# Patient Record
Sex: FEMALE | Race: BLACK OR AFRICAN AMERICAN | NOT HISPANIC OR LATINO | Employment: UNEMPLOYED | URBAN - METROPOLITAN AREA
[De-identification: names, ages, dates, MRNs, and addresses within clinical notes are randomized per-mention and may not be internally consistent; named-entity substitution may affect disease eponyms.]

---

## 2020-10-17 ENCOUNTER — TRANSFERRED RECORDS (OUTPATIENT)
Dept: HEALTH INFORMATION MANAGEMENT | Facility: CLINIC | Age: 75
End: 2020-10-17

## 2020-11-05 ENCOUNTER — TRANSFERRED RECORDS (OUTPATIENT)
Dept: HEALTH INFORMATION MANAGEMENT | Facility: CLINIC | Age: 75
End: 2020-11-05

## 2020-11-05 ENCOUNTER — TRANSCRIBE ORDERS (OUTPATIENT)
Dept: OTHER | Age: 75
End: 2020-11-05

## 2020-11-05 DIAGNOSIS — K63.89 MASS OF COLON: Primary | ICD-10-CM

## 2020-11-09 ENCOUNTER — DOCUMENTATION ONLY (OUTPATIENT)
Dept: SURGERY | Facility: CLINIC | Age: 75
End: 2020-11-09

## 2020-11-09 NOTE — PROGRESS NOTES
Action Aishwarya 11/9/20 10:10AM   Action Taken Mejia recs in CE to review  CSS faxed to [facility] to obtain recs      Action Aishwarya 11/10/20 10:09AM   Action Taken CSS received recs from [facility] - sent to urgent scanning  Still needing CT's Scans- Fax to Mejia to obtain IMGS  Update: Resolved images from [facility]         Action Aishwarya 11/11 11:33AM   Action Taken CSS faxed to [facility] to obtain colon reports once finished on 11/16  CSS will request again     Update: 11/20   [facility]     Action Aishwarya 11/23/20 9:33AM   Action Taken CSS faxed to [facility] to obtain Colon reports

## 2020-11-10 ENCOUNTER — PATIENT OUTREACH (OUTPATIENT)
Dept: SURGERY | Facility: CLINIC | Age: 75
End: 2020-11-10

## 2020-11-10 DIAGNOSIS — C18.7 CANCER OF SIGMOID COLON (H): Primary | ICD-10-CM

## 2020-11-11 NOTE — PROGRESS NOTES
Spoke with daughter about sigmoid ca workup as pt did not have blood work, CT chest, or colonoscopy done. She stated pt is getting this done at OSH. CT is on the 12th and colonoscopy on the 16th. I sent an in basket to Aishwarya to request these reports ASAP.

## 2020-11-16 ENCOUNTER — TRANSFERRED RECORDS (OUTPATIENT)
Dept: HEALTH INFORMATION MANAGEMENT | Facility: CLINIC | Age: 75
End: 2020-11-16

## 2020-11-20 ENCOUNTER — TRANSFERRED RECORDS (OUTPATIENT)
Dept: HEALTH INFORMATION MANAGEMENT | Facility: CLINIC | Age: 75
End: 2020-11-20

## 2020-11-20 NOTE — PROGRESS NOTES
Colon and Rectal Surgery Clinic Note    RE: Myla Glynn.  : 1945.  MINDI: 2020.    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    HPI:     75-year-old lady who underwent CT in 2020 that showed a colovesical fistula and colonoscopy was recommended but she never followed through.    Repeat CT 10/17/20 for dysuria showed:      Additional symptoms include 20-lb unintentional weight loss, fatigue and iron deficiency anemia.  Her hemoglobin has been between 9.4 and 10 since 2019.      She originally met with Dr. Sugey Barry at [facility] who recommended full colonoscopy with biopsy to confirm diagnosis as well as CT chest. This was negative for M1 disease. Due to bladder invasion she referred here for possible complex combined surgery. Recent colonoscopy showed mid sigmoid mass at 40 cm with distal spot. Biopsies were positive for adenocarcinoma.  IHC is pending.    Denies family history of colorectal cancer or inflammatory bowel disease. No previous colonoscopies. No previous anorectal operations. Currently having 1 soft bowel movement without blood every 2-3 days. Does acknowledge dysuria and urinary incontinence.    Medical history:  No past medical history on file.    Surgical history:  No past surgical history on file.    Family history:  No family history on file.    Medications:  Current Outpatient Medications   Medication Sig Dispense Refill     acetaminophen (TYLENOL) 500 MG tablet Take 500 mg by mouth       alum & mag hydroxide-simethicone (MAALOX) 200-200-20 MG/5ML SUSP suspension Take 30 mLs by mouth       hydrochlorothiazide (HYDRODIURIL) 25 MG tablet Take 25 mg by mouth       metFORMIN (GLUCOPHAGE) 500 MG tablet Take 500 mg by mouth       MINTOX 200-200-20 MG/5ML SUSP suspension        omeprazole (PRILOSEC) 20 MG DR capsule Take 20 mg by mouth       ondansetron (ZOFRAN-ODT) 4 MG ODT tab TK 1 T PO EVERY 4 HOURS AS NEEDED  "FOR NAUSEA. PLACE ON TOP OF TONUGE WHERE IT WILL DISSOLVE. THEN SWALLOW.         Allergies:  No Known Allergies    Social history:   Social History     Tobacco Use     Smoking status: Never Smoker     Smokeless tobacco: Never Used   Substance Use Topics     Alcohol use: Not on file     Marital status: .    Physical Examination:  /64 (BP Location: Left arm, Patient Position: Sitting, Cuff Size: Adult Regular)   Ht 5' 1\"   SpO2 100%   General: Well hydrated. No acute distress.  Abdomen: Soft, NT. No masses or  inguinal adenopathy palpated.    Investigations:  None.    Procedures:  None.    ASSESSMENT  75-year-old lady with locally advanced sigmoid adenocarcinoma with invasion into the urinary bladder causing colovesical fistula.  No clear evidence of M1 disease. Risks, benefits, and alternatives of operative treatment were thoroughly discussed with the patient, he/she understands these well and agrees to proceed.    PLAN  1.  Schedule PET/CT and 3T pelvic MR.  2.  Will need immunohistochemistry of recent colonoscopic biopsies.  Review pathology here at the [facility].  3.  Refer to urology for preoperative cystoscopy and intraoperative assistance.  After reviewing CT I think there is a high possibility that she will need a cystectomy.  4.  We will discuss at multidisciplinary colorectal cancer conference.  Mainly to discuss the impact of induction chemotherapy given presence of nonregional lymph node metastatic disease.  Myla and her daughters are not excited about doing chemotherapy before surgery and what much rather have surgery first for this. We will start looking for a date in the operating room for robotic sigmoidectomy with en bloc cystectomy, possible urostomy; flexible sigmoidoscopy. Will need PAC, labs, mechanical bowel prep with antibiotics, and WOC marking/education.  If her hemoglobin is below 10, start iron infusions 2-3/week until surgery date. Also refer to dietitian for " high-protein diet.    Time spent: 60 minutes.  >50% spent in discussing, counseling and coordinating care.    Remi Moscoso M.D., M.Sc.     Division of Colon and Rectal Surgery  [facility]    Referring Provider:  Sugey Barry MD    Primary Care Provider:  Ramu Bhatti MD

## 2020-11-23 ENCOUNTER — OFFICE VISIT (OUTPATIENT)
Dept: SURGERY | Facility: CLINIC | Age: 75
End: 2020-11-23
Attending: COLON & RECTAL SURGERY
Payer: COMMERCIAL

## 2020-11-23 VITALS — OXYGEN SATURATION: 100 % | HEIGHT: 61 IN | SYSTOLIC BLOOD PRESSURE: 134 MMHG | DIASTOLIC BLOOD PRESSURE: 64 MMHG

## 2020-11-23 DIAGNOSIS — C18.7 MALIGNANT NEOPLASM OF SIGMOID COLON (H): Primary | ICD-10-CM

## 2020-11-23 DIAGNOSIS — C18.7 MALIGNANT NEOPLASM OF SIGMOID COLON (H): ICD-10-CM

## 2020-11-23 DIAGNOSIS — N32.1 COLOVESICAL FISTULA: ICD-10-CM

## 2020-11-23 LAB
ABO + RH BLD: NORMAL
ABO + RH BLD: NORMAL
ALBUMIN SERPL-MCNC: 3.4 G/DL (ref 3.4–5)
ALP SERPL-CCNC: 56 U/L (ref 40–150)
ALT SERPL W P-5'-P-CCNC: 12 U/L (ref 0–50)
ANION GAP SERPL CALCULATED.3IONS-SCNC: 9 MMOL/L (ref 3–14)
AST SERPL W P-5'-P-CCNC: 11 U/L (ref 0–45)
BILIRUB SERPL-MCNC: 0.3 MG/DL (ref 0.2–1.3)
BLD GP AB SCN SERPL QL: NORMAL
BLOOD BANK CMNT PATIENT-IMP: NORMAL
BUN SERPL-MCNC: 19 MG/DL (ref 7–30)
CALCIUM SERPL-MCNC: 9.4 MG/DL (ref 8.5–10.1)
CEA SERPL-MCNC: 9.7 UG/L (ref 0–2.5)
CHLORIDE SERPL-SCNC: 100 MMOL/L (ref 94–109)
CO2 SERPL-SCNC: 26 MMOL/L (ref 20–32)
CREAT SERPL-MCNC: 0.95 MG/DL (ref 0.52–1.04)
ERYTHROCYTE [DISTWIDTH] IN BLOOD BY AUTOMATED COUNT: 16.5 % (ref 10–15)
GFR SERPL CREATININE-BSD FRML MDRD: 59 ML/MIN/{1.73_M2}
GLUCOSE SERPL-MCNC: 91 MG/DL (ref 70–99)
HCT VFR BLD AUTO: 28.8 % (ref 35–47)
HGB BLD-MCNC: 8.4 G/DL (ref 11.7–15.7)
INR PPP: 1.1 (ref 0.86–1.14)
MCH RBC QN AUTO: 21.6 PG (ref 26.5–33)
MCHC RBC AUTO-ENTMCNC: 29.2 G/DL (ref 31.5–36.5)
MCV RBC AUTO: 74 FL (ref 78–100)
PLATELET # BLD AUTO: 371 10E9/L (ref 150–450)
POTASSIUM SERPL-SCNC: 3.3 MMOL/L (ref 3.4–5.3)
PREALB SERPL IA-MCNC: 19 MG/DL (ref 15–45)
PROT SERPL-MCNC: 8.1 G/DL (ref 6.8–8.8)
RBC # BLD AUTO: 3.89 10E12/L (ref 3.8–5.2)
SODIUM SERPL-SCNC: 134 MMOL/L (ref 133–144)
SPECIMEN EXP DATE BLD: NORMAL
TRANSFERRIN SERPL-MCNC: 279 MG/DL (ref 210–360)
WBC # BLD AUTO: 8.3 10E9/L (ref 4–11)

## 2020-11-23 PROCEDURE — 85610 PROTHROMBIN TIME: CPT | Performed by: PATHOLOGY

## 2020-11-23 PROCEDURE — 86901 BLOOD TYPING SEROLOGIC RH(D): CPT | Mod: 90 | Performed by: PATHOLOGY

## 2020-11-23 PROCEDURE — 82378 CARCINOEMBRYONIC ANTIGEN: CPT | Mod: 90 | Performed by: PATHOLOGY

## 2020-11-23 PROCEDURE — 99000 SPECIMEN HANDLING OFFICE-LAB: CPT | Performed by: PATHOLOGY

## 2020-11-23 PROCEDURE — 85027 COMPLETE CBC AUTOMATED: CPT | Performed by: PATHOLOGY

## 2020-11-23 PROCEDURE — 84134 ASSAY OF PREALBUMIN: CPT | Performed by: PATHOLOGY

## 2020-11-23 PROCEDURE — 36415 COLL VENOUS BLD VENIPUNCTURE: CPT | Performed by: PATHOLOGY

## 2020-11-23 PROCEDURE — 80053 COMPREHEN METABOLIC PANEL: CPT | Performed by: PATHOLOGY

## 2020-11-23 PROCEDURE — 84466 ASSAY OF TRANSFERRIN: CPT | Mod: 90 | Performed by: PATHOLOGY

## 2020-11-23 PROCEDURE — 86850 RBC ANTIBODY SCREEN: CPT | Mod: 90 | Performed by: PATHOLOGY

## 2020-11-23 PROCEDURE — 86900 BLOOD TYPING SEROLOGIC ABO: CPT | Mod: 90 | Performed by: PATHOLOGY

## 2020-11-23 PROCEDURE — 99203 OFFICE O/P NEW LOW 30 MIN: CPT | Performed by: COLON & RECTAL SURGERY

## 2020-11-23 RX ORDER — ALUMINUM HYDROXIDE, MAGNESIUM HYDROXIDE, DIMETHICONE 200; 200; 20 MG/5ML; MG/5ML; MG/5ML
SUSPENSION ORAL
COMMUNITY
Start: 2020-06-25 | End: 2020-12-29

## 2020-11-23 RX ORDER — ACETAMINOPHEN 500 MG
500 TABLET ORAL
Status: ON HOLD | COMMUNITY
Start: 2020-02-20 | End: 2021-01-12

## 2020-11-23 RX ORDER — MAGNESIUM HYDROXIDE/ALUMINUM HYDROXICE/SIMETHICONE 120; 1200; 1200 MG/30ML; MG/30ML; MG/30ML
30 SUSPENSION ORAL
COMMUNITY
Start: 2020-06-25 | End: 2020-12-29

## 2020-11-23 RX ORDER — ONDANSETRON 4 MG/1
TABLET, ORALLY DISINTEGRATING ORAL
Status: ON HOLD | COMMUNITY
Start: 2020-11-05 | End: 2021-05-05

## 2020-11-23 RX ORDER — HYDROCHLOROTHIAZIDE 25 MG/1
25 TABLET ORAL EVERY MORNING
Status: ON HOLD | COMMUNITY
Start: 2020-10-15 | End: 2021-01-08

## 2020-11-23 NOTE — LETTER
2020       RE: Myla Glynn  [address]  [address]  [place] [address]     Dear Colleague,    Thank you for referring your patient, Myla Glynn, to the [facility] [place] [facility]. Please see a copy of my visit note below.    Colon and Rectal Surgery Clinic Note    RE: Myla Glynn.  : 1945.  MINDI: 2020.    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    HPI:     75-year-old lady who underwent CT in 2020 that showed a colovesical fistula and colonoscopy was recommended but she never followed through.    Repeat CT 10/17/20 for dysuria showed:      Additional symptoms include 20-lb unintentional weight loss, fatigue and iron deficiency anemia.  Her hemoglobin has been between 9.4 and 10 since 2019.      She originally met with Dr. Sugey Barry at [facility] who recommended full colonoscopy with biopsy to confirm diagnosis as well as CT chest. This was negative for M1 disease. Due to bladder invasion she referred here for possible complex combined surgery. Recent colonoscopy showed mid sigmoid mass at 40 cm with distal spot. Biopsies were positive for adenocarcinoma.  IHC is pending.    Denies family history of colorectal cancer or inflammatory bowel disease. No previous colonoscopies. No previous anorectal operations. Currently having 1 soft bowel movement without blood every 2-3 days. Does acknowledge dysuria and urinary incontinence.    Medical history:  No past medical history on file.    Surgical history:  No past surgical history on file.    Family history:  No family history on file.    Medications:  Current Outpatient Medications   Medication Sig Dispense Refill     acetaminophen (TYLENOL) 500 MG tablet Take 500 mg by mouth       alum & mag hydroxide-simethicone (MAALOX) 200-200-20 MG/5ML SUSP suspension Take 30 mLs  "by mouth       hydrochlorothiazide (HYDRODIURIL) 25 MG tablet Take 25 mg by mouth       metFORMIN (GLUCOPHAGE) 500 MG tablet Take 500 mg by mouth       MINTOX 200-200-20 MG/5ML SUSP suspension        omeprazole (PRILOSEC) 20 MG DR capsule Take 20 mg by mouth       ondansetron (ZOFRAN-ODT) 4 MG ODT tab TK 1 T PO EVERY 4 HOURS AS NEEDED FOR NAUSEA. PLACE ON TOP OF TONUGE WHERE IT WILL DISSOLVE. THEN SWALLOW.         Allergies:  No Known Allergies    Social history:   Social History     Tobacco Use     Smoking status: Never Smoker     Smokeless tobacco: Never Used   Substance Use Topics     Alcohol use: Not on file     Marital status: .    Physical Examination:  /64 (BP Location: Left arm, Patient Position: Sitting, Cuff Size: Adult Regular)   Ht 5' 1\"   SpO2 100%   General: Well hydrated. No acute distress.  Abdomen: Soft, NT. No masses or  inguinal adenopathy palpated.    Investigations:  None.    Procedures:  None.    ASSESSMENT  75-year-old lady with locally advanced sigmoid adenocarcinoma with invasion into the urinary bladder causing colovesical fistula.  No clear evidence of M1 disease. Risks, benefits, and alternatives of operative treatment were thoroughly discussed with the patient, he/she understands these well and agrees to proceed.    PLAN  1.  Schedule PET/CT and 3T pelvic MR.  2.  Will need immunohistochemistry of recent colonoscopic biopsies.  Review pathology here at the [facility].  3.  Refer to urology for preoperative cystoscopy and intraoperative assistance.  After reviewing CT I think there is a high possibility that she will need a cystectomy.  4.  We will discuss at multidisciplinary colorectal cancer conference.  Mainly to discuss the impact of induction chemotherapy given presence of nonregional lymph node metastatic disease.  Myla and her daughters are not excited about doing chemotherapy before surgery and what much rather have surgery first for this. We will " start looking for a date in the operating room for robotic sigmoidectomy with en bloc cystectomy, possible urostomy; flexible sigmoidoscopy. Will need PAC, labs, mechanical bowel prep with antibiotics, and WOC marking/education.  If her hemoglobin is below 10, start iron infusions 2-3/week until surgery date. Also refer to dietitian for high-protein diet.    Time spent: 60 minutes.  >50% spent in discussing, counseling and coordinating care.    Remi Moscoso M.D., M.Sc.     Division of Colon and Rectal Surgery  [facility]    Referring Provider:  Sugey Barry MD    Primary Care Provider:  Ramu Bhatti MD

## 2020-11-24 ENCOUNTER — PRE VISIT (OUTPATIENT)
Dept: UROLOGY | Facility: CLINIC | Age: 75
End: 2020-11-24

## 2020-11-24 NOTE — TELEPHONE ENCOUNTER
Chief Complaint : Referral - Dr. Remi Moscoso    Hx/Sx: Sigmoid colon cancer w/ colovesical fistula    Records/Orders: Available    Pt Contacted: N/a    At Rooming: Possible add on cysto    Sergo Haley, EMT

## 2020-11-25 ENCOUNTER — PRE VISIT (OUTPATIENT)
Dept: UROLOGY | Facility: CLINIC | Age: 75
End: 2020-11-25

## 2020-11-25 ENCOUNTER — OFFICE VISIT (OUTPATIENT)
Dept: UROLOGY | Facility: CLINIC | Age: 75
End: 2020-11-25
Attending: COLON & RECTAL SURGERY
Payer: COMMERCIAL

## 2020-11-25 VITALS — DIASTOLIC BLOOD PRESSURE: 60 MMHG | HEART RATE: 81 BPM | SYSTOLIC BLOOD PRESSURE: 106 MMHG

## 2020-11-25 DIAGNOSIS — E11.9 CONTROLLED TYPE 2 DIABETES MELLITUS WITHOUT COMPLICATION, WITHOUT LONG-TERM CURRENT USE OF INSULIN (H): ICD-10-CM

## 2020-11-25 DIAGNOSIS — N32.89 BLADDER MASS: Primary | ICD-10-CM

## 2020-11-25 DIAGNOSIS — C18.7 MALIGNANT NEOPLASM OF SIGMOID COLON (H): ICD-10-CM

## 2020-11-25 PROBLEM — I10 HTN (HYPERTENSION): Status: ACTIVE | Noted: 2017-05-30

## 2020-11-25 PROCEDURE — 99204 OFFICE O/P NEW MOD 45 MIN: CPT | Mod: 25 | Performed by: UROLOGY

## 2020-11-25 PROCEDURE — 52000 CYSTOURETHROSCOPY: CPT | Performed by: UROLOGY

## 2020-11-25 RX ORDER — LIDOCAINE HYDROCHLORIDE 20 MG/ML
JELLY TOPICAL ONCE
Status: COMPLETED | OUTPATIENT
Start: 2020-11-25 | End: 2020-11-25

## 2020-11-25 RX ADMIN — LIDOCAINE HYDROCHLORIDE: 20 JELLY TOPICAL at 11:49

## 2020-11-25 ASSESSMENT — PAIN SCALES - GENERAL: PAINLEVEL: NO PAIN (0)

## 2020-11-25 NOTE — PROGRESS NOTES
Chief Complaint:   Colovesical fistula; adenocarcinoma of the colon         Consult or Referral:     Mr. Myla Glynn is a 75 year old female seen at the request of Dr. Moscoso.         History of Present Illness:    Myla Glynn is a very pleasant 75 year old female who presents with a history of colon cancer with apparent bladder invasion. Recent CT demonstrates evidence of colovesical fistula on 10/17/2020. This is in context of an apparent invasive sigmoid colon cancer, with evidence of bladder invasion. She has noted unintentional tianna loss and fatige, with anemia. Recently met with with Dr. Moscoso from colorectal surgery, who is working her up for surgical excision. She has undergone colonoscopy with noted sigmoid mass and biopsies positive for adenocarcinoma.    She is referred today for evaluation of possible combined procedure. She currently does not complain of hematuria or feculent urine.          Past Medical History:   HTN  DM Type II         Past Surgical History:   No previous surgery         Medications     Current Outpatient Medications   Medication     acetaminophen (TYLENOL) 500 MG tablet     alum & mag hydroxide-simethicone (MAALOX) 200-200-20 MG/5ML SUSP suspension     hydrochlorothiazide (HYDRODIURIL) 25 MG tablet     metFORMIN (GLUCOPHAGE) 500 MG tablet     MINTOX 200-200-20 MG/5ML SUSP suspension     omeprazole (PRILOSEC) 20 MG DR capsule     ondansetron (ZOFRAN-ODT) 4 MG ODT tab     No current facility-administered medications for this visit.           Family History:     No known family history of  malignancy.         Social History:     Social History     Socioeconomic History     Marital status:      Spouse name: Not on file     Number of children: Not on file     Years of education: Not on file     Highest education level: Not on file   Occupational History     Not on file   Social Needs     Financial resource strain: Not on file     Food insecurity      Worry: Not on file     Inability: Not on file     Transportation needs     Medical: Not on file     Non-medical: Not on file   Tobacco Use     Smoking status: Never Smoker     Smokeless tobacco: Never Used   Substance and Sexual Activity     Alcohol use: Not on file     Drug use: Not on file     Sexual activity: Not on file   Lifestyle     Physical activity     Days per week: Not on file     Minutes per session: Not on file     Stress: Not on file   Relationships     Social connections     Talks on phone: Not on file     Gets together: Not on file     Attends Orthodox service: Not on file     Active member of club or organization: Not on file     Attends meetings of clubs or organizations: Not on file     Relationship status: Not on file     Intimate partner violence     Fear of current or ex partner: Not on file     Emotionally abused: Not on file     Physically abused: Not on file     Forced sexual activity: Not on file   Other Topics Concern     Not on file   Social History Narrative     Not on file            Allergies:   Patient has no known allergies.         Review of Systems:  From intake questionnaire     Skin: negative  Eyes: negative  Ears/Nose/Throat: negative  Respiratory: No shortness of breath, dyspnea on exertion, cough, or hemoptysis  Cardiovascular: No chest pain or palpitations  Gastrointestinal: negative; no nausea/vomiting, constipation or diarrhea  Genitourinary: as per HPI  Musculoskeletal: negative  Neurologic: negative  Psychiatric: negative  Hematologic/Lymphatic/Immunologic: negative  Endocrine: negative         Physical Exam:     Patient is a 75 year old  female   Vitals: Blood pressure 106/60, pulse 81.  Constitutional: There is no height or weight on file to calculate BMI.  Alert, no acute distress, oriented, conversant  Eyes: no scleral icterus; extraocular muscles intact, moist conjunctivae  Neck: trachea midline, no thyromegaly  Ears/nose/mouth: throat/mouth:normal, good  dentition  Respiratory: no respiratory distress, or pursed lip breathing  Cardiovascular: pulses strong and intact; no obvious jugular venous distension present  Gastrointestinal: soft, nontender, no organomegaly or masses,   Lymphatics: No inguinal adenopathy  Musculoskeletal: extremities normal, no peripheral edema  Skin: no suspicious lesions or rashes  Neuro: Alert, oriented, speech and mentation normal  Psych: affect and mood normal, alert and oriented to person, place and time  Gait: Normal  : see cystoscopy note      Labs and Pathology:    I reviewed all applicable laboratory and pathology data and went over findings with patient  Significant for   Lab Results   Component Value Date    CR 0.95 11/23/2020         Imaging:    I directly visualized and reviewed all applicable imaging a with patient.    CT abd/pelvis 10/17/2020  IMPRESSION:   1.  Again noted is a colovesical fistula with likely primary colonic malignancy  in the proximal sigmoid colon, with tumor eroding into the adjacent urinary  bladder. This is favored over a primary urothelial malignancy. Gastroenterology,  colorectal surgical, and/or oncologic consultation is advised.    2.  There is associated likely koki metastatic disease.    3.  No urothelial abnormalities in the visualized upper tracts.    4.  Negative for urinary tract calculi or hydronephrosis.      Outside and Past Medical records:    I spent 10 minutes reviewing outside and past medical records.         Assessment and Plan:     Assessment: 75 year old female with invasive sigmoid colon cancer, with involvement of the bladder. We reviewed her images today. Cystoscopy was performed (see separate note). We discussed that there is gross invasion of the bladder from the mass at the dome. At the very least, this will require a partial cystectomy, en bloc with the sigmoid mass. On cystoscopy today, there is a considerable component of the bladder that is not involved with tumor and it  grossly appears that partial cystectomy may be feasible. The mass is at the dome and does not encroach upon the bladder neck or ureteral orifices.  That being said, we also discussed that pending intra-operative findings, it is entirely possible that a radical cystectomy may be required in order to complete an appropriate resection, particularly if there is concern about margin status. In that circumstance, she would require urinary diversion, with either ileal conduit or colon conduit. The risks of this were discussed, including risk of urine leak, ureteroenteric stricture, and potential stoma complications. On discussion today, she expresses a strong desire to preserve the bladder, if at all possible, but otherwise consents to radical cystectomy with urinary diversion, if necessary.    Plan:  - Cystoscopy today - see separate note  - Schedule for partial cystectomy, possible radical cystectomy with urinary diversion  - Will coordinate combined procedure with Dr. Moscoso and colorectal surgery team    Angel Newsome MD  Urology  [facility]

## 2020-11-25 NOTE — LETTER
11/25/2020       RE: Myla Glynn  [address]  [address]  [place] [address]     Dear Colleague,    Thank you for referring your patient, Myla Glynn, to the [facility] [place] at [facility]. Please see a copy of my visit note below.          Chief Complaint:   Colovesical fistula; adenocarcinoma of the colon         Consult or Referral:     Mr. Myla Glynn is a 75 year old female seen at the request of Dr. Moscoso.         History of Present Illness:    Myla Glynn is a very pleasant 75 year old female who presents with a history of colon cancer with apparent bladder invasion. Recent CT demonstrates evidence of colovesical fistula on 10/17/2020. This is in context of an apparent invasive sigmoid colon cancer, with evidence of bladder invasion. She has noted unintentional tianna loss and fatige, with anemia. Recently met with with Dr. Moscoso from colorectal surgery, who is working her up for surgical excision. She has undergone colonoscopy with noted sigmoid mass and biopsies positive for adenocarcinoma.    She is referred today for evaluation of possible combined procedure. She currently does not complain of hematuria or feculent urine.          Past Medical History:   HTN  DM Type II         Past Surgical History:   No previous surgery         Medications     Current Outpatient Medications   Medication     acetaminophen (TYLENOL) 500 MG tablet     alum & mag hydroxide-simethicone (MAALOX) 200-200-20 MG/5ML SUSP suspension     hydrochlorothiazide (HYDRODIURIL) 25 MG tablet     metFORMIN (GLUCOPHAGE) 500 MG tablet     MINTOX 200-200-20 MG/5ML SUSP suspension     omeprazole (PRILOSEC) 20 MG DR capsule     ondansetron (ZOFRAN-ODT) 4 MG ODT tab     No current facility-administered medications for this visit.           Family History:     No known family history of  malignancy.         Social History:     Social History      Socioeconomic History     Marital status:      Spouse name: Not on file     Number of children: Not on file     Years of education: Not on file     Highest education level: Not on file   Occupational History     Not on file   Social Needs     Financial resource strain: Not on file     Food insecurity     Worry: Not on file     Inability: Not on file     Transportation needs     Medical: Not on file     Non-medical: Not on file   Tobacco Use     Smoking status: Never Smoker     Smokeless tobacco: Never Used   Substance and Sexual Activity     Alcohol use: Not on file     Drug use: Not on file     Sexual activity: Not on file   Lifestyle     Physical activity     Days per week: Not on file     Minutes per session: Not on file     Stress: Not on file   Relationships     Social connections     Talks on phone: Not on file     Gets together: Not on file     Attends Yazdanism service: Not on file     Active member of club or organization: Not on file     Attends meetings of clubs or organizations: Not on file     Relationship status: Not on file     Intimate partner violence     Fear of current or ex partner: Not on file     Emotionally abused: Not on file     Physically abused: Not on file     Forced sexual activity: Not on file   Other Topics Concern     Not on file   Social History Narrative     Not on file            Allergies:   Patient has no known allergies.         Review of Systems:  From intake questionnaire     Skin: negative  Eyes: negative  Ears/Nose/Throat: negative  Respiratory: No shortness of breath, dyspnea on exertion, cough, or hemoptysis  Cardiovascular: No chest pain or palpitations  Gastrointestinal: negative; no nausea/vomiting, constipation or diarrhea  Genitourinary: as per HPI  Musculoskeletal: negative  Neurologic: negative  Psychiatric: negative  Hematologic/Lymphatic/Immunologic: negative  Endocrine: negative         Physical Exam:     Patient is a 75 year old  female   Vitals:  Blood pressure 106/60, pulse 81.  Constitutional: There is no height or weight on file to calculate BMI.  Alert, no acute distress, oriented, conversant  Eyes: no scleral icterus; extraocular muscles intact, moist conjunctivae  Neck: trachea midline, no thyromegaly  Ears/nose/mouth: throat/mouth:normal, good dentition  Respiratory: no respiratory distress, or pursed lip breathing  Cardiovascular: pulses strong and intact; no obvious jugular venous distension present  Gastrointestinal: soft, nontender, no organomegaly or masses,   Lymphatics: No inguinal adenopathy  Musculoskeletal: extremities normal, no peripheral edema  Skin: no suspicious lesions or rashes  Neuro: Alert, oriented, speech and mentation normal  Psych: affect and mood normal, alert and oriented to person, place and time  Gait: Normal  : see cystoscopy note      Labs and Pathology:    I reviewed all applicable laboratory and pathology data and went over findings with patient  Significant for   Lab Results   Component Value Date    CR 0.95 11/23/2020         Imaging:    I directly visualized and reviewed all applicable imaging a with patient.    CT abd/pelvis 10/17/2020  IMPRESSION:   1.  Again noted is a colovesical fistula with likely primary colonic malignancy  in the proximal sigmoid colon, with tumor eroding into the adjacent urinary  bladder. This is favored over a primary urothelial malignancy. Gastroenterology,  colorectal surgical, and/or oncologic consultation is advised.    2.  There is associated likely koki metastatic disease.    3.  No urothelial abnormalities in the visualized upper tracts.    4.  Negative for urinary tract calculi or hydronephrosis.      Outside and Past Medical records:    I spent 10 minutes reviewing outside and past medical records.         Assessment and Plan:     Assessment: 75 year old female with invasive sigmoid colon cancer, with involvement of the bladder. We reviewed her images today. Cystoscopy was  performed (see separate note). We discussed that there is gross invasion of the bladder from the mass at the dome. At the very least, this will require a partial cystectomy, en bloc with the sigmoid mass. On cystoscopy today, there is a considerable component of the bladder that is not involved with tumor and it grossly appears that partial cystectomy may be feasible. The mass is at the dome and does not encroach upon the bladder neck or ureteral orifices.  That being said, we also discussed that pending intra-operative findings, it is entirely possible that a radical cystectomy may be required in order to complete an appropriate resection, particularly if there is concern about margin status. In that circumstance, she would require urinary diversion, with either ileal conduit or colon conduit. The risks of this were discussed, including risk of urine leak, ureteroenteric stricture, and potential stoma complications. On discussion today, she expresses a strong desire to preserve the bladder, if at all possible, but otherwise consents to radical cystectomy with urinary diversion, if necessary.    Plan:  - Cystoscopy today - see separate note  - Schedule for partial cystectomy, possible radical cystectomy with urinary diversion  - Will coordinate combined procedure with Dr. Moscoso and colorectal surgery team    Angel Newsome MD  Urology  [facility]

## 2020-11-25 NOTE — NURSING NOTE
Chief Complaint   Patient presents with     Consult     Sigmoid colon cancer w/ colovesical fistula       There were no vitals taken for this visit. There is no height or weight on file to calculate BMI.    There is no problem list on file for this patient.      No Known Allergies    Current Outpatient Medications   Medication Sig Dispense Refill     acetaminophen (TYLENOL) 500 MG tablet Take 500 mg by mouth       alum & mag hydroxide-simethicone (MAALOX) 200-200-20 MG/5ML SUSP suspension Take 30 mLs by mouth       hydrochlorothiazide (HYDRODIURIL) 25 MG tablet Take 25 mg by mouth       metFORMIN (GLUCOPHAGE) 500 MG tablet Take 500 mg by mouth       MINTOX 200-200-20 MG/5ML SUSP suspension        omeprazole (PRILOSEC) 20 MG DR capsule Take 20 mg by mouth       ondansetron (ZOFRAN-ODT) 4 MG ODT tab TK 1 T PO EVERY 4 HOURS AS NEEDED FOR NAUSEA. PLACE ON TOP OF TONUGE WHERE IT WILL DISSOLVE. THEN SWALLOW.         Social History     Tobacco Use     Smoking status: Never Smoker     Smokeless tobacco: Never Used   Substance Use Topics     Alcohol use: Not on file     Drug use: Not on file       Zandra Palafox, EMT  11/25/2020  10:43 AM

## 2020-11-25 NOTE — NURSING NOTE
Chief Complaint   Patient presents with     Consult     Sigmoid colon cancer w/ colovesical fistula       Blood pressure 106/60, pulse 81. There is no height or weight on file to calculate BMI.    Patient Active Problem List   Diagnosis     Controlled type 2 diabetes mellitus without complication, without long-term current use of insulin (H)     HTN (hypertension)       No Known Allergies    Current Outpatient Medications   Medication Sig Dispense Refill     acetaminophen (TYLENOL) 500 MG tablet Take 500 mg by mouth       alum & mag hydroxide-simethicone (MAALOX) 200-200-20 MG/5ML SUSP suspension Take 30 mLs by mouth       hydrochlorothiazide (HYDRODIURIL) 25 MG tablet Take 25 mg by mouth       metFORMIN (GLUCOPHAGE) 500 MG tablet Take 500 mg by mouth       MINTOX 200-200-20 MG/5ML SUSP suspension        omeprazole (PRILOSEC) 20 MG DR capsule Take 20 mg by mouth       ondansetron (ZOFRAN-ODT) 4 MG ODT tab TK 1 T PO EVERY 4 HOURS AS NEEDED FOR NAUSEA. PLACE ON TOP OF TONUGE WHERE IT WILL DISSOLVE. THEN SWALLOW.         Social History     Tobacco Use     Smoking status: Never Smoker     Smokeless tobacco: Never Used   Substance Use Topics     Alcohol use: None     Drug use: None       Invasive Procedure Safety Checklist:    Procedure: Cystoscopy    Action: Complete sections and checkboxes as appropriate.    Pre-procedure:  1. Patient ID Verified with 2 identifiers (Alexa and  or MRN) : YES    2. Procedure and site verified with patient/designee (when able) : YES    3. Accurate consent documentation in medical record : YES    4. H&P (or appropriate assessment) documented in medical record : N/A  H&P must be up to 30 days prior to procedure an updated within 24 hours of                 Procedure as applicable.     5. Relevant diagnostic and radiology test results appropriately labeled and displayed as applicable : YES    6. Blood products, implants, devices, and/or special equipment available for the procedure as  applicable : YES    7. Procedure site(s) marked with provider initials [Exclusions: none] : NO    8. Marking not required. Reason : Yes  Procedure does not require site marking    Time Out:     Time-Out performed immediately prior to starting procedure, including verbal and active participation of all team members addressing: YES    1. Correct patient identity.  2. Confirmed that the correct side and site are marked.  3. An accurate procedure to be done.  4. Agreement on the procedure to be done.  5. Correct patient position.  6. Relevant images and results are properly labeled and appropriately displayed.  7. The need to administer antibiotics or fluids for irrigation purposes during the procedure as applicable.  8. Safety precautions based on patient history or medication use.    During Procedure: Verification of correct person, site, and procedure occurs any time the responsibility for care of the patient is transferred to another member of the care team.    The following medication was given:     MEDICATION: Lidocaine Uro-Jet 2% 200mg (20mg/mL)  ROUTE: Urethral   SITE: Urethra   DOSE: 10mL  LOT #: [id number]  : [employer]   EXPIRATION DATE: 8-22  NDC#: [address]-38   Was there drug waste? No    Prior to injection, verified patient identity using patient's name and date of birth.  Due to injection administration, patient instructed to remain in clinic for 15 minutes  afterwards, and to report any adverse reaction to me immediately.    Drug Amount Wasted:  None.  Vial/Syringe: Single dose vial      AURELIO Delatorre  11/25/2020  11:49 AM

## 2020-11-27 RX ORDER — HEPARIN SODIUM 5000 [USP'U]/.5ML
5000 INJECTION, SOLUTION INTRAVENOUS; SUBCUTANEOUS
Status: CANCELLED | OUTPATIENT
Start: 2020-11-27

## 2020-11-27 NOTE — PROCEDURES
OFFICE CYSTOSCOPY 11/25/2020    Pre-procedure diagnosis:  Sigmoid colon cancer  Post-procedure diagnosis: Bladder invasion of colon cancer  Procedure performed:  Cystourethroscopy  Surgeon:    Angel Newsome MD   Anesthesia:    Local    Indications for procedure:   Myla Glynn is a 75 year old female with a history of sigmoid colon cancer with apparent bladder invasion.    Description of procedure:   After fully informed, voluntary consent was obtained, the patient was brought into the procedure room, identified and placed in a dorsal lithotomy position on the cystoscopy table.  The vagina/introitus were prepped with betadine and draped in a sterile fashion.  Urojet lidocaine gel was introduced.  A 15F flexible cystoscope was inserted into the urethra, and the bladder and urethra were examined in a systematic manner.  The patient tolerated the procedure well and there were no complications.      Findings:  External exam revealed no cystocele and no rectocele.   Cystoscopy then showed the urethra to be normal in appearance with normal coaptation. The bladder itself was completely surveyed.  The ureteric orifices were normal in position and number and effluxing clear urine.  There was no trabeculation.  Bladder mass noted at anterior bladder wall, along the left side. This was near the dome. Mass consistent with location of invasive sigmoid colon cancer, as noted on scan. The remainder of bladder mucosa was unremarkable. No involvement of bladder neck or either ureteral orifice. Given normal appearance of remaining bladder mucosa and tumor location along the dome, partial cystectomy may be feasible.     Assessment/Plan:   Myla Glynn is a 75 year old female with a history of new diagnosis sigmoid colon cancer. Has invasion of bladder. Will plan for surgical removal in combination with Dr. Moscoso of colorectal surgery.    Angel Newsome MD  Urology  [facility]

## 2020-11-30 ENCOUNTER — VIRTUAL VISIT (OUTPATIENT)
Dept: GASTROENTEROLOGY | Facility: CLINIC | Age: 75
End: 2020-11-30
Attending: COLON & RECTAL SURGERY
Payer: COMMERCIAL

## 2020-11-30 ENCOUNTER — TELEPHONE (OUTPATIENT)
Dept: SURGERY | Facility: CLINIC | Age: 75
End: 2020-11-30

## 2020-11-30 ENCOUNTER — PREP FOR PROCEDURE (OUTPATIENT)
Dept: ONCOLOGY | Facility: CLINIC | Age: 75
End: 2020-11-30

## 2020-11-30 DIAGNOSIS — N32.89 BLADDER MASS: ICD-10-CM

## 2020-11-30 DIAGNOSIS — E11.9 CONTROLLED TYPE 2 DIABETES MELLITUS WITHOUT COMPLICATION, WITHOUT LONG-TERM CURRENT USE OF INSULIN (H): ICD-10-CM

## 2020-11-30 DIAGNOSIS — C18.7 MALIGNANT NEOPLASM OF SIGMOID COLON (H): Primary | ICD-10-CM

## 2020-11-30 PROCEDURE — 97802 MEDICAL NUTRITION INDIV IN: CPT | Mod: TEL | Performed by: DIETITIAN, REGISTERED

## 2020-11-30 NOTE — PATIENT INSTRUCTIONS
It was nice speaking with you today. Below are the nutrition recommendations we discussed at your visit.  Please let me know if you have any additional questions.    Nutrition recommendations    1. Aim for eating 6 smaller meals per day.    2. Drink at least 1-2 nutrition drinks per day. Some examples include Ensure Max, Ensure High Protein, Boost High Protein, Boost Glucose Control or Glucerna. (these nutrition drinks count as 1-2 of your 6 smaller meals per day).    3. Include some protein at all of your smaller meals. Some protein sources include fish, chicken, turkey, beef, black beans, kidney beans, lentils, tofu, peanut butter, eggs, milk.    4. Increase fluids to drink at least 6-8 cups per day (ideally increase water intake and protein drinks count towards fluids as well).    5. Can write down everything you eat and drink to help you track how much you are eating and/or help remind you to eat and drink enough each day.    Can follow up in 2 months or as needed.    If you would like to schedule a follow up appointment with Deepti Sosa, Registered Dietitian, please call [phone].    Deepti Sosa, MS, RD, LD

## 2020-11-30 NOTE — PROGRESS NOTES
Robotic Sigmoidectomy With En Bloc Cystectomy, Possible Urostomy; Flexible Sigmoidoscopy; Sigmoidoscopy, Flexible    Partial Cystectomy; Cystourethroscopy; Possible Radical Cystectomy With Urinary Diversion

## 2020-11-30 NOTE — LETTER
"    11/30/2020         RE: Myla Glynn  [address]  [address]  [place] [address]        Dear Colleague,    Thank you for referring your patient, Myla Glynn, to the [facility] [place]. Please see a copy of my visit note below.    Myla Glynn is a 75 year old female who is being evaluated via a billable telephone visit.      The patient has been notified of following:     \"This telephone visit will be conducted via a call between you and your physician/provider. We have found that certain health care needs can be provided without the need for a physical exam.  This service lets us provide the care you need with a short phone conversation.  If a prescription is necessary we can send it directly to your pharmacy.  If lab work is needed we can place an order for that and you can then stop by our lab to have the test done at a later time.    Telephone visits are billed at different rates depending on your insurance coverage. During this emergency period, for some insurers they may be billed the same as an in-person visit.  Please reach out to your insurance provider with any questions.    If during the course of the call the physician/provider feels a telephone visit is not appropriate, you will not be charged for this service.\"    Patient has given verbal consent for Telephone visit?  Yes  During this virtual visit the patient is located in [place], patient verifies this as the location during the entirety of this visit.     What phone number would you like to be contacted at? [phone].   Note: used [employer]  services for entire visit and had [employer] , Darrian ID# [id number].  Her daughter was also on call with patient today.    How would you like to obtain your AVS? Mail a copy    Phone call duration: 46 minutes    Deepti Sosa, MS, RD, LD      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  Patient will have robotic sigmoidectomy with en bloc cystectomy and possible urostomy and flexible sigmoidoscopy. Additionally, patient has had unintentional weight loss of 20 lbs. Referred to RD to discuss high protein diet. Conducted phone visit today using [employer] .    Patient stated that she does not have pain/issues with eating just has decreased appetite and forces herself to eats. She stated that she was told that she lost 20 lbs but was not sure what her weight was prior to now. She weighed herself during the visit today and was 117 lbs. She stated that she has noticed a visible difference/loss of weight in her body appearance, face and clothes are all too big for her now. Her daughter was also on the phone visit with patient and [employer]  as well.     Patient Active Problem List   Diagnosis     Controlled type 2 diabetes mellitus without complication, without long-term current use of insulin (H)     HTN (hypertension)     Malignant neoplasm of sigmoid colon (H)     Bladder mass     Height:   Ht Readings from Last 1 Encounters:   11/23/20 1.549 m (5' 1\")     Weight: Current weight is 117 lbs (weighed herself during visit today).  She wasn't sure what her exact weight was but her doctor told her that she lost 20 lbs. She also noticed a visible difference in body appearance, face and clothes are all too big now.  From chart review pt was 126 lbs on 10/15/2020 so down 7% in 6 weeks.     BMI:  22.11    Diet Recall:  (some usual meals): Typically waits until 11am to eat something and skips a morning meal. She eats lunch and dinner and typically " a snack. She does not eat anything after 6pm otherwise feels too heavy if eats later.  Meal Food    Breakfast skips   Lunch 11am: 1 egg, homemade WW injera bread   Dinner Before 6pm: homemade vegetables or meat soup with homemade injera bread    Snacks Homemade injera bread with beef, tomato pasta and homemade sorghum with bread   Beverages 2 cups black tea with milk and sometimes coffee, 1 c water   Alcohol Intake none     Labs:  Last Comprehensive Metabolic Panel:  Sodium   Date Value Ref Range Status   11/23/2020 134 133 - 144 mmol/L Final     Potassium   Date Value Ref Range Status   11/23/2020 3.3 (L) 3.4 - 5.3 mmol/L Final     Chloride   Date Value Ref Range Status   11/23/2020 100 94 - 109 mmol/L Final     Carbon Dioxide   Date Value Ref Range Status   11/23/2020 26 20 - 32 mmol/L Final     Anion Gap   Date Value Ref Range Status   11/23/2020 9 3 - 14 mmol/L Final     Glucose   Date Value Ref Range Status   11/23/2020 91 70 - 99 mg/dL Final     Urea Nitrogen   Date Value Ref Range Status   11/23/2020 19 7 - 30 mg/dL Final     Creatinine   Date Value Ref Range Status   11/23/2020 0.95 0.52 - 1.04 mg/dL Final     GFR Estimate   Date Value Ref Range Status   11/23/2020 59 (L) >60 mL/min/[1.73_m2] Final     Comment:     Non  GFR Calc  Starting 12/18/2018, serum creatinine based estimated GFR (eGFR) will be   calculated using the Chronic Kidney Disease Epidemiology Collaboration   (CKD-EPI) equation.       Calcium   Date Value Ref Range Status   11/23/2020 9.4 8.5 - 10.1 mg/dL Final     Last Comprehensive Metabolic Panel:  Sodium   Date Value Ref Range Status   11/23/2020 134 133 - 144 mmol/L Final     Potassium   Date Value Ref Range Status   11/23/2020 3.3 (L) 3.4 - 5.3 mmol/L Final     Chloride   Date Value Ref Range Status   11/23/2020 100 94 - 109 mmol/L Final     Carbon Dioxide   Date Value Ref Range Status   11/23/2020 26 20 - 32 mmol/L Final     Anion Gap   Date Value Ref Range Status    11/23/2020 9 3 - 14 mmol/L Final     Glucose   Date Value Ref Range Status   11/23/2020 91 70 - 99 mg/dL Final     Urea Nitrogen   Date Value Ref Range Status   11/23/2020 19 7 - 30 mg/dL Final     Creatinine   Date Value Ref Range Status   11/23/2020 0.95 0.52 - 1.04 mg/dL Final     GFR Estimate   Date Value Ref Range Status   11/23/2020 59 (L) >60 mL/min/[1.73_m2] Final     Comment:     Non  GFR Calc  Starting 12/18/2018, serum creatinine based estimated GFR (eGFR) will be   calculated using the Chronic Kidney Disease Epidemiology Collaboration   (CKD-EPI) equation.       Calcium   Date Value Ref Range Status   11/23/2020 9.4 8.5 - 10.1 mg/dL Final     Pertinent Medications/vitamin and mineral supplements:     Current Outpatient Medications   Medication     acetaminophen (TYLENOL) 500 MG tablet     alum & mag hydroxide-simethicone (MAALOX) 200-200-20 MG/5ML SUSP suspension     hydrochlorothiazide (HYDRODIURIL) 25 MG tablet     metFORMIN (GLUCOPHAGE) 500 MG tablet     MINTOX 200-200-20 MG/5ML SUSP suspension     omeprazole (PRILOSEC) 20 MG DR capsule     ondansetron (ZOFRAN-ODT) 4 MG ODT tab     No current facility-administered medications for this visit.      Food Allergies:  NKFA    MALNUTRITION:  % Weight Loss:  > 7.5% in 3 months (severe malnutrition)  % Intake:  <75% for >/= 1 month (non-severe malnutrition)  Subcutaneous Fat Loss:  Unable to assess. Pt reported being able to visably see body changes weight loss in body and face.  Muscle Loss:  Unable to assess.  Fluid Retention:  None noted    Malnutrition Diagnosis: Non-Severe malnutrition  In Context of:  Chronic illness or disease    Nutrition Diagnosis:    Unintentional weight loss related to decreased appetite and intake as evidenced by pt report and 14% weight loss over the last several months.    Nutrition Prescription: increased calories and increased protein diet.    Nutrition Intervention:    Nutrition Education/Counseling:  Diet  education with tips and suggestions for increasing calorie and protein.  Reviewed good protein sources based on patient's food preferences.  Recommended eating at least 5-6 smaller meals per day as smaller meals more often may be better tolerated than fewer larger meals per day.  Recommended patient incorporate 1-2 oral nutrition drinks per day.  Discussed some examples.  Increase water/fluids to drink at least 6 to 8 cups (48 to 64 ounces) per day.  Also discussed writing down food and beverages consumed to help patient stay on track with eating fairly and to help give an idea of how much she is eating each day and also to remind her to eat.    Educational Materials Provided:  Mailing Tips for increasing calories and protein handouts.      Goals:  1. Aim for eating 6 smaller meals per day.    2. Drink at least 1-2 nutrition drinks per day. Some examples include Ensure Max, Ensure High Protein, Boost High Protein, Boost Glucose Control or Glucerna. (these nutrition drinks count as 1-2 of your 6 smaller meals per day).    3. Include some protein at all of your smaller meals. Some protein sources include fish, chicken, turkey, beef, black beans, kidney beans, lentils, tofu, peanut butter, eggs, milk.    4. Increase fluids to drink at least 6-8 cups per day (ideally increase water intake and protein drinks count towards fluids as well).    5. Can write down everything you eat and drink to help you track how much you are eating and/or help remind you to eat and drink enough each day.    Nutrition Monitoring and Evaluation: Will monitor adherence to nutrition recommendations at future RD visits.     Further Medical Nutrition Therapy:  Can follow up in 2 months or as needed.    Patient was encouraged to call/contact RD with any further questions.    Deepti Sosa, MS, RD, LD

## 2020-11-30 NOTE — PROGRESS NOTES
"Myla Glynn is a 75 year old female who is being evaluated via a billable telephone visit.      The patient has been notified of following:     \"This telephone visit will be conducted via a call between you and your physician/provider. We have found that certain health care needs can be provided without the need for a physical exam.  This service lets us provide the care you need with a short phone conversation.  If a prescription is necessary we can send it directly to your pharmacy.  If lab work is needed we can place an order for that and you can then stop by our lab to have the test done at a later time.    Telephone visits are billed at different rates depending on your insurance coverage. During this emergency period, for some insurers they may be billed the same as an in-person visit.  Please reach out to your insurance provider with any questions.    If during the course of the call the physician/provider feels a telephone visit is not appropriate, you will not be charged for this service.\"    Patient has given verbal consent for Telephone visit?  Yes  During this virtual visit the patient is located in [place], patient verifies this as the location during the entirety of this visit.     What phone number would you like to be contacted at? [phone].   Note: used [place]  services for entire visit and had [employer] , Darrian ID# [id number].  Her daughter was also on call with patient today.    How would you like to obtain your AVS? 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  Patient will have robotic sigmoidectomy with en bloc cystectomy and possible urostomy and flexible sigmoidoscopy. Additionally, patient has had unintentional weight loss of 20 lbs. Referred to RD to discuss high protein diet. Conducted phone visit today using [employer] .    Patient stated that she does not have pain/issues with eating just has decreased appetite and forces herself to eats. She stated that she was told that she lost 20 lbs but was not sure what her weight was prior to now. She weighed herself during the visit today and was 117 lbs. She stated that she has noticed a visible difference/loss of weight in her body appearance, face and clothes are all too big for her now. Her daughter was also on the phone visit with patient and [employer]  as well.     Patient Active Problem List   Diagnosis     Controlled type 2 diabetes mellitus without complication, without long-term current use of insulin (H)     HTN (hypertension)     Malignant neoplasm of sigmoid colon (H)     Bladder mass     Height:   Ht Readings from Last 1 Encounters:   11/23/20 1.549 m (5' 1\")     Weight: Current weight is 117 lbs (weighed herself during visit today).  She wasn't sure what her exact weight was but her doctor told her that she lost 20 lbs. She also noticed a visible difference in body appearance, face and clothes are all too big now.  From chart review pt was 126 lbs on 10/15/2020 so down 7% in 6 weeks.     BMI:  22.11    Diet Recall:  (some usual meals): Typically waits until 11am to eat something and skips a morning meal. She eats lunch and dinner and typically a snack. She does not eat anything after 6pm otherwise feels too heavy if eats later.  Meal Food    Breakfast skips   Lunch 11am: 1 egg, homemade WW injera bread   Dinner Before 6pm: homemade vegetables or meat soup with homemade injera bread    Snacks Homemade injera bread with beef, tomato pasta and " homemade sorghum with bread   Beverages 2 cups black tea with milk and sometimes coffee, 1 c water   Alcohol Intake none     Labs:  Last Comprehensive Metabolic Panel:  Sodium   Date Value Ref Range Status   11/23/2020 134 133 - 144 mmol/L Final     Potassium   Date Value Ref Range Status   11/23/2020 3.3 (L) 3.4 - 5.3 mmol/L Final     Chloride   Date Value Ref Range Status   11/23/2020 100 94 - 109 mmol/L Final     Carbon Dioxide   Date Value Ref Range Status   11/23/2020 26 20 - 32 mmol/L Final     Anion Gap   Date Value Ref Range Status   11/23/2020 9 3 - 14 mmol/L Final     Glucose   Date Value Ref Range Status   11/23/2020 91 70 - 99 mg/dL Final     Urea Nitrogen   Date Value Ref Range Status   11/23/2020 19 7 - 30 mg/dL Final     Creatinine   Date Value Ref Range Status   11/23/2020 0.95 0.52 - 1.04 mg/dL Final     GFR Estimate   Date Value Ref Range Status   11/23/2020 59 (L) >60 mL/min/[1.73_m2] Final     Comment:     Non  GFR Calc  Starting 12/18/2018, serum creatinine based estimated GFR (eGFR) will be   calculated using the Chronic Kidney Disease Epidemiology Collaboration   (CKD-EPI) equation.       Calcium   Date Value Ref Range Status   11/23/2020 9.4 8.5 - 10.1 mg/dL Final     Last Comprehensive Metabolic Panel:  Sodium   Date Value Ref Range Status   11/23/2020 134 133 - 144 mmol/L Final     Potassium   Date Value Ref Range Status   11/23/2020 3.3 (L) 3.4 - 5.3 mmol/L Final     Chloride   Date Value Ref Range Status   11/23/2020 100 94 - 109 mmol/L Final     Carbon Dioxide   Date Value Ref Range Status   11/23/2020 26 20 - 32 mmol/L Final     Anion Gap   Date Value Ref Range Status   11/23/2020 9 3 - 14 mmol/L Final     Glucose   Date Value Ref Range Status   11/23/2020 91 70 - 99 mg/dL Final     Urea Nitrogen   Date Value Ref Range Status   11/23/2020 19 7 - 30 mg/dL Final     Creatinine   Date Value Ref Range Status   11/23/2020 0.95 0.52 - 1.04 mg/dL Final     GFR Estimate   Date  Value Ref Range Status   11/23/2020 59 (L) >60 mL/min/[1.73_m2] Final     Comment:     Non  GFR Calc  Starting 12/18/2018, serum creatinine based estimated GFR (eGFR) will be   calculated using the Chronic Kidney Disease Epidemiology Collaboration   (CKD-EPI) equation.       Calcium   Date Value Ref Range Status   11/23/2020 9.4 8.5 - 10.1 mg/dL Final     Pertinent Medications/vitamin and mineral supplements:     Current Outpatient Medications   Medication     acetaminophen (TYLENOL) 500 MG tablet     alum & mag hydroxide-simethicone (MAALOX) 200-200-20 MG/5ML SUSP suspension     hydrochlorothiazide (HYDRODIURIL) 25 MG tablet     metFORMIN (GLUCOPHAGE) 500 MG tablet     MINTOX 200-200-20 MG/5ML SUSP suspension     omeprazole (PRILOSEC) 20 MG DR capsule     ondansetron (ZOFRAN-ODT) 4 MG ODT tab     No current facility-administered medications for this visit.      Food Allergies:  NKFA    MALNUTRITION:  % Weight Loss:  > 7.5% in 3 months (severe malnutrition)  % Intake:  <75% for >/= 1 month (non-severe malnutrition)  Subcutaneous Fat Loss:  Unable to assess. Pt reported being able to visably see body changes weight loss in body and face.  Muscle Loss:  Unable to assess.  Fluid Retention:  None noted    Malnutrition Diagnosis: Non-Severe malnutrition  In Context of:  Chronic illness or disease    Nutrition Diagnosis:    Unintentional weight loss related to decreased appetite and intake as evidenced by pt report and 14% weight loss over the last several months.    Nutrition Prescription: increased calories and increased protein diet.    Nutrition Intervention:    Nutrition Education/Counseling:  Diet education with tips and suggestions for increasing calorie and protein.  Reviewed good protein sources based on patient's food preferences.  Recommended eating at least 5-6 smaller meals per day as smaller meals more often may be better tolerated than fewer larger meals per day.  Recommended patient  incorporate 1-2 oral nutrition drinks per day.  Discussed some examples.  Increase water/fluids to drink at least 6 to 8 cups (48 to 64 ounces) per day.  Also discussed writing down food and beverages consumed to help patient stay on track with eating fairly and to help give an idea of how much she is eating each day and also to remind her to eat.    Educational Materials Provided:  Mailing Tips for increasing calories and protein handouts.      Goals:  1. Aim for eating 6 smaller meals per day.    2. Drink at least 1-2 nutrition drinks per day. Some examples include Ensure Max, Ensure High Protein, Boost High Protein, Boost Glucose Control or Glucerna. (these nutrition drinks count as 1-2 of your 6 smaller meals per day).    3. Include some protein at all of your smaller meals. Some protein sources include fish, chicken, turkey, beef, black beans, kidney beans, lentils, tofu, peanut butter, eggs, milk.    4. Increase fluids to drink at least 6-8 cups per day (ideally increase water intake and protein drinks count towards fluids as well).    5. Can write down everything you eat and drink to help you track how much you are eating and/or help remind you to eat and drink enough each day.    Nutrition Monitoring and Evaluation: Will monitor adherence to nutrition recommendations at future RD visits.     Further Medical Nutrition Therapy:  Can follow up in 2 months or as needed.    Patient was encouraged to call/contact RD with any further questions.    Deepti Sosa, MS, RD, LD

## 2020-11-30 NOTE — TELEPHONE ENCOUNTER
Spoke with patient's daughter Michele with patient available in the background and an [employer]  on the phone to assist. Imaging appointments were already scheduled. Completed the following scheduling:    Patient is scheduled for surgery with Dr. Moscoso and Dr. Newsome    Spoke with Michele with patient present    Date of Surgery: 1/5/2021    Location: [facility]    Pre-op with surgeon (if applicable): 11/23/2020    Ostomy Nutrition appointment completed via phone on 11/30/2020 at 9:30 am    PET Oncology Whole Body: 12/22/2020 at 12:00 pm    MRI Pelvis (Intra-pelvic organs): 12/22/2020 at 3:00 pm    H&P: Scheduled with PAC on 12/29/2020 at 10:30 am    WOC scheduled on 12/29/2020 at 10:30 am    Labs scheduled at the [facility] on 12/29/2020 at 11:45 am    COVID test at [facility] on Saturday 1/2/2020 at 10:00 am    Surgery packet: will send via Mail

## 2020-12-01 ENCOUNTER — TRANSFERRED RECORDS (OUTPATIENT)
Dept: HEALTH INFORMATION MANAGEMENT | Facility: CLINIC | Age: 75
End: 2020-12-01

## 2020-12-01 NOTE — TELEPHONE ENCOUNTER
FUTURE VISIT INFORMATION      SURGERY INFORMATION:    Date: 20    Location: [facility] OR    Surgeon:  Remi Moscoso MD    Anesthesia Type:  General    Procedure: Robotic sigmoidectomy with en bloc cystectomy, possible urostomy; flexible sigmoidoscopy SIGMOIDOSCOPY, FLEXIBLE    RECORDS REQUESTED FROM:       Primary Care Provider: Mej[facility]    Pertinent Medical History: Hypertension    Most recent EKG+ Tracin20- Mejia

## 2020-12-02 DIAGNOSIS — D64.9 ANEMIA: ICD-10-CM

## 2020-12-03 ENCOUNTER — PATIENT OUTREACH (OUTPATIENT)
Dept: SURGERY | Facility: CLINIC | Age: 75
End: 2020-12-03

## 2020-12-03 DIAGNOSIS — D64.9 ANEMIA: Primary | ICD-10-CM

## 2020-12-03 NOTE — PROGRESS NOTES
Spoke with Michele. Gave her the number to call to get set up for iron infusions and told her the patient will need a COVID test before infusions. We will do 5 infusions and then recheck her hemoglobin after.

## 2020-12-04 DIAGNOSIS — R60.9 EDEMA: Primary | ICD-10-CM

## 2020-12-06 DIAGNOSIS — Z11.59 ENCOUNTER FOR SCREENING FOR OTHER VIRAL DISEASES: Primary | ICD-10-CM

## 2020-12-07 ENCOUNTER — TUMOR CONFERENCE (OUTPATIENT)
Dept: ONCOLOGY | Facility: CLINIC | Age: 75
End: 2020-12-07
Payer: COMMERCIAL

## 2020-12-07 NOTE — PROGRESS NOTES
Tumor Conference Information  Tumor Conference: Colorectal  Specialties Present: Medical oncology, Radiation oncology, Radiology, Surgery  Patient Status: A new patient  Pathology: Not Discussed  Treatment to Date: None  Clinical Trial Eligibility: Not discussed  Recommended Plan: Surgery  Did the review exceed 30 minutes?: did not           Documentation / Disclaimer Cancer Tumor Board Note  Cancer tumor board recommendations do not override what is determined to be reasonable care and treatment, which is dependent on the circumstances of a patient's case; the patient's medical, social, and personal concerns; and the clinical judgment of the oncologist [physician].

## 2020-12-07 NOTE — TELEPHONE ENCOUNTER
Called patient's daughter and left voicemail about some appointments patient will need prior to surgery with Dr. Moscoso and Dr. Newsome. Informed patient's daughter that the appointments were scheduled on the same day she is seeing PAC and that  will send out information about the appointments and surgery. Gave [phone] as call back number if patient has any questions.

## 2020-12-09 DIAGNOSIS — D64.9 ANEMIA: ICD-10-CM

## 2020-12-09 PROCEDURE — U0003 INFECTIOUS AGENT DETECTION BY NUCLEIC ACID (DNA OR RNA); SEVERE ACUTE RESPIRATORY SYNDROME CORONAVIRUS 2 (SARS-COV-2) (CORONAVIRUS DISEASE [COVID-19]), AMPLIFIED PROBE TECHNIQUE, MAKING USE OF HIGH THROUGHPUT TECHNOLOGIES AS DESCRIBED BY CMS-2020-01-R: HCPCS | Performed by: COLON & RECTAL SURGERY

## 2020-12-10 ENCOUNTER — PATIENT OUTREACH (OUTPATIENT)
Dept: SURGERY | Facility: CLINIC | Age: 75
End: 2020-12-10

## 2020-12-10 LAB
SARS-COV-2 RNA SPEC QL NAA+PROBE: NOT DETECTED
SPECIMEN SOURCE: NORMAL

## 2020-12-10 NOTE — PROGRESS NOTES
Spoke with daughter who stated her mom has been constipated. She denies nausea, vomiting, abd bloating. Recommend daily miralax. She can increase this to two times a day if this does not help or she has nausea, vomiting or abd bloating to give me a call back

## 2020-12-14 ENCOUNTER — INFUSION THERAPY VISIT (OUTPATIENT)
Dept: INFUSION THERAPY | Facility: CLINIC | Age: 75
End: 2020-12-14
Attending: COLON & RECTAL SURGERY
Payer: COMMERCIAL

## 2020-12-14 VITALS
OXYGEN SATURATION: 99 % | HEART RATE: 80 BPM | SYSTOLIC BLOOD PRESSURE: 120 MMHG | DIASTOLIC BLOOD PRESSURE: 64 MMHG | RESPIRATION RATE: 18 BRPM | TEMPERATURE: 98.3 F

## 2020-12-14 DIAGNOSIS — D50.8 OTHER IRON DEFICIENCY ANEMIA: Primary | ICD-10-CM

## 2020-12-14 DIAGNOSIS — C18.7 MALIGNANT NEOPLASM OF SIGMOID COLON (H): ICD-10-CM

## 2020-12-14 PROCEDURE — 96365 THER/PROPH/DIAG IV INF INIT: CPT

## 2020-12-14 PROCEDURE — 250N000011 HC RX IP 250 OP 636: Performed by: COLON & RECTAL SURGERY

## 2020-12-14 PROCEDURE — 258N000003 HC RX IP 258 OP 636: Performed by: COLON & RECTAL SURGERY

## 2020-12-14 RX ADMIN — IRON SUCROSE 200 MG: 20 INJECTION, SOLUTION INTRAVENOUS at 10:21

## 2020-12-14 NOTE — LETTER
12/14/2020         RE: Myla Glynn  [address]  [address]  [place] [address]        Dear Colleague,    Thank you for referring your patient, Myla Glynn, to the [facility] TREAT[facility]. Please see a copy of my visit note below.    Nursing Note  Myla Glynn [facility]:   Chief Complaint   Patient presents with     Infusion     IV Venofer     During [facility]'s [facility] appointment, orders from Dr. SIXTO Moscoso were completed.  Frequency: today is dose 1 of 5 total    Progress note:  Patient identification verified by name and date of birth.  Assessment completed.  Vitals recorded in Doc Flowsheets.  Patient was provided with education regarding medication/procedure and possible side effects.  Patient verbalized understanding.     present during visit today: Yes, Language: ClubLocal  services used.     Treatment Conditions: Patient denies fever, chills, signs of infection, recent illness, antibiotics use, productive cough or elevated temperature.    Premedications: were not ordered.    Drug Waste Record: No    Infusion length and rate:  infusion given over approximately 15 minutes + 15 minute observation.     Labs: were not ordered for this appointment.    Vascular access: peripheral IV placed today.    Patient c/o burning during urination x 1 week. Vitals stable today. RN paged Dr. Moscoso to discuss. Encouraged patient drink plenty of fluids and contact MD if symptoms do not improve or urgent care if symptoms are intolerable. Patient voiced understanding and agrees with plan of care. Staff message also sent to Saima HENLEY RN to reach out to patient.     Post Infusion Assessment:  Patient tolerated infusion without incident.  Patient observed 15 minutes post infusion for first dose med.   Blood return noted pre and post infusion.  Site patent and intact, free  from redness, edema or discomfort.  No evidence of extravasations.  Access discontinued per protocol.     Discharge Plan:   AVS given to patient.   Staff message sent to  to contact patient's daughter with remaining infusion appts.   Follow up plan of care with: ongoing infusions [facility].  Discharge instructions were reviewed with patient.  Pt left in stable condition with daughter.   Patient/representative verbalized understanding of discharge instructions and all questions answered.  Patient discharged [facility] in stable condition.    Administrations This Visit     iron sucrose (VENOFER) 200 mg in sodium chloride 0.9 % 100 mL intermittent infusion     Admin Date  12/14/2020 Action  New Bag Dose  200 mg Rate  480 mL/hr Route  Intravenous Administered By  Tarah Van RN              Vital signs:  Temp: 98.3  F (36.8  C) Temp src: Oral BP: 130/68 Pulse: 83   Resp: 18 SpO2: 99 % O2 Device: None (Room air)        There is no height or weight on file to calculate BMI.              Again, thank you for allowing me to participate in the care of your patient.        Sincerely,        VIDEO,

## 2020-12-14 NOTE — PROGRESS NOTES
Nursing Note  Myla Glynn [facility] for:   Chief Complaint   Patient presents with     Infusion     IV Venofer     During [facility]'s [facility] appointment, orders from Dr. SIXTO Moscoso were completed.  Frequency: today is dose 1 of 5 total    Progress note:  Patient identification verified by name and date of birth.  Assessment completed.  Vitals recorded in Doc Flowsheets.  Patient was provided with education regarding medication/procedure and possible side effects.  Patient verbalized understanding.     present during visit today: Yes, Language: XO1  services used.     Treatment Conditions: Patient denies fever, chills, signs of infection, recent illness, antibiotics use, productive cough or elevated temperature.    Premedications: were not ordered.    Drug Waste Record: No    Infusion length and rate:  infusion given over approximately 15 minutes + 15 minute observation.     Labs: were not ordered for this appointment.    Vascular access: peripheral IV placed today.    Patient c/o burning during urination x 1 week. Vitals stable today. RN paged Dr. Moscoso to discuss. Encouraged patient drink plenty of fluids and contact MD if symptoms do not improve or urgent care if symptoms are intolerable. Patient voiced understanding and agrees with plan of care. Staff message also sent to Saima HENLEY RN to reach out to patient.     Post Infusion Assessment:  Patient tolerated infusion without incident.  Patient observed 15 minutes post infusion for first dose med.   Blood return noted pre and post infusion.  Site patent and intact, free from redness, edema or discomfort.  No evidence of extravasations.  Access discontinued per protocol.     Discharge Plan:   AVS given to patient.   Staff message sent to  to contact patient's daughter with remaining infusion appts.   Follow up plan of care with: ongoing infusions  [facility].  Discharge instructions were reviewed with patient.  Pt left in stable condition with daughter.   Patient/representative verbalized understanding of discharge instructions and all questions answered.  Patient discharged [facility] in stable condition.    Administrations This Visit     iron sucrose (VENOFER) 200 mg in sodium chloride 0.9 % 100 mL intermittent infusion     Admin Date  12/14/2020 Action  New Bag Dose  200 mg Rate  480 mL/hr Route  Intravenous Administered By  Tarah Van RN              Vital signs:  Temp: 98.3  F (36.8  C) Temp src: Oral BP: 130/68 Pulse: 83   Resp: 18 SpO2: 99 % O2 Device: None (Room air)        There is no height or weight on file to calculate BMI.

## 2020-12-14 NOTE — PATIENT INSTRUCTIONS
Dear Myla Glynn    Thank you for choosing [facility] ([facility] for your infusion.  The following information is a summary of our appointment as well as important reminders.      Patient Education     Iron Sucrose injection  Brand Name: Venofer  What is this medicine?  IRON SUCROSE (GLENNY culps) is an iron complex. Iron is used to make healthy red blood cells, which carry oxygen and nutrients throughout the body. This medicine is used to treat iron deficiency anemia in people with chronic kidney disease.  How should I use this medicine?  This medicine is for infusion into a vein. It is given by a health care professional in a hospital or clinic setting.  Talk to your pediatrician regarding the use of this medicine in children. While this drug may be prescribed for children as young as 2 years for selected conditions, precautions do apply.  What side effects may I notice from receiving this medicine?  Side effects that you should report to your doctor or health care professional as soon as possible:    allergic reactions like skin rash, itching or hives, swelling of the face, lips, or tongue    breathing problems    changes in blood pressure    cough    fast, irregular heartbeat    feeling faint or lightheaded, falls    fever or chills    flushing, sweating, or hot feelings    joint or muscle aches/pains    seizures    swelling of the ankles or feet    unusually weak or tired  Side effects that usually do not require medical attention (report to your doctor or health care professional if they continue or are bothersome):    diarrhea    feeling achy    headache    irritation at site where injected    nausea, vomiting    stomach upset    tiredness  What may interact with this medicine?  Do not take this medicine with any of the following medications:    deferoxamine    dimercaprol    other iron products  This medicine may also interact with the  following medications:    chloramphenicol    deferasirox  What if I miss a dose?  It is important not to miss your dose. Call your doctor or health care professional if you are unable to keep an appointment.  Where should I keep my medicine?  This drug is given in a hospital or clinic and will not be stored at home.  What should I tell my health care provider before I take this medicine?  They need to know if you have any of these conditions:    anemia not caused by low iron levels    heart disease    high levels of iron in the blood    kidney disease    liver disease    an unusual or allergic reaction to iron, other medicines, foods, dyes, or preservatives    pregnant or trying to get pregnant    breast-feeding  What should I watch for while using this medicine?  Visit your doctor or healthcare professional regularly. Tell your doctor or healthcare professional if your symptoms do not start to get better or if they get worse. You may need blood work done while you are taking this medicine.  You may need to follow a special diet. Talk to your doctor. Foods that contain iron include: whole grains/cereals, dried fruits, beans, or peas, leafy green vegetables, and organ meats (liver, kidney).  NOTE:This sheet is a summary. It may not cover all possible information. If you have questions about this medicine, talk to your doctor, pharmacist, or health care provider. Copyright  2020 [employer]             We look forward in seeing you on your next appointment here at [facility] ([facility]).  Please don t hesitate to call us at [phone] to reschedule any of your appointments or to speak with one of the [facility] registered nurses.  It was a pleasure taking care of you today.    Sincerely,    [facility]  [address] [place] [address]  [address]  Phone:  [phone]

## 2020-12-16 ENCOUNTER — PATIENT OUTREACH (OUTPATIENT)
Dept: SURGERY | Facility: CLINIC | Age: 75
End: 2020-12-16

## 2020-12-16 NOTE — PROGRESS NOTES
Infusion nurse set me a message stating that patient has been having burning with urination for a week. Discussed with . Pt has a colovesical fistula so this is expected but if it gets worse to go to her PCP.

## 2020-12-17 ENCOUNTER — INFUSION THERAPY VISIT (OUTPATIENT)
Dept: INFUSION THERAPY | Facility: CLINIC | Age: 75
End: 2020-12-17
Attending: COLON & RECTAL SURGERY
Payer: COMMERCIAL

## 2020-12-17 VITALS
DIASTOLIC BLOOD PRESSURE: 68 MMHG | RESPIRATION RATE: 16 BRPM | HEART RATE: 81 BPM | SYSTOLIC BLOOD PRESSURE: 117 MMHG | OXYGEN SATURATION: 100 % | TEMPERATURE: 98.9 F

## 2020-12-17 DIAGNOSIS — C18.7 MALIGNANT NEOPLASM OF SIGMOID COLON (H): ICD-10-CM

## 2020-12-17 DIAGNOSIS — D50.8 OTHER IRON DEFICIENCY ANEMIA: Primary | ICD-10-CM

## 2020-12-17 PROCEDURE — 96365 THER/PROPH/DIAG IV INF INIT: CPT

## 2020-12-17 PROCEDURE — 258N000003 HC RX IP 258 OP 636: Performed by: COLON & RECTAL SURGERY

## 2020-12-17 PROCEDURE — 250N000011 HC RX IP 250 OP 636: Performed by: COLON & RECTAL SURGERY

## 2020-12-17 RX ADMIN — IRON SUCROSE 200 MG: 20 INJECTION, SOLUTION INTRAVENOUS at 13:48

## 2020-12-17 NOTE — LETTER
12/17/2020         RE: Myla Glynn  [address]  [address]  [place] [address]        Dear Colleague,    Thank you for referring your patient, Myla Glynn, to the [facility] T[facility]. Please see a copy of my visit note below.    Nursing Note  Myla Glynn [facility] for:   Chief Complaint   Patient presents with     Infusion     Venofer     During [facility]'s [facility] appointment, orders from Dr. SIXTO Moscoso were completed.  Frequency: today is dose 2 of 5 total    Progress note:  Patient identification verified by name and date of birth.  Assessment completed.  Vitals recorded in Doc Flowsheets.  Patient was provided with education regarding medication/procedure and possible side effects.  Patient verbalized understanding.     present during visit today: Yes, Language: Praedicat  services used.     Treatment Conditions: Patient denies fever, chills, signs of infection, recent illness, antibiotics use, productive cough or elevated temperature.    Premedications: were not ordered.    Drug Waste Record: No    Infusion length and rate:  infusion given over approximately 15 minutes     Labs: were not ordered for this appointment.    Vascular access: peripheral IV placed today.      Post Infusion Assessment:  Patient tolerated infusion without incident.    Blood return noted pre and post infusion.  Site patent and intact, free from redness, edema or discomfort.  No evidence of extravasations.  Access discontinued per protocol.     Discharge Plan:   AVS given to patient. Family member will call to schedule remaining infusions.   Follow up plan of care with: ongoing infusions at [facility].  Discharge instructions were reviewed with patient.  Pt left in stable condition with daughter.   Patient/representative verbalized understanding of  discharge instructions and all questions answered.  Patient discharged [facility] in stable condition.     Administrations This Visit     iron sucrose (VENOFER) 200 mg in sodium chloride 0.9 % 100 mL intermittent infusion     Admin Date  12/17/2020 Action  New Bag Dose  200 mg Rate  480 mL/hr Route  Intravenous Administered By  Tarah Van RN              Vital signs:  Temp: 98.9  F (37.2  C) Temp src: Oral BP: 117/67 Pulse: 88   Resp: 18 SpO2: 100 % O2 Device: None (Room air)        There is no height or weight on file to calculate BMI.              Again, thank you for allowing me to participate in the care of your patient.        Sincerely,        Phone,

## 2020-12-17 NOTE — PATIENT INSTRUCTIONS
Dear Myla Glynn    Thank you for choosing [facility] ([facility]) for your infusion.  The following information is a summary of our appointment as well as important reminders.      Please call to schedule remaining infusions.    We look forward in seeing you on your next appointment here at [facility] ([facility]).  Please don t hesitate to call us at [phone] to reschedule any of your appointments or to speak with one of the [facility] registered nurses.  It was a pleasure taking care of you today.    Sincerely,    [facility]  [address] [place] [address]  [address]  Phone:  [phone]

## 2020-12-17 NOTE — PROGRESS NOTES
Nursing Note  Myla Glynn presents [facility] for:   Chief Complaint   Patient presents with     Infusion     Venofer     During [facility]'s [facility] appointment, orders from Dr. SIXTO Moscoso were completed.  Frequency: today is dose 2 of 5 total    Progress note:  Patient identification verified by name and date of birth.  Assessment completed.  Vitals recorded in Doc Flowsheets.  Patient was provided with education regarding medication/procedure and possible side effects.  Patient verbalized understanding.     present during visit today: Yes, Language: TigtatianaBuzzDoes  services used.     Treatment Conditions: Patient denies fever, chills, signs of infection, recent illness, antibiotics use, productive cough or elevated temperature.    Premedications: were not ordered.    Drug Waste Record: No    Infusion length and rate:  infusion given over approximately 15 minutes     Labs: were not ordered for this appointment.    Vascular access: peripheral IV placed today.      Post Infusion Assessment:  Patient tolerated infusion without incident.    Blood return noted pre and post infusion.  Site patent and intact, free from redness, edema or discomfort.  No evidence of extravasations.  Access discontinued per protocol.     Discharge Plan:   AVS given to patient. Family member will call to schedule remaining infusions.   Follow up plan of care with: ongoing infusions at [facility].  Discharge instructions were reviewed with patient.  Pt left in stable condition with daughter.   Patient/representative verbalized understanding of discharge instructions and all questions answered.  Patient discharged [facility] in stable condition.     Administrations This Visit     iron sucrose (VENOFER) 200 mg in sodium chloride 0.9 % 100 mL intermittent infusion     Admin Date  12/17/2020  Action  New Bag Dose  200 mg Rate  480 mL/hr Route  Intravenous Administered By  Tarah Van RN              Vital signs:  Temp: 98.9  F (37.2  C) Temp src: Oral BP: 117/67 Pulse: 88   Resp: 18 SpO2: 100 % O2 Device: None (Room air)        There is no height or weight on file to calculate BMI.

## 2020-12-22 ENCOUNTER — TELEPHONE (OUTPATIENT)
Dept: ONCOLOGY | Facility: CLINIC | Age: 75
End: 2020-12-22

## 2020-12-22 ENCOUNTER — HOSPITAL ENCOUNTER (OUTPATIENT)
Dept: PET IMAGING | Facility: CLINIC | Age: 75
End: 2020-12-22
Attending: COLON & RECTAL SURGERY
Payer: COMMERCIAL

## 2020-12-22 ENCOUNTER — HOSPITAL ENCOUNTER (OUTPATIENT)
Dept: MRI IMAGING | Facility: CLINIC | Age: 75
End: 2020-12-22
Attending: COLON & RECTAL SURGERY
Payer: COMMERCIAL

## 2020-12-22 DIAGNOSIS — C18.7 MALIGNANT NEOPLASM OF SIGMOID COLON (H): ICD-10-CM

## 2020-12-22 DIAGNOSIS — N32.89 BLADDER MASS: Primary | ICD-10-CM

## 2020-12-22 PROCEDURE — 343N000001 HC RX 343: Performed by: COLON & RECTAL SURGERY

## 2020-12-22 PROCEDURE — A9552 F18 FDG: HCPCS | Performed by: COLON & RECTAL SURGERY

## 2020-12-22 PROCEDURE — 78816 PET IMAGE W/CT FULL BODY: CPT | Mod: PI

## 2020-12-22 PROCEDURE — 72197 MRI PELVIS W/O & W/DYE: CPT

## 2020-12-22 PROCEDURE — 255N000002 HC RX 255 OP 636: Performed by: RADIOLOGY

## 2020-12-22 PROCEDURE — 250N000011 HC RX IP 250 OP 636: Performed by: COLON & RECTAL SURGERY

## 2020-12-22 PROCEDURE — A9585 GADOBUTROL INJECTION: HCPCS | Performed by: RADIOLOGY

## 2020-12-22 PROCEDURE — 78816 PET IMAGE W/CT FULL BODY: CPT | Mod: 26 | Performed by: RADIOLOGY

## 2020-12-22 PROCEDURE — 72197 MRI PELVIS W/O & W/DYE: CPT | Mod: 26 | Performed by: RADIOLOGY

## 2020-12-22 RX ORDER — GADOBUTROL 604.72 MG/ML
10 INJECTION INTRAVENOUS ONCE
Status: COMPLETED | OUTPATIENT
Start: 2020-12-22 | End: 2020-12-22

## 2020-12-22 RX ADMIN — FLUDEOXYGLUCOSE F-18 10.15 MCI.: 500 INJECTION, SOLUTION INTRAVENOUS at 12:02

## 2020-12-22 RX ADMIN — GADOBUTROL 7.5 ML: 604.72 INJECTION INTRAVENOUS at 15:54

## 2020-12-22 RX ADMIN — GLUCAGON HYDROCHLORIDE 1 MG: 1 INJECTION, POWDER, FOR SOLUTION INTRAMUSCULAR; INTRAVENOUS; SUBCUTANEOUS at 14:52

## 2020-12-22 NOTE — TELEPHONE ENCOUNTER
----- Message from Ashely Null RN sent at 12/22/2020 10:56 AM CST -----  Regarding: RE: lab appt 12.29.20  Nope. Thank you  ----- Message -----  From: Ruthann Knowles LPN  Sent: 12/22/2020   9:42 AM CST  To: Ashely Null RN  Subject: RE: lab appt 12.29.20                            Aaliyah,    Patient scheduled for pre op labs. Anything needed other than UC?    Ruthann Knowles LPN  ----- Message -----  From: Kobe Crook CLT  Sent: 12/22/2020   8:46 AM CST  To: [facility]  Subject: lab appt 12.29.20                                In order to offer our patients the best possible experience, the lab schedule has been reviewed and found that this patient does not have any lab orders.   Please place lab orders ASAP.   Thank you, your [facility] [phone]

## 2020-12-23 ENCOUNTER — PATIENT OUTREACH (OUTPATIENT)
Dept: SURGERY | Facility: CLINIC | Age: 75
End: 2020-12-23

## 2020-12-23 ENCOUNTER — TELEPHONE (OUTPATIENT)
Dept: ENDOCRINOLOGY | Facility: CLINIC | Age: 75
End: 2020-12-23

## 2020-12-23 ENCOUNTER — INFUSION THERAPY VISIT (OUTPATIENT)
Dept: INFUSION THERAPY | Facility: CLINIC | Age: 75
End: 2020-12-23
Attending: COLON & RECTAL SURGERY
Payer: COMMERCIAL

## 2020-12-23 VITALS
OXYGEN SATURATION: 98 % | DIASTOLIC BLOOD PRESSURE: 59 MMHG | SYSTOLIC BLOOD PRESSURE: 107 MMHG | TEMPERATURE: 98.8 F | RESPIRATION RATE: 16 BRPM | HEART RATE: 82 BPM

## 2020-12-23 DIAGNOSIS — C18.7 MALIGNANT NEOPLASM OF SIGMOID COLON (H): Primary | ICD-10-CM

## 2020-12-23 DIAGNOSIS — D50.8 OTHER IRON DEFICIENCY ANEMIA: Primary | ICD-10-CM

## 2020-12-23 DIAGNOSIS — E04.1 THYROID NODULE: ICD-10-CM

## 2020-12-23 DIAGNOSIS — C18.7 MALIGNANT NEOPLASM OF SIGMOID COLON (H): ICD-10-CM

## 2020-12-23 PROCEDURE — 250N000011 HC RX IP 250 OP 636: Performed by: COLON & RECTAL SURGERY

## 2020-12-23 PROCEDURE — 258N000003 HC RX IP 258 OP 636: Performed by: COLON & RECTAL SURGERY

## 2020-12-23 PROCEDURE — 96365 THER/PROPH/DIAG IV INF INIT: CPT

## 2020-12-23 RX ADMIN — IRON SUCROSE 200 MG: 20 INJECTION, SOLUTION INTRAVENOUS at 13:34

## 2020-12-23 ASSESSMENT — PAIN SCALES - GENERAL: PAINLEVEL: NO PAIN (0)

## 2020-12-23 NOTE — LETTER
12/23/2020         RE: Myla Glynn  [address]  [address]  [place] [address]        Dear Colleague,    Thank you for referring your patient, Myla Glynn, to the [facility] TREATMENT [facility]. Please see a copy of my visit note below.    Nursing Note  Myla Glynn [facility] for:   Chief Complaint   Patient presents with     Infusion     venofer     During [facility]'s [facility] appointment, orders from Angel Moscoso MD were completed.  Frequency: today is dose 3 of 5 total    Progress note:  Patient identification verified by name and date of birth.  Assessment completed.  Vitals recorded in Doc Flowsheets.  Patient was provided with education regarding medication/procedure and possible side effects.  Patient verbalized understanding.     present during visit today: Yes, Language: Tigrinya.     Treatment Conditions: Non-applicable.    Premedications: were not ordered.    Drug Waste Record: No    Infusion length and rate:  infusion given over approximately 15 minutes    Labs: were not ordered for this appointment.    Vascular access: peripheral IV placed today.    Post Infusion Assessment:  Patient tolerated infusion without incident.  Blood return noted pre and post infusion.  Site patent and intact, free from redness, edema or discomfort.  No evidence of extravasations.  Access discontinued per protocol.     Discharge Plan:   Follow up plan of care with: ongoing infusions at [facility]. and after visit summary given to patient  Discharge instructions were reviewed with patient.  Patient/representative verbalized understanding of discharge instructions and all questions answered.  Patient discharged from [facility] in stable condition.    Becky Sahu RN    Administrations This Visit     iron sucrose  (VENOFER) 200 mg in sodium chloride 0.9 % 100 mL intermittent infusion     Admin Date  12/23/2020 Action  New Bag Dose  200 mg Route  Intravenous Administered By  Becky Sahu RN                /59   Pulse 82   Temp 98.8  F (37.1  C)   Resp 16   SpO2 98%           Again, thank you for allowing me to participate in the care of your patient.        Sincerely,        VIDEO,

## 2020-12-23 NOTE — TELEPHONE ENCOUNTER
[facility] Call Center    Phone Message    May a detailed message be left on voicemail: yes     Reason for Call: Appointment Intake    Referring Provider Name: JAMIE ROBIN  Diagnosis and/or Symptoms: Malignant neoplasm of sigmoid colon (H)  Thyroid nodule    Nothing available within 2 weeks. Please review referral and call patient to schedule appointment.   Action Taken: Message routed to:  [facility] ([facility] Endo    Travel Screening: Not Applicable

## 2020-12-23 NOTE — PROGRESS NOTES
Nursing Note  Myla Glynn [facility] for:   Chief Complaint   Patient presents with     Infusion     venofer     During [facility]'s [facility] appointment, orders from Angel Moscoso MD were completed.  Frequency: today is dose 3 of 5 total    Progress note:  Patient identification verified by name and date of birth.  Assessment completed.  Vitals recorded in Doc Flowsheets.  Patient was provided with education regarding medication/procedure and possible side effects.  Patient verbalized understanding.     present during visit today: Yes, Language: Tigrinya.     Treatment Conditions: Non-applicable.    Premedications: were not ordered.    Drug Waste Record: No    Infusion length and rate:  infusion given over approximately 15 minutes    Labs: were not ordered for this appointment.    Vascular access: peripheral IV placed today.    Post Infusion Assessment:  Patient tolerated infusion without incident.  Blood return noted pre and post infusion.  Site patent and intact, free from redness, edema or discomfort.  No evidence of extravasations.  Access discontinued per protocol.     Discharge Plan:   Follow up plan of care with: ongoing infusions at [facility]. and after visit summary given to patient  Discharge instructions were reviewed with patient.  Patient/representative verbalized understanding of discharge instructions and all questions answered.  Patient discharged [facility] in stable condition.    Becky Sahu RN    Administrations This Visit     iron sucrose (VENOFER) 200 mg in sodium chloride 0.9 % 100 mL intermittent infusion     Admin Date  12/23/2020 Action  New Bag Dose  200 mg Route  Intravenous Administered By  Becky Sahu RN                /59   Pulse 82   Temp 98.8  F (37.1  C)   Resp 16   SpO2 98%

## 2020-12-28 ENCOUNTER — INFUSION THERAPY VISIT (OUTPATIENT)
Dept: INFUSION THERAPY | Facility: CLINIC | Age: 75
End: 2020-12-28
Attending: COLON & RECTAL SURGERY
Payer: COMMERCIAL

## 2020-12-28 VITALS
RESPIRATION RATE: 16 BRPM | DIASTOLIC BLOOD PRESSURE: 77 MMHG | TEMPERATURE: 99 F | HEART RATE: 83 BPM | SYSTOLIC BLOOD PRESSURE: 120 MMHG

## 2020-12-28 DIAGNOSIS — D50.8 OTHER IRON DEFICIENCY ANEMIA: Primary | ICD-10-CM

## 2020-12-28 DIAGNOSIS — C18.7 MALIGNANT NEOPLASM OF SIGMOID COLON (H): ICD-10-CM

## 2020-12-28 PROCEDURE — 96365 THER/PROPH/DIAG IV INF INIT: CPT

## 2020-12-28 PROCEDURE — 258N000003 HC RX IP 258 OP 636: Performed by: COLON & RECTAL SURGERY

## 2020-12-28 PROCEDURE — 250N000011 HC RX IP 250 OP 636: Performed by: COLON & RECTAL SURGERY

## 2020-12-28 RX ADMIN — IRON SUCROSE 200 MG: 20 INJECTION, SOLUTION INTRAVENOUS at 16:10

## 2020-12-28 NOTE — PROGRESS NOTES
Nursing Note  Myla Glynn presents [facility] for:   Chief Complaint   Patient presents with     Infusion     Venofer     During [facility]'s [facility] appointment, orders from Dr. Moscoso were completed.  Frequency: today is dose 4 of 5 total    Progress note:  Patient identification verified by name and date of birth.  Assessment completed.  Vitals recorded in Doc Flowsheets.  Patient was provided with education regarding medication/procedure and possible side effects.  Patient verbalized understanding.     present during visit today: Yes, Language: Tigrinya.     Treatment Conditions: Non-applicable.    Premedications: were not ordered.    Drug Waste Record: No    Infusion length and rate:  infusion given over approximately 15 minutes    Labs: were not ordered for this appointment.    Vascular access: peripheral IV placed today.    Is the next appt scheduled? Yes    Post Infusion Assessment:  Patient tolerated infusion without incident.     Discharge Plan:   Follow up plan of care with: ongoing infusions at [facility]. and ordering provider as scheduled.  Discharge instructions were reviewed with patient.  Patient/representative verbalized understanding of discharge instructions and all questions answered.  Patient discharged [facility] in stable condition.    Irais Yuen RN       Administrations This Visit     iron sucrose (VENOFER) 200 mg in sodium chloride 0.9 % 100 mL intermittent infusion     Admin Date  12/28/2020 Action  New Bag Dose  200 mg Rate  480 mL/hr Route  Intravenous Administered By  Irais Yuen, JAMARI              /77 (BP Location: Left arm)   Pulse 83   Temp 99  F (37.2  C) (Oral)   Resp 16

## 2020-12-28 NOTE — TELEPHONE ENCOUNTER
To schedulers: Please schedule  for lst available endocrine. This could be a virtual visit. Referring provider should order thyroid US to be done prior to this appt with us.  Preferred provider: Ml Callejas Radulescu Lynn A Burmeister, MD  Endocrine triage

## 2020-12-28 NOTE — LETTER
12/28/2020         RE: Myla Glynn  [address]  [address]  [place] [address]        Dear Colleague,    Thank you for referring your patient, Myla Glynn, to the [facility] T[facility]. Please see a copy of my visit note below.    Nursing Note  Myla Glynn [facility] for:   Chief Complaint   Patient presents with     Infusion     Venofer     During today's [facility] appointment, orders from Dr. Moscoso were completed.  Frequency: today is dose 4 of 5 total    Progress note:  Patient identification verified by name and date of birth.  Assessment completed.  Vitals recorded in Doc Flowsheets.  Patient was provided with education regarding medication/procedure and possible side effects.  Patient verbalized understanding.     present during visit today: Yes, Language: Tigrinya.     Treatment Conditions: Non-applicable.    Premedications: were not ordered.    Drug Waste Record: No    Infusion length and rate:  infusion given over approximately 15 minutes    Labs: were not ordered for this appointment.    Vascular access: peripheral IV placed today.    Is the next appt scheduled? Yes    Post Infusion Assessment:  Patient tolerated infusion without incident.     Discharge Plan:   Follow up plan of care with: ongoing infusions at [facility]. and ordering provider as scheduled.  Discharge instructions were reviewed with patient.  Patient/representative verbalized understanding of discharge instructions and all questions answered.  Patient discharged from [facility] in stable condition.    Irais Yuen RN       Administrations This Visit     iron sucrose (VENOFER) 200 mg in sodium chloride 0.9 % 100 mL intermittent infusion     Admin Date  12/28/2020 Action  New Bag Dose  200 mg Rate  480 mL/hr Route  Intravenous  Administered By  Irais Yuen, JAMARI              /77 (BP Location: Left arm)   Pulse 83   Temp 99  F (37.2  C) (Oral)   Resp 16           Again, thank you for allowing me to participate in the care of your patient.        Sincerely,        VIDEO,

## 2020-12-29 ENCOUNTER — OFFICE VISIT (OUTPATIENT)
Dept: UROLOGY | Facility: CLINIC | Age: 75
End: 2020-12-29
Payer: COMMERCIAL

## 2020-12-29 ENCOUNTER — ANCILLARY PROCEDURE (OUTPATIENT)
Dept: ULTRASOUND IMAGING | Facility: CLINIC | Age: 75
End: 2020-12-29
Attending: UROLOGY
Payer: COMMERCIAL

## 2020-12-29 ENCOUNTER — ANESTHESIA EVENT (OUTPATIENT)
Dept: SURGERY | Facility: CLINIC | Age: 75
End: 2020-12-29
Payer: COMMERCIAL

## 2020-12-29 ENCOUNTER — OFFICE VISIT (OUTPATIENT)
Dept: SURGERY | Facility: CLINIC | Age: 75
End: 2020-12-29
Attending: COLON & RECTAL SURGERY
Payer: COMMERCIAL

## 2020-12-29 ENCOUNTER — OFFICE VISIT (OUTPATIENT)
Dept: WOUND CARE | Facility: CLINIC | Age: 75
End: 2020-12-29
Attending: COLON & RECTAL SURGERY
Payer: COMMERCIAL

## 2020-12-29 ENCOUNTER — PRE VISIT (OUTPATIENT)
Dept: SURGERY | Facility: CLINIC | Age: 75
End: 2020-12-29

## 2020-12-29 VITALS
DIASTOLIC BLOOD PRESSURE: 69 MMHG | WEIGHT: 119 LBS | BODY MASS INDEX: 22.47 KG/M2 | RESPIRATION RATE: 15 BRPM | OXYGEN SATURATION: 100 % | HEART RATE: 84 BPM | SYSTOLIC BLOOD PRESSURE: 107 MMHG | HEIGHT: 61 IN

## 2020-12-29 DIAGNOSIS — Z71.89 OSTOMY NURSE CONSULTATION: ICD-10-CM

## 2020-12-29 DIAGNOSIS — R60.9 EDEMA: ICD-10-CM

## 2020-12-29 DIAGNOSIS — C18.7 MALIGNANT NEOPLASM OF SIGMOID COLON (H): ICD-10-CM

## 2020-12-29 DIAGNOSIS — N32.89 BLADDER MASS: Primary | ICD-10-CM

## 2020-12-29 DIAGNOSIS — Z01.818 PREOP EXAMINATION: Primary | ICD-10-CM

## 2020-12-29 DIAGNOSIS — N32.89 BLADDER MASS: ICD-10-CM

## 2020-12-29 LAB
ANION GAP SERPL CALCULATED.3IONS-SCNC: 8 MMOL/L (ref 3–14)
BUN SERPL-MCNC: 38 MG/DL (ref 7–30)
CALCIUM SERPL-MCNC: 9.4 MG/DL (ref 8.5–10.1)
CHLORIDE SERPL-SCNC: 102 MMOL/L (ref 94–109)
CO2 SERPL-SCNC: 29 MMOL/L (ref 20–32)
CREAT SERPL-MCNC: 1.06 MG/DL (ref 0.52–1.04)
ERYTHROCYTE [DISTWIDTH] IN BLOOD BY AUTOMATED COUNT: 22.5 % (ref 10–15)
GFR SERPL CREATININE-BSD FRML MDRD: 52 ML/MIN/{1.73_M2}
GLUCOSE SERPL-MCNC: 103 MG/DL (ref 70–99)
HCT VFR BLD AUTO: 29.1 % (ref 35–47)
HGB BLD-MCNC: 8.2 G/DL (ref 11.7–15.7)
MCH RBC QN AUTO: 22.6 PG (ref 26.5–33)
MCHC RBC AUTO-ENTMCNC: 28.2 G/DL (ref 31.5–36.5)
MCV RBC AUTO: 80 FL (ref 78–100)
PLATELET # BLD AUTO: 439 10E9/L (ref 150–450)
POTASSIUM SERPL-SCNC: 3.5 MMOL/L (ref 3.4–5.3)
RBC # BLD AUTO: 3.63 10E12/L (ref 3.8–5.2)
SODIUM SERPL-SCNC: 139 MMOL/L (ref 133–144)
WBC # BLD AUTO: 9.8 10E9/L (ref 4–11)

## 2020-12-29 PROCEDURE — 80048 BASIC METABOLIC PNL TOTAL CA: CPT | Performed by: PATHOLOGY

## 2020-12-29 PROCEDURE — 86850 RBC ANTIBODY SCREEN: CPT | Performed by: PATHOLOGY

## 2020-12-29 PROCEDURE — 36415 COLL VENOUS BLD VENIPUNCTURE: CPT | Performed by: PATHOLOGY

## 2020-12-29 PROCEDURE — 87186 SC STD MICRODIL/AGAR DIL: CPT | Mod: 90 | Performed by: PATHOLOGY

## 2020-12-29 PROCEDURE — 99203 OFFICE O/P NEW LOW 30 MIN: CPT | Performed by: CLINICAL NURSE SPECIALIST

## 2020-12-29 PROCEDURE — 99207 PR NO CHARGE LOS: CPT

## 2020-12-29 PROCEDURE — 84134 ASSAY OF PREALBUMIN: CPT | Performed by: PATHOLOGY

## 2020-12-29 PROCEDURE — 87088 URINE BACTERIA CULTURE: CPT | Mod: 90 | Performed by: PATHOLOGY

## 2020-12-29 PROCEDURE — 93970 EXTREMITY STUDY: CPT | Performed by: RADIOLOGY

## 2020-12-29 PROCEDURE — 86900 BLOOD TYPING SEROLOGIC ABO: CPT | Performed by: PATHOLOGY

## 2020-12-29 PROCEDURE — 85027 COMPLETE CBC AUTOMATED: CPT | Performed by: PATHOLOGY

## 2020-12-29 PROCEDURE — 86901 BLOOD TYPING SEROLOGIC RH(D): CPT | Performed by: PATHOLOGY

## 2020-12-29 PROCEDURE — 99207 PR PREOP VISIT IN GLOBAL PKG: CPT

## 2020-12-29 PROCEDURE — 87086 URINE CULTURE/COLONY COUNT: CPT | Mod: 90 | Performed by: PATHOLOGY

## 2020-12-29 ASSESSMENT — MIFFLIN-ST. JEOR: SCORE: 982.16

## 2020-12-29 ASSESSMENT — PAIN SCALES - GENERAL: PAINLEVEL: NO PAIN (0)

## 2020-12-29 NOTE — H&P
Pre-Operative H & P     CC:  Preoperative exam to assess for increased cardiopulmonary risk while undergoing surgery and anesthesia.    Date of Encounter: 12/29/2020  Primary Care Physician:  No Ref-Primary, Physician  Reason for visit: Malignant neoplasm of sigmoid colon (H) [C18.7]  HPI  Myla Glynn is a 75 year old female who presents for pre-operative H & P in preparation for Robotic assisted sigmoidectomy, SIGMOIDOSCOPY, FLEXIBLE, Partial Cystectomy, Cystourethroscopy, Possible Radical Cystectomy With Urinary Diversion with Joe Moscoso and Mercedez on 1/5/21 at [facility]. History is obtained from the patient and daughter via . Records were also reviewed.    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 She has consulted with Joe Moscoso and Mercedez and has been counseled for above procedures.     Her history is otherwise significant for HTN, DM II, and anemia, s/p iron infusions, last planned for 12/30/20.    Today patient denies fever, cough, shortness of breath, chest pain, irregular HR, or ankle edema. She is reporting burning with urination.       Past Medical History  Past Medical History:   Diagnosis Date     Anemia      Bladder mass      Controlled type 2 diabetes mellitus without complication, without long-term current use of insulin (H)      Gastroesophageal reflux disease      HTN (hypertension)      Malignant neoplasm of sigmoid colon (H)        Past Surgical History  Past Surgical History:   Procedure Laterality Date     COLONOSCOPY         Hx of Blood transfusions/reactions: Denies. Has been receiving iron infusions, last planned for 12/30/20.     Hx of abnormal bleeding or anti-platelet use: Denies.     Menstrual history: No LMP recorded. Patient is postmenopausal.    Steroid use in the last year: Unknown.     Personal or FH with difficulty with Anesthesia:   Denies.    Prior to Admission Medications  Current Outpatient Medications   Medication Sig Dispense Refill     acetaminophen (TYLENOL) 500 MG tablet Take 500 mg by mouth       hydrochlorothiazide (HYDRODIURIL) 25 MG tablet Take 25 mg by mouth every morning        metFORMIN (GLUCOPHAGE) 500 MG tablet Take 500 mg by mouth every morning        omeprazole (PRILOSEC) 20 MG DR capsule Take 20 mg by mouth every morning        ondansetron (ZOFRAN-ODT) 4 MG ODT tab TK 1 T PO EVERY 4 HOURS AS NEEDED FOR NAUSEA. PLACE ON TOP OF TONUGE WHERE IT WILL DISSOLVE. THEN SWALLOW.         Allergies  No Known Allergies    Social History  Social History     Socioeconomic History     Marital status:      Spouse name: Not on file     Number of children: Not on file     Years of education: Not on file     Highest education level: Not on file   Occupational History     Not on file   Social Needs     Financial resource strain: Not on file     Food insecurity     Worry: Not on file     Inability: Not on file     Transportation needs     Medical: Not on file     Non-medical: Not on file   Tobacco Use     Smoking status: Never Smoker     Smokeless tobacco: Never Used   Substance and Sexual Activity     Alcohol use: Not Currently     Drug use: Not on file     Sexual activity: Not on file   Lifestyle     Physical activity     Days per week: Not on file     Minutes per session: Not on file     Stress: Not on file   Relationships     Social connections     Talks on phone: Not on file     Gets together: Not on file     Attends Druze service: Not on file     Active member of club or organization: Not on file     Attends meetings of clubs or organizations: Not on file     Relationship status: Not on file     Intimate partner violence     Fear of current or ex partner: Not on file     Emotionally abused: Not on file     Physically abused: Not on file     Forced sexual activity: Not on file   Other Topics Concern     Not on file   Social  "History Narrative     Not on file       Family History  Family History   Problem Relation Age of Onset     No Known Problems Mother      No Known Problems Father      No Known Problems Sister      No Known Problems Brother        Preop Vitals    BP Readings from Last 3 Encounters:   12/29/20 107/69   12/28/20 120/77   12/23/20 107/59    Pulse Readings from Last 3 Encounters:   12/29/20 84   12/28/20 83   12/23/20 82      Resp Readings from Last 3 Encounters:   12/29/20 15   12/28/20 16   12/23/20 16    SpO2 Readings from Last 3 Encounters:   12/29/20 100%   12/23/20 98%   12/17/20 100%      Temp Readings from Last 1 Encounters:   12/28/20 99  F (37.2  C) (Oral)    Ht Readings from Last 1 Encounters:   12/29/20 1.549 m (5' 1\")      Wt Readings from Last 1 Encounters:   No data found for Wt    There is no height or weight on file to calculate BMI.     ROS/MED HX  The complete review of systems is negative other than noted in the HPI or here.   ENT/Pulmonary:  - neg pulmonary ROS     Neurologic:  - neg neurologic ROS     Cardiovascular:     (+) hypertension----. : . . . :. . Previous cardiac testing date:results:date: results:ECG reviewed date:6/26/20 results:SR date: results:         (-) taking anticoagulants/antiplatelets   METS/Exercise Tolerance:  3 - Able to walk 1-2 blocks without stopping   Hematologic:     (+) Anemia, Other Hematologic Disorder-has received iron infusions, last planned for 12/30/20     (-) History of Transfusion   Musculoskeletal:  - neg musculoskeletal ROS       GI/Hepatic:     (+) GERD Asymptomatic on medication, bowel prep,       Renal/Genitourinary:     (+) Other Renal/ Genitourinary, burning with urination      Endo:     (+) type II DM Last HgA1c: 5.7  date: 1/8/20 Not using insulin - not using insulin pump .      Psychiatric:  - neg psychiatric ROS       Infectious Disease:  - neg infectious disease ROS       Malignancy:   (+) Malignancy History of Other  Other CA sigmoid colon cancer " "with invasion into bladder Active status post         Other:    (+) C-spine cleared: N/A, no H/O Chronic Pain,no other significant disability              PHYSICAL EXAM:   Mental Status/Neuro: A/A/O; Age Appropriate   Airway: Facies: Feasible  Mallampati: II  Mouth/Opening: Full  TM distance: > 6 cm  Neck ROM: Full   Respiratory: Auscultation: CTAB     Resp. Rate: Normal     Resp. Effort: Normal      CV: Rhythm: Regular  Heart: Normal Sounds  Edema: LLE; RLE   Comments: Multiple teeth missing     Dental: Details                BP: 107/69 Pulse: 84   Resp: 15 SpO2: 100 %         119 lbs 0 oz  5' 1\"   Body mass index is 22.48 kg/m .       Physical Exam  Constitutional: Awake, alert, cooperative, no apparent distress, and appears stated age. Communication provided by tablet . Daughter accompanies patient.   Eyes: Pupils equal, round and reactive to light, extra ocular muscles intact, sclera clear, conjunctiva normal.  HENT: Normocephalic, oral pharynx with moist mucus membranes, multiple missing teeth. No goiter appreciated.   Respiratory: Clear to auscultation bilaterally, no crackles or wheezing. No cough or obvious dyspnea.  Cardiovascular: Regular rate and rhythm, normal S1 and S2, and no murmur noted.  Carotids +2, no bruits. Mild bilateral lower extremity edema. Palpable pulses to radial  DP and PT arteries.   GI: Normal bowel sounds, soft, non-distended, non-tender, no masses palpated.  Lymph/Hematologic: No cervical lymphadenopathy and no supraclavicular lymphadenopathy.  Genitourinary: Deferred.   Skin: Warm and dry.    Musculoskeletal: Full ROM of neck. There is no redness, warmth, or swelling of the joints. Gross motor strength is weakened.   Neurologic: Awake, alert, oriented to name, place and time. Cranial nerves II-XII are grossly intact. Gait is cautious.  Neuropsychiatric: Calm, cooperative. Normal affect.     Labs: (personally reviewed)  Lab Results   Component Value Date    WBC 9.8 " 12/29/2020     Lab Results   Component Value Date    RBC 3.63 12/29/2020     Lab Results   Component Value Date    HGB 8.2 12/29/2020     Lab Results   Component Value Date    HCT 29.1 12/29/2020     Lab Results   Component Value Date    MCV 80 12/29/2020     Lab Results   Component Value Date    MCH 22.6 12/29/2020     Lab Results   Component Value Date    MCHC 28.2 12/29/2020     Lab Results   Component Value Date    RDW 22.5 12/29/2020     Lab Results   Component Value Date     12/29/2020     Last Comprehensive Metabolic Panel:  Sodium   Date Value Ref Range Status   12/29/2020 139 133 - 144 mmol/L Final     Potassium   Date Value Ref Range Status   12/29/2020 3.5 3.4 - 5.3 mmol/L Final     Chloride   Date Value Ref Range Status   12/29/2020 102 94 - 109 mmol/L Final     Carbon Dioxide   Date Value Ref Range Status   12/29/2020 29 20 - 32 mmol/L Final     Anion Gap   Date Value Ref Range Status   12/29/2020 8 3 - 14 mmol/L Final     Glucose   Date Value Ref Range Status   12/29/2020 103 (H) 70 - 99 mg/dL Final     Urea Nitrogen   Date Value Ref Range Status   12/29/2020 38 (H) 7 - 30 mg/dL Final     Creatinine   Date Value Ref Range Status   12/29/2020 1.06 (H) 0.52 - 1.04 mg/dL Final     GFR Estimate   Date Value Ref Range Status   12/29/2020 52 (L) >60 mL/min/[1.73_m2] Final     Comment:     Non  GFR Calc  Starting 12/18/2018, serum creatinine based estimated GFR (eGFR) will be   calculated using the Chronic Kidney Disease Epidemiology Collaboration   (CKD-EPI) equation.       Calcium   Date Value Ref Range Status   12/29/2020 9.4 8.5 - 10.1 mg/dL Final     Lab Results   Component Value Date    AST 11 11/23/2020     Lab Results   Component Value Date    ALT 12 11/23/2020     No results found for: BILICONJ   Lab Results   Component Value Date    BILITOTAL 0.3 11/23/2020     Lab Results   Component Value Date    ALBUMIN 3.4 11/23/2020     Lab Results   Component Value Date    PROTTOTAL 8.1  11/23/2020      Lab Results   Component Value Date    ALKPHOS 56 11/23/2020     EKG: Personally reviewed 6/26/20 Sinus rhythm  MR Pelvis 12/22/20                                                                   IMPRESSION:   1. An annular, fungating sigmoid mass that is approximately 7.4 cm in  length with associated luminal narrowing. The tumor extends through  the muscularis propria to invade the bladder, mesosigmoid, and  peritoneal reflection posteriorly with possible invasion the uterus  and proximal left mesovarium/ovarian ligaments. The mass also  extending anteroinferiorly invading the anterior peritoneal reflection  and possibly anterior abdominal wall. T-stage 4.  2. Multiple suspicious adjacent lymph nodes in the mesosigmoid,  N-stage 2.  3. Redemonstration of known colovesical fistula through the mass.    PET Oncology 12/22/20  IMPRESSION: In this patient with biopsy-proven adenocarcinoma of the  sigmoid colon  1. Irregular bowel wall thickening of the mid sigmoid colon with  marked FDG uptake compatible with known sigmoid colon cancer. FDG avid  mass extends to the urinary bladder with large lobulated areas of soft  tissue thickening seen throughout the urinary bladder.   2. 9 mm node/soft tissue nodule along the mesenteric margin which  appears to demonstrate FDG uptake, representing a metastatic focus.  Multiple additional small perisigmoidal lymph nodes are not enlarged  and do not appear to demonstrate significant FDG uptake, however this  is difficult to determine given the extent of blooming from the marked  FDG uptake of the sigmoid colon cancer.  3. No evidence of distant metastatic disease within the head/neck,  chest, or abdomen.  4. No evidence of hydronephrosis despite extensive urinary bladder  involvement.  5. 1.2 cm left thyroid hypoattenuating nodule. Recommend dedicated  thyroid ultrasound for further evaluation.  6. Additional incidental findings include colonic  diverticulosis,  punctate nonobstructing inferior pole right renal stone, and sub-3 mm  pulmonary nodules.    US thyroid pending    11/12/20 CT chest    IMPRESSION:    Normal examination of the chest.    CT abd/pelvis 11/12/20  IMPRESSION:   1.  Again noted is a colovesical fistula with likely primary colonic malignancy  in the proximal sigmoid colon, with tumor eroding into the adjacent urinary  bladder. This is favored over a primary urothelial malignancy. Gastroenterology,  colorectal surgical, and/or oncologic consultation is advised.    2.  There is associated likely koki metastatic disease.    3.  No urothelial abnormalities in the visualized upper tracts.    4.  Negative for urinary tract calculi or hydronephrosis.     Imaging reviewed by this provider      Outside records reviewed from: Care Everywhere    ASSESSMENT and PLAN  Myla Glynn is a 75 year old female scheduled to undergo Robotic assisted sigmoidectomy, SIGMOIDOSCOPY, FLEXIBLE, Partial Cystectomy, Cystourethroscopy, Possible Radical Cystectomy With Urinary Diversion with Joe Moscoso and Mercedez on 1/5/21. She has the following specific operative considerations:   - RCRI : 0.9% risk of major adverse cardiac event.   - Anesthesia considerations:  Refer to PAC assessment in anesthesia records  - VTE risk: 3%  - FARZAD # of risks 2/8 = Low risk  - Risk of PONV score = 3.  If > 2, anti-emetic intervention recommended. If 3 or > anti emetic intervention recommended with two or more meds    --Malignant neoplasm of sigmoid colon with invasion into bladder plus colovesical fistula. Above procedure now planned.   --HTN. Will hold hydrochlorothiazide. No other cardiac history symptoms or meds. EKG SR. Able to walk short distances.   --Nonsmoker. Denies pulmonary symptoms.    --GERD Will take omeprazole on DOS.  --DIABETES MELLITUS II. Last A1c 5.7 Will hold metformin on DOS.  --Anemia. Last Hgb: 8.2. Has been receiving iron infusions, last planned for  12/30/20.  --Renal insufficiency Cr 1.06, GFR 60. Report of burning with urination. UC pending per Dr. Newsome.  --Possibility of nerve block if appropriate for patient's procedure. Final counseling and decisions by regional team on DOS.  --Type and screen drawn today.   --Enhanced recovery pathway initiated.     Arrival time, NPO, shower and medication instructions provided by nursing staff today. Preparing For Your Surgery handout given.      WILL Montes CNS  Preoperative Assessment Center  [facility]  Clinic and Surgery Center  Phone: [phone]  Fax: [phone]

## 2020-12-29 NOTE — PROGRESS NOTES
[facility] Preoperative Ostomy Consult    Referral from Dr. Newsome  Consult attended by patient and daughter  Diagnosis: sigmoid cancer Date of Surgery: 01/05/2021   Type of Surgery: Partial Cystectomy, Cystourethroscopy, Possible Radical Cystectomy With Urinary Diversion  Emotional readiness for surgery: no acute distress  Physical Limitations: With the following limitations:  Language barrier, daughter is good . Prefers that over [facility] interpreters  Abdomen assessed for site by: Patient's ability to visiualize area, avoidance of skin creases and scars, palpating for rectus abdominus muscle and clothing fit  Teaching: Anatomy/picture of stoma, stoma/bowel function postop, intro to pouches, written/media offered and role of WOC/postop followup explained.  Stoma site marking:  Marking pen and tegaderm   Location chosen: RLQ  [employer] Ostomy packet given to patient  Nadege Last NP was available for supervision of care if needed or if questions should arise and regarding plan of care.  Kelly Wood RN RN CWON

## 2020-12-29 NOTE — PATIENT INSTRUCTIONS
Preparing for Your Surgery      Name:  Myla Glynn   MRN:  [id number]   :  1945   Today's Date:  2020       Arriving for surgery:  Surgery date:  21  Arrival time:  06:45 am    Restrictions due to COVID 19:  No visitors are allowed at this time.    [employer] parking is available for anyone with mobility limitations or disabilities.  ([place]  24 hours/ 7 days a week; [facility] [place]  7 am- 3:30 pm, Mon- Fri)    Please come to:   [facility], [place] [address]  [address] [place]  [address]  -    Please proceed to the Surgery Lounge on the 3rd floor. [phone]?     - ?If you are in need of directions, wheelchair or escort please stop at the Information Desk in the lobby.  Inform the information person that you are here for surgery; a wheelchair and escort will be provided to the Surgery Lounge .?     What can I eat or drink?  -  You may eat per Surgery Packet from surgeon - see Bowel Prep Instructions.  -  You may have clear liquids until 2 hours before surgery.     Examples of clear liquids:  Water  Clear broth  Juices (apple, white grape, white cranberry  and cider) without pulp  Noncarbonated, powder based beverages  (lemonade and Sarwat-Aid)  Sodas (Sprite, 7-Up, ginger ale and seltzer)  Coffee or tea (without milk or cream)  Gatorade    -  No Alcohol for at least 24 hours before surgery     Which medicines can I take?    Hold Aspirin for 7 days before surgery.   Hold Multivitamins for 7 days before surgery.  Hold Supplements for 7 days before surgery.  Hold Ibuprofen (Advil, Motrin) for 1 day before surgery--unless otherwise directed by surgeon.  Hold Naproxen (Aleve) for 4 days before surgery.    -  DO NOT take these medications the day of surgery:  Metformin + hydrochlorothiazide (hydrodiuril) if normally taken in the morning.  -  PLEASE TAKE these medications the day of surgery:  Tylenol or zofran if needed; omeprazole (prilosec).    How do I prepare  myself?  - Please take 2 showers before surgery using Scrubcare or Hibiclens soap.    Use this soap only from the neck to your toes.     Leave the soap on your skin for one minute--then rinse thoroughly.      You may use your own shampoo and conditioner; no other hair products.   - Please remove all jewelry and body piercings.  - No lotions, deodorants or fragrance.  - No makeup or fingernail polish.   - Bring your ID and insurance card.    - All patients are required to have a Covid-19 test within 4 days of surgery/procedure.      -Patients will be contacted by the [facility] scheduling team within 1 week of surgery to make an appointment.      - Patients may call the Scheduling team at [phone] if they have not been scheduled within 4 days of  surgery.      Questions or Concerns:    - For any questions regarding the day of surgery or your hospital stay, please contact the Pre Admission Nursing Office at [phone].       - If you have health changes between today and your surgery please call your surgeon.       For questions after surgery please call your surgeons office.           Enhanced Recovery After Surgery     This is a team effort, including you, to get you back on your feet, eating and drinking normally and out of the hospital as quickly as possible.  The goals are: 1) NO INFECTIONS and   2) RETURN TO NORMAL DIET    How can we achieve these goals?  1) STAY ACTIVE: Walk every day before your surgery; try to increase the amount every day.  Walk after surgery as much as you can-the nurses will help you.  Walking speeds healing and gets you home quicker, you heal better at home and have less risk of infection.     2) INCENTIVE SPIROMETER: Practice your incentive spirometer 4 times per day with 5 repetitions each time.  Using the incentive spirometer can strengthen your muscles between your ribs and help you have a strong cough after surgery.  A more effective cough can help prevent problems with  your lungs.    3) STAY HYDRATED: Drink clear liquids up until 2 hours before your surgery. We would like you to purchase a drink such as Gatorade or Ensure Clear (not the milkshake type).  Drink this before bedtime and on the way into the hospital, drink between 8-10 ounces or until you feel hydrated.  Keeping well hydrated leads to your veins being plump, you wake up faster, and you are less likely to be nauseated. Start drinking water as soon as you can after surgery and advance to clear liquids and food as tolerated.  IV fluids contain salt, drinking fluids will minimize the amount of IV fluids you need and decrease the amount of salt you get.    The most common reason for the patient to be readmitted is dehydration. Staying hydrated after you go home from the hospital is very important.  Ensure or Ensure Clear are good options to keep you hydrated.     4) PAIN MANAGEMENT: If we minimize the amount of opioids and narcotics, and use regional blocks (which numb the area where your surgery is) along with oral pain medications; you will have less side effects of nausea and constipation. Narcotics can slow down your bowels and cause you to stay in the hospital longer.     Our goal is to keep you comfortable; eating and drinking normally and back home safely.

## 2020-12-29 NOTE — ANESTHESIA PREPROCEDURE EVALUATION
"Anesthesia Pre-Procedure Evaluation    Patient: Myla Glynn   MRN:     [id number] Gender:   female   Age:    75 year old :      1945        Preoperative Diagnosis: Malignant neoplasm of sigmoid colon (H) [C18.7]   Procedure(s):  Robotic assisted sigmoidectomy  SIGMOIDOSCOPY, FLEXIBLE  Partial Cystectomy, Cystourethroscopy, Possible Radical Cystectomy With Urinary Diversion     LABS:  CBC:   Lab Results   Component Value Date    WBC 8.3 2020    HGB 8.4 (L) 2020    HCT 28.8 (L) 2020     2020     BMP:   Lab Results   Component Value Date     2020    POTASSIUM 3.3 (L) 2020    CHLORIDE 100 2020    CO2 26 2020    BUN 19 2020    CR 0.95 2020    GLC 91 2020     COAGS:   Lab Results   Component Value Date    INR 1.10 2020     POC: No results found for: BGM, HCG, HCGS  OTHER:   Lab Results   Component Value Date    DICKSON 9.4 2020    ALBUMIN 3.4 2020    PROTTOTAL 8.1 2020    ALT 12 2020    AST 11 2020    ALKPHOS 56 2020    BILITOTAL 0.3 2020        Preop Vitals    BP Readings from Last 3 Encounters:   20 107/69   20 120/77   20 107/59    Pulse Readings from Last 3 Encounters:   20 84   20 83   20 82      Resp Readings from Last 3 Encounters:   20 15   20 16   20 16    SpO2 Readings from Last 3 Encounters:   20 100%   20 98%   20 100%      Temp Readings from Last 1 Encounters:   20 99  F (37.2  C) (Oral)    Ht Readings from Last 1 Encounters:   20 1.549 m (5' 1\")      Wt Readings from Last 1 Encounters:   No data found for Wt    There is no height or weight on file to calculate BMI.     LDA:        Past Medical History:   Diagnosis Date     Anemia      Bladder mass      Controlled type 2 diabetes mellitus without complication, without long-term current use of insulin (H)      Gastroesophageal reflux disease  "     HTN (hypertension)      Malignant neoplasm of sigmoid colon (H)       Past Surgical History:   Procedure Laterality Date     COLONOSCOPY        No Known Allergies     Anesthesia Evaluation     . Pt has had prior anesthetic. Type: MAC    No history of anesthetic complications          ROS/MED HX    ENT/Pulmonary:  - neg pulmonary ROS     Neurologic:  - neg neurologic ROS     Cardiovascular:     (+) Dyslipidemia, hypertension----. : . . . :. . Previous cardiac testing date:results:date: results:ECG reviewed date:6/26/20 results:SR date: results:         (-) taking anticoagulants/antiplatelets   METS/Exercise Tolerance:  3 - Able to walk 1-2 blocks without stopping   Hematologic:     (+) Anemia, Other Hematologic Disorder-has received iron infusions, last planned for 12/30/20     (-) History of Transfusion   Musculoskeletal:  - neg musculoskeletal ROS       GI/Hepatic:     (+) GERD Asymptomatic on medication, bowel prep,       Renal/Genitourinary:     (+) chronic renal disease, type: CRI, Pt does not require dialysis, Pt has no history of transplant, Other Renal/ Genitourinary, burning with urination      Endo:     (+) type II DM Last HgA1c: 5.7  date: 1/8/20 Not using insulin - not using insulin pump .      Psychiatric:  - neg psychiatric ROS       Infectious Disease:  - neg infectious disease ROS       Malignancy:   (+) Malignancy History of Other  Other CA sigmoid colon cancer with invasion into bladder Active status post         Other:    (+) C-spine cleared: N/A, no H/O Chronic Pain,no other significant disability                        PHYSICAL EXAM:   Mental Status/Neuro: A/A/O; Age Appropriate   Airway: Facies: Feasible  Mallampati: II  Mouth/Opening: Full  TM distance: > 6 cm  Neck ROM: Full   Respiratory: Auscultation: CTAB     Resp. Rate: Normal     Resp. Effort: Normal      CV: Rhythm: Regular  Heart: Normal Sounds  Edema: LLE; RLE   Comments: Multiple teeth missing. No natural top teeth; partial  dentures bottom     Dental: Details                Assessment:   ASA SCORE: 3            Plan:   Anes. Type:  General      Induction:  IV (Standard)   Airway: ETT; Oral   Access/Monitoring: PIV; 2nd PIV (discussed possible arterial and central lines)   Maintenance: Balanced     PONV Management: Adult Risk Factors: Female     CONSENT: Direct conversation   Plan and risks discussed with: Patient   Blood Products: Consented (ALL Blood Products)                PAC Discussion and Assessment    ASA Classification: 3  Case is suitable for: [address]  Anesthetic techniques and relevant risks discussed: GA  Invasive monitoring and risk discussed: No  Types:   Possibility and Risk of blood transfusion discussed: Yes  NPO instructions given:   Additional anesthetic preparation and risks discussed:   Needs early admission to pre-op area:   Other:     PAC Resident/NP Anesthesia Assessment:  Myla Glynn is a 75 year old female scheduled to undergo Robotic assisted sigmoidectomy, SIGMOIDOSCOPY, FLEXIBLE, Partial Cystectomy, Cystourethroscopy, Possible Radical Cystectomy With Urinary Diversion with Joe Moscoso and [place] on 1/5/21. She has the following specific operative considerations:   - RCRI : 0.9% risk of major adverse cardiac event.   - VTE risk: 3%  - FARZAD # of risks 2/8 = Low risk  - Risk of PONV score = 3.  If > 2, anti-emetic intervention recommended. If 3 or > anti emetic intervention recommended with two or more meds    --Malignant neoplasm of sigmoid colon with invasion into bladder plus colovesical fistula. Above procedure now planned.   --HTN. Will hold hydrochlorothiazide. No other cardiac history symptoms or meds. EKG SR. Able to walk short distances.   --Nonsmoker. Denies pulmonary symptoms.    --GERD Will take omeprazole on DOS.  --DIABETES MELLITUS II. Last A1c 5.7 Will hold metformin on DOS.  --Anemia. Last Hgb: 8.2. Has been receiving iron infusions, last planned for 12/30/20.  --Renal insufficiency Cr  1.06, GFR 60. Report of burning with urination. UC pending per Dr. Newsome.  --Possibility of nerve block if appropriate for patient's procedure. Final counseling and decisions by regional team on DOS.  --Type and screen drawn today.   --Enhanced recovery pathway initiated.     Arrival time, NPO, shower and medication instructions provided by nursing staff today. Preparing For Your Surgery handout given.          Reviewed and Signed by PAC Mid-Level Provider/Resident  Mid-Level Provider/Resident: WILL Barcenas, CNS  Date: 12/29/20  Time: 9:36am    Attending Anesthesiologist Anesthesia Assessment:        Anesthesiologist:   Date:   Time:   Pass/Fail:   Disposition:     PAC Pharmacist Assessment:        Pharmacist:   Date:   Time:    WILL Montes CNS

## 2020-12-29 NOTE — PROGRESS NOTES
Pre Op Teaching Flowsheet       Pre and Post op Patient Education  Relevant Diagnosis:  Bladder mass and colon cancer  Surgical procedure:  PARTIAL CYSTECTOMY; CYSTOURETHROSCOPY; POSSIBLE RADICAL CYSTECTOMY WITH URINARY DIVERSION  Teaching Topic:  Pre and post op teaching  Person Involved in teaching: Myla Glynn and patients daughter who speaks English    Motivation Level:  Asks Questions: Yes  Eager to Learn:  Yes  Cooperative: Yes  Receptive (willing/able to accept information):  Yes    Patient demonstrates understanding of the following:  Date of surgery:  1/5/21  Location of surgery:  [address] [place] [address]  History and Physical and any other testing necessary prior to surgery: Yes  Required time line for completion of History and Physical and any pre-op testing: Yes    Patient demonstrates understanding of the following:  Pre-op bowel prep:  Clear liquids day prior  Pre-op showering/scrub information with PCMX Soap: Yes  Blood thinner medications discussed and when to stop (if applicable):  Yes      Infection Prevention:   Patient demonstrates understanding of the following:  Surgical procedure site care taught: at time of discharge  Signs and symptoms of infection taught:  Yes      Post-op follow-up:  Discussed how to contact the hospital, nurse, and clinic scheduling staff if necessary.    Instructional materials used/given/mailed:  [facility] Booklet, post op teaching sheet, Map, Soap, and arrival/location information.    Surgical instructions packet mailed to patient.    Patient has a  and someone to stay with her for the first 24 hours.    Patient was given nutritional drink.    Patient is aware of the need for COVID and is scheduled to do so.

## 2020-12-29 NOTE — TELEPHONE ENCOUNTER
US scheduled for 12/31. Appt should be 1st available after that with [facility] as preferred providers.

## 2020-12-30 ENCOUNTER — INFUSION THERAPY VISIT (OUTPATIENT)
Dept: INFUSION THERAPY | Facility: CLINIC | Age: 75
End: 2020-12-30
Attending: COLON & RECTAL SURGERY
Payer: COMMERCIAL

## 2020-12-30 VITALS
SYSTOLIC BLOOD PRESSURE: 106 MMHG | RESPIRATION RATE: 16 BRPM | DIASTOLIC BLOOD PRESSURE: 63 MMHG | HEART RATE: 77 BPM | TEMPERATURE: 99.2 F

## 2020-12-30 DIAGNOSIS — C18.7 MALIGNANT NEOPLASM OF SIGMOID COLON (H): ICD-10-CM

## 2020-12-30 DIAGNOSIS — D50.8 OTHER IRON DEFICIENCY ANEMIA: Primary | ICD-10-CM

## 2020-12-30 LAB
ABO + RH BLD: NORMAL
ABO + RH BLD: NORMAL
BACTERIA SPEC CULT: ABNORMAL
BLD GP AB SCN SERPL QL: NORMAL
BLOOD BANK CMNT PATIENT-IMP: NORMAL
BLOOD BANK CMNT PATIENT-IMP: NORMAL
Lab: ABNORMAL
SPECIMEN EXP DATE BLD: NORMAL
SPECIMEN SOURCE: ABNORMAL

## 2020-12-30 PROCEDURE — 258N000003 HC RX IP 258 OP 636: Performed by: COLON & RECTAL SURGERY

## 2020-12-30 PROCEDURE — 250N000011 HC RX IP 250 OP 636: Performed by: COLON & RECTAL SURGERY

## 2020-12-30 PROCEDURE — 96365 THER/PROPH/DIAG IV INF INIT: CPT

## 2020-12-30 RX ADMIN — IRON SUCROSE 200 MG: 20 INJECTION, SOLUTION INTRAVENOUS at 16:17

## 2020-12-30 NOTE — LETTER
12/30/2020         RE: Myla Glynn  [address]  [address]  [place] [address]        Dear Colleague,    Thank you for referring your patient, Myla Glynn, to the [facility]. Please see a copy of my visit note below.    Nursing Note  [facility] Glynn [facility]:   Chief Complaint   Patient presents with     Infusion     Venofer     During today's [facility] appointment, orders from Dr. Moscoso were completed.  Frequency: today is dose 5 of 5 total    Progress note:  Patient identification verified by name and date of birth.  Assessment completed.  Vitals recorded in Doc Flowsheets.  Patient was provided with education regarding medication/procedure and possible side effects.  Patient verbalized understanding.     present during visit today: Yes, Language: Tigrinya.     Treatment Conditions: Non-applicable.    Premedications: were not ordered.    Drug Waste Record: No    Infusion length and rate:  infusion given over approximately 15-20 minutes    Labs: were not ordered for this appointment.    Vascular access: peripheral IV placed today.    Is the next appt scheduled? NA, last infusion today    Post Infusion Assessment:  Patient tolerated infusion without incident.     Discharge Plan:   Follow up plan of care with: ordering provider as scheduled.  Discharge instructions were reviewed with patient.  Patient/representative verbalized understanding of discharge instructions and all questions answered.  [facility] discharged [facility] in stable condition.    Irais Yuen RN       Administrations This Visit     iron sucrose (VENOFER) 200 mg in sodium chloride 0.9 % 100 mL intermittent infusion     Admin Date  12/30/2020 Action  New Bag Dose  200 mg Rate  480 mL/hr Route  Intravenous Administered By  Irais Yuen, JAMARI                 /63 (BP Location: Right arm)   Pulse 77   Temp 99.2  F (37.3  C) (Oral)   Resp 16                 Again, thank you for allowing me to participate in the care of your patient.        Sincerely,        VIDEO,

## 2020-12-30 NOTE — PROGRESS NOTES
Nursing Note  Myla Glynn presents [facility] for:   Chief Complaint   Patient presents with     Infusion     Venofer     During [facility] appointment, orders from Dr. Moscoso were completed.  Frequency: today is dose 5 of 5 total    Progress note:  Patient identification verified by name and date of birth.  Assessment completed.  Vitals recorded in Doc Flowsheets.  Patient was provided with education regarding medication/procedure and possible side effects.  Patient verbalized understanding.     present during visit today: Yes, Language: Tigrinya.     Treatment Conditions: Non-applicable.    Premedications: were not ordered.    Drug Waste Record: No    Infusion length and rate:  infusion given over approximately 15-20 minutes    Labs: were not ordered for this appointment.    Vascular access: peripheral IV placed today.    Is the next appt scheduled? NA, last infusion today    Post Infusion Assessment:  Patient tolerated infusion without incident.     Discharge Plan:   Follow up plan of care with: ordering provider as scheduled.  Discharge instructions were reviewed with patient.  Patient/representative verbalized understanding of discharge instructions and all questions answered.  [facility] discharged [facility] in stable condition.    Irais Yuen RN       Administrations This Visit     iron sucrose (VENOFER) 200 mg in sodium chloride 0.9 % 100 mL intermittent infusion     Admin Date  12/30/2020 Action  New Bag Dose  200 mg Rate  480 mL/hr Route  Intravenous Administered By  Irais Yuen RN                /63 (BP Location: Right arm)   Pulse 77   Temp 99.2  F (37.3  C) (Oral)   Resp 16

## 2020-12-31 ENCOUNTER — ANCILLARY PROCEDURE (OUTPATIENT)
Dept: ULTRASOUND IMAGING | Facility: CLINIC | Age: 75
End: 2020-12-31
Attending: COLON & RECTAL SURGERY
Payer: COMMERCIAL

## 2020-12-31 ENCOUNTER — TEAM CONFERENCE (OUTPATIENT)
Dept: SURGERY | Facility: CLINIC | Age: 75
End: 2020-12-31

## 2020-12-31 DIAGNOSIS — E04.1 THYROID NODULE: ICD-10-CM

## 2020-12-31 DIAGNOSIS — N39.0 URINARY TRACT INFECTION: Primary | ICD-10-CM

## 2020-12-31 DIAGNOSIS — C18.7 MALIGNANT NEOPLASM OF SIGMOID COLON (H): Primary | ICD-10-CM

## 2020-12-31 DIAGNOSIS — C18.7 MALIGNANT NEOPLASM OF SIGMOID COLON (H): ICD-10-CM

## 2020-12-31 PROCEDURE — 76536 US EXAM OF HEAD AND NECK: CPT | Performed by: RADIOLOGY

## 2020-12-31 RX ORDER — POLYETHYLENE GLYCOL 3350 17 G/17G
238 POWDER, FOR SOLUTION ORAL SEE ADMIN INSTRUCTIONS
Qty: 238 G | Refills: 0 | Status: ON HOLD | OUTPATIENT
Start: 2020-12-31 | End: 2021-01-08

## 2020-12-31 RX ORDER — NEOMYCIN SULFATE 500 MG/1
1000 TABLET ORAL EVERY 6 HOURS
Qty: 6 TABLET | Refills: 0 | Status: SHIPPED | OUTPATIENT
Start: 2020-12-31 | End: 2021-01-02

## 2020-12-31 RX ORDER — ONDANSETRON 4 MG/1
4 TABLET, FILM COATED ORAL EVERY 6 HOURS
Qty: 3 TABLET | Refills: 0 | Status: ON HOLD | OUTPATIENT
Start: 2020-12-31 | End: 2021-01-08

## 2020-12-31 RX ORDER — METRONIDAZOLE 500 MG/1
500 TABLET ORAL EVERY 6 HOURS
Qty: 3 TABLET | Refills: 0 | Status: ON HOLD | OUTPATIENT
Start: 2020-12-31 | End: 2021-01-08

## 2020-12-31 RX ORDER — CIPROFLOXACIN 500 MG/1
500 TABLET, FILM COATED ORAL 2 TIMES DAILY
Qty: 10 TABLET | Refills: 0 | Status: ON HOLD | OUTPATIENT
Start: 2020-12-31 | End: 2021-01-08

## 2020-12-31 NOTE — PROGRESS NOTES
COLON AND RECTAL SURGERY HUDDLE:    Patient was reviewed in preporation for their surgery the following was reviewed and has been completed:    Surgeon: Dr. Remi Moscoso    Surgery & Date: 1/5- robot sigmoidectomy with flex sig, Partial Cystectomy, Cystourethroscopy, Possible Radical Cystectomy With Urinary Diversion    Last MD Note: reviewed    Anesthesia Type: General    Other Providers: Yes Dr. George     PAC: Yes    WOC: Yes    Labs: Yes    Nutrition: Yes    Bowel Prep: Yes MiraLAX / Gatorade , Antibiotic and Magnesium Citrate    Packet: Yes    Imaging: Yes-- US thyroid, iron infusions     Post-Op Appointments: Yes    COVID Yes

## 2021-01-02 ENCOUNTER — TELEPHONE (OUTPATIENT)
Dept: SURGERY | Facility: CLINIC | Age: 76
End: 2021-01-02

## 2021-01-02 DIAGNOSIS — Z11.59 ENCOUNTER FOR SCREENING FOR OTHER VIRAL DISEASES: ICD-10-CM

## 2021-01-02 DIAGNOSIS — C18.7 MALIGNANT NEOPLASM OF SIGMOID COLON (H): ICD-10-CM

## 2021-01-02 LAB
SARS-COV-2 RNA SPEC QL NAA+PROBE: NORMAL
SPECIMEN SOURCE: NORMAL

## 2021-01-02 PROCEDURE — U0005 INFEC AGEN DETEC AMPLI PROBE: HCPCS | Performed by: COLON & RECTAL SURGERY

## 2021-01-02 PROCEDURE — U0003 INFECTIOUS AGENT DETECTION BY NUCLEIC ACID (DNA OR RNA); SEVERE ACUTE RESPIRATORY SYNDROME CORONAVIRUS 2 (SARS-COV-2) (CORONAVIRUS DISEASE [COVID-19]), AMPLIFIED PROBE TECHNIQUE, MAKING USE OF HIGH THROUGHPUT TECHNOLOGIES AS DESCRIBED BY CMS-2020-01-R: HCPCS | Performed by: COLON & RECTAL SURGERY

## 2021-01-02 RX ORDER — NEOMYCIN SULFATE 500 MG/1
1000 TABLET ORAL EVERY 6 HOURS
Qty: 6 TABLET | Refills: 0 | Status: ON HOLD | OUTPATIENT
Start: 2021-01-02 | End: 2021-01-08

## 2021-01-02 NOTE — TELEPHONE ENCOUNTER
Patient's daughter called stating that she had already taken neomycin, instead of taking it the day before surgery. Re-ordered neomycin and sent to patient's pharmacy.

## 2021-01-03 LAB
LABORATORY COMMENT REPORT: NORMAL
SARS-COV-2 RNA SPEC QL NAA+PROBE: NEGATIVE
SPECIMEN SOURCE: NORMAL

## 2021-01-04 ENCOUNTER — TELEPHONE (OUTPATIENT)
Dept: WOUND CARE | Facility: CLINIC | Age: 76
End: 2021-01-04

## 2021-01-04 DIAGNOSIS — C18.7 MALIGNANT NEOPLASM OF SIGMOID COLON (H): ICD-10-CM

## 2021-01-04 DIAGNOSIS — C18.7 MALIGNANT NEOPLASM OF SIGMOID COLON (H): Primary | ICD-10-CM

## 2021-01-04 LAB — PREALB SERPL IA-MCNC: 5 MG/DL (ref 15–45)

## 2021-01-04 NOTE — TELEPHONE ENCOUNTER
"Spoke with daughter who stated that her mom does not want a stoma. She just wants  \"as much of the tumor removed as possible but she wants to keep her bladder.\"   Message send to surgical teams to discuss with pt.     ----- Message from Ashely Null RN sent at 12/31/2020  1:20 PM CST -----  Patient's daughter is requesting that you call her mom as she has some questions for you regarding stoma.  Thank you      "

## 2021-01-05 ENCOUNTER — ANCILLARY PROCEDURE (OUTPATIENT)
Dept: ULTRASOUND IMAGING | Facility: CLINIC | Age: 76
End: 2021-01-05
Payer: COMMERCIAL

## 2021-01-05 ENCOUNTER — ANESTHESIA (OUTPATIENT)
Dept: SURGERY | Facility: CLINIC | Age: 76
End: 2021-01-05
Payer: COMMERCIAL

## 2021-01-05 ENCOUNTER — HOSPITAL ENCOUNTER (INPATIENT)
Facility: CLINIC | Age: 76
LOS: 8 days | Discharge: HOME HEALTH | End: 2021-01-13
Attending: COLON & RECTAL SURGERY | Admitting: COLON & RECTAL SURGERY
Payer: COMMERCIAL

## 2021-01-05 DIAGNOSIS — R19.8 HIGH OUTPUT ILEOSTOMY (H): ICD-10-CM

## 2021-01-05 DIAGNOSIS — C18.7 MALIGNANT NEOPLASM OF SIGMOID COLON (H): Primary | ICD-10-CM

## 2021-01-05 DIAGNOSIS — Z93.2 HIGH OUTPUT ILEOSTOMY (H): ICD-10-CM

## 2021-01-05 DIAGNOSIS — N32.89 BLADDER MASS: ICD-10-CM

## 2021-01-05 LAB
ALBUMIN SERPL-MCNC: 2.2 G/DL (ref 3.4–5)
ALP SERPL-CCNC: 23 U/L (ref 40–150)
ALT SERPL W P-5'-P-CCNC: 8 U/L (ref 0–50)
ANION GAP SERPL CALCULATED.3IONS-SCNC: 10 MMOL/L (ref 3–14)
AST SERPL W P-5'-P-CCNC: 16 U/L (ref 0–45)
BASE DEFICIT BLDA-SCNC: 5.7 MMOL/L
BASE DEFICIT BLDA-SCNC: 6.8 MMOL/L
BASE DEFICIT BLDA-SCNC: 8.1 MMOL/L
BASE DEFICIT BLDA-SCNC: 8.4 MMOL/L
BASE DEFICIT BLDA-SCNC: 8.9 MMOL/L
BILIRUB SERPL-MCNC: 1.6 MG/DL (ref 0.2–1.3)
BLD PROD TYP BPU: NORMAL
BLD UNIT ID BPU: 0
BLOOD PRODUCT CODE: NORMAL
BPU ID: NORMAL
BUN SERPL-MCNC: 15 MG/DL (ref 7–30)
CA-I BLD-MCNC: 4.2 MG/DL (ref 4.4–5.2)
CA-I BLD-MCNC: 4.9 MG/DL (ref 4.4–5.2)
CA-I BLD-MCNC: 5 MG/DL (ref 4.4–5.2)
CA-I BLD-MCNC: 5.4 MG/DL (ref 4.4–5.2)
CA-I BLD-MCNC: 6.9 MG/DL (ref 4.4–5.2)
CALCIUM SERPL-MCNC: 8.6 MG/DL (ref 8.5–10.1)
CHLORIDE SERPL-SCNC: 108 MMOL/L (ref 94–109)
CO2 SERPL-SCNC: 18 MMOL/L (ref 20–32)
CREAT SERPL-MCNC: 0.92 MG/DL (ref 0.52–1.04)
CREAT SERPL-MCNC: 0.94 MG/DL (ref 0.52–1.04)
ERYTHROCYTE [DISTWIDTH] IN BLOOD BY AUTOMATED COUNT: 17.2 % (ref 10–15)
GFR SERPL CREATININE-BSD FRML MDRD: 60 ML/MIN/{1.73_M2}
GFR SERPL CREATININE-BSD FRML MDRD: 61 ML/MIN/{1.73_M2}
GLUCOSE BLD-MCNC: 128 MG/DL (ref 70–99)
GLUCOSE BLD-MCNC: 141 MG/DL (ref 70–99)
GLUCOSE BLD-MCNC: 164 MG/DL (ref 70–99)
GLUCOSE BLD-MCNC: 193 MG/DL (ref 70–99)
GLUCOSE BLD-MCNC: 208 MG/DL (ref 70–99)
GLUCOSE BLDC GLUCOMTR-MCNC: 157 MG/DL (ref 70–99)
GLUCOSE BLDC GLUCOMTR-MCNC: 172 MG/DL (ref 70–99)
GLUCOSE BLDC GLUCOMTR-MCNC: 173 MG/DL (ref 70–99)
GLUCOSE BLDC GLUCOMTR-MCNC: 95 MG/DL (ref 70–99)
GLUCOSE SERPL-MCNC: 177 MG/DL (ref 70–99)
HCO3 BLD-SCNC: 16 MMOL/L (ref 21–28)
HCO3 BLD-SCNC: 17 MMOL/L (ref 21–28)
HCO3 BLD-SCNC: 17 MMOL/L (ref 21–28)
HCO3 BLD-SCNC: 19 MMOL/L (ref 21–28)
HCO3 BLD-SCNC: 20 MMOL/L (ref 21–28)
HCT VFR BLD AUTO: 27.8 % (ref 35–47)
HGB BLD-MCNC: 10.1 G/DL (ref 11.7–15.7)
HGB BLD-MCNC: 7.8 G/DL (ref 11.7–15.7)
HGB BLD-MCNC: 8.4 G/DL (ref 11.7–15.7)
HGB BLD-MCNC: 8.6 G/DL (ref 11.7–15.7)
HGB BLD-MCNC: 8.9 G/DL (ref 11.7–15.7)
HGB BLD-MCNC: 9 G/DL (ref 11.7–15.7)
HGB BLD-MCNC: 9.1 G/DL (ref 11.7–15.7)
INR PPP: 1.49 (ref 0.86–1.14)
INTERPRETATION ECG - MUSE: NORMAL
LACTATE BLD-SCNC: 1.4 MMOL/L (ref 0.7–2)
LACTATE BLD-SCNC: 1.4 MMOL/L (ref 0.7–2)
LACTATE BLD-SCNC: 1.8 MMOL/L (ref 0.7–2)
LACTATE BLD-SCNC: 2.6 MMOL/L (ref 0.7–2)
LACTATE BLD-SCNC: 3.4 MMOL/L (ref 0.7–2)
MAGNESIUM SERPL-MCNC: 1.7 MG/DL (ref 1.6–2.3)
MAGNESIUM SERPL-MCNC: 1.7 MG/DL (ref 1.6–2.3)
MCH RBC QN AUTO: 27.1 PG (ref 26.5–33)
MCHC RBC AUTO-ENTMCNC: 32.7 G/DL (ref 31.5–36.5)
MCV RBC AUTO: 83 FL (ref 78–100)
O2/TOTAL GAS SETTING VFR VENT: 40 %
O2/TOTAL GAS SETTING VFR VENT: 50 %
O2/TOTAL GAS SETTING VFR VENT: 55 %
O2/TOTAL GAS SETTING VFR VENT: 55 %
O2/TOTAL GAS SETTING VFR VENT: 56 %
PCO2 BLD: 29 MM HG (ref 35–45)
PCO2 BLD: 32 MM HG (ref 35–45)
PCO2 BLD: 34 MM HG (ref 35–45)
PCO2 BLD: 37 MM HG (ref 35–45)
PCO2 BLD: 39 MM HG (ref 35–45)
PH BLD: 7.3 PH (ref 7.35–7.45)
PH BLD: 7.31 PH (ref 7.35–7.45)
PH BLD: 7.33 PH (ref 7.35–7.45)
PH BLD: 7.34 PH (ref 7.35–7.45)
PH BLD: 7.35 PH (ref 7.35–7.45)
PHOSPHATE SERPL-MCNC: 4.3 MG/DL (ref 2.5–4.5)
PHOSPHATE SERPL-MCNC: 5 MG/DL (ref 2.5–4.5)
PLATELET # BLD AUTO: 283 10E9/L (ref 150–450)
PO2 BLD: 184 MM HG (ref 80–105)
PO2 BLD: 278 MM HG (ref 80–105)
PO2 BLD: 279 MM HG (ref 80–105)
PO2 BLD: 293 MM HG (ref 80–105)
PO2 BLD: 295 MM HG (ref 80–105)
POTASSIUM BLD-SCNC: 3.3 MMOL/L (ref 3.4–5.3)
POTASSIUM BLD-SCNC: 3.6 MMOL/L (ref 3.4–5.3)
POTASSIUM BLD-SCNC: 3.7 MMOL/L (ref 3.4–5.3)
POTASSIUM BLD-SCNC: 4.1 MMOL/L (ref 3.4–5.3)
POTASSIUM BLD-SCNC: 4.2 MMOL/L (ref 3.4–5.3)
POTASSIUM SERPL-SCNC: 3.1 MMOL/L (ref 3.4–5.3)
POTASSIUM SERPL-SCNC: 4.1 MMOL/L (ref 3.4–5.3)
PROT SERPL-MCNC: 4.2 G/DL (ref 6.8–8.8)
RBC # BLD AUTO: 3.36 10E12/L (ref 3.8–5.2)
SODIUM BLD-SCNC: 129 MMOL/L (ref 133–144)
SODIUM BLD-SCNC: 133 MMOL/L (ref 133–144)
SODIUM BLD-SCNC: 134 MMOL/L (ref 133–144)
SODIUM BLD-SCNC: 135 MMOL/L (ref 133–144)
SODIUM BLD-SCNC: 135 MMOL/L (ref 133–144)
SODIUM SERPL-SCNC: 136 MMOL/L (ref 133–144)
TRANSFUSION STATUS PATIENT QL: NORMAL
TROPONIN I SERPL-MCNC: 0.02 UG/L (ref 0–0.04)
WBC # BLD AUTO: 12.9 10E9/L (ref 4–11)

## 2021-01-05 PROCEDURE — C2617 STENT, NON-COR, TEM W/O DEL: HCPCS | Performed by: COLON & RECTAL SURGERY

## 2021-01-05 PROCEDURE — 0T778DZ DILATION OF LEFT URETER WITH INTRALUMINAL DEVICE, VIA NATURAL OR ARTIFICIAL OPENING ENDOSCOPIC: ICD-10-PCS | Performed by: UROLOGY

## 2021-01-05 PROCEDURE — 0TBB0ZZ EXCISION OF BLADDER, OPEN APPROACH: ICD-10-PCS | Performed by: UROLOGY

## 2021-01-05 PROCEDURE — 370N000017 HC ANESTHESIA TECHNICAL FEE, PER MIN: Performed by: COLON & RECTAL SURGERY

## 2021-01-05 PROCEDURE — 250N000013 HC RX MED GY IP 250 OP 250 PS 637: Performed by: COLON & RECTAL SURGERY

## 2021-01-05 PROCEDURE — 84484 ASSAY OF TROPONIN QUANT: CPT | Performed by: COLON & RECTAL SURGERY

## 2021-01-05 PROCEDURE — 36415 COLL VENOUS BLD VENIPUNCTURE: CPT | Performed by: ANESTHESIOLOGY

## 2021-01-05 PROCEDURE — 250N000013 HC RX MED GY IP 250 OP 250 PS 637: Performed by: ANESTHESIOLOGY

## 2021-01-05 PROCEDURE — 82947 ASSAY GLUCOSE BLOOD QUANT: CPT

## 2021-01-05 PROCEDURE — 250N000009 HC RX 250: Performed by: NURSE ANESTHETIST, CERTIFIED REGISTERED

## 2021-01-05 PROCEDURE — 86900 BLOOD TYPING SEROLOGIC ABO: CPT | Performed by: ANESTHESIOLOGY

## 2021-01-05 PROCEDURE — 85018 HEMOGLOBIN: CPT | Performed by: ANESTHESIOLOGY

## 2021-01-05 PROCEDURE — 88331 PATH CONSLTJ SURG 1 BLK 1SPC: CPT | Mod: TC | Performed by: COLON & RECTAL SURGERY

## 2021-01-05 PROCEDURE — 0TJB8ZZ INSPECTION OF BLADDER, VIA NATURAL OR ARTIFICIAL OPENING ENDOSCOPIC: ICD-10-PCS | Performed by: UROLOGY

## 2021-01-05 PROCEDURE — 44207 L COLECTOMY/COLOPROCTOSTOMY: CPT | Mod: GC | Performed by: COLON & RECTAL SURGERY

## 2021-01-05 PROCEDURE — 86901 BLOOD TYPING SEROLOGIC RH(D): CPT | Performed by: ANESTHESIOLOGY

## 2021-01-05 PROCEDURE — 0UT20ZZ RESECTION OF BILATERAL OVARIES, OPEN APPROACH: ICD-10-PCS | Performed by: UROLOGY

## 2021-01-05 PROCEDURE — 85027 COMPLETE CBC AUTOMATED: CPT | Performed by: COLON & RECTAL SURGERY

## 2021-01-05 PROCEDURE — 88331 PATH CONSLTJ SURG 1 BLK 1SPC: CPT | Mod: 26 | Performed by: PATHOLOGY

## 2021-01-05 PROCEDURE — 999N000054 HC STATISTIC EKG NON-CHARGEABLE

## 2021-01-05 PROCEDURE — 0UT70ZZ RESECTION OF BILATERAL FALLOPIAN TUBES, OPEN APPROACH: ICD-10-PCS | Performed by: UROLOGY

## 2021-01-05 PROCEDURE — 93005 ELECTROCARDIOGRAM TRACING: CPT

## 2021-01-05 PROCEDURE — 82330 ASSAY OF CALCIUM: CPT

## 2021-01-05 PROCEDURE — P9041 ALBUMIN (HUMAN),5%, 50ML: HCPCS | Performed by: NURSE ANESTHETIST, CERTIFIED REGISTERED

## 2021-01-05 PROCEDURE — 250N000011 HC RX IP 250 OP 636: Performed by: STUDENT IN AN ORGANIZED HEALTH CARE EDUCATION/TRAINING PROGRAM

## 2021-01-05 PROCEDURE — 36415 COLL VENOUS BLD VENIPUNCTURE: CPT | Performed by: COLON & RECTAL SURGERY

## 2021-01-05 PROCEDURE — 258N000003 HC RX IP 258 OP 636: Performed by: NURSE ANESTHETIST, CERTIFIED REGISTERED

## 2021-01-05 PROCEDURE — 44310 ILEOSTOMY/JEJUNOSTOMY: CPT | Mod: GC | Performed by: COLON & RECTAL SURGERY

## 2021-01-05 PROCEDURE — 80053 COMPREHEN METABOLIC PANEL: CPT | Performed by: COLON & RECTAL SURGERY

## 2021-01-05 PROCEDURE — P9041 ALBUMIN (HUMAN),5%, 50ML: HCPCS | Performed by: ANESTHESIOLOGY

## 2021-01-05 PROCEDURE — 0D1B4Z4 BYPASS ILEUM TO CUTANEOUS, PERCUTANEOUS ENDOSCOPIC APPROACH: ICD-10-PCS | Performed by: COLON & RECTAL SURGERY

## 2021-01-05 PROCEDURE — 250N000011 HC RX IP 250 OP 636: Performed by: NURSE ANESTHETIST, CERTIFIED REGISTERED

## 2021-01-05 PROCEDURE — 0DJD4ZZ INSPECTION OF LOWER INTESTINAL TRACT, PERCUTANEOUS ENDOSCOPIC APPROACH: ICD-10-PCS | Performed by: COLON & RECTAL SURGERY

## 2021-01-05 PROCEDURE — 710N000010 HC RECOVERY PHASE 1, LEVEL 2, PER MIN: Performed by: COLON & RECTAL SURGERY

## 2021-01-05 PROCEDURE — 88342 IMHCHEM/IMCYTCHM 1ST ANTB: CPT | Mod: 26 | Performed by: PATHOLOGY

## 2021-01-05 PROCEDURE — 250N000025 HC SEVOFLURANE, PER MIN: Performed by: COLON & RECTAL SURGERY

## 2021-01-05 PROCEDURE — 999N000015 HC STATISTIC ARTERIAL MONITORING DAILY

## 2021-01-05 PROCEDURE — 93010 ELECTROCARDIOGRAM REPORT: CPT | Mod: 76 | Performed by: INTERNAL MEDICINE

## 2021-01-05 PROCEDURE — 360N000078 HC SURGERY LEVEL 5, PER MIN: Performed by: COLON & RECTAL SURGERY

## 2021-01-05 PROCEDURE — 88305 TISSUE EXAM BY PATHOLOGIST: CPT | Mod: TC | Performed by: COLON & RECTAL SURGERY

## 2021-01-05 PROCEDURE — 83735 ASSAY OF MAGNESIUM: CPT | Performed by: COLON & RECTAL SURGERY

## 2021-01-05 PROCEDURE — 88309 TISSUE EXAM BY PATHOLOGIST: CPT | Mod: 26 | Performed by: PATHOLOGY

## 2021-01-05 PROCEDURE — 258N000003 HC RX IP 258 OP 636: Performed by: ANESTHESIOLOGY

## 2021-01-05 PROCEDURE — 120N000003 HC R&B IMCU UMMC

## 2021-01-05 PROCEDURE — 0DBU4ZZ EXCISION OF OMENTUM, PERCUTANEOUS ENDOSCOPIC APPROACH: ICD-10-PCS | Performed by: COLON & RECTAL SURGERY

## 2021-01-05 PROCEDURE — 86923 COMPATIBILITY TEST ELECTRIC: CPT | Performed by: ANESTHESIOLOGY

## 2021-01-05 PROCEDURE — 84132 ASSAY OF SERUM POTASSIUM: CPT

## 2021-01-05 PROCEDURE — 999N000157 HC STATISTIC RCP TIME EA 10 MIN

## 2021-01-05 PROCEDURE — 250N000011 HC RX IP 250 OP 636: Performed by: UROLOGY

## 2021-01-05 PROCEDURE — 88304 TISSUE EXAM BY PATHOLOGIST: CPT | Mod: TC | Performed by: COLON & RECTAL SURGERY

## 2021-01-05 PROCEDURE — 88342 IMHCHEM/IMCYTCHM 1ST ANTB: CPT | Mod: TC | Performed by: COLON & RECTAL SURGERY

## 2021-01-05 PROCEDURE — 999N001017 HC STATISTIC GLUCOSE BY METER IP

## 2021-01-05 PROCEDURE — C1769 GUIDE WIRE: HCPCS | Performed by: COLON & RECTAL SURGERY

## 2021-01-05 PROCEDURE — 83605 ASSAY OF LACTIC ACID: CPT

## 2021-01-05 PROCEDURE — 250N000011 HC RX IP 250 OP 636: Performed by: ANESTHESIOLOGY

## 2021-01-05 PROCEDURE — 86850 RBC ANTIBODY SCREEN: CPT | Performed by: ANESTHESIOLOGY

## 2021-01-05 PROCEDURE — 0TS70ZZ REPOSITION LEFT URETER, OPEN APPROACH: ICD-10-PCS | Performed by: UROLOGY

## 2021-01-05 PROCEDURE — 999N000141 HC STATISTIC PRE-PROCEDURE NURSING ASSESSMENT: Performed by: COLON & RECTAL SURGERY

## 2021-01-05 PROCEDURE — 0DTJ0ZZ RESECTION OF APPENDIX, OPEN APPROACH: ICD-10-PCS | Performed by: COLON & RECTAL SURGERY

## 2021-01-05 PROCEDURE — 88309 TISSUE EXAM BY PATHOLOGIST: CPT | Mod: TC | Performed by: COLON & RECTAL SURGERY

## 2021-01-05 PROCEDURE — 82803 BLOOD GASES ANY COMBINATION: CPT

## 2021-01-05 PROCEDURE — 84295 ASSAY OF SERUM SODIUM: CPT

## 2021-01-05 PROCEDURE — 44213 LAP MOBIL SPLENIC FL ADD-ON: CPT | Mod: GC | Performed by: COLON & RECTAL SURGERY

## 2021-01-05 PROCEDURE — 88302 TISSUE EXAM BY PATHOLOGIST: CPT | Mod: 26 | Performed by: PATHOLOGY

## 2021-01-05 PROCEDURE — 88304 TISSUE EXAM BY PATHOLOGIST: CPT | Mod: 26 | Performed by: PATHOLOGY

## 2021-01-05 PROCEDURE — 84100 ASSAY OF PHOSPHORUS: CPT | Performed by: COLON & RECTAL SURGERY

## 2021-01-05 PROCEDURE — P9016 RBC LEUKOCYTES REDUCED: HCPCS | Performed by: ANESTHESIOLOGY

## 2021-01-05 PROCEDURE — 272N000001 HC OR GENERAL SUPPLY STERILE: Performed by: COLON & RECTAL SURGERY

## 2021-01-05 PROCEDURE — 58150 TOTAL HYSTERECTOMY: CPT | Mod: GC | Performed by: OBSTETRICS & GYNECOLOGY

## 2021-01-05 PROCEDURE — 258N000003 HC RX IP 258 OP 636: Performed by: COLON & RECTAL SURGERY

## 2021-01-05 PROCEDURE — 250N000011 HC RX IP 250 OP 636: Performed by: COLON & RECTAL SURGERY

## 2021-01-05 PROCEDURE — 85610 PROTHROMBIN TIME: CPT | Performed by: COLON & RECTAL SURGERY

## 2021-01-05 PROCEDURE — C9290 INJ, BUPIVACAINE LIPOSOME: HCPCS | Performed by: STUDENT IN AN ORGANIZED HEALTH CARE EDUCATION/TRAINING PROGRAM

## 2021-01-05 PROCEDURE — 84132 ASSAY OF SERUM POTASSIUM: CPT | Performed by: ANESTHESIOLOGY

## 2021-01-05 PROCEDURE — 82565 ASSAY OF CREATININE: CPT | Performed by: ANESTHESIOLOGY

## 2021-01-05 PROCEDURE — 0DTN0ZZ RESECTION OF SIGMOID COLON, OPEN APPROACH: ICD-10-PCS | Performed by: COLON & RECTAL SURGERY

## 2021-01-05 PROCEDURE — 271N000001 HC OR GENERAL SUPPLY NON-STERILE: Performed by: COLON & RECTAL SURGERY

## 2021-01-05 PROCEDURE — C1765 ADHESION BARRIER: HCPCS | Performed by: COLON & RECTAL SURGERY

## 2021-01-05 PROCEDURE — 88305 TISSUE EXAM BY PATHOLOGIST: CPT | Mod: 26 | Performed by: PATHOLOGY

## 2021-01-05 PROCEDURE — 0UT90ZZ RESECTION OF UTERUS, OPEN APPROACH: ICD-10-PCS | Performed by: UROLOGY

## 2021-01-05 DEVICE — STENT URETERAL PERCUFLEX PLUS 6FRX26CM M0061752630: Type: IMPLANTABLE DEVICE | Site: URETER | Status: FUNCTIONAL

## 2021-01-05 RX ORDER — SODIUM CHLORIDE 9 MG/ML
INJECTION, SOLUTION INTRAVENOUS CONTINUOUS PRN
Status: DISCONTINUED | OUTPATIENT
Start: 2021-01-05 | End: 2021-01-05

## 2021-01-05 RX ORDER — LIDOCAINE HYDROCHLORIDE 20 MG/ML
INJECTION, SOLUTION INFILTRATION; PERINEURAL PRN
Status: DISCONTINUED | OUTPATIENT
Start: 2021-01-05 | End: 2021-01-05

## 2021-01-05 RX ORDER — DEXAMETHASONE SODIUM PHOSPHATE 4 MG/ML
INJECTION, SOLUTION INTRA-ARTICULAR; INTRALESIONAL; INTRAMUSCULAR; INTRAVENOUS; SOFT TISSUE PRN
Status: DISCONTINUED | OUTPATIENT
Start: 2021-01-05 | End: 2021-01-05

## 2021-01-05 RX ORDER — CELECOXIB 200 MG/1
200 CAPSULE ORAL ONCE
Status: COMPLETED | OUTPATIENT
Start: 2021-01-05 | End: 2021-01-05

## 2021-01-05 RX ORDER — LIDOCAINE 40 MG/G
CREAM TOPICAL
Status: DISCONTINUED | OUTPATIENT
Start: 2021-01-05 | End: 2021-01-05 | Stop reason: HOSPADM

## 2021-01-05 RX ORDER — ONDANSETRON 2 MG/ML
4 INJECTION INTRAMUSCULAR; INTRAVENOUS EVERY 30 MIN PRN
Status: DISCONTINUED | OUTPATIENT
Start: 2021-01-05 | End: 2021-01-05 | Stop reason: HOSPADM

## 2021-01-05 RX ORDER — HYDROMORPHONE HYDROCHLORIDE 1 MG/ML
.3-.5 INJECTION, SOLUTION INTRAMUSCULAR; INTRAVENOUS; SUBCUTANEOUS EVERY 10 MIN PRN
Status: DISCONTINUED | OUTPATIENT
Start: 2021-01-05 | End: 2021-01-05 | Stop reason: HOSPADM

## 2021-01-05 RX ORDER — HYDROCHLOROTHIAZIDE 25 MG/1
25 TABLET ORAL EVERY MORNING
Status: DISCONTINUED | OUTPATIENT
Start: 2021-01-06 | End: 2021-01-07

## 2021-01-05 RX ORDER — ERTAPENEM 1 G/1
1 INJECTION, POWDER, LYOPHILIZED, FOR SOLUTION INTRAMUSCULAR; INTRAVENOUS SEE ADMIN INSTRUCTIONS
Status: DISCONTINUED | OUTPATIENT
Start: 2021-01-05 | End: 2021-01-05

## 2021-01-05 RX ORDER — LABETALOL 20 MG/4 ML (5 MG/ML) INTRAVENOUS SYRINGE
PRN
Status: DISCONTINUED | OUTPATIENT
Start: 2021-01-05 | End: 2021-01-05

## 2021-01-05 RX ORDER — BUPIVACAINE HYDROCHLORIDE 2.5 MG/ML
INJECTION, SOLUTION EPIDURAL; INFILTRATION; INTRACAUDAL PRN
Status: DISCONTINUED | OUTPATIENT
Start: 2021-01-05 | End: 2021-01-05

## 2021-01-05 RX ORDER — SODIUM CHLORIDE, SODIUM LACTATE, POTASSIUM CHLORIDE, CALCIUM CHLORIDE 600; 310; 30; 20 MG/100ML; MG/100ML; MG/100ML; MG/100ML
INJECTION, SOLUTION INTRAVENOUS CONTINUOUS
Status: DISCONTINUED | OUTPATIENT
Start: 2021-01-05 | End: 2021-01-05 | Stop reason: HOSPADM

## 2021-01-05 RX ORDER — SODIUM CHLORIDE, SODIUM LACTATE, POTASSIUM CHLORIDE, CALCIUM CHLORIDE 600; 310; 30; 20 MG/100ML; MG/100ML; MG/100ML; MG/100ML
INJECTION, SOLUTION INTRAVENOUS CONTINUOUS
Status: DISCONTINUED | OUTPATIENT
Start: 2021-01-05 | End: 2021-01-11

## 2021-01-05 RX ORDER — POTASSIUM CHLORIDE 750 MG/1
40 TABLET, EXTENDED RELEASE ORAL ONCE
Status: COMPLETED | OUTPATIENT
Start: 2021-01-05 | End: 2021-01-05

## 2021-01-05 RX ORDER — ACETAMINOPHEN 500 MG
1000 TABLET ORAL 4 TIMES DAILY
Status: DISCONTINUED | OUTPATIENT
Start: 2021-01-06 | End: 2021-01-13 | Stop reason: HOSPADM

## 2021-01-05 RX ORDER — FENTANYL CITRATE 50 UG/ML
25-50 INJECTION, SOLUTION INTRAMUSCULAR; INTRAVENOUS
Status: DISCONTINUED | OUTPATIENT
Start: 2021-01-05 | End: 2021-01-05 | Stop reason: HOSPADM

## 2021-01-05 RX ORDER — CIPROFLOXACIN 2 MG/ML
400 INJECTION, SOLUTION INTRAVENOUS SEE ADMIN INSTRUCTIONS
Status: DISCONTINUED | OUTPATIENT
Start: 2021-01-05 | End: 2021-01-05 | Stop reason: HOSPADM

## 2021-01-05 RX ORDER — CIPROFLOXACIN 2 MG/ML
INJECTION, SOLUTION INTRAVENOUS PRN
Status: DISCONTINUED | OUTPATIENT
Start: 2021-01-05 | End: 2021-01-05

## 2021-01-05 RX ORDER — POTASSIUM CHLORIDE 7.45 MG/ML
INJECTION INTRAVENOUS PRN
Status: DISCONTINUED | OUTPATIENT
Start: 2021-01-05 | End: 2021-01-05

## 2021-01-05 RX ORDER — HEPARIN SODIUM 5000 [USP'U]/.5ML
5000 INJECTION, SOLUTION INTRAVENOUS; SUBCUTANEOUS
Status: COMPLETED | OUTPATIENT
Start: 2021-01-05 | End: 2021-01-05

## 2021-01-05 RX ORDER — ALBUMIN, HUMAN INJ 5% 5 %
250 SOLUTION INTRAVENOUS ONCE
Status: COMPLETED | OUTPATIENT
Start: 2021-01-05 | End: 2021-01-05

## 2021-01-05 RX ORDER — FENTANYL CITRATE 50 UG/ML
INJECTION, SOLUTION INTRAMUSCULAR; INTRAVENOUS PRN
Status: DISCONTINUED | OUTPATIENT
Start: 2021-01-05 | End: 2021-01-05

## 2021-01-05 RX ORDER — SODIUM CHLORIDE, SODIUM GLUCONATE, SODIUM ACETATE, POTASSIUM CHLORIDE AND MAGNESIUM CHLORIDE 526; 502; 368; 37; 30 MG/100ML; MG/100ML; MG/100ML; MG/100ML; MG/100ML
INJECTION, SOLUTION INTRAVENOUS CONTINUOUS PRN
Status: DISCONTINUED | OUTPATIENT
Start: 2021-01-05 | End: 2021-01-05

## 2021-01-05 RX ORDER — PROPOFOL 10 MG/ML
INJECTION, EMULSION INTRAVENOUS PRN
Status: DISCONTINUED | OUTPATIENT
Start: 2021-01-05 | End: 2021-01-05

## 2021-01-05 RX ORDER — GRANISETRON HYDROCHLORIDE 1 MG/ML
1 INJECTION INTRAVENOUS ONCE
Status: COMPLETED | OUTPATIENT
Start: 2021-01-05 | End: 2021-01-05

## 2021-01-05 RX ORDER — HYDRALAZINE HYDROCHLORIDE 20 MG/ML
2.5-5 INJECTION INTRAMUSCULAR; INTRAVENOUS EVERY 10 MIN PRN
Status: DISCONTINUED | OUTPATIENT
Start: 2021-01-05 | End: 2021-01-05 | Stop reason: HOSPADM

## 2021-01-05 RX ORDER — ERTAPENEM 1 G/1
1 INJECTION, POWDER, LYOPHILIZED, FOR SOLUTION INTRAMUSCULAR; INTRAVENOUS
Status: DISCONTINUED | OUTPATIENT
Start: 2021-01-05 | End: 2021-01-05

## 2021-01-05 RX ORDER — NALOXONE HYDROCHLORIDE 0.4 MG/ML
0.2 INJECTION, SOLUTION INTRAMUSCULAR; INTRAVENOUS; SUBCUTANEOUS
Status: DISCONTINUED | OUTPATIENT
Start: 2021-01-05 | End: 2021-01-05 | Stop reason: HOSPADM

## 2021-01-05 RX ORDER — CIPROFLOXACIN 2 MG/ML
400 INJECTION, SOLUTION INTRAVENOUS EVERY 12 HOURS
Status: COMPLETED | OUTPATIENT
Start: 2021-01-05 | End: 2021-01-06

## 2021-01-05 RX ORDER — ACETAMINOPHEN 325 MG/1
975 TABLET ORAL ONCE
Status: COMPLETED | OUTPATIENT
Start: 2021-01-05 | End: 2021-01-05

## 2021-01-05 RX ORDER — ALBUMIN, HUMAN INJ 5% 5 %
SOLUTION INTRAVENOUS CONTINUOUS PRN
Status: DISCONTINUED | OUTPATIENT
Start: 2021-01-05 | End: 2021-01-05

## 2021-01-05 RX ORDER — DEXMEDETOMIDINE HYDROCHLORIDE 4 UG/ML
0.2-1.2 INJECTION, SOLUTION INTRAVENOUS CONTINUOUS
Status: DISCONTINUED | OUTPATIENT
Start: 2021-01-05 | End: 2021-01-05 | Stop reason: HOSPADM

## 2021-01-05 RX ORDER — OXYBUTYNIN CHLORIDE 5 MG/1
5 TABLET, EXTENDED RELEASE ORAL AT BEDTIME
Status: DISCONTINUED | OUTPATIENT
Start: 2021-01-05 | End: 2021-01-05

## 2021-01-05 RX ORDER — NALOXONE HYDROCHLORIDE 0.4 MG/ML
0.4 INJECTION, SOLUTION INTRAMUSCULAR; INTRAVENOUS; SUBCUTANEOUS
Status: DISCONTINUED | OUTPATIENT
Start: 2021-01-05 | End: 2021-01-05 | Stop reason: HOSPADM

## 2021-01-05 RX ORDER — ALBUMIN, HUMAN INJ 5% 5 %
250 SOLUTION INTRAVENOUS
Status: CANCELLED | OUTPATIENT
Start: 2021-01-05

## 2021-01-05 RX ORDER — HYDRALAZINE HYDROCHLORIDE 20 MG/ML
10 INJECTION INTRAMUSCULAR; INTRAVENOUS EVERY 4 HOURS PRN
Status: DISCONTINUED | OUTPATIENT
Start: 2021-01-05 | End: 2021-01-07

## 2021-01-05 RX ORDER — MEPERIDINE HYDROCHLORIDE 25 MG/ML
12.5 INJECTION INTRAMUSCULAR; INTRAVENOUS; SUBCUTANEOUS
Status: DISCONTINUED | OUTPATIENT
Start: 2021-01-05 | End: 2021-01-05 | Stop reason: HOSPADM

## 2021-01-05 RX ORDER — PHENYLEPHRINE HCL IN 0.9% NACL 50MG/250ML
.1-1 PLASTIC BAG, INJECTION (ML) INTRAVENOUS CONTINUOUS
Status: DISCONTINUED | OUTPATIENT
Start: 2021-01-05 | End: 2021-01-05 | Stop reason: HOSPADM

## 2021-01-05 RX ORDER — ONDANSETRON 4 MG/1
4 TABLET, ORALLY DISINTEGRATING ORAL EVERY 30 MIN PRN
Status: DISCONTINUED | OUTPATIENT
Start: 2021-01-05 | End: 2021-01-05 | Stop reason: HOSPADM

## 2021-01-05 RX ORDER — EPHEDRINE SULFATE 50 MG/ML
INJECTION, SOLUTION INTRAMUSCULAR; INTRAVENOUS; SUBCUTANEOUS PRN
Status: DISCONTINUED | OUTPATIENT
Start: 2021-01-05 | End: 2021-01-05

## 2021-01-05 RX ORDER — ALVIMOPAN 12 MG/1
12 CAPSULE ORAL ONCE
Status: COMPLETED | OUTPATIENT
Start: 2021-01-05 | End: 2021-01-05

## 2021-01-05 RX ORDER — ESMOLOL HYDROCHLORIDE 10 MG/ML
INJECTION INTRAVENOUS PRN
Status: DISCONTINUED | OUTPATIENT
Start: 2021-01-05 | End: 2021-01-05

## 2021-01-05 RX ORDER — CALCIUM CHLORIDE 100 MG/ML
INJECTION INTRAVENOUS; INTRAVENTRICULAR PRN
Status: DISCONTINUED | OUTPATIENT
Start: 2021-01-05 | End: 2021-01-05

## 2021-01-05 RX ORDER — GLYCOPYRROLATE 0.2 MG/ML
INJECTION, SOLUTION INTRAMUSCULAR; INTRAVENOUS PRN
Status: DISCONTINUED | OUTPATIENT
Start: 2021-01-05 | End: 2021-01-05

## 2021-01-05 RX ORDER — LABETALOL HYDROCHLORIDE 5 MG/ML
10 INJECTION, SOLUTION INTRAVENOUS
Status: DISCONTINUED | OUTPATIENT
Start: 2021-01-05 | End: 2021-01-05 | Stop reason: HOSPADM

## 2021-01-05 RX ORDER — NICOTINE POLACRILEX 4 MG
15-30 LOZENGE BUCCAL
Status: DISCONTINUED | OUTPATIENT
Start: 2021-01-05 | End: 2021-01-13 | Stop reason: HOSPADM

## 2021-01-05 RX ORDER — PHYSOSTIGMINE SALICYLATE 1 MG/ML
1.2 INJECTION INTRAVENOUS
Status: DISCONTINUED | OUTPATIENT
Start: 2021-01-05 | End: 2021-01-05 | Stop reason: HOSPADM

## 2021-01-05 RX ORDER — LIDOCAINE 40 MG/G
CREAM TOPICAL
Status: DISCONTINUED | OUTPATIENT
Start: 2021-01-05 | End: 2021-01-13 | Stop reason: HOSPADM

## 2021-01-05 RX ORDER — CIPROFLOXACIN 2 MG/ML
400 INJECTION, SOLUTION INTRAVENOUS
Status: COMPLETED | OUTPATIENT
Start: 2021-01-05 | End: 2021-01-05

## 2021-01-05 RX ORDER — SODIUM CHLORIDE, SODIUM LACTATE, POTASSIUM CHLORIDE, CALCIUM CHLORIDE 600; 310; 30; 20 MG/100ML; MG/100ML; MG/100ML; MG/100ML
INJECTION, SOLUTION INTRAVENOUS CONTINUOUS PRN
Status: DISCONTINUED | OUTPATIENT
Start: 2021-01-05 | End: 2021-01-05

## 2021-01-05 RX ORDER — DEXTROSE MONOHYDRATE 25 G/50ML
25-50 INJECTION, SOLUTION INTRAVENOUS
Status: DISCONTINUED | OUTPATIENT
Start: 2021-01-05 | End: 2021-01-13 | Stop reason: HOSPADM

## 2021-01-05 RX ORDER — FENTANYL CITRATE 50 UG/ML
25-50 INJECTION, SOLUTION INTRAMUSCULAR; INTRAVENOUS EVERY 5 MIN PRN
Status: DISCONTINUED | OUTPATIENT
Start: 2021-01-05 | End: 2021-01-05 | Stop reason: HOSPADM

## 2021-01-05 RX ORDER — FLUMAZENIL 0.1 MG/ML
0.2 INJECTION, SOLUTION INTRAVENOUS
Status: DISCONTINUED | OUTPATIENT
Start: 2021-01-05 | End: 2021-01-05 | Stop reason: HOSPADM

## 2021-01-05 RX ADMIN — GLYCOPYRROLATE 0.2 MG: 0.2 INJECTION, SOLUTION INTRAMUSCULAR; INTRAVENOUS at 17:38

## 2021-01-05 RX ADMIN — CIPROFLOXACIN 400 MG: 2 INJECTION INTRAVENOUS at 08:38

## 2021-01-05 RX ADMIN — ROCURONIUM BROMIDE 40 MG: 10 INJECTION INTRAVENOUS at 09:22

## 2021-01-05 RX ADMIN — CALCIUM CHLORIDE 1 G: 100 INJECTION INTRAVENOUS; INTRAVENTRICULAR at 14:38

## 2021-01-05 RX ADMIN — ROCURONIUM BROMIDE 20 MG: 10 INJECTION INTRAVENOUS at 10:35

## 2021-01-05 RX ADMIN — ROCURONIUM BROMIDE 20 MG: 10 INJECTION INTRAVENOUS at 14:18

## 2021-01-05 RX ADMIN — ALBUMIN (HUMAN): 12.5 SOLUTION INTRAVENOUS at 12:29

## 2021-01-05 RX ADMIN — PHENYLEPHRINE HYDROCHLORIDE 50 MCG: 10 INJECTION INTRAVENOUS at 09:25

## 2021-01-05 RX ADMIN — FENTANYL CITRATE 50 MCG: 50 INJECTION, SOLUTION INTRAMUSCULAR; INTRAVENOUS at 20:00

## 2021-01-05 RX ADMIN — DEXMEDETOMIDINE HYDROCHLORIDE 0.3 MCG/KG/HR: 100 INJECTION, SOLUTION INTRAVENOUS at 10:30

## 2021-01-05 RX ADMIN — ACETAMINOPHEN 975 MG: 325 TABLET, FILM COATED ORAL at 07:40

## 2021-01-05 RX ADMIN — ESMOLOL HYDROCHLORIDE 10 MG: 10 INJECTION, SOLUTION INTRAVENOUS at 18:12

## 2021-01-05 RX ADMIN — BUPIVACAINE HYDROCHLORIDE 40 ML: 2.5 INJECTION, SOLUTION EPIDURAL; INFILTRATION; INTRACAUDAL; PERINEURAL at 08:58

## 2021-01-05 RX ADMIN — CIPROFLOXACIN 400 MG: 2 INJECTION INTRAVENOUS at 07:57

## 2021-01-05 RX ADMIN — PHENYLEPHRINE HYDROCHLORIDE 100 MCG: 10 INJECTION INTRAVENOUS at 13:58

## 2021-01-05 RX ADMIN — SODIUM CHLORIDE, POTASSIUM CHLORIDE, SODIUM LACTATE AND CALCIUM CHLORIDE: 600; 310; 30; 20 INJECTION, SOLUTION INTRAVENOUS at 15:00

## 2021-01-05 RX ADMIN — SODIUM CHLORIDE: 9 INJECTION, SOLUTION INTRAVENOUS at 14:45

## 2021-01-05 RX ADMIN — ROCURONIUM BROMIDE 20 MG: 10 INJECTION INTRAVENOUS at 13:52

## 2021-01-05 RX ADMIN — Medication 2.5 MG: at 17:25

## 2021-01-05 RX ADMIN — FENTANYL CITRATE 50 MCG: 50 INJECTION, SOLUTION INTRAMUSCULAR; INTRAVENOUS at 16:51

## 2021-01-05 RX ADMIN — SODIUM CHLORIDE: 9 INJECTION, SOLUTION INTRAVENOUS at 11:25

## 2021-01-05 RX ADMIN — HEPARIN SODIUM 5000 UNITS: 5000 INJECTION, SOLUTION INTRAVENOUS; SUBCUTANEOUS at 07:31

## 2021-01-05 RX ADMIN — ALVIMOPAN 12 MG: 12 CAPSULE ORAL at 07:45

## 2021-01-05 RX ADMIN — PHENYLEPHRINE HYDROCHLORIDE 100 MCG: 10 INJECTION INTRAVENOUS at 14:41

## 2021-01-05 RX ADMIN — ROCURONIUM BROMIDE 20 MG: 10 INJECTION INTRAVENOUS at 11:42

## 2021-01-05 RX ADMIN — METRONIDAZOLE 500 MG: 500 INJECTION, SOLUTION INTRAVENOUS at 09:00

## 2021-01-05 RX ADMIN — Medication 5 MG: at 17:13

## 2021-01-05 RX ADMIN — CALCIUM CHLORIDE 0.5 G: 100 INJECTION INTRAVENOUS; INTRAVENTRICULAR at 14:35

## 2021-01-05 RX ADMIN — PHENYLEPHRINE HYDROCHLORIDE 200 MCG: 10 INJECTION INTRAVENOUS at 12:19

## 2021-01-05 RX ADMIN — LIDOCAINE HYDROCHLORIDE 50 MG: 20 INJECTION, SOLUTION INFILTRATION; PERINEURAL at 08:54

## 2021-01-05 RX ADMIN — FENTANYL CITRATE 25 MCG: 50 INJECTION, SOLUTION INTRAMUSCULAR; INTRAVENOUS at 18:12

## 2021-01-05 RX ADMIN — DEXAMETHASONE SODIUM PHOSPHATE 4 MG: 4 INJECTION, SOLUTION INTRA-ARTICULAR; INTRALESIONAL; INTRAMUSCULAR; INTRAVENOUS; SOFT TISSUE at 09:43

## 2021-01-05 RX ADMIN — HYDROMORPHONE HYDROCHLORIDE 0.5 MG: 1 INJECTION, SOLUTION INTRAMUSCULAR; INTRAVENOUS; SUBCUTANEOUS at 16:34

## 2021-01-05 RX ADMIN — FENTANYL CITRATE 25 MCG: 50 INJECTION, SOLUTION INTRAMUSCULAR; INTRAVENOUS at 19:17

## 2021-01-05 RX ADMIN — PHENYLEPHRINE HYDROCHLORIDE 0.2 MCG/KG/MIN: 10 INJECTION INTRAVENOUS at 09:33

## 2021-01-05 RX ADMIN — SODIUM CHLORIDE, SODIUM GLUCONATE, SODIUM ACETATE, POTASSIUM CHLORIDE AND MAGNESIUM CHLORIDE: 526; 502; 368; 37; 30 INJECTION, SOLUTION INTRAVENOUS at 16:43

## 2021-01-05 RX ADMIN — FENTANYL CITRATE 50 MCG: 50 INJECTION, SOLUTION INTRAMUSCULAR; INTRAVENOUS at 09:49

## 2021-01-05 RX ADMIN — CALCIUM CHLORIDE 0.5 G: 100 INJECTION INTRAVENOUS; INTRAVENTRICULAR at 13:00

## 2021-01-05 RX ADMIN — SUGAMMADEX 200 MG: 100 INJECTION, SOLUTION INTRAVENOUS at 17:34

## 2021-01-05 RX ADMIN — POTASSIUM CHLORIDE 40 MEQ: 750 TABLET, EXTENDED RELEASE ORAL at 08:07

## 2021-01-05 RX ADMIN — GRANISETRON HYDROCHLORIDE 1 MG: 1 INJECTION INTRAVENOUS at 07:47

## 2021-01-05 RX ADMIN — ESMOLOL HYDROCHLORIDE 20 MG: 10 INJECTION, SOLUTION INTRAVENOUS at 18:10

## 2021-01-05 RX ADMIN — ROCURONIUM BROMIDE 40 MG: 10 INJECTION INTRAVENOUS at 08:54

## 2021-01-05 RX ADMIN — BUPIVACAINE 20 ML: 13.3 INJECTION, SUSPENSION, LIPOSOMAL INFILTRATION at 08:58

## 2021-01-05 RX ADMIN — SODIUM CHLORIDE, POTASSIUM CHLORIDE, SODIUM LACTATE AND CALCIUM CHLORIDE: 600; 310; 30; 20 INJECTION, SOLUTION INTRAVENOUS at 19:43

## 2021-01-05 RX ADMIN — ROCURONIUM BROMIDE 20 MG: 10 INJECTION INTRAVENOUS at 15:30

## 2021-01-05 RX ADMIN — ROCURONIUM BROMIDE 20 MG: 10 INJECTION INTRAVENOUS at 12:24

## 2021-01-05 RX ADMIN — PHENYLEPHRINE HYDROCHLORIDE 100 MCG: 10 INJECTION INTRAVENOUS at 15:23

## 2021-01-05 RX ADMIN — ROCURONIUM BROMIDE 30 MG: 10 INJECTION INTRAVENOUS at 13:00

## 2021-01-05 RX ADMIN — CELECOXIB 200 MG: 200 CAPSULE ORAL at 07:42

## 2021-01-05 RX ADMIN — FENTANYL CITRATE 25 MCG: 50 INJECTION, SOLUTION INTRAMUSCULAR; INTRAVENOUS at 19:36

## 2021-01-05 RX ADMIN — HYDROMORPHONE HYDROCHLORIDE 0.5 MG: 1 INJECTION, SOLUTION INTRAMUSCULAR; INTRAVENOUS; SUBCUTANEOUS at 20:29

## 2021-01-05 RX ADMIN — SODIUM CHLORIDE, POTASSIUM CHLORIDE, SODIUM LACTATE AND CALCIUM CHLORIDE: 600; 310; 30; 20 INJECTION, SOLUTION INTRAVENOUS at 09:05

## 2021-01-05 RX ADMIN — FENTANYL CITRATE 50 MCG: 50 INJECTION, SOLUTION INTRAMUSCULAR; INTRAVENOUS at 11:04

## 2021-01-05 RX ADMIN — POTASSIUM CHLORIDE 10 MEQ: 7.46 INJECTION, SOLUTION INTRAVENOUS at 15:30

## 2021-01-05 RX ADMIN — LABETALOL 20 MG/4 ML (5 MG/ML) INTRAVENOUS SYRINGE 2.5 MG: at 10:12

## 2021-01-05 RX ADMIN — Medication 5 MG: at 17:44

## 2021-01-05 RX ADMIN — Medication: at 19:44

## 2021-01-05 RX ADMIN — FENTANYL CITRATE 50 MCG: 50 INJECTION, SOLUTION INTRAMUSCULAR; INTRAVENOUS at 09:39

## 2021-01-05 RX ADMIN — LABETALOL 20 MG/4 ML (5 MG/ML) INTRAVENOUS SYRINGE 2.5 MG: at 10:21

## 2021-01-05 RX ADMIN — FENTANYL CITRATE 50 MCG: 50 INJECTION, SOLUTION INTRAMUSCULAR; INTRAVENOUS at 13:44

## 2021-01-05 RX ADMIN — ROCURONIUM BROMIDE 10 MG: 10 INJECTION INTRAVENOUS at 16:57

## 2021-01-05 RX ADMIN — PROPOFOL 150 MG: 10 INJECTION, EMULSION INTRAVENOUS at 08:54

## 2021-01-05 RX ADMIN — ALBUMIN (HUMAN): 12.5 SOLUTION INTRAVENOUS at 12:31

## 2021-01-05 RX ADMIN — Medication 2.5 MG: at 17:42

## 2021-01-05 RX ADMIN — ROCURONIUM BROMIDE 20 MG: 10 INJECTION INTRAVENOUS at 13:10

## 2021-01-05 RX ADMIN — ALBUMIN HUMAN 250 ML: 0.05 INJECTION, SOLUTION INTRAVENOUS at 19:20

## 2021-01-05 RX ADMIN — SODIUM CHLORIDE, POTASSIUM CHLORIDE, SODIUM LACTATE AND CALCIUM CHLORIDE: 600; 310; 30; 20 INJECTION, SOLUTION INTRAVENOUS at 08:38

## 2021-01-05 RX ADMIN — SODIUM CHLORIDE, POTASSIUM CHLORIDE, SODIUM LACTATE AND CALCIUM CHLORIDE: 600; 310; 30; 20 INJECTION, SOLUTION INTRAVENOUS at 07:30

## 2021-01-05 RX ADMIN — ALBUMIN (HUMAN): 12.5 SOLUTION INTRAVENOUS at 12:52

## 2021-01-05 RX ADMIN — SODIUM CHLORIDE, SODIUM GLUCONATE, SODIUM ACETATE, POTASSIUM CHLORIDE AND MAGNESIUM CHLORIDE: 526; 502; 368; 37; 30 INJECTION, SOLUTION INTRAVENOUS at 15:18

## 2021-01-05 RX ADMIN — HUMAN INSULIN 2 UNITS/HR: 100 INJECTION, SOLUTION SUBCUTANEOUS at 13:01

## 2021-01-05 RX ADMIN — FENTANYL CITRATE 50 MCG: 50 INJECTION, SOLUTION INTRAMUSCULAR; INTRAVENOUS at 08:54

## 2021-01-05 ASSESSMENT — MIFFLIN-ST. JEOR: SCORE: 977.12

## 2021-01-05 NOTE — ANESTHESIA PROCEDURE NOTES
Airway   Date/Time: 1/5/2021 9:00 AM   Patient location during procedure: OR    Staff -   Other Anesthesia Staff: Zamzam Mccullough  Performed By: SRNA    Consent for Airway   Urgency: elective    Indications and Patient Condition  Indications for airway management: carolee-procedural  Induction type:intravenousMask difficulty assessment: 2 - vent by mask + OA or adjuvant +/- NMBA    Final Airway Details  Final airway type: endotracheal airway  Successful airway:ETT - single  Endotracheal Airway Details   ETT size (mm): 6.5  Cuffed: yes  Successful intubation technique: direct laryngoscopy  Grade View of Cords: 1  Measured from: gums/teeth  Secured at (cm): 21  Secured with: pink tape  Bite block used: None    Post intubation assessment   Placement verified by: capnometry, equal breath sounds and chest rise   Number of attempts at approach: 1  Secured with:pink tape  Ease of procedure: easy  Dentition: Unchanged and Intact

## 2021-01-05 NOTE — ANESTHESIA PROCEDURE NOTES
Arterial Line Procedure Note      Staff -   Anesthesiologist:  Thiago Matt MD  Resident/Fellow: Momo Lopez MD  Performed By: resident    Procedure Start/Stop Times:     patient identified, IV checked, site marked, risks and benefits discussed, informed consent, monitors and equipment checked, pre-op evaluation and at physician/surgeon's request      Correct Patient: Yes      Correct Position: Yes      Correct Site: Yes      Correct Procedure: Yes      Correct Laterality:  Yes    Site Marked:  Yes  Line Placement:     Procedure:  Arterial Line    Insertion Site:  Radial    Insertion laterality:  Left    Skin Prep: Chloraprep      Patient Prep: patient draped, mask, sterile gloves, sterile gown, hat and hand hygiene      Local skin infiltration:  None    Ultrasound Guided?: Yes      Artery evaluated via ultrasound confirming patency.   Using realtime imaging, the artery was punctured and the needle was observed entering the artery.      A permanent image is entered into patient's chart.      Catheter size:  20 gauge, Quick cath    Dressing:  Tegaderm    Complications:  None obvious    Arterial waveform: Yes      IBP within 10% of NIBP: Yes

## 2021-01-05 NOTE — OR NURSING
Intraoperative findings indicate need for additional procedures related to patient's diagnosis.  This information related to daughter, Genete, by Dr. Moscoso prior to start of additional procedures.

## 2021-01-05 NOTE — OR NURSING
Intraoperative findings indicated the need for additional procedures related to diagnosis.   spoke with daughter,Michele, to inform her of findings and need for additional procedures.

## 2021-01-05 NOTE — OR NURSING
Dr. Rosario ([place]-rectal Fellow) paged to clarify which subcutaneous anticoagulation (Heparin versus Lovenox) since there are duplicate orders. Per MD, subcutaneous Heparin CAN be given and Lovenox discontinued.

## 2021-01-05 NOTE — BRIEF OP NOTE
[facility]     Brief Operative Note    Pre-operative diagnosis: Malignant neoplasm of sigmoid colon (H) [C18.7]  Post-operative diagnosis Adenocarcinoma of the sigmoid colon, invading into the bladder and uterus    Procedure: Procedure(s):  SIGMOIDOSCOPY, FLEXIBLE  Laparoscopy Operative Adult, takedown of splenic flexure, appendectomy  Partial Cystectomy, Cystourethroscopy,  Hysterectomy total abdominal  Salpingo-oophorectomy bilateral  Surgeon: Surgeon(s) and Role:  Panel 1:     * Remi Moscoso MD - Primary  Panel 2:     * Angel Newsome MD - Primary  Panel 3:     * Jimy Jackson MD - Primary  Anesthesia: General   Estimated blood loss: 600 mL    Fluids:  - 3 L crystalloid  - 500 ml albumin  - 3 units pRBC      Drains:   - R MARIE drain in pelvis  - R loop ileostomy  - Cameron catheter  - Internal JJ stent      Specimens:   ID Type Source Tests Collected by Time Destination   A : right adnexa Tissue Other SURGICAL PATHOLOGY EXAM Remi Moscoso MD 1/5/2021 12:43 PM    B : sigmoid, uterus with cervix, left adnexa, dome of bladder Tissue Other SURGICAL PATHOLOGY EXAM Remi Moscoso MD 1/5/2021  1:05 PM    C : Anastomotic Ring Tissue Other SURGICAL PATHOLOGY EXAM Remi Moscoso MD 1/5/2021  4:24 PM    D : Appendix Tissue Appendix SURGICAL PATHOLOGY EXAM Remi Moscoso MD 1/5/2021  4:35 PM      Findings:   Tumor invading from mid-sigmoid to left bladder. No evidence of carcinomatosis or liver mets  Complications: None.  Implants:   Implant Name Type Inv. Item Serial No.  Lot No. LRB No. Used Action   STENT URETERAL PERCUFLEX PLUS [id number] Stent STENT URETERAL PERCUFLEX PLUS [id number]  [employer] [id number] Left 1 Implanted       José Luis Rosario MD  Colorectal Fellow  1/5/2021

## 2021-01-05 NOTE — ANESTHESIA PROCEDURE NOTES
Peripheral Nerve Block Procedure Note      Staff -   Anesthesiologist:  Jameel Eldridge MD  Resident/Fellow: Debbie Lieberman MD  Performed By: resident  Procedure performed by resident/CRNA in presence of a teaching physician.    Location: OR AFTER induction  Procedure Start/Stop TImes:     patient identified, IV checked, site marked, risks and benefits discussed, informed consent, monitors and equipment checked, pre-op evaluation, at physician/surgeon's request and post-op pain management      Correct Patient: Yes      Correct Position: Yes      Correct Site: Yes      Correct Procedure: Yes      Correct Laterality:  Yes    Site Marked:  Yes  Procedure details:     Procedure:  Quadratus lumborum    Diagnosis:  Post operative pain    Laterality:  Bilateral    Position:  Supine    Sterile Prep: chloraprep, mask and sterile gloves      Local skin infiltration:  None    Needle:  Short bevel    Needle gauge:  21    Needle length (mm):  110    Ultrasound: Yes      Ultrasound used to identify targeted nerve, plexus, or vascular structure and placed a needle adjacent to it      Permanent Image entered into patiient's record      Abnormal pain on injection: No      Blood Aspirated: No      Paresthesias:  No    Bleeding at site: No      Bolus via:  Needle    Infusion Method:  Single Shot    Complications:  None  Assessment/Narrative:     Injection made incrementally with aspirations every (mL):  5

## 2021-01-06 ENCOUNTER — APPOINTMENT (OUTPATIENT)
Dept: OCCUPATIONAL THERAPY | Facility: CLINIC | Age: 76
End: 2021-01-06
Attending: COLON & RECTAL SURGERY
Payer: COMMERCIAL

## 2021-01-06 LAB
ANION GAP SERPL CALCULATED.3IONS-SCNC: 12 MMOL/L (ref 3–14)
BUN SERPL-MCNC: 18 MG/DL (ref 7–30)
CA-I SERPL ISE-MCNC: 4.6 MG/DL (ref 4.4–5.2)
CALCIUM SERPL-MCNC: 8.3 MG/DL (ref 8.5–10.1)
CHLORIDE SERPL-SCNC: 105 MMOL/L (ref 94–109)
CO2 SERPL-SCNC: 17 MMOL/L (ref 20–32)
CREAT SERPL-MCNC: 1.26 MG/DL (ref 0.52–1.04)
ERYTHROCYTE [DISTWIDTH] IN BLOOD BY AUTOMATED COUNT: 17.7 % (ref 10–15)
GFR SERPL CREATININE-BSD FRML MDRD: 42 ML/MIN/{1.73_M2}
GLUCOSE BLDC GLUCOMTR-MCNC: 132 MG/DL (ref 70–99)
GLUCOSE BLDC GLUCOMTR-MCNC: 139 MG/DL (ref 70–99)
GLUCOSE BLDC GLUCOMTR-MCNC: 156 MG/DL (ref 70–99)
GLUCOSE BLDC GLUCOMTR-MCNC: 168 MG/DL (ref 70–99)
GLUCOSE BLDC GLUCOMTR-MCNC: 182 MG/DL (ref 70–99)
GLUCOSE BLDC GLUCOMTR-MCNC: 204 MG/DL (ref 70–99)
GLUCOSE SERPL-MCNC: 194 MG/DL (ref 70–99)
HCT VFR BLD AUTO: 26.1 % (ref 35–47)
HGB BLD-MCNC: 8.2 G/DL (ref 11.7–15.7)
INR PPP: 1.56 (ref 0.86–1.14)
LACTATE BLD-SCNC: 3.5 MMOL/L (ref 0.7–2)
LACTATE BLD-SCNC: 4.5 MMOL/L (ref 0.7–2)
MAGNESIUM SERPL-MCNC: 1.6 MG/DL (ref 1.6–2.3)
MCH RBC QN AUTO: 26.5 PG (ref 26.5–33)
MCHC RBC AUTO-ENTMCNC: 31.4 G/DL (ref 31.5–36.5)
MCV RBC AUTO: 85 FL (ref 78–100)
PHOSPHATE SERPL-MCNC: 5.4 MG/DL (ref 2.5–4.5)
PLATELET # BLD AUTO: 255 10E9/L (ref 150–450)
POTASSIUM SERPL-SCNC: 4.2 MMOL/L (ref 3.4–5.3)
RBC # BLD AUTO: 3.09 10E12/L (ref 3.8–5.2)
SODIUM SERPL-SCNC: 134 MMOL/L (ref 133–144)
TROPONIN I SERPL-MCNC: 0.05 UG/L (ref 0–0.04)
WBC # BLD AUTO: 13.7 10E9/L (ref 4–11)

## 2021-01-06 PROCEDURE — 83605 ASSAY OF LACTIC ACID: CPT | Performed by: PHYSICIAN ASSISTANT

## 2021-01-06 PROCEDURE — 999N001017 HC STATISTIC GLUCOSE BY METER IP

## 2021-01-06 PROCEDURE — 258N000003 HC RX IP 258 OP 636: Performed by: PHYSICIAN ASSISTANT

## 2021-01-06 PROCEDURE — 85610 PROTHROMBIN TIME: CPT | Performed by: COLON & RECTAL SURGERY

## 2021-01-06 PROCEDURE — 250N000012 HC RX MED GY IP 250 OP 636 PS 637: Performed by: COLON & RECTAL SURGERY

## 2021-01-06 PROCEDURE — 84484 ASSAY OF TROPONIN QUANT: CPT | Performed by: COLON & RECTAL SURGERY

## 2021-01-06 PROCEDURE — 97165 OT EVAL LOW COMPLEX 30 MIN: CPT | Mod: GO

## 2021-01-06 PROCEDURE — 80048 BASIC METABOLIC PNL TOTAL CA: CPT | Performed by: COLON & RECTAL SURGERY

## 2021-01-06 PROCEDURE — 83735 ASSAY OF MAGNESIUM: CPT | Performed by: COLON & RECTAL SURGERY

## 2021-01-06 PROCEDURE — 250N000013 HC RX MED GY IP 250 OP 250 PS 637: Performed by: COLON & RECTAL SURGERY

## 2021-01-06 PROCEDURE — 84100 ASSAY OF PHOSPHORUS: CPT | Performed by: COLON & RECTAL SURGERY

## 2021-01-06 PROCEDURE — 999N000197 HC STATISTIC WOC PT EDUCATION, 0-15 MIN

## 2021-01-06 PROCEDURE — 258N000003 HC RX IP 258 OP 636: Performed by: COLON & RECTAL SURGERY

## 2021-01-06 PROCEDURE — 250N000011 HC RX IP 250 OP 636

## 2021-01-06 PROCEDURE — 250N000011 HC RX IP 250 OP 636: Performed by: COLON & RECTAL SURGERY

## 2021-01-06 PROCEDURE — 36415 COLL VENOUS BLD VENIPUNCTURE: CPT | Performed by: COLON & RECTAL SURGERY

## 2021-01-06 PROCEDURE — P9041 ALBUMIN (HUMAN),5%, 50ML: HCPCS | Performed by: COLON & RECTAL SURGERY

## 2021-01-06 PROCEDURE — 120N000003 HC R&B IMCU UMMC

## 2021-01-06 PROCEDURE — 85027 COMPLETE CBC AUTOMATED: CPT | Performed by: COLON & RECTAL SURGERY

## 2021-01-06 PROCEDURE — 250N000011 HC RX IP 250 OP 636: Performed by: PHYSICIAN ASSISTANT

## 2021-01-06 PROCEDURE — 36415 COLL VENOUS BLD VENIPUNCTURE: CPT | Performed by: PHYSICIAN ASSISTANT

## 2021-01-06 PROCEDURE — C9113 INJ PANTOPRAZOLE SODIUM, VIA: HCPCS | Performed by: COLON & RECTAL SURGERY

## 2021-01-06 PROCEDURE — 82330 ASSAY OF CALCIUM: CPT | Performed by: COLON & RECTAL SURGERY

## 2021-01-06 PROCEDURE — 999N000127 HC STATISTIC PERIPHERAL IV START W US GUIDANCE

## 2021-01-06 PROCEDURE — 97535 SELF CARE MNGMENT TRAINING: CPT | Mod: GO

## 2021-01-06 RX ORDER — METOCLOPRAMIDE HYDROCHLORIDE 5 MG/ML
5 INJECTION INTRAMUSCULAR; INTRAVENOUS EVERY 6 HOURS PRN
Status: DISCONTINUED | OUTPATIENT
Start: 2021-01-06 | End: 2021-01-09

## 2021-01-06 RX ORDER — HYDROMORPHONE HYDROCHLORIDE 1 MG/ML
0.3 INJECTION, SOLUTION INTRAMUSCULAR; INTRAVENOUS; SUBCUTANEOUS ONCE
Status: COMPLETED | OUTPATIENT
Start: 2021-01-06 | End: 2021-01-06

## 2021-01-06 RX ORDER — HYDROMORPHONE HYDROCHLORIDE 1 MG/ML
0.3 INJECTION, SOLUTION INTRAMUSCULAR; INTRAVENOUS; SUBCUTANEOUS
Status: DISCONTINUED | OUTPATIENT
Start: 2021-01-06 | End: 2021-01-09

## 2021-01-06 RX ORDER — NALOXONE HYDROCHLORIDE 0.4 MG/ML
0.4 INJECTION, SOLUTION INTRAMUSCULAR; INTRAVENOUS; SUBCUTANEOUS
Status: DISCONTINUED | OUTPATIENT
Start: 2021-01-06 | End: 2021-01-13 | Stop reason: HOSPADM

## 2021-01-06 RX ORDER — ALBUMIN, HUMAN INJ 5% 5 %
250 SOLUTION INTRAVENOUS ONCE
Status: COMPLETED | OUTPATIENT
Start: 2021-01-06 | End: 2021-01-06

## 2021-01-06 RX ORDER — METOCLOPRAMIDE HYDROCHLORIDE 5 MG/ML
5 INJECTION INTRAMUSCULAR; INTRAVENOUS ONCE
Status: COMPLETED | OUTPATIENT
Start: 2021-01-06 | End: 2021-01-06

## 2021-01-06 RX ORDER — NALOXONE HYDROCHLORIDE 0.4 MG/ML
0.2 INJECTION, SOLUTION INTRAMUSCULAR; INTRAVENOUS; SUBCUTANEOUS
Status: DISCONTINUED | OUTPATIENT
Start: 2021-01-06 | End: 2021-01-13 | Stop reason: HOSPADM

## 2021-01-06 RX ORDER — HEPARIN SODIUM 5000 [USP'U]/.5ML
5000 INJECTION, SOLUTION INTRAVENOUS; SUBCUTANEOUS EVERY 12 HOURS
Status: DISCONTINUED | OUTPATIENT
Start: 2021-01-06 | End: 2021-01-07

## 2021-01-06 RX ORDER — MAGNESIUM SULFATE 1 G/100ML
1 INJECTION INTRAVENOUS ONCE
Status: COMPLETED | OUTPATIENT
Start: 2021-01-06 | End: 2021-01-06

## 2021-01-06 RX ADMIN — METRONIDAZOLE 500 MG: 500 INJECTION, SOLUTION INTRAVENOUS at 05:43

## 2021-01-06 RX ADMIN — SODIUM CHLORIDE, POTASSIUM CHLORIDE, SODIUM LACTATE AND CALCIUM CHLORIDE: 600; 310; 30; 20 INJECTION, SOLUTION INTRAVENOUS at 05:21

## 2021-01-06 RX ADMIN — INSULIN ASPART 2 UNITS: 100 INJECTION, SOLUTION INTRAVENOUS; SUBCUTANEOUS at 00:10

## 2021-01-06 RX ADMIN — ACETAMINOPHEN 1000 MG: 500 TABLET, FILM COATED ORAL at 15:21

## 2021-01-06 RX ADMIN — CIPROFLOXACIN 400 MG: 2 INJECTION, SOLUTION INTRAVENOUS at 11:23

## 2021-01-06 RX ADMIN — CIPROFLOXACIN 400 MG: 2 INJECTION, SOLUTION INTRAVENOUS at 00:10

## 2021-01-06 RX ADMIN — SODIUM CHLORIDE, POTASSIUM CHLORIDE, SODIUM LACTATE AND CALCIUM CHLORIDE: 600; 310; 30; 20 INJECTION, SOLUTION INTRAVENOUS at 20:18

## 2021-01-06 RX ADMIN — HEPARIN SODIUM 5000 UNITS: 5000 INJECTION, SOLUTION INTRAVENOUS; SUBCUTANEOUS at 11:46

## 2021-01-06 RX ADMIN — ALBUMIN HUMAN 250 ML: 0.05 INJECTION, SOLUTION INTRAVENOUS at 13:15

## 2021-01-06 RX ADMIN — INSULIN ASPART 1 UNITS: 100 INJECTION, SOLUTION INTRAVENOUS; SUBCUTANEOUS at 04:33

## 2021-01-06 RX ADMIN — METRONIDAZOLE 500 MG: 500 INJECTION, SOLUTION INTRAVENOUS at 00:09

## 2021-01-06 RX ADMIN — SODIUM CHLORIDE, POTASSIUM CHLORIDE, SODIUM LACTATE AND CALCIUM CHLORIDE 500 ML: 600; 310; 30; 20 INJECTION, SOLUTION INTRAVENOUS at 05:44

## 2021-01-06 RX ADMIN — ACETAMINOPHEN 1000 MG: 500 TABLET, FILM COATED ORAL at 20:18

## 2021-01-06 RX ADMIN — PANTOPRAZOLE SODIUM 40 MG: 40 INJECTION, POWDER, FOR SOLUTION INTRAVENOUS at 00:10

## 2021-01-06 RX ADMIN — METOCLOPRAMIDE HYDROCHLORIDE 5 MG: 5 INJECTION INTRAMUSCULAR; INTRAVENOUS at 11:19

## 2021-01-06 RX ADMIN — ACETAMINOPHEN 1000 MG: 500 TABLET, FILM COATED ORAL at 12:06

## 2021-01-06 RX ADMIN — PROCHLORPERAZINE EDISYLATE 5 MG: 5 INJECTION INTRAMUSCULAR; INTRAVENOUS at 06:05

## 2021-01-06 RX ADMIN — SODIUM CHLORIDE, POTASSIUM CHLORIDE, SODIUM LACTATE AND CALCIUM CHLORIDE: 600; 310; 30; 20 INJECTION, SOLUTION INTRAVENOUS at 11:28

## 2021-01-06 RX ADMIN — HYDROMORPHONE HYDROCHLORIDE 0.3 MG: 1 INJECTION, SOLUTION INTRAMUSCULAR; INTRAVENOUS; SUBCUTANEOUS at 13:15

## 2021-01-06 RX ADMIN — MAGNESIUM SULFATE 1 G: 1 INJECTION INTRAVENOUS at 09:46

## 2021-01-06 RX ADMIN — METRONIDAZOLE 500 MG: 500 INJECTION, SOLUTION INTRAVENOUS at 15:11

## 2021-01-06 ASSESSMENT — ACTIVITIES OF DAILY LIVING (ADL)
ADLS_ACUITY_SCORE: 17

## 2021-01-06 NOTE — PROGRESS NOTES
"/70   Pulse 109   Temp 96.3  F (35.7  C)   Resp 13   Ht 1.549 m (5' 0.98\")   Wt 54 kg (119 lb 0.8 oz)   SpO2 100%   BMI 22.51 kg/m    Pt arrived to  from PACU at 2230. Pt came to  for closer monitoring post TAHBSO, partial cystectomy, custourethroscopy, appendectomy, and takedown of splenic flexure. Pt speaks Tigrinya. Oriented to room and given call light and given instructions via interpretation. Instructed of PCA use. Aqua K pad ordered for back pain. Two RN skin assessment completed by Ashely RN and Radha NIETO Rn. Nursing will monitor.    "

## 2021-01-06 NOTE — OP NOTE
Procedure Date: 01/05/2021.      PREOPERATIVE DIAGNOSES:   1.  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   2.  Hypertension.   3.  Diabetes mellitus type 2.   4.  Iron deficiency anemia.   5.  ECOG score of 1.      POSTOPERATIVE DIAGNOSES:   1.  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   2.  Hypertension.   3.  Diabetes mellitus type 2.   4.  Iron deficiency anemia.   5.  ECOG score of 1.      ANESTHESIA:  General endotracheal anesthesia plus bilateral TAP blocks.      PROCEDURES PERFORMED:   1.  Diagnostic laparoscopy.   2.  Laparoscopic takedown of splenic flexure.   3.  Sigmoidectomy with en bloc partial cystectomy and total abdominal hysterectomy with left salpingo-oophorectomy.   4.  Right salpingo-oophorectomy.   5.  Reimplantation of left ureter.   6.  Partial omentectomy (en bloc).   7.  Appendectomy.   8.  Flexible sigmoidoscopy.   9.  Cystoscopy.   10. Diverting loop ileostomy.     SURGEON:  Remi Moscoso MD      COSURGEONS:  Angel Newsome MD and Jimy Jackson MD      ASSISTANT:  José Luis Rosario MD (Colon and Rectal Surgery fellow).      BRIEF HISTORY:  Myla is a 76-year-old pleasant lady who underwent a CT scan in 06/2020  for recurrent urinary tract infections that showed a colovesical fistula.  She did not follow-up after this.  She continued to have urinary symptoms, and a CT scan was repeated on 10/17/2020, and this showed a colovesical fistula with likely colon malignancy in the sigmoid colon.  Her additional symptoms included a 20-pound weight loss over the past 6 months, fatigue, iron deficiency anemia.  She originally met with Dr. Barry in [facility], who recommended colonoscopy and biopsy.  She also underwent a CT scan of the chest that did not show any evidence of distant metastatic disease.  A colonoscopy showed a mid sigmoid mass at 40 cm.  This was biopsied and showed adenocarcinoma.  Her immunohistochemistry was  negative.  No additional findings.  The mass was tattooed distally.  She denied any family history of colorectal cancer or inflammatory bowel disease.  No previous colonoscopies.  She denied previous anorectal operations or fecal incontinence.  Her case was discussed at a multidisciplinary colorectal cancer conference, and we did repeat a PET CT as well as a 3T pelvic MRI.  This was performed to better define the anatomy of the tumor and for the potential need for a radical cystectomy.  The PET CT was also performed to rule out any distant metastatic disease.  Both of these confirmed the local invasiveness of the tumor and adjacent lymphadenopathy, but no distant metastatic disease.  She did have some thyroid nodules that were followed up with an ultrasound, but this did not appear to be malignant.  She will follow-up with her endocrinologist for this.  Her CEA level was 9.7.  We also discussed the potential need for induction systemic chemotherapy, but because of her ongoing large colovesical fistula with recurrent urinary tract infections and the potential for a free perforation and depending on the response to chemotherapy, we did not agree we did not recommend induction chemotherapy and this was seconded by the patient and her family.  I did start her on nutritional supplements that were high in protein as well as regular iron infusions to optimize her for surgery.  She did see Urology preoperatively.  Dr. Newsome thought that the need for a radical cystectomy would be in the realm of about 10%.  I thoroughly discussed the risks, benefits and alternatives of operative treatment with Myla, and she agreed to proceed.      DESCRIPTION OF PROCEDURE:  After obtaining informed consent, the patient was brought to the operating room, placed in the modified lithotomy position.  General endotracheal anesthesia was gently induced without difficulty.  The patient underwent preoperative placement of bilateral TAP blocks.   "She also received appropriate preoperative antibiotic prophylaxis as well as mechanical and chemical DVT prophylaxis.  Bilateral lower extremity pneumatic compression devices were applied, and all pressure points were cushioned.  The abdomen and perineum were prepped and draped in the standard sterile fashion.  A Cameron catheter was inserted.  A \"timeout\" was performed.  A 12 mm supraumbilical incision was placed, and the peritoneal cavity was entered under direct vision.  A 12 mm balloon tip trocar was then placed at this incision, and pneumoperitoneum was established up to 15 mmHg without difficulty.  The peritoneal cavity was then assessed with a 30-degree angle 10 mm laparoscope, and there was no evidence of metastatic disease inside the peritoneal cavity.  Furthermore, there was no evidence of organ injury or bleeding.  We were able to examine all quadrants of the abdomen, and there was omentum attached down to the tumor, but there was no evidence of carcinomatosis or liver metastases.  Two additional 5 mm trocars were placed at the right lower abdomen, and these were placed under direct vision.  The patient was then positioned, and the small bowel was retracted to the right abdomen.  The entire descending colon was mobilized using a lateral to medial approach.  The right ureter was clearly identified, and care was taken to not injure the structure during the dissection.  The entire splenic flexure was taken down using a LigaSure device.  The greater omentum was also dissected off of the distal transverse colon. We also performed a partial omentectomy by transecting the greater omentum that was attached to the tumor, in order to excise it en bloc.  After we confirmed that there was enough length of the descending colon to perform a tension-free colorectal anastomosis, we desufflated pneumoperitoneum.  A lower midline incision was placed, and an XL Roland wound protector was used for retraction.  We again " investigated the entire peritoneal cavity, and there was no clear evidence of metastatic disease.  The sigmoid colon was mobilized using a lateral to medial approach.  The pedicle of the inferior mesenteric artery was identified, and this was skeletonized onto its origin.  The inferior mesenteric artery was transected at its origin using a clamp, cut and tie technique with double suture ligatures.  The mesentery was then transected proximal to the MAGDALENA pedicle.  The junction between the descending colon and the sigmoid colon was then transected with a green contour stapler.  We then performed some of the retroperitoneal dissection up to the upper rectum, and there was no evidence of local invasion into the retroperitoneum.  After this, Dr. Newsome performed mobilization of the entire bladder.  Please see his operative report for full details.  This was quite intricate, as the tumor was difficult to evaluate.  I did require cystoscopy at the same time in order to have adequate margins for our specimen, and these were confirmed with frozen section on being no evidence of malignancy.  Dr. Jackson also performed en bloc total abdominal hysterectomy with left salpingo-oophorectomy.  The specimen was then sent for permanent pathology.  He then performed right salpingo-oophorectomy.  Again, please see Dr. Jackson's operative report for full details.  There was some blood loss from a branch of the left hypogastric vein, and this was controlled with pressure and 4-0 Prolene sutures as well as clips.  The patient did receive a total of 3 units of transfusion because of this.  After this, we further dissected the rectosigmoid junction into the pelvis and transected the mesentery at this site.  We confirmed that we had adequate distal margins, and the rectum was transected with a green Contour stapler.  At this time, Dr. Newsome identified a left ureter injury, and this prompted him to perform reimplantation of the left ureter  after closing the bladder.  Please see Dr. Newsome's operative report for the full details of this part of the operation.  After this, we performed a flexible sigmoidoscopy in order to confirm that the rectal staple line was intact, and there was no evidence of air leakage upon hydropneumatic testing.  The proximal staple line was excised on the colon, and the anvil of a 29 mm EEA stapler was passed through this and brought through the anterior aspect or antimesenteric aspect of the descending colon.  The colon was then restapled with a green Contour stapler.  This staple line was reinforced with a running horizontal mattress 4-0 Monocryl suture.  This segment of colon appeared to be viable with visibly pulsatile arterial blood flow.  We confirmed that we had adequate length of the colon conduit to perform a colorectal anastomosis.  A 29 mm EEA stapler was then passed transanally and brought all the way up to the staple line.  The post was delivered at one of the corners of the previous Contour green staple lines in order to have only 1 intersecting staple line.  The post was connected to the anvil, and the anvil was retrieved inside the stapler.  The stapler was then fired and retrieved.  Inspection of the anastomotic rings showed 2 intact anastomotic rings, and these were sent for permanent pathology.  The anastomosis showed absolutely no evidence of tension, torsion, or inadequate blood supply.  Flexible sigmoidoscopy was repeated, and hydropneumatic testing showed no evidence of air leakage.  Furthermore, the circular staple line appeared to be intact and with no evidence of bleeding.  The colonoscope was then removed.  The peritoneal cavity was irrigated with approximately 4 liters of warm sterile water and subsequently suctioned out.  Instrument, sponge and needle counts were all correct as reported to me.  The mesoappendix was transected with a LigaSure device, and the appendix was excised using a green  Contour stapler.  The stapler line was reinforced with individual 4-0 silk Lembert sutures.  The omentum was brought down to the pelvis.  A 19-S[place] Benji drain was left in the pelvis, and this was brought through one of the previous 5 mm laparoscopic trocar incisions.  The drain was secured in place with a 2-0 nylon suture.  We then identified a segment of ileum approximately 15 cm proximal to the ileocecal valve that would reach to a previously marked site at the right lower abdomen.  A circular incision was placed at the right lower abdomen, and this was dissected through the abdominal wall to allow 2 fingers to easily pass through the abdominal wall.  The segment of ileum was then brought up with appropriate orientation through this incision.  Multiple sheets of Seprafilm were left inside the peritoneal cavity and around the proposed ileostomy site.  We did use a closing tray for this operation, given its length and complexity.  Instrument, sponge and needle counts were all correct as reported to me.  The fascia was reapproximated with the combination of a running #1 Prolene suture and multiple internal retention #1 Prolene sutures.  The wound was then irrigated with dilute peroxide.  The skin was reapproximated with a running 4-0 Monocryl subcuticular suture and Exofin Fusion.  A sterile drain dressing was then placed.  The other 5 mm trocar site was closed with a 4-0 Monocryl subcuticular suture and Exofin Fusion.  The segment of ileum that was brought up through the right lower abdomen was matured as a loop ileostomy with multiple 3-0 Vicryl sutures.  A sterile stoma appliance was then placed.  The Cameron catheter was left in place.  The patient tolerated the procedure well.      COMPLICATIONS:  Left ureteral injury, requiring further transection of the left ureter and reimplantation.      ESTIMATED BLOOD LOSS:  600 mL.      REPLACEMENT:  3000 mL of crystalloid; 500 mL of colloid; and 3 units of packed red  blood cells.      DRAINS AND TUBES:  Cameron catheter and 19-Tongan Benji drain in the pelvis.  Also has left-sided internal double-J stent.      SPECIMENS:  Right adnexa, sigmoid with en bloc uterus, cervix, left adnexa and dome of bladder, anastomotic rings, appendix.     FINDINGS: 29 mm end-to-end colorectal anastomosis at 13-cm from the anal verge. Negative leak test.     DISPOSITION:  PACU.         JAMIE ROBIN MD             D: 2021   T: 2021   MT: RHIANNA      Name:     EUGENIO CHEN   MRN:      [id number]        Account:        [id number]   :      1945           Procedure Date: 2021      Document: [id number]       cc: Ramu Jackson MD

## 2021-01-06 NOTE — PROGRESS NOTES
Paged Dr. José Luis Rosario: Labs have resulted: Albumin 2.2. INR 1.49 please see rest in chart Sherri Potts RN on 1/5/2021 at 8:04 PM

## 2021-01-06 NOTE — PROGRESS NOTES
CLINICAL NUTRITION SERVICES    Reason for Assessment:  Low residue diet teaching education, received consult.    Diet History:  Pt education delivered with help of . Pt reports no history of receiving low-fiber diet nutrition education in the past.      Nutrition Diagnosis:  Food- and nutrition-related knowledge deficit r/t no previous knowledge of low fiber diet AEB pt report of no previous formal low fiber nutrition education.    Nutrition Prescription/Recs:  Advance diet to low fiber when medically appropriate.     Interventions:  Nutrition Education  1.  Provided verbal instruction on a low fiber diet.  2.  Unable to provide handouts in pt native language.      Goals:    Pt will discuss current diet and recognize changes in diet to implement low fiber diet.    Follow-up:   Patient to ask any further nutrition-related questions before discharge.  In addition, pt may request outpatient RD appointment.    Selma Mitchell RDN, LD  [address] [phone]

## 2021-01-06 NOTE — OR NURSING
Writing RN assumed care of patient. Dennis  #[id number] utilized to complete assessment. Pt educated on PCA pump with help of .

## 2021-01-06 NOTE — PROGRESS NOTES
CRS update    Pt seen with Dennis .  Dry mouth.  Left sided flank pain.  PCA button not providing much relief.  No nausea, no burping, hiccuping.  Explained increased PCA dose, explained awaiting ROBF, explained small sips and risks of drinking too much too quickly, encouraged mobilization.     Updated daughter Michele.  Updated about RR, lactic acid, likely volume down, that is multifactorial.  Will trend.  Continue IVF.  Sips, await ROBF from ostomy.  PT/OT.  At time of discharge will have ostomy, likely alejandra catheter, likely MARIE drain, lovenox injections.  Daughter will ask cousin, Dominic to see if she can help with injections.  Daughter stated that she will be unable to do that.  Briefly also mentioned rehab potentially if needed pending evaluation with PT/OT.  Daughter preferred to have pt stay here as long as she can.      Wendi House PA-C  Colon and Rectal Surgery

## 2021-01-06 NOTE — ANESTHESIA CARE TRANSFER NOTE
Patient: Myla JEFFERSON [facility]    Procedure(s):  SIGMOIDOSCOPY, FLEXIBLE  Laparoscopy Operative Adult, takedown of splenic flexure, appendectomy  Partial Cystectomy, Cystourethroscopy,  Hysterectomy total abdominal  Salpingo-oophorectomy bilateral    Diagnosis: Malignant neoplasm of sigmoid colon (H) [C18.7]  Diagnosis Additional Information: No value filed.    Anesthesia Type:   General     Note:  Airway :Nasal Cannula  Patient transferred to:PACU  Comments: To PACU, VSS, pt awake and alert, exchanging well, report to RN, care accepted.Handoff Report: Identifed the Patient, Identified the Reponsible Provider, Reviewed the pertinent medical history, Discussed the surgical course, Reviewed Intra-OP anesthesia mangement and issues during anesthesia, Set expectations for post-procedure period and Allowed opportunity for questions and acknowledgement of understanding      Vitals: (Last set prior to Anesthesia Care Transfer)    CRNA VITALS  1/5/2021 1730 - 1/5/2021 1806      1/5/2021             Pulse:  94    ART BP:  126/61    ART Mean:  85    SpO2:  100 %    Resp Rate (observed):  (!) 3                Electronically Signed By: WILL Winslow CRNA  January 5, 2021  6:06 PM

## 2021-01-06 NOTE — ANESTHESIA POSTPROCEDURE EVALUATION
Anesthesia POST Procedure Evaluation    [facility]   MRN:     [id number] Gender:   female   Age:    76 year old :      1945        Preoperative Diagnosis: Malignant neoplasm of sigmoid colon (H) [C18.7]   Procedure(s):  SIGMOIDOSCOPY, FLEXIBLE  Laparoscopy Operative Adult, takedown of splenic flexure, appendectomy  Partial Cystectomy, Cystourethroscopy,  Hysterectomy total abdominal  Salpingo-oophorectomy bilateral   Postop Comments: No value filed.     Anesthesia Type: General       Disposition: Admission   Postop Pain Control: Uneventful            Sign Out: Well controlled pain   PONV: No   Neuro/Psych: Uneventful            Sign Out: Acceptable/Baseline neuro status   Airway/Respiratory: Uneventful            Sign Out: Acceptable/Baseline resp. status   CV/Hemodynamics: Uneventful            Sign Out: OTHER CV status (required fluid boluses)               Blood Pressure: Normal               Rate/Rhythm: Normal HR               Perfusion:  Acceptable perfusion   Other NRE: NONE   DID A NON-ROUTINE EVENT OCCUR? No         Last Anesthesia Record Vitals:  CRNA VITALS  2021 1730 - 2021 1830      2021             Resp Rate (observed):  18          Last PACU Vitals:  Vitals Value Taken Time   /75 21 2040   Temp 36.7  C (98.06  F) 21 2043   Pulse 111 21 2043   Resp 14 21 2030   SpO2 100 % 21 2043   Temp src     NIBP 111/73 21 1806   Pulse     SpO2 100 % 21 1806   Resp     Temp     Ht Rate 106 21 1806   Temp 2     Vitals shown include unvalidated device data.      Electronically Signed By: El Naylor MD, 2021, 8:45 PM

## 2021-01-06 NOTE — OP NOTE
OPERATIVE REPORT  1/5/2021     PREPROCEDURE DIAGNOSIS:   1.  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   2.  Hypertension.   3.  Diabetes mellitus type 2.   4.  Iron deficiency anemia.   5.  ECOG score of 1.      POSTPROCEDURE DIAGNOSIS:   POSTOPERATIVE DIAGNOSES:   1.  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   2.  Hypertension.   3.  Diabetes mellitus type 2.   4.  Iron deficiency anemia.   5.  ECOG score of 1.      PROCEDURE PERFORMED:   1.  Partial cystectomy en bloc with sigmoidectomy and total abdominal hysterectomy with bilateral salpingo-oophorectomy.   2. Reimplantation of left ureter.   3. Placement of left ureteral stent  3. Cystoscopy     SURGEON: Angel Newsome MD   Co-Surgeons: Remi Moscoso MD; Jimy Jackson MD  ASSISTANT(S): Angel Camacho MD (Urology Resident)  ANESTHESIA:  General with endotracheal intubation.   EBL:  600 ml    FINDINGS:   Sigmoid colon with invasive mass, grossly involving the dome and left lateral wall of the bladder. Also grossly involving the uterus with dense inflammatory changes along the left side of the uterus and bladder. Mass removed en bloc with partial cystectomy. Negative margin on frozen section from closest margin of bladder mucosa. Distal left ureter involved in dense scar tissue along left side of mass. Portion of ureter resected with mass and left ureter reimplanted to dome of bladder, along the right side. Water-tight bladder closure at end of case.    DRAINS:     MARIE Drain; 6 Fr x 26 cm left ureteral stent, 18 Fr urethral Cameron with 10 ml sterile water.    SPECIMENS:   ID Type Source Tests Collected by Time Destination   A : right adnexa Tissue Other SURGICAL PATHOLOGY EXAM Remi Moscoso MD 1/5/2021 12:43 PM    B : sigmoid, uterus with cervix, left adnexa, dome of bladder Tissue Other SURGICAL PATHOLOGY EXAM Remi Moscoso MD 1/5/2021  1:05 PM    C : Anastomotic Ring Tissue Other  SURGICAL PATHOLOGY EXAM Remi Moscoso MD 1/5/2021  4:24 PM    D : Appendix Tissue Appendix SURGICAL PATHOLOGY EXAM Remi Moscoso MD 1/5/2021  4:35 PM                 INDICATIONS:    Ms. Myla Glynn is a(n) 76 year old female with a history of locally advanced invasive adenocarcinoma of the sigmoid colon. This was invading the dome of the bladder with associated colovesical fistula. In consultation with colorectal surgery, she presents for surgical excision. Upon careful discussion with the patient and her family, they expressed as strong desire to avoid urostomy, understanding there is a chance that bladder may not ultimately be spared. She elects to proceed with en bloc surgical excision of her mass, after a thorough discussion of risks, benefits, and alternatives, she elected to undergo the above procedures.     DESCRIPTION OF PROCEDURE: After fully informed voluntary consent was obtained, the patient was brought into the operating room, properly identified and placed in a supine position on the operating table. General anesthesia was induced, endotracheal intubation performed, & IV antibiotics were administered. She was repositioned into the dorsal lithotomy position.  The abdomen was shaved, prepped and draped in the usual sterile fashion. A timeout was performed to confirm correct patient, procedure, and laterality.  A Cameron catheter was placed in the bladder in a sterile manner.    Dr. Moscoso began with diagnostic laparoscopy and subsequent mobilization of the sigmoid colon. Please see his separate note for details of this portion of the procedure. Once this was accomplished, a low midline incision was made. I proceeded to mobilize the bladder anteriorly by bluntly developing the space of Retzius. Firm mass was noted at the dome, but significant portion of normal bladder was noted along the right side. There was considerable mass effect, so at this point Dr. Moscoso proceeded  to mobilize and divide the segment of sigmoid colon containing the mass. With this mobilized, I proceeded to perform flexible cystourethroscopy to better evaluated the borders of the mass. An anterior cystotomy was made and the bladder mucosa was incised circumferentially around the area of mass invasion and fistula. The remainder of the bladder mucosa was unremarkable. The area of bladder mucosa most closely approximating the invasive mass was sent for frozen section and returned benign.    The majority of bladder mass had been excised with the exception of the portion adjoining the uterus and cervix. Given the apparent invasion from the colon mass to the uterus, Dr. Jackson was called into the room. He proceeded to perform hysterectomy with bilateral salpingo-oophorectomy. There was noted to be rather dense inflammatory change along the left side of the bladder and uterus. While the right ureter was clearly identified, the left distal ureter was more difficult to clearly identify, given this dense scar tissue. Dr. Jackson proceeded to complete removal of the uterus and ovaries. The remainder of the mass was cleared from the bladder and the specimen was sent en bloc to pathology. Of note, there was some bleeding from hypogastric veins on the left side, ultimately repaired with 4-0 prolene suture.    At this point, the bladder was carefully evaluated. The remaining mucosa was grossly normal. While approximately 30% of bladder was removed en bloc with the mass, there appeared to be adequate remaining bladder for primary closure. Thus, consistent with patient's preoperative wishes, decision was made to spare her bladder. The ureteral orifices were identified. The right ureteral orifice was cannulated with a sensor wire which passed without difficulty to the kidney. The left ureteral orifice was cannulated, but wire did not pass easily and protruded through the bladder wall. Upon further dissection through this  indurated tissue, it became apparent that a portion of the left ureter was transected during dissection and removal of the mass. This was inherent to the nature of the procedure, given the dense scar tissue in this area, associated with invasive malignancy. Above the level of the ureteral injury, the ureter was dissected free. With proximal mobilization, there was adequate length to bring this to the bladder to perform left ureteral reimplant.     At this point, the anterior cystotomy was closed in 2 layers, with 3-0 and subsequent 2-0 suture. This was leak-tested and water-tight in closure. There was some redundancy to the right side of the bladder. Vicryl suture was used to hitch this portion of the bladder to some of the tissue anterior to the sacrum. Given the planned location of the colorectal anastomosis and the area of bladder removed, it was felt reimplant to the right side of the dome would provide the safest area for reimplant. With the bladder hitched to the presacral tissue, the left ureter was able to reach this portion of the bladder in a tension-free fashion. The ureter was spatulated and anastomosis was completed to a cystotomy in the right side of the bladder dome. This was completed in interrupted fashion with 4-0 PDS suture. Prior to completing the anastomosis, as 6 Fr x 26 cm JJ ureteral stent was placed. Bladder was again leak-tested, and all closures were water-tight.      Dr. Moscoso then proceeded to complete the operation with colorectal anastomosis and closure. Please see his operative report for full details of this part of the operation.     All sponge, needle and instrument counts were correct and doubly verified prior to closure. The patient was taken to the PACU without difficulty.      Angel Newsome MD  Urology  [facility]

## 2021-01-06 NOTE — PROGRESS NOTES
"Urology  Progress Note    - Rapid called for lactated of 4.5, 500 ml bolus, at this point attributed to post operative state  - getting one bolus now for soft pressures  - has back pain, otherwise pain controlled     Exam  /60 (BP Location: Right arm, Cuff Size: Adult Regular)   Pulse 102   Temp 98.3  F (36.8  C) (Oral)   Resp 14   Ht 1.549 m (5' 0.98\")   Wt 54 kg (119 lb 0.8 oz)   SpO2 100%   BMI 22.51 kg/m    No acute distress  Non-labored breathing RA  Abdomen soft, non-distended, midline incision c/d/i appropriately tender, stoma pink and healthy  Lower extremities non-edematous bilaterally  MARIE serosanguinous  Cameron clear yellow in appearance    /125  MARIE 180/90  No stool since surgery    Labs  WBC   Date Value Ref Range Status   01/06/2021 13.7 (H) 4.0 - 11.0 10e9/L Final   01/05/2021 12.9 (H) 4.0 - 11.0 10e9/L Final   12/29/2020 9.8 4.0 - 11.0 10e9/L Final      Hemoglobin   Date Value Ref Range Status   01/06/2021 8.2 (L) 11.7 - 15.7 g/dL Final   01/05/2021 9.1 (L) 11.7 - 15.7 g/dL Final   01/05/2021 9.0 (L) 11.7 - 15.7 g/dL Final      Platelet Count   Date Value Ref Range Status   01/06/2021 255 150 - 450 10e9/L Final      Creatinine   Date Value Ref Range Status   01/06/2021 1.26 (H) 0.52 - 1.04 mg/dL Final   01/05/2021 0.92 0.52 - 1.04 mg/dL Final   01/05/2021 0.94 0.52 - 1.04 mg/dL Final      Potassium   Date Value Ref Range Status   01/06/2021 4.2 3.4 - 5.3 mmol/L Final        Culture Results: Pre-op urine with E Coli    Drains/Lines/Tubes and Anticipated Removal:  Continue Cameron, Continue MARIE drain    Assessment/Plan  76 year old y/o female POD#1 s.p sigmoidectomy, partial cystectomy, DOC BL DESI, left ureteral reimplant with colorectal, gyn/onc and urology teams for 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    - Agree with Cipro for now for pre-op E Coli urine culture  - Continue Cameron catheter for now. Will need this for 1-2 weeks with MARIE creatinine prior to " removal  - Stent removal in about 6 weeks. Urology will arrange this.  - Urology will continue to follow    Seen and examined with the chief resident. Will discuss with Dr. Newsome.    Melanie Seay MD  Urology Resident     Contacting the Urology Team     Please use the following job codes to reach the Urology Team. Note that you must use an in house phone and that job codes cannot receive text pages.     On weekdays, dial [phone] (or star-star-star [phone] on the new [employer] telephones) then [phone] to reach the Adult Urology resident or PA on call    On weekdays, dial [phone] (or star-star-star [phone] on the new [place] telephones) then [phone] to reach the Pediatric Urology resident    On weeknights and weekends, dial [phone] (or star-star-star [phone] on the new [place] telephones) then [phone] to reach the Urology resident on call (for both Adult and Pediatrics)

## 2021-01-06 NOTE — PROVIDER NOTIFICATION
-------------------CRITICAL LAB VALUE-------------------    Lab Value: Lactic acid= 3.5  Time of notification: 9:21 AM  MD notified: Alejandro Salguero MD  Patient status: VSS on RA. UOP remains low.  Temp:  [96.1  F (35.6  C)-98.3  F (36.8  C)] 98.2  F (36.8  C)  Pulse:  [] 97  Resp:  [10-18] 14  BP: ()/(53-85) 104/55  MAP:  [71 mmHg-115 mmHg] 83 mmHg  Arterial Line BP: ()/(52-97) 111/62  SpO2:  [100 %] 100 %  Orders received: None. MD acknowledged critical value.

## 2021-01-06 NOTE — CODE/RAPID RESPONSE
Rapid Response Team Note    Assessment   In assessment a rapid response was called on Myla Glynn due to lactic acidosis. This presentation is likely due to immediate post-op state and worsened by Hypertension  Diabetes Mellitus, Type II  Advanced sigmoid adenocarcinoma  Recent colorectal surgery (dx lap, sigmoidectomy w/partial cystecomy, DOC w/ RSO, appendectomy) surgery for adenocarcinoma, now 0 days post operative.     Plan   -  500mL LR bolus over 1 hour, repeat LA in 2 hours  -  The Colorectal Surgery primary team was paged and currently awaiting a response.  -  Disposition: The patient will remain on the current unit. We will continue to monitor this patient closely.  -  Reassessment and plan follow-up will be performed by the primary team      NATASHA ARMENDARIZ PA  [facility] RRT AMCOM Job Code Contact #[phone]    Hospital Course   Brief Summary of events leading to rapid response:   Rapid response called for incidental lactic acid level of 4.5 (resulted w/ scheduled Ionized calcium draw). Patient denies pain. RN notes UOP has been low since arriving to the floor.    Admission Diagnosis:   Malignant neoplasm of sigmoid colon (H) [C18.7]     Physical Exam   Temp: 98.3  F (36.8  C) Temp  Min: 96.1  F (35.6  C)  Max: 98.7  F (37.1  C)  Resp: 14 Resp  Min: 10  Max: 18  SpO2: 100 % SpO2  Min: 100 %  Max: 100 %  Pulse: 102 Pulse  Min: 67  Max: 109    No data recorded  BP: 100/60 Systolic (24hrs), Av , Min:91 , Max:133   Diastolic (24hrs), Av, Min:53, Max:85     I/Os: I/O last 3 completed shifts:  In: 5803 [I.V.:2603]  Out: 1105 [Urine:325; Drains:180; Blood:600]     Exam:   General: in no acute distress  Mental Status: baseline mental status.  Heart: RRR  Lungs: Clear  Abdomen: Soft, NT/ND  Extremities: No cyanosis or edema    Significant Results and Procedures   Lactic Acid:   Recent Labs   Lab Test 21  0454 21  1717 21  1621   LACT 4.5* 3.4* 2.6*     CBC:   Recent Labs    Lab Test 01/06/21  0454 01/05/21  1836 01/05/21  1717 12/29/20  1122 12/29/20  1122   WBC 13.7* 12.9*  --   --  9.8   HGB 8.2* 9.1* 9.0*   < > 8.2*   HCT 26.1* 27.8*  --   --  29.1*    283  --   --  439    < > = values in this interval not displayed.        Sepsis Evaluation   The patient is not known to have an infection.  NO EVIDENCE OF SEPSIS at this time.  Vital sign, physical exam, and lab findings are likely due to immediate post-op state.

## 2021-01-06 NOTE — PROGRESS NOTES
"POST OP CHECK  01/06/2021    Myla Glynn is a 76 year old female with Locally advanced sigmoid adenocarcinoma with colovesical, Hypertension, Diabetes mellitus type 2, Iron deficiency anemia, ECOG score of 1.  now POD #0 s/p:    1.  Diagnostic laparoscopy.   2.  Laparoscopic takedown of splenic flexure.   3.  Sigmoidectomy with en bloc partial cystectomy and total abdominal hysterectomy with left salpingo-oophorectomy.   4.  Right salpingo-oophorectomy.   5.  Reimplantation of left ureter.   6.  Partial omentectomy (en bloc).   7.  Appendectomy.   8.  Flexible sigmoidoscopy.   9.  Cystoscopy.      Pt reports doing well, pain under control. She is experiencing no pain except some slight back pain from the bed. No CP/SOB. No fevers chills.     BP (P) 107/58 (BP Location: Right arm, Cuff Size: Adult Regular)   Pulse 90   Temp (P) 97.7  F (36.5  C) (Oral)   Resp (P) 14   Ht 1.549 m (5' 0.98\")   Wt 54 kg (119 lb 0.8 oz)   SpO2 100%   BMI 22.51 kg/m       General: Alert, interactive, NAD, cousin is a nurse here and is interpreting  Chest: NLB on room air, regular rhythm, regular rate   Abdomen: Soft, appropriately tender, non distended, ostomy pink and well perfused, no stool or gas in bag   Incision/Dressing: C/d/i  Extremities: WWP  : Cameron in place, clear urine in bag     I/O this shift:  In: -   Out: 215 [Urine:125; Drains:90]       A/P: Myla Glynn is a 76 year old female now POD #0 s/p procedure listed above. Doing well postoperatively.  - Continue cares per primary    Destinee Grubbs MD  General Surgery, PGY-1        "

## 2021-01-06 NOTE — PROGRESS NOTES
"SPIRITUAL HEALTH SERVICES: Tele-Encounter  Patient Location ([facility], [facility], Unit): [facility], [place], [address]  Spoke with (patient, family relationship): Pt Myla's daughter (Michele)      Referral Source: Self initiated  visit.     If applicable: patient was appropriately screened for telechaplaincy support with bedside nurse prior to visit (e.g. Mental Health and Addiction contexts). See call details below.    DATA:  Pt Myla identifies as [employer] and is of Stateless descent.     Myla was made know to me through the hospital census as she was listed as \"Church\" during her admissions process. She was given this religous designation by mistake.     I spoke to her daughter Michele via [employer] with a professional Tropp[employer] . Michele stated that, \"My mother is not Church and if she needed a  she would want at Mandaeism  to pray with her.     Michele mentioned that Myla is in much pain now and would not need a  visit at this time. I assured Myla that we can SH[facility] can accomodate any of her mother's spiritual/emotional needs and that she may request a Mandaeism  when deemed appropriate.         PLAN:  Unit  will be informed of my visit for follow up. SHS is available to Myla per request.     LIN Damian    ______________________________    Type of service:  Telephone Visit     has received verbal consent for a TelephoneVisit from the patient? No    Distance Provider Location: designated [place] office or home office (secure setting)    Mode of Communication: telephone (via [employer] phone or [employer] tele-call-number ([phone]))      "

## 2021-01-06 NOTE — CODE/RAPID RESPONSE
Activated RRT at 0525 for LA of 4.5 which resulted with the ionized Calcium. RRT team came and assessed pt- 500 mL bolus ordered. Will recheck LA after pt receives bolus. All VSS. Pt reporting no incisional pain, just chronic back pain relieved with her Dilaudid PCA and heat pad.

## 2021-01-06 NOTE — CONSULTS
WOC Nurse Inpatient [facility]   WOC Nurse Inpatient Adult     Initial Assessment   Assessment of new loop Ileostomy Stoma complication(s) edema   Mucocutaneous junction; DERIK did not remove pouch  Peristomal complication(s) DERIK did not remove pouch    Pouch wear time:24-48 hours  Following today's visit:Patient  is  able to demonstrate;       1. How to empty their pouch? no      2. How to change their pouch?  no      3. How to read and record intake and output correctly? No    Psychosocial: patient is Tirgriny[place] speaking, utilized Ipad  for communication. Very lethargic today, had RR called this AM for high lactate. States had nausea but it better now. Pain controlled with PCA.     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    Current Diet/Nutrition: Orders Placed This Encounter      Clear Liquid Diet     TPN no   I/O last 3 completed shifts:  In: 6845.42 [I.V.:3645.42]  Out: 1320 [Urine:450; Drains:270; Blood:600]  Labs:    Recent Labs   Lab 01/06/21  0454 01/05/21  1836   ALBUMIN  --  2.2*   HGB 8.2* 9.1*   INR 1.56* 1.49*   WBC 13.7* 12.9*        Physical Exam:  Current pouching system:Melissa two piece OR pouch   Reason for pouch change today: pouch not changed today  Stoma appearance: pink-red, edematous and protruberant  Stoma size; about 1.5 inch   Peristomal skin: not visualized (barrier in place)  Stoma output :clear bowel sweat  Abdominal  Assessment  soft , NG still in place? No  Surgical Site: dressing dry and intact  Pain: Dull ache  Is patient still on a PCA Yes    Interventions:  Patient's chart evaluated.  Focus of today's visit: stoma assessment, dropped off supplies, introduced [facility] role in teaching   Participant of teaching session today  patient   Change made with ostomy management today: No  Patient/family: lethargic  Supplies:Gathered and at bedside    Plan:  Learning needs: initial fitting, pouch change demonstration , pouch change return demonstration, verbal instruction , diet and hydration , pouch emptying, output measurement, odor/flatus management, lifestyle adjustments and discharge instructions  Preparation for discharge: No discharge preparations started    Discharge prep still needed: Supplies ordered, Completed supply list, Registered for samples from , Prescriptions or note left on chart for MD to sign/complete, Prepared for discharge to home with homecare, Discussed making a WOC Nurse outpatient appointment upon discharge, Discussed when to follow up with a WOC Nurse in the future and Discussed how/ where to order supplies  Recommend home care? yes and by home WOC nurse if possible    Discussed plan of care with Patient and Nurse  Nursing to notify the Provider(s) and re-consult the WOC Nurse if new ostomy concerns or discharge planned before next planned WOC visit.    WOC Nurse will return: Daily M-F  Face to face time: 10 minutes    Liliana Miranda RN, CWOCN

## 2021-01-06 NOTE — PROGRESS NOTES
"Colorectal Surgery Progress Note    Subjective: Complaining of dry mouth and pain in her back but says her abdomen is feeling ok. Not complaining of any nausea. Had a rapid response called this AM for lactate of 4.5 but clinically looking well other than low UOP.     Objective:   /55 (BP Location: Right arm)   Pulse 97   Temp 98.2  F (36.8  C) (Oral)   Resp 14   Ht 1.549 m (5' 0.98\")   Wt 54 kg (119 lb 0.8 oz)   SpO2 100%   BMI 22.51 kg/m      PE:   Gen: Resting comfortably in no acute distress   CV: Regular rate and non-cyanotic appearing   Resp: non-labored breathing on 2L NC   Abd: Soft, non-distended, appropriately tender, no rigidity or guarding. Ostomy pink and healthy appearing with some bowel sweat, no gas or stool in appliance. Drain with serosanguinous output.   : Cameron draining clear yellow urine.   Ext: warm and well perfused  Incision: c/d/i    Recent labs reviewed.    Recent imaging reviewed.    A/P: Myla Glynn is a 76 year old female, who is status post sigmoidectomy, partial cystectomy, DOC BL DESI, left ureteral reimplant for colovesical fistula in setting of sigmoid colon adenocarcinoma on 1/5/2021.    - Tylenol, Dilaudid PCA  - Encouraged IS  - Diet: NPO, sip and ice chips ok if not having nausea  - Monitor UOP  - IVF  - Abx: Cipro/flagyl  Ppx: SqH   Dispo: 6B  L/D/A: justyn SALAZAR (1-2 weeks with MARIE creatinine prior to removal)     Seen and discussed with the CRS fellow who will discuss with staff.    Alejandro Salguero MD  Surgery PGY-1    "

## 2021-01-06 NOTE — PROVIDER NOTIFICATION
01/06/21 0525   Call Information   Date of Call 01/06/21   Time of Call 0525   Name of person requesting the team Aaliyah   Title of person requesting team RN   RRT Arrival time 0528   Time RRT ended 0600   Reason for call   Type of RRT Adult   Primary reason for call Sepsis suspected   Sepsis Suspected Elevated Lactate level   Was patient transferred from the ED, ICU, or PACU within last 24 hours prior to RRT call? No   SBAR   Situation Called for lactic 4.5   Background Myla Glynn is a 75 year old female who presents for pre-operative H & P in preparation for Robotic assisted sigmoidectomy, SIGMOIDOSCOPY, FLEXIBLE, Partial Cystectomy, Cystourethroscopy, Possible Radical Cystectomy With Urinary Diversion with Joe Moscoso and [place] on 1/5/21 at [facility].   Notable History/Conditions Cancer;Diabetes;Hypertension   Assessment Patient resting in bed. MAPs >60.    Interventions Fluid bolus;Labs  (recheck lactic)   Patient Outcome   Patient Outcome Stabilized on unit   RRT Team   Attending/Primary/Covering Physician [place] [facility]   Date Attending Physician notified 01/06/21   Time Attending Physician notified 0525   Physician(s) Lois Toro PA   Lead RN Abigail     D/I: RRT called for patient who had lactic of 4.5. Mild increase in heartrate from baseline, but patient appears in no acute distress. Urine output has decreased overnight. Fluid bolus started. Plan for lactic recheck about 0745.     A: Lactic acidosis possibly R/T recent surgery tonight. Low urine output may indicate mild hypovolemia given large abdominal surgery.     P: RRT recheck due [id number].     Lactic redraw 3.5. Primary team updated. Vs stable.Patient resting. Noted urine output still mildly low and appears concentrated.  Patient may still be fluid down post surgery. Awaiting primary team assessment.

## 2021-01-06 NOTE — PLAN OF CARE
"/60 (BP Location: Right arm, Cuff Size: Adult Regular)   Pulse 102   Temp 98.3  F (36.8  C) (Oral)   Resp 14   Ht 1.549 m (5' 0.98\")   Wt 54 kg (119 lb 0.8 oz)   SpO2 100%   BMI 22.51 kg/m      Neuro: A&Ox4, needs Tigrinya interpretation for communication  Cardiac: SR-ST 90-100s  Respiratory:  Maintaining adequate O2 sats via RA. Capno IPI 8-10 EtCO2 30s  GI/: voiding marginal amounts via alejandra,  no BM. Pt reported feeling nauseas x1- given PRN compazine  Diet/appetite:  NPO diet orders, meds and ice chips ok  Activity:  Assist of 2 with Q2H repo  Pain: has dilaudid PCA and using heat pad for pain relief to back. Pt denies incisional discomfort. Many denies requests noted for PCA with shift total.  Skin: Midline incision CDI and approximated  LDA's: MARIE bulb with moderate amounts of serosanguineous drainage, alejandra, colostomy with no output or gas, PIV with MIVF at 125 mL/hr    See RRT note r/t LA of 4.5    Plan: Nursing will continue to follow the POC and update the MD team with concerns.    Ashely Britton RN  6B Intermediate Care    "

## 2021-01-07 ENCOUNTER — APPOINTMENT (OUTPATIENT)
Dept: OCCUPATIONAL THERAPY | Facility: CLINIC | Age: 76
End: 2021-01-07
Attending: COLON & RECTAL SURGERY
Payer: COMMERCIAL

## 2021-01-07 LAB
ABO + RH BLD: NORMAL
ABO + RH BLD: NORMAL
ANION GAP SERPL CALCULATED.3IONS-SCNC: 9 MMOL/L (ref 3–14)
BLD GP AB SCN SERPL QL: NORMAL
BLD PROD TYP BPU: NORMAL
BLD UNIT ID BPU: 0
BLD UNIT ID BPU: 0
BLOOD BANK CMNT PATIENT-IMP: NORMAL
BLOOD PRODUCT CODE: NORMAL
BLOOD PRODUCT CODE: NORMAL
BPU ID: NORMAL
BPU ID: NORMAL
BUN SERPL-MCNC: 23 MG/DL (ref 7–30)
CALCIUM SERPL-MCNC: 8 MG/DL (ref 8.5–10.1)
CHLORIDE SERPL-SCNC: 105 MMOL/L (ref 94–109)
CO2 SERPL-SCNC: 22 MMOL/L (ref 20–32)
CREAT SERPL-MCNC: 1.68 MG/DL (ref 0.52–1.04)
GFR SERPL CREATININE-BSD FRML MDRD: 30 ML/MIN/{1.73_M2}
GLUCOSE BLDC GLUCOMTR-MCNC: 101 MG/DL (ref 70–99)
GLUCOSE BLDC GLUCOMTR-MCNC: 110 MG/DL (ref 70–99)
GLUCOSE BLDC GLUCOMTR-MCNC: 115 MG/DL (ref 70–99)
GLUCOSE BLDC GLUCOMTR-MCNC: 87 MG/DL (ref 70–99)
GLUCOSE SERPL-MCNC: 113 MG/DL (ref 70–99)
HGB BLD-MCNC: 6.7 G/DL (ref 11.7–15.7)
HGB BLD-MCNC: 8.9 G/DL (ref 11.7–15.7)
INTERPRETATION ECG - MUSE: NORMAL
LACTATE BLD-SCNC: 1 MMOL/L (ref 0.7–2)
MAGNESIUM SERPL-MCNC: 2 MG/DL (ref 1.6–2.3)
NUM BPU REQUESTED: 5
PHOSPHATE SERPL-MCNC: 3.5 MG/DL (ref 2.5–4.5)
POTASSIUM SERPL-SCNC: 4.3 MMOL/L (ref 3.4–5.3)
SODIUM SERPL-SCNC: 136 MMOL/L (ref 133–144)
SPECIMEN EXP DATE BLD: NORMAL
TRANSFUSION STATUS PATIENT QL: NORMAL

## 2021-01-07 PROCEDURE — 250N000013 HC RX MED GY IP 250 OP 250 PS 637: Performed by: COLON & RECTAL SURGERY

## 2021-01-07 PROCEDURE — 85018 HEMOGLOBIN: CPT | Performed by: COLON & RECTAL SURGERY

## 2021-01-07 PROCEDURE — 999N001017 HC STATISTIC GLUCOSE BY METER IP

## 2021-01-07 PROCEDURE — 84100 ASSAY OF PHOSPHORUS: CPT | Performed by: PHYSICIAN ASSISTANT

## 2021-01-07 PROCEDURE — 36415 COLL VENOUS BLD VENIPUNCTURE: CPT | Performed by: COLON & RECTAL SURGERY

## 2021-01-07 PROCEDURE — 80048 BASIC METABOLIC PNL TOTAL CA: CPT | Performed by: PHYSICIAN ASSISTANT

## 2021-01-07 PROCEDURE — P9016 RBC LEUKOCYTES REDUCED: HCPCS | Performed by: ANESTHESIOLOGY

## 2021-01-07 PROCEDURE — 250N000011 HC RX IP 250 OP 636: Performed by: COLON & RECTAL SURGERY

## 2021-01-07 PROCEDURE — 83605 ASSAY OF LACTIC ACID: CPT | Performed by: COLON & RECTAL SURGERY

## 2021-01-07 PROCEDURE — 83735 ASSAY OF MAGNESIUM: CPT | Performed by: PHYSICIAN ASSISTANT

## 2021-01-07 PROCEDURE — 258N000003 HC RX IP 258 OP 636: Performed by: COLON & RECTAL SURGERY

## 2021-01-07 PROCEDURE — C9113 INJ PANTOPRAZOLE SODIUM, VIA: HCPCS | Performed by: COLON & RECTAL SURGERY

## 2021-01-07 PROCEDURE — 97535 SELF CARE MNGMENT TRAINING: CPT | Mod: GO

## 2021-01-07 PROCEDURE — 36415 COLL VENOUS BLD VENIPUNCTURE: CPT | Performed by: PHYSICIAN ASSISTANT

## 2021-01-07 PROCEDURE — 250N000011 HC RX IP 250 OP 636: Performed by: PHYSICIAN ASSISTANT

## 2021-01-07 PROCEDURE — 120N000003 HC R&B IMCU UMMC

## 2021-01-07 PROCEDURE — 85018 HEMOGLOBIN: CPT | Performed by: PHYSICIAN ASSISTANT

## 2021-01-07 PROCEDURE — G0463 HOSPITAL OUTPT CLINIC VISIT: HCPCS

## 2021-01-07 RX ADMIN — CALCIUM GLUCONATE 2 G: 98 INJECTION, SOLUTION INTRAVENOUS at 14:02

## 2021-01-07 RX ADMIN — ACETAMINOPHEN 1000 MG: 500 TABLET, FILM COATED ORAL at 16:02

## 2021-01-07 RX ADMIN — PANTOPRAZOLE SODIUM 40 MG: 40 INJECTION, POWDER, FOR SOLUTION INTRAVENOUS at 23:53

## 2021-01-07 RX ADMIN — HYDROMORPHONE HYDROCHLORIDE 0.3 MG: 1 INJECTION, SOLUTION INTRAMUSCULAR; INTRAVENOUS; SUBCUTANEOUS at 23:51

## 2021-01-07 RX ADMIN — ACETAMINOPHEN 1000 MG: 500 TABLET, FILM COATED ORAL at 08:05

## 2021-01-07 RX ADMIN — HEPARIN SODIUM 5000 UNITS: 5000 INJECTION, SOLUTION INTRAVENOUS; SUBCUTANEOUS at 00:20

## 2021-01-07 RX ADMIN — PROCHLORPERAZINE EDISYLATE 5 MG: 5 INJECTION INTRAMUSCULAR; INTRAVENOUS at 20:17

## 2021-01-07 RX ADMIN — PANTOPRAZOLE SODIUM 40 MG: 40 INJECTION, POWDER, FOR SOLUTION INTRAVENOUS at 00:20

## 2021-01-07 RX ADMIN — ACETAMINOPHEN 1000 MG: 500 TABLET, FILM COATED ORAL at 20:19

## 2021-01-07 RX ADMIN — SODIUM CHLORIDE, POTASSIUM CHLORIDE, SODIUM LACTATE AND CALCIUM CHLORIDE: 600; 310; 30; 20 INJECTION, SOLUTION INTRAVENOUS at 03:29

## 2021-01-07 RX ADMIN — ACETAMINOPHEN 1000 MG: 500 TABLET, FILM COATED ORAL at 12:19

## 2021-01-07 ASSESSMENT — ACTIVITIES OF DAILY LIVING (ADL)
ADLS_ACUITY_SCORE: 16
ADLS_ACUITY_SCORE: 18
DEPENDENT_IADLS:: INDEPENDENT
ADLS_ACUITY_SCORE: 16

## 2021-01-07 ASSESSMENT — MIFFLIN-ST. JEOR: SCORE: 1010.12

## 2021-01-07 NOTE — PLAN OF CARE
PT / 6B - PT orders received. Cxl. Pt declining upon attempt 2/2 fatigue despite encouragement. Rescheduled.

## 2021-01-07 NOTE — PROGRESS NOTES
WO Nurse Inpatient [facility]   WO Nurse Inpatient Adult     Initial Assessment   Assessment of new loop Ileostomy Stoma complication(s) edema   Mucocutaneous junction; DERIK did not remove pouch  Peristomal complication(s) DERIK did not remove pouch    Pouch wear time:24-48 hours  Following today's visit:Patient  is  able to demonstrate;       1. How to empty their pouch? No, demo provided 1/7 with family member for interpretation       2. How to change their pouch?  No, demo provided 1/7 with family member for interpretation       3. How to read and record intake and output correctly? No    Psychosocial: patient is Tirgrinya speaking, utilized family member who works within the hospital for interpretation of pouch change and pouch emptying demonstrations. Patient plans on living with family for awhile after discharge while she recovers, does live alone normally.     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    Current Diet/Nutrition: Orders Placed This Encounter      Clear Liquid Diet     TPN no   I/O last 3 completed shifts:  In: 3710.33 [P.O.:170; I.V.:2961.33]  Out: 1465 [Urine:1280; Drains:185]  Labs:    Recent Labs   Lab 01/07/21  1418 01/06/21  0454 01/06/21  0454 01/05/21  1836   ALBUMIN  --   --   --  2.2*   HGB 8.9*   < > 8.2* 9.1*   INR  --   --  1.56* 1.49*   WBC  --   --  13.7* 12.9*    < > = values in this interval not displayed.        Physical Exam:  Current pouching system:Melissa two piece 57mm pouch   Reason for pouch change today: ostomy education  Stoma appearance: pink-red, edematous and protruberant  Stoma size; about 1 inch   Peristomal skin: intact   Stoma output :clear bowel sweat  Abdominal  Assessment  distended , NG still in place?  No  Surgical Site: dressing dry and intact  Pain: Dull ache  Is patient still on a PCA Yes    Interventions:  Patient's chart evaluated.  Focus of today's visit: initial fitting, pouch change demonstration  and pouch emptying   Participant of teaching session today patient  and family member who works within [facility]  Change made with ostomy management today: Yes, switched to 57mm pouch  Patient/family: lethargic and observing  Supplies:Gathered and at bedside    Plan:  Learning needs: pouch change return demonstration, verbal instruction , diet and hydration , pouch emptying, output measurement, odor/flatus management, lifestyle adjustments and discharge instructions  Preparation for discharge: No discharge preparations started    Discharge prep still needed: Supplies ordered, Completed supply list, Registered for samples from , Prescriptions or note left on chart for MD to sign/complete, Prepared for discharge to home with homecare, Discussed making a WOC Nurse outpatient appointment upon discharge, Discussed when to follow up with a WOC Nurse in the future and Discussed how/ where to order supplies  Recommend home care? yes and by home WOC nurse if possible    Discussed plan of care with Patient, Family and Nurse  Nursing to notify the Provider(s) and re-consult the WOC Nurse if new ostomy concerns or discharge planned before next planned WOC visit.    WOC Nurse will return: Daily M-F  Face to face time: 40 minutes    Liliana Miranda RN, CWOCN

## 2021-01-07 NOTE — CONSULTS
Care Management Initial Consult    General Information  Assessment completed with: Patient, Family, Patient - Nechi; Cousin - Reena  Type of CM/SW Visit: Initial Assessment  Primary Care Provider verified and updated as needed: No   Readmission within the last 30 days: no previous admission in last 30 days      Reason for Consult: discharge planning  Advance Care Planning:    None on file    Communication Assessment  Patient's communication style: spoken language (non-English)(Tigrinya)    Hearing Difficulty or Deaf: no   Wear Glasses or Blind: yes    Cognitive  Cognitive/Neuro/Behavioral: WDL                      Living Environment:   People in home: alone     Current living Arrangements: apartment      Able to return to prior arrangements: no  Living Arrangement Comments: Pt plans to discharge to her daughterUri, home when she is stable for d/c. She will have family around frequently and able to assist. This is patient's preference for d/c, as well as Reena' preference.     Family/Social Support:  Care provided by: self  Provides care for: no one  Marital Status:   Children, Other (specify)(Cousin, grandchild)          Description of Support System: Supportive, Involved    Support Assessment: Adequate family and caregiver support, Adequate social supports    Current Resources:   Skilled Home Care Services:  None  Community Resources: None  Equipment currently used at home: none  Supplies currently used at home: None    Employment/Financial:  Employment Status:       Employment/ Comments: Did not discuss  Financial Concerns: No concerns identified   Referral to Financial Counselor: No    Lifestyle & Psychosocial Needs:     Socioeconomic History     Marital status:      Spouse name: Not on file     Number of children: Not on file     Years of education: Not on file     Highest education level: Not on file     Tobacco Use     Smoking status: Never Smoker     Smokeless tobacco: Never Used    Substance and Sexual Activity     Alcohol use: Not Currently     Functional Status:  Prior to admission patient needed assistance:   Dependent ADLs:: Independent  Dependent IADLs:: Independent  Assesssment of Functional Status: Needs placement in a SNF/TCF for rehabilitation(Pt and cousin declining placement at this time)    Mental Health Status:  Mental Health Status: No Current Concerns       Chemical Dependency Status:  Chemical Dependency Status: No Current Concerns         Values/Beliefs:  Spiritual, Cultural Beliefs, Advent Practices, Values that affect care: yes    Description of Beliefs that Will Affect Care: Zoroastrian          Additional Information:  SW met with Pt at bedside with Prescreen  via iPad. SW discussed home environment and Pt endorses plan to discharge to her daughter's home upon discharge. SW briefly explained rehab placement however Pt stated she did not want rehab placement, and instead would prefer rehab at home. Later, Pt said that SW could speak with her family and what they wanted she was open to.    SW spoke with cousin (Reena) via the phone today. SW briefly explained the above visit with Pt. SW then explained current TCU recommendation. SW did explain that unfortunately pt's insurance does not cover TCU placement. SW explained that SW could make referral to ARU to see if Pt meets this criteria and that, if she does meet criteria, that would be a covered service under Pt's insurance. Reena explained that they would hope to bring Pt home at discharge and hope that she improves enough to not need placement upon discharge.      SW remains available during this hospitalization.     RUY Villalba, [facility] CenterSW  6[facility] Intermediate Care Unit   [place]  Phone: [phone]  Pager: [phone]

## 2021-01-07 NOTE — PROGRESS NOTES
01/06/21 1600   Quick Adds   Type of Visit Initial Occupational Therapy Evaluation       Present yes   Language Tigrinya   Living Environment   People in home alone   Current Living Arrangements apartment   Home Accessibility no concerns   Transportation Anticipated family or friend will provide   Living Environment Comments Pt has a walk in shower.    Self-Care   Usual Activity Tolerance moderate   Current Activity Tolerance fair   Regular Exercise No   Equipment Currently Used at Home none   Disability/Function   Hearing Difficulty or Deaf no   Wear Glasses or Blind yes   Vision Management reading glasses   Concentrating, Remembering or Making Decisions Difficulty no   Difficulty Communicating no   Difficulty Eating/Swallowing no   Walking or Climbing Stairs Difficulty no   Dressing/Bathing Difficulty no   Toileting issues no   Doing Errands Independently Difficulty (such as shopping) no   Fall history within last six months no   Change in Functional Status Since Onset of Current Illness/Injury yes   General Information   Onset of Illness/Injury or Date of Surgery 01/05/21   Referring Physician Remi Moscoso MD   Patient/Family Therapy Goal Statement (OT) Pt would like to return home independently.    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     Existing Precautions/Restrictions abdominal;fall   Left Upper Extremity (Weight-bearing Status)   (10lbs)   Right Upper Extremity (Weight-bearing Status)   (10lbs)   Left Lower Extremity (Weight-bearing Status) full weight-bearing (FWB)   Right Lower Extremity (Weight-bearing Status) full weight-bearing (FWB)   Cognitive Status Examination   Orientation Status orientation to person, place and  time   Affect/Mental Status (Cognitive) WFL   Visual Perception   Visual Impairment/Limitations WFL;corrective lenses for reading   Sensory   Sensory Quick Adds No deficits were identified   Pain Assessment   Patient Currently in Pain No   Integumentary/Edema   Integumentary/Edema no deficits were identifed   Range of Motion Comprehensive   Comment, General Range of Motion BUE AROM 0-~150 humeral flexion, limited by discomfort   Strength Comprehensive (MMT)   Comment, General Manual Muscle Testing (MMT) Assessment Pt demonstrates generalized weakness post op.    Bed Mobility   Comment (Bed Mobility) Mod A And Max vc's.    Transfers   Transfer Comments Mod A and max vc's.    Clinical Impression   Criteria for Skilled Therapeutic Interventions Met (OT) yes;meets criteria;skilled treatment is necessary   OT Diagnosis Decreased independence with functional transfers and ADLs/IADLs.    OT Problem List-Impairments impacting ADL activity tolerance impaired;fear & anxiety;flexibility;mobility;range of motion (ROM);strength;pain;post-surgical precautions;postural control   Assessment of Occupational Performance 3-5 Performance Deficits   Identified Performance Deficits Decreased independence with functional transfers and ADLs/IADLs.    Planned Therapy Interventions (OT) ADL retraining;IADL retraining;bed mobility training;joint mobilization;ROM;strengthening;stretching;transfer training;home program guidelines;progressive activity/exercise   Clinical Decision Making Complexity (OT) low complexity   Therapy Frequency (OT) 6x/week   Predicted Duration of Therapy 1/13/2021   Risks and Benefits of Treatment have been explained. Yes   Patient, Family & other staff in agreement with plan of care Yes   OT Discharge Planning    OT Discharge Recommendation (DC Rec) Transitional Care Facility   OT Rationale for DC Rec Pt is below baseline function. Pt will benefit from continued therapy to maximize functional independence.    Total  Evaluation Time (Minutes)   Total Evaluation Time (Minutes) 15

## 2021-01-07 NOTE — OP NOTE
Procedure Date: 01/07/2021      This is an intraoperative consultation on Myla Glynn, who is a 76-year-old female who was undergoing surgery for sigmoid cancer with abscess and fistula formation.  At the time that I was contacted, the patient had undergone laparotomy under the care of Dr. Moscoso and Dr. Newsome and the anterior portion of the bladder had been resected and left in continuity with the sigmoid colon.  The sigmoid colon had been transected, but there were dense adhesions of the colon and the inferior aspect of the bladder to the uterus was felt that hysterectomy would be indicated for clearing of the abscess and the tumor.  At the time of my consultation, she was hemodynamically stable and there was good control of bleeding.  There were, however, dense adhesions with significant scarring on the left aspect of the uterus.  There was limited mobility.  The abdomen was explored.  Attention was first turned to the left side, which was the more complicated side.  The retroperitoneum had been partially entered and in an effort to identify the ureter we continued the dissection of the left retroperitoneum.  Unfortunately,  this from sidewall resulted in a small venotomy at the level of the external iliac vein versus possibly in the obturator space.  This could not be easily identified; however, was easily controllable with pressure.  After multiple attempts, we simply packed this area and good hemostasis was assured.  We then proceeded to attempt to move the uterus medially to the right.  The IP ligament had been transected at my arrival, and for this reason and because we could not be easily mobilize the left side because of the potential to increase bleeding, we made the decision to transect the uterine artery high at the cornua of the uterus.  A similar procedure was performed on the right side.  However, in order to avoid a retroperitoneal dissection, the right ovary was left intact and  we transected at the adnexa from the uterus at the cornua.  The uterine artery was then transected and suture ligated on both sides.  At this point, the remaining portion of the bladder which was in situ was dissected inferiorly off the lower aspect of the uterus.  There were dense adhesions here.  This took a significant amount of time.  The uterine artery could then be skeletonized and the specimen could be amputated at the level of the cervix.  The left adnexa, uterus and cervix were then sent as a single specimen.  After discussion with Dr. Moscoso, he felt that the right adnexa should be removed as well both to prevent any possible residual infection, but also to assure that the patient would not have to return unnecessarily to the operating room.  At this point, the right adnexa was elevated into the field, the ureter could be identified and the right adnexa was amputated at the IP ligament and sent for final pathology.  The bladder and the distal portion of the colon still needed to be addressed and the decision was made to defer closure of the venotomy as there was good hemostasis at that point until later.  I therefore left this to Dr. Moscoso and Dr. Newsome, which they attended to at a later point.      ADDENDUM:  At the closure, it was worrisome that there was significant scarring on the left aspect.  It was felt that further dissection would not be feasible and that if ureteral involvement was present, would likely require reimplantation in any event.  This was reevaluated by Dr. Newsome at the conclusion and it was felt that the ureter was injured and therefore reimplantation was performed at that point.         LEANDRO RODRIGUEZ MD             D: 2021   T: 2021   MT: TAMMY      Name:     EUGENIO CHEN   MRN:      [id number]        Account:        [id number]   :      1945           Procedure Date: 2021      Document: [id number]

## 2021-01-07 NOTE — PROGRESS NOTES
"Urology  Progress Note    - Afebrile overnight, mildly tachy to low 100s  - Hgb this AM 6.7  - otherwise does not appear in acute distress or pain  - obtaining one unit pRBCs during rounds this AM    Exam  BP 97/43   Pulse 106   Temp 98.7  F (37.1  C) (Oral)   Resp 16   Ht 1.549 m (5' 0.98\")   Wt 57.3 kg (126 lb 5.2 oz)   SpO2 97%   BMI 23.88 kg/m    No acute distress  Non-labored breathing RA  Abdomen soft, non-distended, midline incision c/d/i appropriately tender, stoma pink and healthy  Lower extremities non-edematous bilaterally  MARIE serosanguinous  Cameron light pink in appearance    /280  MARIE 265/35  Colostomy 0/0    Labs  WBC   Date Value Ref Range Status   01/06/2021 13.7 (H) 4.0 - 11.0 10e9/L Final   01/05/2021 12.9 (H) 4.0 - 11.0 10e9/L Final   12/29/2020 9.8 4.0 - 11.0 10e9/L Final      Hemoglobin   Date Value Ref Range Status   01/07/2021 6.7 (LL) 11.7 - 15.7 g/dL Final     Comment:     This result has been called to LUIS ALFREDO DSOUZA by Garth Saldivar on 01 07 2021 at 0524,   and has been read back.      01/06/2021 8.2 (L) 11.7 - 15.7 g/dL Final   01/05/2021 9.1 (L) 11.7 - 15.7 g/dL Final      Platelet Count   Date Value Ref Range Status   01/06/2021 255 150 - 450 10e9/L Final      Creatinine   Date Value Ref Range Status   01/07/2021 1.68 (H) 0.52 - 1.04 mg/dL Final   01/06/2021 1.26 (H) 0.52 - 1.04 mg/dL Final   01/05/2021 0.92 0.52 - 1.04 mg/dL Final      Potassium   Date Value Ref Range Status   01/07/2021 4.3 3.4 - 5.3 mmol/L Final        Culture Results: Pre-op urine with E Coli    Drains/Lines/Tubes and Anticipated Removal:  Continue Cameron, Continue MARIE drain    Assessment/Plan  76 year old y/o female status post sigmoidectomy, partial cystectomy, DOC BL DESI, left ureteral reimplant on 1/5/2021 with colorectal, gyn/onc and urology teams for 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    - Low suspicion of urologic source of bleeding  - Agree with Cipro for now for pre-op E " Coli urine culture  - Continue Cameron catheter for 2 weeks, with cystogram prior to removal. Contact urology prior to discharge for decision regarding MARIE drain.   - Stent removal in about 6 weeks. Urology will arrange this.  - Urology will continue to follow    Seen and examined with the chief resident. To be discussed with Dr. Newsome.    Melanie Seay MD  Urology Resident     Contacting the Urology Team     Please use the following job codes to reach the Urology Team. Note that you must use an in house phone and that job codes cannot receive text pages.     On weekdays, dial [phone] (or star-star-star [phone] on the new [employer] telephones) then [phone] to reach the Adult Urology resident or PA on call    On weekdays, dial [phone] (or star-star-star [phone] on the new [employer] telephones) then [phone] to reach the Pediatric Urology resident    On weeknights and weekends, dial [phone] (or star-star-star [phone] on the new [employer] telephones) then [phone] to reach the Urology resident on call (for both Adult and Pediatrics)

## 2021-01-07 NOTE — PLAN OF CARE
Neuro: A&Ox4. Able to communicate well with  ipad.  Cardiac: Afebrile. SR/ST 90-110s. VSS.   Respiratory: Sating % on RA.  GI/: Cameron with adequate urine output. Hypoactive bowel sounds, no stool from colostomy.  Diet/appetite: Tolerating clear liquid diet. Only sips of water so far. No appetite, denied nausea.  Activity:  Assist of 2.   Pain: At acceptable level on current regimen: dilaudid PCA and scheduled tylenol.  Skin: No new deficits noted.  LDA's: Cameron, colostomy, MARIE, R PIV with LR infusing 75 mL/hr.    Hemoglobin 6.7 on AM labs, given 2 units PRBCs. Recheck hemoglobin 8.9.    Plan: Continue with POC. Notify primary team with changes.

## 2021-01-07 NOTE — PLAN OF CARE
Neuro: A&Ox4.   Cardiac: Tachycardia. BP stable.   Respiratory: Sating adequately on RA.  GI/: Adequate urine output. No stool out of colostomy  Diet/appetite: Tolerating clear liquid diet.  Activity: Bedrest overnight  Pain: Pain controlled adequately with PCA  Skin: No new deficits noted.  LDA's: Colostomy  Cameron catheter  MARIE drain  Plan: Continue with POC. Notify primary team with changes.

## 2021-01-07 NOTE — PLAN OF CARE
Assumed care of patient [id number].    Neuro: A&Ox4. Able to communicate well with  ipad.  Cardiac: Afebrile. SR/ST 90-110s. VSS.   Respiratory: Sating % on RA. Capno WNL.  GI/: Alejandra with low/marginal UOP (Dr. Moscoso aware). Given 250 mL albumin for low UOP. Hypoactive bowel sounds. Colostomy intact; small amount of serous output in bag but no stool yet.  Diet/appetite: Tolerating sips of clears. BG ACHS. Intermittent nausea. Given reglan x1 with good relief.  Activity:  Assist of 2. Q2H turns while in bed. Pt sat in chair for ~30 min this evening.  Pain: Reports incisional/abdominal and back pain. Dilaudid PCA increased to 0.2mg Q10min this AM. Little improvement after increase. Needs encouragement/reminders to use PCA. Scheduled tylenol given with some reduction in pain.   Skin: Midline abd incision approximated and CEDRIC.   LDA's: Abd MARIE with moderate serosang output, alejandra, colostomy, R PIVx2. MIVF LR infusing 125 mL/hr.     Mag 1.6 on AM labs, replaced with 1g IV mag. Recheck tomorrow with AM labs.     Plan: Continue with POC. Notify primary team with changes.

## 2021-01-07 NOTE — PROGRESS NOTES
Colorectal Surgery Progress Note  [facility]  POD#2      Subjective:  Spoke to pt with Dennis .  Pt reports that she feels a little bit better overall today compared to yesterday.  Pain a little bit better but still has pain.  Pt denies nausea, hiccuping burping today.  Pt did not get up yesterday, as when she attempted to get up she felt like she was going to fall.  Discussed that we are still waiting for ileostomy output.  Pt denies bloating.  Encouraged just sips and to go slow with oral fluid until output from ileostomy as at risk with aspiration.     Vitals:  Vitals:    01/07/21 0640 01/07/21 0645 01/07/21 0700 01/07/21 0800   BP: 107/51 110/46 116/48 115/48   BP Location:  Left arm Left arm Left arm   Pulse: 111 112 112 111   Resp: 18 18 18 16   Temp: 98.3  F (36.8  C) 98.5  F (36.9  C) 98.7  F (37.1  C) 98.6  F (37  C)   TempSrc: Oral Oral Oral Oral   SpO2: 99% 98% 98% 99%   Weight:       Height:         I/O:  I/O last 3 completed shifts:  In: 3473 [P.O.:50; I.V.:3423]  Out: 840 [Urine:630; Drains:210]    Physical Exam:  Gen: AAOx3, NAD  Pulm: Non-labored breathing  Abd: Soft, mildly distended, appropriately tender, no guarding/rebound   Incision C/D/I  And partially covered by ostomy appliance.    Stoma pink and viable - no stool/gas   MARIE drain serosanginous  Ext:  Warm and well-perfused    BMP  Recent Labs   Lab 01/07/21  0436 01/06/21  0454 01/05/21  2239 01/05/21  1836 01/05/21  1717 01/05/21  0654 01/05/21  0654    134  --  136 129*   < >  --    POTASSIUM 4.3 4.2  --  4.1 4.2   < > 3.1*   CHLORIDE 105 105  --  108  --   --   --    CO2 22 17*  --  18*  --   --   --    BUN 23 18  --  15  --   --   --    CR 1.68* 1.26*  --  0.92  --   --  0.94   * 194*  --  177* 141*   < >  --    MAG 2.0 1.6 1.7 1.7  --   --   --    PHOS 3.5 5.4* 5.0* 4.3  --   --   --     < > = values in this interval not displayed.     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  Now s/p diag laparoscopy, sigmoidectomy w/ en bloc partial cystectomy, total abdominal hysterectomy, left salpingo-oophorectomy, reimplantation of left ureter, partial omentectomy, appendectomy, flex sig and diverting loop ileostomy on 1/5/2021.       Neuro/Pain: dilaudid PCA, tylenol.  No NSAIDs 2/2 ALFREDA.   CV: hold home hydrochlorothiazide  PULM: encourage IS.  GI/FEN:   - continue sips.  Aspiration precautions.   - IVF to 75 today  - ambulate  - WOCN for new ileo  : apprec Uro management.    - Continue alejandra x1-2 weeks.  Will need MARIE creat prior to removal of alejandra.    - Stent removal in ~6 weeks w/ Uro as outpatient.   - ALFREDA creat 1.68 from 0.9.  Due to volume depletion. Monitor.   Heme: Hgb 6.7 from 8-9 baseline.  Secondary to both acute blood loss and dilution.   - 2U PRBC today  - calcium IV after transfusions  - Hgb recheck at 1400  ID: no acute issues  Endocrine: hold home metformin    Activity: as tolerated.  Ppx: protonix, holding lovenox/heparin ppx due to anemia & ALFREDA  Dispo: TBD.  Will have new loop ileostomy; alejandra x1-2 weeks, possible MARIE drain, lovenox injections at the time of discharge.     Wendi House PA-C   Colon and Rectal Surgery  2752     Patient was seen and discussed with Dr. Rosario    Updated daughter Michele by phone.  Michele asked me to also call cousin, Reena (RN) to give update.  Discussed Hgb, transfusion, creatinine, monitoring, continue with sips as we are awaiting return of bowel function.  Discussed that we are going slow with oral intake until more output to help decrease risk of aspirating.  Reena asked if we could start senna, but discussed that we recommend  waiting for bowels to wake up.    Discussed ileostomy, MARIE alejandra, lovenox injections likely at discharge.  Although currently holding lovenox.   Reena said that pt had asked for some water last night but that there was difficulties with communication.  Asked Reena to make a communication card/flash cards with words in Tigrinya and its english equivalent that pt could point at.  Seems the communication difficulties are in the evenings when translators may be less available.

## 2021-01-07 NOTE — PROVIDER NOTIFICATION
DATE:  1/7/2021   -------------------CRITICAL LAB VALUE-------------------    Lab Value: Hgb 6.7  Time of notification: 5:36 AM  MD notified: Nadia Grubbs  Patient status: Stable  Temp:  [97.7  F (36.5  C)-98.7  F (37.1  C)] 98.7  F (37.1  C)  Pulse:  [] 106  Resp:  [14-20] 16  BP: ()/(41-61) 97/43  SpO2:  [97 %-100 %] 97 %  Orders received: 1 unit PRBC's ordered, subcutaneous heparin held

## 2021-01-08 ENCOUNTER — APPOINTMENT (OUTPATIENT)
Dept: OCCUPATIONAL THERAPY | Facility: CLINIC | Age: 76
End: 2021-01-08
Attending: COLON & RECTAL SURGERY
Payer: COMMERCIAL

## 2021-01-08 LAB
ANION GAP SERPL CALCULATED.3IONS-SCNC: 7 MMOL/L (ref 3–14)
BUN SERPL-MCNC: 20 MG/DL (ref 7–30)
CALCIUM SERPL-MCNC: 8.8 MG/DL (ref 8.5–10.1)
CHLORIDE SERPL-SCNC: 110 MMOL/L (ref 94–109)
CO2 SERPL-SCNC: 22 MMOL/L (ref 20–32)
CREAT SERPL-MCNC: 1.1 MG/DL (ref 0.52–1.04)
GFR SERPL CREATININE-BSD FRML MDRD: 49 ML/MIN/{1.73_M2}
GLUCOSE BLDC GLUCOMTR-MCNC: 114 MG/DL (ref 70–99)
GLUCOSE BLDC GLUCOMTR-MCNC: 79 MG/DL (ref 70–99)
GLUCOSE BLDC GLUCOMTR-MCNC: 88 MG/DL (ref 70–99)
GLUCOSE BLDC GLUCOMTR-MCNC: 90 MG/DL (ref 70–99)
GLUCOSE BLDC GLUCOMTR-MCNC: 90 MG/DL (ref 70–99)
GLUCOSE SERPL-MCNC: 93 MG/DL (ref 70–99)
HGB BLD-MCNC: 9.6 G/DL (ref 11.7–15.7)
POTASSIUM SERPL-SCNC: 3.7 MMOL/L (ref 3.4–5.3)
SODIUM SERPL-SCNC: 140 MMOL/L (ref 133–144)

## 2021-01-08 PROCEDURE — 85018 HEMOGLOBIN: CPT | Performed by: COLON & RECTAL SURGERY

## 2021-01-08 PROCEDURE — 97535 SELF CARE MNGMENT TRAINING: CPT | Mod: GO

## 2021-01-08 PROCEDURE — 97110 THERAPEUTIC EXERCISES: CPT | Mod: GO

## 2021-01-08 PROCEDURE — C9113 INJ PANTOPRAZOLE SODIUM, VIA: HCPCS | Performed by: COLON & RECTAL SURGERY

## 2021-01-08 PROCEDURE — 80048 BASIC METABOLIC PNL TOTAL CA: CPT | Performed by: COLON & RECTAL SURGERY

## 2021-01-08 PROCEDURE — 999N000127 HC STATISTIC PERIPHERAL IV START W US GUIDANCE

## 2021-01-08 PROCEDURE — 999N000197 HC STATISTIC WOC PT EDUCATION, 0-15 MIN

## 2021-01-08 PROCEDURE — 120N000002 HC R&B MED SURG/OB UMMC

## 2021-01-08 PROCEDURE — 36415 COLL VENOUS BLD VENIPUNCTURE: CPT | Performed by: COLON & RECTAL SURGERY

## 2021-01-08 PROCEDURE — 250N000013 HC RX MED GY IP 250 OP 250 PS 637: Performed by: COLON & RECTAL SURGERY

## 2021-01-08 PROCEDURE — 999N001017 HC STATISTIC GLUCOSE BY METER IP

## 2021-01-08 PROCEDURE — 258N000003 HC RX IP 258 OP 636: Performed by: COLON & RECTAL SURGERY

## 2021-01-08 PROCEDURE — 250N000011 HC RX IP 250 OP 636: Performed by: COLON & RECTAL SURGERY

## 2021-01-08 RX ORDER — HEPARIN SODIUM 5000 [USP'U]/.5ML
5000 INJECTION, SOLUTION INTRAVENOUS; SUBCUTANEOUS EVERY 12 HOURS
Status: DISCONTINUED | OUTPATIENT
Start: 2021-01-08 | End: 2021-01-11

## 2021-01-08 RX ADMIN — HEPARIN SODIUM 5000 UNITS: 5000 INJECTION, SOLUTION INTRAVENOUS; SUBCUTANEOUS at 08:22

## 2021-01-08 RX ADMIN — HEPARIN SODIUM 5000 UNITS: 5000 INJECTION, SOLUTION INTRAVENOUS; SUBCUTANEOUS at 19:52

## 2021-01-08 RX ADMIN — PANTOPRAZOLE SODIUM 40 MG: 40 INJECTION, POWDER, FOR SOLUTION INTRAVENOUS at 23:31

## 2021-01-08 RX ADMIN — HYDROMORPHONE HYDROCHLORIDE 0.3 MG: 1 INJECTION, SOLUTION INTRAMUSCULAR; INTRAVENOUS; SUBCUTANEOUS at 03:16

## 2021-01-08 RX ADMIN — SODIUM CHLORIDE, POTASSIUM CHLORIDE, SODIUM LACTATE AND CALCIUM CHLORIDE: 600; 310; 30; 20 INJECTION, SOLUTION INTRAVENOUS at 06:30

## 2021-01-08 RX ADMIN — SODIUM CHLORIDE, POTASSIUM CHLORIDE, SODIUM LACTATE AND CALCIUM CHLORIDE 500 ML: 600; 310; 30; 20 INJECTION, SOLUTION INTRAVENOUS at 20:22

## 2021-01-08 RX ADMIN — ACETAMINOPHEN 1000 MG: 500 TABLET, FILM COATED ORAL at 08:22

## 2021-01-08 RX ADMIN — PROCHLORPERAZINE EDISYLATE 5 MG: 5 INJECTION INTRAMUSCULAR; INTRAVENOUS at 16:24

## 2021-01-08 RX ADMIN — PROCHLORPERAZINE EDISYLATE 5 MG: 5 INJECTION INTRAMUSCULAR; INTRAVENOUS at 23:50

## 2021-01-08 RX ADMIN — PROCHLORPERAZINE EDISYLATE 5 MG: 5 INJECTION INTRAMUSCULAR; INTRAVENOUS at 10:16

## 2021-01-08 RX ADMIN — METOCLOPRAMIDE HYDROCHLORIDE 5 MG: 5 INJECTION INTRAMUSCULAR; INTRAVENOUS at 13:47

## 2021-01-08 ASSESSMENT — ACTIVITIES OF DAILY LIVING (ADL)
ADLS_ACUITY_SCORE: 21
ADLS_ACUITY_SCORE: 20
ADLS_ACUITY_SCORE: 19
ADLS_ACUITY_SCORE: 20
ADLS_ACUITY_SCORE: 21
ADLS_ACUITY_SCORE: 20

## 2021-01-08 ASSESSMENT — MIFFLIN-ST. JEOR: SCORE: 1007.12

## 2021-01-08 NOTE — PLAN OF CARE
Neuro: A&Ox4. Tigrinya speaking.  Cardiac: Afebrile. Tele monitoring d/c'd today. VSS.   Respiratory: Sating % on RA. Denies SOB.  GI/: Cameron with adequate urine output. No BM.  Diet/appetite: Tolerating full liquid diet. Tried some Boost today. Very poor appetite. Intermittent nausea; given compazine x1 and reglan x1 with minimal relief.  Activity:  Assist of 2. Up to the chair x2 today.  Pain: At acceptable level on current regimen.   Skin: Midline abdomen incision w/ liquid bandage CEDRIC and CDI.   LDA's: Cameron, colostomy, MARIE (leaky at site), and R PIV infusing LR 50 mL/hr with Dilaudid PCA.       Plan: Transfer to  when bed available.  Continue with POC. Notify primary team with changes.

## 2021-01-08 NOTE — PROGRESS NOTES
Care Management Follow Up    Length of Stay (days): 3    Expected Discharge Date: 01/12/21     Concerns to be Addressed:       Patient plan of care discussed at interdisciplinary rounds: Yes    Anticipated Discharge Disposition: Home with Daughter       Anticipated Discharge Services:  Home Care  Anticipated Discharge DME:      Patient/family educated on Medicare website which has current facility and service quality ratings: yes  Education Provided on the Discharge Plan:  yes  Patient/Family in Agreement with the Plan:  yes    Referrals Placed by CM/SW:  Referral made to [facility]    Additional Information:  Pt with advanced sigmoid adenocarcinoma with colovesical fistula, s/p sigmoidectomy, partial cystectomy, hysterectomy, appey, ileostomy 1/5/21, anticipated discharge 3-4 days.  Therapy currently recommending TCU which Pt declines, plan is for Pt to discharge to Daughters home. Per Nursing, Pt prefers to have Daughter assist with discharge planning.  I have called and spoke to Pts Daughter, Michele who states Pt will discharge to her home with 24hr assist. Home Care follow up discussed which Michele is agreeable to. Choice of Home Care agencies offered, [facility] is preferred.   I have made a referral to [facility] and added RN, PT to the discharge orders.    Pt will discharge to home of Michele Asa   #[phone]                                                [address]                                                  [address]                                                 [place] [address]    Anni Camargo RN   6B care coordinator #[phone]

## 2021-01-08 NOTE — PROVIDER NOTIFICATION
"Time of notification: 2:35 PM  Provider notified: Marielle GONZALEZ  Patient status: Pt refused lab draw again. No reason given for refusal, simply stating \"I am not going to do it\". RN reminded pt of the verbal contract he made during rounds to have his labs drawn daily starting this afternoon.   Temp:  [98.4  F (36.9  C)-99.6  F (37.6  C)] 98.5  F (36.9  C)  Pulse:  [84-90] 84  Resp:  [16-20] 18  BP: (111-140)/(51-56) 118/56  SpO2:  [96 %-99 %] 98 %  Orders received: None.    "

## 2021-01-08 NOTE — PLAN OF CARE
Pt A&O X4, Tigrinya speaking, answers questions/follows commands appropriately via . VSS, afebrile, HR sinus rhythm 80's, BP's 110-120's/50's, O2 sats > 90% on RA. LS clear, BS+ X4, CMS intact. Pt slept most of overnight shift, had few requests/complaints. Reported aching incisional pain, received PRN 0.3mg IV Dilaudid Q3hrs, Dilaudid PCA set to 0.2mg bumps Q10 minutes, pt using occasionally. PIV in L arm patent & infusing LR @ 75mL/hr w/ Dilaudid PCA Y'd in. Midline abdominal incision CEDRIC & WDL w/ liquid bandage. RLQ ostomy patent, pink stoma, no output, MARIE dressing had moderate serosanguinous drainage, reinforced, MARIE draining serosanguinous drainage. Cameron patent & draining clear yellow urine adequately. Tolerating clear liquid diet with no nausea/emesis. Gets up with A+1/2 + GB to chair. Has call light within reach, able to make needs known, will continue to monitor per POC and update primary team with changes.

## 2021-01-08 NOTE — PROGRESS NOTES
WO Nurse Inpatient [facility]   WO Nurse Inpatient Adult     1/8: patient feeling nausea and bloating to abdomen, small mucus output in pouch otherwise is dry, will continue education on Monday.     Initial Assessment   Assessment of new loop Ileostomy Stoma complication(s) edema   Mucocutaneous junction; intact   Peristomal complication(s) none  Pouch wear time:3-4 days,   Following today's visit:Patient  is  able to demonstrate;       1. How to empty their pouch? No, demo provided 1/7 with family member for interpretation       2. How to change their pouch?  No, demo provided 1/7 with family member for interpretation       3. How to read and record intake and output correctly? No    Psychosocial: patient is Tirgrinya speaking, utilized family member who works within the hospital for interpretation of pouch change and pouch emptying demonstrations. Patient plans on living with family for awhile after discharge while she recovers, does live alone normally.     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    Current Diet/Nutrition: Orders Placed This Encounter      Full Liquid Diet      Diet     TPN no   I/O last 3 completed shifts:  In: 2137.5 [P.O.:420; I.V.:1717.5]  Out: 1827 [Urine:1725; Drains:102]  Labs:    Recent Labs   Lab 01/08/21  0534 01/06/21  0454 01/06/21  0454 01/05/21  1836   ALBUMIN  --   --   --  2.2*   HGB 9.6*   < > 8.2* 9.1*   INR  --   --  1.56* 1.49*   WBC  --   --  13.7* 12.9*    < > = values in this interval not displayed.        Physical Exam:  Current pouching system:Melissa two piece 57mm pouch   Reason for pouch change today: pouch not changed today  Stoma appearance: pink-red, edematous and protruberant  Stoma size; about 1 inch   Peristomal  skin: intact   Stoma output :clear bowel sweat, small amount of white and green mucus  Abdominal  Assessment  distended , NG still in place? No  Surgical Site: dressing dry and intact  Pain: Dull ache  Is patient still on a PCA Yes    Interventions:  Patient's chart evaluated.  Focus of today's visit: checked pouch seal   Participant of teaching session today patient   Change made with ostomy management today: No  Patient/family: lethargic and observing  Supplies:Gathered and at bedside    Plan:  Learning needs: pouch change return demonstration, verbal instruction , diet and hydration , pouch emptying, output measurement, odor/flatus management, lifestyle adjustments and discharge instructions  Preparation for discharge: No discharge preparations started    Discharge prep still needed: Supplies ordered, Completed supply list, Registered for samples from , Prescriptions or note left on chart for MD to sign/complete, Prepared for discharge to home with homecare, Discussed making a WOC Nurse outpatient appointment upon discharge, Discussed when to follow up with a WOC Nurse in the future and Discussed how/ where to order supplies  Recommend home care? yes and by home WOC nurse if possible    Discussed plan of care with Patient  Nursing to notify the Provider(s) and re-consult the WOC Nurse if new ostomy concerns or discharge planned before next planned WOC visit.    WOC Nurse will return: Daily M-F  Face to face time: 10 minutes    Liliana Miranda RN, CWOCN

## 2021-01-08 NOTE — PLAN OF CARE
PT / 6B - Cxl. Attempted PT eval however pt sleeping soundly and this writer unable to arouse pt from her sleep to meaningfully participate in therapy session. Rescheduled.

## 2021-01-08 NOTE — PROGRESS NOTES
Colorectal Surgery Progress Note  [facility]  POD#3      Subjective:  Used ipad [employer] .  Doing ok.  Whole body is sore.  Reports some abdominal bloating.  Possible heartburn.  Denies nausea.  But reports that she has had some burping/hiccuping. Counseled pt to go slow with oral intake today.  And encouraged as much mobilization as possible.  Pt does feel weak and fearful of falling.     Vitals:  Vitals:    01/07/21 1900 01/07/21 1924 01/07/21 2314 01/08/21 0313   BP:  119/56 115/51 127/53   BP Location:  Left arm Left arm Left arm   Cuff Size:   Adult Regular Adult Regular   Pulse: 85 90 88 84   Resp: 16 18 18 20   Temp:  99.6  F (37.6  C) 98.5  F (36.9  C) 98.5  F (36.9  C)   TempSrc:  Oral Oral Oral   SpO2: 96% 98% 98% 99%   Weight:    57 kg (125 lb 10.6 oz)   Height:         I/O:  I/O last 3 completed shifts:  In: 3037.33 [P.O.:410; I.V.:2048.33]  Out: 2465 [Urine:2375; Drains:90]    Physical Exam:  Gen: AAOx3, NAD  Pulm: Non-labored breathing  Abd: Soft, mildly distended, appropriately tender, no guarding/rebound   Incision C/D/I  And partially covered by ostomy appliance.    Stoma pink and viable - no stool/gas   MARIE drain serosanginous  Ext:  Warm and well-perfused    BMP  Recent Labs   Lab 01/08/21  0534 01/07/21  0436 01/06/21  0454 01/05/21  2239 01/05/21  1836    136 134  --  136   POTASSIUM 3.7 4.3 4.2  --  4.1   CHLORIDE 110* 105 105  --  108   CO2 22 22 17*  --  18*   BUN 20 23 18  --  15   CR 1.10* 1.68* 1.26*  --  0.92   GLC 93 113* 194*  --  177*   MAG  --  2.0 1.6 1.7 1.7   PHOS  --  3.5 5.4* 5.0* 4.3     CBC  Recent Labs   Lab 01/08/21  0534 01/07/21  1418 01/07/21  0436 01/06/21  0454 01/05/21  1836   WBC  --   --   --  13.7* 12.9*   HGB 9.6* 8.9* 6.7* 8.2* 9.1*   HCT  --   --   --  26.1* 27.8*   PLT  --   --   --  255 283         ASSESSMENT: This is a 76 year old female PMH DM, HTN, iron deficiency anemia found to have locally advanced sigmoid  adenocarcinoma w/ colovesical fistula.  Now s/p diag laparoscopy, sigmoidectomy w/ en bloc partial cystectomy, total abdominal hysterectomy, left salpingo-oophorectomy, reimplantation of left ureter, partial omentectomy, appendectomy, flex sig and diverting loop ileostomy on 1/5/2021.       Neuro/Pain: dilaudid PCA, tylenol.  No NSAIDs 2/2 ALFREDA.   CV: hold home hydrochlorothiazide  PULM: encourage IS.  GI/FEN:   - FLD. Aspiration precautions.   - IVF to 50 today  - ambulate  - WOCN for new ileo  : apprec Uro management.    - Continue alejandra x1-2 weeks.  Will need MARIE creat prior to removal of alejandra.    - Stent removal in ~6 weeks w/ Uro as outpatient.   - ALFREDA improved, creat 1.1 from 1.68  Heme: Hgb 8.9-9.6 from  6.7 after the 2U PRBC.     ID: no acute issues  Endocrine: hold home metformin    Activity: as tolerated.  Ppx: protonix, holding lovenox/heparin ppx due to anemia & ALFREDA  Dispo: TBD.  Will have new loop ileostomy; alejandra x1-2 weeks, possible MARIE drain, lovenox injections at the time of discharge.     Wendi House PA-C   Colon and Rectal Surgery  1094     Patient was seen and discussed with Dr. Rosario    Called both Michele and Reena.  Reviewed labs, restarting of heparin, bloating, heartburn and burping.  Encouraged going slow with oral intake.  And to encourage as much mobilizing as possible.  Family will help reinforce.  No further questions at this time.

## 2021-01-08 NOTE — PLAN OF CARE
Neuro: A&Ox4.   Cardiac: SR. VSS.   Respiratory: Sating 98% on RA.  GI/: Adequate urine output via alejandra. No output via ostomy.   Diet/appetite: Poor PO intake on clear liquid diet. Had episode of nausea with no emesis x 1, gave PRN antiemetic with relief.   Activity:  Assist of 2, assisting to reposition with pillows in bed.   Pain: At acceptable level on current regimen. Pain pump and scheduled tylenol in use.   Skin: No new deficits noted. Midline abdominal incision CEDRIC and approximated.   LDA's: PIV with LR at 75 ml/hr and dilaudid PCA, R ostomy, R MARIE drain to bulb suction, alejandra.     Plan: Continue with POC. Notify primary team with changes.

## 2021-01-09 ENCOUNTER — APPOINTMENT (OUTPATIENT)
Dept: PHYSICAL THERAPY | Facility: CLINIC | Age: 76
End: 2021-01-09
Attending: COLON & RECTAL SURGERY
Payer: COMMERCIAL

## 2021-01-09 ENCOUNTER — APPOINTMENT (OUTPATIENT)
Dept: GENERAL RADIOLOGY | Facility: CLINIC | Age: 76
End: 2021-01-09
Attending: STUDENT IN AN ORGANIZED HEALTH CARE EDUCATION/TRAINING PROGRAM
Payer: COMMERCIAL

## 2021-01-09 LAB
ANION GAP SERPL CALCULATED.3IONS-SCNC: 11 MMOL/L (ref 3–14)
BUN SERPL-MCNC: 21 MG/DL (ref 7–30)
CALCIUM SERPL-MCNC: 8.6 MG/DL (ref 8.5–10.1)
CHLORIDE SERPL-SCNC: 110 MMOL/L (ref 94–109)
CO2 SERPL-SCNC: 20 MMOL/L (ref 20–32)
CREAT SERPL-MCNC: 0.96 MG/DL (ref 0.52–1.04)
GFR SERPL CREATININE-BSD FRML MDRD: 58 ML/MIN/{1.73_M2}
GLUCOSE BLDC GLUCOMTR-MCNC: 71 MG/DL (ref 70–99)
GLUCOSE BLDC GLUCOMTR-MCNC: 72 MG/DL (ref 70–99)
GLUCOSE BLDC GLUCOMTR-MCNC: 75 MG/DL (ref 70–99)
GLUCOSE BLDC GLUCOMTR-MCNC: 81 MG/DL (ref 70–99)
GLUCOSE BLDC GLUCOMTR-MCNC: 81 MG/DL (ref 70–99)
GLUCOSE SERPL-MCNC: 70 MG/DL (ref 70–99)
HGB BLD-MCNC: 8.9 G/DL (ref 11.7–15.7)
POTASSIUM SERPL-SCNC: 3.7 MMOL/L (ref 3.4–5.3)
SODIUM SERPL-SCNC: 141 MMOL/L (ref 133–144)

## 2021-01-09 PROCEDURE — 97116 GAIT TRAINING THERAPY: CPT | Mod: GP

## 2021-01-09 PROCEDURE — 97161 PT EVAL LOW COMPLEX 20 MIN: CPT | Mod: GP

## 2021-01-09 PROCEDURE — 97530 THERAPEUTIC ACTIVITIES: CPT | Mod: GP

## 2021-01-09 PROCEDURE — 80048 BASIC METABOLIC PNL TOTAL CA: CPT | Performed by: COLON & RECTAL SURGERY

## 2021-01-09 PROCEDURE — 36415 COLL VENOUS BLD VENIPUNCTURE: CPT | Performed by: COLON & RECTAL SURGERY

## 2021-01-09 PROCEDURE — 250N000011 HC RX IP 250 OP 636: Performed by: STUDENT IN AN ORGANIZED HEALTH CARE EDUCATION/TRAINING PROGRAM

## 2021-01-09 PROCEDURE — 999N001017 HC STATISTIC GLUCOSE BY METER IP

## 2021-01-09 PROCEDURE — 250N000011 HC RX IP 250 OP 636: Performed by: COLON & RECTAL SURGERY

## 2021-01-09 PROCEDURE — 74018 RADEX ABDOMEN 1 VIEW: CPT | Mod: 26 | Performed by: RADIOLOGY

## 2021-01-09 PROCEDURE — 258N000003 HC RX IP 258 OP 636: Performed by: COLON & RECTAL SURGERY

## 2021-01-09 PROCEDURE — 250N000009 HC RX 250: Performed by: STUDENT IN AN ORGANIZED HEALTH CARE EDUCATION/TRAINING PROGRAM

## 2021-01-09 PROCEDURE — 85018 HEMOGLOBIN: CPT | Performed by: COLON & RECTAL SURGERY

## 2021-01-09 PROCEDURE — 120N000002 HC R&B MED SURG/OB UMMC

## 2021-01-09 PROCEDURE — 74018 RADEX ABDOMEN 1 VIEW: CPT

## 2021-01-09 RX ORDER — HYDROMORPHONE HYDROCHLORIDE 1 MG/ML
0.3 INJECTION, SOLUTION INTRAMUSCULAR; INTRAVENOUS; SUBCUTANEOUS
Status: DISCONTINUED | OUTPATIENT
Start: 2021-01-09 | End: 2021-01-09

## 2021-01-09 RX ORDER — HYDRALAZINE HYDROCHLORIDE 20 MG/ML
10 INJECTION INTRAMUSCULAR; INTRAVENOUS EVERY 4 HOURS PRN
Status: DISCONTINUED | OUTPATIENT
Start: 2021-01-09 | End: 2021-01-12

## 2021-01-09 RX ORDER — FUROSEMIDE 10 MG/ML
20 INJECTION INTRAMUSCULAR; INTRAVENOUS ONCE
Status: COMPLETED | OUTPATIENT
Start: 2021-01-09 | End: 2021-01-09

## 2021-01-09 RX ORDER — SCOLOPAMINE TRANSDERMAL SYSTEM 1 MG/1
1 PATCH, EXTENDED RELEASE TRANSDERMAL
Status: DISCONTINUED | OUTPATIENT
Start: 2021-01-09 | End: 2021-01-11

## 2021-01-09 RX ORDER — METOCLOPRAMIDE HYDROCHLORIDE 5 MG/ML
5 INJECTION INTRAMUSCULAR; INTRAVENOUS ONCE
Status: COMPLETED | OUTPATIENT
Start: 2021-01-09 | End: 2021-01-09

## 2021-01-09 RX ORDER — ONDANSETRON 2 MG/ML
4 INJECTION INTRAMUSCULAR; INTRAVENOUS EVERY 6 HOURS PRN
Status: DISCONTINUED | OUTPATIENT
Start: 2021-01-09 | End: 2021-01-13 | Stop reason: HOSPADM

## 2021-01-09 RX ORDER — HYDROMORPHONE HCL IN WATER/PF 6 MG/30 ML
0.2 PATIENT CONTROLLED ANALGESIA SYRINGE INTRAVENOUS
Status: DISCONTINUED | OUTPATIENT
Start: 2021-01-09 | End: 2021-01-10

## 2021-01-09 RX ADMIN — HEPARIN SODIUM 5000 UNITS: 5000 INJECTION, SOLUTION INTRAVENOUS; SUBCUTANEOUS at 20:45

## 2021-01-09 RX ADMIN — ONDANSETRON 4 MG: 2 INJECTION INTRAMUSCULAR; INTRAVENOUS at 11:56

## 2021-01-09 RX ADMIN — SCOPALAMINE 1 PATCH: 1 PATCH, EXTENDED RELEASE TRANSDERMAL at 01:52

## 2021-01-09 RX ADMIN — METOCLOPRAMIDE HYDROCHLORIDE 5 MG: 5 INJECTION INTRAMUSCULAR; INTRAVENOUS at 13:26

## 2021-01-09 RX ADMIN — FUROSEMIDE 20 MG: 10 INJECTION, SOLUTION INTRAVENOUS at 09:27

## 2021-01-09 RX ADMIN — ONDANSETRON 4 MG: 2 INJECTION INTRAMUSCULAR; INTRAVENOUS at 17:46

## 2021-01-09 RX ADMIN — CALCIUM GLUCONATE 2 G: 98 INJECTION, SOLUTION INTRAVENOUS at 13:45

## 2021-01-09 RX ADMIN — HEPARIN SODIUM 5000 UNITS: 5000 INJECTION, SOLUTION INTRAVENOUS; SUBCUTANEOUS at 08:34

## 2021-01-09 RX ADMIN — METOCLOPRAMIDE HYDROCHLORIDE 5 MG: 5 INJECTION INTRAMUSCULAR; INTRAVENOUS at 01:21

## 2021-01-09 ASSESSMENT — ACTIVITIES OF DAILY LIVING (ADL)
ADLS_ACUITY_SCORE: 19

## 2021-01-09 NOTE — PLAN OF CARE
"BP (!) 140/68 (BP Location: Right arm)   Pulse 80   Temp 98  F (36.7  C) (Oral)   Resp 16   Ht 1.549 m (5' 0.98\")   Wt 57 kg (125 lb 10.6 oz)   SpO2 96%   BMI 23.76 kg/m    Hypertensive but not within notifying parameters, all other VSS on RA. A&Ox4, Dennis speaking with  ipad at bedside. Denies pain, PCA dilaudid in use. C/O continuous nausea, given PRN compazine & Reglan with no relief. Had 3 large emesis overnight, totaling 800ccs, MD notified and aware. Scop patch placed and administered, orders for Zofran placed, and abdominal X-ray done. Pt now resting comfortably & denying nausea, plan for NG to be placed if emesis occurs again. On full liquid diet, tolerating sips of clears, BGs checked ACHS. Left PIV infusing IVMF. Right MARIE dressing changed X1, good amount of serosanguinous output. Abdominal incision with liquid bandage, CDI and CEDRIC. Ostomy intact, good amount of gas and stool overnight. Cameron positional with marginal urine output. Per report up with A2, not out of bed this shift. Continue POC.   "

## 2021-01-09 NOTE — PROGRESS NOTES
Colorectal Surgery Progress Note    POD 4 s/p open sigmoid colectomy with en bloc partial cystectomy, hysterectomy, bilateral salpingooophorectomy, reimplantation of L ureter, DLI    Subjective:     - Transferred to  yesterday  - Overnight, had 2-3 episodes of bilious emesis  - Still feels nauseated now  - Pain is much improved  - Main complaint is bilateral leg swelling      Physical Exam:    Afebrile, VSS  No acute distress, lying in bed, appears calm but expressing some concern  Grossly alert and oriented  Breathing comfortably on room air  Abdomen is soft, non-distended. Minimally tender. Incisions c/d/I at midline. MARIE drain is serous. Ostomy is pink, some scant gas in bag at present  Legs have 1+ edema. 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  Now s/p open sigmoid colectomy with en bloc partial cystectomy, hysterectomy, bilateral salpingooophorectomy, reimplantation of L ureter, DLI, appendectomy on 1/5/21.    Initially had some post-surgical anemia, which responded well to 2u RBC (after 3 units intraop). Tachycardia resolved. ALFREDA improved. Now appears to have some ileus, with emesis overnight. Still nauseated.    For now, please hold off on NGT. Continue antiemetics as needed.    - NPO for now  - Keep IVF going, drop to 50 ml/hr  - Keep MARIE drain and alejandra intact  - Remove PCA, will have IV dilaudid available  - Lasix x 20 for fluid overload (3 kg over admission weight, 8L net positive fluid, in setting of 5 units RBC and lots of crystalloids)      José Luis Rosario MD ..................1/9/2021   8:56 AM  Fellow  Colon and Rectal Surgery

## 2021-01-09 NOTE — PLAN OF CARE
History:  Came in on 1/3 for abdominal pain.  Had a DOC BSO with sigmoid diverting loop ileostomy creation, also had a partial cystectomy.     Neuro: Alert and oriented x4. Speaks very minimal English, speaks. Family members know English, phone numbers are written on care board.  Activity: 2 assist  Respiratory: O2>90% on RA. Lungs clear.   Diet: Full liquids  GI/:  Colostomy CDI, putting out a small amount of stool. Abdomen soft, mildly distended.   BS @ HS was 90.   Pt has alejandra,  ml LR bolus given @ 2015, for only 70 ml output in 4 hours.   Lines/Drains:  MARIE drain,  Dressing was changed @ 2000 when pt transferred. Alejandra  Incision:  Midline with Dermabond CDI.   Skin: Mepliex on Coxxyx. Warm and dry.

## 2021-01-09 NOTE — PROVIDER NOTIFICATION
Notified MD at 0130 regarding change in condition.      Orders were not obtained.    Comments: Pt had about 700cc green liquid emesis, no antiemetics available, given both Reglan and Compazine with no relief. Small amount of gas & stool from ostomy. Awaiting reply.

## 2021-01-09 NOTE — PROGRESS NOTES
01/09/21 1130   Quick Adds   Type of Visit Initial PT Evaluation       Present yes   Language Tigrinya   Living Environment   People in home alone   Current Living Arrangements apartment   Home Accessibility stairs to enter home   Number of Stairs, Main Entrance 1   Stair Railings, Main Entrance railing on right side (ascending)   Transportation Anticipated family or friend will provide   Living Environment Comments Pt reports will stay with daughter who also lives in apartment, has one step to enter   Self-Care   Usual Activity Tolerance good   Current Activity Tolerance fair   Equipment Currently Used at Home none   Activity/Exercise/Self-Care Comment Pt reports has a shower chair, was I with ADLs and mobility prior to this admission   Disability/Function   Hearing Difficulty or Deaf yes   Hearing Management spoke loudly, clearly ()   Concentrating, Remembering or Making Decisions Difficulty no   Walking or Climbing Stairs Difficulty no   Dressing/Bathing Difficulty no   Toileting issues no   Change in Functional Status Since Onset of Current Illness/Injury yes   General Information   Onset of Illness/Injury or Date of Surgery 01/05/21   Referring Physician Remi Moscoso MD   Patient/Family Therapy Goals Statement (PT) go to daughters home   Pertinent History of Current Problem (include personal factors and/or comorbidities that impact the POC) POD 4 s/p open sigmoid colectomy with en bloc partial cystectomy, hysterectomy, bilateral salpingooophorectomy, reimplantation of L ureter, DLI   Existing Precautions/Restrictions abdominal   Cognition   Orientation Status (Cognition) oriented x 3   Affect/Mental Status (Cognition) WFL   Follows Commands (Cognition) follows one-step commands;75-90% accuracy;repetition of directions required   Posture    Posture Forward head position;Protracted shoulders   Strength   Strength Comments B LEs mildly decreased based on functional  assessment   Bed Mobility   Comment (Bed Mobility) Min A Sit>supine   Transfers   Transfer Safety Comments Min A sit<.stand   Gait/Stairs (Locomotion)   Comment (Gait/Stairs) Min A amb 15' wh walker   Clinical Impression   Criteria for Skilled Therapeutic Intervention yes, treatment indicated   PT Diagnosis (PT) impaired functional mobility   Influenced by the following impairments decreased strength, act tolerance, increased precautions, pain   Functional limitations due to impairments decreased I transfers, gait, bed mob, barrier navigation, ADL's   Clinical Presentation Stable/Uncomplicated   Clinical Decision Making (Complexity) low complexity   Therapy Frequency (PT) 6x/week   Planned Therapy Interventions (PT) balance training;bed mobility training;gait training;home exercise program;patient/family education;stair training;strengthening;transfer training   Risk & Benefits of therapy have been explained care plan/treatment goals reviewed;risks/benefits reviewed;participants voiced agreement with care plan;participants included;patient   PT Discharge Planning    PT Discharge Recommendation (DC Rec) home with assist;home with home care physical therapy   PT Rationale for DC Rec Anticipate progression so pt could potentially discharge to home with daughter to assist, but will need to be fairly I as pt reports dtr works.   PT Brief overview of current status  Min A transfers, min Aamb 15' wh walker

## 2021-01-09 NOTE — PLAN OF CARE
Admitted/transferred from: transferred from   2 RN full   skin assessment completed by ROMELIA KATZ, JAMARI and Lu Najera RN.  Skin assessment finding: issues found blanchable redness on coxxyx . Leaking at MARIE site.   Colostomy is CDI.   Interventions/actions: skin interventions mepilex placed on coxxyx . Replaced MARIE dressing.     Will continue to monitor.

## 2021-01-09 NOTE — PLAN OF CARE
No emesis this shift but does complain of nausea, got zofran and reglan, no complaints of pain, PCA discontinued but has IV dilaudid available. Using IPad  to communicate. Up in chair and ambulated with physical therapy, uses walker and 1 assist. Cameron with adequate output, MARIE to bulb suction and colostomy with small output and gas. Blood sugars under sliding scale parameters.

## 2021-01-10 ENCOUNTER — APPOINTMENT (OUTPATIENT)
Dept: OCCUPATIONAL THERAPY | Facility: CLINIC | Age: 76
End: 2021-01-10
Attending: COLON & RECTAL SURGERY
Payer: COMMERCIAL

## 2021-01-10 LAB
GLUCOSE BLDC GLUCOMTR-MCNC: 131 MG/DL (ref 70–99)
GLUCOSE BLDC GLUCOMTR-MCNC: 60 MG/DL (ref 70–99)
GLUCOSE BLDC GLUCOMTR-MCNC: 62 MG/DL (ref 70–99)
GLUCOSE BLDC GLUCOMTR-MCNC: 69 MG/DL (ref 70–99)
GLUCOSE BLDC GLUCOMTR-MCNC: 73 MG/DL (ref 70–99)
GLUCOSE BLDC GLUCOMTR-MCNC: 90 MG/DL (ref 70–99)
GLUCOSE BLDC GLUCOMTR-MCNC: 95 MG/DL (ref 70–99)
GLUCOSE BLDC GLUCOMTR-MCNC: 95 MG/DL (ref 70–99)
MAGNESIUM SERPL-MCNC: 1.6 MG/DL (ref 1.6–2.3)
PHOSPHATE SERPL-MCNC: 2.5 MG/DL (ref 2.5–4.5)
POTASSIUM SERPL-SCNC: 3.2 MMOL/L (ref 3.4–5.3)
POTASSIUM SERPL-SCNC: 3.4 MMOL/L (ref 3.4–5.3)

## 2021-01-10 PROCEDURE — 250N000013 HC RX MED GY IP 250 OP 250 PS 637: Performed by: COLON & RECTAL SURGERY

## 2021-01-10 PROCEDURE — 97535 SELF CARE MNGMENT TRAINING: CPT | Mod: GO | Performed by: OCCUPATIONAL THERAPIST

## 2021-01-10 PROCEDURE — 36415 COLL VENOUS BLD VENIPUNCTURE: CPT | Performed by: COLON & RECTAL SURGERY

## 2021-01-10 PROCEDURE — 258N000001 HC RX 258: Performed by: COLON & RECTAL SURGERY

## 2021-01-10 PROCEDURE — 120N000002 HC R&B MED SURG/OB UMMC

## 2021-01-10 PROCEDURE — 84100 ASSAY OF PHOSPHORUS: CPT | Performed by: COLON & RECTAL SURGERY

## 2021-01-10 PROCEDURE — 250N000011 HC RX IP 250 OP 636: Performed by: COLON & RECTAL SURGERY

## 2021-01-10 PROCEDURE — C9113 INJ PANTOPRAZOLE SODIUM, VIA: HCPCS | Performed by: COLON & RECTAL SURGERY

## 2021-01-10 PROCEDURE — 258N000003 HC RX IP 258 OP 636: Performed by: COLON & RECTAL SURGERY

## 2021-01-10 PROCEDURE — 84132 ASSAY OF SERUM POTASSIUM: CPT | Performed by: COLON & RECTAL SURGERY

## 2021-01-10 PROCEDURE — 83735 ASSAY OF MAGNESIUM: CPT | Performed by: COLON & RECTAL SURGERY

## 2021-01-10 PROCEDURE — 999N001017 HC STATISTIC GLUCOSE BY METER IP

## 2021-01-10 PROCEDURE — 250N000011 HC RX IP 250 OP 636: Performed by: STUDENT IN AN ORGANIZED HEALTH CARE EDUCATION/TRAINING PROGRAM

## 2021-01-10 RX ORDER — HYDROMORPHONE HCL IN WATER/PF 6 MG/30 ML
0.2 PATIENT CONTROLLED ANALGESIA SYRINGE INTRAVENOUS
Status: DISCONTINUED | OUTPATIENT
Start: 2021-01-10 | End: 2021-01-12

## 2021-01-10 RX ORDER — TRAMADOL HYDROCHLORIDE 50 MG/1
50-100 TABLET ORAL EVERY 6 HOURS PRN
Status: DISCONTINUED | OUTPATIENT
Start: 2021-01-10 | End: 2021-01-12

## 2021-01-10 RX ORDER — POTASSIUM CHLORIDE 750 MG/1
20 TABLET, EXTENDED RELEASE ORAL ONCE
Status: COMPLETED | OUTPATIENT
Start: 2021-01-10 | End: 2021-01-10

## 2021-01-10 RX ORDER — POTASSIUM CHLORIDE 1.5 G/1.58G
20 POWDER, FOR SOLUTION ORAL ONCE
Status: COMPLETED | OUTPATIENT
Start: 2021-01-10 | End: 2021-01-10

## 2021-01-10 RX ADMIN — POTASSIUM CHLORIDE 20 MEQ: 750 TABLET, EXTENDED RELEASE ORAL at 20:30

## 2021-01-10 RX ADMIN — PANTOPRAZOLE SODIUM 40 MG: 40 INJECTION, POWDER, FOR SOLUTION INTRAVENOUS at 01:04

## 2021-01-10 RX ADMIN — HEPARIN SODIUM 5000 UNITS: 5000 INJECTION, SOLUTION INTRAVENOUS; SUBCUTANEOUS at 20:30

## 2021-01-10 RX ADMIN — HEPARIN SODIUM 5000 UNITS: 5000 INJECTION, SOLUTION INTRAVENOUS; SUBCUTANEOUS at 08:29

## 2021-01-10 RX ADMIN — SODIUM CHLORIDE, POTASSIUM CHLORIDE, SODIUM LACTATE AND CALCIUM CHLORIDE: 600; 310; 30; 20 INJECTION, SOLUTION INTRAVENOUS at 01:07

## 2021-01-10 RX ADMIN — DEXTROSE MONOHYDRATE 25 ML: 500 INJECTION PARENTERAL at 04:01

## 2021-01-10 RX ADMIN — ONDANSETRON 4 MG: 2 INJECTION INTRAMUSCULAR; INTRAVENOUS at 17:08

## 2021-01-10 RX ADMIN — POTASSIUM CHLORIDE 20 MEQ: 1.5 POWDER, FOR SOLUTION ORAL at 08:29

## 2021-01-10 ASSESSMENT — ACTIVITIES OF DAILY LIVING (ADL)
ADLS_ACUITY_SCORE: 21
ADLS_ACUITY_SCORE: 19
ADLS_ACUITY_SCORE: 21

## 2021-01-10 ASSESSMENT — MIFFLIN-ST. JEOR: SCORE: 1001.85

## 2021-01-10 NOTE — PROVIDER NOTIFICATION
"Paged MD with the following message \"Noticed that the last BG was 71. Pt is NPO and diabetic. Has LR infusing @ 50ml/h. Do you want to change and glucose containing IV fluid to prevent nocturnal BG drop?\" MD called back and would like RN to monitor and treat with D50 if pt drops and then MD will discuss further plan with Day team in the morning.   "

## 2021-01-10 NOTE — PLAN OF CARE
Using Ipad for interpreting services. Patient continues to deny pain and decline pain meds. Blood sugar was 62 this morning, diet advanced to full liquids, blood sugars improved with juice and supplemental shake. Potassium 3.2, replaced per protocol. Ileostomy with stool and gas, alejandra with adequate output, MARIE to bulb suction. Up with 1 and walker to chair, walks a bit in room, is working with therapy.

## 2021-01-10 NOTE — PLAN OF CARE
Assumed Care: [id number] - [id number]  Background: Pt is 76 yr old female admitted on 01/05/21 with malignant neoplasm of sigmoid colon, s/p Robotic assisted sigmoidectomy,sigmoidoscopy, flexible, Partial Cystectomy, Cystourethroscopy, Possible Radical Cystectomy with Urinary Diversion. Hx of HTN, DM II, and anemia, s/p iron infusions.  D/I: Alert and oriented x 4, speaks Tigrinya. A & O x4. VSS. NPO w/ ice chips. Calm and cooperative with cares. Abd pain is controlled with PRN dilaudid. Tylenol also available but pt continues to refuse scheduled Tylenol. Using ipad  to communicate.  Blood sugars stable, no insulin needed. HS BS 71.   GI/: Nausea controlled with Scopolamine patch behind right ear. Adequate urine output from alejandra.   Lines/Drains: 2 piece colostomy intact and patent with small output and gas.  MARIE to bulb suction with serosanguinous output.  L.forearm PIV infusing LR @ 50 ml/hr. Alejandra patent.    Activity: Pt is up with 1 assist and walker to chair. Trace edema in lower legs, improving.   Recommendations/Plan: Will continue to monitor GI status and pain, provide for safety, encourage activity, assess response to interventions, update MDs with concerns and changes and follow POC

## 2021-01-10 NOTE — PROGRESS NOTES
"Colorectal Surgery Progress Note    POD 5 s/p open sigmoid colectomy with en bloc partial cystectomy, hysterectomy, bilateral salpingooophorectomy, reimplantation of L ureter, DLI    Subjective:   Hypoglycemic overnight, treated with D50 with good effect. No further vomiting. Ambulated with physicial therapy. Colostomy working. Expressed her gratitude for the excellent care by the entire hospital staff and has recovered her hope to heal.     Physical Exam:    /55 (BP Location: Right arm)   Pulse 79   Temp 98.8  F (37.1  C) (Oral)   Resp 16   Ht 1.549 m (5' 0.98\")   Wt 57 kg (125 lb 10.6 oz)   SpO2 99%   BMI 23.76 kg/m      Comfortable and well appearing, talkative this morning   Grossly alert and oriented  Breathing non-labored on room air  Abdomen is soft, non-distended. Minimally tender. Incisions c/d/I at midline. MARIE drain is serous. Ostomy is pink, some scant gas in bag at present  Legs have 1+ edema. 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  Now s/p open sigmoid colectomy with en bloc partial cystectomy, hysterectomy, bilateral salpingooophorectomy, reimplantation of L ureter, DLI, appendectomy on 1/5/21.Initially had some post-surgical anemia, which responded well to 2u RBC (after 3 units intraop). Tachycardia resolved. ALFREDA improved. Nausea improved and without emesis.     - FLD  - Keep IVF 50 ml/hr  - Keep MARIE drain and alejandra       Alejandro Salguero MD  Surgery PGY-1      "

## 2021-01-10 NOTE — PLAN OF CARE
A & O x4. VSS. NPO w/ ice chips. Had some nausea, Zofran given x1. Has Scopolamine patch behind right ear.  Denies pain, has been refusing scheduled Tylenol. Using ipad to  to communicate. Up with 1 assist and RW, was up in chair for a little bit this evening. Blood sugars stable, no insulin needed. HS BS 71. Colostomy with small output and gas.  MARIE to bulb suction, serosanguinous drainage with some clots.  L FA PIV infusing LR @ 50 ml/hr. Adequate urine output from alejandra.  Trace edema in lower legs, improving.

## 2021-01-11 ENCOUNTER — APPOINTMENT (OUTPATIENT)
Dept: PHYSICAL THERAPY | Facility: CLINIC | Age: 76
End: 2021-01-11
Attending: COLON & RECTAL SURGERY
Payer: COMMERCIAL

## 2021-01-11 LAB
ANION GAP SERPL CALCULATED.3IONS-SCNC: 11 MMOL/L (ref 3–14)
BUN SERPL-MCNC: 15 MG/DL (ref 7–30)
CALCIUM SERPL-MCNC: 8.6 MG/DL (ref 8.5–10.1)
CHLORIDE SERPL-SCNC: 110 MMOL/L (ref 94–109)
CO2 SERPL-SCNC: 24 MMOL/L (ref 20–32)
CREAT FLD-MCNC: 0.8 MG/DL
CREAT SERPL-MCNC: 0.77 MG/DL (ref 0.52–1.04)
GFR SERPL CREATININE-BSD FRML MDRD: 76 ML/MIN/{1.73_M2}
GLUCOSE BLDC GLUCOMTR-MCNC: 102 MG/DL (ref 70–99)
GLUCOSE BLDC GLUCOMTR-MCNC: 162 MG/DL (ref 70–99)
GLUCOSE BLDC GLUCOMTR-MCNC: 72 MG/DL (ref 70–99)
GLUCOSE BLDC GLUCOMTR-MCNC: 83 MG/DL (ref 70–99)
GLUCOSE BLDC GLUCOMTR-MCNC: 93 MG/DL (ref 70–99)
GLUCOSE SERPL-MCNC: 86 MG/DL (ref 70–99)
MAGNESIUM SERPL-MCNC: 1.5 MG/DL (ref 1.6–2.3)
PLATELET # BLD AUTO: 268 10E9/L (ref 150–450)
POTASSIUM SERPL-SCNC: 3.6 MMOL/L (ref 3.4–5.3)
POTASSIUM SERPL-SCNC: 3.7 MMOL/L (ref 3.4–5.3)
SODIUM SERPL-SCNC: 144 MMOL/L (ref 133–144)
SPECIMEN SOURCE FLD: NORMAL

## 2021-01-11 PROCEDURE — 85049 AUTOMATED PLATELET COUNT: CPT | Performed by: COLON & RECTAL SURGERY

## 2021-01-11 PROCEDURE — C9113 INJ PANTOPRAZOLE SODIUM, VIA: HCPCS | Performed by: COLON & RECTAL SURGERY

## 2021-01-11 PROCEDURE — 80048 BASIC METABOLIC PNL TOTAL CA: CPT | Performed by: COLON & RECTAL SURGERY

## 2021-01-11 PROCEDURE — 120N000002 HC R&B MED SURG/OB UMMC

## 2021-01-11 PROCEDURE — 84132 ASSAY OF SERUM POTASSIUM: CPT | Performed by: COLON & RECTAL SURGERY

## 2021-01-11 PROCEDURE — 250N000013 HC RX MED GY IP 250 OP 250 PS 637: Performed by: STUDENT IN AN ORGANIZED HEALTH CARE EDUCATION/TRAINING PROGRAM

## 2021-01-11 PROCEDURE — 250N000011 HC RX IP 250 OP 636: Performed by: COLON & RECTAL SURGERY

## 2021-01-11 PROCEDURE — 999N001017 HC STATISTIC GLUCOSE BY METER IP

## 2021-01-11 PROCEDURE — 36415 COLL VENOUS BLD VENIPUNCTURE: CPT | Performed by: COLON & RECTAL SURGERY

## 2021-01-11 PROCEDURE — 999N000198 HC STATISTIC WOC PT EDUCATION, 16-30 MIN

## 2021-01-11 PROCEDURE — 250N000011 HC RX IP 250 OP 636: Performed by: STUDENT IN AN ORGANIZED HEALTH CARE EDUCATION/TRAINING PROGRAM

## 2021-01-11 PROCEDURE — 82570 ASSAY OF URINE CREATININE: CPT | Performed by: COLON & RECTAL SURGERY

## 2021-01-11 PROCEDURE — 83735 ASSAY OF MAGNESIUM: CPT | Performed by: COLON & RECTAL SURGERY

## 2021-01-11 PROCEDURE — 97530 THERAPEUTIC ACTIVITIES: CPT | Mod: GP

## 2021-01-11 RX ORDER — MAGNESIUM SULFATE HEPTAHYDRATE 40 MG/ML
2 INJECTION, SOLUTION INTRAVENOUS ONCE
Status: DISCONTINUED | OUTPATIENT
Start: 2021-01-11 | End: 2021-01-11

## 2021-01-11 RX ORDER — MAGNESIUM SULFATE HEPTAHYDRATE 40 MG/ML
2 INJECTION, SOLUTION INTRAVENOUS ONCE
Status: COMPLETED | OUTPATIENT
Start: 2021-01-11 | End: 2021-01-11

## 2021-01-11 RX ADMIN — HEPARIN SODIUM 5000 UNITS: 5000 INJECTION, SOLUTION INTRAVENOUS; SUBCUTANEOUS at 08:51

## 2021-01-11 RX ADMIN — PSYLLIUM HUSK 1 PACKET: 3.4 POWDER ORAL at 08:51

## 2021-01-11 RX ADMIN — MAGNESIUM SULFATE IN WATER 2 G: 40 INJECTION, SOLUTION INTRAVENOUS at 08:41

## 2021-01-11 RX ADMIN — PANTOPRAZOLE SODIUM 40 MG: 40 INJECTION, POWDER, FOR SOLUTION INTRAVENOUS at 00:59

## 2021-01-11 RX ADMIN — PSYLLIUM HUSK 1 PACKET: 3.4 POWDER ORAL at 20:56

## 2021-01-11 ASSESSMENT — ACTIVITIES OF DAILY LIVING (ADL)
ADLS_ACUITY_SCORE: 21
ADLS_ACUITY_SCORE: 20

## 2021-01-11 NOTE — PROGRESS NOTES
WO Nurse Inpatient [facility]   WO Nurse Inpatient Adult     Initial Assessment   Assessment of new loop Ileostomy Stoma complication(s) edema   Mucocutaneous junction; intact   Peristomal complication(s) none  Pouch wear time:3-4 days,   Following today's visit:Patient  is  able to demonstrate;       1. How to empty their pouch? Yes with assist        2. How to change their pouch?  No, demo provided 1/7 with family member for interpretation       3. How to read and record intake and output correctly? yes    Psychosocial: patient is Tirgrinya speaking, utilized family member who works within the hospital for interpretation of pouch change and pouch emptying demonstrations. Patient plans on living with family for awhile after discharge while she recovers, does live alone normally.     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    Current Diet/Nutrition: Orders Placed This Encounter      Diet      Low Fiber Diet     TPN no   I/O last 3 completed shifts:  In: 680 [P.O.:680]  Out: 2450 [Urine:900; Drains:180; Stool:1370]  Labs:    Recent Labs   Lab 01/09/21  0754 01/06/21  0454 01/06/21  0454 01/05/21  1836   ALBUMIN  --   --   --  2.2*   HGB 8.9*   < > 8.2* 9.1*   INR  --   --  1.56* 1.49*   WBC  --   --  13.7* 12.9*    < > = values in this interval not displayed.        Physical Exam:  Current pouching system:Melissa two piece 57mm pouch   Reason for pouch change today: pouch not changed today  Stoma appearance: pink-red, edematous and protruberant  Stoma size; about 1 inch   Peristomal skin: intact   Stoma output :clear and green   Abdominal  Assessment  distended , NG still in place? No  Surgical Site: dressing dry and intact  Pain: Dull ache  Is patient still on a  PCA No    Interventions:  Patient's chart evaluated.  Focus of today's visit: diet and hydration , pouch emptying and output measurement   Participant of teaching session today patient   Change made with ostomy management today: No  Patient/family: lethargic and observing  Supplies:Gathered and at bedside    Plan:  Learning needs: pouch change return demonstration, verbal instruction , diet and hydration , odor/flatus management, lifestyle adjustments and discharge instructions  Preparation for discharge: No discharge preparations started    Discharge prep still needed: Supplies ordered, Completed supply list, Registered for samples from , Prescriptions or note left on chart for MD to sign/complete, Prepared for discharge to home with homecare, Discussed making a WOC Nurse outpatient appointment upon discharge, Discussed when to follow up with a WOC Nurse in the future and Discussed how/ where to order supplies  Recommend home care? yes and by home WOC nurse if possible    Discussed plan of care with Patient  Nursing to notify the Provider(s) and re-consult the WOC Nurse if new ostomy concerns or discharge planned before next planned WOC visit.    WOC Nurse will return: Daily M-F  Face to face time: 40 minutes    Liliana Miranda RN, CWOCN

## 2021-01-11 NOTE — PROGRESS NOTES
"Colorectal Surgery Progress Note    POD 6 s/p open sigmoid colectomy with en bloc partial cystectomy, hysterectomy, bilateral salpingooophorectomy, reimplantation of L ureter, DLI    Subjective:    Ambulating throughout the day. Endorses fatigue, headache and dizziness. No nausea or emesis. Tolerating fluids. Lower extremities feel numb and edematous.    Physical Exam:    /53 (BP Location: Right arm)   Pulse 74   Temp 98.6  F (37  C) (Oral)   Resp 16   Ht 1.549 m (5' 0.98\")   Wt 56.5 kg (124 lb 8 oz)   SpO2 99%   BMI 23.54 kg/m      Comfortable and well appearing  Grossly alert and oriented  Breathing non-labored on room air  Abdomen is soft, non-distended. Minimally tender. Incisions c/d/I at midline. MARIE drain is serous. Ostomy is pink, gas and thin bilious stool present in bag  Legs have 1+ edema. 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  Now s/p open sigmoid colectomy with en bloc partial cystectomy, hysterectomy, bilateral salpingooophorectomy, reimplantation of L ureter, DLI, appendectomy on 1/5/21. Initially had some post-surgical anemia, which responded well to 2u RBC (after 3 units intraop). Tachycardia resolved. ALFREDA resolved. Nausea improved and without emesis.     -Advance to low residue diet. Encourage boosts.   -Discontinue IVF  -Start metamucil 1 packet BID  -Encourage ambulation    Ruth Moore MD  Surgery PGY-1      "

## 2021-01-11 NOTE — PLAN OF CARE
Patient speaks Tigrinya, utilizing Ipad for interpreting services. Magnesium replaced, IV is saline locked. Ileostomy has output and gas, alejandra with adequate output, MARIE to bulb suction. Patient is up in chair, walks short distances with walker and 1 assist. Started making patient aware of cares she will need to do at home, such as caring for and emptying her ileostomy, alejandra, and possibly lovenox injections. Apparently will be staying with daughter. Continue to monitor teaching needs for home.

## 2021-01-11 NOTE — PLAN OF CARE
Pt is Tigrinya-speaking, iPad utilized. Vitally stable. Denies pain, refuses scheduled Tylenol. Denies nausea overnight. Tolerating clears. Colostomy w/ liquid stool+gas. Cameron in place with adequate urine output. Midline incision w/ dermabond CEDRIC. MARIE drain on R abdomen w/ serosanguineous output. PIV infusing LR @ 30. Up w/ assist of 1. Continue w/ post op cares.

## 2021-01-11 NOTE — PLAN OF CARE
A&O x4, using  ipad to communicate. 1 assist. VSS.  Pt continues to deny pain and scheduled Tylenol.  Blood sugars better, pt still having minimal intake, HS BS 95. On a full liquid diet. IV Zofran given x2.  Ileostomy with stool and gas,  MARIE to bulb suction, serosanginuous output. Adequate UOP from alejandra. Potassium recheck 3.4, PO replacement given, recheck @ 0030.  Magnesium and Phosphorus were WNL this am, recheck tomorrow am. Abdominal incision CDI with liquid bandage open to air. Trace edema in lower extremities.

## 2021-01-12 ENCOUNTER — DOCUMENTATION ONLY (OUTPATIENT)
Dept: CARE COORDINATION | Facility: CLINIC | Age: 76
End: 2021-01-12

## 2021-01-12 ENCOUNTER — APPOINTMENT (OUTPATIENT)
Dept: PHYSICAL THERAPY | Facility: CLINIC | Age: 76
End: 2021-01-12
Attending: COLON & RECTAL SURGERY
Payer: COMMERCIAL

## 2021-01-12 ENCOUNTER — APPOINTMENT (OUTPATIENT)
Dept: OCCUPATIONAL THERAPY | Facility: CLINIC | Age: 76
End: 2021-01-12
Attending: COLON & RECTAL SURGERY
Payer: COMMERCIAL

## 2021-01-12 LAB
ANION GAP SERPL CALCULATED.3IONS-SCNC: 8 MMOL/L (ref 3–14)
BUN SERPL-MCNC: 16 MG/DL (ref 7–30)
CALCIUM SERPL-MCNC: 7.9 MG/DL (ref 8.5–10.1)
CHLORIDE SERPL-SCNC: 108 MMOL/L (ref 94–109)
CO2 SERPL-SCNC: 26 MMOL/L (ref 20–32)
CREAT SERPL-MCNC: 1.1 MG/DL (ref 0.52–1.04)
GFR SERPL CREATININE-BSD FRML MDRD: 49 ML/MIN/{1.73_M2}
GLUCOSE BLDC GLUCOMTR-MCNC: 77 MG/DL (ref 70–99)
GLUCOSE BLDC GLUCOMTR-MCNC: 85 MG/DL (ref 70–99)
GLUCOSE BLDC GLUCOMTR-MCNC: 97 MG/DL (ref 70–99)
GLUCOSE BLDC GLUCOMTR-MCNC: 97 MG/DL (ref 70–99)
GLUCOSE BLDC GLUCOMTR-MCNC: 98 MG/DL (ref 70–99)
GLUCOSE SERPL-MCNC: 90 MG/DL (ref 70–99)
MAGNESIUM SERPL-MCNC: 1.9 MG/DL (ref 1.6–2.3)
MAGNESIUM SERPL-MCNC: 2 MG/DL (ref 1.6–2.3)
PHOSPHATE SERPL-MCNC: 4.2 MG/DL (ref 2.5–4.5)
POTASSIUM SERPL-SCNC: 3.6 MMOL/L (ref 3.4–5.3)
SODIUM SERPL-SCNC: 141 MMOL/L (ref 133–144)

## 2021-01-12 PROCEDURE — 250N000011 HC RX IP 250 OP 636: Performed by: COLON & RECTAL SURGERY

## 2021-01-12 PROCEDURE — 84100 ASSAY OF PHOSPHORUS: CPT

## 2021-01-12 PROCEDURE — 36415 COLL VENOUS BLD VENIPUNCTURE: CPT | Performed by: COLON & RECTAL SURGERY

## 2021-01-12 PROCEDURE — 250N000013 HC RX MED GY IP 250 OP 250 PS 637: Performed by: COLON & RECTAL SURGERY

## 2021-01-12 PROCEDURE — 258N000003 HC RX IP 258 OP 636: Performed by: PHYSICIAN ASSISTANT

## 2021-01-12 PROCEDURE — 97116 GAIT TRAINING THERAPY: CPT | Mod: GP | Performed by: REHABILITATION PRACTITIONER

## 2021-01-12 PROCEDURE — 97530 THERAPEUTIC ACTIVITIES: CPT | Mod: GP | Performed by: REHABILITATION PRACTITIONER

## 2021-01-12 PROCEDURE — 97535 SELF CARE MNGMENT TRAINING: CPT | Mod: GO

## 2021-01-12 PROCEDURE — 250N000009 HC RX 250: Performed by: PHYSICIAN ASSISTANT

## 2021-01-12 PROCEDURE — 83735 ASSAY OF MAGNESIUM: CPT | Performed by: COLON & RECTAL SURGERY

## 2021-01-12 PROCEDURE — 83735 ASSAY OF MAGNESIUM: CPT

## 2021-01-12 PROCEDURE — 250N000011 HC RX IP 250 OP 636: Performed by: PHYSICIAN ASSISTANT

## 2021-01-12 PROCEDURE — 999N001017 HC STATISTIC GLUCOSE BY METER IP

## 2021-01-12 PROCEDURE — 250N000013 HC RX MED GY IP 250 OP 250 PS 637: Performed by: STUDENT IN AN ORGANIZED HEALTH CARE EDUCATION/TRAINING PROGRAM

## 2021-01-12 PROCEDURE — 120N000002 HC R&B MED SURG/OB UMMC

## 2021-01-12 PROCEDURE — 36415 COLL VENOUS BLD VENIPUNCTURE: CPT

## 2021-01-12 PROCEDURE — 999N000200 HC STATISTIC WOC PT EDUCATION, 46-60 MIN

## 2021-01-12 PROCEDURE — 80048 BASIC METABOLIC PNL TOTAL CA: CPT

## 2021-01-12 RX ORDER — ACETAMINOPHEN 500 MG
1000 TABLET ORAL 3 TIMES DAILY
Qty: 1 BOTTLE | Refills: 0 | Status: ON HOLD | OUTPATIENT
Start: 2021-01-12 | End: 2021-05-05

## 2021-01-12 RX ORDER — LOPERAMIDE HCL 2 MG
2 CAPSULE ORAL 2 TIMES DAILY
Status: DISCONTINUED | OUTPATIENT
Start: 2021-01-12 | End: 2021-01-13 | Stop reason: HOSPADM

## 2021-01-12 RX ADMIN — ACETAMINOPHEN 1000 MG: 500 TABLET, FILM COATED ORAL at 13:42

## 2021-01-12 RX ADMIN — FOLIC ACID: 5 INJECTION, SOLUTION INTRAMUSCULAR; INTRAVENOUS; SUBCUTANEOUS at 16:23

## 2021-01-12 RX ADMIN — LOPERAMIDE HYDROCHLORIDE 2 MG: 2 CAPSULE ORAL at 20:27

## 2021-01-12 RX ADMIN — TRAMADOL HYDROCHLORIDE 50 MG: 50 TABLET, FILM COATED ORAL at 10:15

## 2021-01-12 RX ADMIN — ACETAMINOPHEN 1000 MG: 500 TABLET, FILM COATED ORAL at 09:04

## 2021-01-12 RX ADMIN — LOPERAMIDE HYDROCHLORIDE 2 MG: 2 CAPSULE ORAL at 09:05

## 2021-01-12 RX ADMIN — PSYLLIUM HUSK 1 PACKET: 3.4 POWDER ORAL at 09:07

## 2021-01-12 RX ADMIN — PSYLLIUM HUSK 1 PACKET: 3.4 POWDER ORAL at 20:27

## 2021-01-12 RX ADMIN — ENOXAPARIN SODIUM 40 MG: 40 INJECTION SUBCUTANEOUS at 12:00

## 2021-01-12 ASSESSMENT — ACTIVITIES OF DAILY LIVING (ADL)
ADLS_ACUITY_SCORE: 20

## 2021-01-12 NOTE — DISCHARGE INSTRUCTIONS
[facility]     Name: Myla Glynn : 1945  Date: 2021    To order your ostomy supplies    The ostomy Supplier needs this supply list  to process your order. You will need to fax/deliver this list, along with your Insurance information. Your home care nurse can assist with this process.                 List of Ostomy Distributors      [facility] [employer]  Ph. [phone] Fax # [phone]  [facility] [employer]   Ph. [phone]  [facility] [employer]   Ph. [phone]  [facility]   Ph. [phone] [phone]  Or Call your insurance provider for their preferred supplier    Your Medical Supplier will need your surgeon's name, phone and fax number    Clinic:                     Phone                            Fax  Colorectal Surgery:    [phone]  Verbal Order for ostomy supplies for 1 Month per:       Liliana Miranda RN, CWOCN     Authorizing MD: Remi Moscoso MD    Change pouch: Three times a week  Quantity of pouches: 20 pouches/month     Request the following supplies:      Melissa    1 piece soft convex fecal pouch cut up to 38mm #8958      Accessories  2  barrier ring #[id number]     Adapt powder #7906    No sting film barrier # 3345                          M-9 Spray room deodorizer #1511                                     Change your pouch twice a week, more often if leaking.    If you are cutting a hole in the wafer of your pouch, recheck stoma size and adjust pouch opening as needed every week    . Call the Ostomy Nurse at [facility]       [address] [place] [address] : [phone]   Schedule a follow-up visit in 4 to 6 weeks after your surgery, sooner if having problems Bring a complete set of pouch-changing supplies to this visit        Problems you should Report  - The stoma turns blue or darker in color.  - Cuts or sores around the stoma.  - Red, raw or painful skin  around the stoma.  - Any bulging of the skin around the stoma.  - A pouch that leaks every day.  - Problems making the right size hole in the pouch wafer.    Please call with any questions or concerns.

## 2021-01-12 NOTE — PROGRESS NOTES
"CLINICAL NUTRITION SERVICES - ASSESSMENT NOTE     Nutrition Prescription  Recommendation for providers to order:  Consider  consult for helping pt cope with loss of family member (pt reported to writer that she just found out her son passed away and became very tearful).    Malnutrition Status:    Severe malnutrition in the context of acute on chronic illness    Recommendations already ordered by Registered Dietitian (RD):  -Discontinued supplement: Boost Glucose Control   -Ordered supplement: Boost Plus Strawberry BID @ 10am and 2pm per pt preference (ordered Boost Plus instead of Boost Glucose Control as it's higher kcal and pt not eating well)    Future/Additional Recommendations:  -Monitor PO adequacy and wt trends     REASON FOR ASSESSMENT  Myla Glynn is a/an 76 year old female assessed by the dietitian for LOS    NUTRITION HISTORY  -Pt known to Clinical Nutrition - received low fiber diet education on 1/6/2021 (handouts not available in pt's native language).   -Spoke with patient in room with Tigrinya iPad : Pt reports having a small appetite at baseline. She became tearful during visit and shared that she recently found out that her son who had been battling a chronic illness had passed away.     CURRENT NUTRITION ORDERS  Diet: Low Fiber + Boost Glucose Control between meals   Intake/Tolerance: 50% meals per RN flowsheets     LABS  Labs reviewed  -K+ 3.7 (WNL)  -Mg++ 1.5 (L)  -BUN 15 (WNL)  -Cr 0.77 (WNL)  -BG  over past 24 hrs    MEDICATIONS  Medications reviewed  -Medium dose sliding scale insulin  -Imodium BID  -Psyllium 1 pkt BID    ANTHROPOMETRICS  Height: 154.9 cm (5' .984\")  Most Recent Weight: 56.5 kg (124 lb 8 oz)    IBW: 47.7 kg    BMI: 22.51 kg/m2; Normal BMI  Weight History: 6.3% wt loss over past 3 months based on admit/suspect dry wt on 1/5. Suspect wt gain this admit is fluid-related.  Wt Readings from Last 20 Encounters:   01/10/21 56.5 kg (124 lb 8 oz) "   12/29/20 54 kg (119 lb)     Wts this admit:    01/10/21 0900 56.5 kg (124 lb 8 oz)   01/08/21 0313 57 kg (125 lb 10.6 oz)   01/07/21 0558 57.3 kg (126 lb 5.2 oz)   01/05/21 0617 54 kg (119 lb 0.8 oz) - admit/driest wt     Care Everywhere:  57.5 kg (126 lb 12.8 oz) 10/15/2020 1:40 PM CDT     57.6 kg (127 lb) 06/26/2020 3:10 PM CDT     60.5 kg (133 lb 6.4 oz) 02/20/2020 1:28 PM CST     61.9 kg (136 lb 6.4 oz) 01/08/2020 12:32 PM CST     62.4 kg (137 lb 9.6 oz) 10/25/2019 1:33 PM CDT     Dosing Weight: 54 kg (actual, based on admit/driest wt of 54 kg on 1/5)    ASSESSED NUTRITION NEEDS  Estimated Energy Needs: 1000-8042 kcals/day (25 - 30 kcals/kg)  Justification: Maintenance  Estimated Protein Needs: 65-81 grams protein/day (1.2 - 1.5 grams of pro/kg)  Justification: Hypercatabolism with acute illness, post-op, and Maintenance  Estimated Fluid Needs: 1 mL/kcal  Justification: Maintenance or Per provider pending fluid status    PHYSICAL FINDINGS  See malnutrition section below.  -Ileostomy - WOC following for this  -Hair and skin WNL for age/gender per visual    MALNUTRITION  % Intake: </= 50% for >/= 5 days (severe) - based on RN flowsheets  % Weight Loss: Up to 7.5% in 3 months (non-severe)  Subcutaneous Fat Loss: Facial region:  Mild (per visual)  Muscle Loss: Temporal:  Mild, Facial & jaw region:  Mild, Thoracic region (clavicle, acromium bone, deltoid, trapezius, pectoral):  Moderate and Lower arm  (forearm):  Mild (per visual)  Fluid Accumulation/Edema: Does not meet criteria (1+ trace BLE edema per RN flowsheets)  Malnutrition Diagnosis: Severe malnutrition in the context of acute on chronic illness    NUTRITION DIAGNOSIS  Inadequate oral intake related to decreased appetite in setting of recent major surgery and depression as evidenced by pt report of baseline small appetite and currently not feeling like eating after news of her son passing away, signs of mild subcutaneous adipose tissue wasting, signs of  mild-moderate muscle tissue wasting, and wt loss of 6.3% over past 3 months.    INTERVENTIONS  Implementation  -Nutrition Education (with Tigrinya iPad ): Provided education on RD role, encouraged use of nutrition drinks especially if appetite is poor.    -Medical food supplement therapy: As above     Goals  Patient to consume % of nutritionally adequate meal trays TID, or the equivalent with supplements/snacks.     Monitoring/Evaluation  Progress toward goals will be monitored and evaluated per protocol.    Rox Rosario RD, LD  Pager: [phone]

## 2021-01-12 NOTE — PROGRESS NOTES
WO Nurse Inpatient [facility]   WO Nurse Inpatient Adult     Initial Assessment   Assessment of new loop Ileostomy Stoma complication(s) edema   Mucocutaneous junction; intact   Peristomal complication(s) none  Pouch wear time:3-4 days,   Following today's visit:Patient/Daughter is  able to demonstrate;       1. How to empty their pouch? Yes with assist, demo provided to daughter 1/12        2. How to change their pouch?  No, demo provided 1/7 with family member for interpretation and demo completed for daughter 1/12      3. How to read and record intake and output correctly? Yes     Psychosocial: patient is Tirgrinya speaking, utilized family member who works within the hospital for interpretation of pouch change and pouch emptying demonstrations. Patient plans on living with family for awhile after discharge while she recovers, does live alone normally. Daughter at bedside today 1/12 for teaching, per daughter patient is confused and talking to people who are not in the room, notified RN.     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    Current Diet/Nutrition: Orders Placed This Encounter      Low Fiber Diet      Diet     TPN no   I/O last 3 completed shifts:  In: 920 [P.O.:920]  Out: 2175 [Urine:885; Drains:295; Stool:995]  Labs:    Recent Labs   Lab 01/09/21  0754 01/06/21  0454 01/06/21  0454 01/05/21  1836   ALBUMIN  --   --   --  2.2*   HGB 8.9*   < > 8.2* 9.1*   INR  --   --  1.56* 1.49*   WBC  --   --  13.7* 12.9*    < > = values in this interval not displayed.        Physical Exam:  Current pouching system:Melissa two piece 44mm pouch with 1/2 barrier ring  Reason for pouch change today: ostomy education  Stoma appearance: pink-red, edematous and  protruberant  Stoma size; about 1 inch   Peristomal skin: intact   Stoma output :clear and green   Abdominal  Assessment  soft , NG still in place? No  Surgical Site: dressing dry and intact  Pain: Dull ache  Is patient still on a PCA No    Interventions:  Patient's chart evaluated.  Focus of today's visit: pouch change demonstration , verbal instruction , diet and hydration , pouch emptying, output measurement, odor/flatus management, lifestyle adjustments and discharge instructions   Participant of teaching session today patient  and family member daughter who patient will be staying with at discharge  Change made with ostomy management today: Yes, switched to 44mm size and added barrier ring  Patient/family: observing and active participation  Supplies:Gathered and at bedside    Plan:  Learning needs: discharge instructions  Preparation for discharge: Supplies ordered, Prescriptions or note left on chart for MD to sign/complete, Prepared for discharge to home with homecare, Discussed making a WOC Nurse outpatient appointment upon discharge, Discussed when to follow up with a WOC Nurse in the future and Discussed how/ where to order supplies  Recommend home care? yes and by home WOC nurse if possible    Discussed plan of care with Patient, Family and Nurse  Nursing to notify the Provider(s) and re-consult the WOC Nurse if new ostomy concerns or discharge planned before next planned WOC visit.    WOC Nurse will return: Daily M-F  Face to face time: > 60 minutes    Liliana Miranda RN, [facility]        [employer]     Name: Myla Glynn : 1945  Date: 2021    To order your ostomy supplies    The ostomy Supplier needs this supply list  to process your order. You will need to fax/deliver this list, along with your Insurance information. Your home care nurse can assist with this process.                 List of Ostomy Distributors      [facility]  Ph. [phone] [phone]  Fax # [phone]  [facility] [employer]   Ph. [phone] [phone]  [facility]   Ph. [phone]  [facility]   Ph. [phone] [phone]  Or Call your insurance provider for their preferred supplier    Your Medical Supplier will need your surgeon's name, phone and fax number    Clinic:                     Phone                            Fax  Colorectal Surgery:    [phone]  Verbal Order for ostomy supplies for 1 Month per:       Liliana Miranda, RN, CWOCN     Authorizing MD: Remi Moscoso MD    Change pouch: Three times a week  Quantity of pouches: 20 pouches/month     Request the following supplies:      [place]    2 piece flat wafer 44mm  #[id number]         Fecal pouch 44mm- # [id number]      Accessories  2  barrier ring #[id number]     Adapt powder #7906    No sting film barrier # 3345                          M-9 Spray room deodorizer #7732                                     Change your pouch twice a week, more often if leaking.    If you are cutting a hole in the wafer of your pouch, recheck stoma size and adjust pouch opening as needed every week    . Call the Ostomy Nurse at [facility]       [address] [place] [address] : [phone]   Schedule a follow-up visit in 4 to 6 weeks after your surgery, sooner if having problems Bring a complete set of pouch-changing supplies to this visit        Problems you should Report  - The stoma turns blue or darker in color.  - Cuts or sores around the stoma.  - Red, raw or painful skin around the stoma.  - Any bulging of the skin around the stoma.  - A pouch that leaks every day.  - Problems making the right size hole in the pouch wafer.    Please call with any questions or concerns.

## 2021-01-12 NOTE — PLAN OF CARE
VSS on RA. Pt having some confusion and visual hallucinations per daughter. Pain managed with Tylenol and Tramadol. Tolerating low fiber diet. Ostomy with moderate liquid stool. Cameron with low cloudy urine output. MARIE with moderate serosanguinous output. LLQ abd with blisters, covered with mepilex. Incision c/d/I. Up with 1+GB+W. Teaching done at bedside with daughter today re Lovenox, MARIE, Cameron and with WOC RN. Daughter was able to practice and will reinforce tomorrow before discharge. PA notified re- confusion and low UO, banana bag ordered and encourage PO intake. Continue with POC.

## 2021-01-12 NOTE — PROGRESS NOTES
"Colorectal Surgery Progress Note    POD 7 s/p open sigmoid colectomy with en bloc partial cystectomy, hysterectomy, bilateral salpingooophorectomy, reimplantation of L ureter, DLI    Subjective:   Tolerating low residue diet. Overall feels better. Lower extremity edema improved. Denies pain.     Physical Exam:    /53 (BP Location: Right arm)   Pulse 79   Temp 97.6  F (36.4  C) (Oral)   Resp 18   Ht 1.549 m (5' 0.98\")   Wt 56.5 kg (124 lb 8 oz)   SpO2 99%   BMI 23.54 kg/m      Comfortable and well appearing  Grossly alert and oriented  Breathing non-labored on room air  Abdomen is soft, non-distended. Minimally tender. Incisions c/d/I at midline. MARIE drain is serous. Ostomy is pink, gas and thin stool present in bag  Extremities warm and well perfused. 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  Now s/p open sigmoid colectomy with en bloc partial cystectomy, hysterectomy, bilateral salpingooophorectomy, reimplantation of L ureter, DLI, appendectomy on 1/5/21. Initially had some post-surgical anemia, which responded well to 2u RBC (after 3 units intraop). Tachycardia resolved. ALFREDA resolved.     -Continue metamucil for high ostomy output. Start imodium 2 mg BID.   -Keep MARIE drain in place today.   -Encourage ambulation  -Daughter coming for teaching today  -Likely discharge home tomorrow pending OT recs     Ruth Moore MD  Surgery PGY-1      "

## 2021-01-12 NOTE — PLAN OF CARE
Pt AVSS. Pt sleeping between cares. Pt denied c/o's pain. Abd round, +bs, +gas. Ostomy intact with good stool output-watery and green in color. MARIE with mod amts s/s output. Cameron patent with adeq uv. PIV saline locked. 0200 BS=97. Pt denied any nausea tonite. Cont to monitor output. Enc increased activity today to promote return of bowel fxn.

## 2021-01-12 NOTE — DISCHARGE SUMMARY
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  Diagnostic laparoscopy.   2.  Laparoscopic takedown of splenic flexure.   3.  Sigmoidectomy with en bloc partial cystectomy and total abdominal hysterectomy with left salpingo-oophorectomy.   4.  Right salpingo-oophorectomy.   5.  Reimplantation of left ureter.   6.  Partial omentectomy (en bloc).   7.  Appendectomy.   8.  Flexible sigmoidoscopy.   9.  Cystoscopy.     PROCEDURE PERFORMED:   1.  Partial cystectomy en bloc with sigmoidectomy and total abdominal hysterectomy with bilateral salpingo-oophorectomy.   2. Reimplantation of left ureter.   3. Placement of left ureteral stent  3. Cystoscopy             Consultations:   NUTRITION SERVICES ADULT IP CONSULT  OCCUPATIONAL THERAPY ADULT IP CONSULT  PHYSICAL THERAPY ADULT IP CONSULT  SOCIAL WORK IP CONSULT  WOUND OSTOMY CONTINENCE NURSE  IP CONSULT  VASCULAR ACCESS  CARE ADULT IP CONSULT  CARE MANAGEMENT / SOCIAL WORK IP CONSULT         Imaging Studies:     Results for orders placed or performed during the hospital encounter of 01/05/21   POC US Guidance Needle Placement    Impression    Bilateral quadratus lumborum plane block   XR Abdomen Port 1 View    Narrative    EXAM: XR ABDOMEN PORT 1 VW  1/9/2021 3:45 AM     HISTORY:  New nausea and 700 cc emesis in the post-operative setting ?  obstruction       TECHNIQUE: Single frontal radiograph of the abdomen    COMPARISON:  MRI 12/22/2020    FINDINGS:   Post surgical changes of partial cystectomy,?en bloc sigmoidectomy  and?total abdominal hysterectomy with?bilateral?salpingo-oophorectomy,  reimplantation of left ureter and placement of left ureteral stent.  Pelvic drain in place. Double-J urinary stent on the left.    Mildly dilated loops of small bowel up to 39 mm. No pneumatosis or  portal venous gas. Very little air is seen distally.      Impression    IMPRESSION: Postsurgical changes and mildly dilated loops of small  bowel likely postoperative adynamic ileus. Very little air seen  distally. Consider follow-up film. Cannot exclude obstruction.    I have personally reviewed the examination and initial interpretation  and I agree with the findings.    MADELIN QUIJANO MD            Medications Prior to Admission:     Medications Prior to Admission   Medication Sig Dispense Refill Last Dose     omeprazole (PRILOSEC) 20 MG DR capsule Take 20 mg by mouth every morning    1/4/2021 at 0800     ondansetron (ZOFRAN-ODT) 4 MG ODT tab TK 1 T PO EVERY 4 HOURS AS NEEDED FOR NAUSEA. PLACE ON TOP OF TONUGE WHERE IT WILL DISSOLVE. THEN SWALLOW.   1/4/2021 at Unknown time     [DISCONTINUED] acetaminophen (TYLENOL) 500 MG tablet Take 500 mg by mouth   Past Week at Unknown time     [DISCONTINUED] ciprofloxacin (CIPRO) 500 MG tablet Take 1 tablet (500 mg) by mouth 2 times daily for 5 days 10 tablet 0 1/4/2021 at Unknown time     [DISCONTINUED]  "hydrochlorothiazide (HYDRODIURIL) 25 MG tablet Take 25 mg by mouth every morning    1/4/2021 at Unknown time     [DISCONTINUED] metFORMIN (GLUCOPHAGE) 500 MG tablet Take 500 mg by mouth every morning    1/3/2021     [DISCONTINUED] metroNIDAZOLE (FLAGYL) 500 MG tablet Take 1 tablet (500 mg) by mouth every 6 hours At 8:00 am, 2:00 pm, 8:00 pm the day prior to your surgery with neomycin and zofran. 3 tablet 0 1/4/2021 at 2000     [DISCONTINUED] neomycin (MYCIFRADIN) 500 MG tablet Take 2 tablets (1,000 mg) by mouth every 6 hours At 8:00 am, 2:00 pm, 8:00 pm the day prior to your surgery with flagyl and zofran. 6 tablet 0 1/4/2021 at 2000     [DISCONTINUED] ondansetron (ZOFRAN) 4 MG tablet Take 1 tablet (4 mg) by mouth every 6 hours At 8:00 am, 2:00 pm, 8:00 pm the day prior to your surgery with neomycin and flagyl. 3 tablet 0 1/4/2021 at 2000     [DISCONTINUED] polyethylene glycol (MIRALAX) 17 g packet Take 238 g by mouth See Admin Instructions Starting at 4 pm night prior to surgery. Refer to \"Getting Ready for Surgery\" instructions. 238 g 0 1/4/2021 at 1600          Discharge Medications:     Current Discharge Medication List      START taking these medications    Details   enoxaparin ANTICOAGULANT (LOVENOX) 30 MG/0.3ML syringe Inject 0.3 mLs (30 mg) Subcutaneous every 24 hours for 20 days  Qty: 6 mL, Refills: 0    Associated Diagnoses: Malignant neoplasm of sigmoid colon (H)      loperamide (IMODIUM) 2 MG capsule Take 1 capsule (2 mg) by mouth 2 times daily  Qty: 120 capsule, Refills: 0    Associated Diagnoses: Malignant neoplasm of sigmoid colon (H)      psyllium (METAMUCIL/KONSYL) Packet Take 1 packet by mouth 2 times daily  Qty: 60 packet, Refills: 0    Associated Diagnoses: High output ileostomy (H)      traMADol (ULTRAM) 50 MG tablet Take 0.5-1 tablets (25-50 mg) by mouth every 8 hours as needed for moderate to severe pain  Qty: 20 tablet, Refills: 0    Associated Diagnoses: Malignant neoplasm of sigmoid colon " (H)         CONTINUE these medications which have CHANGED    Details   acetaminophen (TYLENOL) 500 MG tablet Take 2 tablets (1,000 mg) by mouth 3 times daily As scheduled for the next 7-10 days, then decrease both dose and frequency to as needed  Qty: 1 Bottle, Refills: 0    Associated Diagnoses: Malignant neoplasm of sigmoid colon (H)         CONTINUE these medications which have NOT CHANGED    Details   omeprazole (PRILOSEC) 20 MG DR capsule Take 20 mg by mouth every morning       ondansetron (ZOFRAN-ODT) 4 MG ODT tab TK 1 T PO EVERY 4 HOURS AS NEEDED FOR NAUSEA. PLACE ON TOP OF TONUGE WHERE IT WILL DISSOLVE. THEN SWALLOW.         STOP taking these medications       ciprofloxacin (CIPRO) 500 MG tablet Comments:   Reason for Stopping:         hydrochlorothiazide (HYDRODIURIL) 25 MG tablet Comments:   Reason for Stopping:         metFORMIN (GLUCOPHAGE) 500 MG tablet Comments:   Reason for Stopping:         metroNIDAZOLE (FLAGYL) 500 MG tablet Comments:   Reason for Stopping:         neomycin (MYCIFRADIN) 500 MG tablet Comments:   Reason for Stopping:         ondansetron (ZOFRAN) 4 MG tablet Comments:   Reason for Stopping:         polyethylene glycol (MIRALAX) 17 g packet Comments:   Reason for Stopping:                     Brief History of Illness:   76 year old female with PMH HTN, DM2, iron deficiency anemia who was found to have locally advanced sigmoid adenocarcinoma with a colovesical fistula.  Pt was admitted and underwent the above procedures on 1/5/2021.            Hospital Course:   Pt was monitored on a telemetry unit due to elevated lactic acid and ALFREDA with creatinine of 1.68 from baseline of 0.9.  Pt was fluid resuscitated and this resolved.  Pt was noted to have a hgb of 6.7.  Pt was given 2U PRBC on 1/7.  Pain was controlled with a dilaudid PCA and tylenol.  Per the recommendations of urology, her alejandra is to remain in place x2 weeks and will need a cystogram prior to alejandra removal.  MARIE creatinine was  "checked and this was 0.8.  MARIE is to also stay in place per Urology.      Pt had eventual return of bowel function and was able to tolerate very small amounts of a low fiber diet.  Did have low appetites but also did not particularly like the food at the hospital. Pt was given nutritional supplements 1-2 times per day.      Home metformin and hydrochlorothiazide should be held for 2 weeks post-operative due to normal to lower blood pressures, new ileostomy, low appetites and recent ALFREDA.  Pt may no longer need hydrochlorothiazide in the setting of her new ileostomy.     Daughter received teaching regarding managing new ileostomy, MARIE drain, alejandra catheter, lovenox injections which she is to do for a total of 30 days after surgery.  Discussed with family that it is very important to encourage oral intake.      Patient is to follow up in the Colon and Rectal Surgery Clinic in 1 week with Nadege Last NP and then with Dr. Moscoso in 2-3 weeks after.          Day of Discharge Physical Exam:   Blood pressure 119/60, pulse 79, temperature 97.3  F (36.3  C), temperature source Oral, resp. rate 16, height 1.549 m (5' 0.98\"), weight 56.5 kg (124 lb 8 oz), SpO2 98 %.    Gen: AAOx3, NAD  Pulm: Non-labored breathing  Abd: Soft, appropriately tender, no guarding/rebound   Incision C/D/I    Stoma pink and viable with stool and gas in bag   MARIE drain serosanginous   Alejandra catheter - yellow urine  Ext:  Warm and well-perfused         Final Pathology Result:   FINAL PATHOLOGY RESULT PENDING AT THE TIME OF DISCHARGE           Discharge Instructions and Follow-Up:     Discharge Procedure Orders   Medication Therapy Management Referral   Referral Priority: Routine Referral Type: Med Therapy Management   Requested Specialty: Pharmacist   Number of Visits Requested: 1     Home care nursing referral   Referral Priority: Routine Referral Type: Home Health Therapies & Aides   Number of Visits Requested: 1     Home Care PT " Referral for Hospital Discharge   Referral Priority: Routine Referral Type: Home Health Therapies & Aides   Number of Visits Requested: 1     Home Care OT Referral for Hospital Discharge   Referral Priority: Routine   Number of Visits Requested: 1     Reason for your hospital stay   Order Comments: 1/5/2021: PROCEDURES PERFORMED:   1.  Diagnostic laparoscopy.   2.  Laparoscopic takedown of splenic flexure.   3.  Sigmoidectomy with en bloc partial cystectomy and total abdominal hysterectomy with left salpingo-oophorectomy.   4.  Right salpingo-oophorectomy.   5.  Reimplantation of left ureter.   6.  Partial omentectomy (en bloc).   7.  Appendectomy.   8.  Flexible sigmoidoscopy.   9.  Cystoscopy.     Activity   Order Comments: Your activity upon discharge:   ACTIVITY  -No lifting, pushing, pulling greater than 10 lbs and no strenuous exercise for 6 weeks   -Do not insert anything into anus/rectum for 6 weeks   -No driving while on narcotic analgesics (i.e. Percocet, oxycodone, Vicodin)  -No driving until you are able to fully twist to both sides or slam on brakes quickly and without any pain  -We encourage walking at least 4-5 times per day     Order Specific Question Answer Comments   Is discharge order? Yes      Monitor and record   Order Comments: MONITOR AND RECORD:  1. Total daily ileostomy outputs. Bring record with you to your first post-operative clinic appointment in the colorectal surgery clinic.     Tubes and drains   Order Comments: You are going home with the following tubes or drains:  1.  MARIE drain:  Empty and record output three times per day.  Bring record with you to your first post-operative appointment in the colorectal clinic.  You will need a Creatinine fluid level checked on the MARIE drain output prior to MARIE drain removal.   2.  Cameron catheter.  Keep until deemed safe for removal per Colorectal Surgery and or Urology.     MD face to face encounter   Order Comments: Documentation of Face to Face  and Certification for Home Health Services    I certify that patient: Myla Glynn is under my care and that I, or a nurse practitioner or physician's assistant working with me, had a face-to-face encounter that meets the physician face-to-face encounter requirements with this patient on: 1/11/21    This encounter with the patient was in whole, or in part, for the following medical condition, which is the primary reason for home health care: sigmoid colon ca    I certify that, based on my findings, the following services are medically necessary home health services: RN, PT    Further, I certify that my clinical findings support that this patient is homebound,absences from home require considerable and taxing effort and are for medical reasons or [employer] services or infrequently or of short duration when for other reasons.    Based on the above findings. I certify that this patient is confined to the home and needs intermittent skilled nursing care, physical therapy and/or speech therapy.  The patient is under my care, and I have initiated the establishment of the plan of care.  This patient will be followed by a physician who will periodically review the plan of care.  Physician/Provider to provide follow up care: Jami Bundy    Attending hospital physician (the Medicare certified [facility] provider):  Physician Signature: See electronic signature associated with these discharge orders.  Date: 1/8/2021     Adult [facility] Follow-up and recommended labs and tests   Order Comments: WOUND CARE  -Inspect your wounds daily for signs of infection (increased redness, drainage, pain)  -Keep your wound clean and dry  -You may shower, but do not soak in tub or pool    NOTIFY  Please contact Rufina Fulton RN, Saima Vail RN or Jose Alfredo Bay LPN at [phone] for problems after discharge such as:  -Temperature > 101F, chills, rigors, dizziness  -Redness around or purulent drainage from wound  -Inability to  tolerate diet, nausea or vomiting  -You stop passing gas (or your stoma stops functioning), develop significant bloating, abdominal pain  -Have blood in stools/vomit  -Have severe diarrhea/constipation  -Any other questions or concerns.  - At nights (after 4:30pm), on weekends, or if urgent, call [phone] and ask the  to speak with the on-call Colorectal Surgery resident or fellow    -Measure your total daily ileostomy output and record.  If you have LESS than 500mL or GREATER than 1000mL of ileostomy output within a 24 hour period, please notify the Colorectal Nurse.  If you have greater than 1000-1200mL in a 24 hour period or greater than 500cc for three consecutive 8 hour shifts, take Imodium 2mg once, stay hydrated especially with Pedialyte and call the Colorectal Clinic to further discuss.      Medication Instructions  Some of your medications may have changed. Please take only prescribed and resumed medications     FOLLOW-UP  1.  You will need to follow-up with Nadege Last NP in the Colon and Rectal Surgery clinic in 2 week(s) and then with CRS Staff: Dr. Remi Moscoso in 3-4 weeks after.  Please contact our clinic scheduler (phone # [phone]) if you have not heard from our clinic in 3 business days afer discharge to schedule a follow-up appointment. Please bring your log of daily ileostomy outputs to your follow up clinic appointment.   2.  Follow up with your primary care provider in 1-2 weeks after discharge from the hospital to review this hospitalization.   3.  Drink 1-2 nutritional supplements per day, such as Boost, Protein Premier, or add protein powder to your own shakes/smoothies.    4.  Hold or do NOT take metformin and hydrochlorothiazide until seen in colorectal clinic.       ORAL REHYDRATION RECIPE for Home Use  With your new ileostomy, you are at increased risk for dehydration,  Especially if you have high ileostomy outputs, or low appetites, you can help  prevent it by drinking this mixture as directed.  Some examples of commercially prepared Oral Rehydration Solutions are: Pedialyte (solution, powder packet, Freeze Pops), or Drip Drop.     Electrolyte Water   -4 cups of water (equal to 1 quart or 32 ounces)   -  teaspoon salt   -6 teaspoons sugar   -Crystal Light if desired (or other choice Nutrasweet or Splenda flavoring - SUGAR-FREE) to taste (recommend lemonade or orange-pineapple flavors, but any flavor will work)    Add two cups of tap water to a large container. With measuring spoons (not table silverware), add the dry ingredients and stir or shake well. Add two additional cups of water. This will equal one quart of Electrolyte Water. Add sugar-free flavoring if desired. Best if chilled. Sip solution throughout the day. Discard after 24 hours.            Appointments on [facility] and/or [facility] (with [facility] or [facility] provider or service). Call [phone] if you haven't heard regarding these appointments within 7 days of discharge.     Walker Order   Order Comments: DME Documentation:   Describe the reason for need to support medical necessity: gait instability and impaired balance.     I, the undersigned, certify that the above prescribed supplies are medically necessary for this patient and is both reasonable and necessary in reference to accepted standards of medical and necessary in reference to accepted standards of medical practice in the treatment of this patient's condition and is not prescribed as a convenience.     Order Specific Question Answer Comments   DME Provider: RONNY Medina Type: Standard (2 Wheel) youth walker     Diet   Order Comments: DIET  -Low Residue Diet for at least 4-6 weeks unless cleared by Colorectal surgery.  No raw vegetables, fruit skins, fibrous foods that require a lot of chewing, nuts, seeds, corn, popcorn.   -Eat slowly, chew thoroughly, eat small frequent meals throughout the day  -Stay well  hydrated.    -Follow food recommendations given by Nutritionist & Wound Ostomy Nurse.  -Recommend 1-2 nutritional supplements per day.     Order Specific Question Answer Comments   Is discharge order? Yes             Home Health Care:     Arranged           Discharge Disposition:     Discharged to home      Condition at discharge: Stable      Wendi House PA-C  Colon and Rectal Surgery     Pt was seen and discussed with Dr. Rosario on 1/13/2021

## 2021-01-12 NOTE — PLAN OF CARE
VSS. Pt reports minimal abdominal pain, declines meds/interventions. Incisions with dermabond CEDRIC. MARIE: 80 ml serosanguinous output. Low fiber diet, fair intake, BS active x4, gas & stool via ostomy. Cameron with adequate output. Pt up assist of 1 & walker, but refused OOB activity this evening. Plan for daughter to come at 1000 tomorrow for discharge teaching.

## 2021-01-13 ENCOUNTER — PRE VISIT (OUTPATIENT)
Dept: UROLOGY | Facility: CLINIC | Age: 76
End: 2021-01-13

## 2021-01-13 ENCOUNTER — PATIENT OUTREACH (OUTPATIENT)
Dept: CARE COORDINATION | Facility: CLINIC | Age: 76
End: 2021-01-13

## 2021-01-13 ENCOUNTER — APPOINTMENT (OUTPATIENT)
Dept: PHYSICAL THERAPY | Facility: CLINIC | Age: 76
End: 2021-01-13
Attending: COLON & RECTAL SURGERY
Payer: COMMERCIAL

## 2021-01-13 ENCOUNTER — APPOINTMENT (OUTPATIENT)
Dept: OCCUPATIONAL THERAPY | Facility: CLINIC | Age: 76
End: 2021-01-13
Attending: COLON & RECTAL SURGERY
Payer: COMMERCIAL

## 2021-01-13 VITALS
WEIGHT: 124.5 LBS | SYSTOLIC BLOOD PRESSURE: 120 MMHG | OXYGEN SATURATION: 95 % | DIASTOLIC BLOOD PRESSURE: 76 MMHG | HEART RATE: 92 BPM | BODY MASS INDEX: 23.5 KG/M2 | TEMPERATURE: 99.6 F | HEIGHT: 61 IN | RESPIRATION RATE: 18 BRPM

## 2021-01-13 DIAGNOSIS — N32.89 BLADDER MASS: Primary | ICD-10-CM

## 2021-01-13 LAB
GLUCOSE BLDC GLUCOMTR-MCNC: 118 MG/DL (ref 70–99)
GLUCOSE BLDC GLUCOMTR-MCNC: 59 MG/DL (ref 70–99)
GLUCOSE BLDC GLUCOMTR-MCNC: 80 MG/DL (ref 70–99)
GLUCOSE BLDC GLUCOMTR-MCNC: 81 MG/DL (ref 70–99)
GLUCOSE BLDC GLUCOMTR-MCNC: 91 MG/DL (ref 70–99)

## 2021-01-13 PROCEDURE — 250N000013 HC RX MED GY IP 250 OP 250 PS 637: Performed by: STUDENT IN AN ORGANIZED HEALTH CARE EDUCATION/TRAINING PROGRAM

## 2021-01-13 PROCEDURE — 250N000013 HC RX MED GY IP 250 OP 250 PS 637: Performed by: COLON & RECTAL SURGERY

## 2021-01-13 PROCEDURE — G0463 HOSPITAL OUTPT CLINIC VISIT: HCPCS

## 2021-01-13 PROCEDURE — 999N001017 HC STATISTIC GLUCOSE BY METER IP

## 2021-01-13 PROCEDURE — 97530 THERAPEUTIC ACTIVITIES: CPT | Mod: GP

## 2021-01-13 PROCEDURE — 97535 SELF CARE MNGMENT TRAINING: CPT | Mod: GO

## 2021-01-13 PROCEDURE — 250N000011 HC RX IP 250 OP 636: Performed by: PHYSICIAN ASSISTANT

## 2021-01-13 RX ORDER — LOPERAMIDE HCL 2 MG
2 CAPSULE ORAL 2 TIMES DAILY
Qty: 120 CAPSULE | Refills: 0 | Status: ON HOLD | OUTPATIENT
Start: 2021-01-13 | End: 2021-05-05

## 2021-01-13 RX ORDER — TRAMADOL HYDROCHLORIDE 50 MG/1
25-50 TABLET ORAL EVERY 8 HOURS PRN
Qty: 20 TABLET | Refills: 0 | Status: ON HOLD | OUTPATIENT
Start: 2021-01-13 | End: 2021-05-05

## 2021-01-13 RX ORDER — HEPARIN SODIUM 5000 [USP'U]/.5ML
5000 INJECTION, SOLUTION INTRAVENOUS; SUBCUTANEOUS EVERY 12 HOURS
Status: DISCONTINUED | OUTPATIENT
Start: 2021-01-13 | End: 2021-01-13

## 2021-01-13 RX ADMIN — ENOXAPARIN SODIUM 30 MG: 30 INJECTION SUBCUTANEOUS at 13:10

## 2021-01-13 RX ADMIN — LOPERAMIDE HYDROCHLORIDE 2 MG: 2 CAPSULE ORAL at 08:54

## 2021-01-13 RX ADMIN — PSYLLIUM HUSK 1 PACKET: 3.4 POWDER ORAL at 08:54

## 2021-01-13 ASSESSMENT — ACTIVITIES OF DAILY LIVING (ADL)
ADLS_ACUITY_SCORE: 20

## 2021-01-13 NOTE — PLAN OF CARE
Alert and oriented. Non-English speaking.  ipad utilized as well as family presence for teaching. Had hypoglycemic episode - resolved after drinking Boost. Tolerating low fiber diet with encouragement. Family demonstrated ostomy emptying and measuring, WOC RN reviewed ostomy cares with patient and her family. Denies pain or nausea.     Discharged to home with assistance of family. Ashlyn House and writer reviewed discharge instructions. Family and patient verbalized understanding. Ostomy, alejandra and MARIE supplies sent. All belongings sent and new medications received from pharmacy.

## 2021-01-13 NOTE — PLAN OF CARE
9423-1300 Midline abdominal incision C/D/I with dermabond. Ileostomy intact, +gas and stool. Denies pain and nausea; pt refused scheduled Tylenol. PIV saline locked. MARIE with small amount of serosanguinous output. Cameron in place with low urine output, MD notified and ordered labs. Encouraging PO intake as pt tolerates. IPad  used for communication with pt. Up with assist x1, gait belt, and walker, not out of bed this shift. On low fiber diet, poor appetite. BG ac and hs. VSS on room air. Possible discharge home today.

## 2021-01-13 NOTE — PROGRESS NOTES
"Colorectal Surgery Progress Note    POD 8 s/p open sigmoid colectomy with en bloc partial cystectomy, hysterectomy, bilateral salpingooophorectomy, reimplantation of L ureter, DLI    Subjective: She has a poor appetite, which she says has been normal for her. No pain or nausea. Per , responding appropriately to questions.     Per chart, low urine output overnight. Pain controlled with tylenol.        Physical Exam:    /52 (BP Location: Right arm)   Pulse 68   Temp 97.1  F (36.2  C) (Oral)   Resp 18   Ht 1.549 m (5' 0.98\")   Wt 56.5 kg (124 lb 8 oz)   SpO2 100%   BMI 23.54 kg/m      Comfortable and well appearing  Grossly alert and oriented  Breathing non-labored on room air  Abdomen is soft, non-distended. Minimally tender. Incisions c/d/I at midline. MARIE drain is serous. Ostomy is pink, gas and thin stool present in bag  Extremities warm and well perfused. 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  Now s/p open sigmoid colectomy with en bloc partial cystectomy, hysterectomy, bilateral salpingooophorectomy, reimplantation of L ureter, DLI, appendectomy on 1/5/21. Initially had some post-surgical anemia, which responded well to 2u RBC (after 3 units intraop). Tachycardia resolved. ALFREDA initally resolved, now with Cr at 1.1.    -Improved to lower ostomy output with metamucil and imodium 2 mg BID. Will continue.   -Keep MARIE drain in place for discharge  -Encourage ambulation and PO intake.  -Discharge home today with home care    Honey Hendricks  [employer], MS4     I, Ruth Moore MD, saw the patient with the medical student and have " edited the note to reflect our combined findings. I agree with the history, exam, assessment and plan as outlined above and have discussed this patient with the colorectal fellow who will discuss with staff.     Ruth Moore  General Surgery Resident, PGY-1

## 2021-01-13 NOTE — PROGRESS NOTES
WO Nurse Inpatient [facility]   WO Nurse Inpatient Adult     Initial Assessment   Assessment of new loop Ileostomy Stoma complication(s) edema   Mucocutaneous junction; intact   Peristomal complication(s) none  Pouch wear time:3-4 days,   Following today's visit:Patient/Daughter is  able to demonstrate;       1. How to empty their pouch? Yes with assist, demo provided to daughter 1/12        2. How to change their pouch?  No, demo provided 1/7 with family member for interpretation and demo completed for daughter 1/12      3. How to read and record intake and output correctly? Yes     Psychosocial: patient is Tirgrinya speaking,  Patient plans on living with family for awhile after discharge while she recovers, does live alone normally.     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    Current Diet/Nutrition: Orders Placed This Encounter      Low Fiber Diet      Diet     TPN no   I/O last 3 completed shifts:  In: 1980 [P.O.:980; I.V.:1000]  Out: 1750 [Urine:460; Drains:380; Stool:910]  Labs:    Recent Labs   Lab 01/09/21  0754   HGB 8.9*        Physical Exam:  Current pouching system:[place] one piece soft convex pouch with barrier ring   Reason for pouch change today: ostomy education and leakage  Stoma appearance: pink-red, edematous and protruberant  Stoma size; about 1 inch   Peristomal skin: intact   Stoma output :clear and green   Abdominal  Assessment  soft , NG still in place? No  Surgical Site: dressing dry and intact  Pain: Dull ache  Is patient still on a PCA No    Interventions:  Patient's chart evaluated.  Focus of today's visit: verbal instruction , lifestyle adjustments and discharge instructions   Participant of teaching session today patient  and  family member daughter who patient will be staying with at discharge  Change made with ostomy management today: Yes, switched to soft convex pouch with barrier ring   Patient/family: observing and active participation  Supplies:Gathered and at bedside    Plan:  Learning needs: completed with family who patient will be staying with  Preparation for discharge: Supplies ordered, Prescriptions or note left on chart for MD to sign/complete, Prepared for discharge to home with homecare, Discussed making a WOC Nurse outpatient appointment upon discharge, Discussed when to follow up with a WOC Nurse in the future and Discussed how/ where to order supplies  Recommend home care? yes and by home WOC nurse if possible    Discussed plan of care with Patient, Family and Nurse  Nursing to notify the Provider(s) and re-consult the WOC Nurse if new ostomy concerns or discharge planned before next planned WOC visit.    WOC Nurse will return: Daily M-F  Face to face time: 45 minutes    Liliana Miranda RN, [facility]     Name: Myla Glynn : 1945  Date: 2021    To order your ostomy supplies    The ostomy Supplier needs this supply list  to process your order. You will need to fax/deliver this list, along with your Insurance information. Your home care nurse can assist with this process.                 List of Ostomy Distributors      [facility]  Ph. [phone] Fax # [phone]  [facility]   Ph. [phone]  [employer]   Ph. [phone]  [facility]   Ph. [phone]  Or Call your insurance provider for their preferred supplier    Your Medical Supplier will need your surgeon's name, phone and fax number    Clinic:                     Phone                            Fax  Colorectal Surgery:    [phone]  Verbal Order for ostomy supplies for 1 Month per:       Liliana Miranda,  RN, CWOCN     Authorizing MD: Remi Moscoso MD    Change pouch: Three times a week  Quantity of pouches: 20 pouches/month     Request the following supplies:      Melissa    1 piece soft convex fecal pouch cut up to 38mm #8958      Accessories  2  barrier ring #0035     Adapt powder #3306    No sting film barrier # 3345                          M-9 Spray room deodorizer #7855                                     Change your pouch twice a week, more often if leaking.    If you are cutting a hole in the wafer of your pouch, recheck stoma size and adjust pouch opening as needed every week    . Call the Ostomy Nurse at [facility]       [address] [place] [address], [place] : [phone]   Schedule a follow-up visit in 4 to 6 weeks after your surgery, sooner if having problems Bring a complete set of pouch-changing supplies to this visit        Problems you should Report  - The stoma turns blue or darker in color.  - Cuts or sores around the stoma.  - Red, raw or painful skin around the stoma.  - Any bulging of the skin around the stoma.  - A pouch that leaks every day.  - Problems making the right size hole in the pouch wafer.    Please call with any questions or concerns.

## 2021-01-13 NOTE — TELEPHONE ENCOUNTER
Chief Complaint : Post op - Partial cystectomy w/ hysterectomy    Hx/Sx: Sigmoid adenocarcinoma w/ colovesical fistula    Records/Orders: Cystogram to be scheduled    Pt Contacted: N/a    At Rooming: Possible alejandra removal    Sergo Haley, EMT

## 2021-01-13 NOTE — PROVIDER NOTIFICATION
Paged Charleen Payne at 2065. Pt's urine output remains low. Urine output 45 mL over last 2 hrs.     MD aware and ordered labs.       Updated Charleen Payne at 5723. Pt has had out 75 mL since 0030.

## 2021-01-13 NOTE — PROGRESS NOTES
[facility] Care   Spoke with daughter Orville to discuss plans for home care. Patient to be discharged home 1/13/21 and has agreed to have [facility] follow with services of SN, PT and OT. Patient care support center processing referral.  Daughter verbalized understanding that initial visit is scheduled for 1/14 or 1/15/21. Provided 24 hour phone number for [facility] for any questions or concerns.    Melissa Doll RN BSN  [facility] Care Liaison  [phone]

## 2021-01-13 NOTE — PLAN OF CARE
Occupational Therapy Discharge Summary    Reason for therapy discharge:    Discharged to home with home therapy.    Progress towards therapy goal(s). See goals on Care Plan in [facility] electronic health record for goal details.  Goals partially met.  Barriers to achieving goals:   discharge from facility.    Therapy recommendation(s):    Continued therapy is recommended.  Rationale/Recommendations:  recommended TCU with pt discharging home with 24/7 A from daughter and HH OT. Recommend continued HH therapies to progress ADL/IADL IND and functional mobility.

## 2021-01-14 ENCOUNTER — TELEPHONE (OUTPATIENT)
Dept: UROLOGY | Facility: CLINIC | Age: 76
End: 2021-01-14

## 2021-01-14 LAB — COPATH REPORT: NORMAL

## 2021-01-14 NOTE — TELEPHONE ENCOUNTER
----- Message from AURELIO Delatorre sent at 1/14/2021  9:20 AM CST -----  Orders are in. Thanks!  ----- Message -----  From: Vini Alexander MD  Sent: 1/13/2021   3:47 PM CST  To: AURELIO Delatorre    Ordered!  ----- Message -----  From: Sergo Haley EMT  Sent: 1/13/2021   1:56 PM CST  To: MD Shaheed Gavin Dr.,     We can get this scheduled but would you be able to add orders for the patient? I'm not completely sure which cystogram is required.     Thanks,  Sergo  ----- Message -----  From: Annamarie Diehl  Sent: 1/13/2021   1:03 PM CST  To: AURELIO Delatorre    Hi Sergo,    Can you please place the cystogram order for me?  ----- Message -----  From: Ashely Null RN  Sent: 1/13/2021  10:38 AM CST  To: Clinic Coordinators-[facility]    Please see below and call and schedule accordingly. Thank you  ----- Message -----  From: Vini Alexander MD  Sent: 1/13/2021   9:13 AM CST  To: JAMARI Hines    Ms. Glynn is being discharged today. She is already scheduled for follow-up with Dr. Newsome on 1/20/2021, but she will need a cystogram prior to her appointment. I wasn't sure if it would be possible to get that done the morning of (it looks like her appointment is at 9:15) or will need to be scheduled on a day prior. Thanks!    Best,  Nilesh

## 2021-01-14 NOTE — PROGRESS NOTES
[facility]: Post-Discharge Note  SITUATION                                                      Admission:    Admission Date: 01/05/21   Reason for Admission: Partial cystectomy en bloc with sigmoidectomy and total abdominal hysterectomy with bilateral salpingo-oophorectomy.  Discharge:   Discharge Date: 01/13/21  Discharge Diagnosis: Partial cystectomy en bloc with sigmoidectomy and total abdominal hysterectomy with bilateral salpingo-oophorectomy.    BACKGROUND                                                      76 year old female with PMH HTN, DM2, iron deficiency anemia who was found to have locally advanced sigmoid adenocarcinoma with a colovesical fistula.  Pt was admitted and underwent the above procedures on 1/5/2021.     ASSESSMENT      Discharge Assessment  Patient reports symptoms are: Improved  Does the patient have all of their medications?: Yes  Does patient know what their new medications are for?: Yes  Does patient have a follow-up appointment scheduled?: Yes  Does patient have any other questions or concerns?: No    Post-op  Did the patient have surgery or a procedure: Yes  Incision: healing  Drainage: No  Bleeding: none  Fever: No  Chills: No  Redness: No  Warmth: No  Swelling: No  Incision site pain: No  Eating & Drinking: eating and drinking without complaints/concerns  PO Intake: regular diet  Bowel Function: normal  Urinary Status: voiding without complaint/concerns        PLAN                                                      Outpatient Plan:  FOLLOW-UP  1.  You will need to follow-up with Nadege Last NP in the Colon and Rectal Surgery clinic in 2 week(s) and then with CRS Staff: Dr. Remi Moscoso in 3-4 weeks after.  Please contact our clinic scheduler (phone # [phone]) if you have not heard from our clinic in 3 business days afer discharge to schedule a follow-up appointment. Please bring your log of daily ileostomy outputs to your follow up  clinic appointment.   2.  Follow up with your primary care provider in 1-2 weeks after discharge from the hospital to review this hospitalization.   3.  Drink 1-2 nutritional supplements per day, such as Boost, Protein Premier, or add protein powder to your own shakes/smoothies.    4.  Hold or do NOT take metformin and hydrochlorothiazide until seen in colorectal clinic.        Future Appointments   Date Time Provider Department Center   1/19/2021  2:00 PM [facility] [place] [place]   1/20/2021  9:15 AM [facility]ome [facility]   1/22/2021  9:15 AM Nadege Alaniz APRN CNP [facility]   2/15/2021 11:00 AM Remi Moscoso MD [facility]           Imani Valenzuela, [facility]

## 2021-01-14 NOTE — TELEPHONE ENCOUNTER
Left message for pt informing of cystogram scheduled on 1/19/21 at 2:00PM at [facility]. Imaging needed prior to post op on 1/20.

## 2021-01-15 ENCOUNTER — PATIENT OUTREACH (OUTPATIENT)
Dept: SURGERY | Facility: CLINIC | Age: 76
End: 2021-01-15

## 2021-01-15 NOTE — PROGRESS NOTES
Post Op Note     Called to check on patient postoperatively after hospital discharge.       Patient is s/p robot assisted sigmoidectomy, flex sig; partial cystectomy, L ureteral reimplant and stent, DOC-BSO with Dr. Remi Moscoso for sigmoid cancer   Admitted 1/5/2021 and discharged on 1/13/2021      Pain is well controlled with tylenol.     Patient is eating and drinking normally. Patient is on a low fiber diet.  Encouraged patient to drink 8-10 glasses of water a day. I encouraged electrolyte supplements as well. She drank 1000cc yesterday.     Patient has been recording daily ileostomy outputs. Patient has recorded outputs these were; 1900cc in the last 24 hours. Advised patient to contact the clinic if outputs are more than 1000 mL daily for two consecutive days or more than 2000 mL in one day or less than 500 mL for two consecutive days.    Patient reports taking medication for bowel function. She took fiber yesterday and threw up after it and now refuses to take fiber. Discussed with . Told home care nurse and daughter to take 1 pill 4x a day. (orginally told them 2 pills 4x a day but made sure everyone understood 1 pill 4x a day)     Patient reports pouching difficulties    If any pouching difficulties reported, please contact [employer] [employer] nurse  to schedule appointment.     Patient does not require supplies.     Patient has a alejandra in place. Following up with urology on this     Patient is set up with home care. Patient reports being contacted by the home care service. Patient has been seen by someone from home care.     I did not review any pathology with patient     Patient Reports vomiting yesterday after taking fiber     Patient Denies any fevers or chills.    Patient's incision is C/D/I. Patient reminded NOT to remove any dressings over their incisions.     MARIE in the last 24 hours was 300cc and 60cc today.     Patient is on a activity restriction. Lifting 10 pounds for 6 weeks.     Patient Denies  needing any forms completed.     Follow up is set up with Nadege Alaniz NP on 1/22 and with Dr. Remi Moscoso on 2/15.   Encouraged the patient to contact the clinic in the meantime with any fevers, chills, nausea, vomiting, increased colostomy output, no colostomy output, dizziness, lightheadedness, uncontrolled pain, changes to the incisions, or with any questions or concerns.    Patient's questions were answered to their stated satisfaction and they are in agreement with this plan.    JAMARI Landaverde [phone]  Colon & Rectal Surgery Clinic  [facility]

## 2021-01-18 ENCOUNTER — PATIENT OUTREACH (OUTPATIENT)
Dept: SURGERY | Facility: CLINIC | Age: 76
End: 2021-01-18

## 2021-01-18 NOTE — PROGRESS NOTES
Patient's output in the last 24 hours was 400cc yesterday and 350cc today. She is taking imodium 1 pill BID. Drinking 1 L. Urine is yellow. Denies nausea and vomiting. Feeling well overall but a little fatigued. She says her ostomy supplies has not arrived yet and will check in with homecare.

## 2021-01-19 ENCOUNTER — ANCILLARY PROCEDURE (OUTPATIENT)
Dept: GENERAL RADIOLOGY | Facility: CLINIC | Age: 76
End: 2021-01-19
Payer: COMMERCIAL

## 2021-01-19 DIAGNOSIS — N32.89 BLADDER MASS: ICD-10-CM

## 2021-01-19 PROCEDURE — 74430 CONTRAST X-RAY BLADDER: CPT | Mod: GC | Performed by: RADIOLOGY

## 2021-01-19 PROCEDURE — 51600 INJECTION FOR BLADDER X-RAY: CPT | Mod: GC | Performed by: RADIOLOGY

## 2021-01-20 ENCOUNTER — OFFICE VISIT (OUTPATIENT)
Dept: UROLOGY | Facility: CLINIC | Age: 76
End: 2021-01-20
Payer: COMMERCIAL

## 2021-01-20 VITALS
TEMPERATURE: 98.8 F | SYSTOLIC BLOOD PRESSURE: 102 MMHG | DIASTOLIC BLOOD PRESSURE: 65 MMHG | HEART RATE: 90 BPM | OXYGEN SATURATION: 100 %

## 2021-01-20 DIAGNOSIS — Z46.6 ENCOUNTER FOR FOLEY CATHETER REMOVAL: Primary | ICD-10-CM

## 2021-01-20 PROCEDURE — 99024 POSTOP FOLLOW-UP VISIT: CPT | Performed by: UROLOGY

## 2021-01-20 RX ORDER — CIPROFLOXACIN 500 MG/1
500 TABLET, FILM COATED ORAL ONCE
Status: COMPLETED | OUTPATIENT
Start: 2021-01-20 | End: 2021-01-20

## 2021-01-20 RX ADMIN — CIPROFLOXACIN 500 MG: 500 TABLET, FILM COATED ORAL at 10:27

## 2021-01-20 ASSESSMENT — PAIN SCALES - GENERAL: PAINLEVEL: MODERATE PAIN (5)

## 2021-01-20 NOTE — PROGRESS NOTES
CHIEF COMPLAINT   It was my pleasure to see Myla Glynn who is a 76 year old female for follow-up of bladder mass.      HPI   Myla Glynn is a very pleasant 76 year old female who presents with a history of invasive colon cancer into bladder. She is s/p partial cystectomy en bloc with colon and left ureteral reimplant due to involvement with tumor. Here for post-op. Cystogram demonstrates no bladder leak. Left ureteral stent in good position. Otherwise recovering well from surgery. Follow-up with Dr. Moscoso later this week.    1/5/2021  B. SIGMOID COLON, UTERUS WITH CERVIX, LEFT ADNEXA, DOME OF BLADDER,   PARTIAL CYSTECTOMYEN BLOC WITH   SIGMOIDECTOMY ANDTOTAL ABDOMINAL HYSTERECTOMY   WITHLEFTSALPINGO-OOPHORECTOMY:   - Invasive adenocarcinoma, moderately differentiated, 8.6 cm in greatest   dimension   - Tumor invades the urinary bladder wall and bladder mucosa   - Tumor with adjacent inflammation/abscess adheres to anterior uterine   wall without microscopic involvement of   the myometrium by carcinoma   - All resection margins (bladder margin, proximal, distal and mesenteric)   are negative   - Lymphovascular and perineural invasion is identified   - Colon with a tubular adenoma, 0.6 cm   - Bladder wall with chronic inflammation, fibrosis and adhesions   - Portion of the ureter with no significant histologic abnormality   - Benign cervix with nabothian cysts   - Atrophic endometrium with a submucosal leiomyoma (1.2 cm)   - Benign atrophic ovary   - Fallopian tube with no significant histologic abnormality   - One of forty eight lymph nodes positive for carcinoma (1/48)   - See synoptic report for details    PHYSICAL EXAM  Patient is a 76 year old  female   Vitals: Blood pressure 102/65, pulse 90, temperature 98.8  F (37.1  C), SpO2 100 %.  General Appearance Adult: There is no height or weight on file to calculate BMI.  Alert, no acute distress, oriented  Lungs: no respiratory distress, or  pursed lip breathing  Abdomen: soft, nontender, no organomegaly or masses  Back: no CVAT  Neuro: Alert, oriented, speech and mentation normal  Psych: affect and mood normal    ASSESSMENT and PLAN  76 year old female with history of invasive colon cancer, s/p en bloc partial cystectomy and distal left ureterectomy with reimplant in conjunction with colon resection. She is healing well. Cameron removed today following normal cystogram. OK for drain removal at discretion of colorectal surgery. Left ureteral stent to remain in place for another 4 weeks.    Cameron out today  Follow-up 1 month with cysto stent removal    Angel Newsome MD  Urology  [facility]

## 2021-01-20 NOTE — NURSING NOTE
Chief Complaint   Patient presents with     Surgical Followup     Partial cystectomy w/ hysterectomy       Blood pressure 102/65, pulse 90, temperature 98.8  F (37.1  C), SpO2 100 %. There is no height or weight on file to calculate BMI.    Patient Active Problem List   Diagnosis     Controlled type 2 diabetes mellitus without complication, without long-term current use of insulin (H)     HTN (hypertension)     Malignant neoplasm of sigmoid colon (H)     Bladder mass     Anemia       No Known Allergies    Current Outpatient Medications   Medication Sig Dispense Refill     acetaminophen (TYLENOL) 500 MG tablet Take 2 tablets (1,000 mg) by mouth 3 times daily As scheduled for the next 7-10 days, then decrease both dose and frequency to as needed 1 Bottle 0     enoxaparin ANTICOAGULANT (LOVENOX) 30 MG/0.3ML syringe Inject 0.3 mLs (30 mg) Subcutaneous every 24 hours for 20 days 6 mL 0     loperamide (IMODIUM) 2 MG capsule Take 1 capsule (2 mg) by mouth 2 times daily 120 capsule 0     omeprazole (PRILOSEC) 20 MG DR capsule Take 20 mg by mouth every morning        ondansetron (ZOFRAN-ODT) 4 MG ODT tab TK 1 T PO EVERY 4 HOURS AS NEEDED FOR NAUSEA. PLACE ON TOP OF TONUGE WHERE IT WILL DISSOLVE. THEN SWALLOW.       psyllium (METAMUCIL/KONSYL) Packet Take 1 packet by mouth 2 times daily 60 packet 0     traMADol (ULTRAM) 50 MG tablet Take 0.5-1 tablets (25-50 mg) by mouth every 8 hours as needed for moderate to severe pain 20 tablet 0       Social History     Tobacco Use     Smoking status: Never Smoker     Smokeless tobacco: Never Used   Substance Use Topics     Alcohol use: Not Currently     Drug use: None         Order has been verified: Yes.    The following medication was given:     MEDICATION:  Ciprofloxacin  ROUTE: PO  SITE: Medication was given orally   DOSE: 500mg  LOT #: [id number]  : [employer]  EXPIRATION DATE: 07/22  NDC#: [address]-70   Was there drug waste? No    Prior to administration,  verified patient identity using patient's name and date of birth.    Drug Amount Wasted:  None.  Vial/Syringe: Single dose      Removal:  16 Fr straight tipped latex alejandra catheter removed from urethral meatus without difficulty after removing 10mL of fluid from the balloon.    Patient did tolerate procedure well.     Patient instructed as to where to call or go for pain, fever, leakage, or decreased urine flow.       Sergo Haley, EMT  1/20/2021  9:39 AM

## 2021-01-20 NOTE — PATIENT INSTRUCTIONS
Please follow up with Dr. Newsome in one month for a cystoscopy stent removal.     It was a pleasure meeting with you today.  Thank you for allowing me and my team the privilege of caring for you today.  YOU are the reason we are here, and I truly hope we provided you with the excellent service you deserve.  Please let us know if there is anything else we can do for you so that we can be sure you are leaving completely satisfied with your care experience.

## 2021-01-20 NOTE — LETTER
1/20/2021      RE: Myla Glynn  [address]  [address]  [place] [address]         CHIEF COMPLAINT   It was my pleasure to see Myla Glynn who is a 76 year old female for follow-up of bladder mass.      HPI   Myla Glynn is a very pleasant 76 year old female who presents with a history of invasive colon cancer into bladder. She is s/p partial cystectomy en bloc with colon and left ureteral reimplant due to involvement with tumor. Here for post-op. Cystogram demonstrates no bladder leak. Left ureteral stent in good position. Otherwise recovering well from surgery. Follow-up with Dr. Moscoso later this week.    1/5/2021  B. SIGMOID COLON, UTERUS WITH CERVIX, LEFT ADNEXA, DOME OF BLADDER,   PARTIAL CYSTECTOMYEN BLOC WITH   SIGMOIDECTOMY ANDTOTAL ABDOMINAL HYSTERECTOMY   WITHLEFTSALPINGO-OOPHORECTOMY:   - Invasive adenocarcinoma, moderately differentiated, 8.6 cm in greatest   dimension   - Tumor invades the urinary bladder wall and bladder mucosa   - Tumor with adjacent inflammation/abscess adheres to anterior uterine   wall without microscopic involvement of   the myometrium by carcinoma   - All resection margins (bladder margin, proximal, distal and mesenteric)   are negative   - Lymphovascular and perineural invasion is identified   - Colon with a tubular adenoma, 0.6 cm   - Bladder wall with chronic inflammation, fibrosis and adhesions   - Portion of the ureter with no significant histologic abnormality   - Benign cervix with nabothian cysts   - Atrophic endometrium with a submucosal leiomyoma (1.2 cm)   - Benign atrophic ovary   - Fallopian tube with no significant histologic abnormality   - One of forty eight lymph nodes positive for carcinoma (1/48)   - See synoptic report for details    PHYSICAL EXAM  Patient is a 76 year old  female   Vitals: Blood pressure 102/65, pulse 90, temperature 98.8  F (37.1  C), SpO2 100 %.  General Appearance Adult: There is no height or weight on  file to calculate BMI.  Alert, no acute distress, oriented  Lungs: no respiratory distress, or pursed lip breathing  Abdomen: soft, nontender, no organomegaly or masses  Back: no CVAT  Neuro: Alert, oriented, speech and mentation normal  Psych: affect and mood normal    ASSESSMENT and PLAN  76 year old female with history of invasive colon cancer, s/p en bloc partial cystectomy and distal left ureterectomy with reimplant in conjunction with colon resection. She is healing well. Cameron removed today following normal cystogram. OK for drain removal at discretion of colorectal surgery. Left ureteral stent to remain in place for another 4 weeks.    Cameron out today  Follow-up 1 month with cysto stent removal    Angel Newsome MD  Urology  [facility]

## 2021-01-22 ENCOUNTER — OFFICE VISIT (OUTPATIENT)
Dept: SURGERY | Facility: CLINIC | Age: 76
End: 2021-01-22
Payer: COMMERCIAL

## 2021-01-22 VITALS
DIASTOLIC BLOOD PRESSURE: 59 MMHG | HEART RATE: 84 BPM | WEIGHT: 108.8 LBS | OXYGEN SATURATION: 98 % | BODY MASS INDEX: 20.57 KG/M2 | SYSTOLIC BLOOD PRESSURE: 93 MMHG

## 2021-01-22 DIAGNOSIS — Z09 FOLLOW-UP EXAMINATION FOLLOWING SURGERY: Primary | ICD-10-CM

## 2021-01-22 DIAGNOSIS — C18.7 MALIGNANT NEOPLASM OF SIGMOID COLON (H): Primary | ICD-10-CM

## 2021-01-22 DIAGNOSIS — C18.7 MALIGNANT NEOPLASM OF SIGMOID COLON (H): ICD-10-CM

## 2021-01-22 PROCEDURE — 99024 POSTOP FOLLOW-UP VISIT: CPT | Performed by: NURSE PRACTITIONER

## 2021-01-22 ASSESSMENT — PAIN SCALES - GENERAL: PAINLEVEL: NO PAIN (0)

## 2021-01-22 NOTE — LETTER
2021       RE: Myla Glynn  [address]  [address]  [place] [address]     Dear Colleague,    Thank you for referring your patient, Myla Glynn, to the [facility] [place] at [facility]. Please see a copy of my visit note below.    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 Moscoso, Dr. Jackson, and Dr. Newsome on 2021.    FINAL DIAGNOSIS:   A. RIGHT ADNEXA, RIGHT SALPINGO-OOPHORECTOMY:   - Atrophic ovary   - Fallopian tube with paratubal cyst   - Negative for malignancy     B. SIGMOID COLON, UTERUS WITH CERVIX, LEFT ADNEXA, DOME OF BLADDER,   PARTIAL CYSTECTOMYEN BLOC WITH   SIGMOIDECTOMY ANDTOTAL ABDOMINAL HYSTERECTOMY   WITHLEFTSALPINGO-OOPHORECTOMY:   - Invasive adenocarcinoma, moderately differentiated, 8.6 cm in greatest   dimension   - Tumor invades the urinary bladder wall and bladder mucosa   - Tumor with adjacent inflammation/abscess adheres to anterior uterine   wall without microscopic involvement of   the myometrium by carcinoma   - All resection margins (bladder margin, proximal, distal and mesenteric)   are negative   - Lymphovascular and perineural invasion is identified   - Colon with a tubular adenoma, 0.6 cm   - Bladder wall with chronic inflammation, fibrosis and adhesions   - Portion of the ureter with no significant histologic abnormality   - Benign cervix with  nabothian cysts   - Atrophic endometrium with a submucosal leiomyoma (1.2 cm)   - Benign atrophic ovary   - Fallopian tube with no significant histologic abnormality   - One of forty eight lymph nodes positive for carcinoma (1/48)   - See synoptic report for details     C. ANASTOMOSIS RING, EXCISION:   - Benign colonic mucosa   - Negative for malignancy     D. APPENDIX, APPENDECTOMY:   - Appendix with obliteration (neuroma of tip), serositis and adhesions   - Negative for malignancy     Interval history: Myla presents today with her daughter. She has been doing well. No pain. No difficulty with the ostomy and stools have been thick to loose. No watery diarrhea. No nausea or vomiting. Tolerating a low residue diet but decreased appetite. No fevers or chills. She saw Dr. George two days ago with alejandra removal. MARIE was left in place but this fell out at home yesterday.     Physical Examination:    BP 93/59 (BP Location: Right arm, Patient Position: Sitting, Cuff Size: Adult Regular)   Pulse 84   Wt 108 lb 12.8 oz   SpO2 98%   BMI 20.57 kg/m    General: alert, oriented, in no acute distress, sitting comfortably  HEENT: mucous membranes moist  Abdomen: Soft, nondistended, nontender on palpation.  Stoma fairly flush with the skin but pink and moist.  Soft stool in bag and pouch with good seal.  Midline incision well approximated with Dermabond in place.  No erythema or drainage.    Assessment/Plan:  76 year old female status post diagnostic laparoscopy, takedown of splenic flexure, sigmoidectomy with en bloc partial cystectomy and total abdominal hysterectomy with left salpingo-oophorectomy, right salpingo-oophorectomy, reimplantation of left ureter, partial omentectomy, appendectomy, flexible sigmoidoscopy, and cystoscopy with Dr. Moscoso, Dr. Jackson, and Dr. Newsome on 1/5/2021.  She is recovering well from a surgical standpoint.  Stoma is working well.  Low residue diet for another 2 weeks and then she can slowly  add fiber back into her diet.  No lifting more than 10 pounds for full 6 weeks from surgery.  Will notify Dr. Newsome about MARIE falling out.  Reviewed her pathology today and we will get her set up to meet with oncology.  She is scheduled for follow-up with Dr. Moscoso on 2/15/2021.  Encouraged her to contact the clinic in the meantime with any questions or concerns.    Medical history:  Past Medical History:   Diagnosis Date     Anemia      Bladder mass      Controlled type 2 diabetes mellitus without complication, without long-term current use of insulin (H)      Gastroesophageal reflux disease      HTN (hypertension)      Malignant neoplasm of sigmoid colon (H)        Surgical history:  Past Surgical History:   Procedure Laterality Date     COLONOSCOPY       CYSTECTOMY BLADDER RADICAL, ILEAL DIVERSION, COMBINED N/A 1/5/2021    Procedure: Partial Cystectomy, Cystourethroscopy,;  Surgeon: Angel Newsome MD;  Location: [facility]     HYSTERECTOMY TOTAL ABDOMINAL  1/5/2021    Procedure: Hysterectomy total abdominal;  Surgeon: Jimy Jackson MD;  Location: [facility]     LAPAROSCOPY OPERATIVE ADULT  1/5/2021    Procedure: Laparoscopy Operative Adult, takedown of splenic flexure, appendectomy;  Surgeon: Remi Moscoso MD;  Location: [facility]     SALPINGO-OOPHORECTOMY BILATERAL  1/5/2021    Procedure: Salpingo-oophorectomy bilateral;  Surgeon: Jimy Jackson MD;  Location: [facility]     SIGMOIDOSCOPY FLEXIBLE N/A 1/5/2021    Procedure: SIGMOIDOSCOPY, FLEXIBLE;  Surgeon: Remi Moscoso MD;  Location: [facility]       Problem list:    Patient Active Problem List    Diagnosis Date Noted     Anemia 12/02/2020     Priority: Medium     Malignant neoplasm of sigmoid colon (H) 11/27/2020     Priority: Medium     Bladder mass 11/27/2020     Priority: Medium     Controlled type 2 diabetes mellitus without complication, without long-term current use of insulin (H) 10/09/2019     Priority: Medium      HTN (hypertension) 05/30/2017     Priority: Medium       Medications:  Current Outpatient Medications   Medication Sig Dispense Refill     acetaminophen (TYLENOL) 500 MG tablet Take 2 tablets (1,000 mg) by mouth 3 times daily As scheduled for the next 7-10 days, then decrease both dose and frequency to as needed 1 Bottle 0     enoxaparin ANTICOAGULANT (LOVENOX) 30 MG/0.3ML syringe Inject 0.3 mLs (30 mg) Subcutaneous every 24 hours for 20 days 6 mL 0     loperamide (IMODIUM) 2 MG capsule Take 1 capsule (2 mg) by mouth 2 times daily 120 capsule 0     omeprazole (PRILOSEC) 20 MG DR capsule Take 20 mg by mouth every morning        ondansetron (ZOFRAN-ODT) 4 MG ODT tab TK 1 T PO EVERY 4 HOURS AS NEEDED FOR NAUSEA. PLACE ON TOP OF TONUGE WHERE IT WILL DISSOLVE. THEN SWALLOW.       psyllium (METAMUCIL/KONSYL) Packet Take 1 packet by mouth 2 times daily 60 packet 0     traMADol (ULTRAM) 50 MG tablet Take 0.5-1 tablets (25-50 mg) by mouth every 8 hours as needed for moderate to severe pain 20 tablet 0       Allergies:  No Known Allergies    Family history:  Family History   Problem Relation Age of Onset     No Known Problems Mother      No Known Problems Father      No Known Problems Sister      No Known Problems Brother        Social history:  Social History     Tobacco Use     Smoking status: Never Smoker     Smokeless tobacco: Never Used   Substance Use Topics     Alcohol use: Not Currently     Marital status: .  Nursing Notes:   Juhi Muhammad  1/22/2021  9:16 AM  Signed  Chief Complaint   Patient presents with     Surgical Followup       Vitals:    01/22/21 0913   BP: 93/59   BP Location: Right arm   Patient Position: Sitting   Cuff Size: Adult Regular   Pulse: 84   SpO2: 98%   Weight: 108 lb 12.8 oz       Body mass index is 20.57 kg/m .    Juhi Muhammad MA      20 minutes spent on the date of the encounter doing chart review, history and exam, documentation and further activities as noted above.   This is  a postop visit.    WILL Desai, NP-C  Colon and Rectal Surgery  [facility]    This note was created using speech recognition software and may contain unintended word substitutions.

## 2021-01-22 NOTE — PROGRESS NOTES
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 Moscoso, Dr. Jackson, and Dr. Newsome on 2021.    FINAL DIAGNOSIS:   A. RIGHT ADNEXA, RIGHT SALPINGO-OOPHORECTOMY:   - Atrophic ovary   - Fallopian tube with paratubal cyst   - Negative for malignancy     B. SIGMOID COLON, UTERUS WITH CERVIX, LEFT ADNEXA, DOME OF BLADDER,   PARTIAL CYSTECTOMYEN BLOC WITH   SIGMOIDECTOMY ANDTOTAL ABDOMINAL HYSTERECTOMY   WITHLEFTSALPINGO-OOPHORECTOMY:   - Invasive adenocarcinoma, moderately differentiated, 8.6 cm in greatest   dimension   - Tumor invades the urinary bladder wall and bladder mucosa   - Tumor with adjacent inflammation/abscess adheres to anterior uterine   wall without microscopic involvement of   the myometrium by carcinoma   - All resection margins (bladder margin, proximal, distal and mesenteric)   are negative   - Lymphovascular and perineural invasion is identified   - Colon with a tubular adenoma, 0.6 cm   - Bladder wall with chronic inflammation, fibrosis and adhesions   - Portion of the ureter with no significant histologic abnormality   - Benign cervix with nabothian cysts   - Atrophic endometrium with a submucosal leiomyoma (1.2 cm)   - Benign atrophic ovary   - Fallopian tube with no significant histologic abnormality   - One of forty eight lymph nodes positive for carcinoma ()   - See synoptic report for details     C. ANASTOMOSIS RING, EXCISION:   - Benign colonic mucosa   - Negative  for malignancy     D. APPENDIX, APPENDECTOMY:   - Appendix with obliteration (neuroma of tip), serositis and adhesions   - Negative for malignancy     Interval history: Myla presents today with her daughter. She has been doing well. No pain. No difficulty with the ostomy and stools have been thick to loose. No watery diarrhea. No nausea or vomiting. Tolerating a low residue diet but decreased appetite. No fevers or chills. She saw Dr. George two days ago with alejandra removal. MARIE was left in place but this fell out at home yesterday.     Physical Examination:    BP 93/59 (BP Location: Right arm, Patient Position: Sitting, Cuff Size: Adult Regular)   Pulse 84   Wt 108 lb 12.8 oz   SpO2 98%   BMI 20.57 kg/m    General: alert, oriented, in no acute distress, sitting comfortably  HEENT: mucous membranes moist  Abdomen: Soft, nondistended, nontender on palpation.  Stoma fairly flush with the skin but pink and moist.  Soft stool in bag and pouch with good seal.  Midline incision well approximated with Dermabond in place.  No erythema or drainage.    Assessment/Plan:  76 year old female status post diagnostic laparoscopy, takedown of splenic flexure, sigmoidectomy with en bloc partial cystectomy and total abdominal hysterectomy with left salpingo-oophorectomy, right salpingo-oophorectomy, reimplantation of left ureter, partial omentectomy, appendectomy, flexible sigmoidoscopy, and cystoscopy with Dr. Moscoso, Dr. Jackson, and Dr. Newsome on 1/5/2021.  She is recovering well from a surgical standpoint.  Stoma is working well.  Low residue diet for another 2 weeks and then she can slowly add fiber back into her diet.  No lifting more than 10 pounds for full 6 weeks from surgery.  Will notify Dr. Newsome about MARIE falling out.  Reviewed her pathology today and we will get her set up to meet with oncology.  She is scheduled for follow-up with Dr. Moscoso on 2/15/2021.  Encouraged her to contact the clinic in the meantime with  any questions or concerns.    Medical history:  Past Medical History:   Diagnosis Date     Anemia      Bladder mass      Controlled type 2 diabetes mellitus without complication, without long-term current use of insulin (H)      Gastroesophageal reflux disease      HTN (hypertension)      Malignant neoplasm of sigmoid colon (H)        Surgical history:  Past Surgical History:   Procedure Laterality Date     COLONOSCOPY       CYSTECTOMY BLADDER RADICAL, ILEAL DIVERSION, COMBINED N/A 1/5/2021    Procedure: Partial Cystectomy, Cystourethroscopy,;  Surgeon: Angel Newsome MD;  Location: [facility]     HYSTERECTOMY TOTAL ABDOMINAL  1/5/2021    Procedure: Hysterectomy total abdominal;  Surgeon: Jimy Jackson MD;  Location: [facility]     LAPAROSCOPY OPERATIVE ADULT  1/5/2021    Procedure: Laparoscopy Operative Adult, takedown of splenic flexure, appendectomy;  Surgeon: Remi Moscoso MD;  Location[facility]     SALPINGO-OOPHORECTOMY BILATERAL  1/5/2021    Procedure: Salpingo-oophorectomy bilateral;  Surgeon: Jimy Jackson MD;  [facility]     SIGMOIDOSCOPY FLEXIBLE N/A 1/5/2021    Procedure: SIGMOIDOSCOPY, FLEXIBLE;  Surgeon: Remi Moscoso MD;  Location: [facility]       Problem list:    Patient Active Problem List    Diagnosis Date Noted     Anemia 12/02/2020     Priority: Medium     Malignant neoplasm of sigmoid colon (H) 11/27/2020     Priority: Medium     Bladder mass 11/27/2020     Priority: Medium     Controlled type 2 diabetes mellitus without complication, without long-term current use of insulin (H) 10/09/2019     Priority: Medium     HTN (hypertension) 05/30/2017     Priority: Medium       Medications:  Current Outpatient Medications   Medication Sig Dispense Refill     acetaminophen (TYLENOL) 500 MG tablet Take 2 tablets (1,000 mg) by mouth 3 times daily As scheduled for the next 7-10 days, then decrease both dose and frequency to as needed 1 Bottle 0      enoxaparin ANTICOAGULANT (LOVENOX) 30 MG/0.3ML syringe Inject 0.3 mLs (30 mg) Subcutaneous every 24 hours for 20 days 6 mL 0     loperamide (IMODIUM) 2 MG capsule Take 1 capsule (2 mg) by mouth 2 times daily 120 capsule 0     omeprazole (PRILOSEC) 20 MG DR capsule Take 20 mg by mouth every morning        ondansetron (ZOFRAN-ODT) 4 MG ODT tab TK 1 T PO EVERY 4 HOURS AS NEEDED FOR NAUSEA. PLACE ON TOP OF TONUGE WHERE IT WILL DISSOLVE. THEN SWALLOW.       psyllium (METAMUCIL/KONSYL) Packet Take 1 packet by mouth 2 times daily 60 packet 0     traMADol (ULTRAM) 50 MG tablet Take 0.5-1 tablets (25-50 mg) by mouth every 8 hours as needed for moderate to severe pain 20 tablet 0       Allergies:  No Known Allergies    Family history:  Family History   Problem Relation Age of Onset     No Known Problems Mother      No Known Problems Father      No Known Problems Sister      No Known Problems Brother        Social history:  Social History     Tobacco Use     Smoking status: Never Smoker     Smokeless tobacco: Never Used   Substance Use Topics     Alcohol use: Not Currently     Marital status: .  Nursing Notes:   Juhi Muhammad  1/22/2021  9:16 AM  Signed  Chief Complaint   Patient presents with     Surgical Followup       Vitals:    01/22/21 0913   BP: 93/59   BP Location: Right arm   Patient Position: Sitting   Cuff Size: Adult Regular   Pulse: 84   SpO2: 98%   Weight: 108 lb 12.8 oz       Body mass index is 20.57 kg/m .    Juhi Muhammad MA         20 minutes spent on the date of the encounter doing chart review, history and exam, documentation and further activities as noted above.   This is a postop visit.    WILL Desai, NP-C  Colon and Rectal Surgery  [facility]    This note was created using speech recognition software and may contain unintended word substitutions.

## 2021-01-22 NOTE — NURSING NOTE
Chief Complaint   Patient presents with     Surgical Followup       Vitals:    01/22/21 0913   BP: 93/59   BP Location: Right arm   Patient Position: Sitting   Cuff Size: Adult Regular   Pulse: 84   SpO2: 98%   Weight: 108 lb 12.8 oz       Body mass index is 20.57 kg/m .    Juhi Muhammad MA

## 2021-01-25 ENCOUNTER — VIRTUAL VISIT (OUTPATIENT)
Dept: ONCOLOGY | Facility: CLINIC | Age: 76
End: 2021-01-25
Attending: COLON & RECTAL SURGERY
Payer: COMMERCIAL

## 2021-01-25 DIAGNOSIS — C18.7 MALIGNANT NEOPLASM OF SIGMOID COLON (H): ICD-10-CM

## 2021-01-25 PROCEDURE — 999N001193 HC VIDEO/TELEPHONE VISIT; NO CHARGE

## 2021-01-25 PROCEDURE — 99204 OFFICE O/P NEW MOD 45 MIN: CPT | Mod: 95 | Performed by: INTERNAL MEDICINE

## 2021-01-25 NOTE — PROGRESS NOTES
"Myla is a 76 year old who is being evaluated via a billable video visit.      How would you like to obtain your AVS? Mail a copy  If the video visit is dropped, the invitation should be resent by: Text to cell phone: [phone]  Will anyone else be joining your video visit? No     I have reviewed and updated the patient's allergies and medication list.    Concerns: none  Refills: none     Vitals - Patient Reported  Weight (Patient Reported): 49 kg (108 lb)  Height (Patient Reported): 154.9 cm (5' 1\")  BMI (Based on Pt Reported Ht/Wt): 20.41  Pain Score: No Pain (0)      Felecia Walsh CMA            Video Start Time: 1250  Video-Visit Details    Type of service:  Video Visit    Video End Time:1320    Originating Location (pt. Location): Home    Distant Location (provider location):  [facility]     Platform used for Video Visit: [employer]    30 min - about half was video and half was telephone.    [facility]  MEDICAL ONCOLOGY    NEW PATIENT VISIT NOTE    Reason for consultation: sigmoid colon cancer    Referring Provider: Remi Moscoso MD    Oncology Treatment Summary  1. 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 October 2020 presented to PCP with recurrent dysuria, hematuria, 10/17/2020 CT AP ws done again with colovesicular fistula with a mass seen invading from colon into bladder.  3. 11/16 colonoscopy at [facility] with reported mass at 40 cm, bx with adenocarcinoma, I am unable to find MMR  4. 11/23 CEA 9.7  5. 12/22/2020 MRI pelvis locally advanced disease with multiple enlarged regional lymph nodes  6. 12/22/2020 PET some uptake in mesenteric area, no distant disease  7. 1/5/21 left hemicolectomy, DOC, BSO, partial cystectomy     HISTORY OF PRESENTING ILLNESS  Patient is a pleasant 76-year-old who is seen today via video visit with her daughter who does translating.  She speaks both " Madelininya and anharic.  Her history is as noted above.  She began having problems with her urinary tract back last summer and a CT scan was done which showed a colovesicular fistula.  She declined colonoscopy at that point.  In October she had been having a months worth of dysuria and UTI-like symptoms and saw her primary care provider who ordered a subsequent CT scan which again showed a colovesicular fistula with now a more clearly seen mass invading from the sigmoid colon into the bladder.  She did have a colonoscopy performed which showed a adenocarcinoma.  We do not have the MMR results.  Preoperative CEA was 9.7.  MRI and PET were performed which did not show any evidence of distant disease however locally advanced disease with invasion into the bladder and regional lymph node abnormalities were noted.  She subsequently underwent a left hemicolectomy DOC/BSO and partial cystectomy on 1/5/2021.  Pathology as noted below.  She is now at home and recovering from surgery.  Her daughter notes she is eating a little bit better than she was presurgery however has not regained any of the 20 pounds she lost preoperatively.  No nausea or vomiting.  She is urinating well.  No new abdominal pain.  She is walking with a walker but is still quite weak and spends most of her day sitting.     PAST MEDICAL HISTORY  Gastroesophageal reflux disease, hypertension, type 2 diabetes      CURRENT OUTPATIENT MEDICATIONS  Current Outpatient Medications   Medication     acetaminophen (TYLENOL) 500 MG tablet     enoxaparin ANTICOAGULANT (LOVENOX) 30 MG/0.3ML syringe     loperamide (IMODIUM) 2 MG capsule     omeprazole (PRILOSEC) 20 MG DR capsule     ondansetron (ZOFRAN-ODT) 4 MG ODT tab     psyllium (METAMUCIL/KONSYL) Packet     traMADol (ULTRAM) 50 MG tablet     No current facility-administered medications for this visit.         ALLERGIES   No Known Allergies     SOCIAL HISTORY  She is originally from [facility].  She is a , she  lives in [place] with her daughter no tobacco or alcohol use     FAMILY HISTORY  No malignancies     REVIEW OF SYSTEMS  Review Of Systems  Skin: negative  Eyes: negative  Ears/Nose/Throat: negative  Respiratory: No shortness of breath, dyspnea on exertion, cough, or hemoptysis  Cardiovascular: negative  Gastrointestinal: negative as per HPI  Genitourinary: negative  Musculoskeletal: negative  Neurologic: negative  Psychiatric: negative  Hematologic/Lymphatic/Immunologic: negative  Endocrine: negative      PHYSICAL EXAM  B/P: Data Unavailable, T: Data Unavailable, P: Data Unavailable, R: Data Unavailable  Wt Readings from Last 3 Encounters:   01/22/21 49.4 kg (108 lb 12.8 oz)   01/10/21 56.5 kg (124 lb 8 oz)   12/29/20 54 kg (119 lb)       ECOG PPS 1-2    Physical Exam  HENT:      Head: Normocephalic and atraumatic.   Eyes:      Extraocular Movements: Extraocular movements intact.      Conjunctiva/sclera: Conjunctivae normal.      Pupils: Pupils are equal, round, and reactive to light.   Pulmonary:      Effort: Pulmonary effort is normal.   Neurological:      General: No focal deficit present.      Mental Status: She is alert and oriented to person, place, and time. Mental status is at baseline.   Psychiatric:         Mood and Affect: Mood normal.         Behavior: Behavior normal.         Thought Content: Thought content normal.         Judgment: Judgment normal.              LABORATORY AND IMAGING STUDIES  Recent Labs   Lab Test 01/12/21  2234 01/11/21  0637 01/11/21  0051 01/10/21  1937 01/10/21  0729 01/09/21  0754 01/08/21  0534 01/07/21  0436    144  --   --   --  141 140 136   POTASSIUM 3.6 3.7 3.6 3.4 3.2* 3.7 3.7 4.3   CHLORIDE 108 110*  --   --   --  110* 110* 105   CO2 26 24  --   --   --  20 22 22   ANIONGAP 8 11  --   --   --  11 7 9   BUN 16 15  --   --   --  21 20 23   CR 1.10* 0.77  --   --   --  0.96 1.10* 1.68*   GLC 90 86  --   --   --  70 93 113*   DICKSON 7.9* 8.6  --   --   --  8.6 8.8 8.0*      Recent Labs   Lab Test 01/12/21  2234 01/12/21  0757 01/11/21  0637 01/10/21  0729 01/07/21  0436 01/06/21  0454 01/05/21  2239   MAG 1.9 2.0 1.5* 1.6 2.0 1.6 1.7   PHOS 4.2  --   --  2.5 3.5 5.4* 5.0*     Recent Labs   Lab Test 01/11/21  0637 01/09/21  0754 01/08/21  0534 01/07/21  1418 01/07/21  0436 01/06/21  0454 01/05/21  1836 12/29/20  1122 12/29/20  1122 11/23/20  1432   WBC  --   --   --   --   --  13.7* 12.9*  --  9.8 8.3   HGB  --  8.9* 9.6* 8.9* 6.7* 8.2* 9.1*   < > 8.2* 8.4*     --   --   --   --  255 283  --  439 371   MCV  --   --   --   --   --  85 83  --  80 74*    < > = values in this interval not displayed.     Recent Labs   Lab Test 01/05/21  1836 11/23/20  1432   BILITOTAL 1.6* 0.3   ALKPHOS 23* 56   ALT 8 12   AST 16 11   ALBUMIN 2.2* 3.4     No results found for: TSH  Recent Labs   Lab Test 11/23/20  1432   CEA 9.7*     Results for orders placed or performed in visit on 01/19/21   XR Cystogram    Narrative    XR CYSTOGRAM   1/19/2021 2:46 PM    HISTORY:  Bladder mass     COMPARISON: Correlation made with MR pelvis 12/22/2020    Fluoroscopy time: 60 0.6 minutes    FINDINGS: The  film demonstrates air distended loops of small and  large bowel in a nonobstructive pattern. Left nephroureteral stent and  pelvic surgical drain in place. Anastomotic staple line and surgical  clips project over the pelvis. There is no abnormal mass or  calcification. No bony abnormality is identified.  A voiding  cystourethrogram was performed with 60 cc CystoConray II introduced  into the bladder via a pre-existing catheter. Single cycle of filling  and voiding were observed, revealing surgical changes of partial  cystectomy with luminal irregularity along the resection margin. At  full distention there is no extraluminal leakage of contrast. There is  no reflux. The urethra is normal.      Impression    IMPRESSION: Status post partial cystectomy, no evidence of leak.    IJASON DO, attest  that I was present for all critical  portions of the procedure and was immediately available to provide  guidance and assistance during the remainder of the procedure.    I have personally reviewed the examination and initial interpretation  and I agree with the findings.    JASON MCCORMICK, DO     SPECIMEN     Procedure:         Partial cystectomy, sigmoidectomy, total abdominal hysterectomy and         bilateral salphingo-oophorectomy     TUMOR     Tumor Site:         - Sigmoid colon     Histologic Type:         - Adenocarcinoma     Histologic Grade:         - G2: Moderately differentiated     Tumor Size: 8.6 x 6.3 x 1.6 Centimeters (cm)     Tumor Extension:         - Tumor directly invades adjacent structures - Urinary Bladder     Macroscopic Tumor Perforation:         - Present     Lymphovascular Invasion:         - Present       Lymphovascular Invasion Type:           - Small vessel lymphovascular invasion     Perineural Invasion:         - Present     Treatment Effect:         - No known presurgical therapy     MARGINS     Margins:         - All margins are uninvolved by invasive carcinoma, high grade   dysplasia /         intramucosal carcinoma, and low grade dysplasia     Margins Examined:         - Proximal         - Distal         - Radial (circumferential) or Mesenteric         Bladder wall margin     Distance of Invasive Carcinoma from Closest Margin:         42 mm     Closest Margin:         - Radial (circumferential) or Mesenteric     Distance of Tumor from Radial (circumferential) Margin:         42 mm     Distance of Tumor from Distal Margin:         58 mm     LYMPH NODES     Number of Lymph Nodes Involved:         1     Number of Lymph Nodes Examined:         48     Tumor Deposits:         - Not identified     PATHOLOGIC STAGE CLASSIFICATION (PTNM, AJCC 8TH EDITION)     Primary Tumor (pT):         - pT4b     Regional Lymph Nodes (pN):         - pN1a     ADDITIONAL FINDINGS     Additional Findings:          - Adenoma(s)        ASSESSMENT  AND RECOMMENDATIONS:  1.  T4 N1 M0 adenocarcinoma of the sigmoid colon with perforation and invasion into the bladder with 1 of 48 lymph nodes involved, lymphovascular invasion was noted as was perineural invasion and an elevated preoperative CEA status post en bloc resection  2 history of hypertension  3 diabetes  4 weight loss  5.  Deconditioning    Plan    I discussed with the daughter who spoke with her mother regarding her newly diagnosed stage III colon cancer.  She has had a good surgical result with all margins being clear.  We discussed that nevertheless given her locally advanced disease she does have anywhere from a 40 to 60% risk of systemic recurrence.  We discussed the standard of care as an additional 6 months of adjuvant chemotherapy.  At this point we did not going to chemotherapy at more detail as her mother is not currently having good enough performance status to go through it.  She is currently 3 weeks out from surgery and I have suggested we give her another couple of weeks to recuperate and try gaining some weight back and that we then see me in the office so I can better evaluate what I think she can tolerate.  While FOLFOX chemotherapy would be ideal other options could include at least oral Xeloda pending her performance status.  Her daughter is in agreement with this and would like to see her mother and get stronger.  I think this is totally reasonable.    We additionally need to obtain results of her MMR.    Sarah Butler MD on 1/25/2021 at 1:42 PM

## 2021-01-25 NOTE — LETTER
"    1/25/2021         RE: Myla Glynn  [address]  [address]  [place] [address]        Dear Colleague,    Thank you for referring your patient, Myla Glynn, to the [facility]. Please see a copy of my visit note below.    Myla is a 76 year old who is being evaluated via a billable video visit.      How would you like to obtain your AVS? Mail a copy  If the video visit is dropped, the invitation should be resent by: Text to cell phone: [phone]  Will anyone else be joining your video visit? No     I have reviewed and updated the patient's allergies and medication list.    Concerns: none  Refills: none     Vitals - Patient Reported  Weight (Patient Reported): 49 kg (108 lb)  Height (Patient Reported): 154.9 cm (5' 1\")  BMI (Based on Pt Reported Ht/Wt): 20.41  Pain Score: No Pain (0)      Felecia Walsh CMA            Video Start Time: 1250  Video-Visit Details    Type of service:  Video Visit    Video End Time:1320    Originating Location (pt. Location): Home    Distant Location (provider location):  [facility]     Platform used for Video Visit: [employer]    30 min - about half was video and half was telephone.    [facility]  MEDICAL ONCOLOGY    NEW PATIENT VISIT NOTE    Reason for consultation: sigmoid colon cancer    Referring Provider: Remi Moscoso MD    Oncology Treatment Summary  1. 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 October 2020 presented to PCP with recurrent dysuria, hematuria, 10/17/2020 CT AP ws done again with colovesicular fistula with a mass seen invading from colon into bladder.  3. 11/16 colonoscopy at [facility] with reported mass at 40 cm, bx with adenocarcinoma, I am unable to find MMR  4. 11/23 CEA 9.7  5. 12/22/2020 MRI pelvis locally advanced disease with multiple enlarged regional lymph nodes  6. 12/22/2020 PET " some uptake in mesenteric area, no distant disease  7. 1/5/21 left hemicolectomy, DOC, BSO, partial cystectomy     HISTORY OF PRESENTING ILLNESS  Patient is a pleasant 76-year-old who is seen today via video visit with her daughter who does translating.  She speaks both Tingrinya and anharic.  Her history is as noted above.  She began having problems with her urinary tract back last summer and a CT scan was done which showed a colovesicular fistula.  She declined colonoscopy at that point.  In October she had been having a months worth of dysuria and UTI-like symptoms and saw her primary care provider who ordered a subsequent CT scan which again showed a colovesicular fistula with now a more clearly seen mass invading from the sigmoid colon into the bladder.  She did have a colonoscopy performed which showed a adenocarcinoma.  We do not have the MMR results.  Preoperative CEA was 9.7.  MRI and PET were performed which did not show any evidence of distant disease however locally advanced disease with invasion into the bladder and regional lymph node abnormalities were noted.  She subsequently underwent a left hemicolectomy DOC/BSO and partial cystectomy on 1/5/2021.  Pathology as noted below.  She is now at home and recovering from surgery.  Her daughter notes she is eating a little bit better than she was presurgery however has not regained any of the 20 pounds she lost preoperatively.  No nausea or vomiting.  She is urinating well.  No new abdominal pain.  She is walking with a walker but is still quite weak and spends most of her day sitting.     PAST MEDICAL HISTORY  Gastroesophageal reflux disease, hypertension, type 2 diabetes      CURRENT OUTPATIENT MEDICATIONS  Current Outpatient Medications   Medication     acetaminophen (TYLENOL) 500 MG tablet     enoxaparin ANTICOAGULANT (LOVENOX) 30 MG/0.3ML syringe     loperamide (IMODIUM) 2 MG capsule     omeprazole (PRILOSEC) 20 MG DR capsule     ondansetron  (ZOFRAN-ODT) 4 MG ODT tab     psyllium (METAMUCIL/KONSYL) Packet     traMADol (ULTRAM) 50 MG tablet     No current facility-administered medications for this visit.         ALLERGIES   No Known Allergies     SOCIAL HISTORY  She is originally from [facility].  She is a , she lives in [place] with her daughter no tobacco or alcohol use     FAMILY HISTORY  No malignancies     REVIEW OF SYSTEMS  Review Of Systems  Skin: negative  Eyes: negative  Ears/Nose/Throat: negative  Respiratory: No shortness of breath, dyspnea on exertion, cough, or hemoptysis  Cardiovascular: negative  Gastrointestinal: negative as per HPI  Genitourinary: negative  Musculoskeletal: negative  Neurologic: negative  Psychiatric: negative  Hematologic/Lymphatic/Immunologic: negative  Endocrine: negative      PHYSICAL EXAM  B/P: Data Unavailable, T: Data Unavailable, P: Data Unavailable, R: Data Unavailable  Wt Readings from Last 3 Encounters:   01/22/21 49.4 kg (108 lb 12.8 oz)   01/10/21 56.5 kg (124 lb 8 oz)   12/29/20 54 kg (119 lb)       ECOG PPS 1-2    Physical Exam  HENT:      Head: Normocephalic and atraumatic.   Eyes:      Extraocular Movements: Extraocular movements intact.      Conjunctiva/sclera: Conjunctivae normal.      Pupils: Pupils are equal, round, and reactive to light.   Pulmonary:      Effort: Pulmonary effort is normal.   Neurological:      General: No focal deficit present.      Mental Status: She is alert and oriented to person, place, and time. Mental status is at baseline.   Psychiatric:         Mood and Affect: Mood normal.         Behavior: Behavior normal.         Thought Content: Thought content normal.         Judgment: Judgment normal.              LABORATORY AND IMAGING STUDIES  Recent Labs   Lab Test 01/12/21  2234 01/11/21  0637 01/11/21  0051 01/10/21  1937 01/10/21  0729 01/09/21  0754 01/08/21  0534 01/07/21  0436    144  --   --   --  141 140 136   POTASSIUM 3.6 3.7 3.6 3.4 3.2* 3.7 3.7 4.3   CHLORIDE  108 110*  --   --   --  110* 110* 105   CO2 26 24  --   --   --  20 22 22   ANIONGAP 8 11  --   --   --  11 7 9   BUN 16 15  --   --   --  21 20 23   CR 1.10* 0.77  --   --   --  0.96 1.10* 1.68*   GLC 90 86  --   --   --  70 93 113*   DICKSON 7.9* 8.6  --   --   --  8.6 8.8 8.0*     Recent Labs   Lab Test 01/12/21  2234 01/12/21  0757 01/11/21  0637 01/10/21  0729 01/07/21  0436 01/06/21  0454 01/05/21 2239   MAG 1.9 2.0 1.5* 1.6 2.0 1.6 1.7   PHOS 4.2  --   --  2.5 3.5 5.4* 5.0*     Recent Labs   Lab Test 01/11/21 0637 01/09/21 0754 01/08/21  0534 01/07/21  1418 01/07/21  0436 01/06/21  0454 01/05/21  1836 12/29/20  1122 12/29/20  1122 11/23/20  1432   WBC  --   --   --   --   --  13.7* 12.9*  --  9.8 8.3   HGB  --  8.9* 9.6* 8.9* 6.7* 8.2* 9.1*   < > 8.2* 8.4*     --   --   --   --  255 283  --  439 371   MCV  --   --   --   --   --  85 83  --  80 74*    < > = values in this interval not displayed.     Recent Labs   Lab Test 01/05/21  1836 11/23/20  1432   BILITOTAL 1.6* 0.3   ALKPHOS 23* 56   ALT 8 12   AST 16 11   ALBUMIN 2.2* 3.4     No results found for: TSH  Recent Labs   Lab Test 11/23/20  1432   CEA 9.7*     Results for orders placed or performed in visit on 01/19/21   XR Cystogram    Narrative    XR CYSTOGRAM   1/19/2021 2:46 PM    HISTORY:  Bladder mass     COMPARISON: Correlation made with MR pelvis 12/22/2020    Fluoroscopy time: 60 0.6 minutes    FINDINGS: The  film demonstrates air distended loops of small and  large bowel in a nonobstructive pattern. Left nephroureteral stent and  pelvic surgical drain in place. Anastomotic staple line and surgical  clips project over the pelvis. There is no abnormal mass or  calcification. No bony abnormality is identified.  A voiding  cystourethrogram was performed with 60 cc CystoConray II introduced  into the bladder via a pre-existing catheter. Single cycle of filling  and voiding were observed, revealing surgical changes of partial  cystectomy with  luminal irregularity along the resection margin. At  full distention there is no extraluminal leakage of contrast. There is  no reflux. The urethra is normal.      Impression    IMPRESSION: Status post partial cystectomy, no evidence of leak.    I, JASON MCCORMICK DO, attest that I was present for all critical  portions of the procedure and was immediately available to provide  guidance and assistance during the remainder of the procedure.    I have personally reviewed the examination and initial interpretation  and I agree with the findings.    JASON MCCORMICK DO     SPECIMEN     Procedure:         Partial cystectomy, sigmoidectomy, total abdominal hysterectomy and         bilateral salphingo-oophorectomy     TUMOR     Tumor Site:         - Sigmoid colon     Histologic Type:         - Adenocarcinoma     Histologic Grade:         - G2: Moderately differentiated     Tumor Size: 8.6 x 6.3 x 1.6 Centimeters (cm)     Tumor Extension:         - Tumor directly invades adjacent structures - Urinary Bladder     Macroscopic Tumor Perforation:         - Present     Lymphovascular Invasion:         - Present       Lymphovascular Invasion Type:           - Small vessel lymphovascular invasion     Perineural Invasion:         - Present     Treatment Effect:         - No known presurgical therapy     MARGINS     Margins:         - All margins are uninvolved by invasive carcinoma, high grade   dysplasia /         intramucosal carcinoma, and low grade dysplasia     Margins Examined:         - Proximal         - Distal         - Radial (circumferential) or Mesenteric         Bladder wall margin     Distance of Invasive Carcinoma from Closest Margin:         42 mm     Closest Margin:         - Radial (circumferential) or Mesenteric     Distance of Tumor from Radial (circumferential) Margin:         42 mm     Distance of Tumor from Distal Margin:         58 mm     LYMPH NODES     Number of Lymph Nodes Involved:         1     Number of  Lymph Nodes Examined:         48     Tumor Deposits:         - Not identified     PATHOLOGIC STAGE CLASSIFICATION (PTNM, AJCC 8TH EDITION)     Primary Tumor (pT):         - pT4b     Regional Lymph Nodes (pN):         - pN1a     ADDITIONAL FINDINGS     Additional Findings:         - Adenoma(s)        ASSESSMENT  AND RECOMMENDATIONS:  1.  T4 N1 M0 adenocarcinoma of the sigmoid colon with perforation and invasion into the bladder with 1 of 48 lymph nodes involved, lymphovascular invasion was noted as was perineural invasion and an elevated preoperative CEA status post en bloc resection  2 history of hypertension  3 diabetes  4 weight loss  5.  Deconditioning    Plan    I discussed with the daughter who spoke with her mother regarding her newly diagnosed stage III colon cancer.  She has had a good surgical result with all margins being clear.  We discussed that nevertheless given her locally advanced disease she does have anywhere from a 40 to 60% risk of systemic recurrence.  We discussed the standard of care as an additional 6 months of adjuvant chemotherapy.  At this point we did not going to chemotherapy at more detail as her mother is not currently having good enough performance status to go through it.  She is currently 3 weeks out from surgery and I have suggested we give her another couple of weeks to recuperate and try gaining some weight back and that we then see me in the office so I can better evaluate what I think she can tolerate.  While FOLFOX chemotherapy would be ideal other options could include at least oral Xeloda pending her performance status.  Her daughter is in agreement with this and would like to see her mother and get stronger.  I think this is totally reasonable.    We additionally need to obtain results of her MMR.    Sarah Butler MD on 1/25/2021 at 1:42 PM

## 2021-02-09 ENCOUNTER — PATIENT OUTREACH (OUTPATIENT)
Dept: SURGERY | Facility: CLINIC | Age: 76
End: 2021-02-09

## 2021-02-09 ENCOUNTER — TELEPHONE (OUTPATIENT)
Dept: SURGERY | Facility: CLINIC | Age: 76
End: 2021-02-09

## 2021-02-09 NOTE — PROGRESS NOTES
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    INTERVAL HISTORY: Denies increased pain, fevers, or chills. Tolerating diet well although appetite is poor. Ileostomy functioning well with no issues with pouching. Output is approximately 600 mL/24 hours. Off narcotic pain meds.    Physical Examination:  /65 (BP Location: Left arm, Patient Position: Sitting, Cuff Size: Adult Small)   Pulse 77   Temp 97.5  F (36.4  C) (Oral)   Ht 5' 1\"   Wt 105 lb 6.4 oz   SpO2 100%   BMI 19.92 kg/m    Abdomen soft, nontender.  Right-sided ileostomy viable and functioning. Incision with no evidence of infection or hernia.    ASSESSMENT  Doing well postop. She met with Dr. Butler of medical oncology on 21 and she recommended 6 months of adjuvant chemotherapy with FOLFOX.    FINAL DIAGNOSIS:   A. RIGHT ADNEXA, RIGHT SALPINGO-OOPHORECTOMY:   - Atrophic ovary   - Fallopian tube with paratubal cyst   - Negative for malignancy     B. SIGMOID COLON, UTERUS WITH CERVIX, LEFT ADNEXA, DOME OF BLADDER,   PARTIAL CYSTECTOMYEN BLOC WITH   SIGMOIDECTOMY ANDTOTAL ABDOMINAL HYSTERECTOMY   WITHLEFTSALPINGO-OOPHORECTOMY:   - Invasive adenocarcinoma, moderately differentiated, 8.6 cm in greatest   dimension   - Tumor invades the urinary bladder wall and bladder mucosa   - Tumor with adjacent inflammation/abscess adheres to anterior uterine   wall without microscopic involvement of   the myometrium by carcinoma   - All " resection margins (bladder margin, proximal, distal and mesenteric)   are negative   - Lymphovascular and perineural invasion is identified   - Colon with a tubular adenoma, 0.6 cm   - Bladder wall with chronic inflammation, fibrosis and adhesions   - Portion of the ureter with no significant histologic abnormality   - Benign cervix with nabothian cysts   - Atrophic endometrium with a submucosal leiomyoma (1.2 cm)   - Benign atrophic ovary   - Fallopian tube with no significant histologic abnormality   - One of forty eight lymph nodes positive for carcinoma (1/48)   - See synoptic report for details     C. ANASTOMOSIS RING, EXCISION:   - Benign colonic mucosa   - Negative for malignancy     D. APPENDIX, APPENDECTOMY:   - Appendix with obliteration (neuroma of tip), serositis and adhesions   - Negative for malignancy     PLAN  1.  High-fiber diet.  No activity restrictions.  Refer to dietitian for high-protein diet.  2.  Schedule Gastrografin enema in 2 weeks.    3.  Flexible sigmoidoscopy in clinic in 3 months, and to discuss potential ileostomy takedown after finalizing adjuvant chemotherapy.  3.  Colonoscopy in 1 year.  4.  Rest of cancer surveillance per medical oncology.    30 minutes spent on the date of the encounter doing chart review, history and exam, documentation and further activities as noted above.    Remi Moscoso M.D., M.Sc.     Division of Colon and Rectal Surgery  [facility]    Referring Provider:  Sarah Butler MD     Primary Care Provider:  Jami Bundy    CC:  Ramu Jackson MD

## 2021-02-09 NOTE — TELEPHONE ENCOUNTER
LVM x1 for Amy to answer any questions. Pt has a follow up appt next week with us. We can assess her incision at that time

## 2021-02-09 NOTE — PROGRESS NOTES
Amy with home care returned my call. Plan for now will be for gauze changes 1-3x a day and then we can look at her incision on Monday for her follow up with .

## 2021-02-09 NOTE — TELEPHONE ENCOUNTER
[facility] Call Center    Phone Message    May a detailed message be left on voicemail: yes     Reason for Call: Other: Cadence from [facility] called as patient's ostomy incision is healing well; however, there are 2 pin-point areas that are draining yellow fluid.  Cadence would like a call back from a nurse.  Please follow up with Cadence at [phone].  Thank you!     Action Taken: Message routed to:  [facility] ([facility]): [facility]    Travel Screening: Not Applicable

## 2021-02-12 NOTE — TELEPHONE ENCOUNTER
RECORDS RECEIVED FROM: internal /ce    DATE RECEIVED: 2.22.21    NOTES (FOR ALL VISITS) STATUS DETAILS   OFFICE NOTES from referring provider Internal  Remi Moscoso,   OFFICE NOTES from other specialist Internal/ce  [facility]- 12.29.20 [place]  [facility]- 6.26.20 [place]     ED NOTES Internal  1.5.21 [place]      OPERATIVE REPORT  (thyroid, pituitary, adrenal, parathyroid) na    MEDICATION LIST Internal     IMAGING      DEXASCAN na    MRI (BRAIN) na    XR (Chest) ce [facility]- 6.25.20,    CT (HEAD/NECK/CHEST/ABDOMEN) ce [facility]- 11.12.20, 10.17.20, 6.25.20,  PET- 12.22.20     NUCLEAR  na    ULTRASOUND (HEAD/NECK) Internal  12.31.20    LABS     DIABETES: HBGA1C, CREATININE, FASTING LIPIDS, MICROALBUMIN URINE, POTASSIUM, TSH, T4    THYROID: TSH, T4, CBC, THYRODLONULIN, TOTAL T3, FREE T4, CALCITONIN, CEA Internal/ce [facility]- 1.6.21   CREATININE 1.5.21  HBGA1C 1.8.20   LIPIDS 6.5.19   POTASSIUM 1.11.21  VITAMIN D 10.9.19

## 2021-02-15 ENCOUNTER — PRE VISIT (OUTPATIENT)
Dept: UROLOGY | Facility: CLINIC | Age: 76
End: 2021-02-15

## 2021-02-15 ENCOUNTER — OFFICE VISIT (OUTPATIENT)
Dept: SURGERY | Facility: CLINIC | Age: 76
End: 2021-02-15
Payer: COMMERCIAL

## 2021-02-15 ENCOUNTER — ONCOLOGY VISIT (OUTPATIENT)
Dept: ONCOLOGY | Facility: CLINIC | Age: 76
End: 2021-02-15
Attending: INTERNAL MEDICINE
Payer: COMMERCIAL

## 2021-02-15 VITALS
HEART RATE: 77 BPM | DIASTOLIC BLOOD PRESSURE: 65 MMHG | BODY MASS INDEX: 19.9 KG/M2 | WEIGHT: 105.4 LBS | HEIGHT: 61 IN | TEMPERATURE: 97.5 F | SYSTOLIC BLOOD PRESSURE: 110 MMHG | OXYGEN SATURATION: 100 %

## 2021-02-15 VITALS
TEMPERATURE: 97.5 F | HEART RATE: 77 BPM | DIASTOLIC BLOOD PRESSURE: 65 MMHG | SYSTOLIC BLOOD PRESSURE: 110 MMHG | WEIGHT: 105.38 LBS | OXYGEN SATURATION: 100 % | HEIGHT: 61 IN | BODY MASS INDEX: 19.9 KG/M2

## 2021-02-15 DIAGNOSIS — Z98.890 POSTOPERATIVE STATE: Primary | ICD-10-CM

## 2021-02-15 DIAGNOSIS — C18.7 MALIGNANT NEOPLASM OF SIGMOID COLON (H): Primary | ICD-10-CM

## 2021-02-15 LAB
ALBUMIN SERPL-MCNC: 3.5 G/DL (ref 3.4–5)
ALP SERPL-CCNC: 54 U/L (ref 40–150)
ALT SERPL W P-5'-P-CCNC: 9 U/L (ref 0–50)
ANION GAP SERPL CALCULATED.3IONS-SCNC: 9 MMOL/L (ref 3–14)
AST SERPL W P-5'-P-CCNC: 6 U/L (ref 0–45)
BASOPHILS # BLD AUTO: 0 10E9/L (ref 0–0.2)
BASOPHILS NFR BLD AUTO: 0.3 %
BILIRUB SERPL-MCNC: 0.2 MG/DL (ref 0.2–1.3)
BUN SERPL-MCNC: 45 MG/DL (ref 7–30)
CALCIUM SERPL-MCNC: 10 MG/DL (ref 8.5–10.1)
CEA SERPL-MCNC: 1.1 UG/L (ref 0–2.5)
CHLORIDE SERPL-SCNC: 113 MMOL/L (ref 94–109)
CO2 SERPL-SCNC: 16 MMOL/L (ref 20–32)
CREAT SERPL-MCNC: 1.69 MG/DL (ref 0.52–1.04)
DIFFERENTIAL METHOD BLD: ABNORMAL
EOSINOPHIL # BLD AUTO: 0.1 10E9/L (ref 0–0.7)
EOSINOPHIL NFR BLD AUTO: 2 %
ERYTHROCYTE [DISTWIDTH] IN BLOOD BY AUTOMATED COUNT: 16.6 % (ref 10–15)
FERRITIN SERPL-MCNC: 276 NG/ML (ref 8–252)
GFR SERPL CREATININE-BSD FRML MDRD: 29 ML/MIN/{1.73_M2}
GLUCOSE SERPL-MCNC: 94 MG/DL (ref 70–99)
HCT VFR BLD AUTO: 38.4 % (ref 35–47)
HGB BLD-MCNC: 11.9 G/DL (ref 11.7–15.7)
IMM GRANULOCYTES # BLD: 0 10E9/L (ref 0–0.4)
IMM GRANULOCYTES NFR BLD: 0.3 %
IRON SATN MFR SERPL: 15 % (ref 15–46)
IRON SERPL-MCNC: 33 UG/DL (ref 35–180)
LYMPHOCYTES # BLD AUTO: 1.3 10E9/L (ref 0.8–5.3)
LYMPHOCYTES NFR BLD AUTO: 17.9 %
MCH RBC QN AUTO: 27.7 PG (ref 26.5–33)
MCHC RBC AUTO-ENTMCNC: 31 G/DL (ref 31.5–36.5)
MCV RBC AUTO: 89 FL (ref 78–100)
MONOCYTES # BLD AUTO: 0.4 10E9/L (ref 0–1.3)
MONOCYTES NFR BLD AUTO: 5.6 %
NEUTROPHILS # BLD AUTO: 5.3 10E9/L (ref 1.6–8.3)
NEUTROPHILS NFR BLD AUTO: 73.9 %
NRBC # BLD AUTO: 0 10*3/UL
NRBC BLD AUTO-RTO: 0 /100
PLATELET # BLD AUTO: 296 10E9/L (ref 150–450)
POTASSIUM SERPL-SCNC: 4.3 MMOL/L (ref 3.4–5.3)
PROT SERPL-MCNC: 8.1 G/DL (ref 6.8–8.8)
RBC # BLD AUTO: 4.3 10E12/L (ref 3.8–5.2)
SODIUM SERPL-SCNC: 138 MMOL/L (ref 133–144)
TIBC SERPL-MCNC: 222 UG/DL (ref 240–430)
WBC # BLD AUTO: 7.1 10E9/L (ref 4–11)

## 2021-02-15 PROCEDURE — 99024 POSTOP FOLLOW-UP VISIT: CPT | Performed by: COLON & RECTAL SURGERY

## 2021-02-15 PROCEDURE — 99214 OFFICE O/P EST MOD 30 MIN: CPT | Performed by: INTERNAL MEDICINE

## 2021-02-15 PROCEDURE — 85025 COMPLETE CBC W/AUTO DIFF WBC: CPT | Performed by: INTERNAL MEDICINE

## 2021-02-15 PROCEDURE — 82378 CARCINOEMBRYONIC ANTIGEN: CPT | Performed by: INTERNAL MEDICINE

## 2021-02-15 PROCEDURE — G0463 HOSPITAL OUTPT CLINIC VISIT: HCPCS

## 2021-02-15 PROCEDURE — 83540 ASSAY OF IRON: CPT | Performed by: INTERNAL MEDICINE

## 2021-02-15 PROCEDURE — 80053 COMPREHEN METABOLIC PANEL: CPT | Performed by: INTERNAL MEDICINE

## 2021-02-15 PROCEDURE — 82728 ASSAY OF FERRITIN: CPT | Performed by: INTERNAL MEDICINE

## 2021-02-15 PROCEDURE — 83550 IRON BINDING TEST: CPT | Performed by: INTERNAL MEDICINE

## 2021-02-15 PROCEDURE — 36415 COLL VENOUS BLD VENIPUNCTURE: CPT

## 2021-02-15 ASSESSMENT — PAIN SCALES - GENERAL
PAINLEVEL: NO PAIN (0)
PAINLEVEL: NO PAIN (0)

## 2021-02-15 ASSESSMENT — MIFFLIN-ST. JEOR
SCORE: 915.47
SCORE: 915.38

## 2021-02-15 NOTE — NURSING NOTE
"Oncology Rooming Note    February 15, 2021 12:37 PM   Myla Glynn is a 76 year old female who presents for:    Chief Complaint   Patient presents with     Oncology Clinic Visit     RETURN; MALIGNANT NEOPLASM OF SIGMOID COLON     Initial Vitals: /65   Pulse 77   Temp 97.5  F (36.4  C) (Oral)   Ht 1.549 m (5' 1\")   Wt 47.8 kg (105 lb 6.1 oz)   SpO2 100%   BMI 19.91 kg/m   Estimated body mass index is 19.91 kg/m  as calculated from the following:    Height as of this encounter: 1.549 m (5' 1\").    Weight as of this encounter: 47.8 kg (105 lb 6.1 oz). Body surface area is 1.43 meters squared.  No Pain (0) Comment: Data Unavailable   No LMP recorded. Patient has had a hysterectomy.  Allergies reviewed: Yes  Medications reviewed: Yes    Medications: Medication refills not needed today.  Pharmacy name entered into [employer]: [id number] - [place], [place] - [address] AT [facility] [address]    Clinical concerns: Poor appetite.        Anabel Soni CMA              "

## 2021-02-15 NOTE — PATIENT INSTRUCTIONS
Follow up:    1. GGE in 2 weeks     2. Nutrition referral     3. Flexible sigmoidoscopy in 3 months     Please call with any questions or concerns regarding your clinic visit today.    It is a pleasure being involved in your health care.    Contacts post-consultation depending on your need:    Radiology Appointments [phone]    Schedule Clinic Appointments [phone] # 1   M-F 7:30 - 5 pm    JAMARI Landaverde [phone]    Clinic Fax Number [phone]    Surgery Scheduling [phone]    My Chart is available 24 hours a day and is a secure way to access your records and communicate with your care team.  I strongly recommend signing up if you haven't already done so, if you are comfortable with computers.  If you would like to inquire about this or are having problems with My Chart access, you may call [phone] or go online at [email or web address].  Please allow at least 24 hours for a response and extra time on weekends and Holidays.

## 2021-02-15 NOTE — LETTER
2/15/2021         RE: Myla Glynn  [address]  [address]  [place] [address]        Dear Colleague,    Thank you for referring your patient, Myla Glynn, to the [facility]. Please see a copy of my visit note below.    [facility]  MEDICAL ONCOLOGY    RETURN PATIENT VISIT NOTE    Reason for visit: sigmoid colon cancer      Oncology Treatment Summary  1. 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 October 2020 presented to PCP with recurrent dysuria, hematuria, 10/17/2020 CT AP ws done again with colovesicular fistula with a mass seen invading from colon into bladder.  3. 11/16 colonoscopy at [facility] with reported mass at 40 cm, bx with adenocarcinoma, I am unable to find MMR  4. 11/23 CEA 9.7  5. 12/22/2020 MRI pelvis locally advanced disease with multiple enlarged regional lymph nodes  6. 12/22/2020 PET some uptake in mesenteric area, no distant disease  7. 1/5/21 left hemicolectomy, DOC, BSO, partial cystectomy     HISTORY OF PRESENTING ILLNESS  Patient is a pleasant 76-year-old who is seen today via a face-to-face visit.  She has a cousin with her today.  She states she is doing better.  She still has some nausea and is not eating well but is doing better than she was previously.  She believes her weight is stabilizing.  She does not have any pain and is not taking any pain meds.  Her surgical incision sites are healing well and is having no issues with these.  She is wondering if she should go back on her antihypertensives and diabetes medications as these have been stable.  She is here today today to discuss chemotherapy and what it would involve.     PAST MEDICAL HISTORY  Gastroesophageal reflux disease, hypertension, type 2 diabetes      CURRENT OUTPATIENT MEDICATIONS  Current Outpatient Medications   Medication     acetaminophen (TYLENOL) 500 MG tablet      loperamide (IMODIUM) 2 MG capsule     omeprazole (PRILOSEC) 20 MG DR capsule     ondansetron (ZOFRAN-ODT) 4 MG ODT tab     psyllium (METAMUCIL/KONSYL) Packet     traMADol (ULTRAM) 50 MG tablet     No current facility-administered medications for this visit.         ALLERGIES   No Known Allergies     SOCIAL HISTORY  She is originally from [place].  She is a , she lives in [place] with her daughter no tobacco or alcohol use     FAMILY HISTORY  No malignancies     REVIEW OF SYSTEMS  Review Of Systems  Skin: negative  Eyes: negative  Ears/Nose/Throat: negative  Respiratory: No shortness of breath, dyspnea on exertion, cough, or hemoptysis  Cardiovascular: negative  Gastrointestinal: negative as per HPI  Genitourinary: negative  Musculoskeletal: negative  Neurologic: negative  Psychiatric: negative  Hematologic/Lymphatic/Immunologic: negative  Endocrine: negative      PHYSICAL EXAM  B/P: Data Unavailable, T: Data Unavailable, P: Data Unavailable, R: Data Unavailable  Wt Readings from Last 3 Encounters:   02/15/21 47.8 kg (105 lb 6.4 oz)   01/22/21 49.4 kg (108 lb 12.8 oz)   01/10/21 56.5 kg (124 lb 8 oz)       ECOG PPS 1-2    Physical Exam  HENT:      Head: Normocephalic and atraumatic.   Eyes:      Extraocular Movements: Extraocular movements intact.      Conjunctiva/sclera: Conjunctivae normal.      Pupils: Pupils are equal, round, and reactive to light.   Pulmonary:      Effort: Pulmonary effort is normal.   Neurological:      General: No focal deficit present.      Mental Status: She is alert and oriented to person, place, and time. Mental status is at baseline.   Psychiatric:         Mood and Affect: Mood normal.         Behavior: Behavior normal.         Thought Content: Thought content normal.         Judgment: Judgment normal.              LABORATORY AND IMAGING STUDIES  Recent Labs   Lab Test 01/12/21  2234 01/11/21  0637 01/11/21  0051 01/10/21  1937 01/10/21  0729 01/09/21  0754 01/08/21  0534  01/07/21 0436    144  --   --   --  141 140 136   POTASSIUM 3.6 3.7 3.6 3.4 3.2* 3.7 3.7 4.3   CHLORIDE 108 110*  --   --   --  110* 110* 105   CO2 26 24  --   --   --  20 22 22   ANIONGAP 8 11  --   --   --  11 7 9   BUN 16 15  --   --   --  21 20 23   CR 1.10* 0.77  --   --   --  0.96 1.10* 1.68*   GLC 90 86  --   --   --  70 93 113*   DICKSON 7.9* 8.6  --   --   --  8.6 8.8 8.0*     Recent Labs   Lab Test 01/12/21  2234 01/12/21  0757 01/11/21  0637 01/10/21  0729 01/07/21 0436 01/06/21 0454 01/05/21 2239   MAG 1.9 2.0 1.5* 1.6 2.0 1.6 1.7   PHOS 4.2  --   --  2.5 3.5 5.4* 5.0*     Recent Labs   Lab Test 01/11/21 0637 01/09/21 0754 01/08/21  0534 01/07/21  1418 01/07/21 0436 01/06/21 0454 01/05/21 1836 12/29/20  1122 12/29/20  1122 11/23/20  1432   WBC  --   --   --   --   --  13.7* 12.9*  --  9.8 8.3   HGB  --  8.9* 9.6* 8.9* 6.7* 8.2* 9.1*   < > 8.2* 8.4*     --   --   --   --  255 283  --  439 371   MCV  --   --   --   --   --  85 83  --  80 74*    < > = values in this interval not displayed.     Recent Labs   Lab Test 01/05/21  1836 11/23/20  1432   BILITOTAL 1.6* 0.3   ALKPHOS 23* 56   ALT 8 12   AST 16 11   ALBUMIN 2.2* 3.4     No results found for: TSH  Recent Labs   Lab Test 11/23/20  1432   CEA 9.7*     Results for orders placed or performed in visit on 01/19/21   XR Cystogram    Narrative    XR CYSTOGRAM   1/19/2021 2:46 PM    HISTORY:  Bladder mass     COMPARISON: Correlation made with MR pelvis 12/22/2020    Fluoroscopy time: 60 0.6 minutes    FINDINGS: The  film demonstrates air distended loops of small and  large bowel in a nonobstructive pattern. Left nephroureteral stent and  pelvic surgical drain in place. Anastomotic staple line and surgical  clips project over the pelvis. There is no abnormal mass or  calcification. No bony abnormality is identified.  A voiding  cystourethrogram was performed with 60 cc CystoConray II introduced  into the bladder via a pre-existing  catheter. Single cycle of filling  and voiding were observed, revealing surgical changes of partial  cystectomy with luminal irregularity along the resection margin. At  full distention there is no extraluminal leakage of contrast. There is  no reflux. The urethra is normal.      Impression    IMPRESSION: Status post partial cystectomy, no evidence of leak.    I, JASON MCCORMICK DO, attest that I was present for all critical  portions of the procedure and was immediately available to provide  guidance and assistance during the remainder of the procedure.    I have personally reviewed the examination and initial interpretation  and I agree with the findings.    JASON MCCORMICK DO     SPECIMEN     Procedure:         Partial cystectomy, sigmoidectomy, total abdominal hysterectomy and         bilateral salphingo-oophorectomy     TUMOR     Tumor Site:         - Sigmoid colon     Histologic Type:         - Adenocarcinoma     Histologic Grade:         - G2: Moderately differentiated     Tumor Size: 8.6 x 6.3 x 1.6 Centimeters (cm)     Tumor Extension:         - Tumor directly invades adjacent structures - Urinary Bladder     Macroscopic Tumor Perforation:         - Present     Lymphovascular Invasion:         - Present       Lymphovascular Invasion Type:           - Small vessel lymphovascular invasion     Perineural Invasion:         - Present     Treatment Effect:         - No known presurgical therapy     MARGINS     Margins:         - All margins are uninvolved by invasive carcinoma, high grade   dysplasia /         intramucosal carcinoma, and low grade dysplasia     Margins Examined:         - Proximal         - Distal         - Radial (circumferential) or Mesenteric         Bladder wall margin     Distance of Invasive Carcinoma from Closest Margin:         42 mm     Closest Margin:         - Radial (circumferential) or Mesenteric     Distance of Tumor from Radial (circumferential) Margin:         42 mm     Distance  of Tumor from Distal Margin:         58 mm     LYMPH NODES     Number of Lymph Nodes Involved:         1     Number of Lymph Nodes Examined:         48     Tumor Deposits:         - Not identified     PATHOLOGIC STAGE CLASSIFICATION (PTNM, AJCC 8TH EDITION)     Primary Tumor (pT):         - pT4b     Regional Lymph Nodes (pN):         - pN1a     ADDITIONAL FINDINGS     Additional Findings:         - Adenoma(s)        ASSESSMENT  AND RECOMMENDATIONS:  1.  T4 N1 M0 adenocarcinoma of the sigmoid colon with perforation and invasion into the bladder with 1 of 48 lymph nodes involved, lymphovascular invasion was noted as was perineural invasion and an elevated preoperative CEA status post en bloc resection  2. history of hypertension  3. Diabetes  4 Weight loss  5.  Deconditioning    Plan      1.  Her blood pressure is doing quite well currently off her antihypertensives and I suspect this is somewhat due to the weight that she is lost through all of this.  I have suggested she stay off of them for this time..  With regard to her Metformin I have suggested she talk about this with her primary care provider.  2.  We discussed again the role of adjuvant chemotherapy in reducing the risk of systemic recurrence.  She did have 1 lymph node involved with a T4 primary lesion with clear margins.  We discussed chemotherapy regimen options such as FOLFOX chemotherapy, infusional 5-FU, or oral Xeloda.  After discussing this she is interested in doing oral Xeloda and would like to at least try it to see if she can tolerate it.  We discussed the risk benefits alternatives and side effects and I do think her performance status is such that we could consider it.  At this point we will send a prescription to the oral pharmacy team and obtain preauthorization for drug.  I would want to see her after she has been on her first week of therapy.  We will additionally do labs today    Sarah Butler MD on 2/15/2021 at 4:49  PM

## 2021-02-15 NOTE — NURSING NOTE
"Chief Complaint   Patient presents with     Surgical Followup     Post-op follow up, DOS: 01/05/2021       Vitals:    02/15/21 1107   BP: 110/65   BP Location: Left arm   Patient Position: Sitting   Cuff Size: Adult Small   Pulse: 77   Temp: 97.5  F (36.4  C)   TempSrc: Oral   SpO2: 100%   Weight: 105 lb 6.4 oz   Height: 5' 1\"       Body mass index is 19.92 kg/m .       Rachael Nickerson CMA    "

## 2021-02-15 NOTE — PROGRESS NOTES
[facility]  MEDICAL ONCOLOGY    RETURN PATIENT VISIT NOTE    Reason for visit: sigmoid colon cancer      Oncology Treatment Summary  1. 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 October 2020 presented to PCP with recurrent dysuria, hematuria, 10/17/2020 CT AP ws done again with colovesicular fistula with a mass seen invading from colon into bladder.  3. 11/16 colonoscopy at [facility] with reported mass at 40 cm, bx with adenocarcinoma, I am unable to find MMR  4. 11/23 CEA 9.7  5. 12/22/2020 MRI pelvis locally advanced disease with multiple enlarged regional lymph nodes  6. 12/22/2020 PET some uptake in mesenteric area, no distant disease  7. 1/5/21 left hemicolectomy, DOC, BSO, partial cystectomy     HISTORY OF PRESENTING ILLNESS  Patient is a pleasant 76-year-old who is seen today via a face-to-face visit.  She has a cousin with her today.  She states she is doing better.  She still has some nausea and is not eating well but is doing better than she was previously.  She believes her weight is stabilizing.  She does not have any pain and is not taking any pain meds.  Her surgical incision sites are healing well and is having no issues with these.  She is wondering if she should go back on her antihypertensives and diabetes medications as these have been stable.  She is here today today to discuss chemotherapy and what it would involve.     PAST MEDICAL HISTORY  Gastroesophageal reflux disease, hypertension, type 2 diabetes      CURRENT OUTPATIENT MEDICATIONS  Current Outpatient Medications   Medication     acetaminophen (TYLENOL) 500 MG tablet     loperamide (IMODIUM) 2 MG capsule     omeprazole (PRILOSEC) 20 MG DR capsule     ondansetron (ZOFRAN-ODT) 4 MG ODT tab     psyllium (METAMUCIL/KONSYL) Packet     traMADol (ULTRAM) 50 MG tablet     No current facility-administered medications for this visit.         ALLERGIES   No  Known Allergies     SOCIAL HISTORY  She is originally from [place].  She is a , she lives in [place] with her daughter no tobacco or alcohol use     FAMILY HISTORY  No malignancies     REVIEW OF SYSTEMS  Review Of Systems  Skin: negative  Eyes: negative  Ears/Nose/Throat: negative  Respiratory: No shortness of breath, dyspnea on exertion, cough, or hemoptysis  Cardiovascular: negative  Gastrointestinal: negative as per HPI  Genitourinary: negative  Musculoskeletal: negative  Neurologic: negative  Psychiatric: negative  Hematologic/Lymphatic/Immunologic: negative  Endocrine: negative      PHYSICAL EXAM  B/P: Data Unavailable, T: Data Unavailable, P: Data Unavailable, R: Data Unavailable  Wt Readings from Last 3 Encounters:   02/15/21 47.8 kg (105 lb 6.4 oz)   01/22/21 49.4 kg (108 lb 12.8 oz)   01/10/21 56.5 kg (124 lb 8 oz)       ECOG PPS 1-2    Physical Exam  HENT:      Head: Normocephalic and atraumatic.   Eyes:      Extraocular Movements: Extraocular movements intact.      Conjunctiva/sclera: Conjunctivae normal.      Pupils: Pupils are equal, round, and reactive to light.   Pulmonary:      Effort: Pulmonary effort is normal.   Neurological:      General: No focal deficit present.      Mental Status: She is alert and oriented to person, place, and time. Mental status is at baseline.   Psychiatric:         Mood and Affect: Mood normal.         Behavior: Behavior normal.         Thought Content: Thought content normal.         Judgment: Judgment normal.              LABORATORY AND IMAGING STUDIES  Recent Labs   Lab Test 01/12/21  2234 01/11/21  0637 01/11/21  0051 01/10/21  1937 01/10/21  0729 01/09/21  0754 01/08/21  0534 01/07/21  0436    144  --   --   --  141 140 136   POTASSIUM 3.6 3.7 3.6 3.4 3.2* 3.7 3.7 4.3   CHLORIDE 108 110*  --   --   --  110* 110* 105   CO2 26 24  --   --   --  20 22 22   ANIONGAP 8 11  --   --   --  11 7 9   BUN 16 15  --   --   --  21 20 23   CR 1.10* 0.77  --   --   --   0.96 1.10* 1.68*   GLC 90 86  --   --   --  70 93 113*   DICSKON 7.9* 8.6  --   --   --  8.6 8.8 8.0*     Recent Labs   Lab Test 01/12/21 2234 01/12/21 0757 01/11/21 0637 01/10/21  0729 01/07/21  0436 01/06/21  0454 01/05/21  2239   MAG 1.9 2.0 1.5* 1.6 2.0 1.6 1.7   PHOS 4.2  --   --  2.5 3.5 5.4* 5.0*     Recent Labs   Lab Test 01/11/21 0637 01/09/21  0754 01/08/21  0534 01/07/21  1418 01/07/21  0436 01/06/21  0454 01/05/21  1836 12/29/20  1122 12/29/20  1122 11/23/20  1432   WBC  --   --   --   --   --  13.7* 12.9*  --  9.8 8.3   HGB  --  8.9* 9.6* 8.9* 6.7* 8.2* 9.1*   < > 8.2* 8.4*     --   --   --   --  255 283  --  439 371   MCV  --   --   --   --   --  85 83  --  80 74*    < > = values in this interval not displayed.     Recent Labs   Lab Test 01/05/21  1836 11/23/20  1432   BILITOTAL 1.6* 0.3   ALKPHOS 23* 56   ALT 8 12   AST 16 11   ALBUMIN 2.2* 3.4     No results found for: TSH  Recent Labs   Lab Test 11/23/20  1432   CEA 9.7*     Results for orders placed or performed in visit on 01/19/21   XR Cystogram    Narrative    XR CYSTOGRAM   1/19/2021 2:46 PM    HISTORY:  Bladder mass     COMPARISON: Correlation made with MR pelvis 12/22/2020    Fluoroscopy time: 60 0.6 minutes    FINDINGS: The  film demonstrates air distended loops of small and  large bowel in a nonobstructive pattern. Left nephroureteral stent and  pelvic surgical drain in place. Anastomotic staple line and surgical  clips project over the pelvis. There is no abnormal mass or  calcification. No bony abnormality is identified.  A voiding  cystourethrogram was performed with 60 cc CystoConray II introduced  into the bladder via a pre-existing catheter. Single cycle of filling  and voiding were observed, revealing surgical changes of partial  cystectomy with luminal irregularity along the resection margin. At  full distention there is no extraluminal leakage of contrast. There is  no reflux. The urethra is normal.      Impression     IMPRESSION: Status post partial cystectomy, no evidence of leak.    I, JASON MCCOMRICK DO, attest that I was present for all critical  portions of the procedure and was immediately available to provide  guidance and assistance during the remainder of the procedure.    I have personally reviewed the examination and initial interpretation  and I agree with the findings.    JASON MCCORMICK DO     SPECIMEN     Procedure:         Partial cystectomy, sigmoidectomy, total abdominal hysterectomy and         bilateral salphingo-oophorectomy     TUMOR     Tumor Site:         - Sigmoid colon     Histologic Type:         - Adenocarcinoma     Histologic Grade:         - G2: Moderately differentiated     Tumor Size: 8.6 x 6.3 x 1.6 Centimeters (cm)     Tumor Extension:         - Tumor directly invades adjacent structures - Urinary Bladder     Macroscopic Tumor Perforation:         - Present     Lymphovascular Invasion:         - Present       Lymphovascular Invasion Type:           - Small vessel lymphovascular invasion     Perineural Invasion:         - Present     Treatment Effect:         - No known presurgical therapy     MARGINS     Margins:         - All margins are uninvolved by invasive carcinoma, high grade   dysplasia /         intramucosal carcinoma, and low grade dysplasia     Margins Examined:         - Proximal         - Distal         - Radial (circumferential) or Mesenteric         Bladder wall margin     Distance of Invasive Carcinoma from Closest Margin:         42 mm     Closest Margin:         - Radial (circumferential) or Mesenteric     Distance of Tumor from Radial (circumferential) Margin:         42 mm     Distance of Tumor from Distal Margin:         58 mm     LYMPH NODES     Number of Lymph Nodes Involved:         1     Number of Lymph Nodes Examined:         48     Tumor Deposits:         - Not identified     PATHOLOGIC STAGE CLASSIFICATION (PTNM, AJCC 8TH EDITION)     Primary Tumor (pT):         - pT4b      Regional Lymph Nodes (pN):         - pN1a     ADDITIONAL FINDINGS     Additional Findings:         - Adenoma(s)        ASSESSMENT  AND RECOMMENDATIONS:  1.  T4 N1 M0 adenocarcinoma of the sigmoid colon with perforation and invasion into the bladder with 1 of 48 lymph nodes involved, lymphovascular invasion was noted as was perineural invasion and an elevated preoperative CEA status post en bloc resection  2. history of hypertension  3. Diabetes  4 Weight loss  5.  Deconditioning    Plan      1.  Her blood pressure is doing quite well currently off her antihypertensives and I suspect this is somewhat due to the weight that she is lost through all of this.  I have suggested she stay off of them for this time..  With regard to her Metformin I have suggested she talk about this with her primary care provider.  2.  We discussed again the role of adjuvant chemotherapy in reducing the risk of systemic recurrence.  She did have 1 lymph node involved with a T4 primary lesion with clear margins.  We discussed chemotherapy regimen options such as FOLFOX chemotherapy, infusional 5-FU, or oral Xeloda.  After discussing this she is interested in doing oral Xeloda and would like to at least try it to see if she can tolerate it.  We discussed the risk benefits alternatives and side effects and I do think her performance status is such that we could consider it.  At this point we will send a prescription to the oral pharmacy team and obtain preauthorization for drug.  I would want to see her after she has been on her first week of therapy.  We will additionally do labs today    Sarah Butler MD on 2/15/2021 at 4:49 PM

## 2021-02-15 NOTE — LETTER
"2/15/2021       RE: Myla Glynn  [address]  [address]  [place] [address]     Dear Colleague,    Thank you for referring your patient, Myla Glynn, to the [facility] [place] [facility]. Please see a copy of my visit note below.    Colon and Rectal Surgery Follow-up Clinic Note      RE: Myla Glynn  : 1945  MINDI: 2/15/2021    DIAGNOSIS: 76 year old female with locally advanced sigmoid adenocarcinoma and colovesical fistula who is now status post diagnostic laparoscopy, takedown of splenic flexure, sigmoidectomy with en bloc partial cystectomy and total abdominal hysterectomy with left salpingo-oophorectomy, right salpingo-oophorectomy, reimplantation of left ureter, partial omentectomy, appendectomy, flexible sigmoidoscopy, cystoscopy, and diverting loop ileostomy on 2021.    INTERVAL HISTORY: Denies increased pain, fevers, or chills. Tolerating diet well although appetite is poor. Ileostomy functioning well with no issues with pouching. Output is approximately 600 mL/24 hours. Off narcotic pain meds.    Physical Examination:  /65 (BP Location: Left arm, Patient Position: Sitting, Cuff Size: Adult Small)   Pulse 77   Temp 97.5  F (36.4  C) (Oral)   Ht 5' 1\"   Wt 105 lb 6.4 oz   SpO2 100%   BMI 19.92 kg/m    Abdomen soft, nontender.  Right-sided ileostomy viable and functioning. Incision with no evidence of infection or hernia.    ASSESSMENT  Doing well postop. She met with Dr. Butler of medical oncology on 21 and she recommended 6 months of adjuvant chemotherapy with FOLFOX.    FINAL DIAGNOSIS:   A. RIGHT ADNEXA, RIGHT SALPINGO-OOPHORECTOMY:   - Atrophic ovary   - Fallopian tube with paratubal cyst   - Negative for malignancy     B. SIGMOID COLON, UTERUS WITH CERVIX, LEFT ADNEXA, DOME OF BLADDER,   PARTIAL CYSTECTOMYEN BLOC WITH   SIGMOIDECTOMY ANDTOTAL ABDOMINAL " HYSTERECTOMY   WITHLEFTSALPINGO-OOPHORECTOMY:   - Invasive adenocarcinoma, moderately differentiated, 8.6 cm in greatest   dimension   - Tumor invades the urinary bladder wall and bladder mucosa   - Tumor with adjacent inflammation/abscess adheres to anterior uterine   wall without microscopic involvement of   the myometrium by carcinoma   - All resection margins (bladder margin, proximal, distal and mesenteric)   are negative   - Lymphovascular and perineural invasion is identified   - Colon with a tubular adenoma, 0.6 cm   - Bladder wall with chronic inflammation, fibrosis and adhesions   - Portion of the ureter with no significant histologic abnormality   - Benign cervix with nabothian cysts   - Atrophic endometrium with a submucosal leiomyoma (1.2 cm)   - Benign atrophic ovary   - Fallopian tube with no significant histologic abnormality   - One of forty eight lymph nodes positive for carcinoma (1/48)   - See synoptic report for details     C. ANASTOMOSIS RING, EXCISION:   - Benign colonic mucosa   - Negative for malignancy     D. APPENDIX, APPENDECTOMY:   - Appendix with obliteration (neuroma of tip), serositis and adhesions   - Negative for malignancy     PLAN  1.  High-fiber diet.  No activity restrictions.  Refer to dietitian for high-protein diet.  2.  Schedule Gastrografin enema in 2 weeks.    3.  Flexible sigmoidoscopy in clinic in 3 months, and to discuss potential ileostomy takedown after finalizing adjuvant chemotherapy.  3.  Colonoscopy in 1 year.  4.  Rest of cancer surveillance per medical oncology.    30 minutes spent on the date of the encounter doing chart review, history and exam, documentation and further activities as noted above.    Remi Moscoso M.D., M.Sc.     Division of Colon and Rectal Surgery  [facility]    Referring Provider:  Sarah Butler MD     Primary Care Provider:  Jami Bundy    CC:  Ramu Bhatti MD    Sugey Jackson MD

## 2021-02-15 NOTE — NURSING NOTE
Labs were drawn on this pt in the clinic per MD request.  Labs drawn via her left arm.   Desirae Schaefer MA

## 2021-02-16 ENCOUNTER — TELEPHONE (OUTPATIENT)
Dept: GASTROENTEROLOGY | Facility: OUTPATIENT CENTER | Age: 76
End: 2021-02-16

## 2021-02-16 NOTE — TELEPHONE ENCOUNTER
Nutrition Education Scheduling Outreach #1:    Call to patient to schedule. Left message with phone number to call to schedule.    Plan for 2nd outreach attempt within 1 week.    Francine Barry  [facility]  Diabetes and Nutrition Scheduling

## 2021-02-17 ENCOUNTER — TELEPHONE (OUTPATIENT)
Dept: ONCOLOGY | Facility: CLINIC | Age: 76
End: 2021-02-17

## 2021-02-17 DIAGNOSIS — C18.7 MALIGNANT NEOPLASM OF SIGMOID COLON (H): Primary | ICD-10-CM

## 2021-02-17 RX ORDER — HEPARIN SODIUM,PORCINE 10 UNIT/ML
5 VIAL (ML) INTRAVENOUS
Status: CANCELLED | OUTPATIENT
Start: 2021-02-19

## 2021-02-17 RX ORDER — HEPARIN SODIUM (PORCINE) LOCK FLUSH IV SOLN 100 UNIT/ML 100 UNIT/ML
5 SOLUTION INTRAVENOUS
Status: CANCELLED | OUTPATIENT
Start: 2021-02-19

## 2021-02-18 ENCOUNTER — INFUSION THERAPY VISIT (OUTPATIENT)
Dept: ONCOLOGY | Facility: CLINIC | Age: 76
End: 2021-02-18
Attending: INTERNAL MEDICINE
Payer: COMMERCIAL

## 2021-02-18 ENCOUNTER — TELEPHONE (OUTPATIENT)
Dept: ONCOLOGY | Facility: CLINIC | Age: 76
End: 2021-02-18

## 2021-02-18 VITALS
SYSTOLIC BLOOD PRESSURE: 102 MMHG | TEMPERATURE: 98.1 F | RESPIRATION RATE: 16 BRPM | DIASTOLIC BLOOD PRESSURE: 54 MMHG | HEART RATE: 58 BPM | OXYGEN SATURATION: 100 %

## 2021-02-18 DIAGNOSIS — Z87.440 PERSONAL HISTORY OF URINARY TRACT INFECTION: Primary | ICD-10-CM

## 2021-02-18 DIAGNOSIS — R30.0 BURNING WITH URINATION: ICD-10-CM

## 2021-02-18 DIAGNOSIS — C18.7 MALIGNANT NEOPLASM OF SIGMOID COLON (H): Primary | ICD-10-CM

## 2021-02-18 DIAGNOSIS — C18.7 MALIGNANT NEOPLASM OF SIGMOID COLON (H): ICD-10-CM

## 2021-02-18 DIAGNOSIS — D50.8 OTHER IRON DEFICIENCY ANEMIA: Primary | ICD-10-CM

## 2021-02-18 LAB
ALBUMIN UR-MCNC: 100 MG/DL
ANION GAP SERPL CALCULATED.3IONS-SCNC: 9 MMOL/L (ref 3–14)
APPEARANCE UR: ABNORMAL
BACTERIA #/AREA URNS HPF: ABNORMAL /HPF
BILIRUB UR QL STRIP: NEGATIVE
BUN SERPL-MCNC: 39 MG/DL (ref 7–30)
CALCIUM SERPL-MCNC: 7.2 MG/DL (ref 8.5–10.1)
CHLORIDE SERPL-SCNC: 122 MMOL/L (ref 94–109)
CO2 SERPL-SCNC: 13 MMOL/L (ref 20–32)
COLOR UR AUTO: YELLOW
CREAT SERPL-MCNC: 1.27 MG/DL (ref 0.52–1.04)
GFR SERPL CREATININE-BSD FRML MDRD: 42 ML/MIN/{1.73_M2}
GLUCOSE SERPL-MCNC: 72 MG/DL (ref 70–99)
GLUCOSE UR STRIP-MCNC: NEGATIVE MG/DL
HGB UR QL STRIP: ABNORMAL
KETONES UR STRIP-MCNC: NEGATIVE MG/DL
LEUKOCYTE ESTERASE UR QL STRIP: ABNORMAL
MUCOUS THREADS #/AREA URNS LPF: PRESENT /LPF
NITRATE UR QL: NEGATIVE
PH UR STRIP: 5 PH (ref 5–7)
POTASSIUM SERPL-SCNC: 3.4 MMOL/L (ref 3.4–5.3)
RBC #/AREA URNS AUTO: 63 /HPF (ref 0–2)
SODIUM SERPL-SCNC: 144 MMOL/L (ref 133–144)
SOURCE: ABNORMAL
SP GR UR STRIP: 1.01 (ref 1–1.03)
SQUAMOUS #/AREA URNS AUTO: 1 /HPF (ref 0–1)
UROBILINOGEN UR STRIP-MCNC: 0 MG/DL (ref 0–2)
WBC #/AREA URNS AUTO: >182 /HPF (ref 0–5)
WBC CLUMPS #/AREA URNS HPF: PRESENT /HPF

## 2021-02-18 PROCEDURE — 87086 URINE CULTURE/COLONY COUNT: CPT | Performed by: INTERNAL MEDICINE

## 2021-02-18 PROCEDURE — 81001 URINALYSIS AUTO W/SCOPE: CPT | Performed by: INTERNAL MEDICINE

## 2021-02-18 PROCEDURE — 96361 HYDRATE IV INFUSION ADD-ON: CPT

## 2021-02-18 PROCEDURE — 999N000127 HC STATISTIC PERIPHERAL IV START W US GUIDANCE

## 2021-02-18 PROCEDURE — 96360 HYDRATION IV INFUSION INIT: CPT

## 2021-02-18 PROCEDURE — 80048 BASIC METABOLIC PNL TOTAL CA: CPT | Performed by: INTERNAL MEDICINE

## 2021-02-18 PROCEDURE — 258N000003 HC RX IP 258 OP 636: Performed by: INTERNAL MEDICINE

## 2021-02-18 RX ORDER — SULFAMETHOXAZOLE/TRIMETHOPRIM 800-160 MG
1 TABLET ORAL 2 TIMES DAILY
Qty: 10 TABLET | Refills: 0 | Status: ON HOLD | OUTPATIENT
Start: 2021-02-18 | End: 2021-05-05

## 2021-02-18 RX ORDER — MIRTAZAPINE 15 MG/1
15 TABLET, ORALLY DISINTEGRATING ORAL AT BEDTIME
Qty: 30 TABLET | Refills: 3 | Status: SHIPPED | OUTPATIENT
Start: 2021-02-18 | End: 2024-01-01

## 2021-02-18 RX ORDER — HEPARIN SODIUM (PORCINE) LOCK FLUSH IV SOLN 100 UNIT/ML 100 UNIT/ML
5 SOLUTION INTRAVENOUS
Status: CANCELLED | OUTPATIENT
Start: 2021-02-18

## 2021-02-18 RX ORDER — HEPARIN SODIUM,PORCINE 10 UNIT/ML
5 VIAL (ML) INTRAVENOUS
Status: CANCELLED | OUTPATIENT
Start: 2021-02-18

## 2021-02-18 RX ADMIN — SODIUM CHLORIDE 1000 ML: 9 INJECTION, SOLUTION INTRAVENOUS at 08:00

## 2021-02-18 ASSESSMENT — PAIN SCALES - GENERAL: PAINLEVEL: NO PAIN (0)

## 2021-02-18 NOTE — TELEPHONE ENCOUNTER
RN CARE COORDINATION    Outgoing Call:   Spoke with Michele re: UTI results and plan for antibiotics. Informed Michele the antibiotic is at her local pharmacy. She will  today t have her mother start.     Discussed importance of fluid intake for Nechi while her mother is on chemotherapy. Michele stated her mother is trying to drink more and she is encouraging her mother to try. Discussed strategies that might be helpful for her mother. Michele will continue to encourage her to increase fluid intake.     Encouraged Michele to call with any additional questions or concerns.       TONA Mars, RN  RN Care Coordinator  [facility]

## 2021-02-18 NOTE — PROGRESS NOTES
thyroid nodule  Tomasa Driver CMA    Myla is a 76 year old who is being evaluated via a billable video visit.      How would you like to obtain your AVS? MyChart  If the video visit is dropped, the invitation should be resent by: [phone]  Will anyone else be joining your video visit? Yes: daughter. How would they like to receive their invitation? Text to cell phone: [phone]

## 2021-02-18 NOTE — PROGRESS NOTES
Infusion Nursing Note:  Myla Glynn presents today for IVF.    Patient seen by provider today: No   present during visit today: Not Applicable.    Note: Pt arrives feeling ok, denies any current pain. She continues to have fatigue and also complains of burning with urination. She states the burning is often and started after her catheter was removed post surgery. She denies any dizziness, shortness of breath, chest pain or falls. She is eating and drinking very little. States she has 1 ensure a day and when encouraged to try to do 2, she stated she can barely get the 1 down. She has some nausea and states she takes something to help her gut prior to eating which helps a little.   Message sent to Dr. Butler regarding the burning with urination and low appetite.    TORB: Dr. Butler/Kerline Johnson RN  -send urine for UA/UC  -ok to discharge patient after drawing BMP, do not need to wait for results  -based on BMP results, will decide plan for when patient should return if she will need more IVF  -plan to start her on oral xeloda   -Prerna to update patient with plan  -Pt to start Remeron to help with appetite    Urine sent and BMP drawn post IVF. Daughter updated with plan. Pt and daughter stated understanding.    Intravenous Access:  Peripheral IV placed by vascular access.    Treatment Conditions:  Not Applicable.      Post Infusion Assessment:  Patient tolerated infusion without incident.  Blood return noted pre and post infusion.  Site patent and intact, free from redness, edema or discomfort.  No evidence of extravasations.  Access discontinued per protocol.       Discharge Plan:   Patient given Remeron prescription.  Discharge instructions reviewed with: Patient.  Patient and/or family verbalized understanding of discharge instructions and all questions answered.  Copy of AVS reviewed with patient and/or family.  Patient will return (inbasket sent for future appts, none scheduled for IVF or  with Dr. Butler) for next appointment.  Patient discharged in stable condition accompanied by: self.  Departure Mode: Ambulatory.    Kerline Johnson RN

## 2021-02-18 NOTE — PATIENT INSTRUCTIONS
Contact Numbers:   [facility] Main Line: [phone]    Call triage to speak with triage if you are experiencing chills and/or temperature greater than or equal to 100.5, uncontrolled nausea/vomiting, diarrhea, constipation, dizziness, shortness of breath, chest pain, bleeding, unexplained bruising, or any new/concerning symptoms, questions/concerns.     If you are having any concerning symptoms or wish to speak to a provider before your next infusion visit, please call your care coordinator or triage to notify them so we can adequately serve you.     If you need a refill on a medication or narcotic prescription, please call triage or your care coordinator before your infusion appointment.                 February 2021 Sunday Monday Tuesday Wednesday Thursday Friday Saturday        1     2     3     4     5     6       7     8     9     10     11     12     13       14     15    [facility] RETURN  10:45 AM   (30 min.)   Remi Moscoso MD   [facility] Colon and Rectal Surgery [facility] [place]    [facility] RETURN  12:15 PM   (60 min.)   Sarah Butler MD   [facility] 16     17     18    [facility] ONC INFUSION 120   7:30 AM   (120 min.)   [facility] INFUSION   [facility] 19     20       21     22    VIDEO VISIT NEW   9:45 AM   (120 min.)   Micki Bull MD   [facility] Endocrinology [facility] [place] 23    XR COLON WATER SOLUBLE DIAG   7:45 AM   (60 min.)   [id number]   [facility] Xray [place] 24     25    CYSTOSCOPY   8:45 AM   (30 min.)   Angel Newsome MD   [facility] Urology Clinic [place] 26     27       28 March 2021 Sunday Monday Tuesday Wednesday Thursday Friday Saturday        1     2     3     4     5     6       7     8     9     10     11     12     13       14     15     16     17     18     19     20       21     22     23      24     25     26     27       28     29     30     31                                    Lab Results:  No results found for this or any previous visit (from the past 12 hour(s)).

## 2021-02-18 NOTE — TELEPHONE ENCOUNTER
----- Message from Sarah Butler MD sent at 2/18/2021 11:18 AM CST -----  1. Looks like she has a UTI and I sent Rx to pharmacy  2. Her creat improved with fluids, is there a way to have her and daughter talked with you about the importance of staying hydrated if she wants to do chemo?

## 2021-02-19 LAB
BACTERIA SPEC CULT: NORMAL
Lab: NORMAL
SPECIMEN SOURCE: NORMAL

## 2021-02-22 ENCOUNTER — PRE VISIT (OUTPATIENT)
Dept: ENDOCRINOLOGY | Facility: CLINIC | Age: 76
End: 2021-02-22

## 2021-02-22 ENCOUNTER — VIRTUAL VISIT (OUTPATIENT)
Dept: ENDOCRINOLOGY | Facility: CLINIC | Age: 76
End: 2021-02-22
Attending: COLON & RECTAL SURGERY
Payer: COMMERCIAL

## 2021-02-22 DIAGNOSIS — E04.1 LEFT THYROID NODULE: Primary | ICD-10-CM

## 2021-02-22 PROCEDURE — 99204 OFFICE O/P NEW MOD 45 MIN: CPT | Mod: 95 | Performed by: INTERNAL MEDICINE

## 2021-02-22 NOTE — LETTER
2/22/2021       RE: Myla Glynn  [address]  [address]  [place] [address]     Dear Colleague,    Thank you for referring your patient, Myla Glynn, to the [facility] ENDOCRINOLOGY CLINIC [place] at [facility]. Please see a copy of my visit note below.    thyroid nodule  Tomasa Driver, CATRACHITO    Myla is a 76 year old who is being evaluated via a billable video visit.      How would you like to obtain your AVS? MyChart  If the video visit is dropped, the invitation should be resent by: [phone]  Will anyone else be joining your video visit? Yes: daughter. How would they like to receive their invitation? Text to cell phone: [phone]        Endocrinology Clinic New Consult      Myla Glynn MRN:[id number] YOB: 1945  Primary care provider: Jami Bundy     Reason for Endocrine consult: thyroid nodules    HPI:  Myla Glynn is a 76 year old female with PMHx of hypertension, type II diabetes diagnosed in 2019 - was on metformin therapy (now discontinued after diagnosis of sigmoid colon cancer), locally invasive sigmoid colon cancer s/p left hemicolectomy, DOC, BSO, partial cystectomy on 1/5/21. 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  She had been started on new medication, mirtazapine which makes her really sleepy. She has not been taking that medication due to this adverse effect    The patient underwent a PET CT scan in December 2020 which showed a left sided thyroid nodule measuring 1.3 cm with dystrophy calcifications, and it was not FDG avid.  The patient subsequently  underwent ultrasound of her thyroid gland which showed a left sided nodule measuring less than 1 cm in size    the patient's daughter says that her mother has never had radiation to her head and neck in the past  no personal history of thyroid disease  no history of thyroid cancer in the family  no history of thyroid disease in the family  the patient does not complain of difficulty swallowing, difficulty breathing or change in her voice  she does not feel any lumps or bumps in her neck    ROS:  All 12 systems were reviewed and negative except as mentioned in HPI    Past Medical/Surgical History:  Past Medical History:   Diagnosis Date     Anemia      Bladder mass      Controlled type 2 diabetes mellitus without complication, without long-term current use of insulin (H)      Gastroesophageal reflux disease      HTN (hypertension)      Malignant neoplasm of sigmoid colon (H)      Past Surgical History:   Procedure Laterality Date     COLONOSCOPY       CYSTECTOMY BLADDER RADICAL, ILEAL DIVERSION, COMBINED N/A 1/5/2021    Procedure: Partial Cystectomy, Cystourethroscopy,;  Surgeon: Angel Newsome MD;  Location: [facility]     HYSTERECTOMY TOTAL ABDOMINAL  1/5/2021    Procedure: Hysterectomy total abdominal;  Surgeon: Jimy Jackson MD;  [facility]: [facility]     LAPAROSCOPY OPERATIVE ADULT  1/5/2021    Procedure: Laparoscopy Operative Adult, takedown of splenic flexure, appendectomy;  Surgeon: Remi Moscoso MD;  [facility]     SALPINGO-OOPHORECTOMY BILATERAL  1/5/2021    Procedure: Salpingo-oophorectomy bilateral;  Surgeon: Jimy Jackson MD;  [facility]     SIGMOIDOSCOPY FLEXIBLE N/A 1/5/2021    Procedure: SIGMOIDOSCOPY, FLEXIBLE;  Surgeon: Remi Moscoso MD;  Location[facility]       Allergies:  No Known Allergies    PTA Meds:  Prior to Admission medications    Medication Sig Last Dose Taking? Auth Provider   acetaminophen (TYLENOL) 500 MG tablet Take 2 tablets  (1,000 mg) by mouth 3 times daily As scheduled for the next 7-10 days, then decrease both dose and frequency to as needed Taking Yes Wendi House PA-C   loperamide (IMODIUM) 2 MG capsule Take 1 capsule (2 mg) by mouth 2 times daily Taking Yes Wendi House PA-C   mirtazapine (REMERON SOL-TAB) 15 MG ODT 1 tablet (15 mg) by Orally disintegrating tablet route At Bedtime Taking Yes Sarah Butler MD   omeprazole (PRILOSEC) 20 MG DR capsule Take 20 mg by mouth every morning  Taking Yes Reported, Patient   ondansetron (ZOFRAN-ODT) 4 MG ODT tab TK 1 T PO EVERY 4 HOURS AS NEEDED FOR NAUSEA. PLACE ON TOP OF TONUGE WHERE IT WILL DISSOLVE. THEN SWALLOW. Taking Yes Reported, Patient   psyllium (METAMUCIL/KONSYL) Packet Take 1 packet by mouth 2 times daily Taking Yes Wendi House PA-C   sulfamethoxazole-trimethoprim (BACTRIM DS) 800-160 MG tablet Take 1 tablet by mouth 2 times daily Taking Yes Sarah Butler MD   traMADol (ULTRAM) 50 MG tablet Take 0.5-1 tablets (25-50 mg) by mouth every 8 hours as needed for moderate to severe pain Taking Yes Wendi House PA-C        Current Medications:   Current Outpatient Medications   Medication     acetaminophen (TYLENOL) 500 MG tablet     loperamide (IMODIUM) 2 MG capsule     mirtazapine (REMERON SOL-TAB) 15 MG ODT     omeprazole (PRILOSEC) 20 MG DR capsule     ondansetron (ZOFRAN-ODT) 4 MG ODT tab     psyllium (METAMUCIL/KONSYL) Packet     sulfamethoxazole-trimethoprim (BACTRIM DS) 800-160 MG tablet     traMADol (ULTRAM) 50 MG tablet     No current facility-administered medications for this visit.        Family History:  Family History   Problem Relation Age of Onset     No Known Problems Mother      No Known Problems Father      No Known Problems Sister      No Known Problems Brother        Social History:  Social History     Tobacco Use     Smoking status: Never Smoker     Smokeless tobacco: Never Used    Substance Use Topics     Alcohol use: Not Currently         Physical examination:  could not be performed at this was a telephone encounter    Endocrine Labs:  10/9/2019 [facility]  TSH: 0.44     Assessment and Plan:   The patient is a 76-year-old female with past medical history significant for hypertension, type II diabetes diagnosed in 2019 - was on metformin therapy (now discontinued after diagnosis of sigmoid colon cancer), locally invasive sigmoid colon cancer s/p left hemicolectomy, DOC, BSO, partial cystectomy on 1/5/21. She will be starting chemotherapy with oral Xeloda soon    1. Multiple thyroid nodules:  Nodules on the right side are less than 1 cm  Left-sided thyroid nodule measures 1.7 cm and the largest dimension, a solid and hypoechoic with macrocalcification  the thyroid nodule was identified on CT and was not FDG avid on PET scan done on 12/22  No difficulty breathing, change invoice or difficulty swallowing    The patient and her daughter have been explained about the natural history of thyroid nodules and the risk of harboring thyroid cancer    There have been explained ultrasound findings in detail, and that the left thyroid nodules meets criteria for evaluation by FNA    The patient's daughter wants to hold off on US guided FNA of the left thyroid nodule until the patient has undergone her chemotherapy as she has become more debilitated since her surgery and expects her to decline further on chemotherapy    We will obtain thyroid function tests, can be done by the patient tomorrow when they come for a clinic visit to the [facility]    Due to the COVID 19 pandemic this visit was a telephone visit in order to help prevent spread of infection in this high risk patient and the general population. The patient gave verbal consent for the visit today.    Platform used: [employer]  Start time: 1000  Stop time: 1040  Total time: 40 minutes    Case discussed with Dr. Bull,  MD Tatiana Trejo MD  Endocrinology Fellow  Pager number [phone]    I, Micki Bull, personally evaluated this patient during a virtual visit. I discussed the patient with the fellow and agree with the assessment and plan of care as documented in the fellow's note. I personally reviewed vital signs, mediations, labs and imaging.    Key Findings:  MNG with 1.3 cm hypoechoic nodule with calcifications, not PET avid. Very unlikely to be metastatic disease, however FNA is recommended to rule out thyroid Ca. As patient is asymptomatic, does not have risk factors, and currently is on chemotherapy for invasive rectal cancer, biopsy should be delayed after chemo therapy is more completed.    Lab Results   Component Value Date    TSH 0.54 02/23/2021     Micki Bull MD  Endocrinology and Diabetes  [address]  [facility] [address]  Tel [phone]

## 2021-02-22 NOTE — PROGRESS NOTES
Endocrinology Clinic New Consult      Myla Glynn MRN:[id number] YOB: 1945  Primary care provider: Jami Bundy     Reason for Endocrine consult: thyroid nodules    HPI:  Myla Glynn is a 76 year old female with PMHx of hypertension, type II diabetes diagnosed in 2019 - was on metformin therapy (now discontinued after diagnosis of sigmoid colon cancer), locally invasive sigmoid colon cancer s/p left hemicolectomy, DOC, BSO, partial cystectomy on 1/5/21. 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  She had been started on new medication, mirtazapine which makes her really sleepy. She has not been taking that medication due to this adverse effect    The patient underwent a PET CT scan in December 2020 which showed a left sided thyroid nodule measuring 1.3 cm with dystrophy calcifications, and it was not FDG avid.  The patient subsequently underwent ultrasound of her thyroid gland which showed a left sided nodule measuring less than 1 cm in size    the patient's daughter says that her mother has never had radiation to her head and neck in the past  no personal history of thyroid disease  no history of thyroid cancer in the family  no history of thyroid disease in the family  the patient does not complain of difficulty swallowing, difficulty breathing or change in her voice  she does not feel any lumps or bumps in her neck    ROS:  All 12 systems were reviewed and negative except as mentioned in HPI    Past Medical/Surgical History:  Past Medical History:   Diagnosis Date     Anemia      Bladder mass      Controlled type 2 diabetes mellitus without  complication, without long-term current use of insulin (H)      Gastroesophageal reflux disease      HTN (hypertension)      Malignant neoplasm of sigmoid colon (H)      Past Surgical History:   Procedure Laterality Date     COLONOSCOPY       CYSTECTOMY BLADDER RADICAL, ILEAL DIVERSION, COMBINED N/A 1/5/2021    Procedure: Partial Cystectomy, Cystourethroscopy,;  Surgeon: Angel Newsome MD;  [facility]     HYSTERECTOMY TOTAL ABDOMINAL  1/5/2021    Procedure: Hysterectomy total abdominal;  Surgeon: Jimy Jackson MD;  [facility]     LAPAROSCOPY OPERATIVE ADULT  1/5/2021    Procedure: Laparoscopy Operative Adult, takedown of splenic flexure, appendectomy;  Surgeon: Remi Moscoso MD;  [facility]     SALPINGO-OOPHORECTOMY BILATERAL  1/5/2021    Procedure: Salpingo-oophorectomy bilateral;  Surgeon: Jimy Jackson MD;  [facility]     SIGMOIDOSCOPY FLEXIBLE N/A 1/5/2021    Procedure: SIGMOIDOSCOPY, FLEXIBLE;  Surgeon: Remi Moscoso MD;  [facility]       Allergies:  No Known Allergies    PTA Meds:  Prior to Admission medications    Medication Sig Last Dose Taking? Auth Provider   acetaminophen (TYLENOL) 500 MG tablet Take 2 tablets (1,000 mg) by mouth 3 times daily As scheduled for the next 7-10 days, then decrease both dose and frequency to as needed Taking Yes Wendi House PA-C   loperamide (IMODIUM) 2 MG capsule Take 1 capsule (2 mg) by mouth 2 times daily Taking Yes Wendi House PA-C   mirtazapine (REMERON SOL-TAB) 15 MG ODT 1 tablet (15 mg) by Orally disintegrating tablet route At Bedtime Taking Yes Sarah Butler MD   omeprazole (PRILOSEC) 20 MG DR capsule Take 20 mg by mouth every morning  Taking Yes Reported, Patient   ondansetron (ZOFRAN-ODT) 4 MG ODT tab TK 1 T PO EVERY 4 HOURS AS NEEDED FOR NAUSEA. PLACE ON TOP OF TONUGE WHERE IT WILL DISSOLVE. THEN SWALLOW. Taking Yes Reported, Patient    psyllium (METAMUCIL/KONSYL) Packet Take 1 packet by mouth 2 times daily Taking Yes Wendi House PA-C   sulfamethoxazole-trimethoprim (BACTRIM DS) 800-160 MG tablet Take 1 tablet by mouth 2 times daily Taking Yes Sarah Butler MD   traMADol (ULTRAM) 50 MG tablet Take 0.5-1 tablets (25-50 mg) by mouth every 8 hours as needed for moderate to severe pain Taking Yes Wendi House PA-C        Current Medications:   Current Outpatient Medications   Medication     acetaminophen (TYLENOL) 500 MG tablet     loperamide (IMODIUM) 2 MG capsule     mirtazapine (REMERON SOL-TAB) 15 MG ODT     omeprazole (PRILOSEC) 20 MG DR capsule     ondansetron (ZOFRAN-ODT) 4 MG ODT tab     psyllium (METAMUCIL/KONSYL) Packet     sulfamethoxazole-trimethoprim (BACTRIM DS) 800-160 MG tablet     traMADol (ULTRAM) 50 MG tablet     No current facility-administered medications for this visit.        Family History:  Family History   Problem Relation Age of Onset     No Known Problems Mother      No Known Problems Father      No Known Problems Sister      No Known Problems Brother        Social History:  Social History     Tobacco Use     Smoking status: Never Smoker     Smokeless tobacco: Never Used   Substance Use Topics     Alcohol use: Not Currently         Physical examination:  could not be performed at this was a telephone encounter    Endocrine Labs:  10/9/2019 [facility]  TSH: 0.44     Assessment and Plan:   The patient is a 76-year-old female with past medical history significant for hypertension, type II diabetes diagnosed in 2019 - was on metformin therapy (now discontinued after diagnosis of sigmoid colon cancer), locally invasive sigmoid colon cancer s/p left hemicolectomy, DOC, BSO, partial cystectomy on 1/5/21. She will be starting chemotherapy with oral Xeloda soon    1. Multiple thyroid nodules:  Nodules on the right side are less than 1 cm  Left-sided thyroid nodule measures 1.7  cm and the largest dimension, a solid and hypoechoic with macrocalcification  the thyroid nodule was identified on CT and was not FDG avid on PET scan done on 12/22  No difficulty breathing, change invoice or difficulty swallowing    The patient and her daughter have been explained about the natural history of thyroid nodules and the risk of harboring thyroid cancer    There have been explained ultrasound findings in detail, and that the left thyroid nodules meets criteria for evaluation by FNA    The patient's daughter wants to hold off on US guided FNA of the left thyroid nodule until the patient has undergone her chemotherapy as she has become more debilitated since her surgery and expects her to decline further on chemotherapy    We will obtain thyroid function tests, can be done by the patient tomorrow when they come for a clinic visit to the [facility]    Due to the COVID 19 pandemic this visit was a telephone visit in order to help prevent spread of infection in this high risk patient and the general population. The patient gave verbal consent for the visit today.    Platform used: [employer]  Start time: 1000  Stop time: 1040  Total time: 40 minutes    Case discussed with MD Tatiana Boudreaux MD  Endocrinology Fellow  Pager number [phone]    I, Micki Bull, personally evaluated this patient during a virtual visit. I discussed the patient with the fellow and agree with the assessment and plan of care as documented in the fellow's note. I personally reviewed vital signs, mediations, labs and imaging.    Key Findings:  MNG with 1.3 cm hypoechoic nodule with calcifications, not PET avid. Very unlikely to be metastatic disease, however FNA is recommended to rule out thyroid Ca. As patient is asymptomatic, does not have risk factors, and currently is on chemotherapy for invasive rectal cancer, biopsy should be delayed after chemo therapy is more completed.    Lab Results   Component Value Date    TSH  0.54 02/23/2021     Micki Bull MD  Endocrinology and Diabetes  [facility]  [address]  [facility] [place] [address]  Tel [phone]

## 2021-02-23 ENCOUNTER — ANCILLARY PROCEDURE (OUTPATIENT)
Dept: GENERAL RADIOLOGY | Facility: CLINIC | Age: 76
End: 2021-02-23
Attending: COLON & RECTAL SURGERY
Payer: COMMERCIAL

## 2021-02-23 DIAGNOSIS — Z98.890 POSTOPERATIVE STATE: ICD-10-CM

## 2021-02-23 DIAGNOSIS — E04.1 LEFT THYROID NODULE: ICD-10-CM

## 2021-02-23 LAB — TSH SERPL DL<=0.005 MIU/L-ACNC: 0.54 MU/L (ref 0.4–4)

## 2021-02-23 PROCEDURE — 84443 ASSAY THYROID STIM HORMONE: CPT | Performed by: PATHOLOGY

## 2021-02-23 PROCEDURE — 36415 COLL VENOUS BLD VENIPUNCTURE: CPT | Performed by: PATHOLOGY

## 2021-02-23 PROCEDURE — 74270 X-RAY XM COLON 1CNTRST STD: CPT | Mod: GC | Performed by: RADIOLOGY

## 2021-02-23 RX ADMIN — DIATRIZOATE MEGLUMINE AND DIATRIZOATE SODIUM 480 ML: 660; 100 SOLUTION ORAL; RECTAL at 08:40

## 2021-02-25 ENCOUNTER — OFFICE VISIT (OUTPATIENT)
Dept: UROLOGY | Facility: CLINIC | Age: 76
End: 2021-02-25
Payer: COMMERCIAL

## 2021-02-25 VITALS — HEART RATE: 76 BPM | TEMPERATURE: 98 F | SYSTOLIC BLOOD PRESSURE: 83 MMHG | DIASTOLIC BLOOD PRESSURE: 57 MMHG

## 2021-02-25 DIAGNOSIS — Z46.6 ENCOUNTER FOR REMOVAL OF URETERAL STENT: ICD-10-CM

## 2021-02-25 DIAGNOSIS — C18.7 MALIGNANT NEOPLASM OF SIGMOID COLON (H): Primary | ICD-10-CM

## 2021-02-25 PROCEDURE — 52310 CYSTOSCOPY AND TREATMENT: CPT | Mod: 58 | Performed by: UROLOGY

## 2021-02-25 RX ORDER — LIDOCAINE HYDROCHLORIDE 20 MG/ML
JELLY TOPICAL ONCE
Status: COMPLETED | OUTPATIENT
Start: 2021-02-25 | End: 2021-02-25

## 2021-02-25 RX ORDER — CIPROFLOXACIN 500 MG/1
500 TABLET, FILM COATED ORAL ONCE
Status: COMPLETED | OUTPATIENT
Start: 2021-02-25 | End: 2021-02-25

## 2021-02-25 RX ADMIN — CIPROFLOXACIN 500 MG: 500 TABLET, FILM COATED ORAL at 10:04

## 2021-02-25 RX ADMIN — LIDOCAINE HYDROCHLORIDE: 20 JELLY TOPICAL at 10:04

## 2021-02-25 ASSESSMENT — PAIN SCALES - GENERAL: PAINLEVEL: NO PAIN (0)

## 2021-02-25 NOTE — PATIENT INSTRUCTIONS
"Please follow up with Dr. Newsome as needed.       AFTER YOUR CYSTOSCOPY        You have just completed a cystoscopy, or \"cysto\", which allowed your physician to learn more about your bladder (or to remove a stent placed after surgery). We suggest that you continue to avoid caffeine, fruit juice, and alcohol for the next 24 hours, however, you are encouraged to return to your normal activities.         A few things that are considered normal after your cystoscopy:     * Small amount of bleeding (or spotting) that clears within the next 24 hours     * Slight burning sensation with urination     * Sensation to of needing to avoid more frequently     * The feeling of \"air\" in your urine     * Mild discomfort that is relieved with Tylenol        Please contact our office promptly if you:     * Develop a fever above 101 degrees     * Are unable to urinate     * Develop bright red blood that does not stop     * Severe pain or swelling         Please contact our office with any concerns or questions @[email or web address].  "

## 2021-02-25 NOTE — PROGRESS NOTES
CHIEF COMPLAINT   It was my pleasure to see Myla Glynn who is a 76 year old female for follow-up of colon cancer invading bladder.      HPI  Myla Glynn is a very pleasant 76 year old female who presents with a history of invasive colon cancer into bladder. She is s/p partial cystectomy en bloc with colon and left ureteral reimplant due to involvement with tumor. Recovering well from surgery. Following with colorectal.  Here for stent removal. No significant daytime urgency/frequency. Nocturia x 3-4.     1/5/2021  B. SIGMOID COLON, UTERUS WITH CERVIX, LEFT ADNEXA, DOME OF BLADDER,   PARTIAL CYSTECTOMYEN BLOC WITH   SIGMOIDECTOMY ANDTOTAL ABDOMINAL HYSTERECTOMY   WITHLEFTSALPINGO-OOPHORECTOMY:   - Invasive adenocarcinoma, moderately differentiated, 8.6 cm in greatest   dimension   - Tumor invades the urinary bladder wall and bladder mucosa   - Tumor with adjacent inflammation/abscess adheres to anterior uterine   wall without microscopic involvement of   the myometrium by carcinoma   - All resection margins (bladder margin, proximal, distal and mesenteric)   are negative   - Lymphovascular and perineural invasion is identified   - Colon with a tubular adenoma, 0.6 cm   - Bladder wall with chronic inflammation, fibrosis and adhesions   - Portion of the ureter with no significant histologic abnormality   - Benign cervix with nabothian cysts   - Atrophic endometrium with a submucosal leiomyoma (1.2 cm)   - Benign atrophic ovary   - Fallopian tube with no significant histologic abnormality   - One of forty eight lymph nodes positive for carcinoma (1/48)   - See synoptic report for details    PHYSICAL EXAM  Patient is a 76 year old  female   Vitals: Blood pressure (!) 83/57, pulse 76, temperature 98  F (36.7  C).  General Appearance Adult: There is no height or weight on file to calculate BMI.  Alert, no acute distress, oriented  Lungs: no respiratory distress, or pursed lip breathing  Neuro: Alert,  oriented, speech and mentation normal  Psych: affect and mood normal    OFFICE CYSTOSCOPY 2/25/2021    CYSTOSCOPY PROCEDURE:  After sterile preparation and draping of the patient,  a 17-Frisian flexible cystoscope was introduced via the urethra.  It was passed without difficulty into the bladder.  The urethra was open without evidence of stricture.  The ureteral orifices were orthotopic.  The double J stent was seen coming out of the reimplanted ureter at the dome. It was grasped with an alligator forceps and extracted intact without difficulty.  The patient tolerated the procedure well    Watch for any new onset fevers, signs of UTI.  May expect some pain after removal.  If this is severe, or last many hours, you may need to return for replacement of stent.    ASSESSMENT and PLAN  76 year old female with history of invasive colon cancer, s/p en bloc partial cystectomy and distal left ureterectomy with reimplant in conjunction with colon resection. She is healing well. Stent removed today. We briefly discussed anticholinergics, but she is content with symptoms at this time.     Stent removed today. Urology follow-up yenni Newsome MD  Urology  [facility]

## 2021-02-25 NOTE — NURSING NOTE
Chief Complaint   Patient presents with     Cystoscopy     ureteral stent removal       Blood pressure (!) 83/57, pulse 76, temperature 98  F (36.7  C). There is no height or weight on file to calculate BMI.    Patient Active Problem List   Diagnosis     Controlled type 2 diabetes mellitus without complication, without long-term current use of insulin (H)     HTN (hypertension)     Malignant neoplasm of sigmoid colon (H)     Bladder mass     Anemia       No Known Allergies    Current Outpatient Medications   Medication Sig Dispense Refill     mirtazapine (REMERON SOL-TAB) 15 MG ODT 1 tablet (15 mg) by Orally disintegrating tablet route At Bedtime 30 tablet 3     omeprazole (PRILOSEC) 20 MG DR capsule Take 20 mg by mouth every morning        acetaminophen (TYLENOL) 500 MG tablet Take 2 tablets (1,000 mg) by mouth 3 times daily As scheduled for the next 7-10 days, then decrease both dose and frequency to as needed 1 Bottle 0     loperamide (IMODIUM) 2 MG capsule Take 1 capsule (2 mg) by mouth 2 times daily 120 capsule 0     ondansetron (ZOFRAN-ODT) 4 MG ODT tab TK 1 T PO EVERY 4 HOURS AS NEEDED FOR NAUSEA. PLACE ON TOP OF TONUGE WHERE IT WILL DISSOLVE. THEN SWALLOW.       psyllium (METAMUCIL/KONSYL) Packet Take 1 packet by mouth 2 times daily 60 packet 0     sulfamethoxazole-trimethoprim (BACTRIM DS) 800-160 MG tablet Take 1 tablet by mouth 2 times daily (Patient not taking: Reported on 2021) 10 tablet 0     traMADol (ULTRAM) 50 MG tablet Take 0.5-1 tablets (25-50 mg) by mouth every 8 hours as needed for moderate to severe pain 20 tablet 0       Social History     Tobacco Use     Smoking status: Never Smoker     Smokeless tobacco: Never Used   Substance Use Topics     Alcohol use: Not Currently     Drug use: None       Invasive Procedure Safety Checklist:    Procedure: Cystoscopy    Action: Complete sections and checkboxes as appropriate.    Pre-procedure:  1. Patient ID Verified with 2 identifiers (Alexa and CINDI  or MRN) : YES    2. Procedure and site verified with patient/designee (when able) : YES    3. Accurate consent documentation in medical record : YES    4. H&P (or appropriate assessment) documented in medical record : N/A  H&P must be up to 30 days prior to procedure an updated within 24 hours of                 Procedure as applicable.     5. Relevant diagnostic and radiology test results appropriately labeled and displayed as applicable : YES    6. Blood products, implants, devices, and/or special equipment available for the procedure as applicable : YES    7. Procedure site(s) marked with provider initials [Exclusions: none] : NO    8. Marking not required. Reason : Yes  Procedure does not require site marking    Time Out:     Time-Out performed immediately prior to starting procedure, including verbal and active participation of all team members addressing: YES    1. Correct patient identity.  2. Confirmed that the correct side and site are marked.  3. An accurate procedure to be done.  4. Agreement on the procedure to be done.  5. Correct patient position.  6. Relevant images and results are properly labeled and appropriately displayed.  7. The need to administer antibiotics or fluids for irrigation purposes during the procedure as applicable.  8. Safety precautions based on patient history or medication use.    During Procedure: Verification of correct person, site, and procedure occurs any time the responsibility for care of the patient is transferred to another member of the care team.    The following medication was given:     MEDICATION: Lidocaine Uro-Jet 2% 200mg (20mg/mL)  ROUTE: Urethral   SITE: Urethra   DOSE: 10mL  LOT #: [id number]  : [employer]   EXPIRATION DATE: 9-22  NDC#: [address]-52   Was there drug waste? No    MEDICATION: Ciprofloxacin   ROUTE: PO  SITE: Oral  DOSE: 500mg  LOT #: [id number]  : [employer]  EXPIRATION DATE: 08/21  NDC#: [address]-42  Was there drug waste?  No    Prior to injection, verified patient identity using patient's name and date of birth.  Due to injection administration, patient instructed to remain in clinic for 15 minutes  afterwards, and to report any adverse reaction to me immediately.    Drug Amount Wasted:  None.  Vial/Syringe: Single dose vial      AURELIO Delatorre  2/25/2021  9:15 AM

## 2021-03-01 ENCOUNTER — VIRTUAL VISIT (OUTPATIENT)
Dept: ONCOLOGY | Facility: CLINIC | Age: 76
End: 2021-03-01
Attending: INTERNAL MEDICINE
Payer: COMMERCIAL

## 2021-03-01 DIAGNOSIS — C18.7 MALIGNANT NEOPLASM OF SIGMOID COLON (H): Primary | ICD-10-CM

## 2021-03-01 PROCEDURE — 999N001193 HC VIDEO/TELEPHONE VISIT; NO CHARGE

## 2021-03-01 PROCEDURE — 99214 OFFICE O/P EST MOD 30 MIN: CPT | Mod: 95 | Performed by: INTERNAL MEDICINE

## 2021-03-01 NOTE — LETTER
"    3/1/2021         RE: Myla Glynn  [address]  [address]  [place] [address]        Dear Colleague,    Thank you for referring your patient, Myla Glynn, to the [facility]. Please see a copy of my visit note below.    Myla is a 76 year old who is being evaluated via a billable video visit.      How would you like to obtain your AVS? Mail a copy  If the video visit is dropped, the invitation should be resent by: Text to cell phone: [phone]  Will anyone else be joining your video visit? No      I have reviewed and updated the patient's allergies and medication list.    Concerns: No new concerns.   Refills: None needed.       Vitals - Patient Reported  Weight (Patient Reported): 47.6 kg (105 lb)  Height (Patient Reported): 154.9 cm (5' 1\")  BMI (Based on Pt Reported Ht/Wt): 19.84  Pain Score: No Pain (0)    Anabel Soni CMA      Video Start Time: 0800  Video-Visit Details    Type of service:  Video Visit    Video End Time:0815    Originating Location (pt. Location): Home    Distant Location (provider location):  [facility]     Platform used for Video Visit: Well     [facility] PHYSICIANS  MEDICAL ONCOLOGY    RETURN PATIENT VISIT NOTE    Reason for visit: sigmoid colon cancer      Oncology Treatment Summary  1. 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 October 2020 presented to PCP with recurrent dysuria, hematuria, 10/17/2020 CT AP ws done again with colovesicular fistula with a mass seen invading from colon into bladder.  3. 11/16 colonoscopy at [facility] with reported mass at 40 cm, bx with adenocarcinoma, I am unable to find MMR  4. 11/23 CEA 9.7  5. 12/22/2020 MRI pelvis locally advanced disease with multiple enlarged regional lymph nodes  6. 12/22/2020 PET some uptake in mesenteric area, no distant disease  7. 1/5/21 left hemicolectomy, DOC, " BSO, partial cystectomy     HISTORY OF PRESENTING ILLNESS  Patient is a pleasant 76-year-old who is seen today via a video visit.  Her daughter is with her and translating.  We had previously discussed trying to increase her fluid intake however she does have problems doing this.  She also is not eating a great deal and her daughter notes she is weak.  She simply states she does not have much of an appetite.  As she is meeting with the dietitian later this week.  No other new symptoms.     PAST MEDICAL HISTORY  Gastroesophageal reflux disease, hypertension, type 2 diabetes      CURRENT OUTPATIENT MEDICATIONS  Current Outpatient Medications   Medication     acetaminophen (TYLENOL) 500 MG tablet     loperamide (IMODIUM) 2 MG capsule     mirtazapine (REMERON SOL-TAB) 15 MG ODT     omeprazole (PRILOSEC) 20 MG DR capsule     ondansetron (ZOFRAN-ODT) 4 MG ODT tab     psyllium (METAMUCIL/KONSYL) Packet     sulfamethoxazole-trimethoprim (BACTRIM DS) 800-160 MG tablet     traMADol (ULTRAM) 50 MG tablet     No current facility-administered medications for this visit.         ALLERGIES   No Known Allergies     SOCIAL HISTORY  She is originally from [facility].  She is a , she lives in [place] with her daughter no tobacco or alcohol use     FAMILY HISTORY  No malignancies     REVIEW OF SYSTEMS  Review Of Systems  Skin: negative  Eyes: negative  Ears/Nose/Throat: negative  Respiratory: No shortness of breath, dyspnea on exertion, cough, or hemoptysis  Cardiovascular: negative  Gastrointestinal: negative as per HPI  Genitourinary: negative  Musculoskeletal: negative  Neurologic: negative  Psychiatric: negative  Hematologic/Lymphatic/Immunologic: negative  Endocrine: negative      PHYSICAL EXAM  B/P: Data Unavailable, T: Data Unavailable, P: Data Unavailable, R: Data Unavailable  Wt Readings from Last 3 Encounters:   02/15/21 47.8 kg (105 lb 6.1 oz)   02/15/21 47.8 kg (105 lb 6.4 oz)   01/22/21 49.4 kg (108 lb 12.8 oz)        ECOG PPS 1-2    Physical Exam  HENT:      Head: Normocephalic and atraumatic.   Eyes:      Extraocular Movements: Extraocular movements intact.      Conjunctiva/sclera: Conjunctivae normal.      Pupils: Pupils are equal, round, and reactive to light.   Pulmonary:      Effort: Pulmonary effort is normal.   Neurological:      General: No focal deficit present.      Mental Status: She is alert and oriented to person, place, and time. Mental status is at baseline.   Psychiatric:         Mood and Affect: Mood normal.         Behavior: Behavior normal.         Thought Content: Thought content normal.         Judgment: Judgment normal.              LABORATORY AND IMAGING STUDIES  Recent Labs   Lab Test 02/18/21  0936 02/15/21  1321 01/12/21  2234 01/11/21  0637 01/11/21  0051 01/09/21  0754 01/09/21  0754    138 141 144  --   --  141   POTASSIUM 3.4 4.3 3.6 3.7 3.6   < > 3.7   CHLORIDE 122* 113* 108 110*  --   --  110*   CO2 13* 16* 26 24  --   --  20   ANIONGAP 9 9 8 11  --   --  11   BUN 39* 45* 16 15  --   --  21   CR 1.27* 1.69* 1.10* 0.77  --   --  0.96   GLC 72 94 90 86  --   --  70   DICKSON 7.2* 10.0 7.9* 8.6  --   --  8.6    < > = values in this interval not displayed.     Recent Labs   Lab Test 01/12/21  2234 01/12/21  0757 01/11/21  0637 01/10/21  0729 01/07/21  0436 01/06/21  0454 01/05/21  2239   MAG 1.9 2.0 1.5* 1.6 2.0 1.6 1.7   PHOS 4.2  --   --  2.5 3.5 5.4* 5.0*     Recent Labs   Lab Test 02/15/21  1321 01/11/21  0637 01/09/21  0754 01/08/21  0534 01/07/21  1418 01/07/21  0436 01/06/21  0454 01/05/21  1836 12/29/20  1122 12/29/20  1122 11/23/20  1432   WBC 7.1  --   --   --   --   --  13.7* 12.9*  --  9.8 8.3   HGB 11.9  --  8.9* 9.6* 8.9* 6.7* 8.2* 9.1*   < > 8.2* 8.4*    268  --   --   --   --  255 283  --  439 371   MCV 89  --   --   --   --   --  85 83  --  80 74*   NEUTROPHIL 73.9  --   --   --   --   --   --   --   --   --   --     < > = values in this interval not displayed.      Recent Labs   Lab Test 02/15/21  1321 01/05/21  1836 11/23/20  1432   BILITOTAL 0.2 1.6* 0.3   ALKPHOS 54 23* 56   ALT 9 8 12   AST 6 16 11   ALBUMIN 3.5 2.2* 3.4     TSH   Date Value Ref Range Status   02/23/2021 0.54 0.40 - 4.00 mU/L Final     Recent Labs   Lab Test 02/15/21  1321 11/23/20  1432   CEA 1.1 9.7*     Results for orders placed or performed in visit on 02/23/21   XR Colon Water Soluble Diagnostic    Narrative    Examination:  Gastrografin enema  2/23/2021 8:41 AM     Comparison: MRI 12/22/2020    History: Post LAR. Evaluate anastomosis    Fluoro time: 1.4 minutes.    Technique: Water soluble contrast was introduced into the colon  through a rectal tube under gravity.  The patient was rolled to fill  the colon with contrast from the rectum to the transverse colon under  fluoroscopy.     Findings: On the  film, the bowel gas pattern is non obstructed.  Postsurgical changes of sigmoidectomy, partial en block cystectomy,  and hysterectomy with bilateral salpingo-oophorectomy. Ureterostomy  tube projects from the expected location of the left renal pelvis to  the bladder.    A single contrast enema was performed. Mild narrowing is visualized at  the level of the anastomosis without evidence of extravasation or  fistula.The mucosa was normal throughout, with no evidence for mass,  stricture, or polyp. There is no diverticulosis without  diverticulitis.       Impression    Impression: Postsurgical changes of sigmoidectomy with mild narrowing  at the level of the anastomosis without evidence of leak or  fistulization.    I have personally reviewed the examination and initial interpretation  and I agree with the findings.    ARJUN KENNEY MD          ASSESSMENT  AND RECOMMENDATIONS:  1.  T4 N1 M0 adenocarcinoma of the sigmoid colon with perforation and invasion into the bladder with 1 of 48 lymph nodes involved, lymphovascular invasion was noted as was perineural invasion and an elevated  preoperative CEA status post en bloc resection  2. History of hypertension  3. Diabetes  4 Weight loss  5.  Deconditioning    Plan      1.  We have discussed options regarding adjuvant therapy.  She will not be able to be a candidate for Xeloda given her renal function that cannot be dosed safely at her GFR level.  Therefore we discussed options of chemotherapy be being either intravenous FOLFOX versus infusional 5-FU alone versus observation.  I discussed these options with her daughter who is translated to the patient.  At this point we have decided against going forward with chemotherapy given her frailty and weakness and poor oral intake.  Her daughter would like to spend the next few months trying to get her stronger and eating and drinking better.  I have told him that chemotherapy his goal is to reduce the risk of systemic recurrence.  We have discussed observation instead of chemotherapy and I would see her in 3 months with lab work.  We also discussed her recent Gastrografin enema which did not show any fistula or leak.    Sarah Butler MD on 3/1/2021 at 8:22 AM

## 2021-03-01 NOTE — PROGRESS NOTES
"Myla is a 76 year old who is being evaluated via a billable video visit.      How would you like to obtain your AVS? Mail a copy  If the video visit is dropped, the invitation should be resent by: Text to cell phone: [phone]  Will anyone else be joining your video visit? No      I have reviewed and updated the patient's allergies and medication list.    Concerns: No new concerns.   Refills: None needed.       Vitals - Patient Reported  Weight (Patient Reported): 47.6 kg (105 lb)  Height (Patient Reported): 154.9 cm (5' 1\")  BMI (Based on Pt Reported Ht/Wt): 19.84  Pain Score: No Pain (0)    Anabel Soni CMA      Video Start Time: 0800  Video-Visit Details    Type of service:  Video Visit    Video End Time:0815    Originating Location (pt. Location): Home    Distant Location (provider location):  [facility]     Platform used for Video Visit: [facility] PHYSICIANS  MEDICAL ONCOLOGY    RETURN PATIENT VISIT NOTE    Reason for visit: sigmoid colon cancer      Oncology Treatment Summary  1. 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 October 2020 presented to PCP with recurrent dysuria, hematuria, 10/17/2020 CT AP ws done again with colovesicular fistula with a mass seen invading from colon into bladder.  3. 11/16 colonoscopy at [facility] with reported mass at 40 cm, bx with adenocarcinoma, I am unable to find MMR  4. 11/23 CEA 9.7  5. 12/22/2020 MRI pelvis locally advanced disease with multiple enlarged regional lymph nodes  6. 12/22/2020 PET some uptake in mesenteric area, no distant disease  7. 1/5/21 left hemicolectomy, DOC, BSO, partial cystectomy     HISTORY OF PRESENTING ILLNESS  Patient is a pleasant 76-year-old who is seen today via a video visit.  Her daughter is with her and translating.  We had previously discussed trying to increase her fluid intake however she does have problems doing " this.  She also is not eating a great deal and her daughter notes she is weak.  She simply states she does not have much of an appetite.  As she is meeting with the dietitian later this week.  No other new symptoms.     PAST MEDICAL HISTORY  Gastroesophageal reflux disease, hypertension, type 2 diabetes      CURRENT OUTPATIENT MEDICATIONS  Current Outpatient Medications   Medication     acetaminophen (TYLENOL) 500 MG tablet     loperamide (IMODIUM) 2 MG capsule     mirtazapine (REMERON SOL-TAB) 15 MG ODT     omeprazole (PRILOSEC) 20 MG DR capsule     ondansetron (ZOFRAN-ODT) 4 MG ODT tab     psyllium (METAMUCIL/KONSYL) Packet     sulfamethoxazole-trimethoprim (BACTRIM DS) 800-160 MG tablet     traMADol (ULTRAM) 50 MG tablet     No current facility-administered medications for this visit.         ALLERGIES   No Known Allergies     SOCIAL HISTORY  She is originally from [facility].  She is a , she lives in [place] with her daughter no tobacco or alcohol use     FAMILY HISTORY  No malignancies     REVIEW OF SYSTEMS  Review Of Systems  Skin: negative  Eyes: negative  Ears/Nose/Throat: negative  Respiratory: No shortness of breath, dyspnea on exertion, cough, or hemoptysis  Cardiovascular: negative  Gastrointestinal: negative as per HPI  Genitourinary: negative  Musculoskeletal: negative  Neurologic: negative  Psychiatric: negative  Hematologic/Lymphatic/Immunologic: negative  Endocrine: negative      PHYSICAL EXAM  B/P: Data Unavailable, T: Data Unavailable, P: Data Unavailable, R: Data Unavailable  Wt Readings from Last 3 Encounters:   02/15/21 47.8 kg (105 lb 6.1 oz)   02/15/21 47.8 kg (105 lb 6.4 oz)   01/22/21 49.4 kg (108 lb 12.8 oz)       ECOG PPS 1-2    Physical Exam  HENT:      Head: Normocephalic and atraumatic.   Eyes:      Extraocular Movements: Extraocular movements intact.      Conjunctiva/sclera: Conjunctivae normal.      Pupils: Pupils are equal, round, and reactive to light.   Pulmonary:      Effort:  Pulmonary effort is normal.   Neurological:      General: No focal deficit present.      Mental Status: She is alert and oriented to person, place, and time. Mental status is at baseline.   Psychiatric:         Mood and Affect: Mood normal.         Behavior: Behavior normal.         Thought Content: Thought content normal.         Judgment: Judgment normal.              LABORATORY AND IMAGING STUDIES  Recent Labs   Lab Test 02/18/21  0936 02/15/21  1321 01/12/21  2234 01/11/21  0637 01/11/21  0051 01/09/21  0754 01/09/21  0754    138 141 144  --   --  141   POTASSIUM 3.4 4.3 3.6 3.7 3.6   < > 3.7   CHLORIDE 122* 113* 108 110*  --   --  110*   CO2 13* 16* 26 24  --   --  20   ANIONGAP 9 9 8 11  --   --  11   BUN 39* 45* 16 15  --   --  21   CR 1.27* 1.69* 1.10* 0.77  --   --  0.96   GLC 72 94 90 86  --   --  70   DICKSON 7.2* 10.0 7.9* 8.6  --   --  8.6    < > = values in this interval not displayed.     Recent Labs   Lab Test 01/12/21  2234 01/12/21  0757 01/11/21  0637 01/10/21  0729 01/07/21  0436 01/06/21  0454 01/05/21  2239   MAG 1.9 2.0 1.5* 1.6 2.0 1.6 1.7   PHOS 4.2  --   --  2.5 3.5 5.4* 5.0*     Recent Labs   Lab Test 02/15/21  1321 01/11/21  0637 01/09/21  0754 01/08/21  0534 01/07/21  1418 01/07/21  0436 01/06/21  0454 01/05/21  1836 12/29/20  1122 12/29/20  1122 11/23/20  1432   WBC 7.1  --   --   --   --   --  13.7* 12.9*  --  9.8 8.3   HGB 11.9  --  8.9* 9.6* 8.9* 6.7* 8.2* 9.1*   < > 8.2* 8.4*    268  --   --   --   --  255 283  --  439 371   MCV 89  --   --   --   --   --  85 83  --  80 74*   NEUTROPHIL 73.9  --   --   --   --   --   --   --   --   --   --     < > = values in this interval not displayed.     Recent Labs   Lab Test 02/15/21  1321 01/05/21  1836 11/23/20  1432   BILITOTAL 0.2 1.6* 0.3   ALKPHOS 54 23* 56   ALT 9 8 12   AST 6 16 11   ALBUMIN 3.5 2.2* 3.4     TSH   Date Value Ref Range Status   02/23/2021 0.54 0.40 - 4.00 mU/L Final     Recent Labs   Lab Test 02/15/21  1321  11/23/20  1432   CEA 1.1 9.7*     Results for orders placed or performed in visit on 02/23/21   XR Colon Water Soluble Diagnostic    Narrative    Examination:  Gastrografin enema  2/23/2021 8:41 AM     Comparison: MRI 12/22/2020    History: Post LAR. Evaluate anastomosis    Fluoro time: 1.4 minutes.    Technique: Water soluble contrast was introduced into the colon  through a rectal tube under gravity.  The patient was rolled to fill  the colon with contrast from the rectum to the transverse colon under  fluoroscopy.     Findings: On the  film, the bowel gas pattern is non obstructed.  Postsurgical changes of sigmoidectomy, partial en block cystectomy,  and hysterectomy with bilateral salpingo-oophorectomy. Ureterostomy  tube projects from the expected location of the left renal pelvis to  the bladder.    A single contrast enema was performed. Mild narrowing is visualized at  the level of the anastomosis without evidence of extravasation or  fistula.The mucosa was normal throughout, with no evidence for mass,  stricture, or polyp. There is no diverticulosis without  diverticulitis.       Impression    Impression: Postsurgical changes of sigmoidectomy with mild narrowing  at the level of the anastomosis without evidence of leak or  fistulization.    I have personally reviewed the examination and initial interpretation  and I agree with the findings.    RAJUN KENNEY MD          ASSESSMENT  AND RECOMMENDATIONS:  1.  T4 N1 M0 adenocarcinoma of the sigmoid colon with perforation and invasion into the bladder with 1 of 48 lymph nodes involved, lymphovascular invasion was noted as was perineural invasion and an elevated preoperative CEA status post en bloc resection  2. History of hypertension  3. Diabetes  4 Weight loss  5.  Deconditioning    Plan      1.  We have discussed options regarding adjuvant therapy.  She will not be able to be a candidate for Xeloda given her renal function that cannot be dosed safely  at her GFR level.  Therefore we discussed options of chemotherapy be being either intravenous FOLFOX versus infusional 5-FU alone versus observation.  I discussed these options with her daughter who is translated to the patient.  At this point we have decided against going forward with chemotherapy given her frailty and weakness and poor oral intake.  Her daughter would like to spend the next few months trying to get her stronger and eating and drinking better.  I have told him that chemotherapy his goal is to reduce the risk of systemic recurrence.  We have discussed observation instead of chemotherapy and I would see her in 3 months with lab work.  We also discussed her recent Gastrografin enema which did not show any fistula or leak.    Sarah Butler MD on 3/1/2021 at 8:22 AM

## 2021-03-04 ENCOUNTER — TELEPHONE (OUTPATIENT)
Dept: WOUND CARE | Facility: CLINIC | Age: 76
End: 2021-03-04

## 2021-03-04 NOTE — TELEPHONE ENCOUNTER
Offered clinic visit but they are not interested at this time.  She stated that her nurse friend had told her how to cut the barrier correctly.  She believes she was not doing it right.  I am unclear after discussing this with her daughter how it was not covered with right and would like to see her in clinic but will leave it up to their discretion.  She has my phone number if needed    ----- Message from Saima Vail RN sent at 3/4/2021 11:09 AM CST -----  Hey she called me and left a voicemail yesterday. She is having issues with her stoma back. Can you call her and see if you can help? Her daughter annabel helps her as well

## 2021-03-05 NOTE — PROGRESS NOTES
[facility] Outpatient Medical Nutrition Therapy      Myla is a 76 year old who is being evaluated via a billable video visit.      How would you like to obtain your AVS? Mail a copy  If the video visit is dropped, the invitation should be resent by: [phone]   Will anyone else be joining your video visit? Yes: Daughter Michele and Dennis . How would they like to receive their invitation? *Provided interpretation services via the telephone      Video Start Time: 7:05 AM     Video-Visit Details    Type of service:  Video Visit    Video End Time:8:00 AM    Originating Location (pt. Location): Home    Distant Location (provider location):  [facility]STRO[facility] [place]     Platform used for Video Visit: IMRSV    During this virtual visit the patient is located in [place], patient verifies this as the location during the entirety of this visit.     Juno Loredo, PhD, RD    Additional provider notes:  Referring Physician: Blanka  Reason for RD Visit: Ileostomy    Nutrition Plan: Continue to emphasize fluid and protein intake. Begin to reintroduce fruits    Recommendations for MD/Provider to order: None at this time    Malnutrition Diagnosis: Patient does not meet two of the criteria necessary for diagnosing malnutrition given reported improvement in appetite, oral intake, and activity in the past 2 weeks    Nutrition Assessment:  Patient is here for follow-up visit with Registered Dietitian (RD). 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 Recently seen by Endocrinology 2/22/21 at which time it was noted that \"The patient's daughter says that her mother has been becoming increasingly weak over the past few months and after the surgery she has not really had a good appetite.\"     Today reports that she is feeling better. Appetite has been improved for the past two weeks. Also feels like she is able to move better. Prior to surgery met with nutritionist, which is why they asked to see a nutritionist again. She was asked to follow a low fiber diet after surgery, but doesn't like the low fiber restrictions. Discussed ability to begin reintroducing fruits. At this time reports emptying ostomy bag 2x/day. This is often formed, but sometimes liquid (2x/week). Only drinking ~1 L of fluid/day. No complaints of muscle cramping. States that she is getting stronger.     Symptom Review  1. Nausea/vomiting? No  2. Heartburn? No  3. Feeling full quickly? No  4. Decreased appetite? No. Appetite has improved in the last two weeks  5. Weight loss/gain? NA  6. Feeling tired? NA    Dietary beliefs and Practices  1.  Have you modified your diet since surgery? Low fiber  2.  Have you received prior advice on diet?  Yes   A. If so, source? RD prior to surgery  3.  Do you take any vitamin, mineral, or herbal supplements? No  4.  Do you use any calorie/protein supplements?  No    Diet Recall:  (Typical Day)  Meal Name Time Food    Breakfast  Egg OR bread OR egg with bread        Lunch  Sometimes doesn't eat much for lunch (2-3x/wk); Lentils, eggs, OR meat for protein        Dinner  Similar to lunch: Meat, potato OR lentils OR pasta AND bread        Snacks  NA   Beverages  1 liter of water and tea; 1 Ensure/day   Alcohol   NA   -No fruit right now. Would like to add Mandarin ans strawberries    Frequency of eating/taking out meals: NA  Food access/availability: NA  Food preparation confidence/abilities: NA    Anthropometrics:   Height: " Data Unavailable  Weight: Data Unavailable  BMI: There is no height or weight on file to calculate BMI.    Weight History:  Wt Readings from Last 10 Encounters:   02/15/21 47.8 kg (105 lb 6.1 oz)   02/15/21 47.8 kg (105 lb 6.4 oz)   21 49.4 kg (108 lb 12.8 oz)   01/10/21 56.5 kg (124 lb 8 oz)   20 54 kg (119 lb)     Usual Weight: NA  Weight change in past 6 months: NA, but overall feel that weight has improved in the past 2 weeks    Labs: Reviewed  Pertinent Medications/vitamin and mineral supplements:   Current Outpatient Medications   Medication     acetaminophen (TYLENOL) 500 MG tablet     loperamide (IMODIUM) 2 MG capsule     mirtazapine (REMERON SOL-TAB) 15 MG ODT     omeprazole (PRILOSEC) 20 MG DR capsule     ondansetron (ZOFRAN-ODT) 4 MG ODT tab     psyllium (METAMUCIL/KONSYL) Packet     sulfamethoxazole-trimethoprim (BACTRIM DS) 800-160 MG tablet     traMADol (ULTRAM) 50 MG tablet     No current facility-administered medications for this visit.        Food Allergies: Pineapple  Food Intolerances: NA  Physical Activity: NA  Estimated Nutrition Needs based on most recent body weight of 47.8 [id number] calories (25-35 kcals/kg), 45 g protein (1.0 g/kg), ~1 ml/kcal or total fluids per MD     Nutrition Diagnosis:    Food and nutrition related knowledge deficit related to IBD as evidenced by need for diet education.    Nutrition Prescription: Ileostomy diet    Nutrition Intervention:    Nutrition Education/Counseling:  Discussed role of colon in helping to maintain fluid and electrolyte balance and potential for sodium and magnesium wasting, in particular, following surgery. Provided diet education for ileostomy including chewing food very well before swallowing, preventing dehydration, drinking adequate fluids. Recommended avoiding concentrated sugars/added sugars. Discussed tips and some foods that may help thicken stools. Reviewed ORS basics, but emphasized continuing to add salt to food vs  drinking ORS due to limited oral intake in an effort to encourage increased fluid intake. Answered patient's questions. Patient verbalized understanding of education provided. See Goals below.     Educational Materials Provided: No education materials provided at this time  Patient verbalized understanding of education provided. See all recommendations under Goals.    Goals:  1. Ground flaxseed vs whole  2. Chew food very well before swallowing.  3. Ensure regular complex CHO intake (pasta, rice, potatoes, breads)  4. Chewable multivitamin with minerals daily for best absorption  5. Drink adequate fluids. Aim for drinking a minimum of 8-10 cups of fluids per day   6. Trevor cracker squares (4 squares) with 2 tablespoons of peanut butter (provides about 250 calories and 9 grams of protein)    Nutrition Monitoring and Evaluation: Will monitor adherence to nutrition recommendations at future RD visits.     Further Medical Nutrition Therapy: As needed  Next Appointment (if applicable): PRN  Patient was encouraged to call/contact RD with any further questions.

## 2021-03-08 ENCOUNTER — VIRTUAL VISIT (OUTPATIENT)
Dept: GASTROENTEROLOGY | Facility: CLINIC | Age: 76
End: 2021-03-08
Attending: COLON & RECTAL SURGERY
Payer: COMMERCIAL

## 2021-03-08 DIAGNOSIS — C18.7 MALIGNANT NEOPLASM OF SIGMOID COLON (H): Primary | ICD-10-CM

## 2021-03-08 DIAGNOSIS — E11.9 CONTROLLED TYPE 2 DIABETES MELLITUS WITHOUT COMPLICATION, WITHOUT LONG-TERM CURRENT USE OF INSULIN (H): ICD-10-CM

## 2021-03-08 DIAGNOSIS — Z71.3 NUTRITIONAL COUNSELING: ICD-10-CM

## 2021-03-08 PROCEDURE — 97803 MED NUTRITION INDIV SUBSEQ: CPT | Performed by: DIETITIAN, REGISTERED

## 2021-03-08 NOTE — LETTER
3/8/2021         RE: Myla Glynn  [address]  [address]  [place] [address]        Dear Colleague,    Thank you for referring your patient, Myla Glynn, to the [facility]STROENTEROLOGY [facility]. Please see a copy of my visit note below.    [facility] Outpatient Medical Nutrition Therapy      Myla is a 76 year old who is being evaluated via a billable video visit.      How would you like to obtain your AVS? Mail a copy  If the video visit is dropped, the invitation should be resent by: [phone]   Will anyone else be joining your video visit? Yes: Daughter Mihcele and Dennis . How would they like to receive their invitation? *Provided interpretation services via the telephone      Video-Visit Details    Type of service:  Video Visit    Video Start Time: 7:05 AM   Video End Time:8:00 AM    Originating Location (pt. Location): Home    Distant Location (provider location):  [facility] GASTROENTEROLOGY [facility]     Platform used for Video Visit: JollyDeck    During this virtual visit the patient is located in [place], patient verifies this as the location during the entirety of this visit.     Juno Loredo, PhD, RD    Additional provider notes:  Referring Physician: Blanka  Reason for RD Visit: Ileostomy    Nutrition Plan: Continue to emphasize fluid and protein intake. Begin to reintroduce fruits    Recommendations for MD/Provider to order: None at this time    Malnutrition Diagnosis: Patient does not meet two of the criteria necessary for diagnosing malnutrition given reported improvement in appetite, oral intake, and activity in the past 2 weeks    Nutrition Assessment:  Patient is here for follow-up visit with Registered Dietitian (RD). 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 Recently seen by Endocrinology 2/22/21 at which time it was noted that \"The patient's daughter says that her mother has been becoming increasingly weak over the past few months and after the surgery she has not really had a good appetite.\"     Today reports that she is feeling better. Appetite has been improved for the past two weeks. Also feels like she is able to move better. Prior to surgery met with nutritionist, which is why they asked to see a nutritionist again. She was asked to follow a low fiber diet after surgery, but doesn't like the low fiber restrictions. Discussed ability to begin reintroducing fruits. At this time reports emptying ostomy bag 2x/day. This is often formed, but sometimes liquid (2x/week). Only drinking ~1 L of fluid/day. No complaints of muscle cramping. States that she is getting stronger.     Symptom Review  1. Nausea/vomiting? No  2. Heartburn? No  3. Feeling full quickly? No  4. Decreased appetite? No. Appetite has improved in the last two weeks  5. Weight loss/gain? NA  6. Feeling tired? NA    Dietary beliefs and Practices  1.  Have you modified your diet since surgery? Low fiber  2.  Have you received prior advice on diet?  Yes   A. If so, source? RD prior to surgery  3.  Do you take any vitamin, mineral, or herbal supplements? No  4.  Do you use any calorie/protein supplements?  No    Diet Recall:  (Typical Day)  Meal Name Time Food    Breakfast  Egg OR bread OR egg with bread        Lunch  Sometimes doesn't eat much for lunch (2-3x/wk); Lentils, eggs, OR meat for protein        Dinner  Similar to lunch: Meat, potato OR lentils OR pasta AND bread        Snacks "  NA   Beverages  1 liter of water and tea; 1 Ensure/day   Alcohol   NA   -No fruit right now. Would like to add Mandarin ans strawberries    Frequency of eating/taking out meals: NA  Food access/availability: NA  Food preparation confidence/abilities: NA    Anthropometrics:   Height: Data Unavailable  Weight: Data Unavailable  BMI: There is no height or weight on file to calculate BMI.    Weight History:  Wt Readings from Last 10 Encounters:   02/15/21 47.8 kg (105 lb 6.1 oz)   02/15/21 47.8 kg (105 lb 6.4 oz)   21 49.4 kg (108 lb 12.8 oz)   01/10/21 56.5 kg (124 lb 8 oz)   20 54 kg (119 lb)     Usual Weight: NA  Weight change in past 6 months: NA, but overall feel that weight has improved in the past 2 weeks    Labs: Reviewed  Pertinent Medications/vitamin and mineral supplements:   Current Outpatient Medications   Medication     acetaminophen (TYLENOL) 500 MG tablet     loperamide (IMODIUM) 2 MG capsule     mirtazapine (REMERON SOL-TAB) 15 MG ODT     omeprazole (PRILOSEC) 20 MG DR capsule     ondansetron (ZOFRAN-ODT) 4 MG ODT tab     psyllium (METAMUCIL/KONSYL) Packet     sulfamethoxazole-trimethoprim (BACTRIM DS) 800-160 MG tablet     traMADol (ULTRAM) 50 MG tablet     No current facility-administered medications for this visit.        Food Allergies: Pineapple  Food Intolerances: NA  Physical Activity: NA  Estimated Nutrition Needs based on most recent body weight of 47.8 [id number] calories (25-35 kcals/kg), 45 g protein (1.0 g/kg), ~1 ml/kcal or total fluids per MD     Nutrition Diagnosis:    Food and nutrition related knowledge deficit related to IBD as evidenced by need for diet education.    Nutrition Prescription: Ileostomy diet    Nutrition Intervention:    Nutrition Education/Counseling:  Discussed role of colon in helping to maintain fluid and electrolyte balance and potential for sodium and magnesium wasting, in particular, following surgery. Provided diet education for ileostomy  including chewing food very well before swallowing, preventing dehydration, drinking adequate fluids. Recommended avoiding concentrated sugars/added sugars. Discussed tips and some foods that may help thicken stools. Reviewed ORS basics, but emphasized continuing to add salt to food vs drinking ORS due to limited oral intake in an effort to encourage increased fluid intake. Answered patient's questions. Patient verbalized understanding of education provided. See Goals below.     Educational Materials Provided: No education materials provided at this time  Patient verbalized understanding of education provided. See all recommendations under Goals.    Goals:  1. Ground flaxseed vs whole  2. Chew food very well before swallowing.  3. Ensure regular complex CHO intake (pasta, rice, potatoes, breads)  4. Chewable multivitamin with minerals daily for best absorption  5. Drink adequate fluids. Aim for drinking a minimum of 8-10 cups of fluids per day   6. Trevor cracker squares (4 squares) with 2 tablespoons of peanut butter (provides about 250 calories and 9 grams of protein)    Nutrition Monitoring and Evaluation: Will monitor adherence to nutrition recommendations at future RD visits.     Further Medical Nutrition Therapy: As needed  Next Appointment (if applicable): PRN  Patient was encouraged to call/contact RD with any further questions.      Again, thank you for allowing me to participate in the care of your patient.      Sincerely,    Juno Loredo RD

## 2021-03-08 NOTE — PATIENT INSTRUCTIONS
Golden Lanza,    It was great meeting you today. Below is a summary of what we discussed:    1. If you would like to re-introduce flaxseed, recommend ground flaxseed (flaxseed meal) vs whole seeds    2. Chew food very well before swallowing.    3. Ensure regular complex CHO intake (pasta, rice, potatoes, breads)    4. Chewable multivitamin with minerals daily for best absorption    5. Drink adequate fluids. Aim for drinking a minimum of 8-10 cups of fluids per day     6. A good calorie and protein snack option we discussed was: Trevor cracker squares (4 squares) with 2 tablespoons of peanut butter (provides about 250 calories and 9 grams of protein)    Best regards,  Juno Loredo, PhD, RD

## 2021-03-09 ENCOUNTER — PATIENT OUTREACH (OUTPATIENT)
Dept: SURGERY | Facility: CLINIC | Age: 76
End: 2021-03-09

## 2021-03-09 DIAGNOSIS — C18.7 MALIGNANT NEOPLASM OF SIGMOID COLON (H): Primary | ICD-10-CM

## 2021-03-09 DIAGNOSIS — Z93.2 S/P ILEOSTOMY (H): ICD-10-CM

## 2021-03-09 NOTE — PROGRESS NOTES
Jimmy called me to let me know that pt is not doing chemotherapy only observation. They are wondering if she can have her stoma taken down even if she is not moving forward with chemotherapy. Sent message to  and Dr. Butler.     Discussed with . He is okay with moving forward with takedown surgery. She will need a flex sig with . Will update Genete and put in case request for takedown

## 2021-03-29 ENCOUNTER — TELEPHONE (OUTPATIENT)
Dept: GASTROENTEROLOGY | Facility: OUTPATIENT CENTER | Age: 76
End: 2021-03-29

## 2021-03-29 ENCOUNTER — OFFICE VISIT (OUTPATIENT)
Dept: WOUND CARE | Facility: CLINIC | Age: 76
End: 2021-03-29
Payer: COMMERCIAL

## 2021-03-29 DIAGNOSIS — Z93.6 S/P ILEAL CONDUIT (H): Primary | ICD-10-CM

## 2021-03-29 PROCEDURE — 99211 OFF/OP EST MAY X REQ PHY/QHP: CPT

## 2021-03-29 NOTE — TELEPHONE ENCOUNTER
Spoke with Michele, Will see her mom in clinic today for assessment     Health Call Center    Phone Message    May a detailed message be left on voicemail: yes     Reason for Call: Other: Pt's daughter, Jovana, called. Pt needs refills on medical supplies and equipment. Please call Jovana [phone].      Action Taken: Message routed to:  [facility] PAC    Travel Screening: Not Applicable

## 2021-03-29 NOTE — PROGRESS NOTES
"WO Ostomy Assessment  Patient comes to clinic for consultation regarding ostomy issues.    She is here with daughterand ostomy care is provided by daughter   Procedure: Diagnostic laparoscopy.   2.  Laparoscopic takedown of splenic flexure.   3.  Sigmoidectomy with en bloc partial cystectomy and total abdominal hysterectomy with left salpingo-oophorectomy.   4.  Right salpingo-oophorectomy.   5.  Reimplantation of left ureter.   6.  Partial omentectomy (en bloc).   7.  Appendectomy.   8.  Flexible sigmoidoscopy.   9.  Cystoscopy.   10. Diverting loop ileostomy.  Dx related to ostomy:sigmoid adenocarinoma with colovesicle fistula  Consulted per Dr Ariana Moscoso    Subjective:  Patient's reason for visit: \"frequent leaking\"     The quantity of ostomy supplies needed by a beneficiary is determined  primarily by the type of ostomy, its location, its construction, and the condition of the skin surface  surrounding the stoma.    Objective:  Type: Ileostomy since  1/5/2021  Stoma: 16 mm  Loop,ileostomy,  viable, pink-red, round and flat     Location: right lower quadrant     Complications: none   Mucutaneous junction:  intact     Peristomal skin: erythema and erosion of epidermis  and barrier is showing some signs of leakage   Output: light brown, liquid    Frequency of pouch change: 3-4 days. This is  scheduled.   Received home care [facility] assessment after discharge:Yes        Current pouch system/supplies:  [place] one piece, cut to fit, convex, Adaptbarrier ring, ostomy powder and skin prep    Assessment: Skin breakdown , wearing pouch to long in setting of flat stoma with liquid output, not wearing belt   Intervention/Plan:  Astrid ring, Some supplies given and will check with [facility] for replacement of order. This product is not available in pre-cut. Too small.  8958   Needs to change 3xper week. Order placed with Tian via phone.   Return to clinic prn .      Dr Moscoso was available for supervision of care if " needed or if questions should arise and regarding plan of care.    eKlly Wood, RN  RN CWON

## 2021-03-31 ENCOUNTER — TELEPHONE (OUTPATIENT)
Dept: SURGERY | Facility: CLINIC | Age: 76
End: 2021-03-31

## 2021-03-31 PROBLEM — Z93.2 S/P ILEOSTOMY (H): Status: ACTIVE | Noted: 2021-03-31

## 2021-03-31 NOTE — TELEPHONE ENCOUNTER
Spoke with patient rep/daughter Michele via phone, completed the following scheduling, then mailed Surgery Packet to patient at verified address on-file:     Required: Yes, you will need a  18 years or older to drive you home from your procedure as well as stay with you for 24 hours after your procedure, if you are discharged the same day as your procedure.    Surgery: Ileostomy Closure    Date: May 4, 2021  Surgeon(s): Dr. Remi Moscoso    Time: You will receive a call 2-3 days prior to your surgery with your finalized surgery and arrival time.     Location:     [address]3C)      [place] [address]      [phone]      [email or web address]     Upcoming Appointments:   To ensure you are fully prepared for your surgery, please make sure the following items have been completed PRIOR to your surgery date:    Pre-operative Flexible Sigmoidoscopy and consult with surgeon:   Clinic appointment with Dr. Remi Moscoso: 4/5/2021 at 10:30 AM                                                                      [facility]                                                                      [address] [place] [id number]    Pre-operative History & Physical appointment:   Clinic appointment with Pre-operative Assessment Center (PAC): 4/20/2021 at 9:00 AM                                                 [facility]                                                 [address] [place] [id number]    Pre-operative Lab appointment:   Lab draw appointment:    4/20/2021 at 10:30 AM                                                 [facility]                                                 [address] [place] [address]                                              [place] [address]    Pre-operative COVID-19 Test:   Pre-procedure asymptomatic COVID PCR   Saturday 5/1/2021 at 1:00 PM                                                 [facility]        [address] [place] [address]    Post operative appointment:  Clinic appointment with Nadege Alaniz NP: 5/19/2021 at 1:30 PM                                                                      [facility](4[facility])                                                                      [address] [place] [address]    Post operative appointment:  Clinic appointment with Dr. Remi Moscoso: 6/14/2021 at 3:30 PM                                                                      [facility](4[facility]                                                                      [address] [place] [address]    Pre-surgical Bowel Preparation:  NONE

## 2021-04-01 NOTE — PROGRESS NOTES
"Colon and Rectal Surgery Follow-up Clinic Note    RE: Myla Glynn  : 1945  MINDI: 2021    DIAGNOSIS: 76 year old female with locally advanced sigmoid adenocarcinoma and colovesical fistula who is now status post diagnostic laparoscopy, takedown of splenic flexure, sigmoidectomy with en bloc partial cystectomy and total abdominal hysterectomy with left salpingo-oophorectomy, right salpingo-oophorectomy, reimplantation of left ureter, partial omentectomy, appendectomy, flexible sigmoidoscopy, cystoscopy, and diverting loop ileostomy on 2021.    INTERVAL HISTORY: Denies increased pain, fevers, or chills. Tolerating diet well although appetite is poor. Has refused adjuvant chemotherapy.  Ileostomy functioning well with no issues with pouching. Output is approximately 600 mL/24 hours. Off narcotic pain meds.    Physical Examination:  /61 (BP Location: Right arm, Patient Position: Sitting, Cuff Size: Adult Regular)   Pulse 56   Ht 5' 1\"   Wt 109 lb 11.2 oz   SpO2 100%   BMI 20.73 kg/m    Abdomen soft, nontender.  Right-sided ileostomy viable and functioning. Incision with no evidence of infection or hernia.  Perianal skin intact.  Digital rectal exam: No masses palpated.  Flexible sigmoidoscopy: after obtaining informed consent and performing a \"time out\", an adult flexible sigmoidoscope was introduced through the anus and passed up to the descending colon. The quality of the prep was poor. Findings: Moderate diversion proctitis.  Widely patent and healthy-appearing stapled side to and colorectal anastomosis at 15 cm from the anal verge with no evidence of neoplasia. There was no active ulceration, inflammation or bleeding. No additional abnormalities were seen. Total scope time: 12 minutes. The patient tolerated the procedure well.    ASSESSMENT  Doing well postop. Patient has refused adjuvant chemotherapy.  Normal GGE and flex sig.    Pathology:  FINAL DIAGNOSIS:   A. RIGHT ADNEXA, RIGHT " SALPINGO-OOPHORECTOMY:   - Atrophic ovary   - Fallopian tube with paratubal cyst   - Negative for malignancy     B. SIGMOID COLON, UTERUS WITH CERVIX, LEFT ADNEXA, DOME OF BLADDER,   PARTIAL CYSTECTOMYEN BLOC WITH   SIGMOIDECTOMY ANDTOTAL ABDOMINAL HYSTERECTOMY   WITHLEFTSALPINGO-OOPHORECTOMY:   - Invasive adenocarcinoma, moderately differentiated, 8.6 cm in greatest   dimension   - Tumor invades the urinary bladder wall and bladder mucosa   - Tumor with adjacent inflammation/abscess adheres to anterior uterine   wall without microscopic involvement of   the myometrium by carcinoma   - All resection margins (bladder margin, proximal, distal and mesenteric)   are negative   - Lymphovascular and perineural invasion is identified   - Colon with a tubular adenoma, 0.6 cm   - Bladder wall with chronic inflammation, fibrosis and adhesions   - Portion of the ureter with no significant histologic abnormality   - Benign cervix with nabothian cysts   - Atrophic endometrium with a submucosal leiomyoma (1.2 cm)   - Benign atrophic ovary   - Fallopian tube with no significant histologic abnormality   - One of forty eight lymph nodes positive for carcinoma (1/48)   - See synoptic report for details     C. ANASTOMOSIS RING, EXCISION:   - Benign colonic mucosa   - Negative for malignancy     D. APPENDIX, APPENDECTOMY:   - Appendix with obliteration (neuroma of tip), serositis and adhesions   - Negative for malignancy     GGE 2/23/21:  Impression: Postsurgical changes of sigmoidectomy with mild narrowing  at the level of the anastomosis without evidence of leak or  fistulization.    PLAN  1.  High-fiber, high-protein diet. No activity restrictions.   2.  Schedule ileostomy takedown. Will need PAC and labs.  3.  Colonoscopy in 1 year.  4.  Rest of cancer surveillance per medical oncology.    30 minutes spent on the date of the encounter doing chart review, history and exam, documentation and further activities as noted  above.    Remi Moscoso M.D., M.Sc.     Division of Colon and Rectal Surgery  [facility]    Referring Provider:  Sarah Butler MD     Primary Care Provider:  Jami Bundy    CC:  Ramu Jackson MD

## 2021-04-05 ENCOUNTER — OFFICE VISIT (OUTPATIENT)
Dept: SURGERY | Facility: CLINIC | Age: 76
End: 2021-04-05
Payer: COMMERCIAL

## 2021-04-05 VITALS
BODY MASS INDEX: 20.71 KG/M2 | HEART RATE: 56 BPM | SYSTOLIC BLOOD PRESSURE: 123 MMHG | WEIGHT: 109.7 LBS | OXYGEN SATURATION: 100 % | HEIGHT: 61 IN | DIASTOLIC BLOOD PRESSURE: 61 MMHG

## 2021-04-05 DIAGNOSIS — Z93.2 S/P ILEOSTOMY (H): Primary | ICD-10-CM

## 2021-04-05 PROCEDURE — 99024 POSTOP FOLLOW-UP VISIT: CPT | Performed by: COLON & RECTAL SURGERY

## 2021-04-05 ASSESSMENT — PAIN SCALES - GENERAL: PAINLEVEL: NO PAIN (0)

## 2021-04-05 ASSESSMENT — MIFFLIN-ST. JEOR: SCORE: 934.98

## 2021-04-05 NOTE — NURSING NOTE
"Chief Complaint   Patient presents with     Flexible Sigmoidoscopy     flex sig and discuss takedown       Vitals:    04/05/21 1042   BP: 123/61   BP Location: Right arm   Patient Position: Sitting   Cuff Size: Adult Regular   Pulse: 56   SpO2: 100%   Weight: 109 lb 11.2 oz   Height: 5' 1\"       Body mass index is 20.73 kg/m .    Juhi Muhammad MA    "

## 2021-04-05 NOTE — LETTER
"2021       RE: Myla Glynn  [address]  [address]  [place] [address]     Dear Colleague,    Thank you for referring your patient, Myla Glynn, to the [facility] [place] [facility]. Please see a copy of my visit note below.    Colon and Rectal Surgery Follow-up Clinic Note    RE: Myla Glynn  : 1945  MINDI: 2021    DIAGNOSIS: 76 year old female with locally advanced sigmoid adenocarcinoma and colovesical fistula who is now status post diagnostic laparoscopy, takedown of splenic flexure, sigmoidectomy with en bloc partial cystectomy and total abdominal hysterectomy with left salpingo-oophorectomy, right salpingo-oophorectomy, reimplantation of left ureter, partial omentectomy, appendectomy, flexible sigmoidoscopy, cystoscopy, and diverting loop ileostomy on 2021.    INTERVAL HISTORY: Denies increased pain, fevers, or chills. Tolerating diet well although appetite is poor. Has refused adjuvant chemotherapy.  Ileostomy functioning well with no issues with pouching. Output is approximately 600 mL/24 hours. Off narcotic pain meds.    Physical Examination:  /61 (BP Location: Right arm, Patient Position: Sitting, Cuff Size: Adult Regular)   Pulse 56   Ht 5' 1\"   Wt 109 lb 11.2 oz   SpO2 100%   BMI 20.73 kg/m    Abdomen soft, nontender.  Right-sided ileostomy viable and functioning. Incision with no evidence of infection or hernia.  Perianal skin intact.  Digital rectal exam: No masses palpated.  Flexible sigmoidoscopy: after obtaining informed consent and performing a \"time out\", an adult flexible sigmoidoscope was introduced through the anus and passed up to the descending colon. The quality of the prep was poor. Findings: Moderate diversion proctitis.  Widely patent and healthy-appearing stapled side to and colorectal anastomosis at 15 cm from the anal verge with no " evidence of neoplasia. There was no active ulceration, inflammation or bleeding. No additional abnormalities were seen. Total scope time: 12 minutes. The patient tolerated the procedure well.    ASSESSMENT  Doing well postop. Patient has refused adjuvant chemotherapy.  Normal GGE and flex sig.    Pathology:  FINAL DIAGNOSIS:   A. RIGHT ADNEXA, RIGHT SALPINGO-OOPHORECTOMY:   - Atrophic ovary   - Fallopian tube with paratubal cyst   - Negative for malignancy     B. SIGMOID COLON, UTERUS WITH CERVIX, LEFT ADNEXA, DOME OF BLADDER,   PARTIAL CYSTECTOMYEN BLOC WITH   SIGMOIDECTOMY ANDTOTAL ABDOMINAL HYSTERECTOMY   WITHLEFTSALPINGO-OOPHORECTOMY:   - Invasive adenocarcinoma, moderately differentiated, 8.6 cm in greatest   dimension   - Tumor invades the urinary bladder wall and bladder mucosa   - Tumor with adjacent inflammation/abscess adheres to anterior uterine   wall without microscopic involvement of   the myometrium by carcinoma   - All resection margins (bladder margin, proximal, distal and mesenteric)   are negative   - Lymphovascular and perineural invasion is identified   - Colon with a tubular adenoma, 0.6 cm   - Bladder wall with chronic inflammation, fibrosis and adhesions   - Portion of the ureter with no significant histologic abnormality   - Benign cervix with nabothian cysts   - Atrophic endometrium with a submucosal leiomyoma (1.2 cm)   - Benign atrophic ovary   - Fallopian tube with no significant histologic abnormality   - One of forty eight lymph nodes positive for carcinoma (1/48)   - See synoptic report for details     C. ANASTOMOSIS RING, EXCISION:   - Benign colonic mucosa   - Negative for malignancy     D. APPENDIX, APPENDECTOMY:   - Appendix with obliteration (neuroma of tip), serositis and adhesions   - Negative for malignancy     GGE 2/23/21:  Impression: Postsurgical changes of sigmoidectomy with mild narrowing  at the level of the anastomosis without evidence of leak  or  fistulization.    PLAN  1.  High-fiber, high-protein diet. No activity restrictions.   2.  Schedule ileostomy takedown. Will need PAC and labs.  3.  Colonoscopy in 1 year.  4.  Rest of cancer surveillance per medical oncology.    30 minutes spent on the date of the encounter doing chart review, history and exam, documentation and further activities as noted above.    Remi Moscoso M.D., M.Sc.     Division of Colon and Rectal Surgery  [facility]    Referring Provider:  Sarah Butler MD     Primary Care Provider:  Jami Bundy    CC:  Ramu Jackson MD

## 2021-04-05 NOTE — PATIENT INSTRUCTIONS
Follow up:    Please call with any questions or concerns regarding your clinic visit today.    It is a pleasure being involved in your health care.    Contacts post-consultation depending on your need:    Radiology Appointments [phone]    Schedule Clinic Appointments [phone] # 1   M-F 7:30 - 5 pm    JAMARI Landaverde [phone]    Clinic Fax Number [phone]    Surgery Scheduling [phone]    My Chart is available 24 hours a day and is a secure way to access your records and communicate with your care team.  I strongly recommend signing up if you haven't already done so, if you are comfortable with computers.  If you would like to inquire about this or are having problems with My Chart access, you may call [phone] or go online at [email or web address].  Please allow at least 24 hours for a response and extra time on weekends and Holidays.

## 2021-04-13 ENCOUNTER — TELEPHONE (OUTPATIENT)
Dept: WOUND CARE | Facility: CLINIC | Age: 76
End: 2021-04-13

## 2021-04-13 NOTE — TELEPHONE ENCOUNTER
Pt daughter thinks there is stool coming from the side. Pouches leaking often. Cut barrier to 20 to accommodate drainage at skin level. Will see in clinic soon   She is  Using heat, cleaning with water.     ----- Message from Kelly Wood RN sent at 4/13/2021  1:28 PM CDT -----  Marilyn [phone] trouble with pouches

## 2021-04-20 ENCOUNTER — OFFICE VISIT (OUTPATIENT)
Dept: SURGERY | Facility: CLINIC | Age: 76
End: 2021-04-20
Attending: COLON & RECTAL SURGERY
Payer: COMMERCIAL

## 2021-04-20 ENCOUNTER — ANESTHESIA EVENT (OUTPATIENT)
Dept: SURGERY | Facility: CLINIC | Age: 76
End: 2021-04-20
Payer: COMMERCIAL

## 2021-04-20 ENCOUNTER — PRE VISIT (OUTPATIENT)
Dept: SURGERY | Facility: CLINIC | Age: 76
End: 2021-04-20

## 2021-04-20 VITALS
RESPIRATION RATE: 18 BRPM | WEIGHT: 108.8 LBS | OXYGEN SATURATION: 100 % | HEART RATE: 61 BPM | BODY MASS INDEX: 21.36 KG/M2 | DIASTOLIC BLOOD PRESSURE: 65 MMHG | HEIGHT: 60 IN | SYSTOLIC BLOOD PRESSURE: 104 MMHG | TEMPERATURE: 97.6 F

## 2021-04-20 DIAGNOSIS — Z93.2 S/P ILEOSTOMY (H): ICD-10-CM

## 2021-04-20 DIAGNOSIS — Z01.818 PREOP EXAMINATION: Primary | ICD-10-CM

## 2021-04-20 DIAGNOSIS — C18.7 MALIGNANT NEOPLASM OF SIGMOID COLON (H): ICD-10-CM

## 2021-04-20 LAB
ALBUMIN SERPL-MCNC: 3.7 G/DL (ref 3.4–5)
ALP SERPL-CCNC: 41 U/L (ref 40–150)
ALT SERPL W P-5'-P-CCNC: 12 U/L (ref 0–50)
ANION GAP SERPL CALCULATED.3IONS-SCNC: 7 MMOL/L (ref 3–14)
AST SERPL W P-5'-P-CCNC: 9 U/L (ref 0–45)
BASOPHILS # BLD AUTO: 0 10E9/L (ref 0–0.2)
BASOPHILS NFR BLD AUTO: 0.7 %
BILIRUB SERPL-MCNC: 0.3 MG/DL (ref 0.2–1.3)
BUN SERPL-MCNC: 36 MG/DL (ref 7–30)
CALCIUM SERPL-MCNC: 9.3 MG/DL (ref 8.5–10.1)
CHLORIDE SERPL-SCNC: 116 MMOL/L (ref 94–109)
CO2 SERPL-SCNC: 18 MMOL/L (ref 20–32)
CREAT SERPL-MCNC: 1.57 MG/DL (ref 0.52–1.04)
DIFFERENTIAL METHOD BLD: ABNORMAL
EOSINOPHIL # BLD AUTO: 0.3 10E9/L (ref 0–0.7)
EOSINOPHIL NFR BLD AUTO: 5 %
ERYTHROCYTE [DISTWIDTH] IN BLOOD BY AUTOMATED COUNT: 15.5 % (ref 10–15)
GFR SERPL CREATININE-BSD FRML MDRD: 32 ML/MIN/{1.73_M2}
GLUCOSE SERPL-MCNC: 83 MG/DL (ref 70–99)
HCT VFR BLD AUTO: 40.6 % (ref 35–47)
HGB BLD-MCNC: 12.5 G/DL (ref 11.7–15.7)
IMM GRANULOCYTES # BLD: 0 10E9/L (ref 0–0.4)
IMM GRANULOCYTES NFR BLD: 0.2 %
LYMPHOCYTES # BLD AUTO: 1.5 10E9/L (ref 0.8–5.3)
LYMPHOCYTES NFR BLD AUTO: 28.3 %
MCH RBC QN AUTO: 28 PG (ref 26.5–33)
MCHC RBC AUTO-ENTMCNC: 30.8 G/DL (ref 31.5–36.5)
MCV RBC AUTO: 91 FL (ref 78–100)
MONOCYTES # BLD AUTO: 0.2 10E9/L (ref 0–1.3)
MONOCYTES NFR BLD AUTO: 4.5 %
NEUTROPHILS # BLD AUTO: 3.3 10E9/L (ref 1.6–8.3)
NEUTROPHILS NFR BLD AUTO: 61.3 %
NRBC # BLD AUTO: 0 10*3/UL
NRBC BLD AUTO-RTO: 0 /100
PLATELET # BLD AUTO: 236 10E9/L (ref 150–450)
POTASSIUM SERPL-SCNC: 3.9 MMOL/L (ref 3.4–5.3)
PREALB SERPL IA-MCNC: 27 MG/DL (ref 15–45)
PROT SERPL-MCNC: 7.6 G/DL (ref 6.8–8.8)
RBC # BLD AUTO: 4.47 10E12/L (ref 3.8–5.2)
SODIUM SERPL-SCNC: 141 MMOL/L (ref 133–144)
TRANSFERRIN SERPL-MCNC: 185 MG/DL (ref 210–360)
WBC # BLD AUTO: 5.4 10E9/L (ref 4–11)

## 2021-04-20 PROCEDURE — 86900 BLOOD TYPING SEROLOGIC ABO: CPT | Performed by: PATHOLOGY

## 2021-04-20 PROCEDURE — 80053 COMPREHEN METABOLIC PANEL: CPT | Performed by: PATHOLOGY

## 2021-04-20 PROCEDURE — 85025 COMPLETE CBC W/AUTO DIFF WBC: CPT | Performed by: PATHOLOGY

## 2021-04-20 PROCEDURE — 86850 RBC ANTIBODY SCREEN: CPT | Performed by: PATHOLOGY

## 2021-04-20 PROCEDURE — 86901 BLOOD TYPING SEROLOGIC RH(D): CPT | Performed by: PATHOLOGY

## 2021-04-20 PROCEDURE — 84134 ASSAY OF PREALBUMIN: CPT | Performed by: PATHOLOGY

## 2021-04-20 PROCEDURE — 99214 OFFICE O/P EST MOD 30 MIN: CPT | Performed by: CLINICAL NURSE SPECIALIST

## 2021-04-20 PROCEDURE — 99000 SPECIMEN HANDLING OFFICE-LAB: CPT | Performed by: PATHOLOGY

## 2021-04-20 PROCEDURE — 36415 COLL VENOUS BLD VENIPUNCTURE: CPT | Performed by: PATHOLOGY

## 2021-04-20 PROCEDURE — 84466 ASSAY OF TRANSFERRIN: CPT | Mod: 90 | Performed by: PATHOLOGY

## 2021-04-20 ASSESSMENT — PAIN SCALES - GENERAL: PAINLEVEL: NO PAIN (0)

## 2021-04-20 ASSESSMENT — MIFFLIN-ST. JEOR: SCORE: 915.01

## 2021-04-20 NOTE — ANESTHESIA PREPROCEDURE EVALUATION
Anesthesia Pre-Procedure Evaluation    Patient: Myla [facility]   MRN: [id number] : 1945        Preoperative Diagnosis: Malignant neoplasm of sigmoid colon (H) [C18.7]  S/P ileostomy (H) [Z93.2]   Procedure : Procedure(s):  CLOSURE, ILEOSTOMY     Past Medical History:   Diagnosis Date     Anemia      Bladder mass      Controlled type 2 diabetes mellitus without complication, without long-term current use of insulin (H)      Gastroesophageal reflux disease      HTN (hypertension)      Ileostomy status (H)      Malignant neoplasm of sigmoid colon (H)       Past Surgical History:   Procedure Laterality Date     COLONOSCOPY       CYSTECTOMY BLADDER RADICAL, ILEAL DIVERSION, COMBINED N/A 2021    Procedure: Partial Cystectomy, Cystourethroscopy,;  Surgeon: Angel Newsome MD;  Location: [facility] OR     HYSTERECTOMY TOTAL ABDOMINAL  2021    Procedure: Hysterectomy total abdominal;  Surgeon: Jimy Jackson MD;  Location: [facility] OR     LAPAROSCOPY OPERATIVE ADULT  2021    Procedure: Laparoscopy Operative Adult, takedown of splenic flexure, appendectomy;  Surgeon: Remi Moscoso MD;  Location: [facility] OR     SALPINGO-OOPHORECTOMY BILATERAL  2021    Procedure: Salpingo-oophorectomy bilateral;  Surgeon: Jimy Jackson MD;  Location: [facility] OR     SIGMOIDOSCOPY FLEXIBLE N/A 2021    Procedure: SIGMOIDOSCOPY, FLEXIBLE;  Surgeon: Remi Moscoso MD;  Location: [facility] OR      No Known Allergies   Social History     Tobacco Use     Smoking status: Never Smoker     Smokeless tobacco: Never Used   Substance Use Topics     Alcohol use: Not Currently      Wt Readings from Last 1 Encounters:   21 49.8 kg (109 lb 11.2 oz)        Anesthesia Evaluation   Pt has had prior anesthetic. Type: General and MAC.    No history of anesthetic complications       ROS/MED HX  ENT/Pulmonary:  - neg pulmonary ROS     Neurologic:  - neg neurologic ROS     Cardiovascular:  - neg  cardiovascular ROS   (+) -----Previous cardiac testing   Echo: Date: Results:    Stress Test: Date: Results:    ECG Reviewed: Date: 1/5/21 Results:  SR  Cath: Date: Results:   (-) taking anticoagulants/antiplatelets   METS/Exercise Tolerance: 1 - Eating, dressing    Hematologic: Comments: Has received one iron infusion since OR.     (+) anemia, history of blood transfusion, no previous transfusion reaction, Known PRBC Anitbodies:No - last Tx 1/7/21,     Musculoskeletal:  - neg musculoskeletal ROS     GI/Hepatic: Comment: Ileostomy status    (+) GERD, Asymptomatic on medication,     Renal/Genitourinary:     (+) renal disease, type: CRI, Pt does not require dialysis,     Endo:     (+) type II DM, Last HgA1c: 5.7, date: 1/8/20, Not using insulin, - not using insulin pump.     Psychiatric/Substance Use:  - neg psychiatric ROS     Infectious Disease:  - neg infectious disease ROS     Malignancy: Comment: Declined chemotherapy  (+) Malignancy, History of GI.GI CA  Active status post Surgery.        Other:  - neg other ROS          Physical Exam    Airway        Mallampati: I   TM distance: > 3 FB   Neck ROM: full   Mouth opening: > 3 cm    Respiratory Devices and Support         Dental       (+) upper dentures and partials      Cardiovascular          Rhythm and rate: regular and normal     Pulmonary           breath sounds clear to auscultation           OUTSIDE LABS:  CBC:   Lab Results   Component Value Date    WBC 7.1 02/15/2021    WBC 13.7 (H) 01/06/2021    HGB 11.9 02/15/2021    HGB 8.9 (L) 01/09/2021    HCT 38.4 02/15/2021    HCT 26.1 (L) 01/06/2021     02/15/2021     01/11/2021     BMP:   Lab Results   Component Value Date     02/18/2021     02/15/2021    POTASSIUM 3.4 02/18/2021    POTASSIUM 4.3 02/15/2021    CHLORIDE 122 (H) 02/18/2021    CHLORIDE 113 (H) 02/15/2021    CO2 13 (L) 02/18/2021    CO2 16 (L) 02/15/2021    BUN 39 (H) 02/18/2021    BUN 45 (H) 02/15/2021    CR 1.27 (H)  02/18/2021    CR 1.69 (H) 02/15/2021    GLC 72 02/18/2021    GLC 94 02/15/2021     COAGS:   Lab Results   Component Value Date    INR 1.56 (H) 01/06/2021     POC:   Lab Results   Component Value Date     (H) 01/13/2021     HEPATIC:   Lab Results   Component Value Date    ALBUMIN 3.5 02/15/2021    PROTTOTAL 8.1 02/15/2021    ALT 9 02/15/2021    AST 6 02/15/2021    ALKPHOS 54 02/15/2021    BILITOTAL 0.2 02/15/2021     OTHER:   Lab Results   Component Value Date    PH 7.33 (L) 01/05/2021    LACT 1.0 01/07/2021    DICKSON 7.2 (L) 02/18/2021    PHOS 4.2 01/12/2021    MAG 1.9 01/12/2021    TSH 0.54 02/23/2021       Anesthesia Plan    ASA Status:  3      Anesthesia Type: General.   Induction: Intravenous.   Maintenance: Inhalation.   Techniques and Equipment:     - Lines/Monitors: 2nd IV     Consents            Postoperative Care       PONV prophylaxis: Ondansetron (or other 5HT-3), Dexamethasone or Solumedrol     Comments:              PAC Discussion and Assessment    ASA Classification: 3  Case is suitable for: [place]  Anesthetic techniques and relevant risks discussed: GA  Invasive monitoring and risk discussed: No    Possibility and Risk of blood transfusion discussed: No            PAC Resident/NP Anesthesia Assessment: Myla Glynn is a 76 year old female scheduled to undergo CLOSURE, ILEOSTOMY with Dr. Moscoso on 5/4/21. She has the following specific operative considerations:   - RCRI :0.9%  risk of major adverse cardiac event.   - VTE risk: 0.5-3%  - FARZAD # of risks 1/8 = Low risk   - Risk of PONV score = 3.  If > 2, anti-emetic intervention recommended. If 3 or > anti emetic intervention recommended with two or more meds    --Ileostomy status after complex surgical resection for sigmoid colon cancer with invasion into bladder plus colovesical fistula. Above procedure now planned for closure of ileostomy.    --History of HYPERTENSION, no meds at this time. No other cardiac history, symptoms or meds.  EKG SR. Able to walk short distances.     --Nonsmoker. Denies pulmonary symptoms.      --GERD Will take omeprazole on DOS.    --DIABETES MELLITUS II. Last A1c 5.7 No meds at this time.     --Anemia. Last Hgb: 12.5. History of transfusion, last 1/7/21. Has also received one iron infusion since surgery.     --Renal insufficiency Cr 1.57.    --Type and screen and lab update by Dr. Moscoso today.     - Optimal  recovery pathway initiated.       Patient is optimized and is acceptable candidate for the proposed procedure.  No further diagnostic evaluation is needed.         Reviewed and Signed by PAC Mid-Level Provider/Resident  Mid-Level Provider/Resident: WILL Barcenas, CNS  Date: 4/20/20  Time: 9:50am                               WILL Montes

## 2021-04-20 NOTE — H&P
Pre-Operative H & P     CC:  Preoperative exam to assess for increased cardiopulmonary risk while undergoing surgery and anesthesia.    Date of Encounter: 4/20/2021  Primary Care Physician:  Jami Bundy  Reason for visit: Malignant neoplasm of sigmoid colon (H) [C18.7]  S/P ileostomy (H) [Z93.2]    HPI  Myla Glynn is a 76 year old female who presents for pre-operative H & P in preparation for CLOSURE, ILEOSTOMY with Dr. Moscoso on 5/4/21 at [facility]. History is obtained from the patient and daughter, via daughter as  (waiver signed); also review of medical records.     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     Per notes she has declined chemotherapy. Patient has followed with Dr. Moscoso, last seen on 4/5/21 where she reported that she was doing well. She was counseled for ileostomy closure.     Her history is otherwise significant for HTN, DM II, GERD, anemia, and CRI. Today patient denies fever, cough, shortness of breath, chest pain, irregular HR, or ankle edema. She and her daughter reported that patient received a blood transfusion around the time of her surgery. Blood bank confirmed last transfusion 1/7/21. She has received an iron infusion since then also.       Past Medical History  Past Medical History:   Diagnosis Date     Anemia      Bladder mass      Controlled type 2 diabetes mellitus without complication, without long-term current use of insulin (H)      Gastroesophageal reflux disease      HTN (hypertension)      Ileostomy status (H)       Malignant neoplasm of sigmoid colon (H)      Renal insufficiency        Past Surgical History  Past Surgical History:   Procedure Laterality Date     COLONOSCOPY       CYSTECTOMY BLADDER RADICAL, ILEAL DIVERSION, COMBINED N/A 1/5/2021    Procedure: Partial Cystectomy, Cystourethroscopy,;  Surgeon: Angel Newsome MD;  Location: [facility] OR     HYSTERECTOMY TOTAL ABDOMINAL  1/5/2021    Procedure: Hysterectomy total abdominal;  Surgeon: Jimy Jackson MD;  Location: [facility] OR     LAPAROSCOPY OPERATIVE ADULT  1/5/2021    Procedure: Laparoscopy Operative Adult, takedown of splenic flexure, appendectomy;  Surgeon: Remi Moscoso MD;  Location: [facility] OR     SALPINGO-OOPHORECTOMY BILATERAL  1/5/2021    Procedure: Salpingo-oophorectomy bilateral;  Surgeon: Jimy Jackson MD;  Location: [facility] OR     SIGMOIDOSCOPY FLEXIBLE N/A 1/5/2021    Procedure: SIGMOIDOSCOPY, FLEXIBLE;  Surgeon: Remi Moscoso MD;  Location: [facility] OR       Hx of Blood transfusions/reactions: Yes, last 1/7/21. No known reactions or antibodies. History of iron infusions, one since OR.     Hx of abnormal bleeding or anti-platelet use: Denies.     Menstrual history: No LMP recorded. Patient has had a hysterectomy.    Steroid use in the last year: Denies.     Personal or FH with difficulty with Anesthesia:  Denies.    Prior to Admission Medications  Current Outpatient Medications   Medication Sig Dispense Refill     acetaminophen (TYLENOL) 500 MG tablet Take 2 tablets (1,000 mg) by mouth 3 times daily As scheduled for the next 7-10 days, then decrease both dose and frequency to as needed (Patient taking differently: Take 1,000 mg by mouth 3 times daily as needed As scheduled for the next 7-10 days, then decrease both dose and frequency to as needed) 1 Bottle 0     omeprazole (PRILOSEC) 20 MG DR capsule Take 20 mg by mouth every morning        traMADol (ULTRAM) 50 MG tablet Take 0.5-1 tablets (25-50 mg) by mouth every  8 hours as needed for moderate to severe pain (Patient taking differently: Take 25-50 mg by mouth every 8 hours as needed for moderate to severe pain ) 20 tablet 0     loperamide (IMODIUM) 2 MG capsule Take 1 capsule (2 mg) by mouth 2 times daily (Patient not taking: Reported on 4/20/2021) 120 capsule 0     mirtazapine (REMERON SOL-TAB) 15 MG ODT 1 tablet (15 mg) by Orally disintegrating tablet route At Bedtime (Patient not taking: Reported on 4/20/2021) 30 tablet 3     ondansetron (ZOFRAN-ODT) 4 MG ODT tab        psyllium (METAMUCIL/KONSYL) Packet Take 1 packet by mouth 2 times daily (Patient not taking: Reported on 4/20/2021) 60 packet 0     sulfamethoxazole-trimethoprim (BACTRIM DS) 800-160 MG tablet Take 1 tablet by mouth 2 times daily (Patient not taking: Reported on 4/20/2021) 10 tablet 0       Allergies  No Known Allergies    Social History  Social History     Socioeconomic History     Marital status:      Spouse name: Not on file     Number of children: Not on file     Years of education: Not on file     Highest education level: Not on file   Occupational History     Not on file   Social Needs     Financial resource strain: Not on file     Food insecurity     Worry: Not on file     Inability: Not on file     Transportation needs     Medical: Not on file     Non-medical: Not on file   Tobacco Use     Smoking status: Never Smoker     Smokeless tobacco: Never Used   Substance and Sexual Activity     Alcohol use: Not Currently     Drug use: Not Currently     Sexual activity: Not on file   Lifestyle     Physical activity     Days per week: Not on file     Minutes per session: Not on file     Stress: Not on file   Relationships     Social connections     Talks on phone: Not on file     Gets together: Not on file     Attends [employer] service: Not on file     Active member of club or organization: Not on file     Attends meetings of clubs or organizations: Not on file     Relationship status: Not on file      "Intimate partner violence     Fear of current or ex partner: Not on file     Emotionally abused: Not on file     Physically abused: Not on file     Forced sexual activity: Not on file   Other Topics Concern     Not on file   Social History Narrative     Not on file       Family History  Family History   Problem Relation Age of Onset     No Known Problems Mother      No Known Problems Father      No Known Problems Sister      No Known Problems Brother        The complete review of systems is negative other than noted in the HPI or here.   ROS/MED HISTORY    ENT/Pulmonary:  - neg pulmonary ROS     Neurologic:  - neg neurologic ROS     Cardiovascular:  - neg cardiovascular ROS   (+) -----Previous cardiac testing   Echo: Date: Results:    Stress Test: Date: Results:    ECG Reviewed: Date: 1/5/21 Results:  SR  Cath: Date: Results:   (-) taking anticoagulants/antiplatelets   METS/Exercise Tolerance: 1 - Eating, dressing    Hematologic: Comments: Has received one iron infusion since OR.     (+) anemia, history of blood transfusion, no previous transfusion reaction, Known PRBC Anitbodies:No - last Tx 1/7/21,     Musculoskeletal:  - neg musculoskeletal ROS     GI/Hepatic: Comment: Ileostomy status    (+) GERD, Asymptomatic on medication,     Renal/Genitourinary:     (+) renal disease, type: CRI, Pt does not require dialysis,     Endo:     (+) type II DM, Last HgA1c: 5.7, date: 1/8/20, Not using insulin, - not using insulin pump.     Psychiatric/Substance Use:  - neg psychiatric ROS     Infectious Disease:  - neg infectious disease ROS     Malignancy: Comment: Declined chemotherapy  (+) Malignancy, History of GI.GI CA  Active status post Surgery.        Other:  - neg other ROS          Temp: 97.6  F (36.4  C) Temp src: Oral BP: 104/65 Pulse: 61   Resp: 18 SpO2: 100 %         108 lbs 12.8 oz  5' 0\"[pt reported[   Body mass index is 21.25 kg/m .       Physical Exam  Constitutional: Awake, alert, cooperative, no apparent " distress, and appears stated age. Accompanied by daughter.   Eyes: Pupils equal, round and reactive to light, extra ocular muscles intact, sclera clear, conjunctiva normal.  HENT: Normocephalic, oral pharynx with moist mucus membranes, few teeth remaining. No goiter appreciated.   Respiratory: Clear to auscultation bilaterally, no crackles or wheezing. No cough or obvious dyspnea.  Cardiovascular: Regular rate and rhythm, normal S1 and S2, and no murmur noted.  Carotids +2, no bruits. No edema. Palpable pulses to radial  DP and PT arteries.   GI: Normal bowel sounds, soft, non-distended, non-tender, ileostomy RLQ, covered with appliance with soft brown stool present. Surgical scars: well healed.   Lymph/Hematologic: No cervical lymphadenopathy and no supraclavicular lymphadenopathy.  Genitourinary: Wearing adult diapers.  Skin: Warm and dry.  No rashes at anticipated surgical site.   Musculoskeletal: Full ROM of neck. There is no redness, warmth, or swelling of the joints. Gross motor strength is normal.    Neurologic: Awake, alert, oriented to name, place and time. Cranial nerves II-XII are grossly intact. Gait is cautious.  Neuropsychiatric: Calm, cooperative. Normal affect.     Labs: (personally reviewed)  Lab Results   Component Value Date    WBC 5.4 04/20/2021     Lab Results   Component Value Date    RBC 4.47 04/20/2021     Lab Results   Component Value Date    HGB 12.5 04/20/2021     Lab Results   Component Value Date    HCT 40.6 04/20/2021     Lab Results   Component Value Date    MCV 91 04/20/2021     Lab Results   Component Value Date    MCH 28.0 04/20/2021     Lab Results   Component Value Date    MCHC 30.8 04/20/2021     Lab Results   Component Value Date    RDW 15.5 04/20/2021     Lab Results   Component Value Date     04/20/2021     Last Comprehensive Metabolic Panel:  Sodium   Date Value Ref Range Status   04/20/2021 141 133 - 144 mmol/L Final     Potassium   Date Value Ref Range Status    04/20/2021 3.9 3.4 - 5.3 mmol/L Final     Chloride   Date Value Ref Range Status   04/20/2021 116 (H) 94 - 109 mmol/L Final     Carbon Dioxide   Date Value Ref Range Status   04/20/2021 18 (L) 20 - 32 mmol/L Final     Anion Gap   Date Value Ref Range Status   04/20/2021 7 3 - 14 mmol/L Final     Glucose   Date Value Ref Range Status   04/20/2021 83 70 - 99 mg/dL Final     Urea Nitrogen   Date Value Ref Range Status   04/20/2021 36 (H) 7 - 30 mg/dL Final     Creatinine   Date Value Ref Range Status   04/20/2021 1.57 (H) 0.52 - 1.04 mg/dL Final     GFR Estimate   Date Value Ref Range Status   04/20/2021 32 (L) >60 mL/min/[1.73_m2] Final     Comment:     Non  GFR Calc  Starting 12/18/2018, serum creatinine based estimated GFR (eGFR) will be   calculated using the Chronic Kidney Disease Epidemiology Collaboration   (CKD-EPI) equation.       Calcium   Date Value Ref Range Status   04/20/2021 9.3 8.5 - 10.1 mg/dL Final     Lab Results   Component Value Date    AST 9 04/20/2021     Lab Results   Component Value Date    ALT 12 04/20/2021     No results found for: BILICONJ   Lab Results   Component Value Date    BILITOTAL 0.3 04/20/2021     Lab Results   Component Value Date    ALBUMIN 3.7 04/20/2021     Lab Results   Component Value Date    PROTTOTAL 7.6 04/20/2021      Lab Results   Component Value Date    ALKPHOS 41 04/20/2021     EKG: Personally reviewed 1/5/21 Sinus rhythm    2/23/21 Gastrograffin enema                                                                       Impression: Postsurgical changes of sigmoidectomy with mild narrowing    at the level of the anastomosis without evidence of leak or    fistulization.        1/19/21 Xray cystogram                                                         IMPRESSION: Status post partial cystectomy, no evidence of leak.    Imaging reviewed by this provider      Outside records reviewed from: Care Everywhere    ASSESSMENT and PLAN  Myla Glynn is  a 76 year old female scheduled to undergo CLOSURE, ILEOSTOMY with Dr. Moscoso on 5/4/21. She has the following specific operative considerations:   - RCRI :0.9%  risk of major adverse cardiac event.   - Anesthesia considerations:  Refer to PAC assessment in anesthesia records  - VTE risk: 0.5-3%  - FARZAD # of risks 1/8 = Low risk   - Risk of PONV score = 3.  If > 2, anti-emetic intervention recommended. If 3 or > anti emetic intervention recommended with two or more meds    --Ileostomy status after complex surgical resection for sigmoid colon cancer with invasion into bladder plus colovesical fistula. Above procedure now planned for closure of ileostomy.    --History of HYPERTENSION, no meds at this time. No other cardiac history, symptoms or meds. EKG SR. Able to walk short distances.     --Nonsmoker. Denies pulmonary symptoms.      --GERD Will take omeprazole on DOS.    --DIABETES MELLITUS II. Last A1c 5.7 No meds at this time.     --Anemia. Last Hgb: 12.5. History of transfusion, last 1/7/21. Has also received one iron infusion since surgery.     --Renal insufficiency Cr 1.57.    --Type and screen and lab update by Dr. Moscoso today.     - Optimal recovery pathway initiated.     Arrival time, NPO, shower and medication instructions provided by nursing staff today.     Patient is optimized and is acceptable candidate for the proposed procedure.  No further diagnostic evaluation is needed.       WILL Montes CNS  Preoperative Assessment Center  [facility]  Phone: [phone]  Fax: [phone]

## 2021-04-20 NOTE — PATIENT INSTRUCTIONS
Preparing for Your Surgery      Name:  Myla Glynn   MRN:  [id number]   :  1945   Today's Date:  2021       Arriving for surgery:  Surgery date:  21  Arrival time:  11:00 am    Restrictions due to COVID 19:  One consistent visitor per patient is allowed.  The visitor will be allowed in the pre-op area.  Visitors are asked to leave the building during the surgery.  No ill visitors.  All visitors must wear face mask.    raksul parking is available for anyone with mobility limitations or disabilities.  ([facility]  24 hours/ 7 days a week; [facility]  7 am- 3:30 pm, Mon- Fri)    Please come to:   [facility] [address]  [address] [place] [address]  -    Please proceed to [address] on the 3rd floor. [phone]?     - ?If you are in need of directions, wheelchair or escort please stop at the Information Desk in the lobby.  Inform the information person that you are here for surgery; a wheelchair and escort to [address] will be provided.?     What can I eat or drink?  -  You may eat and drink normally for up to 8 hours before your surgery.  -  You may have clear liquids until 2 hours before surgery.     Examples of clear liquids:  Water  Clear broth  Juices (apple, white grape, white cranberry  and cider) without pulp  Noncarbonated, powder based beverages  (lemonade and Sarwat-Aid)  Sodas (Sprite, 7-Up, ginger ale and seltzer)  Coffee or tea (without milk or cream)  Gatorade    -  No Alcohol for at least 24 hours before surgery     Which medicines can I take?  Hold Aspirin for 7 days before surgery.   Hold Multivitamins for 7 days before surgery.  Hold Supplements for 7 days before surgery.  Hold Ibuprofen (Advil, Motrin) for 1 day before surgery--unless otherwise directed by surgeon.  Hold Naproxen (Aleve) for 4 days before surgery.  -  PLEASE TAKE these medications the day of surgery:  Tylenol if needed; take morning  medications.    How do I prepare myself?  - Please take 2 showers before surgery using Scrubcare or Hibiclens soap.    Use this soap only from the neck to your toes.     Leave the soap on your skin for one minute--then rinse thoroughly.      You may use your own shampoo and conditioner; no other hair products.   - Please remove all jewelry and body piercings.  - No lotions, deodorants or fragrance.  - No makeup or fingernail polish.   - Bring your ID and insurance card.    - All patients are required to have a Covid-19 test within 4 days of surgery/procedure.      -Patients will be contacted by the [facility] scheduling team within 1 week of surgery to make an appointment.      - Patients may call the Scheduling team at [phone] if they have not been scheduled within 4 days of  surgery.      ALL PATIENTS GOING HOME THE SAME DAY OF SURGERY ARE REQUIRED TO HAVE A RESPONSIBLE ADULT TO DRIVE AND BE IN ATTENDANCE WITH THEM FOR 24 HOURS FOLLOWING SURGERY.    IF THE RESPONSIBLE ADULT IS REQUIRED FOR POST OP TEACHING THE POST OP RN WILL ASK THEM TO COME BACK TO THE RECOVERY AREA.    Questions or Concerns:    - For any questions regarding the day of surgery or your hospital stay, please contact the Pre Admission Nursing Office at [phone].       - If you have health changes between today and your surgery please call your surgeon.       For questions after surgery please call your surgeons office.

## 2021-04-22 DIAGNOSIS — Z11.59 ENCOUNTER FOR SCREENING FOR OTHER VIRAL DISEASES: ICD-10-CM

## 2021-04-23 ENCOUNTER — TEAM CONFERENCE (OUTPATIENT)
Dept: SURGERY | Facility: CLINIC | Age: 76
End: 2021-04-23

## 2021-04-23 NOTE — PROGRESS NOTES
COLON AND RECTAL SURGERY HUDDLE:    Patient was reviewed in preporation for their surgery the following was reviewed and has been completed:    Surgeon: Dr. Remi Moscoso    Surgery & Date: 5/4 ileostomy closure      Last MD Note: reviewed    Anesthesia Type: General    Other Providers: No    PAC: Yes    WOC: N/A    Labs: Yes    Bowel Prep: No Clear Liquid    Packet: Yes    Imaging: N/A    Post-Op Appointments: Yes    COVID: Yes

## 2021-05-01 DIAGNOSIS — Z11.59 ENCOUNTER FOR SCREENING FOR OTHER VIRAL DISEASES: ICD-10-CM

## 2021-05-01 LAB
SARS-COV-2 RNA RESP QL NAA+PROBE: NORMAL
SPECIMEN SOURCE: NORMAL

## 2021-05-01 PROCEDURE — U0005 INFEC AGEN DETEC AMPLI PROBE: HCPCS | Performed by: COLON & RECTAL SURGERY

## 2021-05-01 PROCEDURE — U0003 INFECTIOUS AGENT DETECTION BY NUCLEIC ACID (DNA OR RNA); SEVERE ACUTE RESPIRATORY SYNDROME CORONAVIRUS 2 (SARS-COV-2) (CORONAVIRUS DISEASE [COVID-19]), AMPLIFIED PROBE TECHNIQUE, MAKING USE OF HIGH THROUGHPUT TECHNOLOGIES AS DESCRIBED BY CMS-2020-01-R: HCPCS | Performed by: COLON & RECTAL SURGERY

## 2021-05-02 LAB
LABORATORY COMMENT REPORT: NORMAL
SARS-COV-2 RNA RESP QL NAA+PROBE: NEGATIVE
SPECIMEN SOURCE: NORMAL

## 2021-05-04 ENCOUNTER — ANESTHESIA (OUTPATIENT)
Dept: SURGERY | Facility: CLINIC | Age: 76
End: 2021-05-04
Payer: COMMERCIAL

## 2021-05-04 ENCOUNTER — HOSPITAL ENCOUNTER (INPATIENT)
Facility: CLINIC | Age: 76
LOS: 6 days | Discharge: HOME HEALTH | End: 2021-05-10
Attending: COLON & RECTAL SURGERY | Admitting: COLON & RECTAL SURGERY
Payer: COMMERCIAL

## 2021-05-04 DIAGNOSIS — C18.7 MALIGNANT NEOPLASM OF SIGMOID COLON (H): Primary | ICD-10-CM

## 2021-05-04 DIAGNOSIS — Z93.2 S/P ILEOSTOMY (H): ICD-10-CM

## 2021-05-04 LAB
ABO + RH BLD: NORMAL
ABO + RH BLD: NORMAL
BLD GP AB SCN SERPL QL: NORMAL
BLOOD BANK CMNT PATIENT-IMP: NORMAL
BLOOD BANK CMNT PATIENT-IMP: NORMAL
GLUCOSE BLDC GLUCOMTR-MCNC: 159 MG/DL (ref 70–99)
GLUCOSE BLDC GLUCOMTR-MCNC: 71 MG/DL (ref 70–99)
MAGNESIUM SERPL-MCNC: 1.9 MG/DL (ref 1.6–2.3)
PHOSPHATE SERPL-MCNC: 5 MG/DL (ref 2.5–4.5)
POTASSIUM SERPL-SCNC: 4 MMOL/L (ref 3.4–5.3)
SPECIMEN EXP DATE BLD: NORMAL

## 2021-05-04 PROCEDURE — 272N000001 HC OR GENERAL SUPPLY STERILE: Performed by: COLON & RECTAL SURGERY

## 2021-05-04 PROCEDURE — 250N000011 HC RX IP 250 OP 636: Performed by: COLON & RECTAL SURGERY

## 2021-05-04 PROCEDURE — 250N000011 HC RX IP 250 OP 636: Performed by: ANESTHESIOLOGY

## 2021-05-04 PROCEDURE — 120N000002 HC R&B MED SURG/OB UMMC

## 2021-05-04 PROCEDURE — 370N000017 HC ANESTHESIA TECHNICAL FEE, PER MIN: Performed by: COLON & RECTAL SURGERY

## 2021-05-04 PROCEDURE — 999N001017 HC STATISTIC GLUCOSE BY METER IP

## 2021-05-04 PROCEDURE — 258N000003 HC RX IP 258 OP 636: Performed by: COLON & RECTAL SURGERY

## 2021-05-04 PROCEDURE — 710N000009 HC RECOVERY PHASE 1, LEVEL 1, PER MIN: Performed by: COLON & RECTAL SURGERY

## 2021-05-04 PROCEDURE — 250N000013 HC RX MED GY IP 250 OP 250 PS 637: Performed by: COLON & RECTAL SURGERY

## 2021-05-04 PROCEDURE — 0DNW0ZZ RELEASE PERITONEUM, OPEN APPROACH: ICD-10-PCS | Performed by: COLON & RECTAL SURGERY

## 2021-05-04 PROCEDURE — 360N000077 HC SURGERY LEVEL 4, PER MIN: Performed by: COLON & RECTAL SURGERY

## 2021-05-04 PROCEDURE — 88304 TISSUE EXAM BY PATHOLOGIST: CPT | Mod: TC | Performed by: COLON & RECTAL SURGERY

## 2021-05-04 PROCEDURE — 250N000009 HC RX 250: Performed by: NURSE ANESTHETIST, CERTIFIED REGISTERED

## 2021-05-04 PROCEDURE — 250N000011 HC RX IP 250 OP 636: Performed by: NURSE ANESTHETIST, CERTIFIED REGISTERED

## 2021-05-04 PROCEDURE — 84100 ASSAY OF PHOSPHORUS: CPT | Performed by: COLON & RECTAL SURGERY

## 2021-05-04 PROCEDURE — 258N000003 HC RX IP 258 OP 636: Performed by: ANESTHESIOLOGY

## 2021-05-04 PROCEDURE — 250N000024 HC ISOFLURANE, PER MIN: Performed by: COLON & RECTAL SURGERY

## 2021-05-04 PROCEDURE — 0DBB0ZZ EXCISION OF ILEUM, OPEN APPROACH: ICD-10-PCS | Performed by: COLON & RECTAL SURGERY

## 2021-05-04 PROCEDURE — 999N000141 HC STATISTIC PRE-PROCEDURE NURSING ASSESSMENT: Performed by: COLON & RECTAL SURGERY

## 2021-05-04 PROCEDURE — 83735 ASSAY OF MAGNESIUM: CPT | Performed by: COLON & RECTAL SURGERY

## 2021-05-04 PROCEDURE — C9290 INJ, BUPIVACAINE LIPOSOME: HCPCS | Performed by: ANESTHESIOLOGY

## 2021-05-04 PROCEDURE — 84132 ASSAY OF SERUM POTASSIUM: CPT | Performed by: COLON & RECTAL SURGERY

## 2021-05-04 PROCEDURE — 88304 TISSUE EXAM BY PATHOLOGIST: CPT | Mod: 26 | Performed by: PATHOLOGY

## 2021-05-04 PROCEDURE — 36415 COLL VENOUS BLD VENIPUNCTURE: CPT | Performed by: COLON & RECTAL SURGERY

## 2021-05-04 PROCEDURE — 258N000003 HC RX IP 258 OP 636: Performed by: NURSE ANESTHETIST, CERTIFIED REGISTERED

## 2021-05-04 PROCEDURE — 250N000025 HC SEVOFLURANE, PER MIN: Performed by: COLON & RECTAL SURGERY

## 2021-05-04 RX ORDER — SODIUM CHLORIDE, SODIUM LACTATE, POTASSIUM CHLORIDE, CALCIUM CHLORIDE 600; 310; 30; 20 MG/100ML; MG/100ML; MG/100ML; MG/100ML
INJECTION, SOLUTION INTRAVENOUS CONTINUOUS
Status: DISCONTINUED | OUTPATIENT
Start: 2021-05-04 | End: 2021-05-04 | Stop reason: HOSPADM

## 2021-05-04 RX ORDER — FLUMAZENIL 0.1 MG/ML
0.2 INJECTION, SOLUTION INTRAVENOUS
Status: DISCONTINUED | OUTPATIENT
Start: 2021-05-04 | End: 2021-05-04 | Stop reason: HOSPADM

## 2021-05-04 RX ORDER — NALOXONE HYDROCHLORIDE 0.4 MG/ML
0.2 INJECTION, SOLUTION INTRAMUSCULAR; INTRAVENOUS; SUBCUTANEOUS
Status: DISCONTINUED | OUTPATIENT
Start: 2021-05-04 | End: 2021-05-04

## 2021-05-04 RX ORDER — HYDROMORPHONE HYDROCHLORIDE 1 MG/ML
.3-.5 INJECTION, SOLUTION INTRAMUSCULAR; INTRAVENOUS; SUBCUTANEOUS EVERY 10 MIN PRN
Status: DISCONTINUED | OUTPATIENT
Start: 2021-05-04 | End: 2021-05-04 | Stop reason: HOSPADM

## 2021-05-04 RX ORDER — FENTANYL CITRATE 50 UG/ML
25-50 INJECTION, SOLUTION INTRAMUSCULAR; INTRAVENOUS EVERY 5 MIN PRN
Status: DISCONTINUED | OUTPATIENT
Start: 2021-05-04 | End: 2021-05-04 | Stop reason: HOSPADM

## 2021-05-04 RX ORDER — FENTANYL CITRATE 50 UG/ML
25-50 INJECTION, SOLUTION INTRAMUSCULAR; INTRAVENOUS
Status: DISCONTINUED | OUTPATIENT
Start: 2021-05-04 | End: 2021-05-04 | Stop reason: HOSPADM

## 2021-05-04 RX ORDER — NALOXONE HYDROCHLORIDE 0.4 MG/ML
0.2 INJECTION, SOLUTION INTRAMUSCULAR; INTRAVENOUS; SUBCUTANEOUS
Status: DISCONTINUED | OUTPATIENT
Start: 2021-05-04 | End: 2021-05-10 | Stop reason: HOSPADM

## 2021-05-04 RX ORDER — BUPIVACAINE HYDROCHLORIDE 2.5 MG/ML
INJECTION, SOLUTION EPIDURAL; INFILTRATION; INTRACAUDAL
Status: COMPLETED | OUTPATIENT
Start: 2021-05-04 | End: 2021-05-04

## 2021-05-04 RX ORDER — LIDOCAINE 40 MG/G
CREAM TOPICAL
Status: DISCONTINUED | OUTPATIENT
Start: 2021-05-04 | End: 2021-05-04 | Stop reason: HOSPADM

## 2021-05-04 RX ORDER — ONDANSETRON 2 MG/ML
4 INJECTION INTRAMUSCULAR; INTRAVENOUS EVERY 30 MIN PRN
Status: DISCONTINUED | OUTPATIENT
Start: 2021-05-04 | End: 2021-05-04 | Stop reason: HOSPADM

## 2021-05-04 RX ORDER — LIDOCAINE 40 MG/G
CREAM TOPICAL
Status: DISCONTINUED | OUTPATIENT
Start: 2021-05-04 | End: 2021-05-10 | Stop reason: HOSPADM

## 2021-05-04 RX ORDER — MEPERIDINE HYDROCHLORIDE 25 MG/ML
12.5 INJECTION INTRAMUSCULAR; INTRAVENOUS; SUBCUTANEOUS
Status: DISCONTINUED | OUTPATIENT
Start: 2021-05-04 | End: 2021-05-04 | Stop reason: HOSPADM

## 2021-05-04 RX ORDER — NALOXONE HYDROCHLORIDE 0.4 MG/ML
0.4 INJECTION, SOLUTION INTRAMUSCULAR; INTRAVENOUS; SUBCUTANEOUS
Status: DISCONTINUED | OUTPATIENT
Start: 2021-05-04 | End: 2021-05-10 | Stop reason: HOSPADM

## 2021-05-04 RX ORDER — OXYCODONE HYDROCHLORIDE 5 MG/1
5 TABLET ORAL EVERY 4 HOURS PRN
Status: DISCONTINUED | OUTPATIENT
Start: 2021-05-04 | End: 2021-05-10 | Stop reason: HOSPADM

## 2021-05-04 RX ORDER — NALOXONE HYDROCHLORIDE 0.4 MG/ML
0.4 INJECTION, SOLUTION INTRAMUSCULAR; INTRAVENOUS; SUBCUTANEOUS
Status: DISCONTINUED | OUTPATIENT
Start: 2021-05-04 | End: 2021-05-04

## 2021-05-04 RX ORDER — ACETAMINOPHEN 500 MG
1000 TABLET ORAL 3 TIMES DAILY
Status: DISCONTINUED | OUTPATIENT
Start: 2021-05-05 | End: 2021-05-10 | Stop reason: HOSPADM

## 2021-05-04 RX ORDER — ACETAMINOPHEN 325 MG/1
975 TABLET ORAL ONCE
Status: DISCONTINUED | OUTPATIENT
Start: 2021-05-04 | End: 2021-05-04 | Stop reason: HOSPADM

## 2021-05-04 RX ORDER — GLYCOPYRROLATE 0.2 MG/ML
INJECTION, SOLUTION INTRAMUSCULAR; INTRAVENOUS PRN
Status: DISCONTINUED | OUTPATIENT
Start: 2021-05-04 | End: 2021-05-04

## 2021-05-04 RX ORDER — HEPARIN SODIUM 5000 [USP'U]/.5ML
5000 INJECTION, SOLUTION INTRAVENOUS; SUBCUTANEOUS
Status: COMPLETED | OUTPATIENT
Start: 2021-05-04 | End: 2021-05-04

## 2021-05-04 RX ORDER — SODIUM CHLORIDE, SODIUM LACTATE, POTASSIUM CHLORIDE, CALCIUM CHLORIDE 600; 310; 30; 20 MG/100ML; MG/100ML; MG/100ML; MG/100ML
INJECTION, SOLUTION INTRAVENOUS CONTINUOUS
Status: DISCONTINUED | OUTPATIENT
Start: 2021-05-04 | End: 2021-05-08

## 2021-05-04 RX ORDER — ONDANSETRON 2 MG/ML
INJECTION INTRAMUSCULAR; INTRAVENOUS PRN
Status: DISCONTINUED | OUTPATIENT
Start: 2021-05-04 | End: 2021-05-04

## 2021-05-04 RX ORDER — ALBUTEROL SULFATE 0.83 MG/ML
2.5 SOLUTION RESPIRATORY (INHALATION) EVERY 4 HOURS PRN
Status: DISCONTINUED | OUTPATIENT
Start: 2021-05-04 | End: 2021-05-04 | Stop reason: HOSPADM

## 2021-05-04 RX ORDER — METOPROLOL TARTRATE 1 MG/ML
INJECTION, SOLUTION INTRAVENOUS PRN
Status: DISCONTINUED | OUTPATIENT
Start: 2021-05-04 | End: 2021-05-04

## 2021-05-04 RX ORDER — ONDANSETRON 4 MG/1
4 TABLET, ORALLY DISINTEGRATING ORAL EVERY 30 MIN PRN
Status: DISCONTINUED | OUTPATIENT
Start: 2021-05-04 | End: 2021-05-04 | Stop reason: HOSPADM

## 2021-05-04 RX ORDER — FENTANYL CITRATE 50 UG/ML
INJECTION, SOLUTION INTRAMUSCULAR; INTRAVENOUS PRN
Status: DISCONTINUED | OUTPATIENT
Start: 2021-05-04 | End: 2021-05-04

## 2021-05-04 RX ORDER — HYDROMORPHONE HCL IN WATER/PF 6 MG/30 ML
0.2 PATIENT CONTROLLED ANALGESIA SYRINGE INTRAVENOUS
Status: DISCONTINUED | OUTPATIENT
Start: 2021-05-04 | End: 2021-05-10 | Stop reason: HOSPADM

## 2021-05-04 RX ORDER — HEPARIN SODIUM 5000 [USP'U]/.5ML
5000 INJECTION, SOLUTION INTRAVENOUS; SUBCUTANEOUS EVERY 12 HOURS
Status: DISCONTINUED | OUTPATIENT
Start: 2021-05-05 | End: 2021-05-07

## 2021-05-04 RX ORDER — ACETAMINOPHEN 325 MG/1
975 TABLET ORAL ONCE
Status: COMPLETED | OUTPATIENT
Start: 2021-05-04 | End: 2021-05-04

## 2021-05-04 RX ORDER — GRANISETRON HYDROCHLORIDE 1 MG/ML
1 INJECTION INTRAVENOUS ONCE
Status: COMPLETED | OUTPATIENT
Start: 2021-05-04 | End: 2021-05-04

## 2021-05-04 RX ORDER — OXYCODONE HYDROCHLORIDE 5 MG/1
5 TABLET ORAL EVERY 4 HOURS PRN
Status: DISCONTINUED | OUTPATIENT
Start: 2021-05-04 | End: 2021-05-05 | Stop reason: ALTCHOICE

## 2021-05-04 RX ORDER — PROPOFOL 10 MG/ML
INJECTION, EMULSION INTRAVENOUS PRN
Status: DISCONTINUED | OUTPATIENT
Start: 2021-05-04 | End: 2021-05-04

## 2021-05-04 RX ADMIN — ROCURONIUM BROMIDE 20 MG: 10 INJECTION INTRAVENOUS at 17:57

## 2021-05-04 RX ADMIN — FENTANYL CITRATE 150 MCG: 50 INJECTION, SOLUTION INTRAMUSCULAR; INTRAVENOUS at 17:21

## 2021-05-04 RX ADMIN — FENTANYL CITRATE 25 MCG: 50 INJECTION, SOLUTION INTRAMUSCULAR; INTRAVENOUS at 20:07

## 2021-05-04 RX ADMIN — GLYCOPYRROLATE 0.1 MG: 0.2 INJECTION, SOLUTION INTRAMUSCULAR; INTRAVENOUS at 17:34

## 2021-05-04 RX ADMIN — ERTAPENEM SODIUM 500 MG: 1 INJECTION, POWDER, LYOPHILIZED, FOR SOLUTION INTRAMUSCULAR; INTRAVENOUS at 17:18

## 2021-05-04 RX ADMIN — HEPARIN SODIUM 5000 UNITS: 5000 INJECTION, SOLUTION INTRAVENOUS; SUBCUTANEOUS at 16:29

## 2021-05-04 RX ADMIN — METOPROLOL TARTRATE 2 MG: 5 INJECTION INTRAVENOUS at 18:08

## 2021-05-04 RX ADMIN — ACETAMINOPHEN 975 MG: 325 TABLET, FILM COATED ORAL at 15:45

## 2021-05-04 RX ADMIN — FENTANYL CITRATE 50 MCG: 50 INJECTION, SOLUTION INTRAMUSCULAR; INTRAVENOUS at 17:57

## 2021-05-04 RX ADMIN — ROCURONIUM BROMIDE 50 MG: 10 INJECTION INTRAVENOUS at 17:21

## 2021-05-04 RX ADMIN — FENTANYL CITRATE 25 MCG: 50 INJECTION, SOLUTION INTRAMUSCULAR; INTRAVENOUS at 20:16

## 2021-05-04 RX ADMIN — BUPIVACAINE 20 ML: 13.3 INJECTION, SUSPENSION, LIPOSOMAL INFILTRATION at 16:25

## 2021-05-04 RX ADMIN — FENTANYL CITRATE 50 MCG: 50 INJECTION, SOLUTION INTRAMUSCULAR; INTRAVENOUS at 20:23

## 2021-05-04 RX ADMIN — SODIUM CHLORIDE, POTASSIUM CHLORIDE, SODIUM LACTATE AND CALCIUM CHLORIDE: 600; 310; 30; 20 INJECTION, SOLUTION INTRAVENOUS at 17:18

## 2021-05-04 RX ADMIN — GRANISETRON HYDROCHLORIDE 1 MG: 1 INJECTION, SOLUTION INTRAVENOUS at 16:38

## 2021-05-04 RX ADMIN — ONDANSETRON 4 MG: 2 INJECTION INTRAMUSCULAR; INTRAVENOUS at 19:38

## 2021-05-04 RX ADMIN — METOPROLOL TARTRATE 1 MG: 5 INJECTION INTRAVENOUS at 18:31

## 2021-05-04 RX ADMIN — SODIUM CHLORIDE, POTASSIUM CHLORIDE, SODIUM LACTATE AND CALCIUM CHLORIDE: 600; 310; 30; 20 INJECTION, SOLUTION INTRAVENOUS at 23:47

## 2021-05-04 RX ADMIN — PHENYLEPHRINE HYDROCHLORIDE 100 MCG: 10 INJECTION INTRAVENOUS at 17:32

## 2021-05-04 RX ADMIN — SUGAMMADEX 200 MG: 100 INJECTION, SOLUTION INTRAVENOUS at 19:41

## 2021-05-04 RX ADMIN — PROPOFOL 160 MG: 10 INJECTION, EMULSION INTRAVENOUS at 17:21

## 2021-05-04 RX ADMIN — FENTANYL CITRATE 25 MCG: 50 INJECTION, SOLUTION INTRAMUSCULAR; INTRAVENOUS at 16:20

## 2021-05-04 RX ADMIN — BUPIVACAINE HYDROCHLORIDE 20 ML: 2.5 INJECTION, SOLUTION EPIDURAL; INFILTRATION; INTRACAUDAL; PERINEURAL at 16:25

## 2021-05-04 RX ADMIN — NOREPINEPHRINE BITARTRATE 6.4 MCG: 1 INJECTION, SOLUTION, CONCENTRATE INTRAVENOUS at 17:36

## 2021-05-04 ASSESSMENT — MIFFLIN-ST. JEOR: SCORE: 919.5

## 2021-05-04 NOTE — OR NURSING
Preop block competed without complications. NSR throughout. VSS. 25mcg of fentanyl given. Pt tolerated well.

## 2021-05-04 NOTE — ANESTHESIA PROCEDURE NOTES
Airway       Patient location during procedure: OR  Staff -        CRNA: Carito Barrios APRN CRNA       Performed By: CRNA  Consent for Airway        Urgency: elective  Indications and Patient Condition       Indications for airway management: carolee-procedural       Induction type:intravenous       Mask difficulty assessment: 1 - vent by mask    Final Airway Details       Final airway type: endotracheal airway       Successful airway: ETT - single  Endotracheal Airway Details        ETT size (mm): 7.0       Cuffed: yes       Successful intubation technique: direct laryngoscopy       DL Blade Type: Hoffman 2       Grade View of Cords: 1       Secured at (cm): 20    Post intubation assessment        Placement verified by: capnometry, equal breath sounds and chest rise        Number of attempts at approach: 1       Ease of procedure: easy       Dentition: Intact    Medication(s) Administered   Medication Administration Time: 5/4/2021 5:24 PM

## 2021-05-04 NOTE — ANESTHESIA PROCEDURE NOTES
TAP Procedure Note  Pre-Procedure   Staff -        Anesthesiologist:  Mk Urias MD       Resident/Fellow: Guille Pringle MD       Performed By: anesthesiologist and with residents       Procedure performed by resident/CRNA in presence of a teaching physician.         Location: pre-op       Procedure Start/Stop Times: 5/4/2021 4:25 PM       Pre-Anesthestic Checklist: patient identified, IV checked, site marked, risks and benefits discussed, informed consent, monitors and equipment checked, pre-op evaluation, at physician/surgeon's request and post-op pain management  Timeout:       Correct Patient: Yes        Correct Procedure: Yes        Correct Site: Yes        Correct Position: Yes        Correct Laterality: Yes        Site Marked: Yes  Procedure Documentation  Procedure: TAP       Diagnosis: POST OPERATIVE PAIN       Laterality: bilateral       Patient Position: supine       Patient Prep/Sterile Barriers: sterile gloves, mask       Skin prep: Chloraprep       Needle Type: short bevel       Needle Gauge: 21.        Needle Length (millimeters): 110        - Ultrasound guided       - Ultrasound used to identify targeted nerve, plexus, vascular marker, or fascial plane and place a needle adjacent to it in real-time       - Ultrasound was used to visualize the spread of anesthetic in close proximity to the above referenced structure       - A permanent image is entered into the patient's record.    Assessment/Narrative         The placement was negative for: blood aspirated, painful injection and site bleeding       Paresthesias: No.     Bolus given via needle..        Secured via.        Insertion/Infusion Method: Single Shot       Complications: none       Injection made incrementally with aspirations every 5 mL.    Medication(s) Administered   Bupivacaine 0.25% PF (Infiltration), 20 mL  Bupivacaine liposome (Exparel) 1.3% LA inj susp (Infiltration), 20 mL  Medication Administration Time: 5/4/2021 4:25  PM

## 2021-05-05 LAB
CREAT SERPL-MCNC: 1.59 MG/DL (ref 0.52–1.04)
GFR SERPL CREATININE-BSD FRML MDRD: 32 ML/MIN/{1.73_M2}
HGB BLD-MCNC: 10.4 G/DL (ref 11.7–15.7)
MAGNESIUM SERPL-MCNC: 1.8 MG/DL (ref 1.6–2.3)
PHOSPHATE SERPL-MCNC: 5.1 MG/DL (ref 2.5–4.5)
PLATELET # BLD AUTO: 204 10E9/L (ref 150–450)
POTASSIUM SERPL-SCNC: 4.5 MMOL/L (ref 3.4–5.3)

## 2021-05-05 PROCEDURE — 83735 ASSAY OF MAGNESIUM: CPT | Performed by: COLON & RECTAL SURGERY

## 2021-05-05 PROCEDURE — 84132 ASSAY OF SERUM POTASSIUM: CPT | Performed by: COLON & RECTAL SURGERY

## 2021-05-05 PROCEDURE — 250N000011 HC RX IP 250 OP 636

## 2021-05-05 PROCEDURE — 36415 COLL VENOUS BLD VENIPUNCTURE: CPT | Performed by: COLON & RECTAL SURGERY

## 2021-05-05 PROCEDURE — 82565 ASSAY OF CREATININE: CPT | Performed by: COLON & RECTAL SURGERY

## 2021-05-05 PROCEDURE — 85018 HEMOGLOBIN: CPT | Performed by: COLON & RECTAL SURGERY

## 2021-05-05 PROCEDURE — 120N000002 HC R&B MED SURG/OB UMMC

## 2021-05-05 PROCEDURE — 85049 AUTOMATED PLATELET COUNT: CPT | Performed by: COLON & RECTAL SURGERY

## 2021-05-05 PROCEDURE — 84100 ASSAY OF PHOSPHORUS: CPT | Performed by: COLON & RECTAL SURGERY

## 2021-05-05 PROCEDURE — 250N000013 HC RX MED GY IP 250 OP 250 PS 637: Performed by: COLON & RECTAL SURGERY

## 2021-05-05 PROCEDURE — 258N000003 HC RX IP 258 OP 636: Performed by: COLON & RECTAL SURGERY

## 2021-05-05 PROCEDURE — 250N000011 HC RX IP 250 OP 636: Performed by: COLON & RECTAL SURGERY

## 2021-05-05 RX ORDER — ONDANSETRON 2 MG/ML
4 INJECTION INTRAMUSCULAR; INTRAVENOUS EVERY 6 HOURS PRN
Status: DISCONTINUED | OUTPATIENT
Start: 2021-05-05 | End: 2021-05-10 | Stop reason: HOSPADM

## 2021-05-05 RX ADMIN — Medication 2.5 MG: at 17:46

## 2021-05-05 RX ADMIN — SODIUM CHLORIDE, POTASSIUM CHLORIDE, SODIUM LACTATE AND CALCIUM CHLORIDE: 600; 310; 30; 20 INJECTION, SOLUTION INTRAVENOUS at 09:20

## 2021-05-05 RX ADMIN — OMEPRAZOLE 20 MG: 20 CAPSULE, DELAYED RELEASE ORAL at 09:11

## 2021-05-05 RX ADMIN — ACETAMINOPHEN 1000 MG: 500 TABLET, FILM COATED ORAL at 09:11

## 2021-05-05 RX ADMIN — ERTAPENEM SODIUM 500 MG: 1 INJECTION, POWDER, LYOPHILIZED, FOR SOLUTION INTRAMUSCULAR; INTRAVENOUS at 13:55

## 2021-05-05 RX ADMIN — Medication 2.5 MG: at 03:54

## 2021-05-05 RX ADMIN — SODIUM CHLORIDE, POTASSIUM CHLORIDE, SODIUM LACTATE AND CALCIUM CHLORIDE: 600; 310; 30; 20 INJECTION, SOLUTION INTRAVENOUS at 22:22

## 2021-05-05 RX ADMIN — ACETAMINOPHEN 1000 MG: 500 TABLET, FILM COATED ORAL at 13:54

## 2021-05-05 RX ADMIN — HEPARIN SODIUM 5000 UNITS: 5000 INJECTION, SOLUTION INTRAVENOUS; SUBCUTANEOUS at 20:37

## 2021-05-05 RX ADMIN — ONDANSETRON 4 MG: 2 INJECTION INTRAMUSCULAR; INTRAVENOUS at 18:40

## 2021-05-05 RX ADMIN — ACETAMINOPHEN 1000 MG: 500 TABLET, FILM COATED ORAL at 20:37

## 2021-05-05 ASSESSMENT — ACTIVITIES OF DAILY LIVING (ADL)
ADLS_ACUITY_SCORE: 18

## 2021-05-05 NOTE — PROGRESS NOTES
Status Note ([id number]):    Patient admitted from PACU to  room 412-2. Oriented patient to room, unit, and call light system. All belongings listed upon admission sent up. AVSS on room air. Sleeping comfortably. L PIV running LR @ 100 ml/hr. Requires Tigrinya . Will continue with POC.

## 2021-05-05 NOTE — PLAN OF CARE
POD #1 Ileostomy takedown.   A&O, VSS. Pt reporting minimal pain in abdomen, abd binder on for support. Incisional dressing exhibiting bloody shadow drainage outlined at the beginning of shift. IVF @ 100/hr. Pt tolerating ice chips without c/o nausea, remains NPO. Pt ambulated to bathroom x1 but stated it made her dizzy so bedside commode brought to room. Pt voiding small amounts. Bowel sounds hypoactive, -flatus.   Utilizing  services and relative for communication of needs.   Continue POC as ordered.

## 2021-05-05 NOTE — ANESTHESIA CARE TRANSFER NOTE
[facility]    Procedure(s):  ILEOSTOMY CLOSURE, LYSIS OF ADHESION    Diagnosis: Malignant neoplasm of sigmoid colon (H) [C18.7]  S/P ileostomy (H) [Z93.2]  Diagnosis Additional Information: No value filed.    Anesthesia Type:   General     Note:    Oropharynx: oropharynx clear of all foreign objects  Level of Consciousness: awake  Oxygen Supplementation: nasal cannula  Level of Supplemental Oxygen (L/min / FiO2): 3  Independent Airway: airway patency satisfactory and stable  Dentition: dentition unchanged  Vital Signs Stable: post-procedure vital signs reviewed and stable  Report to RN Given: handoff report given  Patient transferred to: PACU  Comments: Anesthesia Care Transfer Note      Transfer to:  PACU    Patient Vital Signs:  Stable    Airway:  None    Patient transported to PACU with supplemental O2.  Patient alert and breathing comfortably.  VSS.  Care transferred with report to PACU RN.    Nikolay Lowry CRNA  Handoff Report: Identifed the Patient, Identified the Reponsible Provider, Reviewed the pertinent medical history, Discussed the surgical course, Reviewed Intra-OP anesthesia mangement and issues during anesthesia, Set expectations for post-procedure period and Allowed opportunity for questions and acknowledgement of understanding      Vitals: (Last set prior to Anesthesia Care Transfer)  CRNA VITALS  5/4/2021 1919 - 5/4/2021 1953 5/4/2021             Resp Rate (observed):  (!) 2        Electronically Signed By: WILL Lehman CRNA  May 4, 2021  7:53 PM

## 2021-05-05 NOTE — PROGRESS NOTES
Admitted/transferred from: PACU  2 RN full   skin assessment completed by Dayan Deng, JAMARI and Alexandru Machado RN  Skin assessment finding: abdominal incision and take down site covered with dressing, PIV in left arm  Interventions/actions: encourage patient to reposition    Will continue to monitor.

## 2021-05-05 NOTE — OP NOTE
Procedure Date: 05/04/2021.    PREOPERATIVE DIAGNOSES:  1.  Ileostomy status.  2.  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  2.  Hypertension.  3.  Diabetes mellitus, type 2.  4.  ECOG score 1.    POSTOPERATIVE DIAGNOSES:    1.  Ileostomy status.  2.  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  2.  Hypertension.  3.  Diabetes mellitus, type 2.  4.  ECOG score 1.    ANESTHESIA:  General endotracheal anesthesia plus bilateral TAP blocks.    PROCEDURES PERFORMED:     1.  Diverting loop ileostomy takedown.  2.  Extensive lysis of adhesions (90 minutes).    SURGEON:  Remi Moscoso MD    ASSISTANT:  Migdalia Keating MD (General Surgery resident).    BRIEF HISTORY:  Myal is a 76-year-old pleasant lady who was found to have a large rectosigmoid adenocarcinoma with a colovesical fistula for which she underwent an extensive pelvic operation.  Please see my operative report from January 5, 2021.  She refused adjuvant chemotherapy and came to clinic to discuss a takedown of her loop ileostomy.  Flexible sigmoidoscopy in clinic showed moderate diversion proctitis and a widely patent and healthy appearing, stapled side-to-end colorectal anastomosis with no evidence of dehiscence or new or recurrent neoplasia.  She also underwent a Gastrografin enema that showed no evidence of contrast extravasation.  I thoroughly  "discussed the risks, benefits and alternatives of operative treatment with Nechi.  She agreed to proceed.    DESCRIPTION OF OPERATION:  After obtaining informed consent, the patient was brought to the operating room and placed in the supine position.  General endotracheal anesthesia was gently induced without difficulty.  She also underwent preoperative placement of bilateral TAP blocks.  She also received appropriate preoperative antibiotic prophylaxis as well as mechanical and chemical DVT prophylaxis.  Bilateral lower extremity pneumatic compression devices were applied, and all pressure points were cushioned.  A Cameron catheter was inserted.  The abdomen was prepped and draped in the standard sterile fashion.  A \"time-out\" was performed.  A circular incision was placed around the ileostomy and this was circumferentially dissected all the way down to the level of the fascia.  There were multiple interloop intestinal adhesions below the fascia that were extending down into the pelvis.  These were dissected sharply and with monopolar electrocautery.  We did have to perform some additional retraction and unfortunately, this led to a small enterotomy of the afferent limb of the loop ileostomy, requiring us to extend the fascial incision.  I performed extensive lysis of adhesions down in the pelvis.  There were multiple loops that were densely adhered to the cystectomy bed as well as the right pelvic sidewall.  These were all freed up and the small bowel was delivered through the incision.  The mesentery was subsequently transected at the area of the ileostomy and where the enterotomy was.  This was performed with a LigaSure device.  We then placed the 2 sides of ileum side by side and enterotomies were placed.  A MANOJ 100 blue stapler was used to perform a stapled side-to-side functional end-to-end ileoileostomy.  The MANOJ staple line was evaluated and there was no evidence of bleeding.  The staple line was then offset " and the specimen, including the enterotomy and the ileostomy, was transected with a TA 90 stapler (blue load).  The ileoileostomy appeared to be widely patent and with absolutely no evidence of tension, torsion, or inadequate blood supply.  The entire TA staple line was oversewn with a running horizontal mattress 4-0 Monocryl suture.  The corner of the MANOJ staple line was also reinforced with two individual 4-0 silk sutures.  The mesenteric defect was closed with 3-0 Vicryl sutures.  The anastomosis was then returned to the peritoneal cavity.  The rest of the small bowel appeared to be healthy and there was no evidence of any further injuries.  Hemostasis was corroborated.  The muscle of the right lower quadrant incision was loosely reapproximated with a running 2-0 Vicryl suture.  The fascia was reapproximated with a running #1 Prolene suture.  The wound was then irrigated with dilute peroxide.  The dermis was loosely reapproximated with a 2-0 Vicryl pursestring suture.  A 3/4 inch Penrose drain was then left in place and fixated to the skin with a 2-0 nylon suture.  A sterile dressing was applied.  Hemostasis was corroborated.  Instrument, sponge, and needle counts were all correct as reported to me.  The patient tolerated the procedure well.    COMPLICATIONS:  None immediately.    ESTIMATED BLOOD LOSS:  100 mL.    REPLACEMENT:  600 mL of crystalloid.    DRAINS/TUBES:   1.  A 3/4 inch Penrose drain in the subcutaneous tissue.    2.  Cameron catheter was removed.    SPECIMENS:  Ileostomy.    FINDINGS:    1.  Significant interloop adhesions, mainly in the pelvis.   2.  A stapled side-to-side functional end-to-end anastomosis.    DISPOSITION:  PACU.    Remi Moscoso MD        D: 2021   T: 2021   MT: TITI    Name:     EUGENIO CHEN  MRN:      [id number]        Account:        [id number]   :      1945           Procedure Date: 2021     Document: [id number]    cc:  Sarah NICKERSON  MD Ramu Butler MD Janet Lee, MD Joseph R. Zabell, MD Jimy Jackson, MD Jami Bundy, PA

## 2021-05-05 NOTE — PLAN OF CARE
OT/7C: HOLD. PT attempted pt x2 w/ PT unable to mobilize pt. Per discussion with PT and chart review, pt may only require x 1 skilled discipline throughout IP stay. OT to HOLD and follow from periphery and initiate as indicated.

## 2021-05-05 NOTE — ANESTHESIA POSTPROCEDURE EVALUATION
Patient: Myla Ansarishaun    Procedure(s):  ILEOSTOMY CLOSURE, LYSIS OF ADHESION    Diagnosis:Malignant neoplasm of sigmoid colon (H) [C18.7]  S/P ileostomy (H) [Z93.2]  Diagnosis Additional Information: No value filed.    Anesthesia Type:  General    Note:  Disposition: Outpatient   Postop Pain Control: Uneventful            Sign Out: Well controlled pain   PONV: No   Neuro/Psych: Uneventful            Sign Out: Acceptable/Baseline neuro status   Airway/Respiratory: Uneventful            Sign Out: Acceptable/Baseline resp. status   CV/Hemodynamics: Uneventful            Sign Out: Acceptable CV status   Other NRE: NONE   DID A NON-ROUTINE EVENT OCCUR? No           Last vitals:  Vitals:    05/04/21 1952 05/04/21 2000 05/04/21 2013   BP: (!) 162/78 133/76 126/67   Pulse: 71 71 61   Resp:  10    Temp:  36  C (96.8  F)    SpO2: 100% 100% 100%       Last vitals prior to Anesthesia Care Transfer:  CRNA VITALS  5/4/2021 1919 - 5/4/2021 2018 5/4/2021             Resp Rate (observed):  (!) 2          Electronically Signed By: Christopher J. Behrens, MD  May 4, 2021  8:18 PM

## 2021-05-05 NOTE — PROGRESS NOTES
CLINICAL NUTRITION SERVICES  Reason for Assessment:  Nutrition education regarding low fiber diet education  Diet History: Ipad  did not have patients language, daughter was present in the room and translated. Outpatient RD visit providing low fiber diet education documented on 3/08  Nutrition Diagnosis:  No nutrition related diagnosis at this time.  Interventions:  Provided instruction on low fiber diet. Discussed each food group and foods to eat and avoid. Answered patient's questions regarding diet.   Provided handouts : Fiber-Restricted Nutrition Therapy and Low Fiber Diet Hospital Menu  Goals:   Patient will verbalize at least 5 low fiber foods acceptable on diet and 5 high fiber foods to avoid.  Follow-up:    Patient to ask any further nutrition-related questions before discharge. In addition, pt may request outpatient RD appointment.  Shanon Hnaley  Dietetic Intern

## 2021-05-05 NOTE — PROGRESS NOTES
Colorectal Surgery Progress Note  [facility]  POD#1      Subjective:  Speaking with ipad  in Lao.  No Tigrinya available at this time.    Doing ok.  Denies pain.  Denies nausea, burping, hiccuping.  No flatus/gas.  Is laying on a bedpan as she doesn't want to have to rush to restroom.  Commode is at bedside.  Pt does report a little dizziness last night with getting up.  DaughterMichele came half way through the encounter.  Pt reports no appetite.     Vitals:  Vitals:    05/04/21 2330 05/05/21 0101 05/05/21 0333 05/05/21 0622   BP: 120/56 123/53 128/65 127/54   BP Location: Right arm Right arm Right arm Right arm   Cuff Size:       Pulse: 62 65 81 67   Resp: 13 15 20 18   Temp:  96.1  F (35.6  C) 97.7  F (36.5  C) 99.2  F (37.3  C)   TempSrc:  Axillary Axillary Oral   SpO2: 90% 92% 91% 94%   Weight:       Height:         I/O:  I/O last 3 completed shifts:  In: 1548.33 [I.V.:1548.33]  Out: 325 [Urine:325]    Physical Exam:  Gen: AAOx3, NAD  Pulm: Non-labored breathing  Abd: Soft, non-distended, appropriately tender, no guarding/rebound   Old stoma site clean.  Penrose drain in place.  serosang drainage on old dressings.  Explained and taught patient and daughter ho to apply dressings to old ostomy site.   Ext:  Warm and well-perfused    BMP  Recent Labs   Lab 05/04/21 2229   POTASSIUM 4.0   MAG 1.9   PHOS 5.0*     CBCNo lab results found in last 7 days.      ASSESSMENT: This is a 76 year old female PMH HTN, DM2, locally advanced sigmoid cancer with colovesical fistula s/p sigmoidectomy, en bloc partial cystectomy, total abd hysterectomy with bilat salpingo-oophorectomy, re-implanatation of left ureter and DLI in 1/2021.  Now s/p DLI take down and extensive ARGELIA on 5/5.      Neuro/Pain: scheduled tylenol and low dose oxy or tramadol prn  CV: no acute issues  PULM: encourage IS.  GI/FEN:   - FLD  - IVF @ 77  : no acute issues.  Monitor. Per pt and daughter, pt at  baseline may have some urgency.  Per pt she feels normal but just doesn't want to have to rush out of bed due to feeling dizzy.   Heme: Hgb 10.4 from 11-12 baseline  ID: no acute issues  Endocrine: no acute issues    Activity: as tolerated.  apprec PT/OT to evaluate.   Ppx: heparin BID  Dispo: pending JACQUI House PA-C   Colon and Rectal Surgery  2175     Patient was seen and discussed with Dr. Stover

## 2021-05-05 NOTE — DISCHARGE SUMMARY
[facility]  Discharge Summary  Colon and Rectal Surgery     Myla Glynn MRN# [id number]   YOB: 1945 Age: 76 year old     Date of Admission:  5/4/2021  Date of Discharge::  5/10/2021  2:21 PM  Admitting Physician:  Remi Moscoso MD  Discharge Physician:  Remi Moscoso MD  Primary Care Physician:        Medicine, [facility]          Admission Diagnoses:   Malignant neoplasm of sigmoid colon (H) [C18.7]  S/P ileostomy (H) [Z93.2]    Hypertension  Diabetes mellitus type 2  S/p prior sigmoidectomy with en bloc partial cystectomy, DOC-BSO, reimplantation of left ureter, appendectomy, diverting loop ileostomy in 1/2021.          Discharge Diagnosis:   Malignant neoplasm of sigmoid colon (H) [C18.7]  S/P ileostomy (H) [Z93.2]    Hypertension  Diabetes mellitus type 2  S/p prior sigmoidectomy with en bloc partial cystectomy, DOC-BSO, reimplantation of left ureter, appendectomy, diverting loop ileostomy in 1/2021.         Procedures:   5/4/2021:  PROCEDURES PERFORMED:     1.  Diverting loop ileostomy takedown.  2.  Extensive lysis of adhesions (90 minutes).         Consultations:   NUTRITION SERVICES ADULT IP CONSULT  OCCUPATIONAL THERAPY ADULT IP CONSULT  PHYSICAL THERAPY ADULT IP CONSULT  SOCIAL WORK IP CONSULT         Imaging Studies:     Results for orders placed or performed during the hospital encounter of 05/04/21   POC US Guidance Needle Placement    Impression    Transversus abdominis planes            Medications Prior to Admission:     Medications Prior to Admission   Medication Sig Dispense Refill Last Dose     mirtazapine (REMERON SOL-TAB) 15 MG ODT 1 tablet (15 mg) by Orally disintegrating tablet route At Bedtime (Patient not taking: Reported on 4/20/2021) 30 tablet 3 Unknown at Unknown time     omeprazole (PRILOSEC) 20 MG DR capsule Take 20 mg by mouth every morning    More than a month at Unknown time      [DISCONTINUED] acetaminophen (TYLENOL) 500 MG tablet Take 2 tablets (1,000 mg) by mouth 3 times daily As scheduled for the next 7-10 days, then decrease both dose and frequency to as needed (Patient taking differently: Take 1,000 mg by mouth 3 times daily as needed As scheduled for the next 7-10 days, then decrease both dose and frequency to as needed) 1 Bottle 0 Past Week at Unknown time     [DISCONTINUED] loperamide (IMODIUM) 2 MG capsule Take 1 capsule (2 mg) by mouth 2 times daily 120 capsule 0 5/4/2021 at 1000     [DISCONTINUED] ondansetron (ZOFRAN-ODT) 4 MG ODT tab    More than a month at Unknown time     [DISCONTINUED] psyllium (METAMUCIL/KONSYL) Packet Take 1 packet by mouth 2 times daily (Patient not taking: Reported on 4/20/2021) 60 packet 0 Unknown at Unknown time     [DISCONTINUED] sulfamethoxazole-trimethoprim (BACTRIM DS) 800-160 MG tablet Take 1 tablet by mouth 2 times daily (Patient not taking: Reported on 4/20/2021) 10 tablet 0 Unknown at Unknown time     [DISCONTINUED] traMADol (ULTRAM) 50 MG tablet Take 0.5-1 tablets (25-50 mg) by mouth every 8 hours as needed for moderate to severe pain (Patient taking differently: Take 25-50 mg by mouth every 8 hours as needed for moderate to severe pain ) 20 tablet 0 More than a month at Unknown time              Discharge Medications:     Current Discharge Medication List      CONTINUE these medications which have NOT CHANGED    Details   mirtazapine (REMERON SOL-TAB) 15 MG ODT 1 tablet (15 mg) by Orally disintegrating tablet route At Bedtime  Qty: 30 tablet, Refills: 3    Associated Diagnoses: Malignant neoplasm of sigmoid colon (H)      omeprazole (PRILOSEC) 20 MG DR capsule Take 20 mg by mouth every morning          STOP taking these medications       acetaminophen (TYLENOL) 500 MG tablet Comments:   Reason for Stopping:         loperamide (IMODIUM) 2 MG capsule Comments:   Reason for Stopping:         ondansetron (ZOFRAN-ODT) 4 MG ODT tab Comments:    Reason for Stopping:         psyllium (METAMUCIL/KONSYL) Packet Comments:   Reason for Stopping:         sulfamethoxazole-trimethoprim (BACTRIM DS) 800-160 MG tablet Comments:   Reason for Stopping:         traMADol (ULTRAM) 50 MG tablet Comments:   Reason for Stopping:                      Brief History of Illness:   76 year old female, PMH HTN and DM who was found to have locally advanced sigmoid adenocarcinoma with colovesical fistula s/p prior sigmoidectomy with en bloc partial cystectomy and total abdominal hysterectomy with left salpingo-oophorectomy, right salpingo-oophorectomy, reimplantation of left ureter, partial omentectomy, appendectomy, and diverting loop ileostomy on 01/05/2021.  Now s/p elective diverting loop ileostomy take down and extensive lysis of adhesions (90 minutes) on 5/4/2021.           Hospital Course:   Post-operatively pt was gently fluid resuscitated.  Cameron was removed and pt was able to void.  Pt initially had some emesis likely due to slow return of bowel function.  Pt had eventual return of bowel function and was able to tolerate small amounts of a low fiber diet.     Pt was seen by PT/OT and deemed appropriate for discharge home with continued PT and OT.   Post-operative pain was controlled with scheduled tylenol and prn tramadol.     Patient is to follow up in the Colon and Rectal Surgery Clinic in 2-3 week with Nadege Last NP and then with Dr. Moscoso in 4-6 weeks after.          Day of Discharge Physical Exam:   Blood pressure 136/61, pulse 82, temperature 98.6  F (37  C), temperature source Oral, resp. rate 16, height 1.524 m (5'), weight 52.6 kg (115 lb 14.4 oz), SpO2 99 %, not currently breastfeeding.    Gen:      AAOx3, NAD  Pulm:    Non-labored breathing  Abd:      Soft, nd, appropriately tender, no guarding/rebound               Old stoma site clean. Penrose drain in place. Serosang drainage on old dressings.    Ext:       Warm and well-perfused          Final Pathology Result:   FINAL DIAGNOSIS:   ILEOSTOMY:   - Skin with small intestine with acute and chronic inflammation, and   reactive changes consistent with   ileostomy site   - Negative for dysplasia or malignancy          Discharge Instructions and Follow-Up:     Discharge Procedure Orders   Home care nursing referral   Referral Priority: Routine Referral Type: Home Health Therapies & Aides   Number of Visits Requested: 1     Reason for your hospital stay   Order Comments: 5/4/2021:  PROCEDURES PERFORMED:     1.  Diverting loop ileostomy takedown.  2.  Extensive lysis of adhesions (90 minutes).     [facility] Follow-up and recommended labs and tests   Order Comments: WOUND CARE  -Inspect your wounds daily for signs of infection (increased redness, drainage, pain)  -Keep your wound clean and dry  -You may shower, but do not soak in tub or pool    -You have a penrose drain in your old ostomy site wound.  Cover old ostomy site/penrose drain with gauze and change the gauze 2-3 times per day.  The penrose drain will be removed in clinic.  If the penrose drain falls out, no need to be alarmed.  Just call our clinic to let us know.    NOTIFY  Please contact Saima Vail RN, Ivette Ibarra RN or Rachael TRACY at [phone] for problems after discharge such as:  -Temperature > 101F, chills, rigors, dizziness  -Redness around or purulent drainage from wound  -Inability to tolerate diet, nausea or vomiting  -You stop passing gas, develop significant bloating, abdominal pain  -Have blood in stools/vomit  -Have severe diarrhea/constipation  -Any other questions or concerns.  - At nights (after 4:30pm), on weekends, or if urgent, call [phone] and ask the  to speak with the on-call Colorectal Surgery resident or fellow      Medication Instructions  Some of your medications may have changed. Please take only prescribed and resumed medications     FOLLOW-UP  1.  You will need to follow-up with Nadege campoverde  brigid Vieira NP in the Colon and Rectal Surgery clinic in 2-3 week(s) and then with CRS Staff: Dr. Remi Moscoso in 4-5 weeks after.  Please contact our clinic scheduler (phone # [phone]) if you have not heard from our clinic in 3 business days afer discharge to schedule a follow-up appointment.         Appointments on [place] and/or [facility] (with [facility] or [facility] provider or service). Call [phone] if you haven't heard regarding these appointments within 7 days of discharge.     Activity   Order Comments: Your activity upon discharge:   ACTIVITY  -No lifting, pushing, pulling greater than 10 lbs and no strenuous exercise for 6 weeks   -No driving while on narcotic analgesics (i.e. Percocet, oxycodone, Vicodin)  -No driving until you are able to fully twist to both sides or slam on brakes quickly and without any pain  -We encourage walking at least 4-5 times per day     Order Specific Question Answer Comments   Is discharge order? Yes      MD face to face encounter   Order Comments: Documentation of Face to Face and Certification for Home Health Services    I certify that patient: Myla Glynn is under my care and that I, or a nurse practitioner or physician's assistant working with me, had a face-to-face encounter that meets the physician face-to-face encounter requirements with this patient on: 5/10/2021.    This encounter with the patient was in whole, or in part, for the following medical condition, which is the primary reason for home health care:s/p diverting loop ileostomy reversal and extensive ARGELIA on 5/5 .    I certify that, based on my findings, the following services are medically necessary home health services: Nursing, Occupational Therapy and Physical Therapy.    My clinical findings support the need for the above services because: Nurse is needed: To provide caregiver training to assist with: post op assessment and cares.., Occupational Therapy Services are needed to assess  and treat cognitive ability and address ADL safety due to impairment in endurance. and Physical Therapy Services are needed to assess and treat the following functional impairments: mobility.    Further, I certify that my clinical findings support that this patient is homebound (i.e. absences from home require considerable and taxing effort and are for medical reasons or Sabianist services or infrequently or of short duration when for other reasons) because: Requires assistance of another person or specialized equipment to access medical services because patient: Requires supervision of another for safe transfer...    Based on the above findings. I certify that this patient is confined to the home and needs intermittent skilled nursing care, physical therapy and/or speech therapy.  The patient is under my care, and I have initiated the establishment of the plan of care.  This patient will be followed by a physician who will periodically review the plan of care.  Physician/Provider to provide follow up care: Jmai Bundy    Attending hospital physician (the Medicare certified [place] provider): Dr. Remi Moscoso  Physician Signature: See electronic signature associated with these discharge orders.  Date: 5/10/2021     Diet   Order Comments: Follow this diet upon discharge:   DIET  -Low Residue Diet for at least 4-6 weeks unless cleared by Colorectal surgery.  No raw vegetables, fruit skins, fibrous foods that require a lot of chewing, nuts, seeds, corn, popcorn.   -We recommend eating slowly, chewing thoroughly, eating small frequent meals throughout the day  -Stay well hydrated.     Order Specific Question Answer Comments   Is discharge order? Yes             Home Health Care:     Referrals sent, [facility] to start 5/12           Discharge Disposition:     Discharged to home      Condition at discharge: Stable      Wendi House PA-C  Colon and Rectal Surgery     Miri Keating MD  (PGY-1)  Surgery      Pt was seen and discussed with Dr. Moscoso on 5/10/2021

## 2021-05-05 NOTE — PLAN OF CARE
PT: First attempt to see pt, MD walking with pt. Second attempt, pt politely but adamantly declined working with PT this am, stating needing to rest and can get up later. PT will attempt back this pm as schedule allows.

## 2021-05-05 NOTE — PROGRESS NOTES
Post Op Check    Used [employer]  to talk to patient.  Patient says she is having some pain after surgery near her incision but otherwise, denies chest pain or SOB. Appears comfortable in bed. Has not passed gas.     /56 (BP Location: Right arm)   Pulse 65   Temp 95.7  F (35.4  C) (Oral)   Resp 12   Ht 1.524 m (5')   Wt 49.8 kg (109 lb 12.6 oz)   SpO2 100%   BMI 21.44 kg/m      Awake NAD, resting in bed  RRR  On NC 1 L  Abdomen soft, non distended, ilesotomy takedown site with dressing, minimal streaking noted. Abdominal binder in place    A/P: 77 yo F now s/p Ileostomy closure with extensive ARGELIA. Doing well post operatively.    - Ertapenem once on POD1  - subcutaneous heparin q12H  - LR @ 100ml/hr  - Wean of O2 as able  - Pain control    Brandon Morin MD  Surgery Cross [facility]

## 2021-05-06 ENCOUNTER — APPOINTMENT (OUTPATIENT)
Dept: PHYSICAL THERAPY | Facility: CLINIC | Age: 76
End: 2021-05-06
Attending: COLON & RECTAL SURGERY
Payer: COMMERCIAL

## 2021-05-06 LAB
ANION GAP SERPL CALCULATED.3IONS-SCNC: 8 MMOL/L (ref 3–14)
BUN SERPL-MCNC: 33 MG/DL (ref 7–30)
CALCIUM SERPL-MCNC: 8.4 MG/DL (ref 8.5–10.1)
CHLORIDE SERPL-SCNC: 110 MMOL/L (ref 94–109)
CO2 SERPL-SCNC: 21 MMOL/L (ref 20–32)
CREAT SERPL-MCNC: 1.62 MG/DL (ref 0.52–1.04)
GFR SERPL CREATININE-BSD FRML MDRD: 31 ML/MIN/{1.73_M2}
GLUCOSE SERPL-MCNC: 93 MG/DL (ref 70–99)
HGB BLD-MCNC: 9.9 G/DL (ref 11.7–15.7)
MAGNESIUM SERPL-MCNC: 2 MG/DL (ref 1.6–2.3)
PHOSPHATE SERPL-MCNC: 3.8 MG/DL (ref 2.5–4.5)
POTASSIUM SERPL-SCNC: 3.8 MMOL/L (ref 3.4–5.3)
SODIUM SERPL-SCNC: 139 MMOL/L (ref 133–144)

## 2021-05-06 PROCEDURE — 97161 PT EVAL LOW COMPLEX 20 MIN: CPT | Mod: GP | Performed by: REHABILITATION PRACTITIONER

## 2021-05-06 PROCEDURE — 36415 COLL VENOUS BLD VENIPUNCTURE: CPT | Performed by: COLON & RECTAL SURGERY

## 2021-05-06 PROCEDURE — 250N000009 HC RX 250: Performed by: COLON & RECTAL SURGERY

## 2021-05-06 PROCEDURE — 250N000011 HC RX IP 250 OP 636

## 2021-05-06 PROCEDURE — 250N000011 HC RX IP 250 OP 636: Performed by: COLON & RECTAL SURGERY

## 2021-05-06 PROCEDURE — 84100 ASSAY OF PHOSPHORUS: CPT | Performed by: COLON & RECTAL SURGERY

## 2021-05-06 PROCEDURE — 250N000011 HC RX IP 250 OP 636: Performed by: PHYSICIAN ASSISTANT

## 2021-05-06 PROCEDURE — 85018 HEMOGLOBIN: CPT | Performed by: COLON & RECTAL SURGERY

## 2021-05-06 PROCEDURE — C9113 INJ PANTOPRAZOLE SODIUM, VIA: HCPCS | Performed by: PHYSICIAN ASSISTANT

## 2021-05-06 PROCEDURE — 83735 ASSAY OF MAGNESIUM: CPT | Performed by: COLON & RECTAL SURGERY

## 2021-05-06 PROCEDURE — 97116 GAIT TRAINING THERAPY: CPT | Mod: GP | Performed by: REHABILITATION PRACTITIONER

## 2021-05-06 PROCEDURE — 80048 BASIC METABOLIC PNL TOTAL CA: CPT | Performed by: COLON & RECTAL SURGERY

## 2021-05-06 PROCEDURE — 97530 THERAPEUTIC ACTIVITIES: CPT | Mod: GP | Performed by: REHABILITATION PRACTITIONER

## 2021-05-06 PROCEDURE — 250N000013 HC RX MED GY IP 250 OP 250 PS 637: Performed by: COLON & RECTAL SURGERY

## 2021-05-06 PROCEDURE — 258N000003 HC RX IP 258 OP 636: Performed by: PHYSICIAN ASSISTANT

## 2021-05-06 PROCEDURE — 120N000002 HC R&B MED SURG/OB UMMC

## 2021-05-06 PROCEDURE — 258N000003 HC RX IP 258 OP 636: Performed by: COLON & RECTAL SURGERY

## 2021-05-06 RX ORDER — METOCLOPRAMIDE HYDROCHLORIDE 5 MG/ML
5 INJECTION INTRAMUSCULAR; INTRAVENOUS ONCE
Status: COMPLETED | OUTPATIENT
Start: 2021-05-06 | End: 2021-05-06

## 2021-05-06 RX ORDER — CALCIUM GLUCONATE 20 MG/ML
2 INJECTION, SOLUTION INTRAVENOUS ONCE
Status: COMPLETED | OUTPATIENT
Start: 2021-05-06 | End: 2021-05-06

## 2021-05-06 RX ADMIN — PANTOPRAZOLE SODIUM 40 MG: 40 INJECTION, POWDER, FOR SOLUTION INTRAVENOUS at 12:06

## 2021-05-06 RX ADMIN — OXYCODONE HYDROCHLORIDE 5 MG: 5 TABLET ORAL at 10:10

## 2021-05-06 RX ADMIN — HEPARIN SODIUM 5000 UNITS: 5000 INJECTION, SOLUTION INTRAVENOUS; SUBCUTANEOUS at 19:59

## 2021-05-06 RX ADMIN — Medication 2.5 MG: at 06:09

## 2021-05-06 RX ADMIN — Medication 50 MG: at 08:09

## 2021-05-06 RX ADMIN — Medication 2.5 MG: at 01:07

## 2021-05-06 RX ADMIN — OMEPRAZOLE 20 MG: 20 CAPSULE, DELAYED RELEASE ORAL at 08:09

## 2021-05-06 RX ADMIN — SODIUM CHLORIDE, POTASSIUM CHLORIDE, SODIUM LACTATE AND CALCIUM CHLORIDE 500 ML: 600; 310; 30; 20 INJECTION, SOLUTION INTRAVENOUS at 13:16

## 2021-05-06 RX ADMIN — SODIUM CHLORIDE, POTASSIUM CHLORIDE, SODIUM LACTATE AND CALCIUM CHLORIDE: 600; 310; 30; 20 INJECTION, SOLUTION INTRAVENOUS at 10:45

## 2021-05-06 RX ADMIN — ACETAMINOPHEN 1000 MG: 500 TABLET, FILM COATED ORAL at 14:25

## 2021-05-06 RX ADMIN — ACETAMINOPHEN 1000 MG: 500 TABLET, FILM COATED ORAL at 19:59

## 2021-05-06 RX ADMIN — METOCLOPRAMIDE HYDROCHLORIDE 5 MG: 5 INJECTION INTRAMUSCULAR; INTRAVENOUS at 08:24

## 2021-05-06 RX ADMIN — ONDANSETRON 4 MG: 2 INJECTION INTRAMUSCULAR; INTRAVENOUS at 08:25

## 2021-05-06 RX ADMIN — HEPARIN SODIUM 5000 UNITS: 5000 INJECTION, SOLUTION INTRAVENOUS; SUBCUTANEOUS at 08:07

## 2021-05-06 RX ADMIN — CALCIUM GLUCONATE 2 G: 20 INJECTION, SOLUTION INTRAVENOUS at 10:36

## 2021-05-06 RX ADMIN — PROCHLORPERAZINE EDISYLATE 5 MG: 5 INJECTION INTRAMUSCULAR; INTRAVENOUS at 09:53

## 2021-05-06 RX ADMIN — Medication 50 MG: at 16:13

## 2021-05-06 RX ADMIN — ONDANSETRON 4 MG: 2 INJECTION INTRAMUSCULAR; INTRAVENOUS at 14:27

## 2021-05-06 RX ADMIN — ACETAMINOPHEN 1000 MG: 500 TABLET, FILM COATED ORAL at 08:09

## 2021-05-06 ASSESSMENT — ACTIVITIES OF DAILY LIVING (ADL)
ADLS_ACUITY_SCORE: 18
ADLS_ACUITY_SCORE: 18
ADLS_ACUITY_SCORE: 22
ADLS_ACUITY_SCORE: 18

## 2021-05-06 NOTE — PLAN OF CARE
Assumed care of Patient from [id number]. VSS. Patient reports good pain control with tylenol and PRN oxy 2.5mg.  No complaints of nausea. Pt incontinent of urine overnight. Incision dressing marked old drainage. Pt turned and repositioned Q2hr. Pt refused OOB overnight, wants to try in the morning. Using [facility]  with assessment and interactions. Pt able to self mirco-reposition in bed. Will continue plan of care.

## 2021-05-06 NOTE — PROGRESS NOTES
Colorectal Surgery Progress Note  [facility]  POD#2      Subjective:  Speaking with ipad  in tigrinya.  Doing ok.  Denies pain.  Reports emesis this morning.  Current denies nausea.  Per RN, in pain when turning.  Declining mobilization.  Discussed likely ileus, encouraged mobilizing.  No appetite.  Does not want to eat or drink.  Will consider drinking some tea. Reports some urinary leakage today and that is not normal for her.     Vitals:  Vitals:    05/05/21 1400 05/05/21 2317 05/06/21 0549 05/06/21 1000   BP: 130/51 139/53 134/62 119/55   BP Location: Right arm Right arm  Right arm   Pulse: 67 88 85 84   Resp: 20 23 22 20   Temp: 97.1  F (36.2  C) 100  F (37.8  C) 99.2  F (37.3  C) 98  F (36.7  C)   TempSrc: Oral Oral Axillary Oral   SpO2: 96% 96% 98% 99%   Weight:       Height:         I/O:  I/O last 3 completed shifts:  In: 1465.83 [P.O.:460; I.V.:1005.83]  Out: 300 [Urine:300]    Physical Exam:  Gen: AAOx3, NAD  Pulm: Non-labored breathing  Abd: Soft, moderately distended, appropriately tender, no guarding/rebound   Old stoma site clean.  Penrose drain in place.  serosang drainage on old dressings.    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  Now s/p DLI take down and extensive ARGELIA on 5/5.      Neuro/Pain: scheduled tylenol and low dose oxy or tramadol prn  CV: no  acute issues  PULM: encourage IS.  GI/FEN:   - NPO with meds and ice chips due to emesis.   - IVF @ 77  : no acute issues.  Monitor.  Low threshold to check UA/UC if incontinence persists.   Heme: Hgb 9.9 from 11-12 baseline.  Check tomorrow again.   ID: no acute issues  Endocrine: no acute issues    Activity: as tolerated.  apprec PT/OT to evaluate.   Ppx: heparin BID, protonix  Dispo: pending JACQUI Huose PA-C   Colon and Rectal Surgery  1957     Patient was seen and discussed with Dr. Stover

## 2021-05-06 NOTE — PLAN OF CARE
/51 (BP Location: Right arm)   Pulse 67   Temp 97.1  F (36.2  C) (Oral)   Resp 20   Ht 1.524 m (5')   Wt 49.8 kg (109 lb 12.6 oz)   SpO2 96%   BMI 21.44 kg/m      Neuro: Alert. Tigrinya speaking  Cardiac: AVSS.   Respiratory: Sating 96% on RA.  GI/: Voiding in bedpan. Incontinent at times. No BM   Diet/appetite: On full liquid diet with poor appetite. Nausea this evening shift. Gave Zofran x1  Activity:  Resting in bed all day. Refused to get out of bed. Said she will get up and walk tomorrow.   Pain: Abdomen pain managed with Oxy and tylenol  Skin: Abdomen incision with premapore c/d/i. Binder on   LDA's: PIV with LR at 75ml/hr. Family visiting today.     Plan: Continue with POC. Notify primary team with changes.

## 2021-05-06 NOTE — PROGRESS NOTES
05/06/21 1135   Quick Adds   Type of Visit Initial PT Evaluation       Present yes   Language Tigrinya  (via IPad)   Living Environment   People in home alone   Current Living Arrangements apartment   Home Accessibility no concerns   Transportation Anticipated family or friend will provide   Living Environment Comments Pt reports she lives alone in an apartment, uses elevator. Pt reports she does visit her daughter frequently, and her daughters home has stairs.   Self-Care   Usual Activity Tolerance good   Current Activity Tolerance poor   Regular Exercise Yes   Activity/Exercise Type walking  (Pt reports she enjoys walking)   Exercise Amount/Frequency 3-5 times/wk   Equipment Currently Used at Home shower chair;grab bar, toilet;grab bar, tub/shower   Activity/Exercise/Self-Care Comment Pt reports she is able to complete self cares, but has her daughter assist her cooking, cleaning and laundry.   Disability/Function   Hearing Difficulty or Deaf no   Wear Glasses or Blind no   Concentrating, Remembering or Making Decisions Difficulty no   Difficulty Communicating no   Difficulty Eating/Swallowing no   Walking or Climbing Stairs Difficulty no   Dressing/Bathing Difficulty no   Toileting issues no   Doing Errands Independently Difficulty (such as shopping) yes   Errands Management Pt reports she has assistance from her daughter when needed.   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  Now s/p DLI take down and extensive ARGELIA on 5/5.   Existing Precautions/Restrictions fall;abdominal   Cognition   Orientation Status (Cognition) oriented to;person;place   Affect/Mental Status (Cognition) WFL   Follows Commands (Cognition) 50-74% accuracy   Pain Assessment   Patient Currently in Pain Yes, see Vital Sign flowsheet  (Pt pointing to right shoulder when asked about pain)   Integumentary/Edema   Integumentary/Edema other (describe)   Integumentary/Edema Comments abdominal incision, not viewed by PT, abdominal binder donned   Posture    Posture Forward head position;Protracted shoulders;Kyphosis   Range of Motion (ROM)   ROM Quick Adds ROM WFL   Strength   Strength Comments Formal MMT not tested, 3/5 strength observed with functional transfer.   Bed Mobility   Comment (Bed Mobility) Pt tx supine->sit with Min A.    Transfers   Transfer Safety Comments Pt tx sit->stand with Min A.   Gait/Stairs (Locomotion)   Comment (Gait/Stairs) Pt amb ~ 20 feet with Min A, pt reaching for railing, bedside, in room cart with all mobility.   Balance   Balance Comments Moderate sway in static standing.   Sensory Examination   Sensory Perception patient reports no sensory changes   Clinical Impression   Criteria for Skilled Therapeutic Intervention yes, treatment indicated   PT Diagnosis (PT) Impaired Functional Mobility   Influenced by the following impairments activity intolerance, impaired balance,    Functional limitations due to impairments bed mob, transfers, gait   Clinical Presentation Stable/Uncomplicated   Clinical Presentation Rationale PMHx, clinical judgement, current presentation   Clinical Decision Making (Complexity) low complexity   Therapy Frequency (PT) 5x/week   Predicted Duration of Therapy Intervention (days/wks) 5/13/21   Planned Therapy Interventions (PT) bed mobility training;gait training;neuromuscular re-education;strengthening;stair  training;transfer training   Risk & Benefits of therapy have been explained care plan/treatment goals reviewed   PT Discharge Planning    PT Discharge Recommendation (DC Rec) Transitional Care Facility   PT Rationale for DC Rec Pt is well below baseline level of function, decreased strength, activity intolerance, currently would recommend TCU, as pt ambulating with minimal assist and poor balance.   PT Brief overview of current status  Nursing Staff: up with A x 1 + WW + gait belt   Total Evaluation Time   Total Evaluation Time (Minutes) 8

## 2021-05-06 NOTE — PLAN OF CARE
/55 (BP Location: Right arm)   Pulse 84   Temp 98  F (36.7  C) (Oral)   Resp 20   Ht 1.524 m (5')   Wt 49.8 kg (109 lb 12.6 oz)   SpO2 99%   BMI 21.44 kg/m      Neuro: Alert this morning. Drowsy this afternoon after taking Oxy and compazine, but easily aroused.  Cardiac: AVSS.   Respiratory: Sating 99% on RA.  GI/: Incontinent of urine at 10am. Placed on purewick. Voided 50ml at 1pm. Bladder scan at 10am had 46ml and at 1pm had 81ml. LR bolus 500ml given x1. No BM  Diet/appetite: Was on full liquid this morning. Team changed diet to NPO at 11am because Pt was nauseated and had 200ml of green emesis at 8am. Gave zofran with little effect. Compazine given with some relief.   Activity:  Walked to the door and back with PT at Noon. Reports nausea with activity  Pain: C/O abdomen pain. Gave Tramadol 50mg at 8am and Oxy 5mg at 10am   Skin: Dressing changed to abdomen old stoma/Penrose site, and cover with medipore tape  LDA's: PIV with LR at 50ml/  Labs: Calcium 8.4. Got 2gm of Calcium Gluconate IV x1. Magnesium 2.0 no replacement needed per protocol. Rechecked in am     Plan: Continue with POC. Notify primary team with changes.

## 2021-05-07 ENCOUNTER — APPOINTMENT (OUTPATIENT)
Dept: OCCUPATIONAL THERAPY | Facility: CLINIC | Age: 76
End: 2021-05-07
Attending: COLON & RECTAL SURGERY
Payer: COMMERCIAL

## 2021-05-07 ENCOUNTER — APPOINTMENT (OUTPATIENT)
Dept: PHYSICAL THERAPY | Facility: CLINIC | Age: 76
End: 2021-05-07
Attending: COLON & RECTAL SURGERY
Payer: COMMERCIAL

## 2021-05-07 LAB
COPATH REPORT: NORMAL
HGB BLD-MCNC: 8.6 G/DL (ref 11.7–15.7)
MAGNESIUM SERPL-MCNC: 2.1 MG/DL (ref 1.6–2.3)

## 2021-05-07 PROCEDURE — 250N000011 HC RX IP 250 OP 636: Performed by: PHYSICIAN ASSISTANT

## 2021-05-07 PROCEDURE — 36415 COLL VENOUS BLD VENIPUNCTURE: CPT | Performed by: COLON & RECTAL SURGERY

## 2021-05-07 PROCEDURE — C9113 INJ PANTOPRAZOLE SODIUM, VIA: HCPCS | Performed by: PHYSICIAN ASSISTANT

## 2021-05-07 PROCEDURE — 97530 THERAPEUTIC ACTIVITIES: CPT | Mod: GO

## 2021-05-07 PROCEDURE — 97165 OT EVAL LOW COMPLEX 30 MIN: CPT | Mod: GO

## 2021-05-07 PROCEDURE — 258N000003 HC RX IP 258 OP 636: Performed by: COLON & RECTAL SURGERY

## 2021-05-07 PROCEDURE — 83735 ASSAY OF MAGNESIUM: CPT | Performed by: COLON & RECTAL SURGERY

## 2021-05-07 PROCEDURE — 120N000002 HC R&B MED SURG/OB UMMC

## 2021-05-07 PROCEDURE — 97530 THERAPEUTIC ACTIVITIES: CPT | Mod: GP

## 2021-05-07 PROCEDURE — 97535 SELF CARE MNGMENT TRAINING: CPT | Mod: GO

## 2021-05-07 PROCEDURE — 999N000127 HC STATISTIC PERIPHERAL IV START W US GUIDANCE

## 2021-05-07 PROCEDURE — 250N000013 HC RX MED GY IP 250 OP 250 PS 637: Performed by: COLON & RECTAL SURGERY

## 2021-05-07 PROCEDURE — 85018 HEMOGLOBIN: CPT | Performed by: COLON & RECTAL SURGERY

## 2021-05-07 RX ADMIN — SODIUM CHLORIDE, POTASSIUM CHLORIDE, SODIUM LACTATE AND CALCIUM CHLORIDE: 600; 310; 30; 20 INJECTION, SOLUTION INTRAVENOUS at 20:35

## 2021-05-07 RX ADMIN — ACETAMINOPHEN 1000 MG: 500 TABLET, FILM COATED ORAL at 20:25

## 2021-05-07 RX ADMIN — SODIUM CHLORIDE, POTASSIUM CHLORIDE, SODIUM LACTATE AND CALCIUM CHLORIDE: 600; 310; 30; 20 INJECTION, SOLUTION INTRAVENOUS at 00:31

## 2021-05-07 RX ADMIN — PANTOPRAZOLE SODIUM 40 MG: 40 INJECTION, POWDER, FOR SOLUTION INTRAVENOUS at 08:40

## 2021-05-07 RX ADMIN — Medication 25 MG: at 22:12

## 2021-05-07 RX ADMIN — ACETAMINOPHEN 1000 MG: 500 TABLET, FILM COATED ORAL at 08:40

## 2021-05-07 RX ADMIN — Medication 50 MG: at 00:26

## 2021-05-07 ASSESSMENT — ACTIVITIES OF DAILY LIVING (ADL)
ADLS_ACUITY_SCORE: 22
ADLS_ACUITY_SCORE: 23
DEPENDENT_IADLS:: SHOPPING;TRANSPORTATION
ADLS_ACUITY_SCORE: 22
ADLS_ACUITY_SCORE: 23
ADLS_ACUITY_SCORE: 19
PREVIOUS_RESPONSIBILITIES: MEAL PREP;HOUSEKEEPING;MEDICATION MANAGEMENT
ADLS_ACUITY_SCORE: 22

## 2021-05-07 ASSESSMENT — MIFFLIN-ST. JEOR: SCORE: 947.22

## 2021-05-07 NOTE — PROGRESS NOTES
Colorectal Surgery Progress Note  [facility]  POD#3      Subjective:  [facility]  Feeling better today compared to yesterday.  No more emesis.  No nausea.  Maybe a little burping when drinking water.  No stool.  Pt thinks she may have passed some gas.  Pt reports she walked once yesterday a longer walk.  Encouraged her to walk 3 times today.       Vitals:  Vitals:    05/06/21 1000 05/06/21 1554 05/06/21 2354 05/07/21 0600   BP: 119/55 109/49 105/48 103/47   BP Location: Right arm Right arm Right arm Right arm   Pulse: 84 69 71 75   Resp: 20 20 20 16   Temp: 98  F (36.7  C) 97.3  F (36.3  C) 96.6  F (35.9  C) 96.4  F (35.8  C)   TempSrc: Oral Oral Oral Axillary   SpO2: 99% 100% 97% 97%   Weight:       Height:         I/O:  I/O last 3 completed shifts:  In: 1978.75 [I.V.:1478.75; IV Piggyback:500]  Out: 525 [Urine:325; Emesis/NG output:200]    Physical Exam:  Gen: AAOx3, NAD  Pulm: Non-labored breathing  Abd: Soft, mildly distended, appropriately tender, no guarding/rebound   Old stoma site clean.  Penrose drain in place.  serosang drainage on old dressings.    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  Now s/p DLI take down and extensive ARGELIA on 5/5.      Neuro/Pain: scheduled tylenol and low dose oxy or tramadol prn  CV: no acute issues  PULM: encourage  IS.  GI/FEN:   - NPO.  May be able to have some sips later today.  - IVF @ 77  : no acute issues.  Monitor.   Heme: Hgb 8.6 from 11-12 baseline.  Check tomorrow again.   ID: no acute issues  Endocrine: no acute issues    Activity: as tolerated.  apprec PT/OT to evaluate. Encourage walking 3-4 times per day.  Ppx: protonix.  Hold heparin BID for Hgb drift.  Dispo: pending JACQUI House PA-C   Colon and Rectal Surgery    Patient was seen and discussed with Dr. Stover

## 2021-05-07 NOTE — PROGRESS NOTES
"   05/07/21 1300   Quick Adds   Type of Visit Initial Occupational Therapy Evaluation   Living Environment   People in home alone   Current Living Arrangements apartment   Home Accessibility no concerns   Transportation Anticipated family or friend will provide   Living Environment Comments Pt lives alone at baseline although will be d/c to daughters home (daughters home has stairs). There is a tub shower at daughters home and walk-in shower at pts home.    Self-Care   Usual Activity Tolerance good   Current Activity Tolerance fair   Regular Exercise Yes   Activity/Exercise Type walking  (pt walks and also was using \"equipment\" at the gym)   Equipment Currently Used at Home shower chair   Activity/Exercise/Self-Care Comment Pt is IND with ADLs at baseline except for daughter A with stoma/ostomy cares with bathing. Pt does not use a walker at baseline.   Disability/Function   Hearing Difficulty or Deaf no   Wear Glasses or Blind no   Concentrating, Remembering or Making Decisions Difficulty no   Difficulty Communicating no   Difficulty Eating/Swallowing no   Walking or Climbing Stairs Difficulty no   Dressing/Bathing Difficulty no   Toileting issues no   Doing Errands Independently Difficulty (such as shopping) yes   Errands Management Daughter A with grocery shopping.   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  Now s/p DLI take down and " extensive ARGELIA on 5/5.   Existing Precautions/Restrictions abdominal;fall   Left Upper Extremity (Weight-bearing Status) other (see comments)  (10# limit)   Right Upper Extremity (Weight-bearing Status) other (see comments)  (10# limit)   General Observations and Info Activity: Amb w/ A   Cognitive Status Examination   Cognitive Status Comments Did not formally assess, Per daughter appears intact although will continue to assess.   Sensory   Sensory Quick Adds No deficits were identified   Pain Assessment   Patient Currently in Pain No   Integumentary/Edema   Integumentary/Edema no deficits were identifed   Posture   Posture forward head position;protracted shoulders   Range of Motion Comprehensive   Comment, General Range of Motion BUE WFL   Strength Comprehensive (MMT)   Comment, General Manual Muscle Testing (MMT) Assessment Not formally tested 2/2 to abd precautions   Bed Mobility   Bed Mobility supine-sit;sit-supine   Supine-Sit [place] (Bed Mobility) moderate assist (50% patient effort)   Sit-Supine [place] (Bed Mobility) minimum assist (75% patient effort)   Transfers   Transfers toilet transfer   Toilet Transfer   Type (Toilet Transfer) stand-sit   [place] Level (Toilet Transfer) contact guard;verbal cues   Instrumental Activities of Daily Living (IADL)   Previous Responsibilities meal prep;housekeeping;medication management   IADL Comments Daughter A with most IADLs   Clinical Impression   Criteria for Skilled Therapeutic Interventions Met (OT) yes;skilled treatment is necessary   OT Diagnosis decreased I/ADL IND   OT Problem List-Impairments impacting ADL problems related to;activity tolerance impaired;balance;post-surgical precautions;mobility   Assessment of Occupational Performance 5 or more Performance Deficits   Identified Performance Deficits dressing, bathing, toileting, amb, transfers, bed mobility, home mgmt   Planned Therapy Interventions (OT) ADL retraining;IADL retraining;bed  mobility training;strengthening;transfer training;progressive activity/exercise;home program guidelines;risk factor education   Clinical Decision Making Complexity (OT) low complexity   Therapy Frequency (OT) Daily   Predicted Duration of Therapy 5 days   Risk & Benefits of therapy have been explained evaluation/treatment results reviewed;care plan/treatment goals reviewed;risks/benefits reviewed;current/potential barriers reviewed;participants voiced agreement with care plan;participants included;patient;daughter   Comment-Clinical Impression Pt would benefit from continued skilled OT to progress I/ADL IND and functional mobility   OT Discharge Planning    OT Discharge Recommendation (DC Rec) Home with assist;home with home care occupational therapy   OT Rationale for DC Rec Anticipate that by time of d/c, pt will be safe to d/c to daughters home with A prn and HH OT to continue to progress I/ADL IND   OT Brief overview of current status  A x 1 with FWW   Total Evaluation Time (Minutes)   Total Evaluation Time (Minutes) 4

## 2021-05-07 NOTE — PLAN OF CARE
Assumed care of Patient from [id number]. VSS. Patient reports good pain control with PRN tramadol. No complaints of nausea. Patient not passing flatus or stool.  Pt incontinent of urine, using purewick while in bed, with some urine output. Dressings clean and intact, abd binder on for comfort. Repositioned Q2hr with assist of two. Pt able to self mirco-reposition in bed. Will continue plan of care.

## 2021-05-07 NOTE — PLAN OF CARE
0700 to 1500:VSS pt is alert and oriented x 4 ,denies pain and nausea,passing gas ,no Bm today,pt had adequate urinary output pure wick is in place,LS clear,BS+,abdominal dressing changed incision clean and dry penrose intact.pt was up with SBA walked hallways tolerated well.pt is on clear liquid diet.[employer] interpretor was used via I pod.Will continue to monitor.

## 2021-05-07 NOTE — PLAN OF CARE
Pt. c/o right shoulder pain heating pack applied which was effective.pt is alert and oriented x 4,up with assist of one walked hallways with walker ,tolerated well,pt tolerated clear liquid diet,denied abdominal pain and nausea.LS clear,BS+.pt.'s daughter at bed side supportive with cares.

## 2021-05-07 NOTE — CONSULTS
Care Management Initial Consult    General Information  Assessment completed with: Patient and Michele (Daughter) via the telephone  Type of CM/SW Visit: Initial Assessment  Primary Care Provider verified and updated as needed: Yes   Readmission within the last 30 days: no previous admission in last 30 days   Reason for Consult: Discharge planning  Advance Care Planning:  Not addressed at this time     Communication Assessment  Patient's communication style: spoken language (non-English)-  Requires a Tigrinya   Hearing Difficulty or Deaf: no   Wear Glasses or Blind: no    Cognitive  Cognitive/Neuro/Behavioral: WDL                      Living Environment:   People in home: Lives alone     Current living Arrangements: Apartment, elevator to get to her floor      Able to return to prior arrangements: No  Living Arrangement Comments: Declining TCU, discharging to her daughter's home with [facility]    Family/Social Support:  Care provided by: Michele (Daughter)  Provides care for: No one  Marital Status:        Description of Support System: Supportive    Support Assessment: Adequate family and caregiver support-  Michele [facility]7 [facility]    Current Resources:   Patient receiving home care services: No  Community Resources: None  Equipment currently used at home: colostomy/ostomy supplies, walker, rolling  Supplies currently used at home: None    Employment/Financial:  Employment Status: Retired     Financial Concerns: No concerns identified     Lifestyle & Psychosocial Needs:        Socioeconomic History     Marital status:      Spouse name: Not on file     Number of children: Not on file     Years of education: Not on file     Highest education level: Not on file     Tobacco Use     Smoking status: Never Smoker     Smokeless tobacco: Never Used   Substance and Sexual Activity     Alcohol use: Not Currently     Drug use: Not Currently       Functional Status:  Prior to admission  patient needed assistance:   Dependent ADLs:: Ambulation-walker, Bathing, Grooming  Dependent IADLs:: Shopping, Transportation  Assesssment of Functional Status: Not at baseline with ADL Functioning    Mental Health Status:  Mental Health Status: No Current Concerns       Chemical Dependency Status:  Chemical Dependency Status: No Current Concerns           Values/Beliefs:  Spiritual, Cultural Beliefs, Pentecostal Practices, Values that affect care: yes  Description of Beliefs that Will Affect Care: Moravian        Care Management Follow Up    Length of Stay (days): 3  Expected Discharge Date: 05/09/21     Concerns to be Addressed: Discharge planning     Patient plan of care discussed at interdisciplinary rounds: Yes    Anticipated Discharge Disposition: Discharge to her daughter's home:  [address]  [place] [address]     Anticipated Discharge Services: [facility]  Anticipated Discharge DME: Walker    Patient/family educated on Medicare website which has current facility and service quality ratings: no- pt wants to use [facility] as she has used them previously  Education Provided on the Discharge Plan:  yes  Patient/Family in Agreement with the Plan: no- pt and daughter are both declining TCU.    Referrals Placed by CM/MAURO: [facility]-  MAURO initiated a referral  Private pay costs discussed: Not applicable    Additional Information:  Chart reviewed and discussed during rounds.  MAURO met with pt via a Tigrinya iPad  to complete the above assessment.  Pt reports that she wants to discharge to her daughter's home with [facility].  Her daughter is able to provide 24/7 support.  She is not agreeable to recommended TCU placement.    MAURO then spoke with Michele (Daughter) to confirm the above information.  She is also declining TCU and wants pt to discharge to her home with [facility].  She can provide 24/7 support.  Family will transport  at discharge.    Referral made to [facility].    RUY Fine, [facility]  Phone:  [phone]   Pager:  [phone]

## 2021-05-08 ENCOUNTER — APPOINTMENT (OUTPATIENT)
Dept: PHYSICAL THERAPY | Facility: CLINIC | Age: 76
End: 2021-05-08
Attending: COLON & RECTAL SURGERY
Payer: COMMERCIAL

## 2021-05-08 LAB — HGB BLD-MCNC: 8.5 G/DL (ref 11.7–15.7)

## 2021-05-08 PROCEDURE — 250N000013 HC RX MED GY IP 250 OP 250 PS 637: Performed by: COLON & RECTAL SURGERY

## 2021-05-08 PROCEDURE — 250N000009 HC RX 250: Performed by: COLON & RECTAL SURGERY

## 2021-05-08 PROCEDURE — 250N000011 HC RX IP 250 OP 636: Performed by: COLON & RECTAL SURGERY

## 2021-05-08 PROCEDURE — 250N000011 HC RX IP 250 OP 636: Performed by: PHYSICIAN ASSISTANT

## 2021-05-08 PROCEDURE — 85018 HEMOGLOBIN: CPT | Performed by: COLON & RECTAL SURGERY

## 2021-05-08 PROCEDURE — 36415 COLL VENOUS BLD VENIPUNCTURE: CPT | Performed by: COLON & RECTAL SURGERY

## 2021-05-08 PROCEDURE — 97116 GAIT TRAINING THERAPY: CPT | Mod: GP | Performed by: REHABILITATION PRACTITIONER

## 2021-05-08 PROCEDURE — 97530 THERAPEUTIC ACTIVITIES: CPT | Mod: GP | Performed by: REHABILITATION PRACTITIONER

## 2021-05-08 PROCEDURE — 999N000111 HC STATISTIC OT IP EVAL DEFER

## 2021-05-08 PROCEDURE — C9113 INJ PANTOPRAZOLE SODIUM, VIA: HCPCS | Performed by: PHYSICIAN ASSISTANT

## 2021-05-08 PROCEDURE — 120N000002 HC R&B MED SURG/OB UMMC

## 2021-05-08 PROCEDURE — 258N000001 HC RX 258: Performed by: COLON & RECTAL SURGERY

## 2021-05-08 RX ORDER — CALCIUM GLUCONATE 20 MG/ML
2 INJECTION, SOLUTION INTRAVENOUS ONCE
Status: COMPLETED | OUTPATIENT
Start: 2021-05-08 | End: 2021-05-08

## 2021-05-08 RX ORDER — METOCLOPRAMIDE HYDROCHLORIDE 5 MG/ML
5 INJECTION INTRAMUSCULAR; INTRAVENOUS ONCE
Status: COMPLETED | OUTPATIENT
Start: 2021-05-08 | End: 2021-05-08

## 2021-05-08 RX ADMIN — Medication 25 MG: at 00:54

## 2021-05-08 RX ADMIN — FOLIC ACID: 5 INJECTION, SOLUTION INTRAMUSCULAR; INTRAVENOUS; SUBCUTANEOUS at 13:08

## 2021-05-08 RX ADMIN — CALCIUM GLUCONATE 2 G: 20 INJECTION, SOLUTION INTRAVENOUS at 12:21

## 2021-05-08 RX ADMIN — ACETAMINOPHEN 1000 MG: 500 TABLET, FILM COATED ORAL at 09:03

## 2021-05-08 RX ADMIN — METOCLOPRAMIDE HYDROCHLORIDE 5 MG: 5 INJECTION INTRAMUSCULAR; INTRAVENOUS at 12:20

## 2021-05-08 RX ADMIN — Medication 50 MG: at 09:02

## 2021-05-08 RX ADMIN — PANTOPRAZOLE SODIUM 40 MG: 40 INJECTION, POWDER, FOR SOLUTION INTRAVENOUS at 09:02

## 2021-05-08 RX ADMIN — ACETAMINOPHEN 1000 MG: 500 TABLET, FILM COATED ORAL at 20:46

## 2021-05-08 RX ADMIN — HYDROMORPHONE HYDROCHLORIDE 0.2 MG: 0.2 INJECTION, SOLUTION INTRAMUSCULAR; INTRAVENOUS; SUBCUTANEOUS at 07:02

## 2021-05-08 ASSESSMENT — ACTIVITIES OF DAILY LIVING (ADL)
ADLS_ACUITY_SCORE: 20
ADLS_ACUITY_SCORE: 18
ADLS_ACUITY_SCORE: 20
ADLS_ACUITY_SCORE: 18
ADLS_ACUITY_SCORE: 18
ADLS_ACUITY_SCORE: 20

## 2021-05-08 NOTE — PROGRESS NOTES
Attestation:  I, Remi Moscoso MD, saw and evaluated Myla Glynn as part of a shared visit. I have reviewed and discussed with the advanced practice provider their history, physical and plan.  I personally reviewed the vital signs, medications and labs.  My key history or physical exam findings: +flatuys, no BM. No n/v. Pain tolerable. Abd exam benign, mild distension. Dressing changed. Hgb 8.6->8.5.  Key management decisions made by me: LR diet, banana bag, PO pain control, hold Heparin/Toradol, ambulate QID, Hgb and BMP in AM.    Remi Moscoso M.D., M.Sc.    Colon and Rectal Surgery  Pager: [phone].    May 8, 2021       Colorectal Surgery Progress Note  [facility]    May 8, 2021    Subjective:    Speaking with ipad  in [facility]. Has abdominal pain and cramping. Purwick in-place with adequate urine output. Willing to eat more food. Team requested more pain meds for her at bedside. Less nausea this morning. No emesis.      Objective:  /54 (BP Location: Right arm)   Pulse 74   Temp 98.4  F (36.9  C) (Oral)   Resp 16   Ht 1.524 m (5')   Wt 52.6 kg (115 lb 14.4 oz)   SpO2 98%   BMI 22.64 kg/m        Intake/Output Summary (Last 24 hours) at 5/8/2021 1026  Last data filed at 5/8/2021 0659  Gross per 24 hour   Intake 1080 ml   Output 1150 ml   Net -70 ml       PE  Gen: AAOx3, NAD, laying comfortably in bed  Pulm: Non-labored breathing  Abd: Soft, mildly distended, appropriately tender, no guarding/rebound   Old stoma site clean.  Penrose drain in place.  serosang drainage on old dressings.    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  Now  s/p DLI take down and extensive ARGELIA on 5/5.    - Scheduled tylenol, PRN dilaudid, PRN oxy, PRN tramadol for pain  - Check Hgb tomorrow  - Hold   - Upgrade to LRD (daughter said that she prefers to eat bread w/ milk)  - IVF LR 50 ml/hr  - Encourage IS and ambulating with staff/PT  - Continue protonix    Rakesh Barrera, MS3  [employer] [facility]

## 2021-05-08 NOTE — PLAN OF CARE
0700 to 1500:VSS ,pt denied pain but tramadol 50 mg given per MD request to keep pain under control,since pt. had a lot of pain during night shift.pt is alert and oriented,x4 ,up with assist of one with walker.LS clear,BS+ hypoactive,pt is passing gas no BM,had adequate urinary output, ,pure wick is in place. pt.had better appetite today, ate  whole piece of white bread with hot milk,abdominal dressing changed by MD this morning.pt walked about 200 ft tolerated well.Will continue to monitor.

## 2021-05-08 NOTE — PLAN OF CARE
"OT 7C. Attempted to see Pt this AM, Pt sleeping and when awoken, declining therapy with preference for rest. OT returning in PM, with Pt having just returned from walking with nursing. Pt reporting dizziness and fatigue, saying \"I am afraid I may fall if I walk more.\" OT to cancel and reschedule per POC. Thank you.   "

## 2021-05-08 NOTE — PLAN OF CARE
Temp max 99.9 orally, AOVSS on RA. A&Ox4. Denies nausea, yet pt hesitant to increase oral intake d/t previous nausea, clear liquid diet, appetite poor. Cramping pain in abdomen controlled with scheduled Tylenol and PRN Tramadol. Tigrinya speaking, Ipad  used. Pt up with 1 assist, GB and WW. Passing some gas, no bowel movement yet. Purewick in place, having adequate urine output. Abdomen with dressing in place, reinforced, penrose drain open to gravity drainage at ileostomy takedown site, dressing due to be changed again today. Aspiration precautions maintained. Encouraging repositions off bottom. Cont with plan of care.

## 2021-05-09 ENCOUNTER — APPOINTMENT (OUTPATIENT)
Dept: PHYSICAL THERAPY | Facility: CLINIC | Age: 76
End: 2021-05-09
Attending: COLON & RECTAL SURGERY
Payer: COMMERCIAL

## 2021-05-09 LAB
ANION GAP SERPL CALCULATED.3IONS-SCNC: 6 MMOL/L (ref 3–14)
BUN SERPL-MCNC: 24 MG/DL (ref 7–30)
CALCIUM SERPL-MCNC: 9.1 MG/DL (ref 8.5–10.1)
CHLORIDE SERPL-SCNC: 112 MMOL/L (ref 94–109)
CO2 SERPL-SCNC: 23 MMOL/L (ref 20–32)
CREAT SERPL-MCNC: 1.39 MG/DL (ref 0.52–1.04)
GFR SERPL CREATININE-BSD FRML MDRD: 37 ML/MIN/{1.73_M2}
GLUCOSE SERPL-MCNC: 101 MG/DL (ref 70–99)
HGB BLD-MCNC: 8.6 G/DL (ref 11.7–15.7)
MAGNESIUM SERPL-MCNC: 2.5 MG/DL (ref 1.6–2.3)
POTASSIUM SERPL-SCNC: 3.5 MMOL/L (ref 3.4–5.3)
SODIUM SERPL-SCNC: 140 MMOL/L (ref 133–144)

## 2021-05-09 PROCEDURE — 36415 COLL VENOUS BLD VENIPUNCTURE: CPT | Performed by: COLON & RECTAL SURGERY

## 2021-05-09 PROCEDURE — 97116 GAIT TRAINING THERAPY: CPT | Mod: GP | Performed by: REHABILITATION PRACTITIONER

## 2021-05-09 PROCEDURE — 80048 BASIC METABOLIC PNL TOTAL CA: CPT | Performed by: COLON & RECTAL SURGERY

## 2021-05-09 PROCEDURE — 97530 THERAPEUTIC ACTIVITIES: CPT | Mod: GP | Performed by: REHABILITATION PRACTITIONER

## 2021-05-09 PROCEDURE — 250N000013 HC RX MED GY IP 250 OP 250 PS 637: Performed by: COLON & RECTAL SURGERY

## 2021-05-09 PROCEDURE — 250N000011 HC RX IP 250 OP 636: Performed by: STUDENT IN AN ORGANIZED HEALTH CARE EDUCATION/TRAINING PROGRAM

## 2021-05-09 PROCEDURE — 120N000002 HC R&B MED SURG/OB UMMC

## 2021-05-09 PROCEDURE — 83735 ASSAY OF MAGNESIUM: CPT | Performed by: COLON & RECTAL SURGERY

## 2021-05-09 PROCEDURE — 85018 HEMOGLOBIN: CPT | Performed by: COLON & RECTAL SURGERY

## 2021-05-09 RX ORDER — HEPARIN SODIUM 5000 [USP'U]/.5ML
5000 INJECTION, SOLUTION INTRAVENOUS; SUBCUTANEOUS EVERY 12 HOURS
Status: DISCONTINUED | OUTPATIENT
Start: 2021-05-09 | End: 2021-05-10 | Stop reason: HOSPADM

## 2021-05-09 RX ADMIN — HEPARIN SODIUM 5000 UNITS: 5000 INJECTION, SOLUTION INTRAVENOUS; SUBCUTANEOUS at 11:01

## 2021-05-09 RX ADMIN — ACETAMINOPHEN 1000 MG: 500 TABLET, FILM COATED ORAL at 20:04

## 2021-05-09 RX ADMIN — ACETAMINOPHEN 1000 MG: 500 TABLET, FILM COATED ORAL at 15:49

## 2021-05-09 RX ADMIN — ACETAMINOPHEN 1000 MG: 500 TABLET, FILM COATED ORAL at 10:37

## 2021-05-09 RX ADMIN — OMEPRAZOLE 20 MG: 20 CAPSULE, DELAYED RELEASE ORAL at 10:38

## 2021-05-09 RX ADMIN — HEPARIN SODIUM 5000 UNITS: 5000 INJECTION, SOLUTION INTRAVENOUS; SUBCUTANEOUS at 20:04

## 2021-05-09 ASSESSMENT — ACTIVITIES OF DAILY LIVING (ADL)
ADLS_ACUITY_SCORE: 17
ADLS_ACUITY_SCORE: 18
ADLS_ACUITY_SCORE: 22

## 2021-05-09 NOTE — PROGRESS NOTES
Surgery Progress Note    S: NAEO. Pain well controlled overnight. No BM, +flatus.    O:  Temp:  [97.3  F (36.3  C)-99.3  F (37.4  C)] 98.9  F (37.2  C)  Pulse:  [71-79] 79  Resp:  [14-16] 16  BP: (103-133)/(41-57) 133/57  SpO2:  [93 %-97 %] 93 %    Gen: NAD, resting comfortably  CV: RRR  Resp: nonlabored respirations, comfortable on RA  Abd: soft, ntnd, incision cdi    I/O last 3 completed shifts:  In: 1220 [P.O.:320; I.V.:900]  Out: 1550 [Urine:1550]    Labs  Reviewed  Cr 1.39 (1.62)  Hgb 8.6 (8.5)    A/P: Myla Glynn is a 76 year old female s/p diverting loop ileostomy reversal and extensive ARGELIA on 5/5, recovering well postoperatively, awaiting return of bowel function    - Pain control with tramadol and dilaudid PRN  - Diet: low residue  - no IVF  - ppx: SQH  - Dispo: continue to work with PT, recommending TCU at discharge      Patient seen and d/w fellow, Dr. Stover, and d/w staff Dr. Blanka Keating MD (PGY-1)  Surgery

## 2021-05-09 NOTE — PROGRESS NOTES
Myla Glynn is a 76 year old female s/p diverting loop ileostomy reversal and extensive ARGELIA on 5/5, recovering well postoperatively, awaiting return of bowel function    Will work with PT today when daughter arrives at [facility].  Performed and tolerated well, but was fatigued at the end.    Denies pain.     Incision old dried drainage - dressing changed and skin cleaned,    Ordered breakfast per daughter - white bread and warm milk.  Ate most of it.  But still minimal po intake.    Likes coffee.    +gas    - BM today - possible smear per pt.  Cannot confirm.    Cont with PT and up to [facility] to have a BM.    Purewik is patent and adequate amount.    Cont with POC = focus is on having a BM

## 2021-05-09 NOTE — PLAN OF CARE
Continued care for pt from [id number].    VSS on RA. A&Ox4. Denies nausea, denies pain this shift. Tolerating Low fiber diet. Tigrinya speaking, Ipad  in room. Pt up with 1 assist, GB and WW. Passing a good amount of gas, no bowel movement yet. Purewick in place, having adequate urine output. Abdomen with dressing in place, small amount of drainage, penrose drain open to gravity drainage at ileostomy takedown site, dressing due to be changed again today. Aspiration precautions maintained. Encouraging repositions off bottom. Cont with plan of care.

## 2021-05-09 NOTE — PLAN OF CARE
OT 7C Cx. Checked in with Pt this PM, with Pt sleeping and daughter at bedside. Pt's daughter reporting Pt working with PT this afternoon and is fatigued. Pt awakening, endorsing  preference for rest. OT check back as schedule allows otherwise reschedule per POC.

## 2021-05-09 NOTE — PLAN OF CARE
VSS on RA. A&Ox4. Denies nausea, denies pain this shift. Tolerating Low fiber diet, appetite fair. Tigrinya speaking, Ipad  in room yet pt wanted to use daughter most of shift to help interpret. Pt up with 1 assist, YONI and WW, walked in halls multiple times this shift. Passing a good amount of gas, no bowel movement yet. Purewick in place, having adequate urine output. Abdomen with dressing in place, penrose drain open to gravity drainage at ileostomy takedown site, dressing due to be changed again today. Aspiration precautions maintained. Encouraging repositions off bottom. Cont with plan of care.

## 2021-05-10 ENCOUNTER — APPOINTMENT (OUTPATIENT)
Dept: PHYSICAL THERAPY | Facility: CLINIC | Age: 76
End: 2021-05-10
Attending: COLON & RECTAL SURGERY
Payer: COMMERCIAL

## 2021-05-10 ENCOUNTER — PATIENT OUTREACH (OUTPATIENT)
Dept: CARE COORDINATION | Facility: CLINIC | Age: 76
End: 2021-05-10

## 2021-05-10 VITALS
DIASTOLIC BLOOD PRESSURE: 61 MMHG | TEMPERATURE: 98.6 F | HEIGHT: 60 IN | HEART RATE: 82 BPM | BODY MASS INDEX: 22.75 KG/M2 | RESPIRATION RATE: 16 BRPM | OXYGEN SATURATION: 99 % | SYSTOLIC BLOOD PRESSURE: 136 MMHG | WEIGHT: 115.9 LBS

## 2021-05-10 PROCEDURE — 250N000011 HC RX IP 250 OP 636: Performed by: STUDENT IN AN ORGANIZED HEALTH CARE EDUCATION/TRAINING PROGRAM

## 2021-05-10 PROCEDURE — 97530 THERAPEUTIC ACTIVITIES: CPT | Mod: GP | Performed by: REHABILITATION PRACTITIONER

## 2021-05-10 PROCEDURE — 97116 GAIT TRAINING THERAPY: CPT | Mod: GP | Performed by: REHABILITATION PRACTITIONER

## 2021-05-10 PROCEDURE — 250N000013 HC RX MED GY IP 250 OP 250 PS 637: Performed by: COLON & RECTAL SURGERY

## 2021-05-10 PROCEDURE — 250N000011 HC RX IP 250 OP 636: Performed by: COLON & RECTAL SURGERY

## 2021-05-10 RX ORDER — METOCLOPRAMIDE HYDROCHLORIDE 5 MG/ML
5 INJECTION INTRAMUSCULAR; INTRAVENOUS ONCE
Status: COMPLETED | OUTPATIENT
Start: 2021-05-10 | End: 2021-05-10

## 2021-05-10 RX ORDER — ACETAMINOPHEN 500 MG
1000 TABLET ORAL 3 TIMES DAILY PRN
Qty: 100 TABLET | Refills: 0 | Status: ON HOLD
Start: 2021-05-10 | End: 2024-01-01

## 2021-05-10 RX ORDER — POLYETHYLENE GLYCOL 3350 17 G/17G
17 POWDER, FOR SOLUTION ORAL DAILY
Status: DISCONTINUED | OUTPATIENT
Start: 2021-05-10 | End: 2021-05-10 | Stop reason: HOSPADM

## 2021-05-10 RX ORDER — TRAMADOL HYDROCHLORIDE 50 MG/1
25 TABLET ORAL EVERY 6 HOURS PRN
Qty: 15 TABLET | Refills: 0 | Status: SHIPPED | OUTPATIENT
Start: 2021-05-10 | End: 2024-01-01

## 2021-05-10 RX ADMIN — OMEPRAZOLE 20 MG: 20 CAPSULE, DELAYED RELEASE ORAL at 08:50

## 2021-05-10 RX ADMIN — POLYETHYLENE GLYCOL 3350 17 G: 17 POWDER, FOR SOLUTION ORAL at 10:28

## 2021-05-10 RX ADMIN — HEPARIN SODIUM 5000 UNITS: 5000 INJECTION, SOLUTION INTRAVENOUS; SUBCUTANEOUS at 08:50

## 2021-05-10 RX ADMIN — ACETAMINOPHEN 1000 MG: 500 TABLET, FILM COATED ORAL at 08:50

## 2021-05-10 RX ADMIN — METOCLOPRAMIDE HYDROCHLORIDE 5 MG: 5 INJECTION INTRAMUSCULAR; INTRAVENOUS at 10:28

## 2021-05-10 RX ADMIN — ACETAMINOPHEN 1000 MG: 500 TABLET, FILM COATED ORAL at 13:43

## 2021-05-10 ASSESSMENT — ACTIVITIES OF DAILY LIVING (ADL)
ADLS_ACUITY_SCORE: 22
ADLS_ACUITY_SCORE: 17
ADLS_ACUITY_SCORE: 17
ADLS_ACUITY_SCORE: 22

## 2021-05-10 NOTE — PLAN OF CARE
BP (!) 142/55 (BP Location: Left arm)   Pulse 75   Temp 100.2  F (37.9  C) (Oral)   Resp 15   Ht 1.524 m (5')   Wt 52.6 kg (115 lb 14.4 oz)   SpO2 95%   BMI 22.64 kg/m    Hypertensive & temp max 100.2, not within notifying parameters, all other VSS on RA. A&Ox4, Tigrinya speaking with  ipad at the bedside. Denies pain & nausea, tolerating low fiber diet, fair appetite this shift with home foods. Right PIV SL. Takedown site covered & dressing CDI, abdominal binder in place. Skin otherwise intact. Incontinent of urine at times, pure wick in for the first half of the shift but pt then c/o burning at site so encouraging bedside commode, no redness or breakdown noted. Passing gas, awaiting ROBF. Up with A1, gait belt, and walker. Daughter at bedside for support this evening. Continue POC.

## 2021-05-10 NOTE — PROGRESS NOTES
Colorectal Surgery Progress Note  [facility]    May 10, 2021      Subjective:  Speaking with ipad  in tigrinya. Doing ok. Denies pain. Ate some milk and bread yesterday. Endorses some nausea after eating. Passing gas and reports small BM.    Objective:    /60 (BP Location: Right arm)   Pulse 69   Temp 98.6  F (37  C) (Oral)   Resp 16   Ht 1.524 m (5')   Wt 52.6 kg (115 lb 14.4 oz)   SpO2 98%   BMI 22.64 kg/m      I/O last 3 completed shifts:  In: -   Out: 1250 [Urine:1250]    Physical Exam:  Gen: AAOx3, NAD  Pulm: Non-labored breathing  Abd: Soft, nd, appropriately tender, no guarding/rebound   Old stoma site clean. Penrose drain in place. Serosang drainage on old dressings.    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  Now s/p DLI take down and extensive ARGELIA on 5/5.    -Start miralax and reglan for constipation  -Cont LRD + supplements  -ppx: heparin SQ  -Scheduled tylenol, PRN dilaudid, PRN oxy, PRN tramadol for pain  -Encourage ambulation w/ walker with staff/PT  -Possible discharge today if patient and family comfortable with wound cares    Patient seen and discussed with fellow Clarisa Stover MD and gen surg resident Miri Keating MD.    Rakesh Barrera, MS3  [facility] [employer] [facility]    I saw and examined the patient with the medical student and agree with the findings as documented. I have edited the note to reflect my findings and plan where necessary.    Discussed with staff Dr. Blanka Keating MD (PGY-1)  Surgery

## 2021-05-10 NOTE — PROGRESS NOTES
Care Management Follow Up    Length of Stay (days): 6    Expected Discharge Date: 05/10/21     Concerns to be Addressed: discharge planning     Patient plan of care discussed at interdisciplinary rounds: Yes    Anticipated Discharge Disposition: Home with family and home care services     Anticipated Discharge Services: Home Care  Anticipated Discharge DME: NA    Patient/family educated on Medicare website which has current facility and service quality ratings:yes  Education Provided on the Discharge Plan: yes   Patient/Family in Agreement with the Plan: yes    Referrals Placed by CM/SW: Homecare  Private pay costs discussed: Not applicable    Additional Information:  Received update that patient is medically ready for discharge to home today and needs home RN/PT/OT services.  Referral previously made to [facility], but they are unable to do SOC until 5/16 and providers would like patient seen sooner than that.  The following referrals were made:    [facility]-They do not take patients insurance  [facility]-Unable to accept  [facility]-Unable to accept due to patients insurance  [facility]-Reviewing referral, can see 5/12 if able to accept    Care Management Discharge Note    Discharge Date: 05/10/21       Discharge Disposition: Home with family assist, Home Care    Discharge Services: Home Care    Discharge DME: None    Discharge Transportation: family or friend will provide    Private pay costs discussed: Not applicable    PAS Confirmation Code:  NA  Patient/family educated on Medicare website which has current facility and service quality ratings: yes    Education Provided on the Discharge Plan: yes   Persons Notified of Discharge Plans: Bedside RN  Patient/Family in Agreement with the Plan: yes    Handoff Referral Completed: Yes, external     Additional Information:  Patient is medically ready for discharge to home today.  [facility]  able to accept the patient for home RN/PT/OT services and are able to do start of care 5/12.  Patients daughter will provide transportation to home at discharge.      [facility](RN/PT/OT)  Phone: [phone]  Fax: [phone]    [facility]ley  Phone: [phone]  Pager: [phone]  To contact the weekend [facility], Page: [phone]

## 2021-05-10 NOTE — PLAN OF CARE
Occupational Therapy Discharge Summary    Reason for therapy discharge:    Discharged to home with home therapy.    Progress towards therapy goal(s). See goals on Care Plan in [facility] electronic health record for goal details.  Goals partially met.  Barriers to achieving goals:   discharge from facility.    Therapy recommendation(s):    Continued therapy is recommended.  Rationale/Recommendations:  to progress I/ADL IND and tolerance.

## 2021-05-10 NOTE — PLAN OF CARE
BP (!) 153/61 (BP Location: Right arm)   Pulse 76   Temp 99.5  F (37.5  C) (Oral)   Resp 16   Ht 1.524 m (5')   Wt 52.6 kg (115 lb 14.4 oz)   SpO2 99%   BMI 22.64 kg/m       Neuros:  A&Ox4. Able to make needs known. Lupeya speaking- Ipad  at bedside.   Activity: Ax1 with gaitbelt and walker  Cardiac:  WNL. Denies cardiac chest pain   Respiratory:  WNL. Sating well on RA.   Diet: low fiber  GI/Gu:  Voiding without difficulty.No BM this shift.   Skin/Incisions:  Abdominal dressing CDI  LDA: (R) PIV SL.  PRN: No PRNs this shift   Changes:  No acute changes this shift. Pt slept comfortably throughout night   Plan: Continue POC    JOHNATHAN MARIE, RN on 5/10/2021

## 2021-05-10 NOTE — PLAN OF CARE
Physical Therapy Discharge Summary    Reason for therapy discharge:    Discharged to home with home therapy.    Progress towards therapy goal(s). See goals on Care Plan in [facility] electronic health record for goal details.  Goals partially met.  Barriers to achieving goals:   discharge from facility.    Therapy recommendation(s):    Continued therapy is recommended.  Rationale/Recommendations:  Pt would benefit from additional skilled PT and OT services to address gait, balance, and strength.

## 2021-05-10 NOTE — PLAN OF CARE
VSS. Uses [facility] . Doesn't speak English. Voids spontaneously. Incontinent x1. Ambulate to commode x2. No BM, passing flatus. Low pain managed with Tylenol. Low fiber diet, appetite fair. Right abdominal penrose drain. Dressing changed with serous drainage in AM. Assist x1 with gait belt/walker. PT ambulated around unit. Daughter visited at 1300 and she speaks English. Pt generally tired. Discharged @ 1415 to home with daughter and home care RN to visit. Discharge instructions and follow-up appointments reviewed with daughter.

## 2021-05-11 NOTE — PROGRESS NOTES
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  Now s/p elective diverting loop ileostomy take down and extensive lysis of adhesions (90 minutes) on 5/4/2021.           Hospital Course:   Post-operatively pt was gently fluid resuscitated.  Cameron was removed and pt was able to void.  Pt initially had some emesis likely due to slow return of bowel function.  Pt had eventual return of bowel function and was able to tolerate small amounts of a low fiber diet.      Pt was seen by PT/OT and deemed appropriate for discharge home with continued PT and OT.   Post-operative pain was controlled with scheduled tylenol and prn tramadol.    ASSESSMENT      Discharge Assessment  Patient reports symptoms are: Unchanged(Per daughter (Michele) the patient is doing well. Had a BM, wating well, pain controlled with Tylenol.)  Does the patient have all of their medications?: Yes  Does patient know what their new medications are for?: Yes  Does patient have a follow-up appointment scheduled?: Yes  Does patient have any other questions or concerns?: No    Post-op  Did the patient have surgery or a procedure: Yes  Fever: No  Chills: No  Eating & Drinking: eating and drinking without  complaints/concerns  PO Intake: other(Low Residue Diet for at least 4-6 weeks unless cleared by Colorectal surgery.  No raw vegetables, fruit skins, fibrous foods that require a lot of chewing, nuts, seeds, corn, popcorn.)  Bowel Function: normal  Urinary Status: voiding without complaint/concerns    PLAN                                                      Outpatient Plan:      Patient is to follow up in the Colon and Rectal Surgery Clinic in 2-3 week with Nadege Last NP and then with Dr. Moscoso in 4-6 weeks after.     Future Appointments   Date Time Provider Department Center   5/19/2021  1:30 PM Nadege Last APRN CNP [facility]   6/7/2021  1:30 PM  [facility]   6/7/2021  2:00 PM Sarah Butler MD [facility]   6/14/2021  3:30 PM Remi Moscoso MD [facility]           Sandi Roberts, [facility]

## 2021-05-12 ENCOUNTER — PATIENT OUTREACH (OUTPATIENT)
Dept: SURGERY | Facility: CLINIC | Age: 76
End: 2021-05-12

## 2021-05-12 NOTE — PROGRESS NOTES
Post Op Note     Called to check on patient postoperatively after hospital discharge.   Patient does not speak English; is living with her daughter Michele with whom I spoke.      Admission Diagnoses:   Malignant neoplasm of sigmoid colon (H) [C18.7]  S/P ileostomy (H) [Z93.2]   Hypertension  Diabetes mellitus type 2  S/p prior sigmoidectomy with en bloc partial cystectomy, DOC-BSO, reimplantation of left ureter, appendectomy, diverting loop ileostomy in 1/2021.          Procedures:   5/4/2021:  PROCEDURES PERFORMED:     1.  Diverting loop ileostomy takedown.  2.  Extensive lysis of adhesions (90 minutes).    Surgeon: Dr. Remi Moscoso  Admission: 5/4/2021  Discharged: 5/10/2021    [facility]:   [facility]: [phone]   (To start: 5/12/2021; patient changed appt to 5/13/2021)  New meds: none  Note: Penrose in pre-ostomy site.      Pain is well controlled with Tylenol; has Tramadol prn but hasn't felt she's needed it.     Patient is not eating and drinking normally. Patient is on a Low Residue diet. Appetite is good but patient not drinking well.  Discussed this at length; Encouraged patient to drink 8-10 glasses of water a day. Denied nausea/vomiting.     Patient is passing flatus, is having normal bowel movements.    Patient is voiding normally and urine is light in color.    Patient is not ambulating; was instructed to increase mobility; use Incentive Spirometer q1 hour while awake.     Patient was reminded of lifting restriction; not to lift greater than 10 pounds for the next 6 weeks.     Denied fever/chills.     Patient is set up with home care. Patient reports being contacted by the home care service. Home care will initiate on 5/13/2021.    Patient's incision has Penrose drain at old ostomy site: instructed to change gauze dressing 2-3x daily; if drain falls out, apply gauze dressing and contact [facility] clinic. Drainage is minimal at this time.       Pathology was not reviewed with them.     No forms or  supplies needed.       Follow up is set up with Nadege Alaniz NP on 5/19/2021 and with Dr. Remi Moscoso on 6/14/2021.       Encouraged the patient to contact the clinic in the meantime with any fevers, chills, nausea, vomiting, increased colostomy output, no colostomy output, dizziness, lightheadedness, uncontrolled pain, changes to the incisions, or with any questions or concerns.    Patient's questions were answered to their stated satisfaction and they are in agreement with this plan.    Yue Ibarra RN  Colorectal Care Coordinator  [phone]     [address] [place] [address]  Mail Code: [id number]  [place]  [address]    Colon & Rectal Surgery Clinic  [facility]

## 2021-05-20 ENCOUNTER — OFFICE VISIT (OUTPATIENT)
Dept: SURGERY | Facility: CLINIC | Age: 76
End: 2021-05-20
Payer: COMMERCIAL

## 2021-05-20 VITALS
BODY MASS INDEX: 21.83 KG/M2 | HEART RATE: 66 BPM | HEIGHT: 60 IN | DIASTOLIC BLOOD PRESSURE: 56 MMHG | WEIGHT: 111.2 LBS | OXYGEN SATURATION: 100 % | SYSTOLIC BLOOD PRESSURE: 130 MMHG

## 2021-05-20 DIAGNOSIS — Z09 FOLLOW-UP EXAMINATION FOLLOWING SURGERY: Primary | ICD-10-CM

## 2021-05-20 DIAGNOSIS — Z93.2 S/P ILEOSTOMY (H): ICD-10-CM

## 2021-05-20 DIAGNOSIS — C18.7 MALIGNANT NEOPLASM OF SIGMOID COLON (H): ICD-10-CM

## 2021-05-20 PROCEDURE — 99024 POSTOP FOLLOW-UP VISIT: CPT | Performed by: NURSE PRACTITIONER

## 2021-05-20 ASSESSMENT — MIFFLIN-ST. JEOR: SCORE: 925.9

## 2021-05-20 ASSESSMENT — PAIN SCALES - GENERAL: PAINLEVEL: MILD PAIN (3)

## 2021-05-20 NOTE — NURSING NOTE
Chief Complaint   Patient presents with     Post-op Visit     Post-op       Vitals:    05/20/21 0909   BP: 130/56   BP Location: Left arm   Patient Position: Sitting   Cuff Size: Adult Regular   Pulse: 66   SpO2: 100%   Weight: 111 lb 3.2 oz   Height: 5'       Body mass index is 21.72 kg/m .    Juhi Muhammad MA

## 2021-05-20 NOTE — PROGRESS NOTES
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  She is now s/p elective diverting loop ileostomy take down and extensive lysis of adhesions (90 minutes) on 2021 with Dr. Moscoso.    Interval history: Myla presents today with her daughter. She has been doing well since returning home.  No pain.  She is having normal, soft bowel movements.  She is tolerating a low residue diet and denies any nausea or vomiting.  No fevers or chills.  Her daughter is changing the dressing over her takedown site daily.    Physical Examination:   /56 (BP Location: Left arm, Patient Position: Sitting, Cuff Size: Adult Regular)   Pulse 66   Ht 5'   Wt 111 lb 3.2 oz   SpO2 100%   BMI 21.72 kg/m    General: Alert, oriented, in no acute distress, sitting comfortably  HEENT: Mucous membranes moist  Abdomen: Soft, nondistended, nontender on palpation.  Takedown site with Penrose drain in place, which was removed.  Good granulation tissue in the base of the wound.  Corner of 4 x 4 gauze was tucked into the site.    Assessment/Plan:  76 year old female s/p elective diverting loop ileostomy take down and extensive lysis of adhesions (90 minutes) on 2021 with Dr. Moscoso.  She is recovering well from surgery.  Takedown site is healing well.  Tuck the corner of a 4 x 4 gauze on this daily.  Low residue diet for another 2 weeks and then she can slowly add fiber back into her diet.  She is having normal bowel movements.  No lifting more than 10 pounds for full 6 weeks  from surgery.  She is scheduled for follow-up next month with Dr. Moscoso.  Encouraged him to contact the clinic in the meantime with any questions or concerns.    Medical history:  Past Medical History:   Diagnosis Date     Anemia      Bladder mass      Controlled type 2 diabetes mellitus without complication, without long-term current use of insulin (H)      Gastroesophageal reflux disease      HTN (hypertension)      Ileostomy status (H)      Malignant neoplasm of sigmoid colon (H)      Renal insufficiency        Surgical history:  Past Surgical History:   Procedure Laterality Date     COLONOSCOPY       CYSTECTOMY BLADDER RADICAL, ILEAL DIVERSION, COMBINED N/A 1/5/2021    Procedure: Partial Cystectomy, Cystourethroscopy,;  Surgeon: Angel Newsome MD;  Location: [facility] OR     HYSTERECTOMY TOTAL ABDOMINAL  1/5/2021    Procedure: Hysterectomy total abdominal;  Surgeon: Jimy Jackson MD;  Location: [facility] OR     LAPAROSCOPY OPERATIVE ADULT  1/5/2021    Procedure: Laparoscopy Operative Adult, takedown of splenic flexure, appendectomy;  Surgeon: Remi Moscoso MD;  Location: [facility] OR     SALPINGO-OOPHORECTOMY BILATERAL  1/5/2021    Procedure: Salpingo-oophorectomy bilateral;  Surgeon: Jimy Jackson MD;  Location: [facility] OR     SIGMOIDOSCOPY FLEXIBLE N/A 1/5/2021    Procedure: SIGMOIDOSCOPY, FLEXIBLE;  Surgeon: Remi Moscoso MD;  Location: [facility] OR     TAKEDOWN ILEOSTOMY N/A 5/4/2021    Procedure: ILEOSTOMY CLOSURE, LYSIS OF ADHESION;  Surgeon: Remi Moscoso MD;  Location: [facility] OR       Problem list:  Patient Active Problem List    Diagnosis Date Noted     S/P ileostomy (H) 03/31/2021     Priority: Medium     Added automatically from request for surgery [id number]       Anemia 12/02/2020     Priority: Medium     Malignant neoplasm of sigmoid colon (H) 11/27/2020     Priority: Medium     Bladder mass 11/27/2020     Priority: Medium     Controlled type 2 diabetes  mellitus without complication, without long-term current use of insulin (H) 10/09/2019     Priority: Medium     HTN (hypertension) 05/30/2017     Priority: Medium       Medications:  Current Outpatient Medications   Medication Sig Dispense Refill     acetaminophen (TYLENOL) 500 MG tablet Take 2 tablets (1,000 mg) by mouth 3 times daily as needed for mild pain 100 tablet 0     omeprazole (PRILOSEC) 20 MG DR capsule Take 20 mg by mouth every morning        traMADol (ULTRAM) 50 MG tablet Take 0.5 tablets (25 mg) by mouth every 6 hours as needed for moderate pain (mild-moderate pain) 15 tablet 0     mirtazapine (REMERON SOL-TAB) 15 MG ODT 1 tablet (15 mg) by Orally disintegrating tablet route At Bedtime (Patient not taking: Reported on 4/20/2021) 30 tablet 3       Allergies:  No Known Allergies    Family history:  Family History   Problem Relation Age of Onset     No Known Problems Mother      No Known Problems Father      No Known Problems Sister      No Known Problems Brother        Social history:  Social History     Tobacco Use     Smoking status: Never Smoker     Smokeless tobacco: Never Used   Substance Use Topics     Alcohol use: Not Currently     Marital status: .    Nursing Notes:   Juhi Muhammad  5/20/2021  9:12 AM  Signed  Chief Complaint   Patient presents with     Post-op Visit     Post-op       Vitals:    05/20/21 0909   BP: 130/56   BP Location: Left arm   Patient Position: Sitting   Cuff Size: Adult Regular   Pulse: 66   SpO2: 100%   Weight: 111 lb 3.2 oz   Height: 5'       Body mass index is 21.72 kg/m .    Juhi Muhammad MA         15 minutes spent on the date of the encounter doing chart review, history and exam, documentation and further activities as noted above.   This is a postop visit.    WILL Desai, NP-C  Colon and Rectal Surgery  [facility]    This note was created using speech recognition software and may contain unintended word  substitutions.

## 2021-05-20 NOTE — LETTER
2021       RE: Myla Glynn  [address]  [place] [address]     Dear Colleague,    Thank you for referring your patient, Myla Glynn, to the [facility] COLON AND RECTAL SURGERY CLINIC [place] at [facility]. Please see a copy of my visit note below.    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  She is now s/p elective diverting loop ileostomy take down and extensive lysis of adhesions (90 minutes) on 2021 with Dr. Moscoso.    Interval history: Myla presents today with her daughter. She has been doing well since returning home.  No pain.  She is having normal, soft bowel movements.  She is tolerating a low residue diet and denies any nausea or vomiting.  No fevers or chills.  Her daughter is changing the dressing over her takedown site daily.    Physical Examination:   /56 (BP Location: Left arm, Patient Position: Sitting, Cuff Size: Adult Regular)   Pulse 66   Ht 5'   Wt 111 lb 3.2 oz   SpO2 100%   BMI 21.72 kg/m    General: Alert, oriented, in no acute distress, sitting comfortably  HEENT: Mucous membranes moist  Abdomen: Soft, nondistended, nontender on palpation.  Takedown site with Penrose drain in place, which was removed.  Good granulation tissue in the base of the wound.  Corner of 4 x 4 gauze was tucked into the site.    Assessment/Plan:  76 year old female s/p elective diverting loop ileostomy take down and extensive lysis of  adhesions (90 minutes) on 5/4/2021 with Dr. Moscoso.  She is recovering well from surgery.  Takedown site is healing well.  Tuck the corner of a 4 x 4 gauze on this daily.  Low residue diet for another 2 weeks and then she can slowly add fiber back into her diet.  She is having normal bowel movements.  No lifting more than 10 pounds for full 6 weeks from surgery.  She is scheduled for follow-up next month with Dr. Moscoso.  Encouraged him to contact the clinic in the meantime with any questions or concerns.    Medical history:  Past Medical History:   Diagnosis Date     Anemia      Bladder mass      Controlled type 2 diabetes mellitus without complication, without long-term current use of insulin (H)      Gastroesophageal reflux disease      HTN (hypertension)      Ileostomy status (H)      Malignant neoplasm of sigmoid colon (H)      Renal insufficiency        Surgical history:  Past Surgical History:   Procedure Laterality Date     COLONOSCOPY       CYSTECTOMY BLADDER RADICAL, ILEAL DIVERSION, COMBINED N/A 1/5/2021    Procedure: Partial Cystectomy, Cystourethroscopy,;  Surgeon: Angel Newsome MD;  Location: [facility] OR     HYSTERECTOMY TOTAL ABDOMINAL  1/5/2021    Procedure: Hysterectomy total abdominal;  Surgeon: Jimy Jackson MD;  Location: [facility] OR     LAPAROSCOPY OPERATIVE ADULT  1/5/2021    Procedure: Laparoscopy Operative Adult, takedown of splenic flexure, appendectomy;  Surgeon: Remi Moscoso MD;  Location: [facility]     SALPINGO-OOPHORECTOMY BILATERAL  1/5/2021    Procedure: Salpingo-oophorectomy bilateral;  Surgeon: Jimy Jackson MD;  Location: [facility]     SIGMOIDOSCOPY FLEXIBLE N/A 1/5/2021    Procedure: SIGMOIDOSCOPY, FLEXIBLE;  Surgeon: Remi Moscoso MD;  Location: [facility]     TAKEDOWN ILEOSTOMY N/A 5/4/2021    Procedure: ILEOSTOMY CLOSURE, LYSIS OF ADHESION;  Surgeon: Remi Moscoso MD;  Location: [facility] OR       Problem list:  Patient Active  Problem List    Diagnosis Date Noted     S/P ileostomy (H) 03/31/2021     Priority: Medium     Added automatically from request for surgery [id number]       Anemia 12/02/2020     Priority: Medium     Malignant neoplasm of sigmoid colon (H) 11/27/2020     Priority: Medium     Bladder mass 11/27/2020     Priority: Medium     Controlled type 2 diabetes mellitus without complication, without long-term current use of insulin (H) 10/09/2019     Priority: Medium     HTN (hypertension) 05/30/2017     Priority: Medium       Medications:  Current Outpatient Medications   Medication Sig Dispense Refill     acetaminophen (TYLENOL) 500 MG tablet Take 2 tablets (1,000 mg) by mouth 3 times daily as needed for mild pain 100 tablet 0     omeprazole (PRILOSEC) 20 MG DR capsule Take 20 mg by mouth every morning        traMADol (ULTRAM) 50 MG tablet Take 0.5 tablets (25 mg) by mouth every 6 hours as needed for moderate pain (mild-moderate pain) 15 tablet 0     mirtazapine (REMERON SOL-TAB) 15 MG ODT 1 tablet (15 mg) by Orally disintegrating tablet route At Bedtime (Patient not taking: Reported on 4/20/2021) 30 tablet 3       Allergies:  No Known Allergies    Family history:  Family History   Problem Relation Age of Onset     No Known Problems Mother      No Known Problems Father      No Known Problems Sister      No Known Problems Brother        Social history:  Social History     Tobacco Use     Smoking status: Never Smoker     Smokeless tobacco: Never Used   Substance Use Topics     Alcohol use: Not Currently     Marital status: .    Nursing Notes:   Juhi Muhammad  5/20/2021  9:12 AM  Signed  Chief Complaint   Patient presents with     Post-op Visit     Post-op       Vitals:    05/20/21 0909   BP: 130/56   BP Location: Left arm   Patient Position: Sitting   Cuff Size: Adult Regular   Pulse: 66   SpO2: 100%   Weight: 111 lb 3.2 oz   Height: 5'       Body mass index is 21.72 kg/m .    Juhi Muhammad MA         15 minutes spent  on the date of the encounter doing chart review, history and exam, documentation and further activities as noted above.   This is a postop visit.    WILL Desai, NP-C  Colon and Rectal Surgery  [facility]    This note was created using speech recognition software and may contain unintended word substitutions.

## 2021-06-07 ENCOUNTER — ONCOLOGY VISIT (OUTPATIENT)
Dept: ONCOLOGY | Facility: CLINIC | Age: 76
End: 2021-06-07
Attending: INTERNAL MEDICINE
Payer: COMMERCIAL

## 2021-06-07 ENCOUNTER — APPOINTMENT (OUTPATIENT)
Dept: LAB | Facility: CLINIC | Age: 76
End: 2021-06-07
Attending: INTERNAL MEDICINE
Payer: COMMERCIAL

## 2021-06-07 VITALS
OXYGEN SATURATION: 100 % | HEART RATE: 59 BPM | DIASTOLIC BLOOD PRESSURE: 69 MMHG | RESPIRATION RATE: 16 BRPM | TEMPERATURE: 98.2 F | WEIGHT: 110.5 LBS | BODY MASS INDEX: 21.58 KG/M2 | SYSTOLIC BLOOD PRESSURE: 145 MMHG

## 2021-06-07 DIAGNOSIS — C18.7 MALIGNANT NEOPLASM OF SIGMOID COLON (H): ICD-10-CM

## 2021-06-07 LAB
ALBUMIN SERPL-MCNC: 3.6 G/DL (ref 3.4–5)
ALP SERPL-CCNC: 94 U/L (ref 40–150)
ALT SERPL W P-5'-P-CCNC: 26 U/L (ref 0–50)
ANION GAP SERPL CALCULATED.3IONS-SCNC: 8 MMOL/L (ref 3–14)
AST SERPL W P-5'-P-CCNC: 20 U/L (ref 0–45)
BASOPHILS # BLD AUTO: 0 10E9/L (ref 0–0.2)
BASOPHILS NFR BLD AUTO: 0.7 %
BILIRUB SERPL-MCNC: 0.3 MG/DL (ref 0.2–1.3)
BUN SERPL-MCNC: 25 MG/DL (ref 7–30)
CALCIUM SERPL-MCNC: 9.1 MG/DL (ref 8.5–10.1)
CEA SERPL-MCNC: 0.7 UG/L (ref 0–2.5)
CHLORIDE SERPL-SCNC: 110 MMOL/L (ref 94–109)
CO2 SERPL-SCNC: 22 MMOL/L (ref 20–32)
CREAT SERPL-MCNC: 1.3 MG/DL (ref 0.52–1.04)
DIFFERENTIAL METHOD BLD: ABNORMAL
EOSINOPHIL # BLD AUTO: 0.3 10E9/L (ref 0–0.7)
EOSINOPHIL NFR BLD AUTO: 4.6 %
ERYTHROCYTE [DISTWIDTH] IN BLOOD BY AUTOMATED COUNT: 13.7 % (ref 10–15)
GFR SERPL CREATININE-BSD FRML MDRD: 40 ML/MIN/{1.73_M2}
GLUCOSE SERPL-MCNC: 90 MG/DL (ref 70–99)
HCT VFR BLD AUTO: 33.5 % (ref 35–47)
HGB BLD-MCNC: 10.5 G/DL (ref 11.7–15.7)
IMM GRANULOCYTES # BLD: 0 10E9/L (ref 0–0.4)
IMM GRANULOCYTES NFR BLD: 0.2 %
LYMPHOCYTES # BLD AUTO: 2 10E9/L (ref 0.8–5.3)
LYMPHOCYTES NFR BLD AUTO: 34 %
MCH RBC QN AUTO: 29.7 PG (ref 26.5–33)
MCHC RBC AUTO-ENTMCNC: 31.3 G/DL (ref 31.5–36.5)
MCV RBC AUTO: 95 FL (ref 78–100)
MONOCYTES # BLD AUTO: 0.4 10E9/L (ref 0–1.3)
MONOCYTES NFR BLD AUTO: 6.3 %
NEUTROPHILS # BLD AUTO: 3.2 10E9/L (ref 1.6–8.3)
NEUTROPHILS NFR BLD AUTO: 54.2 %
NRBC # BLD AUTO: 0 10*3/UL
NRBC BLD AUTO-RTO: 0 /100
PLATELET # BLD AUTO: 216 10E9/L (ref 150–450)
POTASSIUM SERPL-SCNC: 3.9 MMOL/L (ref 3.4–5.3)
PROT SERPL-MCNC: 7.6 G/DL (ref 6.8–8.8)
RBC # BLD AUTO: 3.54 10E12/L (ref 3.8–5.2)
SODIUM SERPL-SCNC: 141 MMOL/L (ref 133–144)
WBC # BLD AUTO: 5.9 10E9/L (ref 4–11)

## 2021-06-07 PROCEDURE — 36415 COLL VENOUS BLD VENIPUNCTURE: CPT

## 2021-06-07 PROCEDURE — G0463 HOSPITAL OUTPT CLINIC VISIT: HCPCS

## 2021-06-07 PROCEDURE — 85025 COMPLETE CBC W/AUTO DIFF WBC: CPT | Performed by: INTERNAL MEDICINE

## 2021-06-07 PROCEDURE — 80053 COMPREHEN METABOLIC PANEL: CPT | Performed by: INTERNAL MEDICINE

## 2021-06-07 PROCEDURE — 82378 CARCINOEMBRYONIC ANTIGEN: CPT | Performed by: INTERNAL MEDICINE

## 2021-06-07 PROCEDURE — 99213 OFFICE O/P EST LOW 20 MIN: CPT | Performed by: INTERNAL MEDICINE

## 2021-06-07 ASSESSMENT — PAIN SCALES - GENERAL: PAINLEVEL: NO PAIN (0)

## 2021-06-07 NOTE — NURSING NOTE
Chief Complaint   Patient presents with     Blood Draw     Labs drawn via  by RN in lab. VS taken       Labs collected from venipuncture by RN. Vitals taken. Checked in for appointment(s).    Honey Dominguez RN

## 2021-06-07 NOTE — PROGRESS NOTES
[facility]  MEDICAL ONCOLOGY    RETURN PATIENT VISIT NOTE    Reason for visit: sigmoid colon cancer      Oncology Treatment Summary  1. 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 October 2020 presented to PCP with recurrent dysuria, hematuria, 10/17/2020 CT AP ws done again with colovesicular fistula with a mass seen invading from colon into bladder.  3. 11/16 colonoscopy at [facility] with reported mass at 40 cm, bx with adenocarcinoma, I am unable to find MMR  4. 11/23 CEA 9.7  5. 12/22/2020 MRI pelvis locally advanced disease with multiple enlarged regional lymph nodes  6. 12/22/2020 PET some uptake in mesenteric area, no distant disease  7. 1/5/21 left hemicolectomy, DOC, BSO, partial cystectomy for a T4N1M0 adenocarcinoma  8. Discussed adjuvant chemotherapy and she declined.   9. Colostomy take down spring 2021.     HISTORY OF PRESENTING ILLNESS  Patient is a pleasant 76-year-old who is seen today with her daughter for a follow-up visit. She is feeling quite well. She notes her colostomy take down went well and she is having bowl movements okay. Her appetite is good and she is maintaining her weight. No n/v/d/c. Overall she is quite pleased with her health.     PAST MEDICAL HISTORY  Gastroesophageal reflux disease, hypertension, type 2 diabetes      CURRENT OUTPATIENT MEDICATIONS  Current Outpatient Medications   Medication     acetaminophen (TYLENOL) 500 MG tablet     mirtazapine (REMERON SOL-TAB) 15 MG ODT     omeprazole (PRILOSEC) 20 MG DR capsule     traMADol (ULTRAM) 50 MG tablet     No current facility-administered medications for this visit.         ALLERGIES   No Known Allergies     SOCIAL HISTORY  She is originally from [place].  She is a , she lives in [place] with her daughter no tobacco or alcohol use     FAMILY HISTORY  No malignancies     REVIEW OF SYSTEMS  Review Of Systems  Skin: negative  Eyes:  negative  Ears/Nose/Throat: negative  Respiratory: No shortness of breath, dyspnea on exertion, cough, or hemoptysis  Cardiovascular: negative  Gastrointestinal: negative as per HPI  Genitourinary: negative  Musculoskeletal: negative  Neurologic: negative  Psychiatric: negative  Hematologic/Lymphatic/Immunologic: negative  Endocrine: negative      PHYSICAL EXAM  B/P: Data Unavailable, T: Data Unavailable, P: Data Unavailable, R: Data Unavailable  Wt Readings from Last 3 Encounters:   06/07/21 50.1 kg (110 lb 8 oz)   05/20/21 50.4 kg (111 lb 3.2 oz)   05/07/21 52.6 kg (115 lb 14.4 oz)       ECOG PPS 1-2    Physical Exam  HENT:      Head: Normocephalic and atraumatic.   Eyes:      Extraocular Movements: Extraocular movements intact.      Conjunctiva/sclera: Conjunctivae normal.      Pupils: Pupils are equal, round, and reactive to light.   Pulmonary:      Effort: Pulmonary effort is normal.   Neurological:      General: No focal deficit present.      Mental Status: She is alert and oriented to person, place, and time. Mental status is at baseline.   Psychiatric:         Mood and Affect: Mood normal.         Behavior: Behavior normal.         Thought Content: Thought content normal.         Judgment: Judgment normal.              LABORATORY AND IMAGING STUDIES    Recent Labs   Lab Test 06/07/21  1410 05/09/21  0653 05/06/21  0632 05/05/21  1146 05/04/21 2229 04/20/21  1017 02/18/21  0936    140 139  --   --  141 144   POTASSIUM 3.9 3.5 3.8 4.5 4.0 3.9 3.4   CHLORIDE 110* 112* 110*  --   --  116* 122*   CO2 22 23 21  --   --  18* 13*   ANIONGAP 8 6 8  --   --  7 9   BUN 25 24 33*  --   --  36* 39*   CR 1.30* 1.39* 1.62* 1.59*  --  1.57* 1.27*   GLC 90 101* 93  --   --  83 72   DICKSON 9.1 9.1 8.4*  --   --  9.3 7.2*     Recent Labs   Lab Test 05/09/21  0653 05/07/21  0714 05/06/21  0632 05/05/21  1146 05/04/21 2229 01/12/21  2234 01/10/21  0729 01/10/21  0729   MAG 2.5* 2.1 2.0 1.8 1.9 1.9   < > 1.6   PHOS  --   --   3.8 5.1* 5.0* 4.2  --  2.5    < > = values in this interval not displayed.     Recent Labs   Lab Test 06/07/21  1410 05/09/21  0653 05/08/21  0642 05/07/21  0714 05/06/21  0632 05/05/21  0833 04/20/21  1017 02/15/21  1321 01/11/21  0637 01/06/21  0454 01/06/21  0454 01/05/21  1836   WBC 5.9  --   --   --   --   --  5.4 7.1  --   --  13.7* 12.9*   HGB 10.5* 8.6* 8.5* 8.6* 9.9* 10.4* 12.5 11.9  --    < > 8.2* 9.1*     --   --   --   --  204 236 296 268  --  255 283   MCV 95  --   --   --   --   --  91 89  --   --  85 83   NEUTROPHIL 54.2  --   --   --   --   --  61.3 73.9  --   --   --   --     < > = values in this interval not displayed.     Recent Labs   Lab Test 06/07/21  1410 04/20/21  1017 02/15/21  1321   BILITOTAL 0.3 0.3 0.2   ALKPHOS 94 41 54   ALT 26 12 9   AST 20 9 6   ALBUMIN 3.6 3.7 3.5     TSH   Date Value Ref Range Status   02/23/2021 0.54 0.40 - 4.00 mU/L Final     Recent Labs   Lab Test 02/15/21  1321 11/23/20  1432   CEA 1.1 9.7*     Results for orders placed or performed during the hospital encounter of 05/04/21   POC US Guidance Needle Placement    Impression    Transversus abdominis planes          ASSESSMENT  AND RECOMMENDATIONS:  1.  T4 N1 M0 adenocarcinoma of the sigmoid colon with perforation and invasion into the bladder with 1 of 48 lymph nodes involved, lymphovascular invasion was noted as was perineural invasion and an elevated preoperative CEA status post en bloc resection  2. History of hypertension  3. Diabetes      Plan      1. Labs from today pending.  2. See me in 3 months with labs.  3. Plan reimaging with CT CAP in December unless CEA rising or clinical symptoms.  4. She is due for a colonoscopy later this fall.  5. She remains at high risk of recurrence and I would have a low threshold to do imaging with symptoms.     Sarah Butler MD on 6/7/2021 at 5:21 PM

## 2021-06-07 NOTE — LETTER
6/7/2021         RE: Myla Glynn  [address]  [place] [address]        Dear Colleague,    Thank you for referring your patient, Myla Glynn, to the [facility]. Please see a copy of my visit note below.    [facility] PHYSICIANS  MEDICAL ONCOLOGY    RETURN PATIENT VISIT NOTE    Reason for visit: sigmoid colon cancer      Oncology Treatment Summary  1. 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 October 2020 presented to PCP with recurrent dysuria, hematuria, 10/17/2020 CT AP ws done again with colovesicular fistula with a mass seen invading from colon into bladder.  3. 11/16 colonoscopy at [facility] with reported mass at 40 cm, bx with adenocarcinoma, I am unable to find MMR  4. 11/23 CEA 9.7  5. 12/22/2020 MRI pelvis locally advanced disease with multiple enlarged regional lymph nodes  6. 12/22/2020 PET some uptake in mesenteric area, no distant disease  7. 1/5/21 left hemicolectomy, DOC, BSO, partial cystectomy for a T4N1M0 adenocarcinoma  8. Discussed adjuvant chemotherapy and she declined.   9. Colostomy take down spring 2021.     HISTORY OF PRESENTING ILLNESS  Patient is a pleasant 76-year-old who is seen today with her daughter for a follow-up visit. She is feeling quite well. She notes her colostomy take down went well and she is having bowl movements okay. Her appetite is good and she is maintaining her weight. No n/v/d/c. Overall she is quite pleased with her health.     PAST MEDICAL HISTORY  Gastroesophageal reflux disease, hypertension, type 2 diabetes      CURRENT OUTPATIENT MEDICATIONS  Current Outpatient Medications   Medication     acetaminophen (TYLENOL) 500 MG tablet     mirtazapine (REMERON SOL-TAB) 15 MG ODT     omeprazole (PRILOSEC) 20 MG DR capsule     traMADol (ULTRAM) 50 MG tablet     No current facility-administered medications for this visit.          ALLERGIES   No Known Allergies     SOCIAL HISTORY  She is originally from [facility].  She is a , she lives in [place] with her daughter no tobacco or alcohol use     FAMILY HISTORY  No malignancies     REVIEW OF SYSTEMS  Review Of Systems  Skin: negative  Eyes: negative  Ears/Nose/Throat: negative  Respiratory: No shortness of breath, dyspnea on exertion, cough, or hemoptysis  Cardiovascular: negative  Gastrointestinal: negative as per HPI  Genitourinary: negative  Musculoskeletal: negative  Neurologic: negative  Psychiatric: negative  Hematologic/Lymphatic/Immunologic: negative  Endocrine: negative      PHYSICAL EXAM  B/P: Data Unavailable, T: Data Unavailable, P: Data Unavailable, R: Data Unavailable  Wt Readings from Last 3 Encounters:   06/07/21 50.1 kg (110 lb 8 oz)   05/20/21 50.4 kg (111 lb 3.2 oz)   05/07/21 52.6 kg (115 lb 14.4 oz)       ECOG PPS 1-2    Physical Exam  HENT:      Head: Normocephalic and atraumatic.   Eyes:      Extraocular Movements: Extraocular movements intact.      Conjunctiva/sclera: Conjunctivae normal.      Pupils: Pupils are equal, round, and reactive to light.   Pulmonary:      Effort: Pulmonary effort is normal.   Neurological:      General: No focal deficit present.      Mental Status: She is alert and oriented to person, place, and time. Mental status is at baseline.   Psychiatric:         Mood and Affect: Mood normal.         Behavior: Behavior normal.         Thought Content: Thought content normal.         Judgment: Judgment normal.              LABORATORY AND IMAGING STUDIES    Recent Labs   Lab Test 06/07/21  1410 05/09/21  0653 05/06/21  0632 05/05/21  1146 05/04/21  2229 04/20/21  1017 02/18/21  0936    140 139  --   --  141 144   POTASSIUM 3.9 3.5 3.8 4.5 4.0 3.9 3.4   CHLORIDE 110* 112* 110*  --   --  116* 122*   CO2 22 23 21  --   --  18* 13*   ANIONGAP 8 6 8  --   --  7 9   BUN 25 24 33*  --   --  36* 39*   CR 1.30* 1.39* 1.62* 1.59*  --  1.57* 1.27*   GLC 90  101* 93  --   --  83 72   DICKSON 9.1 9.1 8.4*  --   --  9.3 7.2*     Recent Labs   Lab Test 05/09/21  0653 05/07/21  0714 05/06/21  0632 05/05/21  1146 05/04/21  2229 01/12/21  2234 01/10/21  0729 01/10/21  0729   MAG 2.5* 2.1 2.0 1.8 1.9 1.9   < > 1.6   PHOS  --   --  3.8 5.1* 5.0* 4.2  --  2.5    < > = values in this interval not displayed.     Recent Labs   Lab Test 06/07/21  1410 05/09/21  0653 05/08/21  0642 05/07/21  0714 05/06/21  0632 05/05/21  0833 04/20/21  1017 02/15/21  1321 01/11/21  0637 01/06/21  0454 01/06/21  0454 01/05/21  1836   WBC 5.9  --   --   --   --   --  5.4 7.1  --   --  13.7* 12.9*   HGB 10.5* 8.6* 8.5* 8.6* 9.9* 10.4* 12.5 11.9  --    < > 8.2* 9.1*     --   --   --   --  204 236 296 268  --  255 283   MCV 95  --   --   --   --   --  91 89  --   --  85 83   NEUTROPHIL 54.2  --   --   --   --   --  61.3 73.9  --   --   --   --     < > = values in this interval not displayed.     Recent Labs   Lab Test 06/07/21  1410 04/20/21  1017 02/15/21  1321   BILITOTAL 0.3 0.3 0.2   ALKPHOS 94 41 54   ALT 26 12 9   AST 20 9 6   ALBUMIN 3.6 3.7 3.5     TSH   Date Value Ref Range Status   02/23/2021 0.54 0.40 - 4.00 mU/L Final     Recent Labs   Lab Test 02/15/21  1321 11/23/20  1432   CEA 1.1 9.7*     Results for orders placed or performed during the hospital encounter of 05/04/21   POC US Guidance Needle Placement    Impression    Transversus abdominis planes          ASSESSMENT  AND RECOMMENDATIONS:  1.  T4 N1 M0 adenocarcinoma of the sigmoid colon with perforation and invasion into the bladder with 1 of 48 lymph nodes involved, lymphovascular invasion was noted as was perineural invasion and an elevated preoperative CEA status post en bloc resection  2. History of hypertension  3. Diabetes      Plan      1. Labs from today pending.  2. See me in 3 months with labs.  3. Plan reimaging with CT CAP in December unless CEA rising or clinical symptoms.  4. She is due for a colonoscopy later this  fall.  5. She remains at high risk of recurrence and I would have a low threshold to do imaging with symptoms.     Sarah Butler MD on 6/7/2021 at 5:21 PM            Again, thank you for allowing me to participate in the care of your patient.        Sincerely,        Sarah Butler MD

## 2021-06-07 NOTE — NURSING NOTE
Oncology Rooming Note    June 7, 2021 2:37 PM   Myla Glynn is a 76 year old female who presents for:    Chief Complaint   Patient presents with     Blood Draw     Labs drawn via  by RN in lab. VS taken     Oncology Clinic Visit     Pt is here for a rtn for Colon Cancer     Initial Vitals: Blood Pressure (Abnormal) 145/69   Pulse 59   Temperature 98.2  F (36.8  C) (Tympanic)   Respiration 16   Weight 50.1 kg (110 lb 8 oz)   Oxygen Saturation 100%   Body Mass Index 21.58 kg/m   Estimated body mass index is 21.58 kg/m  as calculated from the following:    Height as of 5/20/21: 1.524 m (5').    Weight as of this encounter: 50.1 kg (110 lb 8 oz). Body surface area is 1.46 meters squared.  No Pain (0) Comment: Data Unavailable   No LMP recorded. Patient has had a hysterectomy.  Allergies reviewed: Yes  Medications reviewed: Yes    Medications: Medication refills not needed today.  Pharmacy name entered into [employer]: [id number] - [place], [place] - [address] AT [facility] [address]    Clinical concerns: none       Desirae Schaefer MA

## 2021-06-08 NOTE — PROGRESS NOTES
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 Patient refused adjuvant chemotherapy. She is now s/p elective diverting loop ileostomy take down and extensive lysis of adhesions (90 minutes) on 2021.     INTERVAL HISTORY: Denies increased pain, fevers, or chills. Tolerating diet well. Having 1-2 BM's per day with some urgency but denies fecal incontinence. Off narcotic pain meds. She met with Dr. Butler last week with plan for CT CAP in December.     Physical Examination:  /59 (BP Location: Left arm, Patient Position: Sitting, Cuff Size: Adult Regular)   Pulse 62   Temp 98.4  F (36.9  C) (Oral)   Ht 5'   Wt 111 lb 9.6 oz   SpO2 100%   BMI 21.80 kg/m    Abdomen soft, nontender. Incisions with no evidence of infection or hernia.    ASSESSMENT  Doing well postop.    Pathology:  FINAL DIAGNOSIS:   A. RIGHT ADNEXA, RIGHT SALPINGO-OOPHORECTOMY:   - Atrophic ovary   - Fallopian tube with paratubal cyst   - Negative for malignancy     B. SIGMOID COLON, UTERUS WITH CERVIX, LEFT ADNEXA, DOME OF BLADDER,   PARTIAL CYSTECTOMYEN BLOC WITH   SIGMOIDECTOMY ANDTOTAL ABDOMINAL HYSTERECTOMY   WITHLEFTSALPINGO-OOPHORECTOMY:   - Invasive adenocarcinoma, moderately differentiated, 8.6 cm in greatest   dimension   - Tumor invades the urinary bladder wall and bladder mucosa   - Tumor with adjacent inflammation/abscess adheres to anterior uterine   wall without microscopic involvement of   the myometrium by carcinoma   - All resection margins (bladder margin, proximal, distal and mesenteric)    are negative   - Lymphovascular and perineural invasion is identified   - Colon with a tubular adenoma, 0.6 cm   - Bladder wall with chronic inflammation, fibrosis and adhesions   - Portion of the ureter with no significant histologic abnormality   - Benign cervix with nabothian cysts   - Atrophic endometrium with a submucosal leiomyoma (1.2 cm)   - Benign atrophic ovary   - Fallopian tube with no significant histologic abnormality   - One of forty eight lymph nodes positive for carcinoma (1/48)   - See synoptic report for details     C. ANASTOMOSIS RING, EXCISION:   - Benign colonic mucosa   - Negative for malignancy     D. APPENDIX, APPENDECTOMY:   - Appendix with obliteration (neuroma of tip), serositis and adhesions   - Negative for malignancy     Results for EUGENIO CHEN (MRN [id number]) as of 6/14/2021 13:24   Ref. Range 11/23/2020 14:32 2/15/2021 13:21 6/7/2021 14:10   CEA Latest Ref Range: 0 - 2.5 ug/L 9.7 (H) 1.1 0.7     PLAN  1.  Regular diet.  2.  No activity restrictions.  3.  Surveillance per medical oncology. Colonoscopy with me in December 2021.    30 minutes spent on the date of the encounter doing chart review, history and exam, documentation and further activities as noted above.    Remi Moscoso M.D., M.Sc.     Division of Colon and Rectal Surgery  [facility]    Referring Provider:  Sarah Butler MD      Primary Care Provider:  Jami Bundy     CC:  Ramu Jackson MD

## 2021-06-14 ENCOUNTER — OFFICE VISIT (OUTPATIENT)
Dept: SURGERY | Facility: CLINIC | Age: 76
End: 2021-06-14
Payer: COMMERCIAL

## 2021-06-14 VITALS
TEMPERATURE: 98.4 F | OXYGEN SATURATION: 100 % | WEIGHT: 111.6 LBS | DIASTOLIC BLOOD PRESSURE: 59 MMHG | HEART RATE: 62 BPM | BODY MASS INDEX: 21.91 KG/M2 | HEIGHT: 60 IN | SYSTOLIC BLOOD PRESSURE: 133 MMHG

## 2021-06-14 DIAGNOSIS — Z09 FOLLOW-UP EXAMINATION FOLLOWING SURGERY: Primary | ICD-10-CM

## 2021-06-14 PROCEDURE — 99024 POSTOP FOLLOW-UP VISIT: CPT | Performed by: COLON & RECTAL SURGERY

## 2021-06-14 ASSESSMENT — PAIN SCALES - GENERAL: PAINLEVEL: NO PAIN (0)

## 2021-06-14 ASSESSMENT — MIFFLIN-ST. JEOR: SCORE: 927.71

## 2021-06-14 NOTE — LETTER
2021       RE: Myla Glynn  [address]  [place] [address]     Dear Colleague,    Thank you for referring your patient, Myla Glynn, to the [facility] COLON AND RECTAL SURGERY CLINIC [place] at [facility]. Please see a copy of my visit note below.    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 Patient refused adjuvant chemotherapy. She is now s/p elective diverting loop ileostomy take down and extensive lysis of adhesions (90 minutes) on 2021.     INTERVAL HISTORY: Denies increased pain, fevers, or chills. Tolerating diet well. Having 1-2 BM's per day with some urgency but denies fecal incontinence. Off narcotic pain meds. She met with Dr. Butler last week with plan for CT CAP in December.     Physical Examination:  /59 (BP Location: Left arm, Patient Position: Sitting, Cuff Size: Adult Regular)   Pulse 62   Temp 98.4  F (36.9  C) (Oral)   Ht 5'   Wt 111 lb 9.6 oz   SpO2 100%   BMI 21.80 kg/m    Abdomen soft, nontender. Incisions with no evidence of infection or hernia.    ASSESSMENT  Doing well postop.    Pathology:  FINAL DIAGNOSIS:   A. RIGHT ADNEXA, RIGHT SALPINGO-OOPHORECTOMY:   - Atrophic ovary   - Fallopian tube with paratubal cyst   - Negative for malignancy     B. SIGMOID COLON, UTERUS WITH CERVIX, LEFT ADNEXA, DOME OF BLADDER,   PARTIAL CYSTECTOMYEN BLOC WITH   SIGMOIDECTOMY ANDTOTAL ABDOMINAL HYSTERECTOMY   WITHLEFTSALPINGO-OOPHORECTOMY:   - Invasive  adenocarcinoma, moderately differentiated, 8.6 cm in greatest   dimension   - Tumor invades the urinary bladder wall and bladder mucosa   - Tumor with adjacent inflammation/abscess adheres to anterior uterine   wall without microscopic involvement of   the myometrium by carcinoma   - All resection margins (bladder margin, proximal, distal and mesenteric)   are negative   - Lymphovascular and perineural invasion is identified   - Colon with a tubular adenoma, 0.6 cm   - Bladder wall with chronic inflammation, fibrosis and adhesions   - Portion of the ureter with no significant histologic abnormality   - Benign cervix with nabothian cysts   - Atrophic endometrium with a submucosal leiomyoma (1.2 cm)   - Benign atrophic ovary   - Fallopian tube with no significant histologic abnormality   - One of forty eight lymph nodes positive for carcinoma (1/48)   - See synoptic report for details     C. ANASTOMOSIS RING, EXCISION:   - Benign colonic mucosa   - Negative for malignancy     D. APPENDIX, APPENDECTOMY:   - Appendix with obliteration (neuroma of tip), serositis and adhesions   - Negative for malignancy     Results for EUGENIO CHEN (MRN [id number]) as of 6/14/2021 13:24   Ref. Range 11/23/2020 14:32 2/15/2021 13:21 6/7/2021 14:10   CEA Latest Ref Range: 0 - 2.5 ug/L 9.7 (H) 1.1 0.7     PLAN  1.  Regular diet.  2.  No activity restrictions.  3.  Surveillance per medical oncology. Colonoscopy with me in December 2021.    30 minutes spent on the date of the encounter doing chart review, history and exam, documentation and further activities as noted above.    Remi Moscoso M.D., M.Sc.     Division of Colon and Rectal Surgery  [facility]    Referring Provider:  Sarah Butler MD      Primary Care Provider:  Jami Bundy     CC:  Ramu Jackson MD       Again, thank you for allowing me to  participate in the care of your patient.      Sincerely,    Remi Moscoso MD

## 2021-06-14 NOTE — NURSING NOTE
Chief Complaint   Patient presents with     Surgical Followup     Post-op follow up, DOS:05/04/2021       Vitals:    06/14/21 1533   BP: 133/59   BP Location: Left arm   Patient Position: Sitting   Cuff Size: Adult Regular   Pulse: 62   Temp: 98.4  F (36.9  C)   TempSrc: Oral   SpO2: 100%   Weight: 111 lb 9.6 oz   Height: 5'       Body mass index is 21.8 kg/m .       Rachael Nickerson, CMA

## 2021-09-13 ENCOUNTER — ONCOLOGY VISIT (OUTPATIENT)
Dept: ONCOLOGY | Facility: CLINIC | Age: 76
End: 2021-09-13
Attending: INTERNAL MEDICINE
Payer: COMMERCIAL

## 2021-09-13 ENCOUNTER — APPOINTMENT (OUTPATIENT)
Dept: LAB | Facility: CLINIC | Age: 76
End: 2021-09-13
Attending: INTERNAL MEDICINE
Payer: COMMERCIAL

## 2021-09-13 VITALS
RESPIRATION RATE: 16 BRPM | TEMPERATURE: 98.3 F | BODY MASS INDEX: 24.16 KG/M2 | DIASTOLIC BLOOD PRESSURE: 65 MMHG | WEIGHT: 123.7 LBS | OXYGEN SATURATION: 100 % | SYSTOLIC BLOOD PRESSURE: 136 MMHG | HEART RATE: 64 BPM

## 2021-09-13 DIAGNOSIS — C18.7 MALIGNANT NEOPLASM OF SIGMOID COLON (H): ICD-10-CM

## 2021-09-13 LAB
ALBUMIN SERPL-MCNC: 3.4 G/DL (ref 3.4–5)
ALP SERPL-CCNC: 57 U/L (ref 40–150)
ALT SERPL W P-5'-P-CCNC: 14 U/L (ref 0–50)
ANION GAP SERPL CALCULATED.3IONS-SCNC: 9 MMOL/L (ref 3–14)
AST SERPL W P-5'-P-CCNC: 13 U/L (ref 0–45)
BASOPHILS # BLD AUTO: 0 10E3/UL (ref 0–0.2)
BASOPHILS NFR BLD AUTO: 0 %
BILIRUB SERPL-MCNC: 0.2 MG/DL (ref 0.2–1.3)
BUN SERPL-MCNC: 30 MG/DL (ref 7–30)
CALCIUM SERPL-MCNC: 8.8 MG/DL (ref 8.5–10.1)
CEA SERPL-MCNC: 0.7 UG/L (ref 0–2.5)
CHLORIDE BLD-SCNC: 113 MMOL/L (ref 94–109)
CO2 SERPL-SCNC: 22 MMOL/L (ref 20–32)
CREAT SERPL-MCNC: 1.26 MG/DL (ref 0.52–1.04)
EOSINOPHIL # BLD AUTO: 0.2 10E3/UL (ref 0–0.7)
EOSINOPHIL NFR BLD AUTO: 3 %
ERYTHROCYTE [DISTWIDTH] IN BLOOD BY AUTOMATED COUNT: 12.4 % (ref 10–15)
GFR SERPL CREATININE-BSD FRML MDRD: 42 ML/MIN/1.73M2
GLUCOSE BLD-MCNC: 105 MG/DL (ref 70–99)
HCT VFR BLD AUTO: 36.7 % (ref 35–47)
HGB BLD-MCNC: 11.7 G/DL (ref 11.7–15.7)
IMM GRANULOCYTES # BLD: 0 10E3/UL
IMM GRANULOCYTES NFR BLD: 0 %
LYMPHOCYTES # BLD AUTO: 1.4 10E3/UL (ref 0.8–5.3)
LYMPHOCYTES NFR BLD AUTO: 24 %
MCH RBC QN AUTO: 29.4 PG (ref 26.5–33)
MCHC RBC AUTO-ENTMCNC: 31.9 G/DL (ref 31.5–36.5)
MCV RBC AUTO: 92 FL (ref 78–100)
MONOCYTES # BLD AUTO: 0.4 10E3/UL (ref 0–1.3)
MONOCYTES NFR BLD AUTO: 6 %
NEUTROPHILS # BLD AUTO: 3.9 10E3/UL (ref 1.6–8.3)
NEUTROPHILS NFR BLD AUTO: 67 %
NRBC # BLD AUTO: 0 10E3/UL
NRBC BLD AUTO-RTO: 0 /100
PLATELET # BLD AUTO: 205 10E3/UL (ref 150–450)
POTASSIUM BLD-SCNC: 3.9 MMOL/L (ref 3.4–5.3)
PROT SERPL-MCNC: 7.4 G/DL (ref 6.8–8.8)
RBC # BLD AUTO: 3.98 10E6/UL (ref 3.8–5.2)
SODIUM SERPL-SCNC: 144 MMOL/L (ref 133–144)
WBC # BLD AUTO: 5.9 10E3/UL (ref 4–11)

## 2021-09-13 PROCEDURE — 82378 CARCINOEMBRYONIC ANTIGEN: CPT | Performed by: INTERNAL MEDICINE

## 2021-09-13 PROCEDURE — 80053 COMPREHEN METABOLIC PANEL: CPT | Performed by: INTERNAL MEDICINE

## 2021-09-13 PROCEDURE — G0463 HOSPITAL OUTPT CLINIC VISIT: HCPCS

## 2021-09-13 PROCEDURE — 99213 OFFICE O/P EST LOW 20 MIN: CPT | Performed by: INTERNAL MEDICINE

## 2021-09-13 PROCEDURE — 85025 COMPLETE CBC W/AUTO DIFF WBC: CPT | Performed by: INTERNAL MEDICINE

## 2021-09-13 PROCEDURE — 36415 COLL VENOUS BLD VENIPUNCTURE: CPT | Performed by: INTERNAL MEDICINE

## 2021-09-13 ASSESSMENT — PAIN SCALES - GENERAL: PAINLEVEL: NO PAIN (0)

## 2021-09-13 NOTE — NURSING NOTE
Chief Complaint   Patient presents with     Blood Draw     Labs drawn via  by RN in lab. VS taken.      Labs collected from venipuncture by RN. Vitals taken. Checked in for appointment(s).    Karyn Vargas RN

## 2021-09-13 NOTE — NURSING NOTE
Oncology Rooming Note    September 13, 2021 2:07 PM   Myla Glynn is a 76 year old female who presents for:    Chief Complaint   Patient presents with     Blood Draw     Labs drawn via  by RN in lab. VS taken.      Oncology Clinic Visit     maolignant neoplasm of colon     Initial Vitals: /65 (BP Location: Right arm, Patient Position: Sitting, Cuff Size: Adult Regular)   Pulse 64   Temp 98.3  F (36.8  C) (Oral)   Resp 16   Wt 56.1 kg (123 lb 11.2 oz)   SpO2 100%   BMI 24.16 kg/m   Estimated body mass index is 24.16 kg/m  as calculated from the following:    Height as of 6/14/21: 1.524 m (5').    Weight as of this encounter: 56.1 kg (123 lb 11.2 oz). Body surface area is 1.54 meters squared.  No Pain (0) Comment: Data Unavailable   No LMP recorded. Patient has had a hysterectomy.  Allergies reviewed: Yes  Medications reviewed: Yes    Medications: Medication refills not needed today.  Pharmacy name entered into [facility]: [id number] - [place], [place] - [address] AT [facility] [address]    Clinical concerns: none       Shruthi Burrows CMA

## 2021-09-13 NOTE — PROGRESS NOTES
[facility] PHYSICIANS  MEDICAL ONCOLOGY    RETURN PATIENT VISIT NOTE    Reason for visit: sigmoid colon cancer      Oncology Treatment Summary  1. 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 October 2020 presented to PCP with recurrent dysuria, hematuria, 10/17/2020 CT AP ws done again with colovesicular fistula with a mass seen invading from colon into bladder.  3. 11/16 colonoscopy at [facility] with reported mass at 40 cm, bx with adenocarcinoma, I am unable to find MMR  4. 11/23 CEA 9.7  5. 12/22/2020 MRI pelvis locally advanced disease with multiple enlarged regional lymph nodes  6. 12/22/2020 PET some uptake in mesenteric area, no distant disease  7. 1/5/21 left hemicolectomy, DOC, BSO, partial cystectomy for a T4N1M0 adenocarcinoma  8. Discussed adjuvant chemotherapy and she declined.   9. Colostomy take down spring 2021.     HISTORY OF PRESENTING ILLNESS  Patient is a pleasant 76-year-old who is seen today with her daughter for a follow-up visit.  She continues to do well. She is feeling well and enthusiastically tells me today. Eating well. No n/c/v/d. No pain. Her colostomy take down still works well. No other issues.     PAST MEDICAL HISTORY  Gastroesophageal reflux disease, hypertension, type 2 diabetes      CURRENT OUTPATIENT MEDICATIONS  Current Outpatient Medications   Medication     acetaminophen (TYLENOL) 500 MG tablet     omeprazole (PRILOSEC) 20 MG DR capsule     mirtazapine (REMERON SOL-TAB) 15 MG ODT     traMADol (ULTRAM) 50 MG tablet     No current facility-administered medications for this visit.        ALLERGIES   No Known Allergies     SOCIAL HISTORY  She is originally from [place].  She is a , she lives in [place] with her daughter no tobacco or alcohol use     FAMILY HISTORY  No malignancies     REVIEW OF SYSTEMS  Review Of Systems  Skin: negative  Eyes: negative  Ears/Nose/Throat: negative  Respiratory: No  shortness of breath, dyspnea on exertion, cough, or hemoptysis  Cardiovascular: negative  Gastrointestinal: negative as per HPI  Genitourinary: negative  Musculoskeletal: negative  Neurologic: negative  Psychiatric: negative  Hematologic/Lymphatic/Immunologic: negative  Endocrine: negative      PHYSICAL EXAM  B/P: Data Unavailable, T: Data Unavailable, P: Data Unavailable, R: Data Unavailable  Wt Readings from Last 3 Encounters:   09/13/21 56.1 kg (123 lb 11.2 oz)   06/14/21 50.6 kg (111 lb 9.6 oz)   06/07/21 50.1 kg (110 lb 8 oz)       ECOG PPS 0    Physical Exam  HENT:      Head: Normocephalic and atraumatic.   Eyes:      Extraocular Movements: Extraocular movements intact.      Conjunctiva/sclera: Conjunctivae normal.      Pupils: Pupils are equal, round, and reactive to light.   Pulmonary:      Effort: Pulmonary effort is normal.   Neurological:      General: No focal deficit present.      Mental Status: She is alert and oriented to person, place, and time. Mental status is at baseline.   Psychiatric:         Mood and Affect: Mood normal.         Behavior: Behavior normal.         Thought Content: Thought content normal.         Judgment: Judgment normal.              LABORATORY AND IMAGING STUDIES    Recent Labs   Lab Test 09/13/21  1351 06/07/21  1410 05/09/21  0653 05/06/21  0632 05/05/21  1146 04/20/21  1017    141 140 139  --  141   POTASSIUM 3.9 3.9 3.5 3.8 4.5 3.9   CHLORIDE 113* 110* 112* 110*  --  116*   CO2 22 22 23 21  --  18*   ANIONGAP 9 8 6 8  --  7   BUN 30 25 24 33*  --  36*   CR 1.26* 1.30* 1.39* 1.62* 1.59* 1.57*   * 90 101* 93  --  83   DICKSON 8.8 9.1 9.1 8.4*  --  9.3     Recent Labs   Lab Test 05/09/21  0653 05/07/21  0714 05/06/21  0632 05/05/21  1146 05/04/21  2229 01/12/21  2234 01/10/21  0729   MAG 2.5* 2.1 2.0 1.8 1.9 1.9 1.6   PHOS  --   --  3.8 5.1* 5.0* 4.2 2.5     Recent Labs   Lab Test 09/13/21  1351 06/07/21  1410 05/09/21  0653 05/08/21  0642 05/07/21  0714  05/05/21  0833 04/20/21  1017 02/15/21  1321 01/07/21  0436 01/06/21  0454   WBC 5.9 5.9  --   --   --   --  5.4 7.1  --  13.7*   HGB 11.7 10.5* 8.6* 8.5* 8.6* 10.4* 12.5 11.9  --  8.2*    216  --   --   --  204 236 296   < > 255   MCV 92 95  --   --   --   --  91 89  --  85   NEUTROPHIL 67 54.2  --   --   --   --  61.3 73.9  --   --     < > = values in this interval not displayed.     Recent Labs   Lab Test 09/13/21  1351 06/07/21  1410 04/20/21  1017   BILITOTAL 0.2 0.3 0.3   ALKPHOS 57 94 41   ALT 14 26 12   AST 13 20 9   ALBUMIN 3.4 3.6 3.7     TSH   Date Value Ref Range Status   02/23/2021 0.54 0.40 - 4.00 mU/L Final     Recent Labs   Lab Test 06/07/21  1410 02/15/21  1321 11/23/20  1432   CEA 0.7 1.1 9.7*     Results for orders placed or performed during the hospital encounter of 05/04/21   POC US Guidance Needle Placement    Impression    Transversus abdominis planes          ASSESSMENT  AND RECOMMENDATIONS:  1.  T4 N1 M0 adenocarcinoma of the sigmoid colon with perforation and invasion into the bladder with 1 of 48 lymph nodes involved, lymphovascular invasion was noted as was perineural invasion and an elevated preoperative CEA status post en bloc resection  2. History of hypertension  3. Diabetes      Plan      1. Labs from today so far good, CEA pendnig  2. Due for annual CT CAP in oct/nov  3. See me in 3 months with labs  4. See Dr Moscoso in December for colonoscopy.    Sarah Butler MD on 9/13/2021 at 3:59 PM

## 2021-09-13 NOTE — PATIENT INSTRUCTIONS
Blood work all looks good.    CT scan in October or Novembers, see Dr Butler in December with blood work.    See Dr Moscoso (surgeon) in December for colonoscopy.

## 2021-09-13 NOTE — LETTER
9/13/2021         RE: Myla Glynn  [address]  [place] [address]        Dear Colleague,    Thank you for referring your patient, Myla Glynn, to the [facility] CANCER CLINIC. Please see a copy of my visit note below.    [facility] PHYSICIANS  MEDICAL ONCOLOGY    RETURN PATIENT VISIT NOTE    Reason for visit: sigmoid colon cancer      Oncology Treatment Summary  1. 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 October 2020 presented to PCP with recurrent dysuria, hematuria, 10/17/2020 CT AP ws done again with colovesicular fistula with a mass seen invading from colon into bladder.  3. 11/16 colonoscopy at [facility] with reported mass at 40 cm, bx with adenocarcinoma, I am unable to find MMR  4. 11/23 CEA 9.7  5. 12/22/2020 MRI pelvis locally advanced disease with multiple enlarged regional lymph nodes  6. 12/22/2020 PET some uptake in mesenteric area, no distant disease  7. 1/5/21 left hemicolectomy, DOC, BSO, partial cystectomy for a T4N1M0 adenocarcinoma  8. Discussed adjuvant chemotherapy and she declined.   9. Colostomy take down spring 2021.     HISTORY OF PRESENTING ILLNESS  Patient is a pleasant 76-year-old who is seen today with her daughter for a follow-up visit.  She continues to do well. She is feeling well and enthusiastically tells me today. Eating well. No n/c/v/d. No pain. Her colostomy take down still works well. No other issues.     PAST MEDICAL HISTORY  Gastroesophageal reflux disease, hypertension, type 2 diabetes      CURRENT OUTPATIENT MEDICATIONS  Current Outpatient Medications   Medication     acetaminophen (TYLENOL) 500 MG tablet     omeprazole (PRILOSEC) 20 MG DR capsule     mirtazapine (REMERON SOL-TAB) 15 MG ODT     traMADol (ULTRAM) 50 MG tablet     No current facility-administered medications for this visit.        ALLERGIES   No Known Allergies     SOCIAL  HISTORY  She is originally from [place].  She is a , she lives in [place] with her daughter no tobacco or alcohol use     FAMILY HISTORY  No malignancies     REVIEW OF SYSTEMS  Review Of Systems  Skin: negative  Eyes: negative  Ears/Nose/Throat: negative  Respiratory: No shortness of breath, dyspnea on exertion, cough, or hemoptysis  Cardiovascular: negative  Gastrointestinal: negative as per HPI  Genitourinary: negative  Musculoskeletal: negative  Neurologic: negative  Psychiatric: negative  Hematologic/Lymphatic/Immunologic: negative  Endocrine: negative      PHYSICAL EXAM  B/P: Data Unavailable, T: Data Unavailable, P: Data Unavailable, R: Data Unavailable  Wt Readings from Last 3 Encounters:   09/13/21 56.1 kg (123 lb 11.2 oz)   06/14/21 50.6 kg (111 lb 9.6 oz)   06/07/21 50.1 kg (110 lb 8 oz)       ECOG PPS 0    Physical Exam  HENT:      Head: Normocephalic and atraumatic.   Eyes:      Extraocular Movements: Extraocular movements intact.      Conjunctiva/sclera: Conjunctivae normal.      Pupils: Pupils are equal, round, and reactive to light.   Pulmonary:      Effort: Pulmonary effort is normal.   Neurological:      General: No focal deficit present.      Mental Status: She is alert and oriented to person, place, and time. Mental status is at baseline.   Psychiatric:         Mood and Affect: Mood normal.         Behavior: Behavior normal.         Thought Content: Thought content normal.         Judgment: Judgment normal.              LABORATORY AND IMAGING STUDIES    Recent Labs   Lab Test 09/13/21  1351 06/07/21  1410 05/09/21  0653 05/06/21  0632 05/05/21  1146 04/20/21  1017    141 140 139  --  141   POTASSIUM 3.9 3.9 3.5 3.8 4.5 3.9   CHLORIDE 113* 110* 112* 110*  --  116*   CO2 22 22 23 21  --  18*   ANIONGAP 9 8 6 8  --  7   BUN 30 25 24 33*  --  36*   CR 1.26* 1.30* 1.39* 1.62* 1.59* 1.57*   * 90 101* 93  --  83   DICKSON 8.8 9.1 9.1 8.4*  --  9.3     Recent Labs   Lab Test 05/09/21  0653  05/07/21  0714 05/06/21  0632 05/05/21  1146 05/04/21  2229 01/12/21  2234 01/10/21  0729   MAG 2.5* 2.1 2.0 1.8 1.9 1.9 1.6   PHOS  --   --  3.8 5.1* 5.0* 4.2 2.5     Recent Labs   Lab Test 09/13/21  1351 06/07/21  1410 05/09/21  0653 05/08/21  0642 05/07/21  0714 05/05/21  0833 04/20/21  1017 02/15/21  1321 01/07/21  0436 01/06/21  0454   WBC 5.9 5.9  --   --   --   --  5.4 7.1  --  13.7*   HGB 11.7 10.5* 8.6* 8.5* 8.6* 10.4* 12.5 11.9  --  8.2*    216  --   --   --  204 236 296   < > 255   MCV 92 95  --   --   --   --  91 89  --  85   NEUTROPHIL 67 54.2  --   --   --   --  61.3 73.9  --   --     < > = values in this interval not displayed.     Recent Labs   Lab Test 09/13/21  1351 06/07/21  1410 04/20/21  1017   BILITOTAL 0.2 0.3 0.3   ALKPHOS 57 94 41   ALT 14 26 12   AST 13 20 9   ALBUMIN 3.4 3.6 3.7     TSH   Date Value Ref Range Status   02/23/2021 0.54 0.40 - 4.00 mU/L Final     Recent Labs   Lab Test 06/07/21  1410 02/15/21  1321 11/23/20  1432   CEA 0.7 1.1 9.7*     Results for orders placed or performed during the hospital encounter of 05/04/21   POC US Guidance Needle Placement    Impression    Transversus abdominis planes          ASSESSMENT  AND RECOMMENDATIONS:  1.  T4 N1 M0 adenocarcinoma of the sigmoid colon with perforation and invasion into the bladder with 1 of 48 lymph nodes involved, lymphovascular invasion was noted as was perineural invasion and an elevated preoperative CEA status post en bloc resection  2. History of hypertension  3. Diabetes      Plan      1. Labs from today so far good, CEA pendnig  2. Due for annual CT CAP in oct/nov  3. See me in 3 months with labs  4. See Dr Moscoso in December for colonoscopy.    Sarah Butler MD on 9/13/2021 at 3:59 PM        Again, thank you for allowing me to participate in the care of your patient.        Sincerely,        Sarah Butler MD

## 2021-11-17 ENCOUNTER — ANCILLARY PROCEDURE (OUTPATIENT)
Dept: CT IMAGING | Facility: CLINIC | Age: 76
End: 2021-11-17
Attending: INTERNAL MEDICINE
Payer: COMMERCIAL

## 2021-11-17 DIAGNOSIS — C18.7 MALIGNANT NEOPLASM OF SIGMOID COLON (H): ICD-10-CM

## 2021-11-17 LAB
CREAT BLD-MCNC: 1.4 MG/DL (ref 0.5–1)
GFR SERPL CREATININE-BSD FRML MDRD: 37 ML/MIN/1.73M2

## 2021-11-17 PROCEDURE — 71260 CT THORAX DX C+: CPT | Mod: GC | Performed by: RADIOLOGY

## 2021-11-17 PROCEDURE — 74177 CT ABD & PELVIS W/CONTRAST: CPT | Mod: GC | Performed by: RADIOLOGY

## 2021-11-17 RX ORDER — IOPAMIDOL 755 MG/ML
76 INJECTION, SOLUTION INTRAVASCULAR ONCE
Status: COMPLETED | OUTPATIENT
Start: 2021-11-17 | End: 2021-11-17

## 2021-11-17 RX ADMIN — IOPAMIDOL 76 ML: 755 INJECTION, SOLUTION INTRAVASCULAR at 13:50

## 2021-12-13 ENCOUNTER — ONCOLOGY VISIT (OUTPATIENT)
Dept: ONCOLOGY | Facility: CLINIC | Age: 76
End: 2021-12-13
Attending: INTERNAL MEDICINE
Payer: COMMERCIAL

## 2021-12-13 ENCOUNTER — APPOINTMENT (OUTPATIENT)
Dept: LAB | Facility: CLINIC | Age: 76
End: 2021-12-13
Attending: INTERNAL MEDICINE
Payer: COMMERCIAL

## 2021-12-13 VITALS
BODY MASS INDEX: 25.49 KG/M2 | SYSTOLIC BLOOD PRESSURE: 155 MMHG | OXYGEN SATURATION: 100 % | HEART RATE: 65 BPM | RESPIRATION RATE: 16 BRPM | WEIGHT: 130.5 LBS | TEMPERATURE: 98.1 F | DIASTOLIC BLOOD PRESSURE: 79 MMHG

## 2021-12-13 DIAGNOSIS — C18.7 MALIGNANT NEOPLASM OF SIGMOID COLON (H): ICD-10-CM

## 2021-12-13 DIAGNOSIS — E04.1 THYROID NODULE: Primary | ICD-10-CM

## 2021-12-13 LAB
ALBUMIN SERPL-MCNC: 3.5 G/DL (ref 3.4–5)
ALP SERPL-CCNC: 70 U/L (ref 40–150)
ALT SERPL W P-5'-P-CCNC: 14 U/L (ref 0–50)
ANION GAP SERPL CALCULATED.3IONS-SCNC: 8 MMOL/L (ref 3–14)
AST SERPL W P-5'-P-CCNC: 14 U/L (ref 0–45)
BASOPHILS # BLD AUTO: 0 10E3/UL (ref 0–0.2)
BASOPHILS NFR BLD AUTO: 1 %
BILIRUB SERPL-MCNC: 0.4 MG/DL (ref 0.2–1.3)
BUN SERPL-MCNC: 27 MG/DL (ref 7–30)
CALCIUM SERPL-MCNC: 8.7 MG/DL (ref 8.5–10.1)
CEA SERPL-MCNC: 1.1 UG/L (ref 0–2.5)
CHLORIDE BLD-SCNC: 109 MMOL/L (ref 94–109)
CO2 SERPL-SCNC: 25 MMOL/L (ref 20–32)
CREAT SERPL-MCNC: 1.21 MG/DL (ref 0.52–1.04)
EOSINOPHIL # BLD AUTO: 0.2 10E3/UL (ref 0–0.7)
EOSINOPHIL NFR BLD AUTO: 4 %
ERYTHROCYTE [DISTWIDTH] IN BLOOD BY AUTOMATED COUNT: 12.2 % (ref 10–15)
GFR SERPL CREATININE-BSD FRML MDRD: 44 ML/MIN/1.73M2
GLUCOSE BLD-MCNC: 98 MG/DL (ref 70–99)
HCT VFR BLD AUTO: 37.9 % (ref 35–47)
HGB BLD-MCNC: 12.3 G/DL (ref 11.7–15.7)
IMM GRANULOCYTES # BLD: 0 10E3/UL
IMM GRANULOCYTES NFR BLD: 0 %
LYMPHOCYTES # BLD AUTO: 1.4 10E3/UL (ref 0.8–5.3)
LYMPHOCYTES NFR BLD AUTO: 22 %
MCH RBC QN AUTO: 29.6 PG (ref 26.5–33)
MCHC RBC AUTO-ENTMCNC: 32.5 G/DL (ref 31.5–36.5)
MCV RBC AUTO: 91 FL (ref 78–100)
MONOCYTES # BLD AUTO: 0.5 10E3/UL (ref 0–1.3)
MONOCYTES NFR BLD AUTO: 8 %
NEUTROPHILS # BLD AUTO: 4.1 10E3/UL (ref 1.6–8.3)
NEUTROPHILS NFR BLD AUTO: 65 %
NRBC # BLD AUTO: 0 10E3/UL
NRBC BLD AUTO-RTO: 0 /100
PLATELET # BLD AUTO: 185 10E3/UL (ref 150–450)
POTASSIUM BLD-SCNC: 4.2 MMOL/L (ref 3.4–5.3)
PROT SERPL-MCNC: 7.4 G/DL (ref 6.8–8.8)
RBC # BLD AUTO: 4.15 10E6/UL (ref 3.8–5.2)
SODIUM SERPL-SCNC: 142 MMOL/L (ref 133–144)
WBC # BLD AUTO: 6.3 10E3/UL (ref 4–11)

## 2021-12-13 PROCEDURE — 82378 CARCINOEMBRYONIC ANTIGEN: CPT | Performed by: INTERNAL MEDICINE

## 2021-12-13 PROCEDURE — 85025 COMPLETE CBC W/AUTO DIFF WBC: CPT | Performed by: INTERNAL MEDICINE

## 2021-12-13 PROCEDURE — 82040 ASSAY OF SERUM ALBUMIN: CPT | Performed by: INTERNAL MEDICINE

## 2021-12-13 PROCEDURE — 99213 OFFICE O/P EST LOW 20 MIN: CPT | Performed by: INTERNAL MEDICINE

## 2021-12-13 PROCEDURE — 36415 COLL VENOUS BLD VENIPUNCTURE: CPT | Performed by: INTERNAL MEDICINE

## 2021-12-13 PROCEDURE — 82374 ASSAY BLOOD CARBON DIOXIDE: CPT | Performed by: INTERNAL MEDICINE

## 2021-12-13 PROCEDURE — G0463 HOSPITAL OUTPT CLINIC VISIT: HCPCS

## 2021-12-13 ASSESSMENT — PAIN SCALES - GENERAL: PAINLEVEL: MILD PAIN (2)

## 2021-12-13 NOTE — LETTER
12/13/2021         RE: Myla Glynn  [address]  [place] [address]        Dear Colleague,    Thank you for referring your patient, Myla Glynn, to the [facility]. Please see a copy of my visit note below.    [facility]  MEDICAL ONCOLOGY    RETURN PATIENT VISIT NOTE    Reason for visit: sigmoid colon cancer    Oncology Treatment Summary  1. 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 October 2020 presented to PCP with recurrent dysuria, hematuria, 10/17/2020 CT AP ws done again with colovesicular fistula with a mass seen invading from colon into bladder.  3. 11/16 colonoscopy at [facility] with reported mass at 40 cm, bx with adenocarcinoma, I am unable to find MMR  4. 11/23 CEA 9.7  5. 12/22/2020 MRI pelvis locally advanced disease with multiple enlarged regional lymph nodes  6. 12/22/2020 PET some uptake in mesenteric area, no distant disease  7. 1/5/21 left hemicolectomy, DOC, BSO, partial cystectomy for a T4N1M0 adenocarcinoma  8. Discussed adjuvant chemotherapy and she declined.   9. Colostomy take down spring 2021.     HISTORY OF PRESENTING ILLNESS  Patient is a pleasant 76-year-old who is seen today with her daughter for a follow-up visit.  She is doing well. She is eating well and has gained weight. No pain, no n/v/d/c. Her daughter is with her today.     PAST MEDICAL HISTORY  Gastroesophageal reflux disease, hypertension, type 2 diabetes      CURRENT OUTPATIENT MEDICATIONS  Current Outpatient Medications   Medication     acetaminophen (TYLENOL) 500 MG tablet     mirtazapine (REMERON SOL-TAB) 15 MG ODT     omeprazole (PRILOSEC) 20 MG DR capsule     traMADol (ULTRAM) 50 MG tablet     No current facility-administered medications for this visit.        ALLERGIES   No Known Allergies     SOCIAL HISTORY  She is originally from [place].  She is a , she lives  in [place] with her daughter no tobacco or alcohol use     FAMILY HISTORY  No malignancies     REVIEW OF SYSTEMS  Review Of Systems  Skin: negative  Eyes: negative  Ears/Nose/Throat: negative  Respiratory: No shortness of breath, dyspnea on exertion, cough, or hemoptysis  Cardiovascular: negative  Gastrointestinal: negative as per HPI  Genitourinary: negative  Musculoskeletal: negative  Neurologic: negative  Psychiatric: negative  Hematologic/Lymphatic/Immunologic: negative  Endocrine: negative      PHYSICAL EXAM  B/P: Data Unavailable, T: Data Unavailable, P: Data Unavailable, R: Data Unavailable  Wt Readings from Last 3 Encounters:   12/13/21 59.2 kg (130 lb 8 oz)   09/13/21 56.1 kg (123 lb 11.2 oz)   06/14/21 50.6 kg (111 lb 9.6 oz)       ECOG PPS 0    Physical Exam  HENT:      Head: Normocephalic and atraumatic.   Eyes:      Extraocular Movements: Extraocular movements intact.      Conjunctiva/sclera: Conjunctivae normal.      Pupils: Pupils are equal, round, and reactive to light.   Pulmonary:      Effort: Pulmonary effort is normal.   Neurological:      General: No focal deficit present.      Mental Status: She is alert and oriented to person, place, and time. Mental status is at baseline.   Psychiatric:         Mood and Affect: Mood normal.         Behavior: Behavior normal.         Thought Content: Thought content normal.         Judgment: Judgment normal.              LABORATORY AND IMAGING STUDIES    Recent Labs   Lab Test 12/13/21  1341 11/17/21  1345 09/13/21  1351 06/07/21  1410 05/09/21  0653 05/06/21  0632     --  144 141 140 139   POTASSIUM 4.2  --  3.9 3.9 3.5 3.8   CHLORIDE 109  --  113* 110* 112* 110*   CO2 25  --  22 22 23 21   ANIONGAP 8  --  9 8 6 8   BUN 27  --  30 25 24 33*   CR 1.21* 1.4* 1.26* 1.30* 1.39* 1.62*   GLC 98  --  105* 90 101* 93   DICKSON 8.7  --  8.8 9.1 9.1 8.4*     Recent Labs   Lab Test 05/09/21  0653 05/07/21  0714 05/06/21  0632 05/05/21  1146 05/04/21  2220  01/12/21  2234 01/11/21  0637 01/10/21  0729   MAG 2.5* 2.1 2.0 1.8 1.9 1.9   < > 1.6   PHOS  --   --  3.8 5.1* 5.0* 4.2  --  2.5    < > = values in this interval not displayed.     Recent Labs   Lab Test 12/13/21  1341 09/13/21  1351 06/07/21  1410 05/09/21  0653 05/08/21  0642 05/06/21  0632 05/05/21  0833 04/20/21  1017 02/15/21  1321   WBC 6.3 5.9 5.9  --   --   --   --  5.4 7.1   HGB 12.3 11.7 10.5* 8.6* 8.5*   < > 10.4* 12.5 11.9    205 216  --   --   --  204 236 296   MCV 91 92 95  --   --   --   --  91 89   NEUTROPHIL 65 67 54.2  --   --   --   --  61.3 73.9    < > = values in this interval not displayed.     Recent Labs   Lab Test 12/13/21  1341 09/13/21  1351 06/07/21  1410   BILITOTAL 0.4 0.2 0.3   ALKPHOS 70 57 94   ALT 14 14 26   AST 14 13 20   ALBUMIN 3.5 3.4 3.6     TSH   Date Value Ref Range Status   02/23/2021 0.54 0.40 - 4.00 mU/L Final     Recent Labs   Lab Test 09/13/21  1351 06/07/21  1410 02/15/21  1321 11/23/20  1432   CEA 0.7 0.7 1.1 9.7*     Results for orders placed or performed in visit on 11/17/21   CT Chest/Abdomen/Pelvis w Contrast    Narrative    EXAMINATION: CT CHEST/ABDOMEN/PELVIS W CONTRAST, 11/17/2021 2:06 PM    TECHNIQUE:  Helical CT images from the thoracic inlet through the  symphysis pubis were obtained  with contrast. Contrast dose: Isovue  370 76cc    COMPARISON: Contrast enema 2/23/2021, PET CT 12/22/2020, MRI  pelvis/rectum 12/22/2020    HISTORY: Malignant neoplasm of sigmoid colon (H)    Additional information from the EMR: 76-year-old female with history  of adenocarcinoma of the sigmoid colon with bladder invasion status  post left hemicolectomy and partial cystectomy.    FINDINGS:    Support devices: PET CT 12/22/2020, MRI pelvis/rectum 12/22/2020,  thyroid ultrasound 12/31/2020.    CHEST:     LOWER NECK: Mixed cystic, solid, and calcified nodule in the left  thyroid lobe measures approximately 1.1 x 0.6 cm, decreased in size  compared to the 12/22/2020 PET/CT,  where it was non-FDG avid. No  supraclavicular lymphadenopathy.  MEDIASTINUM AND MONTSE: No mediastinal mass or lymphadenopathy.  HEART AND PERICARDIUM: Normal heart size. No pericardial fluid or  thickening.  THORACIC VESSELS: Mild atherosclerotic calcification of the thoracic  aorta and coronary arteries.  AIRWAYS: The central tracheobronchial tree is clear.  LUNGS: The lungs are clear. 3 mm solid nodule in the medial left major  fissure (series 6 image 61), present on the prior PET/CT and likely  represents a perifissural lymph node. 3 mm solid subpleural nodule in  the right upper lobe (series 6 image 61) was also present on the prior  PET/CT. No pleural effusion or pneumothorax.    ABDOMEN:    LIVER: No focal mass lesions. No intrahepatic biliary ductal  dilatation.  BILIARY: No stones, gallbladder wall thickening, or pericholecystic  fluid. No extrahepatic biliary ductal dilation.  SPLEEN: Unremarkable.  PANCREAS: Normal CT appearance.   ADRENALS: No nodules.   KIDNEYS AND URETERS: No solid lesions. No calculi. No  hydroureteronephrosis.  BLADDER: Postsurgical changes of partial cystectomy, the remaining  bladder wall is unremarkable with no suspicious nodularity or masses.  REPRODUCTIVE: Unremarkable.  BOWEL: Postsurgical changes of left hemicolectomy. No abnormally  dilated loops of bowel.  APPENDIX: The appendix is surgically absent.  MESENTERY/PERITONEUM: Unremarkable.  LYMPH NODES: No lymphadenopathy.  VESSELS: Mild atherosclerotic calcification of the abdominal aorta  without aneurysmal dilation.    BONES: No acute or aggressive osseous lesions. Multilevel degenerative  changes of spine.  SOFT TISSUES: Unremarkable surgical scar in the right lower quadrant  subcutaneous tissues.         Impression    IMPRESSION: In this patient with a history of adenocarcinoma of the  sigmoid colon status post left hemicolectomy and partial cystectomy:  1. No evidence of recurrent or metastatic disease in the  chest,  abdomen or pelvis.  2. Left thyroid nodule is not enlarged compared to 12/22/2020.  Recommend continued ultrasound follow-up.    I have personally reviewed the examination and initial interpretation  and I agree with the findings.    ASIA AGUIRRE MD         SYSTEM ID:  [id number]          ASSESSMENT  AND RECOMMENDATIONS:  1.  T4 N1 M0 adenocarcinoma of the sigmoid colon with perforation and invasion into the bladder with 1 of 48 lymph nodes involved, lymphovascular invasion was noted as was perineural invasion and an elevated preoperative CEA status post en bloc resection  2. History of hypertension  3. Diabetes  4. Thyroid nodule      Plan      1. Reviewed labs which are unchanged, CKD stable. CEA pending.  2. Reviewed CT without evidence of recurrence. Continue annually  3. Needs thyroid us follow-up of nodule  4.due for colonoscopy 1 year out from surgery, will notify Dr Moscoso.  5. See me 4 months with labs, she would like to see me in [place].      Sarah Butler MD on 12/13/2021 at 2:59 PM

## 2021-12-13 NOTE — NURSING NOTE
Oncology Rooming Note    December 13, 2021 2:26 PM   Myla Glynn is a 76 year old female who presents for:    Chief Complaint   Patient presents with     Blood Draw     Labs drawn via  by RN. Vitals taken.     Oncology Clinic Visit     Pt is here for a rtn for Suvivorship Care Plan     Initial Vitals: Blood Pressure (Abnormal) 155/79   Pulse 65   Temperature 98.1  F (36.7  C) (Oral)   Respiration 16   Weight 59.2 kg (130 lb 8 oz)   Oxygen Saturation 100%   Body Mass Index 25.49 kg/m   Estimated body mass index is 25.49 kg/m  as calculated from the following:    Height as of 6/14/21: 1.524 m (5').    Weight as of this encounter: 59.2 kg (130 lb 8 oz). Body surface area is 1.58 meters squared.  Mild Pain (2) Comment: Data Unavailable   No LMP recorded. Patient has had a hysterectomy.  Allergies reviewed: Yes  Medications reviewed: Yes    Medications: Medication refills not needed today.  Pharmacy name entered into [employer]: [id number] - [place], [place] - [address] AT [facility] [address]    Clinical concerns: none       Desirae Schaefer MA

## 2021-12-13 NOTE — PROGRESS NOTES
[facility] PHYSICIANS  MEDICAL ONCOLOGY    RETURN PATIENT VISIT NOTE    Reason for visit: sigmoid colon cancer      Oncology Treatment Summary  1. 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 October 2020 presented to PCP with recurrent dysuria, hematuria, 10/17/2020 CT AP ws done again with colovesicular fistula with a mass seen invading from colon into bladder.  3. 11/16 colonoscopy at [facility] with reported mass at 40 cm, bx with adenocarcinoma, I am unable to find MMR  4. 11/23 CEA 9.7  5. 12/22/2020 MRI pelvis locally advanced disease with multiple enlarged regional lymph nodes  6. 12/22/2020 PET some uptake in mesenteric area, no distant disease  7. 1/5/21 left hemicolectomy, DOC, BSO, partial cystectomy for a T4N1M0 adenocarcinoma  8. Discussed adjuvant chemotherapy and she declined.   9. Colostomy take down spring 2021.     HISTORY OF PRESENTING ILLNESS  Patient is a pleasant 76-year-old who is seen today with her daughter for a follow-up visit.  She is doing well. She is eating well and has gained weight. No pain, no n/v/d/c. Her daughter is with her today.     PAST MEDICAL HISTORY  Gastroesophageal reflux disease, hypertension, type 2 diabetes      CURRENT OUTPATIENT MEDICATIONS  Current Outpatient Medications   Medication     acetaminophen (TYLENOL) 500 MG tablet     mirtazapine (REMERON SOL-TAB) 15 MG ODT     omeprazole (PRILOSEC) 20 MG DR capsule     traMADol (ULTRAM) 50 MG tablet     No current facility-administered medications for this visit.        ALLERGIES   No Known Allergies     SOCIAL HISTORY  She is originally from [place].  She is a , she lives in [place] with her daughter no tobacco or alcohol use     FAMILY HISTORY  No malignancies     REVIEW OF SYSTEMS  Review Of Systems  Skin: negative  Eyes: negative  Ears/Nose/Throat: negative  Respiratory: No shortness of breath, dyspnea on exertion, cough, or  hemoptysis  Cardiovascular: negative  Gastrointestinal: negative as per HPI  Genitourinary: negative  Musculoskeletal: negative  Neurologic: negative  Psychiatric: negative  Hematologic/Lymphatic/Immunologic: negative  Endocrine: negative      PHYSICAL EXAM  B/P: Data Unavailable, T: Data Unavailable, P: Data Unavailable, R: Data Unavailable  Wt Readings from Last 3 Encounters:   12/13/21 59.2 kg (130 lb 8 oz)   09/13/21 56.1 kg (123 lb 11.2 oz)   06/14/21 50.6 kg (111 lb 9.6 oz)       ECOG PPS 0    Physical Exam  HENT:      Head: Normocephalic and atraumatic.   Eyes:      Extraocular Movements: Extraocular movements intact.      Conjunctiva/sclera: Conjunctivae normal.      Pupils: Pupils are equal, round, and reactive to light.   Pulmonary:      Effort: Pulmonary effort is normal.   Neurological:      General: No focal deficit present.      Mental Status: She is alert and oriented to person, place, and time. Mental status is at baseline.   Psychiatric:         Mood and Affect: Mood normal.         Behavior: Behavior normal.         Thought Content: Thought content normal.         Judgment: Judgment normal.              LABORATORY AND IMAGING STUDIES    Recent Labs   Lab Test 12/13/21  1341 11/17/21  1345 09/13/21  1351 06/07/21  1410 05/09/21  0653 05/06/21  0632     --  144 141 140 139   POTASSIUM 4.2  --  3.9 3.9 3.5 3.8   CHLORIDE 109  --  113* 110* 112* 110*   CO2 25  --  22 22 23 21   ANIONGAP 8  --  9 8 6 8   BUN 27  --  30 25 24 33*   CR 1.21* 1.4* 1.26* 1.30* 1.39* 1.62*   GLC 98  --  105* 90 101* 93   DICKSON 8.7  --  8.8 9.1 9.1 8.4*     Recent Labs   Lab Test 05/09/21  0653 05/07/21  0714 05/06/21  0632 05/05/21  1146 05/04/21  2229 01/12/21  2234 01/11/21  0637 01/10/21  0729   MAG 2.5* 2.1 2.0 1.8 1.9 1.9   < > 1.6   PHOS  --   --  3.8 5.1* 5.0* 4.2  --  2.5    < > = values in this interval not displayed.     Recent Labs   Lab Test 12/13/21  1341 09/13/21  1351 06/07/21  1410 05/09/21  0653  05/08/21  0642 05/06/21  0632 05/05/21  0833 04/20/21  1017 02/15/21  1321   WBC 6.3 5.9 5.9  --   --   --   --  5.4 7.1   HGB 12.3 11.7 10.5* 8.6* 8.5*   < > 10.4* 12.5 11.9    205 216  --   --   --  204 236 296   MCV 91 92 95  --   --   --   --  91 89   NEUTROPHIL 65 67 54.2  --   --   --   --  61.3 73.9    < > = values in this interval not displayed.     Recent Labs   Lab Test 12/13/21  1341 09/13/21  1351 06/07/21  1410   BILITOTAL 0.4 0.2 0.3   ALKPHOS 70 57 94   ALT 14 14 26   AST 14 13 20   ALBUMIN 3.5 3.4 3.6     TSH   Date Value Ref Range Status   02/23/2021 0.54 0.40 - 4.00 mU/L Final     Recent Labs   Lab Test 09/13/21  1351 06/07/21  1410 02/15/21  1321 11/23/20  1432   CEA 0.7 0.7 1.1 9.7*     Results for orders placed or performed in visit on 11/17/21   CT Chest/Abdomen/Pelvis w Contrast    Narrative    EXAMINATION: CT CHEST/ABDOMEN/PELVIS W CONTRAST, 11/17/2021 2:06 PM    TECHNIQUE:  Helical CT images from the thoracic inlet through the  symphysis pubis were obtained  with contrast. Contrast dose: Isovue  370 76cc    COMPARISON: Contrast enema 2/23/2021, PET CT 12/22/2020, MRI  pelvis/rectum 12/22/2020    HISTORY: Malignant neoplasm of sigmoid colon (H)    Additional information from the EMR: 76-year-old female with history  of adenocarcinoma of the sigmoid colon with bladder invasion status  post left hemicolectomy and partial cystectomy.    FINDINGS:    Support devices: PET CT 12/22/2020, MRI pelvis/rectum 12/22/2020,  thyroid ultrasound 12/31/2020.    CHEST:     LOWER NECK: Mixed cystic, solid, and calcified nodule in the left  thyroid lobe measures approximately 1.1 x 0.6 cm, decreased in size  compared to the 12/22/2020 PET/CT, where it was non-FDG avid. No  supraclavicular lymphadenopathy.  MEDIASTINUM AND MONTSE: No mediastinal mass or lymphadenopathy.  HEART AND PERICARDIUM: Normal heart size. No pericardial fluid or  thickening.  THORACIC VESSELS: Mild atherosclerotic calcification of  the thoracic  aorta and coronary arteries.  AIRWAYS: The central tracheobronchial tree is clear.  LUNGS: The lungs are clear. 3 mm solid nodule in the medial left major  fissure (series 6 image 61), present on the prior PET/CT and likely  represents a perifissural lymph node. 3 mm solid subpleural nodule in  the right upper lobe (series 6 image 61) was also present on the prior  PET/CT. No pleural effusion or pneumothorax.    ABDOMEN:    LIVER: No focal mass lesions. No intrahepatic biliary ductal  dilatation.  BILIARY: No stones, gallbladder wall thickening, or pericholecystic  fluid. No extrahepatic biliary ductal dilation.  SPLEEN: Unremarkable.  PANCREAS: Normal CT appearance.   ADRENALS: No nodules.   KIDNEYS AND URETERS: No solid lesions. No calculi. No  hydroureteronephrosis.  BLADDER: Postsurgical changes of partial cystectomy, the remaining  bladder wall is unremarkable with no suspicious nodularity or masses.  REPRODUCTIVE: Unremarkable.  BOWEL: Postsurgical changes of left hemicolectomy. No abnormally  dilated loops of bowel.  APPENDIX: The appendix is surgically absent.  MESENTERY/PERITONEUM: Unremarkable.  LYMPH NODES: No lymphadenopathy.  VESSELS: Mild atherosclerotic calcification of the abdominal aorta  without aneurysmal dilation.    BONES: No acute or aggressive osseous lesions. Multilevel degenerative  changes of spine.  SOFT TISSUES: Unremarkable surgical scar in the right lower quadrant  subcutaneous tissues.         Impression    IMPRESSION: In this patient with a history of adenocarcinoma of the  sigmoid colon status post left hemicolectomy and partial cystectomy:  1. No evidence of recurrent or metastatic disease in the chest,  abdomen or pelvis.  2. Left thyroid nodule is not enlarged compared to 12/22/2020.  Recommend continued ultrasound follow-up.    I have personally reviewed the examination and initial interpretation  and I agree with the findings.    ASIA AGUIRRE MD          SYSTEM ID:  [id number]          ASSESSMENT  AND RECOMMENDATIONS:  1.  T4 N1 M0 adenocarcinoma of the sigmoid colon with perforation and invasion into the bladder with 1 of 48 lymph nodes involved, lymphovascular invasion was noted as was perineural invasion and an elevated preoperative CEA status post en bloc resection  2. History of hypertension  3. Diabetes  4. Thyroid nodule      Plan      1. Reviewed labs which are unchanged, CKD stable. CEA pending.  2. Reviewed CT without evidence of recurrence. Continue annually  3. Needs thyroid us follow-up of nodule  4.due for colonoscopy 1 year out from surgery, will notify Dr Moscoso.  5. See me 4 months with labs, she would like to see me in [place].    Sarah Butler MD on 12/13/2021 at 2:59 PM

## 2021-12-13 NOTE — NURSING NOTE
Chief Complaint   Patient presents with     Blood Draw     Labs drawn via  by RN. Vitals taken.     Labs drawn via venipuncture. Vital signs taken. Checked into next appointment.   Francine Atkinson RN

## 2021-12-28 DIAGNOSIS — Z12.11 ENCOUNTER FOR SCREENING COLONOSCOPY: Primary | ICD-10-CM

## 2021-12-29 ENCOUNTER — TELEPHONE (OUTPATIENT)
Dept: GASTROENTEROLOGY | Facility: CLINIC | Age: 76
End: 2021-12-29
Payer: COMMERCIAL

## 2021-12-29 DIAGNOSIS — Z11.59 ENCOUNTER FOR SCREENING FOR OTHER VIRAL DISEASES: ICD-10-CM

## 2021-12-29 NOTE — TELEPHONE ENCOUNTER
Screening Questions  1. Are you active on mychart? NO    2. What insurance is in the chart? [employer]    2.  Ordering/Referring Provider: Sarah Mcqueen MD    3. BMI 25.4, If greater than 40 review exclusion criteria also will need EXTENDED PREP    4.  Respiratory Screening (If yes to any of the following Hospital setting only):     Do you use daily home oxygen? N  Do you have mod to severe Obstructive Sleep Apnea? N   Do you have Pulmonary Hypertension? N   Do you have UNCONTROLLED asthma? N    5. Have you had a heart or lung transplant (If yes, please review exclusion criteria) ? N    6. Are you currently on dialysis or have chronic kidney disease? N    7. Have you had a stroke or Transient ischemic attack (TIA) within 6 months? N    8. In the past 6 months, have you had any heart related issues including cardiomyopathy or heart attack? N                 If yes, did it require cardiac stenting or other implantable device?N      9. Do you have any implantable devices in your body (pacemaker, defib, LVAD)? N    10. Do you take nitroglycerin? If yes, how often? N    11. Are you currently taking any blood thinners?N    12. Are you a diabetic? A LITTLE    13. (Females) Are you currently pregnant? N  If yes, how many weeks?      15. Are you taking any prescription pain medications on a routine schedule? N If yes, MAC sedation and patient will need EXTENDED PREP.    16. Do you have any chemical dependencies such as alcohol, street drugs, or methadone? N If yes, MAC sedation.    17. Do you have any history of post-traumatic stress syndrome, severe anxiety or history of psychosis? N    18. Do you transfer independently? Y    19.  Do you have any issues with constipation? N   If yes, pt will need EXTENDED PREP     20. Preferred Pharmacy for Pre Prescription [facility]    Scheduling Details    Which Colonoscopy Prep was Sent?: EXTENDED  Type of Procedure Scheduled: COLON  Surgeon: LOBITO  Date of Procedure:  01/19  Location: [facility]  Caller (Please ask for phone number if not scheduled by patient): effie Bautista.      Sedation Type: MAC  Conscious Sedation- Needs  for 6 hours after the procedure  MAC/General-Needs  for 24 hours after procedure    Pre-op Required at [facility], [place], [place] and OR for MAC sedation: N  (if yes advise patient they will need a pre-op prior to procedure)      Is patient on blood thinners? -N (If yes- inform patient to follow up with PCP or provider for follow up instructions)     Informed patient they will need an adult  Y  Cannot take any type of public or medical transportation alone    Pre-Procedure Covid test to be completed at [facility] or Externally:  01/15 200PM    Confirmed Nurse will call to complete assessment Y    Additional comments:   [employer] STAFF MESSAGE:  ----- Message from Saima Vail RN sent at 12/28/2021  9:26 AM CST -----  Regarding: FW: colonoscopy due in January?  Please schedule for colonoscopy with     Thanks   Saima KAUFFMAN  ----- Message -----  From: Prerna Mayes RN  Sent: 12/28/2021   9:23 AM CST  To: Prerna Mayes RN, Saima Vail, RN  Subject: colonoscopy due in January?                      H Saima Lanza saw Dr. Butler last week and Dr. Butler is thinking Myla is due for a colonoscopy in January? I was just wondering if she is on your radar?     Thank you! - Prerna

## 2022-01-10 ENCOUNTER — TELEPHONE (OUTPATIENT)
Dept: GASTROENTEROLOGY | Facility: CLINIC | Age: 77
End: 2022-01-10
Payer: COMMERCIAL

## 2022-01-10 DIAGNOSIS — Z12.11 ENCOUNTER FOR SCREENING COLONOSCOPY: Primary | ICD-10-CM

## 2022-01-10 RX ORDER — BISACODYL 5 MG
TABLET, DELAYED RELEASE (ENTERIC COATED) ORAL
Qty: 2 TABLET | Refills: 0 | Status: SHIPPED | OUTPATIENT
Start: 2022-01-10 | End: 2024-01-01

## 2022-01-10 NOTE — LETTER
February 23, 2022      Myla Glynn  [address]  [place] [address]              Dear Myla,      Colonoscopy  Your exam is on 3.2.2022  Arrival Time: 7:30am  Please note that your procedure time may change  Check in at: [facility]; [address], [place] [address]    Please arrive with an adult who can drive you home after the exam and stay with you for the next 24 hours, unless your provider says otherwise.   The medicines used in the exam will make you sleepy. You will not be able to drive. You cannot take a medical cab, taxi, Uber, train or bus by yourself.    For questions or appointments, call: [facility]: [phone] option 2. Monday through Friday, 8 a.m. to 4:30 p.m.  (If it is after hours please reach out to the clinic or provider you were scheduled with.)  -----------------------------------------------------------------------------------------------------------------------------------------------------------------------------------------------    Extended Colonoscopy Prep    The inside of your intestine must be clean in order to allow examination of the colon for presence of any growths and abnormalities, as well as their biopsy or removal.  Please review these instructions carefully well in advance. A number of tips are included in order to make this part of the procedure as comfortable as possible.    Immediately:     Arrange for a responsible adult to drive you home on the day of the exam. This cannot be a taxi or a bus as you will need someone with you after the procedure.    Be sure to tell the doctor who ordered the colonoscopy if you are on Coumadin, Plavix or other blood thinners. These medications may need to be discontinued prior to procedure.    If you have diabetes, you should request an early morning appointment.    Discontinue iron supplements and multivitamins    Fill your  prescription for Golytely (2 containers) and 2 Dulcolax tablets at the pharmacy.    It is very important that you stay well hydrated during the colonoscopy prep. The large volume of the bowel cleansing liquid is designed to clean your colon, but it will not provide hydration. While you are getting the prescribed prep, you may also get 64 oz. of Gatorade or similar sports drink product. (Avoid red and purple colors)    Discontinue Fiber supplements    5 days prior: 2.25.2022    Begin a restricted or low fiber diet    2 days prior: 2.28.2022    Remember to discontinue NSAIDs, example: Advil, Aleve, Ibuprofen, Naproxen, Motrin    Make sure to stay well hydrated, drink at least 4 large glasses of Gatorade or similar sports drink    Drink to be well hydrated, this is important.    1 day prior: 3.1.2022    Plan on being at home this day.    Begin clear liquid diet only.    Avoid any red or purple colored items    10:00 AM: Take 2 Dulcolax tablets at 10 AM    3:00 PM: You will start drinking the Golytely solution.  Drink one, eight oz. glass every 10-15 minutes until   of the 1st container of Golytely is gone.    You will likely start having frequent liquid bowel movements within an hour of drinking the Golytely solution.    8:00 PM: Drink the second half of the 1st container of Golytely.  Drink one, eight oz. glass every 10-15 minutes until   of the 1st container of Golytely is gone.    The prep liquid will not keep you hydrated. You should continue drinking clear liquids throughout the day.    Before you go to bed, mix the 2nd container of Golytely.    Procedure day: 3.2.2022    6 hours before your check-in time @ 1:30am, drink one, eight oz. glass every 10-15 minutes until   of the 2nd  container of Golytely is gone.    You may take your necessary morning medications.    Do not chew or swallow anything including water or gum for at least 4 hours before your colonoscopy. This is a safety issue, and we may need to cancel  your exam if you do not observe this policy.    Please arrive with an adult who can take you home after the test and stay with you for a minimum of 6 hours: The medicine used will make you sleepy. If you do not have someone to take you home, we may cancel your test.      Please arrive with an adult who can drive you home after the exam. If using public transportation you must have someone to ride with you.      What are clear liquids?   You may have:  - Water, tea, coffee (no cream)  - Soda pop, Gatorade (not red or purple)  - Clear nutrition drinks (Enlive, Resource Breeze)   - Jell-O, Popsicles (no milk or fruit pieces) or sorbet (not red or purple)  - Fat-free soup broth or bouillon  - Plain hard candy, such as clear life savers (not red or purple)  - Clear juices and fruit-flavored drinks such as apple juice, white grape juice, Hi-C and Sarwat-Aid (not red or purple)   Do not have:  - Milk or milk products such as ice cream, malts or shakes  - Red or purple drinks of any kind such as cranberry juice or grape juice. Avoid red or purple Jell-O, Popsicles, Sarwat-Aid, sorbet and candy  - Juices with pulp such as orange, grapefruit, pineapple or tomato juice  - Cream soups of any kind  - Alcohol       What is a Low Fiber Diet?   You may have:  - Starches: White bread, rolls, biscuits, croissants, [place] toast, white flour tortillas, waffles, pancakes, Chinese toast; white rice, noodles, pasta, macaroni; cooked and peeled potatoes; plain crackers, saltines; cooked farina or cream of rice; puffed rice, corn flakes, Rice Krispies, Special K   - Vegetables: vegetable broths   - Fruits and fruit juices: Strained fruit juice, canned fruit without seeds or skin (not pineapple), applesauce, pear sauce, ripe bananas, melons (not watermelon)   - Milk products: Milk (plain or flavored), cheese, cottage cheese, yogurt (no berries), custard, ice cream    - Proteins: Tender, well-cooked ground beef, lamb, veal, ham, pork, chicken,  turkey, fish or organ meats; eggs; creamy peanut butter   - Fats and condiments:  Margarine, butter, oils, mayonnaise, sour cream, salad dressing, plain gravy; spices, cooked herbs; sugar, clear jelly, honey, syrup   - Snacks, sweets and drinks: Pretzels, hard candy; plain cakes and cookies (no nuts or seeds); gelatin, plain pudding, sherbet, Popsicles; coffee, tea, carbonated ( fizzy ) drinks Do not have:  - Starches: Breads or rolls that contain nuts, seeds or fruit; whole wheat or whole grain breads that contain more than 1 gram of fiber per slice; cornbread; corn or whole wheat tortillas; potatoes with skin; brown rice, wild rice, kasha (buckwheat)   - Vegetables: Any raw or steamed vegetables; vegetables with seeds; corn in any form   - Fruits and fruit juices: Prunes, prune juice, raisins and other dried fruits, berries and other fruits with seeds, canned pineapple juices with pulp such as orange, grapefruit, pineapple or tomato juice  - Milk products: Any yogurt with nuts, seeds or berries   - Proteins: Tough, fibrous meats with gristle; cooked dried beans, peas or lentils; crunchy peanut butter   - Fats and condiments: Pickles, olives, relish, horseradish; jam, marmalade, preserves   - Snacks, sweets and drinks: Popcorn, nuts, seeds, granola, coconut, candies made with nuts or seeds; all desserts that contain nuts, seeds, raisins and other dried fruits, coconut, whole grains or bran.        Thank you for choosing [facility], for your procedure. If you are sent a survey regarding your care, please take the time to complete the questionnaire.     Updated: 9/28/2021

## 2022-01-10 NOTE — TELEPHONE ENCOUNTER
"Patient scheduled for colonoscopy on 1.19.2022.     Covid test scheduled: 1.15.2022    Arrival time: 1400    Facility location: [facility]    Sedation type: MAC    Diabetic? Yes    Indication for procedure: screening colonoscopy    Bowel prep recommendation: extended prep d/t \"fair\" prep with last colonoscopy.  No mag citrate d/t decreased GFR    Extended prep sent to [id number] - [place], [place] - [address] AT [facility] [address]    Pre visit planning completed.    Miranda Tomlinson RN    "

## 2022-01-10 NOTE — LETTER
January 11, 2022      Myla Lanzaeltonshaun  [address]  [place] [address]              Dear Myla,      Colonoscopy  Your exam is on 1.19.2022  Arrival Time: 2pm  Please note that your procedure time may change  Check in at: [facility]; [address], [place] [address]    Please arrive with an adult who can drive you home after the exam and stay with you for the next 24 hours, unless your provider says otherwise.   The medicines used in the exam will make you sleepy. You will not be able to drive. You cannot take a medical cab, taxi, Uber, train or bus by yourself.    For questions or appointments, call: [facility]: [phone] option 2. Monday through Friday, 8 a.m. to 4:30 p.m.  (If it is after hours please reach out to the clinic or provider you were scheduled with.)    Extended Colonoscopy Prep    The inside of your intestine must be clean in order to allow examination of the colon for presence of any growths and abnormalities, as well as their biopsy or removal.  Please review these instructions carefully well in advance. A number of tips are included in order to make this part of the procedure as comfortable as possible.    Immediately:     Arrange for a responsible adult to drive you home on the day of the exam. This cannot be a taxi or a bus as you will need someone with you after the procedure.    Be sure to tell the doctor who ordered the colonoscopy if you are on Coumadin, Plavix or other blood thinners. These medications may need to be discontinued prior to procedure.    If you have diabetes, you should request an early morning appointment.    Discontinue iron supplements and multivitamins    Fill your prescription for Golytely (2 containers) and 2 Dulcolax tablets at the pharmacy.    It is very important that you stay well hydrated during the colonoscopy prep. The large volume of the bowel cleansing  liquid is designed to clean your colon, but it will not provide hydration. While you are getting the prescribed prep, you may also get 64 oz. of Gatorade or similar sports drink product. (Avoid red and purple colors)    Discontinue Fiber supplements    5 days prior: 1.14.2022    Begin a restricted or low fiber diet    2 days prior: 1.17.2022    Remember to discontinue NSAIDs, example: Advil, Aleve, Ibuprofen, Naproxen, Motrin    Make sure to stay well hydrated, drink at least 4 large glasses of Gatorade or similar sports drink    Drink to be well hydrated, this is important.    1 day prior: 1.18.2022    Plan on being at home this day.    Begin clear liquid diet only.    Avoid any red or purple colored items    10:00 AM: Take 2 Dulcolax tablets at 10 AM    3:00 PM: You will start drinking the Golytely solution.  Drink one, eight oz. glass every 10-15 minutes until   of the 1st container of Golytely is gone.    You will likely start having frequent liquid bowel movements within an hour of drinking the Golytely solution.    8:00 PM: Drink the second half of the 1st container of Golytely.  Drink one, eight oz. glass every 10-15 minutes until   of the 1st container of Golytely is gone.    The prep liquid will not keep you hydrated. You should continue drinking clear liquids throughout the day.    Before you go to bed, mix the 2nd container of Golytely.    Procedure day: 1.19.2022    6 hours before your check-in time @ 8am, drink one, eight oz. glass every 10-15 minutes until   of the 2nd  container of Golytely is gone.    You may take your necessary morning medications.    Do not chew or swallow anything including water or gum for at least 4 hours before your colonoscopy. This is a safety issue, and we may need to cancel your exam if you do not observe this policy.    Please arrive with an adult who can take you home after the test and stay with you for a minimum of 6 hours: The medicine used will make you sleepy. If you  do not have someone to take you home, we may cancel your test.      Please arrive with an adult who can drive you home after the exam. If using public transportation you must have someone to ride with you.      What are clear liquids?   You may have:  - Water, tea, coffee (no cream)  - Soda pop, Gatorade (not red or purple)  - Clear nutrition drinks (Enlive, Resource Breeze)   - Jell-O, Popsicles (no milk or fruit pieces) or sorbet (not red or purple)  - Fat-free soup broth or bouillon  - Plain hard candy, such as clear life savers (not red or purple)  - Clear juices and fruit-flavored drinks such as apple juice, white grape juice, Hi-C and Sarwat-Aid (not red or purple)   Do not have:  - Milk or milk products such as ice cream, malts or shakes  - Red or purple drinks of any kind such as cranberry juice or grape juice. Avoid red or purple Jell-O, Popsicles, Sarwat-Aid, sorbet and candy  - Juices with pulp such as orange, grapefruit, pineapple or tomato juice  - Cream soups of any kind  - Alcohol       What is a Low Fiber Diet?   You may have:  - Starches: White bread, rolls, biscuits, croissants, [place] toast, white flour tortillas, waffles, pancakes, [place] toast; white rice, noodles, pasta, macaroni; cooked and peeled potatoes; plain crackers, saltines; cooked farina or cream of rice; puffed rice, corn flakes, Rice Krispies, Special K   - Vegetables: vegetable broths   - Fruits and fruit juices: Strained fruit juice, canned fruit without seeds or skin (not pineapple), applesauce, pear sauce, ripe bananas, melons (not watermelon)   - Milk products: Milk (plain or flavored), cheese, cottage cheese, yogurt (no berries), custard, ice cream    - Proteins: Tender, well-cooked ground beef, lamb, veal, ham, pork, chicken, turkey, fish or organ meats; eggs; creamy peanut butter   - Fats and condiments:  Margarine, butter, oils, mayonnaise, sour cream, salad dressing, plain gravy; spices, cooked herbs; sugar, clear jelly, honey,  syrup   - Snacks, sweets and drinks: Pretzels, hard candy; plain cakes and cookies (no nuts or seeds); gelatin, plain pudding, sherbet, Popsicles; coffee, tea, carbonated ( fizzy ) drinks Do not have:  - Starches: Breads or rolls that contain nuts, seeds or fruit; whole wheat or whole grain breads that contain more than 1 gram of fiber per slice; cornbread; corn or whole wheat tortillas; potatoes with skin; brown rice, wild rice, kasha (buckwheat)   - Vegetables: Any raw or steamed vegetables; vegetables with seeds; corn in any form   - Fruits and fruit juices: Prunes, prune juice, raisins and other dried fruits, berries and other fruits with seeds, canned pineapple juices with pulp such as orange, grapefruit, pineapple or tomato juice  - Milk products: Any yogurt with nuts, seeds or berries   - Proteins: Tough, fibrous meats with gristle; cooked dried beans, peas or lentils; crunchy peanut butter   - Fats and condiments: Pickles, olives, relish, horseradish; jam, marmalade, preserves   - Snacks, sweets and drinks: Popcorn, nuts, seeds, granola, coconut, candies made with nuts or seeds; all desserts that contain nuts, seeds, raisins and other dried fruits, coconut, whole grains or bran.        Thank you for choosing [facility], for your procedure. If you are sent a survey regarding your care, please take the time to complete the questionnaire.     Updated: 9/28/2021

## 2022-01-11 NOTE — TELEPHONE ENCOUNTER
Dennis  via phone.    Pre assessment questions completed for upcoming colonoscopy procedure scheduled on 1.19.2022    COVID test scheduled 1.15.2022    Reviewed procedural arrival time 1400 and facility location [facility].    Designated  policy reviewed. Instructed to have someone stay 24 hours post procedure.     Anticoagulation/blood thinners? no    Electronic implanted devices? no    Reviewed extended prep instructions with patient. No fiber/iron supplements or foods that contain nuts/seeds prior to procedure.  Pt's daughter stated she will call [facility] if she has any questions.    Patient's daughter verbalized understanding and had no questions or concerns at this time.    Miranda Tomlinson RN

## 2022-01-15 ENCOUNTER — LAB (OUTPATIENT)
Dept: LAB | Facility: CLINIC | Age: 77
End: 2022-01-15
Payer: COMMERCIAL

## 2022-01-15 DIAGNOSIS — Z11.59 ENCOUNTER FOR SCREENING FOR OTHER VIRAL DISEASES: ICD-10-CM

## 2022-01-15 PROCEDURE — U0005 INFEC AGEN DETEC AMPLI PROBE: HCPCS

## 2022-01-15 PROCEDURE — U0003 INFECTIOUS AGENT DETECTION BY NUCLEIC ACID (DNA OR RNA); SEVERE ACUTE RESPIRATORY SYNDROME CORONAVIRUS 2 (SARS-COV-2) (CORONAVIRUS DISEASE [COVID-19]), AMPLIFIED PROBE TECHNIQUE, MAKING USE OF HIGH THROUGHPUT TECHNOLOGIES AS DESCRIBED BY CMS-2020-01-R: HCPCS

## 2022-01-17 ENCOUNTER — TELEPHONE (OUTPATIENT)
Dept: EMERGENCY MEDICINE | Facility: CLINIC | Age: 77
End: 2022-01-17
Payer: COMMERCIAL

## 2022-01-17 LAB — SARS-COV-2 RNA RESP QL NAA+PROBE: POSITIVE

## 2022-01-17 NOTE — TELEPHONE ENCOUNTER
[id number]  Coronavirus (COVID-19) Notification    Reason for call  Notify of POSITIVE  COVID-19 lab result, assess symptoms,  review [facility] recommendations    Lab Result   Lab test for 2019-nCoV rRt-PCR or SARS-COV-2 PCR  Oropharyngeal AND/OR nasopharyngeal swabs were POSITIVE for 2019-nCoV RNA [OR] SARS-COV-2 RNA (COVID-19) RNA     We have been unable to reach Patient by phone at this time to notify of their Positive COVID-19 result.  Left voicemail message requesting a call back to [phone] [facility] for results.        POSITIVE COVID-19 Letter sent.    Rosie Freeman LPN

## 2022-01-17 NOTE — TELEPHONE ENCOUNTER
Daughter informed verbal permission or a signed consent is required. She stated No one has ever asked for this prior.  Daughter gave phone number [phone] to contact patient with .

## 2022-01-18 ENCOUNTER — TELEPHONE (OUTPATIENT)
Dept: GASTROENTEROLOGY | Facility: CLINIC | Age: 77
End: 2022-01-18
Payer: COMMERCIAL

## 2022-01-18 NOTE — TELEPHONE ENCOUNTER
Caller: Myla Glynn WITH DAUGHTER    Procedure: COLONSCOPY    Date, Location, and Surgeon of Procedure Cancelled: 1/19/22 [facility]    Ordering Provider: LOBITO    Reason for cancel (please be detailed, any staff messages or encounters to note?): TESTED POSITIVE FOR COVID19 PUSHED APPOINTMENT FOR A FEW WEEKS        Rescheduled: YES     If rescheduled:    Date: 3/23/22   Location: [facility]   Note any change or update to original order/sedation: NO CHANGES TO SEDATION OR SURGEON

## 2022-01-19 ENCOUNTER — TELEPHONE (OUTPATIENT)
Dept: GASTROENTEROLOGY | Facility: CLINIC | Age: 77
End: 2022-01-19
Payer: COMMERCIAL

## 2022-01-19 NOTE — TELEPHONE ENCOUNTER
Caller: Michele (Daughter)    Procedure: colonoscopy    Date, Location, and Surgeon of Procedure Cancelled: 03/23/2022 at [facility] with Dr. Moscoso    Ordering Provider: Sarah Butler MD in [facility]    Reason for cancel (please be detailed, any staff messages or encounters to note?): Dr. Moscoso on vacation        Rescheduled: yes     If rescheduled:    Date: 03/02/2022   Location: [facility]   Note any change or update to original order/sedation: NO

## 2022-02-01 PROBLEM — C18.7 MALIGNANT NEOPLASM OF SIGMOID COLON (H): Status: ACTIVE | Noted: 2020-11-27

## 2022-02-17 ENCOUNTER — ANCILLARY PROCEDURE (OUTPATIENT)
Dept: ULTRASOUND IMAGING | Facility: CLINIC | Age: 77
End: 2022-02-17
Attending: INTERNAL MEDICINE
Payer: COMMERCIAL

## 2022-02-17 DIAGNOSIS — E04.1 THYROID NODULE: ICD-10-CM

## 2022-02-17 PROCEDURE — 76536 US EXAM OF HEAD AND NECK: CPT | Mod: GC | Performed by: RADIOLOGY

## 2022-02-23 NOTE — TELEPHONE ENCOUNTER
Dennis  via phone.    Pre assessment questions completed for upcoming colonoscopy procedure scheduled on 3.2.2022    Positive COVID test on 1.15.2022.  Pt is aware that they need to be s/sx free for 14 days prior to procedure.  If pt develops s/sx within this time they have been instructed to call and reschedule their endoscopy appt.  Pt's daughter agreeable to plan.    Reviewed procedural arrival time 0730 and facility location [facility].    Designated  policy reviewed. Instructed to have someone stay 24 hours post procedure.     Anticoagulation/blood thinners? no    Electronic implanted devices? no    Reviewed extended prep instructions without mag citrate with [facility]. No fiber/iron supplements or foods that contain nuts/seeds prior to procedure.     Patient verbalized understanding and had no questions or concerns at this time.    Miranda Tomlinson RN

## 2022-02-23 NOTE — TELEPHONE ENCOUNTER
Rescheduled colonoscopy.    Patient scheduled for colonoscopy on 3.2.2022.     Covid positive 1.15.2022    Arrival time: 0730    Facility location: [facility]    Sedation type: MAC    Bowel prep recommendation: Does pt have extended prep or does a new prescription need to be sent to the pharmacy? No mag citrate d/t decreased GFR.    Pre visit planning completed.    Miranda Tomlinson RN

## 2022-03-02 ENCOUNTER — DOCUMENTATION ONLY (OUTPATIENT)
Dept: GASTROENTEROLOGY | Facility: OUTPATIENT CENTER | Age: 77
End: 2022-03-02
Payer: COMMERCIAL

## 2022-03-02 ENCOUNTER — TRANSFERRED RECORDS (OUTPATIENT)
Dept: HEALTH INFORMATION MANAGEMENT | Facility: CLINIC | Age: 77
End: 2022-03-02
Payer: COMMERCIAL

## 2022-03-02 DIAGNOSIS — Z12.11 ENCOUNTER FOR SCREENING COLONOSCOPY: ICD-10-CM

## 2022-07-22 NOTE — PATIENT INSTRUCTIONS
Patient Education     Patient Education    Iron Sucrose Chewable tablet    Iron Sucrose Solution for injection  Iron Sucrose Solution for injection  What is this medicine?  IRON SUCROSE (GLENNY knapp) is an iron complex. Iron is used to make healthy red blood cells, which carry oxygen and nutrients throughout the body. This medicine is used to treat iron deficiency anemia in people with chronic kidney disease.  This medicine may be used for other purposes; ask your health care provider or pharmacist if you have questions.  What should I tell my health care provider before I take this medicine?  They need to know if you have any of these conditions:    anemia not caused by low iron levels    heart disease    high levels of iron in the blood    kidney disease    liver disease    an unusual or allergic reaction to iron, other medicines, foods, dyes, or preservatives    pregnant or trying to get pregnant    breast-feeding  How should I use this medicine?  This medicine is for infusion into a vein. It is given by a health care professional in a hospital or clinic setting.  Talk to your pediatrician regarding the use of this medicine in children. While this drug may be prescribed for children as young as 2 years for selected conditions, precautions do apply.  Overdosage: If you think you have taken too much of this medicine contact a poison control center or emergency room at once.  NOTE: This medicine is only for you. Do not share this medicine with others.  What if I miss a dose?  It is important not to miss your dose. Call your doctor or health care professional if you are unable to keep an appointment.  What may interact with this medicine?  Do not take this medicine with any of the following medications:    deferoxamine    dimercaprol    other iron products  This medicine may also interact with the following medications:    chloramphenicol    deferasirox  This list may not describe all possible interactions.  Give your health care provider a list of all the medicines, herbs, non-prescription drugs, or dietary supplements you use. Also tell them if you smoke, drink alcohol, or use illegal drugs. Some items may interact with your medicine.  What should I watch for while using this medicine?  Visit your doctor or healthcare professional regularly. Tell your doctor or healthcare professional if your symptoms do not start to get better or if they get worse. You may need blood work done while you are taking this medicine.  You may need to follow a special diet. Talk to your doctor. Foods that contain iron include: whole grains/cereals, dried fruits, beans, or peas, leafy green vegetables, and organ meats (liver, kidney).  What side effects may I notice from receiving this medicine?  Side effects that you should report to your doctor or health care professional as soon as possible:    allergic reactions like skin rash, itching or hives, swelling of the face, lips, or tongue    breathing problems    changes in blood pressure    cough    fast, irregular heartbeat    feeling faint or lightheaded, falls    fever or chills    flushing, sweating, or hot feelings    joint or muscle aches/pains    seizures    swelling of the ankles or feet    unusually weak or tired  Side effects that usually do not require medical attention (report to your doctor or health care professional if they continue or are bothersome):    diarrhea    feeling achy    headache    irritation at site where injected    nausea, vomiting    stomach upset    tiredness  This list may not describe all possible side effects. Call your doctor for medical advice about side effects. You may report side effects to FDA at 9-150-FDA-1910.  Where should I keep my medicine?  This drug is given in a hospital or clinic and will not be stored at home.  NOTE:This sheet is a summary. It may not cover all possible information. If you have questions about this medicine, talk to your  doctor, pharmacist, or health care provider. Copyright  2016 Gold Standard            Forceps were not used

## 2023-06-21 NOTE — TELEPHONE ENCOUNTER
[facility] [phone] FUTURE VISIT INFORMATION      SURGERY INFORMATION:    Date: 21    Location: [facility] OR    Surgeon:  Dr. Moscoso    Anesthesia Type:  general    Procedure: iliostomy    Consult:  21    RECORDS REQUESTED FROM:       Primary Care Provider: Jami Bundy    Most recent EKG+ Tracin21

## 2024-01-01 ENCOUNTER — APPOINTMENT (OUTPATIENT)
Dept: CT IMAGING | Facility: CLINIC | Age: 79
End: 2024-01-01
Attending: STUDENT IN AN ORGANIZED HEALTH CARE EDUCATION/TRAINING PROGRAM
Payer: COMMERCIAL

## 2024-01-01 ENCOUNTER — APPOINTMENT (OUTPATIENT)
Dept: PHYSICAL THERAPY | Facility: CLINIC | Age: 79
End: 2024-01-01
Payer: COMMERCIAL

## 2024-01-01 ENCOUNTER — DOCUMENTATION ONLY (OUTPATIENT)
Dept: REHABILITATION | Facility: SKILLED NURSING FACILITY | Age: 79
End: 2024-01-01
Payer: COMMERCIAL

## 2024-01-01 ENCOUNTER — APPOINTMENT (OUTPATIENT)
Dept: CT IMAGING | Facility: CLINIC | Age: 79
End: 2024-01-01
Attending: EMERGENCY MEDICINE
Payer: COMMERCIAL

## 2024-01-01 ENCOUNTER — APPOINTMENT (OUTPATIENT)
Dept: OCCUPATIONAL THERAPY | Facility: SKILLED NURSING FACILITY | Age: 79
End: 2024-01-01
Attending: STUDENT IN AN ORGANIZED HEALTH CARE EDUCATION/TRAINING PROGRAM
Payer: COMMERCIAL

## 2024-01-01 ENCOUNTER — APPOINTMENT (OUTPATIENT)
Dept: PHYSICAL THERAPY | Facility: SKILLED NURSING FACILITY | Age: 79
End: 2024-01-01
Attending: STUDENT IN AN ORGANIZED HEALTH CARE EDUCATION/TRAINING PROGRAM
Payer: COMMERCIAL

## 2024-01-01 ENCOUNTER — APPOINTMENT (OUTPATIENT)
Dept: GENERAL RADIOLOGY | Facility: CLINIC | Age: 79
End: 2024-01-01
Attending: INTERNAL MEDICINE
Payer: COMMERCIAL

## 2024-01-01 ENCOUNTER — DOCUMENTATION ONLY (OUTPATIENT)
Dept: GERIATRICS | Facility: CLINIC | Age: 79
End: 2024-01-01
Payer: COMMERCIAL

## 2024-01-01 ENCOUNTER — OFFICE VISIT (OUTPATIENT)
Dept: CARDIOLOGY | Facility: CLINIC | Age: 79
End: 2024-01-01
Attending: INTERNAL MEDICINE
Payer: COMMERCIAL

## 2024-01-01 ENCOUNTER — APPOINTMENT (OUTPATIENT)
Dept: GENERAL RADIOLOGY | Facility: CLINIC | Age: 79
End: 2024-01-01
Attending: EMERGENCY MEDICINE
Payer: COMMERCIAL

## 2024-01-01 ENCOUNTER — DOCUMENTATION ONLY (OUTPATIENT)
Dept: CARDIOLOGY | Facility: CLINIC | Age: 79
End: 2024-01-01
Payer: COMMERCIAL

## 2024-01-01 ENCOUNTER — APPOINTMENT (OUTPATIENT)
Dept: LAB | Facility: CLINIC | Age: 79
End: 2024-01-01
Payer: COMMERCIAL

## 2024-01-01 ENCOUNTER — LAB (OUTPATIENT)
Dept: LAB | Facility: CLINIC | Age: 79
End: 2024-01-01
Payer: COMMERCIAL

## 2024-01-01 ENCOUNTER — APPOINTMENT (OUTPATIENT)
Dept: SPEECH THERAPY | Facility: CLINIC | Age: 79
End: 2024-01-01
Attending: INTERNAL MEDICINE
Payer: COMMERCIAL

## 2024-01-01 ENCOUNTER — HOME INFUSION (PRE-WILLOW HOME INFUSION) (OUTPATIENT)
Dept: PHARMACY | Facility: CLINIC | Age: 79
End: 2024-01-01
Payer: COMMERCIAL

## 2024-01-01 ENCOUNTER — APPOINTMENT (OUTPATIENT)
Dept: INTERVENTIONAL RADIOLOGY/VASCULAR | Facility: CLINIC | Age: 79
End: 2024-01-01
Payer: COMMERCIAL

## 2024-01-01 ENCOUNTER — HOSPITAL ENCOUNTER (INPATIENT)
Facility: SKILLED NURSING FACILITY | Age: 79
LOS: 9 days | Discharge: HOME | End: 2024-06-27
Attending: STUDENT IN AN ORGANIZED HEALTH CARE EDUCATION/TRAINING PROGRAM | Admitting: STUDENT IN AN ORGANIZED HEALTH CARE EDUCATION/TRAINING PROGRAM
Payer: COMMERCIAL

## 2024-01-01 ENCOUNTER — APPOINTMENT (OUTPATIENT)
Dept: CT IMAGING | Facility: CLINIC | Age: 79
End: 2024-01-01
Attending: INTERNAL MEDICINE
Payer: COMMERCIAL

## 2024-01-01 ENCOUNTER — APPOINTMENT (OUTPATIENT)
Dept: PHYSICAL THERAPY | Facility: CLINIC | Age: 79
End: 2024-01-01
Attending: INTERNAL MEDICINE
Payer: COMMERCIAL

## 2024-01-01 ENCOUNTER — HOSPITAL ENCOUNTER (INPATIENT)
Facility: CLINIC | Age: 79
LOS: 27 days | Discharge: HOSPICE | End: 2024-09-06
Attending: EMERGENCY MEDICINE | Admitting: INTERNAL MEDICINE
Payer: COMMERCIAL

## 2024-01-01 ENCOUNTER — APPOINTMENT (OUTPATIENT)
Dept: PHYSICAL THERAPY | Facility: CLINIC | Age: 79
End: 2024-01-01
Attending: HOSPITALIST
Payer: COMMERCIAL

## 2024-01-01 ENCOUNTER — APPOINTMENT (OUTPATIENT)
Dept: CT IMAGING | Facility: CLINIC | Age: 79
End: 2024-01-01
Payer: COMMERCIAL

## 2024-01-01 ENCOUNTER — HOSPITAL ENCOUNTER (INPATIENT)
Facility: CLINIC | Age: 79
LOS: 20 days | Discharge: NURSING, REHAB OR LONG-TERM CARE | End: 2024-06-18
Attending: EMERGENCY MEDICINE | Admitting: INTERNAL MEDICINE
Payer: COMMERCIAL

## 2024-01-01 ENCOUNTER — TELEPHONE (OUTPATIENT)
Dept: CARDIOLOGY | Facility: CLINIC | Age: 79
End: 2024-01-01

## 2024-01-01 ENCOUNTER — HEALTH MAINTENANCE LETTER (OUTPATIENT)
Age: 79
End: 2024-01-01

## 2024-01-01 VITALS
BODY MASS INDEX: 23.73 KG/M2 | SYSTOLIC BLOOD PRESSURE: 137 MMHG | RESPIRATION RATE: 16 BRPM | OXYGEN SATURATION: 99 % | HEIGHT: 59 IN | DIASTOLIC BLOOD PRESSURE: 66 MMHG | TEMPERATURE: 97.8 F | HEART RATE: 88 BPM | WEIGHT: 117.73 LBS

## 2024-01-01 VITALS
OXYGEN SATURATION: 98 % | BODY MASS INDEX: 24.44 KG/M2 | SYSTOLIC BLOOD PRESSURE: 116 MMHG | HEIGHT: 59 IN | TEMPERATURE: 98.1 F | RESPIRATION RATE: 18 BRPM | WEIGHT: 121.2 LBS | DIASTOLIC BLOOD PRESSURE: 57 MMHG | HEART RATE: 89 BPM

## 2024-01-01 VITALS
WEIGHT: 117 LBS | OXYGEN SATURATION: 100 % | BODY MASS INDEX: 23.62 KG/M2 | DIASTOLIC BLOOD PRESSURE: 68 MMHG | SYSTOLIC BLOOD PRESSURE: 105 MMHG | HEART RATE: 78 BPM

## 2024-01-01 VITALS
RESPIRATION RATE: 18 BRPM | WEIGHT: 130.73 LBS | SYSTOLIC BLOOD PRESSURE: 104 MMHG | OXYGEN SATURATION: 96 % | DIASTOLIC BLOOD PRESSURE: 61 MMHG | BODY MASS INDEX: 26.39 KG/M2 | TEMPERATURE: 98.8 F | HEART RATE: 60 BPM

## 2024-01-01 DIAGNOSIS — I50.20 HEART FAILURE WITH REDUCED EJECTION FRACTION, NYHA CLASS IV (H): ICD-10-CM

## 2024-01-01 DIAGNOSIS — I50.42 CHRONIC COMBINED SYSTOLIC AND DIASTOLIC HEART FAILURE (H): ICD-10-CM

## 2024-01-01 DIAGNOSIS — N17.9 ACUTE KIDNEY INJURY (H): ICD-10-CM

## 2024-01-01 DIAGNOSIS — I50.42 CHRONIC COMBINED SYSTOLIC AND DIASTOLIC HEART FAILURE (H): Primary | ICD-10-CM

## 2024-01-01 DIAGNOSIS — I15.9 SECONDARY HYPERTENSION: ICD-10-CM

## 2024-01-01 DIAGNOSIS — N32.89 BLADDER MASS: ICD-10-CM

## 2024-01-01 DIAGNOSIS — E78.2 MIXED HYPERLIPIDEMIA: ICD-10-CM

## 2024-01-01 DIAGNOSIS — Z93.1 GASTROSTOMY IN PLACE (H): Primary | ICD-10-CM

## 2024-01-01 DIAGNOSIS — Z85.038 PERSONAL HISTORY OF COLON CANCER: Primary | ICD-10-CM

## 2024-01-01 DIAGNOSIS — Z90.79 S/P TOTAL ABDOMINAL HYSTERECTOMY AND BILATERAL SALPINGO-OOPHORECTOMY: ICD-10-CM

## 2024-01-01 DIAGNOSIS — N30.00 ACUTE CYSTITIS WITHOUT HEMATURIA: ICD-10-CM

## 2024-01-01 DIAGNOSIS — C18.9 ADENOCARCINOMA, COLON (H): ICD-10-CM

## 2024-01-01 DIAGNOSIS — F32.A DEPRESSION, UNSPECIFIED DEPRESSION TYPE: ICD-10-CM

## 2024-01-01 DIAGNOSIS — Z90.710 S/P TOTAL ABDOMINAL HYSTERECTOMY AND BILATERAL SALPINGO-OOPHORECTOMY: ICD-10-CM

## 2024-01-01 DIAGNOSIS — C18.7 ADENOCARCINOMA OF SIGMOID COLON (H): ICD-10-CM

## 2024-01-01 DIAGNOSIS — I50.22 CHRONIC SYSTOLIC HEART FAILURE (H): ICD-10-CM

## 2024-01-01 DIAGNOSIS — Z90.6 ACQUIRED ABSENCE OF PART OF URINARY TRACT: ICD-10-CM

## 2024-01-01 DIAGNOSIS — Z51.5 HOSPICE CARE PATIENT: ICD-10-CM

## 2024-01-01 DIAGNOSIS — I50.22 CHRONIC SYSTOLIC HEART FAILURE (H): Primary | ICD-10-CM

## 2024-01-01 DIAGNOSIS — Z90.722 S/P TOTAL ABDOMINAL HYSTERECTOMY AND BILATERAL SALPINGO-OOPHORECTOMY: ICD-10-CM

## 2024-01-01 DIAGNOSIS — Z90.49 ACQUIRED ABSENCE OF LARGE INTESTINE: ICD-10-CM

## 2024-01-01 DIAGNOSIS — I50.20 HFREF (HEART FAILURE WITH REDUCED EJECTION FRACTION) (H): ICD-10-CM

## 2024-01-01 DIAGNOSIS — C18.7 MALIGNANT NEOPLASM OF SIGMOID COLON (H): ICD-10-CM

## 2024-01-01 DIAGNOSIS — Z86.79 PERSONAL HISTORY OF OTHER DISEASES OF THE CIRCULATORY SYSTEM: ICD-10-CM

## 2024-01-01 DIAGNOSIS — R63.0 LACK OF APPETITE: ICD-10-CM

## 2024-01-01 DIAGNOSIS — C79.11 SECONDARY MALIGNANT NEOPLASM OF BLADDER (H): Primary | ICD-10-CM

## 2024-01-01 DIAGNOSIS — Z93.1 GASTROSTOMY STATUS (H): ICD-10-CM

## 2024-01-01 DIAGNOSIS — R11.2 NAUSEA AND VOMITING, UNSPECIFIED VOMITING TYPE: ICD-10-CM

## 2024-01-01 DIAGNOSIS — E44.0 MODERATE PROTEIN-CALORIE MALNUTRITION (H): Primary | ICD-10-CM

## 2024-01-01 DIAGNOSIS — R19.7 DIARRHEA, UNSPECIFIED TYPE: ICD-10-CM

## 2024-01-01 DIAGNOSIS — R10.84 ABDOMINAL PAIN, GENERALIZED: ICD-10-CM

## 2024-01-01 DIAGNOSIS — I50.20 SYSTOLIC HEART FAILURE, UNSPECIFIED HF CHRONICITY (H): ICD-10-CM

## 2024-01-01 DIAGNOSIS — I10 ESSENTIAL HYPERTENSION: ICD-10-CM

## 2024-01-01 LAB
ALBUMIN SERPL BCG-MCNC: 2 G/DL (ref 3.5–5.2)
ALBUMIN SERPL BCG-MCNC: 3.1 G/DL (ref 3.5–5.2)
ALBUMIN SERPL BCG-MCNC: 3.1 G/DL (ref 3.5–5.2)
ALBUMIN SERPL BCG-MCNC: 3.3 G/DL (ref 3.5–5.2)
ALBUMIN SERPL BCG-MCNC: 3.8 G/DL (ref 3.5–5.2)
ALBUMIN UR-MCNC: 100 MG/DL
ALBUMIN UR-MCNC: 300 MG/DL
ALBUMIN UR-MCNC: 300 MG/DL
ALBUMIN UR-MCNC: 50 MG/DL
ALP SERPL-CCNC: 115 U/L (ref 40–150)
ALP SERPL-CCNC: 74 U/L (ref 40–150)
ALP SERPL-CCNC: 80 U/L (ref 40–150)
ALP SERPL-CCNC: 82 U/L (ref 40–150)
ALP SERPL-CCNC: 94 U/L (ref 40–150)
ALT SERPL W P-5'-P-CCNC: 17 U/L (ref 0–50)
ALT SERPL W P-5'-P-CCNC: 18 U/L (ref 0–50)
ALT SERPL W P-5'-P-CCNC: 19 U/L (ref 0–50)
ALT SERPL W P-5'-P-CCNC: 47 U/L (ref 0–50)
ALT SERPL W P-5'-P-CCNC: <5 U/L (ref 0–50)
ANION GAP SERPL CALCULATED.3IONS-SCNC: 10 MMOL/L (ref 7–15)
ANION GAP SERPL CALCULATED.3IONS-SCNC: 11 MMOL/L (ref 7–15)
ANION GAP SERPL CALCULATED.3IONS-SCNC: 12 MMOL/L (ref 7–15)
ANION GAP SERPL CALCULATED.3IONS-SCNC: 13 MMOL/L (ref 7–15)
ANION GAP SERPL CALCULATED.3IONS-SCNC: 14 MMOL/L (ref 7–15)
ANION GAP SERPL CALCULATED.3IONS-SCNC: 15 MMOL/L (ref 7–15)
ANION GAP SERPL CALCULATED.3IONS-SCNC: 17 MMOL/L (ref 7–15)
ANION GAP SERPL CALCULATED.3IONS-SCNC: 17 MMOL/L (ref 7–15)
ANION GAP SERPL CALCULATED.3IONS-SCNC: 18 MMOL/L (ref 7–15)
ANION GAP SERPL CALCULATED.3IONS-SCNC: 20 MMOL/L (ref 7–15)
ANION GAP SERPL CALCULATED.3IONS-SCNC: 8 MMOL/L (ref 7–15)
ANION GAP SERPL CALCULATED.3IONS-SCNC: 9 MMOL/L (ref 7–15)
APPEARANCE UR: ABNORMAL
APTT PPP: NORMAL S
AST SERPL W P-5'-P-CCNC: 15 U/L (ref 0–45)
AST SERPL W P-5'-P-CCNC: 24 U/L (ref 0–45)
AST SERPL W P-5'-P-CCNC: 27 U/L (ref 0–45)
AST SERPL W P-5'-P-CCNC: 31 U/L (ref 0–45)
AST SERPL W P-5'-P-CCNC: 53 U/L (ref 0–45)
ATRIAL RATE - MUSE: 67 BPM
ATRIAL RATE - MUSE: 90 BPM
BACTERIA #/AREA URNS HPF: ABNORMAL /HPF
BACTERIA BLD CULT: NO GROWTH
BACTERIA UR CULT: ABNORMAL
BACTERIA UR CULT: ABNORMAL
BACTERIA UR CULT: NORMAL
BACTERIA UR CULT: NORMAL
BACTERIAL VAGINOSIS VAG-IMP: NEGATIVE
BASE EXCESS BLDV CALC-SCNC: 3 MMOL/L (ref -3–3)
BASE EXCESS BLDV CALC-SCNC: 3 MMOL/L (ref -3–3)
BASOPHILS # BLD AUTO: 0 10E3/UL (ref 0–0.2)
BASOPHILS # BLD AUTO: ABNORMAL 10*3/UL
BASOPHILS # BLD MANUAL: 0.1 10E3/UL (ref 0–0.2)
BASOPHILS NFR BLD AUTO: 0 %
BASOPHILS NFR BLD AUTO: ABNORMAL %
BASOPHILS NFR BLD MANUAL: 1 %
BILIRUB DIRECT SERPL-MCNC: <0.2 MG/DL (ref 0–0.3)
BILIRUB SERPL-MCNC: 0.2 MG/DL
BILIRUB SERPL-MCNC: 0.2 MG/DL
BILIRUB SERPL-MCNC: 0.3 MG/DL
BILIRUB SERPL-MCNC: 1 MG/DL
BILIRUB SERPL-MCNC: <0.2 MG/DL
BILIRUB UR QL STRIP: NEGATIVE
BUN SERPL-MCNC: 19.9 MG/DL (ref 8–23)
BUN SERPL-MCNC: 20.4 MG/DL (ref 8–23)
BUN SERPL-MCNC: 21 MG/DL (ref 8–23)
BUN SERPL-MCNC: 21.1 MG/DL (ref 8–23)
BUN SERPL-MCNC: 22.8 MG/DL (ref 8–23)
BUN SERPL-MCNC: 24.3 MG/DL (ref 8–23)
BUN SERPL-MCNC: 24.6 MG/DL (ref 8–23)
BUN SERPL-MCNC: 24.9 MG/DL (ref 8–23)
BUN SERPL-MCNC: 29 MG/DL (ref 8–23)
BUN SERPL-MCNC: 29.4 MG/DL (ref 8–23)
BUN SERPL-MCNC: 30.3 MG/DL (ref 8–23)
BUN SERPL-MCNC: 31.8 MG/DL (ref 8–23)
BUN SERPL-MCNC: 32.4 MG/DL (ref 8–23)
BUN SERPL-MCNC: 32.4 MG/DL (ref 8–23)
BUN SERPL-MCNC: 32.6 MG/DL (ref 8–23)
BUN SERPL-MCNC: 32.9 MG/DL (ref 8–23)
BUN SERPL-MCNC: 33 MG/DL (ref 8–23)
BUN SERPL-MCNC: 34.4 MG/DL (ref 8–23)
BUN SERPL-MCNC: 37 MG/DL (ref 8–23)
BUN SERPL-MCNC: 38.7 MG/DL (ref 8–23)
BUN SERPL-MCNC: 39.4 MG/DL (ref 8–23)
BUN SERPL-MCNC: 41.1 MG/DL (ref 8–23)
BUN SERPL-MCNC: 43.6 MG/DL (ref 8–23)
BUN SERPL-MCNC: 44.7 MG/DL (ref 8–23)
BUN SERPL-MCNC: 49 MG/DL (ref 8–23)
BUN SERPL-MCNC: 50 MG/DL (ref 8–23)
BUN SERPL-MCNC: 51 MG/DL (ref 8–23)
BUN SERPL-MCNC: 73.5 MG/DL (ref 8–23)
BUN SERPL-MCNC: 76.3 MG/DL (ref 8–23)
BUN SERPL-MCNC: 82.5 MG/DL (ref 8–23)
BUN SERPL-MCNC: 83.9 MG/DL (ref 8–23)
BUN SERPL-MCNC: 84.1 MG/DL (ref 8–23)
BUN SERPL-MCNC: 91 MG/DL (ref 8–23)
BUN SERPL-MCNC: 98.6 MG/DL (ref 8–23)
C DIFF GDH STL QL IA: POSITIVE
C DIFF TOX A+B STL QL IA: POSITIVE
C DIFF TOX B STL QL: NEGATIVE
C DIFF TOX B STL QL: POSITIVE
CALCIUM SERPL-MCNC: 10 MG/DL (ref 8.8–10.4)
CALCIUM SERPL-MCNC: 10.1 MG/DL (ref 8.8–10.4)
CALCIUM SERPL-MCNC: 7.6 MG/DL (ref 8.8–10.2)
CALCIUM SERPL-MCNC: 7.9 MG/DL (ref 8.8–10.2)
CALCIUM SERPL-MCNC: 7.9 MG/DL (ref 8.8–10.2)
CALCIUM SERPL-MCNC: 8 MG/DL (ref 8.8–10.2)
CALCIUM SERPL-MCNC: 8 MG/DL (ref 8.8–10.2)
CALCIUM SERPL-MCNC: 8.1 MG/DL (ref 8.8–10.2)
CALCIUM SERPL-MCNC: 8.2 MG/DL (ref 8.8–10.2)
CALCIUM SERPL-MCNC: 8.3 MG/DL (ref 8.8–10.2)
CALCIUM SERPL-MCNC: 8.4 MG/DL (ref 8.8–10.2)
CALCIUM SERPL-MCNC: 8.5 MG/DL (ref 8.8–10.2)
CALCIUM SERPL-MCNC: 8.5 MG/DL (ref 8.8–10.4)
CALCIUM SERPL-MCNC: 8.7 MG/DL (ref 8.8–10.2)
CALCIUM SERPL-MCNC: 8.7 MG/DL (ref 8.8–10.4)
CALCIUM SERPL-MCNC: 8.7 MG/DL (ref 8.8–10.4)
CALCIUM SERPL-MCNC: 8.8 MG/DL (ref 8.8–10.4)
CALCIUM SERPL-MCNC: 8.9 MG/DL (ref 8.8–10.4)
CALCIUM SERPL-MCNC: 9 MG/DL (ref 8.8–10.2)
CALCIUM SERPL-MCNC: 9 MG/DL (ref 8.8–10.2)
CALCIUM SERPL-MCNC: 9.1 MG/DL (ref 8.8–10.2)
CALCIUM SERPL-MCNC: 9.2 MG/DL (ref 8.8–10.2)
CALCIUM SERPL-MCNC: 9.2 MG/DL (ref 8.8–10.4)
CALCIUM SERPL-MCNC: 9.3 MG/DL (ref 8.8–10.2)
CALCIUM SERPL-MCNC: 9.4 MG/DL (ref 8.8–10.2)
CALCIUM SERPL-MCNC: 9.4 MG/DL (ref 8.8–10.2)
CALCIUM SERPL-MCNC: 9.5 MG/DL (ref 8.8–10.4)
CALCIUM SERPL-MCNC: 9.6 MG/DL (ref 8.8–10.2)
CALCIUM SERPL-MCNC: 9.6 MG/DL (ref 8.8–10.4)
CALCIUM SERPL-MCNC: 9.7 MG/DL (ref 8.8–10.4)
CANDIDA DNA VAG QL NAA+PROBE: DETECTED
CANDIDA GLABRATA / CANDIDA KRUSEI DNA: NOT DETECTED
CEA SERPL-MCNC: 4.8 NG/ML
CHLORIDE SERPL-SCNC: 100 MMOL/L (ref 98–107)
CHLORIDE SERPL-SCNC: 101 MMOL/L (ref 98–107)
CHLORIDE SERPL-SCNC: 101 MMOL/L (ref 98–107)
CHLORIDE SERPL-SCNC: 102 MMOL/L (ref 98–107)
CHLORIDE SERPL-SCNC: 103 MMOL/L (ref 98–107)
CHLORIDE SERPL-SCNC: 104 MMOL/L (ref 98–107)
CHLORIDE SERPL-SCNC: 107 MMOL/L (ref 98–107)
CHLORIDE SERPL-SCNC: 108 MMOL/L (ref 98–107)
CHLORIDE SERPL-SCNC: 108 MMOL/L (ref 98–107)
CHLORIDE SERPL-SCNC: 110 MMOL/L (ref 98–107)
CHLORIDE SERPL-SCNC: 110 MMOL/L (ref 98–107)
CHLORIDE SERPL-SCNC: 111 MMOL/L (ref 98–107)
CHLORIDE SERPL-SCNC: 113 MMOL/L (ref 98–107)
CHLORIDE SERPL-SCNC: 113 MMOL/L (ref 98–107)
CHLORIDE SERPL-SCNC: 114 MMOL/L (ref 98–107)
CHLORIDE SERPL-SCNC: 116 MMOL/L (ref 98–107)
CHLORIDE SERPL-SCNC: 118 MMOL/L (ref 98–107)
CHLORIDE SERPL-SCNC: 122 MMOL/L (ref 98–107)
CHLORIDE SERPL-SCNC: 122 MMOL/L (ref 98–107)
CHLORIDE SERPL-SCNC: 126 MMOL/L (ref 98–107)
CHLORIDE SERPL-SCNC: 95 MMOL/L (ref 98–107)
CHLORIDE SERPL-SCNC: 96 MMOL/L (ref 98–107)
CHLORIDE SERPL-SCNC: 97 MMOL/L (ref 98–107)
CHLORIDE SERPL-SCNC: 97 MMOL/L (ref 98–107)
CHLORIDE SERPL-SCNC: 98 MMOL/L (ref 98–107)
CHLORIDE SERPL-SCNC: 99 MMOL/L (ref 98–107)
COLOR UR AUTO: ABNORMAL
COLOR UR AUTO: YELLOW
CREAT SERPL-MCNC: 0.63 MG/DL (ref 0.51–0.95)
CREAT SERPL-MCNC: 1.08 MG/DL (ref 0.51–0.95)
CREAT SERPL-MCNC: 1.18 MG/DL (ref 0.51–0.95)
CREAT SERPL-MCNC: 1.2 MG/DL (ref 0.51–0.95)
CREAT SERPL-MCNC: 1.22 MG/DL (ref 0.51–0.95)
CREAT SERPL-MCNC: 1.24 MG/DL (ref 0.51–0.95)
CREAT SERPL-MCNC: 1.27 MG/DL (ref 0.51–0.95)
CREAT SERPL-MCNC: 1.27 MG/DL (ref 0.51–0.95)
CREAT SERPL-MCNC: 1.38 MG/DL (ref 0.51–0.95)
CREAT SERPL-MCNC: 1.4 MG/DL (ref 0.51–0.95)
CREAT SERPL-MCNC: 1.42 MG/DL (ref 0.51–0.95)
CREAT SERPL-MCNC: 1.45 MG/DL (ref 0.51–0.95)
CREAT SERPL-MCNC: 1.45 MG/DL (ref 0.51–0.95)
CREAT SERPL-MCNC: 1.47 MG/DL (ref 0.51–0.95)
CREAT SERPL-MCNC: 1.52 MG/DL (ref 0.51–0.95)
CREAT SERPL-MCNC: 1.55 MG/DL (ref 0.51–0.95)
CREAT SERPL-MCNC: 1.58 MG/DL (ref 0.51–0.95)
CREAT SERPL-MCNC: 1.58 MG/DL (ref 0.51–0.95)
CREAT SERPL-MCNC: 1.6 MG/DL (ref 0.51–0.95)
CREAT SERPL-MCNC: 1.61 MG/DL (ref 0.51–0.95)
CREAT SERPL-MCNC: 1.66 MG/DL (ref 0.51–0.95)
CREAT SERPL-MCNC: 1.71 MG/DL (ref 0.51–0.95)
CREAT SERPL-MCNC: 1.76 MG/DL (ref 0.51–0.95)
CREAT SERPL-MCNC: 1.79 MG/DL (ref 0.51–0.95)
CREAT SERPL-MCNC: 1.81 MG/DL (ref 0.51–0.95)
CREAT SERPL-MCNC: 1.83 MG/DL (ref 0.51–0.95)
CREAT SERPL-MCNC: 1.84 MG/DL (ref 0.51–0.95)
CREAT SERPL-MCNC: 1.89 MG/DL (ref 0.51–0.95)
CREAT SERPL-MCNC: 1.95 MG/DL (ref 0.51–0.95)
CREAT SERPL-MCNC: 2 MG/DL (ref 0.51–0.95)
CREAT SERPL-MCNC: 2.04 MG/DL (ref 0.51–0.95)
CREAT SERPL-MCNC: 2.05 MG/DL (ref 0.51–0.95)
CREAT SERPL-MCNC: 2.3 MG/DL (ref 0.51–0.95)
CREAT SERPL-MCNC: 2.38 MG/DL (ref 0.51–0.95)
CREAT SERPL-MCNC: 4.48 MG/DL (ref 0.51–0.95)
CREAT UR-MCNC: 27.9 MG/DL
CRP SERPL-MCNC: 160 MG/L
CYSTATIN C (ROCHE): 3.5 MG/L (ref 0.6–1)
DEPRECATED HCO3 PLAS-SCNC: 12 MMOL/L (ref 22–29)
DEPRECATED HCO3 PLAS-SCNC: 12 MMOL/L (ref 22–29)
DEPRECATED HCO3 PLAS-SCNC: 13 MMOL/L (ref 22–29)
DEPRECATED HCO3 PLAS-SCNC: 13 MMOL/L (ref 22–29)
DEPRECATED HCO3 PLAS-SCNC: 14 MMOL/L (ref 22–29)
DEPRECATED HCO3 PLAS-SCNC: 18 MMOL/L (ref 22–29)
DEPRECATED HCO3 PLAS-SCNC: 20 MMOL/L (ref 22–29)
DEPRECATED HCO3 PLAS-SCNC: 20 MMOL/L (ref 22–29)
DEPRECATED HCO3 PLAS-SCNC: 21 MMOL/L (ref 22–29)
DEPRECATED HCO3 PLAS-SCNC: 22 MMOL/L (ref 22–29)
DEPRECATED HCO3 PLAS-SCNC: 22 MMOL/L (ref 22–29)
DEPRECATED HCO3 PLAS-SCNC: 23 MMOL/L (ref 22–29)
DEPRECATED HCO3 PLAS-SCNC: 24 MMOL/L (ref 22–29)
DEPRECATED HCO3 PLAS-SCNC: 24 MMOL/L (ref 22–29)
DEPRECATED HCO3 PLAS-SCNC: 25 MMOL/L (ref 22–29)
DEPRECATED HCO3 PLAS-SCNC: 26 MMOL/L (ref 22–29)
DEPRECATED HCO3 PLAS-SCNC: 26 MMOL/L (ref 22–29)
DEPRECATED HCO3 PLAS-SCNC: 27 MMOL/L (ref 22–29)
DIASTOLIC BLOOD PRESSURE - MUSE: NORMAL MMHG
DIASTOLIC BLOOD PRESSURE - MUSE: NORMAL MMHG
EGFRCR SERPLBLD CKD-EPI 2021: 10 ML/MIN/1.73M2
EGFRCR SERPLBLD CKD-EPI 2021: 20 ML/MIN/1.73M2
EGFRCR SERPLBLD CKD-EPI 2021: 21 ML/MIN/1.73M2
EGFRCR SERPLBLD CKD-EPI 2021: 24 ML/MIN/1.73M2
EGFRCR SERPLBLD CKD-EPI 2021: 25 ML/MIN/1.73M2
EGFRCR SERPLBLD CKD-EPI 2021: 25 ML/MIN/1.73M2
EGFRCR SERPLBLD CKD-EPI 2021: 26 ML/MIN/1.73M2
EGFRCR SERPLBLD CKD-EPI 2021: 27 ML/MIN/1.73M2
EGFRCR SERPLBLD CKD-EPI 2021: 28 ML/MIN/1.73M2
EGFRCR SERPLBLD CKD-EPI 2021: 29 ML/MIN/1.73M2
EGFRCR SERPLBLD CKD-EPI 2021: 29 ML/MIN/1.73M2
EGFRCR SERPLBLD CKD-EPI 2021: 30 ML/MIN/1.73M2
EGFRCR SERPLBLD CKD-EPI 2021: 31 ML/MIN/1.73M2
EGFRCR SERPLBLD CKD-EPI 2021: 33 ML/MIN/1.73M2
EGFRCR SERPLBLD CKD-EPI 2021: 34 ML/MIN/1.73M2
EGFRCR SERPLBLD CKD-EPI 2021: 35 ML/MIN/1.73M2
EGFRCR SERPLBLD CKD-EPI 2021: 36 ML/MIN/1.73M2
EGFRCR SERPLBLD CKD-EPI 2021: 37 ML/MIN/1.73M2
EGFRCR SERPLBLD CKD-EPI 2021: 37 ML/MIN/1.73M2
EGFRCR SERPLBLD CKD-EPI 2021: 38 ML/MIN/1.73M2
EGFRCR SERPLBLD CKD-EPI 2021: 39 ML/MIN/1.73M2
EGFRCR SERPLBLD CKD-EPI 2021: 39 ML/MIN/1.73M2
EGFRCR SERPLBLD CKD-EPI 2021: 43 ML/MIN/1.73M2
EGFRCR SERPLBLD CKD-EPI 2021: 43 ML/MIN/1.73M2
EGFRCR SERPLBLD CKD-EPI 2021: 45 ML/MIN/1.73M2
EGFRCR SERPLBLD CKD-EPI 2021: 45 ML/MIN/1.73M2
EGFRCR SERPLBLD CKD-EPI 2021: 46 ML/MIN/1.73M2
EGFRCR SERPLBLD CKD-EPI 2021: 47 ML/MIN/1.73M2
EGFRCR SERPLBLD CKD-EPI 2021: 53 ML/MIN/1.73M2
EGFRCR SERPLBLD CKD-EPI 2021: >90 ML/MIN/1.73M2
EOSINOPHIL # BLD AUTO: 0 10E3/UL (ref 0–0.7)
EOSINOPHIL # BLD AUTO: 0 10E3/UL (ref 0–0.7)
EOSINOPHIL # BLD AUTO: 0.1 10E3/UL (ref 0–0.7)
EOSINOPHIL # BLD AUTO: 0.2 10E3/UL (ref 0–0.7)
EOSINOPHIL # BLD AUTO: 0.2 10E3/UL (ref 0–0.7)
EOSINOPHIL # BLD AUTO: ABNORMAL 10*3/UL
EOSINOPHIL # BLD MANUAL: 0.2 10E3/UL (ref 0–0.7)
EOSINOPHIL NFR BLD AUTO: 0 %
EOSINOPHIL NFR BLD AUTO: 0 %
EOSINOPHIL NFR BLD AUTO: 1 %
EOSINOPHIL NFR BLD AUTO: 3 %
EOSINOPHIL NFR BLD AUTO: 4 %
EOSINOPHIL NFR BLD AUTO: ABNORMAL %
EOSINOPHIL NFR BLD MANUAL: 2 %
ERYTHROCYTE [DISTWIDTH] IN BLOOD BY AUTOMATED COUNT: 14.1 % (ref 10–15)
ERYTHROCYTE [DISTWIDTH] IN BLOOD BY AUTOMATED COUNT: 14.3 % (ref 10–15)
ERYTHROCYTE [DISTWIDTH] IN BLOOD BY AUTOMATED COUNT: 14.4 % (ref 10–15)
ERYTHROCYTE [DISTWIDTH] IN BLOOD BY AUTOMATED COUNT: 14.6 % (ref 10–15)
ERYTHROCYTE [DISTWIDTH] IN BLOOD BY AUTOMATED COUNT: 14.7 % (ref 10–15)
ERYTHROCYTE [DISTWIDTH] IN BLOOD BY AUTOMATED COUNT: 14.9 % (ref 10–15)
ERYTHROCYTE [DISTWIDTH] IN BLOOD BY AUTOMATED COUNT: 15.2 % (ref 10–15)
ERYTHROCYTE [DISTWIDTH] IN BLOOD BY AUTOMATED COUNT: 16.3 % (ref 10–15)
ERYTHROCYTE [DISTWIDTH] IN BLOOD BY AUTOMATED COUNT: 16.5 % (ref 10–15)
ERYTHROCYTE [DISTWIDTH] IN BLOOD BY AUTOMATED COUNT: 16.9 % (ref 10–15)
ERYTHROCYTE [DISTWIDTH] IN BLOOD BY AUTOMATED COUNT: 17 % (ref 10–15)
ERYTHROCYTE [DISTWIDTH] IN BLOOD BY AUTOMATED COUNT: 17.1 % (ref 10–15)
ERYTHROCYTE [DISTWIDTH] IN BLOOD BY AUTOMATED COUNT: 17.2 % (ref 10–15)
ERYTHROCYTE [DISTWIDTH] IN BLOOD BY AUTOMATED COUNT: 17.5 % (ref 10–15)
ERYTHROCYTE [DISTWIDTH] IN BLOOD BY AUTOMATED COUNT: 17.6 % (ref 10–15)
ERYTHROCYTE [DISTWIDTH] IN BLOOD BY AUTOMATED COUNT: 17.7 % (ref 10–15)
ERYTHROCYTE [DISTWIDTH] IN BLOOD BY AUTOMATED COUNT: 17.8 % (ref 10–15)
ERYTHROCYTE [DISTWIDTH] IN BLOOD BY AUTOMATED COUNT: 17.8 % (ref 10–15)
ERYTHROCYTE [DISTWIDTH] IN BLOOD BY AUTOMATED COUNT: 17.9 % (ref 10–15)
ERYTHROCYTE [DISTWIDTH] IN BLOOD BY AUTOMATED COUNT: 17.9 % (ref 10–15)
ERYTHROCYTE [DISTWIDTH] IN BLOOD BY AUTOMATED COUNT: 18 % (ref 10–15)
FRACT EXCRET NA UR+SERPL-RTO: 3.1 %
GFR SERPL CREATININE-BSD FRML MDRD: 13 ML/MIN/1.73M2
GLUCOSE BLDC GLUCOMTR-MCNC: 100 MG/DL (ref 70–99)
GLUCOSE BLDC GLUCOMTR-MCNC: 100 MG/DL (ref 70–99)
GLUCOSE BLDC GLUCOMTR-MCNC: 101 MG/DL (ref 70–99)
GLUCOSE BLDC GLUCOMTR-MCNC: 101 MG/DL (ref 70–99)
GLUCOSE BLDC GLUCOMTR-MCNC: 102 MG/DL (ref 70–99)
GLUCOSE BLDC GLUCOMTR-MCNC: 103 MG/DL (ref 70–99)
GLUCOSE BLDC GLUCOMTR-MCNC: 103 MG/DL (ref 70–99)
GLUCOSE BLDC GLUCOMTR-MCNC: 104 MG/DL (ref 70–99)
GLUCOSE BLDC GLUCOMTR-MCNC: 104 MG/DL (ref 70–99)
GLUCOSE BLDC GLUCOMTR-MCNC: 105 MG/DL (ref 70–99)
GLUCOSE BLDC GLUCOMTR-MCNC: 106 MG/DL (ref 70–99)
GLUCOSE BLDC GLUCOMTR-MCNC: 107 MG/DL (ref 70–99)
GLUCOSE BLDC GLUCOMTR-MCNC: 108 MG/DL (ref 70–99)
GLUCOSE BLDC GLUCOMTR-MCNC: 108 MG/DL (ref 70–99)
GLUCOSE BLDC GLUCOMTR-MCNC: 110 MG/DL (ref 70–99)
GLUCOSE BLDC GLUCOMTR-MCNC: 111 MG/DL (ref 70–99)
GLUCOSE BLDC GLUCOMTR-MCNC: 112 MG/DL (ref 70–99)
GLUCOSE BLDC GLUCOMTR-MCNC: 112 MG/DL (ref 70–99)
GLUCOSE BLDC GLUCOMTR-MCNC: 114 MG/DL (ref 70–99)
GLUCOSE BLDC GLUCOMTR-MCNC: 115 MG/DL (ref 70–99)
GLUCOSE BLDC GLUCOMTR-MCNC: 116 MG/DL (ref 70–99)
GLUCOSE BLDC GLUCOMTR-MCNC: 116 MG/DL (ref 70–99)
GLUCOSE BLDC GLUCOMTR-MCNC: 118 MG/DL (ref 70–99)
GLUCOSE BLDC GLUCOMTR-MCNC: 119 MG/DL (ref 70–99)
GLUCOSE BLDC GLUCOMTR-MCNC: 119 MG/DL (ref 70–99)
GLUCOSE BLDC GLUCOMTR-MCNC: 122 MG/DL (ref 70–99)
GLUCOSE BLDC GLUCOMTR-MCNC: 123 MG/DL (ref 70–99)
GLUCOSE BLDC GLUCOMTR-MCNC: 123 MG/DL (ref 70–99)
GLUCOSE BLDC GLUCOMTR-MCNC: 125 MG/DL (ref 70–99)
GLUCOSE BLDC GLUCOMTR-MCNC: 126 MG/DL (ref 70–99)
GLUCOSE BLDC GLUCOMTR-MCNC: 129 MG/DL (ref 70–99)
GLUCOSE BLDC GLUCOMTR-MCNC: 130 MG/DL (ref 70–99)
GLUCOSE BLDC GLUCOMTR-MCNC: 131 MG/DL (ref 70–99)
GLUCOSE BLDC GLUCOMTR-MCNC: 132 MG/DL (ref 70–99)
GLUCOSE BLDC GLUCOMTR-MCNC: 135 MG/DL (ref 70–99)
GLUCOSE BLDC GLUCOMTR-MCNC: 135 MG/DL (ref 70–99)
GLUCOSE BLDC GLUCOMTR-MCNC: 137 MG/DL (ref 70–99)
GLUCOSE BLDC GLUCOMTR-MCNC: 139 MG/DL (ref 70–99)
GLUCOSE BLDC GLUCOMTR-MCNC: 140 MG/DL (ref 70–99)
GLUCOSE BLDC GLUCOMTR-MCNC: 141 MG/DL (ref 70–99)
GLUCOSE BLDC GLUCOMTR-MCNC: 142 MG/DL (ref 70–99)
GLUCOSE BLDC GLUCOMTR-MCNC: 143 MG/DL (ref 70–99)
GLUCOSE BLDC GLUCOMTR-MCNC: 143 MG/DL (ref 70–99)
GLUCOSE BLDC GLUCOMTR-MCNC: 145 MG/DL (ref 70–99)
GLUCOSE BLDC GLUCOMTR-MCNC: 146 MG/DL (ref 70–99)
GLUCOSE BLDC GLUCOMTR-MCNC: 146 MG/DL (ref 70–99)
GLUCOSE BLDC GLUCOMTR-MCNC: 147 MG/DL (ref 70–99)
GLUCOSE BLDC GLUCOMTR-MCNC: 150 MG/DL (ref 70–99)
GLUCOSE BLDC GLUCOMTR-MCNC: 152 MG/DL (ref 70–99)
GLUCOSE BLDC GLUCOMTR-MCNC: 153 MG/DL (ref 70–99)
GLUCOSE BLDC GLUCOMTR-MCNC: 155 MG/DL (ref 70–99)
GLUCOSE BLDC GLUCOMTR-MCNC: 157 MG/DL (ref 70–99)
GLUCOSE BLDC GLUCOMTR-MCNC: 160 MG/DL (ref 70–99)
GLUCOSE BLDC GLUCOMTR-MCNC: 161 MG/DL (ref 70–99)
GLUCOSE BLDC GLUCOMTR-MCNC: 163 MG/DL (ref 70–99)
GLUCOSE BLDC GLUCOMTR-MCNC: 165 MG/DL (ref 70–99)
GLUCOSE BLDC GLUCOMTR-MCNC: 168 MG/DL (ref 70–99)
GLUCOSE BLDC GLUCOMTR-MCNC: 168 MG/DL (ref 70–99)
GLUCOSE BLDC GLUCOMTR-MCNC: 176 MG/DL (ref 70–99)
GLUCOSE BLDC GLUCOMTR-MCNC: 182 MG/DL (ref 70–99)
GLUCOSE BLDC GLUCOMTR-MCNC: 185 MG/DL (ref 70–99)
GLUCOSE BLDC GLUCOMTR-MCNC: 194 MG/DL (ref 70–99)
GLUCOSE BLDC GLUCOMTR-MCNC: 202 MG/DL (ref 70–99)
GLUCOSE BLDC GLUCOMTR-MCNC: 210 MG/DL (ref 70–99)
GLUCOSE BLDC GLUCOMTR-MCNC: 68 MG/DL (ref 70–99)
GLUCOSE BLDC GLUCOMTR-MCNC: 69 MG/DL (ref 70–99)
GLUCOSE BLDC GLUCOMTR-MCNC: 70 MG/DL (ref 70–99)
GLUCOSE BLDC GLUCOMTR-MCNC: 73 MG/DL (ref 70–99)
GLUCOSE BLDC GLUCOMTR-MCNC: 78 MG/DL (ref 70–99)
GLUCOSE BLDC GLUCOMTR-MCNC: 78 MG/DL (ref 70–99)
GLUCOSE BLDC GLUCOMTR-MCNC: 79 MG/DL (ref 70–99)
GLUCOSE BLDC GLUCOMTR-MCNC: 79 MG/DL (ref 70–99)
GLUCOSE BLDC GLUCOMTR-MCNC: 80 MG/DL (ref 70–99)
GLUCOSE BLDC GLUCOMTR-MCNC: 81 MG/DL (ref 70–99)
GLUCOSE BLDC GLUCOMTR-MCNC: 82 MG/DL (ref 70–99)
GLUCOSE BLDC GLUCOMTR-MCNC: 82 MG/DL (ref 70–99)
GLUCOSE BLDC GLUCOMTR-MCNC: 83 MG/DL (ref 70–99)
GLUCOSE BLDC GLUCOMTR-MCNC: 86 MG/DL (ref 70–99)
GLUCOSE BLDC GLUCOMTR-MCNC: 87 MG/DL (ref 70–99)
GLUCOSE BLDC GLUCOMTR-MCNC: 88 MG/DL (ref 70–99)
GLUCOSE BLDC GLUCOMTR-MCNC: 89 MG/DL (ref 70–99)
GLUCOSE BLDC GLUCOMTR-MCNC: 90 MG/DL (ref 70–99)
GLUCOSE BLDC GLUCOMTR-MCNC: 90 MG/DL (ref 70–99)
GLUCOSE BLDC GLUCOMTR-MCNC: 91 MG/DL (ref 70–99)
GLUCOSE BLDC GLUCOMTR-MCNC: 91 MG/DL (ref 70–99)
GLUCOSE BLDC GLUCOMTR-MCNC: 93 MG/DL (ref 70–99)
GLUCOSE BLDC GLUCOMTR-MCNC: 94 MG/DL (ref 70–99)
GLUCOSE BLDC GLUCOMTR-MCNC: 94 MG/DL (ref 70–99)
GLUCOSE BLDC GLUCOMTR-MCNC: 95 MG/DL (ref 70–99)
GLUCOSE BLDC GLUCOMTR-MCNC: 95 MG/DL (ref 70–99)
GLUCOSE BLDC GLUCOMTR-MCNC: 98 MG/DL (ref 70–99)
GLUCOSE BLDC GLUCOMTR-MCNC: 99 MG/DL (ref 70–99)
GLUCOSE SERPL-MCNC: 103 MG/DL (ref 70–99)
GLUCOSE SERPL-MCNC: 103 MG/DL (ref 70–99)
GLUCOSE SERPL-MCNC: 104 MG/DL (ref 70–99)
GLUCOSE SERPL-MCNC: 112 MG/DL (ref 70–99)
GLUCOSE SERPL-MCNC: 113 MG/DL (ref 70–99)
GLUCOSE SERPL-MCNC: 118 MG/DL (ref 70–99)
GLUCOSE SERPL-MCNC: 120 MG/DL (ref 70–99)
GLUCOSE SERPL-MCNC: 121 MG/DL (ref 70–99)
GLUCOSE SERPL-MCNC: 130 MG/DL (ref 70–99)
GLUCOSE SERPL-MCNC: 133 MG/DL (ref 70–99)
GLUCOSE SERPL-MCNC: 138 MG/DL (ref 70–99)
GLUCOSE SERPL-MCNC: 138 MG/DL (ref 70–99)
GLUCOSE SERPL-MCNC: 139 MG/DL (ref 70–99)
GLUCOSE SERPL-MCNC: 141 MG/DL (ref 70–99)
GLUCOSE SERPL-MCNC: 141 MG/DL (ref 70–99)
GLUCOSE SERPL-MCNC: 145 MG/DL (ref 70–99)
GLUCOSE SERPL-MCNC: 146 MG/DL (ref 70–99)
GLUCOSE SERPL-MCNC: 151 MG/DL (ref 70–99)
GLUCOSE SERPL-MCNC: 151 MG/DL (ref 70–99)
GLUCOSE SERPL-MCNC: 152 MG/DL (ref 70–99)
GLUCOSE SERPL-MCNC: 153 MG/DL (ref 70–99)
GLUCOSE SERPL-MCNC: 158 MG/DL (ref 70–99)
GLUCOSE SERPL-MCNC: 159 MG/DL (ref 70–99)
GLUCOSE SERPL-MCNC: 160 MG/DL (ref 70–99)
GLUCOSE SERPL-MCNC: 160 MG/DL (ref 70–99)
GLUCOSE SERPL-MCNC: 164 MG/DL (ref 70–99)
GLUCOSE SERPL-MCNC: 168 MG/DL (ref 70–99)
GLUCOSE SERPL-MCNC: 182 MG/DL (ref 70–99)
GLUCOSE SERPL-MCNC: 184 MG/DL (ref 70–99)
GLUCOSE SERPL-MCNC: 212 MG/DL (ref 70–99)
GLUCOSE SERPL-MCNC: 88 MG/DL (ref 70–99)
GLUCOSE SERPL-MCNC: 90 MG/DL (ref 70–99)
GLUCOSE SERPL-MCNC: 96 MG/DL (ref 70–99)
GLUCOSE SERPL-MCNC: 97 MG/DL (ref 70–99)
GLUCOSE UR STRIP-MCNC: NEGATIVE MG/DL
HBA1C MFR BLD: 6 %
HCO3 BLDV-SCNC: 28 MMOL/L (ref 21–28)
HCO3 BLDV-SCNC: 28 MMOL/L (ref 21–28)
HCO3 SERPL-SCNC: 20 MMOL/L (ref 22–29)
HCO3 SERPL-SCNC: 21 MMOL/L (ref 22–29)
HCO3 SERPL-SCNC: 22 MMOL/L (ref 22–29)
HCO3 SERPL-SCNC: 23 MMOL/L (ref 22–29)
HCO3 SERPL-SCNC: 27 MMOL/L (ref 22–29)
HCO3 SERPL-SCNC: 27 MMOL/L (ref 22–29)
HCO3 SERPL-SCNC: 28 MMOL/L (ref 22–29)
HCT VFR BLD AUTO: 26.1 % (ref 35–47)
HCT VFR BLD AUTO: 26.3 % (ref 35–47)
HCT VFR BLD AUTO: 26.4 % (ref 35–47)
HCT VFR BLD AUTO: 26.6 % (ref 35–47)
HCT VFR BLD AUTO: 27.4 % (ref 35–47)
HCT VFR BLD AUTO: 27.5 % (ref 35–47)
HCT VFR BLD AUTO: 27.9 % (ref 35–47)
HCT VFR BLD AUTO: 28.6 % (ref 35–47)
HCT VFR BLD AUTO: 29 % (ref 35–47)
HCT VFR BLD AUTO: 29.1 % (ref 35–47)
HCT VFR BLD AUTO: 29.3 % (ref 35–47)
HCT VFR BLD AUTO: 29.4 % (ref 35–47)
HCT VFR BLD AUTO: 29.4 % (ref 35–47)
HCT VFR BLD AUTO: 29.5 % (ref 35–47)
HCT VFR BLD AUTO: 29.6 % (ref 35–47)
HCT VFR BLD AUTO: 30.4 % (ref 35–47)
HCT VFR BLD AUTO: 30.5 % (ref 35–47)
HCT VFR BLD AUTO: 30.6 % (ref 35–47)
HCT VFR BLD AUTO: 33.1 % (ref 35–47)
HCT VFR BLD AUTO: 33.9 % (ref 35–47)
HCT VFR BLD AUTO: 34.4 % (ref 35–47)
HCT VFR BLD AUTO: 34.4 % (ref 35–47)
HCT VFR BLD AUTO: 35.2 % (ref 35–47)
HCT VFR BLD AUTO: 35.7 % (ref 35–47)
HCT VFR BLD AUTO: 36.9 % (ref 35–47)
HCT VFR BLD AUTO: 37.5 % (ref 35–47)
HCT VFR BLD AUTO: 38.8 % (ref 35–47)
HCT VFR BLD AUTO: 39.2 % (ref 35–47)
HGB BLD-MCNC: 10.5 G/DL (ref 11.7–15.7)
HGB BLD-MCNC: 11 G/DL (ref 11.7–15.7)
HGB BLD-MCNC: 11.2 G/DL (ref 11.7–15.7)
HGB BLD-MCNC: 11.3 G/DL (ref 11.7–15.7)
HGB BLD-MCNC: 11.5 G/DL (ref 11.7–15.7)
HGB BLD-MCNC: 11.7 G/DL (ref 11.7–15.7)
HGB BLD-MCNC: 11.9 G/DL (ref 11.7–15.7)
HGB BLD-MCNC: 12.1 G/DL (ref 11.7–15.7)
HGB BLD-MCNC: 12.4 G/DL (ref 11.7–15.7)
HGB BLD-MCNC: 13.1 G/DL (ref 11.7–15.7)
HGB BLD-MCNC: 8.3 G/DL (ref 11.7–15.7)
HGB BLD-MCNC: 8.4 G/DL (ref 11.7–15.7)
HGB BLD-MCNC: 8.4 G/DL (ref 11.7–15.7)
HGB BLD-MCNC: 8.5 G/DL (ref 11.7–15.7)
HGB BLD-MCNC: 9 G/DL (ref 11.7–15.7)
HGB BLD-MCNC: 9.1 G/DL (ref 11.7–15.7)
HGB BLD-MCNC: 9.1 G/DL (ref 11.7–15.7)
HGB BLD-MCNC: 9.2 G/DL (ref 11.7–15.7)
HGB BLD-MCNC: 9.2 G/DL (ref 11.7–15.7)
HGB BLD-MCNC: 9.3 G/DL (ref 11.7–15.7)
HGB BLD-MCNC: 9.4 G/DL (ref 11.7–15.7)
HGB BLD-MCNC: 9.4 G/DL (ref 11.7–15.7)
HGB BLD-MCNC: 9.6 G/DL (ref 11.7–15.7)
HGB BLD-MCNC: 9.7 G/DL (ref 11.7–15.7)
HGB UR QL STRIP: ABNORMAL
HOLD SPECIMEN: NORMAL
IMM GRANULOCYTES # BLD: 0 10E3/UL
IMM GRANULOCYTES # BLD: 0.1 10E3/UL
IMM GRANULOCYTES # BLD: 0.1 10E3/UL
IMM GRANULOCYTES # BLD: ABNORMAL 10*3/UL
IMM GRANULOCYTES NFR BLD: 0 %
IMM GRANULOCYTES NFR BLD: 0 %
IMM GRANULOCYTES NFR BLD: 1 %
IMM GRANULOCYTES NFR BLD: ABNORMAL %
INR PPP: 1.21 (ref 0.85–1.15)
INR PPP: 1.23 (ref 0.85–1.15)
INTERPRETATION ECG - MUSE: NORMAL
INTERPRETATION ECG - MUSE: NORMAL
KETONES UR STRIP-MCNC: ABNORMAL MG/DL
KETONES UR STRIP-MCNC: NEGATIVE MG/DL
LACTATE SERPL-SCNC: 1 MMOL/L (ref 0.7–2)
LACTATE SERPL-SCNC: 1.1 MMOL/L (ref 0.7–2)
LACTATE SERPL-SCNC: 1.5 MMOL/L (ref 0.7–2)
LEUKOCYTE ESTERASE UR QL STRIP: ABNORMAL
LIPASE SERPL-CCNC: 35 U/L (ref 13–60)
LYMPHOCYTES # BLD AUTO: 0.9 10E3/UL (ref 0.8–5.3)
LYMPHOCYTES # BLD AUTO: 0.9 10E3/UL (ref 0.8–5.3)
LYMPHOCYTES # BLD AUTO: 1 10E3/UL (ref 0.8–5.3)
LYMPHOCYTES # BLD AUTO: ABNORMAL 10*3/UL
LYMPHOCYTES # BLD MANUAL: 0.5 10E3/UL (ref 0.8–5.3)
LYMPHOCYTES NFR BLD AUTO: 12 %
LYMPHOCYTES NFR BLD AUTO: 14 %
LYMPHOCYTES NFR BLD AUTO: 19 %
LYMPHOCYTES NFR BLD AUTO: 5 %
LYMPHOCYTES NFR BLD AUTO: 6 %
LYMPHOCYTES NFR BLD AUTO: ABNORMAL %
LYMPHOCYTES NFR BLD MANUAL: 6 %
MAGNESIUM SERPL-MCNC: 1.5 MG/DL (ref 1.7–2.3)
MAGNESIUM SERPL-MCNC: 1.6 MG/DL (ref 1.7–2.3)
MAGNESIUM SERPL-MCNC: 1.7 MG/DL (ref 1.7–2.3)
MAGNESIUM SERPL-MCNC: 1.8 MG/DL (ref 1.7–2.3)
MAGNESIUM SERPL-MCNC: 1.9 MG/DL (ref 1.7–2.3)
MAGNESIUM SERPL-MCNC: 2.1 MG/DL (ref 1.7–2.3)
MAGNESIUM SERPL-MCNC: 2.2 MG/DL (ref 1.7–2.3)
MAGNESIUM SERPL-MCNC: 2.3 MG/DL (ref 1.7–2.3)
MAGNESIUM SERPL-MCNC: 2.3 MG/DL (ref 1.7–2.3)
MAGNESIUM SERPL-MCNC: 2.6 MG/DL (ref 1.7–2.3)
MAGNESIUM SERPL-MCNC: 2.8 MG/DL (ref 1.7–2.3)
MAGNESIUM SERPL-MCNC: 2.9 MG/DL (ref 1.7–2.3)
MAGNESIUM SERPL-MCNC: 3.1 MG/DL (ref 1.7–2.3)
MCH RBC QN AUTO: 27.8 PG (ref 26.5–33)
MCH RBC QN AUTO: 27.8 PG (ref 26.5–33)
MCH RBC QN AUTO: 28.2 PG (ref 26.5–33)
MCH RBC QN AUTO: 28.3 PG (ref 26.5–33)
MCH RBC QN AUTO: 28.4 PG (ref 26.5–33)
MCH RBC QN AUTO: 28.5 PG (ref 26.5–33)
MCH RBC QN AUTO: 28.5 PG (ref 26.5–33)
MCH RBC QN AUTO: 28.6 PG (ref 26.5–33)
MCH RBC QN AUTO: 28.7 PG (ref 26.5–33)
MCH RBC QN AUTO: 28.8 PG (ref 26.5–33)
MCH RBC QN AUTO: 29.1 PG (ref 26.5–33)
MCH RBC QN AUTO: 29.2 PG (ref 26.5–33)
MCH RBC QN AUTO: 29.5 PG (ref 26.5–33)
MCH RBC QN AUTO: 29.5 PG (ref 26.5–33)
MCH RBC QN AUTO: 29.7 PG (ref 26.5–33)
MCH RBC QN AUTO: 29.8 PG (ref 26.5–33)
MCH RBC QN AUTO: 29.8 PG (ref 26.5–33)
MCH RBC QN AUTO: 29.9 PG (ref 26.5–33)
MCH RBC QN AUTO: 30.1 PG (ref 26.5–33)
MCH RBC QN AUTO: 30.3 PG (ref 26.5–33)
MCH RBC QN AUTO: 30.4 PG (ref 26.5–33)
MCH RBC QN AUTO: 30.5 PG (ref 26.5–33)
MCH RBC QN AUTO: 30.7 PG (ref 26.5–33)
MCH RBC QN AUTO: 31 PG (ref 26.5–33)
MCH RBC QN AUTO: 31.3 PG (ref 26.5–33)
MCH RBC QN AUTO: 31.5 PG (ref 26.5–33)
MCHC RBC AUTO-ENTMCNC: 30.7 G/DL (ref 31.5–36.5)
MCHC RBC AUTO-ENTMCNC: 30.9 G/DL (ref 31.5–36.5)
MCHC RBC AUTO-ENTMCNC: 31.1 G/DL (ref 31.5–36.5)
MCHC RBC AUTO-ENTMCNC: 31.2 G/DL (ref 31.5–36.5)
MCHC RBC AUTO-ENTMCNC: 31.3 G/DL (ref 31.5–36.5)
MCHC RBC AUTO-ENTMCNC: 31.3 G/DL (ref 31.5–36.5)
MCHC RBC AUTO-ENTMCNC: 31.7 G/DL (ref 31.5–36.5)
MCHC RBC AUTO-ENTMCNC: 31.7 G/DL (ref 31.5–36.5)
MCHC RBC AUTO-ENTMCNC: 31.8 G/DL (ref 31.5–36.5)
MCHC RBC AUTO-ENTMCNC: 31.9 G/DL (ref 31.5–36.5)
MCHC RBC AUTO-ENTMCNC: 31.9 G/DL (ref 31.5–36.5)
MCHC RBC AUTO-ENTMCNC: 32 G/DL (ref 31.5–36.5)
MCHC RBC AUTO-ENTMCNC: 32 G/DL (ref 31.5–36.5)
MCHC RBC AUTO-ENTMCNC: 32.1 G/DL (ref 31.5–36.5)
MCHC RBC AUTO-ENTMCNC: 32.1 G/DL (ref 31.5–36.5)
MCHC RBC AUTO-ENTMCNC: 32.2 G/DL (ref 31.5–36.5)
MCHC RBC AUTO-ENTMCNC: 32.3 G/DL (ref 31.5–36.5)
MCHC RBC AUTO-ENTMCNC: 32.4 G/DL (ref 31.5–36.5)
MCHC RBC AUTO-ENTMCNC: 32.5 G/DL (ref 31.5–36.5)
MCHC RBC AUTO-ENTMCNC: 32.6 G/DL (ref 31.5–36.5)
MCHC RBC AUTO-ENTMCNC: 32.8 G/DL (ref 31.5–36.5)
MCHC RBC AUTO-ENTMCNC: 33.1 G/DL (ref 31.5–36.5)
MCHC RBC AUTO-ENTMCNC: 33.2 G/DL (ref 31.5–36.5)
MCHC RBC AUTO-ENTMCNC: 33.4 G/DL (ref 31.5–36.5)
MCV RBC AUTO: 101 FL (ref 78–100)
MCV RBC AUTO: 89 FL (ref 78–100)
MCV RBC AUTO: 90 FL (ref 78–100)
MCV RBC AUTO: 90 FL (ref 78–100)
MCV RBC AUTO: 91 FL (ref 78–100)
MCV RBC AUTO: 92 FL (ref 78–100)
MCV RBC AUTO: 93 FL (ref 78–100)
MCV RBC AUTO: 94 FL (ref 78–100)
MCV RBC AUTO: 96 FL (ref 78–100)
MCV RBC AUTO: 97 FL (ref 78–100)
MONOCYTES # BLD AUTO: 0.5 10E3/UL (ref 0–1.3)
MONOCYTES # BLD AUTO: 0.6 10E3/UL (ref 0–1.3)
MONOCYTES # BLD AUTO: 0.6 10E3/UL (ref 0–1.3)
MONOCYTES # BLD AUTO: 1.1 10E3/UL (ref 0–1.3)
MONOCYTES # BLD AUTO: 1.1 10E3/UL (ref 0–1.3)
MONOCYTES # BLD AUTO: ABNORMAL 10*3/UL
MONOCYTES # BLD MANUAL: 0.3 10E3/UL (ref 0–1.3)
MONOCYTES NFR BLD AUTO: 6 %
MONOCYTES NFR BLD AUTO: 6 %
MONOCYTES NFR BLD AUTO: 8 %
MONOCYTES NFR BLD AUTO: 9 %
MONOCYTES NFR BLD AUTO: 9 %
MONOCYTES NFR BLD AUTO: ABNORMAL %
MONOCYTES NFR BLD MANUAL: 4 %
MUCOUS THREADS #/AREA URNS LPF: PRESENT /LPF
NEUTROPHILS # BLD AUTO: 16 10E3/UL (ref 1.6–8.3)
NEUTROPHILS # BLD AUTO: 16.5 10E3/UL (ref 1.6–8.3)
NEUTROPHILS # BLD AUTO: 3.7 10E3/UL (ref 1.6–8.3)
NEUTROPHILS # BLD AUTO: 4.9 10E3/UL (ref 1.6–8.3)
NEUTROPHILS # BLD AUTO: 5.8 10E3/UL (ref 1.6–8.3)
NEUTROPHILS # BLD AUTO: ABNORMAL 10*3/UL
NEUTROPHILS # BLD MANUAL: 7.5 10E3/UL (ref 1.6–8.3)
NEUTROPHILS NFR BLD AUTO: 67 %
NEUTROPHILS NFR BLD AUTO: 76 %
NEUTROPHILS NFR BLD AUTO: 77 %
NEUTROPHILS NFR BLD AUTO: 87 %
NEUTROPHILS NFR BLD AUTO: 88 %
NEUTROPHILS NFR BLD AUTO: ABNORMAL %
NEUTROPHILS NFR BLD MANUAL: 87 %
NITRATE UR QL: NEGATIVE
NITRATE UR QL: POSITIVE
NRBC # BLD AUTO: 0 10E3/UL
NRBC BLD AUTO-RTO: 0 /100
NT-PROBNP SERPL-MCNC: 1514 PG/ML (ref 0–1800)
NT-PROBNP SERPL-MCNC: 730 PG/ML (ref 0–1800)
O2/TOTAL GAS SETTING VFR VENT: 20 %
O2/TOTAL GAS SETTING VFR VENT: 96 %
OSMOLALITY UR: 321 MMOL/KG (ref 100–1200)
OXYHGB MFR BLDV: 85 % (ref 70–75)
OXYHGB MFR BLDV: 88 % (ref 70–75)
P AXIS - MUSE: 49 DEGREES
P AXIS - MUSE: 54 DEGREES
PCO2 BLDV: 45 MM HG (ref 40–50)
PCO2 BLDV: 46 MM HG (ref 40–50)
PH BLDV: 7.4 [PH] (ref 7.32–7.43)
PH BLDV: 7.41 [PH] (ref 7.32–7.43)
PH UR STRIP: 6 [PH] (ref 5–7)
PH UR STRIP: 6 [PH] (ref 5–7)
PH UR STRIP: 7 [PH] (ref 5–7)
PH UR STRIP: 8 [PH] (ref 5–7)
PHOSPHATE SERPL-MCNC: 1.6 MG/DL (ref 2.5–4.5)
PHOSPHATE SERPL-MCNC: 1.7 MG/DL (ref 2.5–4.5)
PHOSPHATE SERPL-MCNC: 2.1 MG/DL (ref 2.5–4.5)
PHOSPHATE SERPL-MCNC: 2.3 MG/DL (ref 2.5–4.5)
PHOSPHATE SERPL-MCNC: 2.4 MG/DL (ref 2.5–4.5)
PHOSPHATE SERPL-MCNC: 2.5 MG/DL (ref 2.5–4.5)
PHOSPHATE SERPL-MCNC: 2.6 MG/DL (ref 2.5–4.5)
PHOSPHATE SERPL-MCNC: 2.6 MG/DL (ref 2.5–4.5)
PHOSPHATE SERPL-MCNC: 2.8 MG/DL (ref 2.5–4.5)
PHOSPHATE SERPL-MCNC: 2.9 MG/DL (ref 2.5–4.5)
PHOSPHATE SERPL-MCNC: 2.9 MG/DL (ref 2.5–4.5)
PHOSPHATE SERPL-MCNC: 3 MG/DL (ref 2.5–4.5)
PHOSPHATE SERPL-MCNC: 3.1 MG/DL (ref 2.5–4.5)
PHOSPHATE SERPL-MCNC: 3.2 MG/DL (ref 2.5–4.5)
PHOSPHATE SERPL-MCNC: 3.5 MG/DL (ref 2.5–4.5)
PHOSPHATE SERPL-MCNC: 3.7 MG/DL (ref 2.5–4.5)
PHOSPHATE SERPL-MCNC: 3.8 MG/DL (ref 2.5–4.5)
PHOSPHATE SERPL-MCNC: 3.9 MG/DL (ref 2.5–4.5)
PHOSPHATE SERPL-MCNC: 3.9 MG/DL (ref 2.5–4.5)
PHOSPHATE SERPL-MCNC: 4 MG/DL (ref 2.5–4.5)
PHOSPHATE SERPL-MCNC: 4.1 MG/DL (ref 2.5–4.5)
PHOSPHATE SERPL-MCNC: 4.2 MG/DL (ref 2.5–4.5)
PHOSPHATE SERPL-MCNC: 4.3 MG/DL (ref 2.5–4.5)
PHOSPHATE SERPL-MCNC: 4.4 MG/DL (ref 2.5–4.5)
PHOSPHATE SERPL-MCNC: 4.4 MG/DL (ref 2.5–4.5)
PHOSPHATE SERPL-MCNC: 4.6 MG/DL (ref 2.5–4.5)
PHOSPHATE SERPL-MCNC: 4.8 MG/DL (ref 2.5–4.5)
PHOSPHATE SERPL-MCNC: 4.9 MG/DL (ref 2.5–4.5)
PHOSPHATE SERPL-MCNC: 5.8 MG/DL (ref 2.5–4.5)
PLAT MORPH BLD: ABNORMAL
PLATELET # BLD AUTO: 178 10E3/UL (ref 150–450)
PLATELET # BLD AUTO: 193 10E3/UL (ref 150–450)
PLATELET # BLD AUTO: 199 10E3/UL (ref 150–450)
PLATELET # BLD AUTO: 207 10E3/UL (ref 150–450)
PLATELET # BLD AUTO: 207 10E3/UL (ref 150–450)
PLATELET # BLD AUTO: 211 10E3/UL (ref 150–450)
PLATELET # BLD AUTO: 212 10E3/UL (ref 150–450)
PLATELET # BLD AUTO: 230 10E3/UL (ref 150–450)
PLATELET # BLD AUTO: 231 10E3/UL (ref 150–450)
PLATELET # BLD AUTO: 234 10E3/UL (ref 150–450)
PLATELET # BLD AUTO: 236 10E3/UL (ref 150–450)
PLATELET # BLD AUTO: 248 10E3/UL (ref 150–450)
PLATELET # BLD AUTO: 262 10E3/UL (ref 150–450)
PLATELET # BLD AUTO: 268 10E3/UL (ref 150–450)
PLATELET # BLD AUTO: 270 10E3/UL (ref 150–450)
PLATELET # BLD AUTO: 272 10E3/UL (ref 150–450)
PLATELET # BLD AUTO: 280 10E3/UL (ref 150–450)
PLATELET # BLD AUTO: 286 10E3/UL (ref 150–450)
PLATELET # BLD AUTO: 287 10E3/UL (ref 150–450)
PLATELET # BLD AUTO: 291 10E3/UL (ref 150–450)
PLATELET # BLD AUTO: 291 10E3/UL (ref 150–450)
PLATELET # BLD AUTO: 298 10E3/UL (ref 150–450)
PLATELET # BLD AUTO: 298 10E3/UL (ref 150–450)
PLATELET # BLD AUTO: 299 10E3/UL (ref 150–450)
PLATELET # BLD AUTO: 308 10E3/UL (ref 150–450)
PLATELET # BLD AUTO: 310 10E3/UL (ref 150–450)
PLATELET # BLD AUTO: 329 10E3/UL (ref 150–450)
PLATELET # BLD AUTO: 329 10E3/UL (ref 150–450)
PLATELET # BLD AUTO: 330 10E3/UL (ref 150–450)
PLATELET # BLD AUTO: 333 10E3/UL (ref 150–450)
PLATELET # BLD AUTO: 345 10E3/UL (ref 150–450)
PO2 BLDV: 51 MM HG (ref 25–47)
PO2 BLDV: 58 MM HG (ref 25–47)
POTASSIUM SERPL-SCNC: 3.1 MMOL/L (ref 3.4–5.3)
POTASSIUM SERPL-SCNC: 3.1 MMOL/L (ref 3.4–5.3)
POTASSIUM SERPL-SCNC: 3.4 MMOL/L (ref 3.4–5.3)
POTASSIUM SERPL-SCNC: 3.5 MMOL/L (ref 3.4–5.3)
POTASSIUM SERPL-SCNC: 3.8 MMOL/L (ref 3.4–5.3)
POTASSIUM SERPL-SCNC: 3.9 MMOL/L (ref 3.4–5.3)
POTASSIUM SERPL-SCNC: 4 MMOL/L (ref 3.4–5.3)
POTASSIUM SERPL-SCNC: 4.1 MMOL/L (ref 3.4–5.3)
POTASSIUM SERPL-SCNC: 4.1 MMOL/L (ref 3.4–5.3)
POTASSIUM SERPL-SCNC: 4.2 MMOL/L (ref 3.4–5.3)
POTASSIUM SERPL-SCNC: 4.3 MMOL/L (ref 3.4–5.3)
POTASSIUM SERPL-SCNC: 4.3 MMOL/L (ref 3.4–5.3)
POTASSIUM SERPL-SCNC: 4.4 MMOL/L (ref 3.4–5.3)
POTASSIUM SERPL-SCNC: 4.4 MMOL/L (ref 3.4–5.3)
POTASSIUM SERPL-SCNC: 4.5 MMOL/L (ref 3.4–5.3)
POTASSIUM SERPL-SCNC: 4.6 MMOL/L (ref 3.4–5.3)
POTASSIUM SERPL-SCNC: 4.7 MMOL/L (ref 3.4–5.3)
POTASSIUM SERPL-SCNC: 4.8 MMOL/L (ref 3.4–5.3)
POTASSIUM SERPL-SCNC: 4.9 MMOL/L (ref 3.4–5.3)
POTASSIUM SERPL-SCNC: 4.9 MMOL/L (ref 3.4–5.3)
POTASSIUM SERPL-SCNC: 5 MMOL/L (ref 3.4–5.3)
POTASSIUM SERPL-SCNC: 5.9 MMOL/L (ref 3.4–5.3)
PR INTERVAL - MUSE: 124 MS
PR INTERVAL - MUSE: 156 MS
PROT SERPL-MCNC: 4.3 G/DL (ref 6.4–8.3)
PROT SERPL-MCNC: 7.1 G/DL (ref 6.4–8.3)
PROT SERPL-MCNC: 7.4 G/DL (ref 6.4–8.3)
PROT SERPL-MCNC: 7.4 G/DL (ref 6.4–8.3)
PROT SERPL-MCNC: 9 G/DL (ref 6.4–8.3)
QRS DURATION - MUSE: 70 MS
QRS DURATION - MUSE: 78 MS
QT - MUSE: 382 MS
QT - MUSE: 438 MS
QTC - MUSE: 462 MS
QTC - MUSE: 467 MS
R AXIS - MUSE: 39 DEGREES
R AXIS - MUSE: 5 DEGREES
RBC # BLD AUTO: 2.91 10E6/UL (ref 3.8–5.2)
RBC # BLD AUTO: 2.91 10E6/UL (ref 3.8–5.2)
RBC # BLD AUTO: 2.92 10E6/UL (ref 3.8–5.2)
RBC # BLD AUTO: 2.98 10E6/UL (ref 3.8–5.2)
RBC # BLD AUTO: 2.99 10E6/UL (ref 3.8–5.2)
RBC # BLD AUTO: 3.02 10E6/UL (ref 3.8–5.2)
RBC # BLD AUTO: 3.09 10E6/UL (ref 3.8–5.2)
RBC # BLD AUTO: 3.13 10E6/UL (ref 3.8–5.2)
RBC # BLD AUTO: 3.14 10E6/UL (ref 3.8–5.2)
RBC # BLD AUTO: 3.14 10E6/UL (ref 3.8–5.2)
RBC # BLD AUTO: 3.15 10E6/UL (ref 3.8–5.2)
RBC # BLD AUTO: 3.21 10E6/UL (ref 3.8–5.2)
RBC # BLD AUTO: 3.22 10E6/UL (ref 3.8–5.2)
RBC # BLD AUTO: 3.22 10E6/UL (ref 3.8–5.2)
RBC # BLD AUTO: 3.27 10E6/UL (ref 3.8–5.2)
RBC # BLD AUTO: 3.29 10E6/UL (ref 3.8–5.2)
RBC # BLD AUTO: 3.29 10E6/UL (ref 3.8–5.2)
RBC # BLD AUTO: 3.31 10E6/UL (ref 3.8–5.2)
RBC # BLD AUTO: 3.56 10E6/UL (ref 3.8–5.2)
RBC # BLD AUTO: 3.62 10E6/UL (ref 3.8–5.2)
RBC # BLD AUTO: 3.73 10E6/UL (ref 3.8–5.2)
RBC # BLD AUTO: 3.73 10E6/UL (ref 3.8–5.2)
RBC # BLD AUTO: 3.74 10E6/UL (ref 3.8–5.2)
RBC # BLD AUTO: 3.78 10E6/UL (ref 3.8–5.2)
RBC # BLD AUTO: 3.86 10E6/UL (ref 3.8–5.2)
RBC # BLD AUTO: 3.92 10E6/UL (ref 3.8–5.2)
RBC # BLD AUTO: 4.08 10E6/UL (ref 3.8–5.2)
RBC # BLD AUTO: 4.29 10E6/UL (ref 3.8–5.2)
RBC MORPH BLD: ABNORMAL
RBC URINE: 10 /HPF
RBC URINE: >182 /HPF
SAO2 % BLDV: 86.2 % (ref 70–75)
SAO2 % BLDV: 89.5 % (ref 70–75)
SODIUM SERPL-SCNC: 131 MMOL/L (ref 135–145)
SODIUM SERPL-SCNC: 133 MMOL/L (ref 135–145)
SODIUM SERPL-SCNC: 134 MMOL/L (ref 135–145)
SODIUM SERPL-SCNC: 135 MMOL/L (ref 135–145)
SODIUM SERPL-SCNC: 136 MMOL/L (ref 135–145)
SODIUM SERPL-SCNC: 137 MMOL/L (ref 135–145)
SODIUM SERPL-SCNC: 138 MMOL/L (ref 135–145)
SODIUM SERPL-SCNC: 139 MMOL/L (ref 135–145)
SODIUM SERPL-SCNC: 139 MMOL/L (ref 135–145)
SODIUM SERPL-SCNC: 141 MMOL/L (ref 135–145)
SODIUM SERPL-SCNC: 142 MMOL/L (ref 135–145)
SODIUM SERPL-SCNC: 143 MMOL/L (ref 135–145)
SODIUM SERPL-SCNC: 144 MMOL/L (ref 135–145)
SODIUM SERPL-SCNC: 145 MMOL/L (ref 135–145)
SODIUM SERPL-SCNC: 147 MMOL/L (ref 135–145)
SODIUM SERPL-SCNC: 147 MMOL/L (ref 135–145)
SODIUM SERPL-SCNC: 148 MMOL/L (ref 135–145)
SODIUM SERPL-SCNC: 150 MMOL/L (ref 135–145)
SODIUM SERPL-SCNC: 150 MMOL/L (ref 135–145)
SODIUM SERPL-SCNC: 151 MMOL/L (ref 135–145)
SODIUM UR-SCNC: 53 MMOL/L
SODIUM UR-SCNC: 69 MMOL/L
SP GR UR STRIP: 1.01 (ref 1–1.03)
SP GR UR STRIP: 1.01 (ref 1–1.03)
SP GR UR STRIP: 1.02 (ref 1–1.03)
SP GR UR STRIP: 1.02 (ref 1–1.03)
SQUAMOUS EPITHELIAL: 3 /HPF
SQUAMOUS EPITHELIAL: 5 /HPF
SYSTOLIC BLOOD PRESSURE - MUSE: NORMAL MMHG
SYSTOLIC BLOOD PRESSURE - MUSE: NORMAL MMHG
T AXIS - MUSE: 55 DEGREES
T AXIS - MUSE: 60 DEGREES
T VAGINALIS DNA VAG QL NAA+PROBE: NOT DETECTED
TRANSITIONAL EPI: 4 /HPF
TRANSITIONAL EPI: <1 /HPF
TSH SERPL DL<=0.005 MIU/L-ACNC: 0.64 UIU/ML (ref 0.3–4.2)
UROBILINOGEN UR STRIP-MCNC: NORMAL MG/DL
VENTRICULAR RATE- MUSE: 67 BPM
VENTRICULAR RATE- MUSE: 90 BPM
WBC # BLD AUTO: 10.7 10E3/UL (ref 4–11)
WBC # BLD AUTO: 11.2 10E3/UL (ref 4–11)
WBC # BLD AUTO: 13.4 10E3/UL (ref 4–11)
WBC # BLD AUTO: 13.8 10E3/UL (ref 4–11)
WBC # BLD AUTO: 18.2 10E3/UL (ref 4–11)
WBC # BLD AUTO: 18.7 10E3/UL (ref 4–11)
WBC # BLD AUTO: 25 10E3/UL (ref 4–11)
WBC # BLD AUTO: 4.1 10E3/UL (ref 4–11)
WBC # BLD AUTO: 4.8 10E3/UL (ref 4–11)
WBC # BLD AUTO: 4.9 10E3/UL (ref 4–11)
WBC # BLD AUTO: 5.2 10E3/UL (ref 4–11)
WBC # BLD AUTO: 5.2 10E3/UL (ref 4–11)
WBC # BLD AUTO: 5.4 10E3/UL (ref 4–11)
WBC # BLD AUTO: 5.5 10E3/UL (ref 4–11)
WBC # BLD AUTO: 6.3 10E3/UL (ref 4–11)
WBC # BLD AUTO: 6.4 10E3/UL (ref 4–11)
WBC # BLD AUTO: 6.5 10E3/UL (ref 4–11)
WBC # BLD AUTO: 6.7 10E3/UL (ref 4–11)
WBC # BLD AUTO: 6.7 10E3/UL (ref 4–11)
WBC # BLD AUTO: 6.9 10E3/UL (ref 4–11)
WBC # BLD AUTO: 6.9 10E3/UL (ref 4–11)
WBC # BLD AUTO: 7.1 10E3/UL (ref 4–11)
WBC # BLD AUTO: 7.5 10E3/UL (ref 4–11)
WBC # BLD AUTO: 7.6 10E3/UL (ref 4–11)
WBC # BLD AUTO: 8.6 10E3/UL (ref 4–11)
WBC # BLD AUTO: 8.7 10E3/UL (ref 4–11)
WBC # BLD AUTO: 9.3 10E3/UL (ref 4–11)
WBC # BLD AUTO: 9.6 10E3/UL (ref 4–11)
WBC CLUMPS #/AREA URNS HPF: PRESENT /HPF
WBC CLUMPS #/AREA URNS HPF: PRESENT /HPF
WBC URINE: 131 /HPF
WBC URINE: >182 /HPF

## 2024-01-01 PROCEDURE — 82570 ASSAY OF URINE CREATININE: CPT | Performed by: INTERNAL MEDICINE

## 2024-01-01 PROCEDURE — 250N000011 HC RX IP 250 OP 636: Performed by: INTERNAL MEDICINE

## 2024-01-01 PROCEDURE — 99232 SBSQ HOSP IP/OBS MODERATE 35: CPT | Performed by: INTERNAL MEDICINE

## 2024-01-01 PROCEDURE — 84100 ASSAY OF PHOSPHORUS: CPT | Performed by: INTERNAL MEDICINE

## 2024-01-01 PROCEDURE — 84100 ASSAY OF PHOSPHORUS: CPT | Performed by: PHYSICIAN ASSISTANT

## 2024-01-01 PROCEDURE — 85027 COMPLETE CBC AUTOMATED: CPT | Performed by: HOSPITALIST

## 2024-01-01 PROCEDURE — 255N000002 HC RX 255 OP 636: Mod: JZ | Performed by: RADIOLOGY

## 2024-01-01 PROCEDURE — 97110 THERAPEUTIC EXERCISES: CPT | Mod: GP | Performed by: PHYSICAL THERAPIST

## 2024-01-01 PROCEDURE — 250N000013 HC RX MED GY IP 250 OP 250 PS 637: Performed by: CLINICAL NURSE SPECIALIST

## 2024-01-01 PROCEDURE — 120N000002 HC R&B MED SURG/OB UMMC

## 2024-01-01 PROCEDURE — 258N000003 HC RX IP 258 OP 636: Performed by: EMERGENCY MEDICINE

## 2024-01-01 PROCEDURE — 250N000011 HC RX IP 250 OP 636: Performed by: HOSPITALIST

## 2024-01-01 PROCEDURE — 250N000013 HC RX MED GY IP 250 OP 250 PS 637: Performed by: INTERNAL MEDICINE

## 2024-01-01 PROCEDURE — 250N000013 HC RX MED GY IP 250 OP 250 PS 637: Performed by: PHYSICIAN ASSISTANT

## 2024-01-01 PROCEDURE — 258N000003 HC RX IP 258 OP 636: Performed by: HOSPITALIST

## 2024-01-01 PROCEDURE — 250N000013 HC RX MED GY IP 250 OP 250 PS 637: Performed by: HOSPITALIST

## 2024-01-01 PROCEDURE — 250N000013 HC RX MED GY IP 250 OP 250 PS 637: Performed by: PSYCHIATRY & NEUROLOGY

## 2024-01-01 PROCEDURE — 85041 AUTOMATED RBC COUNT: CPT | Performed by: STUDENT IN AN ORGANIZED HEALTH CARE EDUCATION/TRAINING PROGRAM

## 2024-01-01 PROCEDURE — 99233 SBSQ HOSP IP/OBS HIGH 50: CPT | Performed by: STUDENT IN AN ORGANIZED HEALTH CARE EDUCATION/TRAINING PROGRAM

## 2024-01-01 PROCEDURE — 80048 BASIC METABOLIC PNL TOTAL CA: CPT | Performed by: HOSPITALIST

## 2024-01-01 PROCEDURE — 99153 MOD SED SAME PHYS/QHP EA: CPT

## 2024-01-01 PROCEDURE — 258N000001 HC RX 258: Performed by: HOSPITALIST

## 2024-01-01 PROCEDURE — 72194 CT PELVIS W/O & W/DYE: CPT

## 2024-01-01 PROCEDURE — 83605 ASSAY OF LACTIC ACID: CPT | Performed by: EMERGENCY MEDICINE

## 2024-01-01 PROCEDURE — 85025 COMPLETE CBC W/AUTO DIFF WBC: CPT | Performed by: EMERGENCY MEDICINE

## 2024-01-01 PROCEDURE — 36415 COLL VENOUS BLD VENIPUNCTURE: CPT | Performed by: EMERGENCY MEDICINE

## 2024-01-01 PROCEDURE — 84100 ASSAY OF PHOSPHORUS: CPT | Performed by: NURSE PRACTITIONER

## 2024-01-01 PROCEDURE — 120N000009 HC R&B SNF

## 2024-01-01 PROCEDURE — 82805 BLOOD GASES W/O2 SATURATION: CPT | Performed by: PHYSICIAN ASSISTANT

## 2024-01-01 PROCEDURE — 80053 COMPREHEN METABOLIC PANEL: CPT | Performed by: PHYSICIAN ASSISTANT

## 2024-01-01 PROCEDURE — 74018 RADEX ABDOMEN 1 VIEW: CPT

## 2024-01-01 PROCEDURE — 85049 AUTOMATED PLATELET COUNT: CPT | Performed by: INTERNAL MEDICINE

## 2024-01-01 PROCEDURE — 36415 COLL VENOUS BLD VENIPUNCTURE: CPT | Performed by: HOSPITALIST

## 2024-01-01 PROCEDURE — 36415 COLL VENOUS BLD VENIPUNCTURE: CPT | Performed by: INTERNAL MEDICINE

## 2024-01-01 PROCEDURE — 84295 ASSAY OF SERUM SODIUM: CPT | Performed by: INTERNAL MEDICINE

## 2024-01-01 PROCEDURE — 250N000009 HC RX 250: Performed by: STUDENT IN AN ORGANIZED HEALTH CARE EDUCATION/TRAINING PROGRAM

## 2024-01-01 PROCEDURE — 84100 ASSAY OF PHOSPHORUS: CPT | Performed by: HOSPITALIST

## 2024-01-01 PROCEDURE — 97116 GAIT TRAINING THERAPY: CPT | Mod: GP | Performed by: REHABILITATION PRACTITIONER

## 2024-01-01 PROCEDURE — 250N000009 HC RX 250: Performed by: HOSPITALIST

## 2024-01-01 PROCEDURE — 250N000011 HC RX IP 250 OP 636: Performed by: PHYSICIAN ASSISTANT

## 2024-01-01 PROCEDURE — 97530 THERAPEUTIC ACTIVITIES: CPT | Mod: GP

## 2024-01-01 PROCEDURE — 80048 BASIC METABOLIC PNL TOTAL CA: CPT | Performed by: INTERNAL MEDICINE

## 2024-01-01 PROCEDURE — 258N000003 HC RX IP 258 OP 636: Mod: JZ | Performed by: INTERNAL MEDICINE

## 2024-01-01 PROCEDURE — 85027 COMPLETE CBC AUTOMATED: CPT | Performed by: INTERNAL MEDICINE

## 2024-01-01 PROCEDURE — 83735 ASSAY OF MAGNESIUM: CPT | Performed by: INTERNAL MEDICINE

## 2024-01-01 PROCEDURE — 999N000127 HC STATISTIC PERIPHERAL IV START W US GUIDANCE

## 2024-01-01 PROCEDURE — 74177 CT ABD & PELVIS W/CONTRAST: CPT

## 2024-01-01 PROCEDURE — 97530 THERAPEUTIC ACTIVITIES: CPT | Mod: GP | Performed by: REHABILITATION PRACTITIONER

## 2024-01-01 PROCEDURE — 258N000003 HC RX IP 258 OP 636: Performed by: STUDENT IN AN ORGANIZED HEALTH CARE EDUCATION/TRAINING PROGRAM

## 2024-01-01 PROCEDURE — 99316 NF DSCHRG MGMT 30 MIN+: CPT | Performed by: PHYSICIAN ASSISTANT

## 2024-01-01 PROCEDURE — 97110 THERAPEUTIC EXERCISES: CPT | Mod: GO

## 2024-01-01 PROCEDURE — 97161 PT EVAL LOW COMPLEX 20 MIN: CPT | Mod: GP

## 2024-01-01 PROCEDURE — 99152 MOD SED SAME PHYS/QHP 5/>YRS: CPT

## 2024-01-01 PROCEDURE — 80053 COMPREHEN METABOLIC PANEL: CPT

## 2024-01-01 PROCEDURE — 83735 ASSAY OF MAGNESIUM: CPT | Performed by: STUDENT IN AN ORGANIZED HEALTH CARE EDUCATION/TRAINING PROGRAM

## 2024-01-01 PROCEDURE — 85004 AUTOMATED DIFF WBC COUNT: CPT | Performed by: EMERGENCY MEDICINE

## 2024-01-01 PROCEDURE — 99207 PR SERVICE NOT STAFFED W/SUPERV PROV: CPT | Performed by: UROLOGY

## 2024-01-01 PROCEDURE — 87040 BLOOD CULTURE FOR BACTERIA: CPT | Performed by: EMERGENCY MEDICINE

## 2024-01-01 PROCEDURE — 250N000011 HC RX IP 250 OP 636: Mod: JZ | Performed by: HOSPITALIST

## 2024-01-01 PROCEDURE — G0463 HOSPITAL OUTPT CLINIC VISIT: HCPCS

## 2024-01-01 PROCEDURE — 99238 HOSP IP/OBS DSCHRG MGMT 30/<: CPT | Performed by: STUDENT IN AN ORGANIZED HEALTH CARE EDUCATION/TRAINING PROGRAM

## 2024-01-01 PROCEDURE — 36415 COLL VENOUS BLD VENIPUNCTURE: CPT | Performed by: STUDENT IN AN ORGANIZED HEALTH CARE EDUCATION/TRAINING PROGRAM

## 2024-01-01 PROCEDURE — 83735 ASSAY OF MAGNESIUM: CPT | Performed by: HOSPITALIST

## 2024-01-01 PROCEDURE — 250N000009 HC RX 250: Performed by: PHYSICIAN ASSISTANT

## 2024-01-01 PROCEDURE — 96374 THER/PROPH/DIAG INJ IV PUSH: CPT | Performed by: EMERGENCY MEDICINE

## 2024-01-01 PROCEDURE — 99285 EMERGENCY DEPT VISIT HI MDM: CPT | Mod: 25 | Performed by: EMERGENCY MEDICINE

## 2024-01-01 PROCEDURE — 250N000013 HC RX MED GY IP 250 OP 250 PS 637: Performed by: STUDENT IN AN ORGANIZED HEALTH CARE EDUCATION/TRAINING PROGRAM

## 2024-01-01 PROCEDURE — 250N000009 HC RX 250: Performed by: INTERNAL MEDICINE

## 2024-01-01 PROCEDURE — 99233 SBSQ HOSP IP/OBS HIGH 50: CPT | Performed by: INTERNAL MEDICINE

## 2024-01-01 PROCEDURE — 85007 BL SMEAR W/DIFF WBC COUNT: CPT | Performed by: STUDENT IN AN ORGANIZED HEALTH CARE EDUCATION/TRAINING PROGRAM

## 2024-01-01 PROCEDURE — 74018 RADEX ABDOMEN 1 VIEW: CPT | Mod: 26 | Performed by: RADIOLOGY

## 2024-01-01 PROCEDURE — 97535 SELF CARE MNGMENT TRAINING: CPT | Mod: GO

## 2024-01-01 PROCEDURE — 99255 IP/OBS CONSLTJ NEW/EST HI 80: CPT | Performed by: CLINICAL NURSE SPECIALIST

## 2024-01-01 PROCEDURE — 999N000111 HC STATISTIC OT IP EVAL DEFER

## 2024-01-01 PROCEDURE — 87493 C DIFF AMPLIFIED PROBE: CPT | Performed by: INTERNAL MEDICINE

## 2024-01-01 PROCEDURE — 87324 CLOSTRIDIUM AG IA: CPT | Performed by: STUDENT IN AN ORGANIZED HEALTH CARE EDUCATION/TRAINING PROGRAM

## 2024-01-01 PROCEDURE — 250N000011 HC RX IP 250 OP 636: Performed by: STUDENT IN AN ORGANIZED HEALTH CARE EDUCATION/TRAINING PROGRAM

## 2024-01-01 PROCEDURE — 36415 COLL VENOUS BLD VENIPUNCTURE: CPT | Performed by: PATHOLOGY

## 2024-01-01 PROCEDURE — 81001 URINALYSIS AUTO W/SCOPE: CPT | Performed by: INTERNAL MEDICINE

## 2024-01-01 PROCEDURE — 84100 ASSAY OF PHOSPHORUS: CPT | Performed by: STUDENT IN AN ORGANIZED HEALTH CARE EDUCATION/TRAINING PROGRAM

## 2024-01-01 PROCEDURE — 85610 PROTHROMBIN TIME: CPT | Performed by: EMERGENCY MEDICINE

## 2024-01-01 PROCEDURE — 97116 GAIT TRAINING THERAPY: CPT | Mod: GP

## 2024-01-01 PROCEDURE — 83735 ASSAY OF MAGNESIUM: CPT | Performed by: NURSE PRACTITIONER

## 2024-01-01 PROCEDURE — 97530 THERAPEUTIC ACTIVITIES: CPT | Mod: GP | Performed by: PHYSICAL THERAPIST

## 2024-01-01 PROCEDURE — 83735 ASSAY OF MAGNESIUM: CPT | Performed by: PATHOLOGY

## 2024-01-01 PROCEDURE — 93005 ELECTROCARDIOGRAM TRACING: CPT

## 2024-01-01 PROCEDURE — 99215 OFFICE O/P EST HI 40 MIN: CPT | Performed by: INTERNAL MEDICINE

## 2024-01-01 PROCEDURE — 74176 CT ABD & PELVIS W/O CONTRAST: CPT | Mod: 26 | Performed by: RADIOLOGY

## 2024-01-01 PROCEDURE — 258N000003 HC RX IP 258 OP 636: Performed by: INTERNAL MEDICINE

## 2024-01-01 PROCEDURE — 999N000040 HC STATISTIC CONSULT NO CHARGE VASC ACCESS

## 2024-01-01 PROCEDURE — 82962 GLUCOSE BLOOD TEST: CPT

## 2024-01-01 PROCEDURE — 85027 COMPLETE CBC AUTOMATED: CPT | Performed by: STUDENT IN AN ORGANIZED HEALTH CARE EDUCATION/TRAINING PROGRAM

## 2024-01-01 PROCEDURE — 99239 HOSP IP/OBS DSCHRG MGMT >30: CPT | Performed by: INTERNAL MEDICINE

## 2024-01-01 PROCEDURE — 250N000011 HC RX IP 250 OP 636: Performed by: EMERGENCY MEDICINE

## 2024-01-01 PROCEDURE — 99285 EMERGENCY DEPT VISIT HI MDM: CPT | Performed by: EMERGENCY MEDICINE

## 2024-01-01 PROCEDURE — 84300 ASSAY OF URINE SODIUM: CPT

## 2024-01-01 PROCEDURE — 74019 RADEX ABDOMEN 2 VIEWS: CPT

## 2024-01-01 PROCEDURE — 82374 ASSAY BLOOD CARBON DIOXIDE: CPT | Performed by: HOSPITALIST

## 2024-01-01 PROCEDURE — 99418 PROLNG IP/OBS E/M EA 15 MIN: CPT | Performed by: NURSE PRACTITIONER

## 2024-01-01 PROCEDURE — 85018 HEMOGLOBIN: CPT | Performed by: HOSPITALIST

## 2024-01-01 PROCEDURE — 92610 EVALUATE SWALLOWING FUNCTION: CPT | Mod: GN

## 2024-01-01 PROCEDURE — 87493 C DIFF AMPLIFIED PROBE: CPT | Performed by: STUDENT IN AN ORGANIZED HEALTH CARE EDUCATION/TRAINING PROGRAM

## 2024-01-01 PROCEDURE — 85025 COMPLETE CBC W/AUTO DIFF WBC: CPT | Performed by: PHYSICIAN ASSISTANT

## 2024-01-01 PROCEDURE — 99232 SBSQ HOSP IP/OBS MODERATE 35: CPT | Performed by: HOSPITALIST

## 2024-01-01 PROCEDURE — 74176 CT ABD & PELVIS W/O CONTRAST: CPT

## 2024-01-01 PROCEDURE — 85610 PROTHROMBIN TIME: CPT

## 2024-01-01 PROCEDURE — 0DH63UZ INSERTION OF FEEDING DEVICE INTO STOMACH, PERCUTANEOUS APPROACH: ICD-10-PCS | Performed by: RADIOLOGY

## 2024-01-01 PROCEDURE — 97165 OT EVAL LOW COMPLEX 30 MIN: CPT | Mod: GO

## 2024-01-01 PROCEDURE — 71046 X-RAY EXAM CHEST 2 VIEWS: CPT

## 2024-01-01 PROCEDURE — 80048 BASIC METABOLIC PNL TOTAL CA: CPT | Performed by: STUDENT IN AN ORGANIZED HEALTH CARE EDUCATION/TRAINING PROGRAM

## 2024-01-01 PROCEDURE — 36415 COLL VENOUS BLD VENIPUNCTURE: CPT | Performed by: PHYSICIAN ASSISTANT

## 2024-01-01 PROCEDURE — C1769 GUIDE WIRE: HCPCS

## 2024-01-01 PROCEDURE — 49440 PLACE GASTROSTOMY TUBE PERC: CPT | Mod: GC | Performed by: RADIOLOGY

## 2024-01-01 PROCEDURE — 87186 SC STD MICRODIL/AGAR DIL: CPT | Performed by: STUDENT IN AN ORGANIZED HEALTH CARE EDUCATION/TRAINING PROGRAM

## 2024-01-01 PROCEDURE — 99255 IP/OBS CONSLTJ NEW/EST HI 80: CPT | Mod: GC | Performed by: STUDENT IN AN ORGANIZED HEALTH CARE EDUCATION/TRAINING PROGRAM

## 2024-01-01 PROCEDURE — 74018 RADEX ABDOMEN 1 VIEW: CPT | Mod: 26 | Performed by: STUDENT IN AN ORGANIZED HEALTH CARE EDUCATION/TRAINING PROGRAM

## 2024-01-01 PROCEDURE — 84075 ASSAY ALKALINE PHOSPHATASE: CPT | Performed by: EMERGENCY MEDICINE

## 2024-01-01 PROCEDURE — 99232 SBSQ HOSP IP/OBS MODERATE 35: CPT | Performed by: CLINICAL NURSE SPECIALIST

## 2024-01-01 PROCEDURE — 82565 ASSAY OF CREATININE: CPT | Performed by: INTERNAL MEDICINE

## 2024-01-01 PROCEDURE — 0352U MULTIPLEX VAGINAL PANEL BY PCR: CPT | Performed by: INTERNAL MEDICINE

## 2024-01-01 PROCEDURE — 36415 COLL VENOUS BLD VENIPUNCTURE: CPT

## 2024-01-01 PROCEDURE — 85025 COMPLETE CBC W/AUTO DIFF WBC: CPT | Performed by: INTERNAL MEDICINE

## 2024-01-01 PROCEDURE — 80048 BASIC METABOLIC PNL TOTAL CA: CPT | Performed by: PHYSICIAN ASSISTANT

## 2024-01-01 PROCEDURE — 83880 ASSAY OF NATRIURETIC PEPTIDE: CPT | Performed by: PATHOLOGY

## 2024-01-01 PROCEDURE — 97161 PT EVAL LOW COMPLEX 20 MIN: CPT | Mod: GP | Performed by: PHYSICAL THERAPIST

## 2024-01-01 PROCEDURE — 83690 ASSAY OF LIPASE: CPT | Performed by: EMERGENCY MEDICINE

## 2024-01-01 PROCEDURE — 97110 THERAPEUTIC EXERCISES: CPT | Mod: GP

## 2024-01-01 PROCEDURE — 85041 AUTOMATED RBC COUNT: CPT | Performed by: HOSPITALIST

## 2024-01-01 PROCEDURE — 84132 ASSAY OF SERUM POTASSIUM: CPT | Performed by: HOSPITALIST

## 2024-01-01 PROCEDURE — 99255 IP/OBS CONSLTJ NEW/EST HI 80: CPT | Performed by: INTERNAL MEDICINE

## 2024-01-01 PROCEDURE — 272N000151 HC KIT CR11

## 2024-01-01 PROCEDURE — 999N000248 HC STATISTIC IV INSERT WITH US BY RN

## 2024-01-01 PROCEDURE — G0463 HOSPITAL OUTPT CLINIC VISIT: HCPCS | Performed by: INTERNAL MEDICINE

## 2024-01-01 PROCEDURE — 49440 PLACE GASTROSTOMY TUBE PERC: CPT

## 2024-01-01 PROCEDURE — 84132 ASSAY OF SERUM POTASSIUM: CPT | Performed by: INTERNAL MEDICINE

## 2024-01-01 PROCEDURE — 99233 SBSQ HOSP IP/OBS HIGH 50: CPT | Performed by: HOSPITALIST

## 2024-01-01 PROCEDURE — 250N000013 HC RX MED GY IP 250 OP 250 PS 637

## 2024-01-01 PROCEDURE — 82805 BLOOD GASES W/O2 SATURATION: CPT | Performed by: INTERNAL MEDICINE

## 2024-01-01 PROCEDURE — 83935 ASSAY OF URINE OSMOLALITY: CPT

## 2024-01-01 PROCEDURE — 99308 SBSQ NF CARE LOW MDM 20: CPT | Performed by: PHYSICIAN ASSISTANT

## 2024-01-01 PROCEDURE — 87086 URINE CULTURE/COLONY COUNT: CPT | Performed by: EMERGENCY MEDICINE

## 2024-01-01 PROCEDURE — 71046 X-RAY EXAM CHEST 2 VIEWS: CPT | Mod: 26 | Performed by: RADIOLOGY

## 2024-01-01 PROCEDURE — 84443 ASSAY THYROID STIM HORMONE: CPT | Performed by: EMERGENCY MEDICINE

## 2024-01-01 PROCEDURE — 85730 THROMBOPLASTIN TIME PARTIAL: CPT

## 2024-01-01 PROCEDURE — 99418 PROLNG IP/OBS E/M EA 15 MIN: CPT | Performed by: STUDENT IN AN ORGANIZED HEALTH CARE EDUCATION/TRAINING PROGRAM

## 2024-01-01 PROCEDURE — 999N000007 HC SITE CHECK

## 2024-01-01 PROCEDURE — 87106 FUNGI IDENTIFICATION YEAST: CPT | Performed by: INTERNAL MEDICINE

## 2024-01-01 PROCEDURE — 83735 ASSAY OF MAGNESIUM: CPT | Performed by: PHYSICIAN ASSISTANT

## 2024-01-01 PROCEDURE — 82378 CARCINOEMBRYONIC ANTIGEN: CPT | Performed by: STUDENT IN AN ORGANIZED HEALTH CARE EDUCATION/TRAINING PROGRAM

## 2024-01-01 PROCEDURE — 86140 C-REACTIVE PROTEIN: CPT | Performed by: INTERNAL MEDICINE

## 2024-01-01 PROCEDURE — 999N000128 HC STATISTIC PERIPHERAL IV START W/O US GUIDANCE

## 2024-01-01 PROCEDURE — 80048 BASIC METABOLIC PNL TOTAL CA: CPT | Performed by: PATHOLOGY

## 2024-01-01 PROCEDURE — 83735 ASSAY OF MAGNESIUM: CPT | Performed by: EMERGENCY MEDICINE

## 2024-01-01 PROCEDURE — 99223 1ST HOSP IP/OBS HIGH 75: CPT | Mod: GC | Performed by: INTERNAL MEDICINE

## 2024-01-01 PROCEDURE — 36415 COLL VENOUS BLD VENIPUNCTURE: CPT | Performed by: NURSE PRACTITIONER

## 2024-01-01 PROCEDURE — 87086 URINE CULTURE/COLONY COUNT: CPT | Performed by: STUDENT IN AN ORGANIZED HEALTH CARE EDUCATION/TRAINING PROGRAM

## 2024-01-01 PROCEDURE — 82248 BILIRUBIN DIRECT: CPT | Performed by: INTERNAL MEDICINE

## 2024-01-01 PROCEDURE — 80053 COMPREHEN METABOLIC PANEL: CPT | Performed by: EMERGENCY MEDICINE

## 2024-01-01 PROCEDURE — 96360 HYDRATION IV INFUSION INIT: CPT | Performed by: EMERGENCY MEDICINE

## 2024-01-01 PROCEDURE — 72194 CT PELVIS W/O & W/DYE: CPT | Mod: 26 | Performed by: RADIOLOGY

## 2024-01-01 PROCEDURE — 999N000285 HC STATISTIC VASC ACCESS LAB DRAW WITH PIV START

## 2024-01-01 PROCEDURE — 97116 GAIT TRAINING THERAPY: CPT | Mod: GP | Performed by: PHYSICAL THERAPIST

## 2024-01-01 PROCEDURE — 83036 HEMOGLOBIN GLYCOSYLATED A1C: CPT | Performed by: INTERNAL MEDICINE

## 2024-01-01 PROCEDURE — 250N000012 HC RX MED GY IP 250 OP 636 PS 637: Performed by: INTERNAL MEDICINE

## 2024-01-01 PROCEDURE — 83605 ASSAY OF LACTIC ACID: CPT

## 2024-01-01 PROCEDURE — 84100 ASSAY OF PHOSPHORUS: CPT | Performed by: EMERGENCY MEDICINE

## 2024-01-01 PROCEDURE — 81001 URINALYSIS AUTO W/SCOPE: CPT | Performed by: EMERGENCY MEDICINE

## 2024-01-01 PROCEDURE — 99233 SBSQ HOSP IP/OBS HIGH 50: CPT | Performed by: NURSE PRACTITIONER

## 2024-01-01 PROCEDURE — 93010 ELECTROCARDIOGRAM REPORT: CPT | Performed by: INTERNAL MEDICINE

## 2024-01-01 PROCEDURE — 99152 MOD SED SAME PHYS/QHP 5/>YRS: CPT | Mod: GC | Performed by: RADIOLOGY

## 2024-01-01 PROCEDURE — 999N000065 XR ABDOMEN PORT 1 VIEW

## 2024-01-01 PROCEDURE — 250N000011 HC RX IP 250 OP 636: Mod: JZ

## 2024-01-01 PROCEDURE — S5010 5% DEXTROSE AND 0.45% SALINE: HCPCS | Performed by: INTERNAL MEDICINE

## 2024-01-01 PROCEDURE — 99418 PROLNG IP/OBS E/M EA 15 MIN: CPT | Performed by: CLINICAL NURSE SPECIALIST

## 2024-01-01 PROCEDURE — 74177 CT ABD & PELVIS W/CONTRAST: CPT | Mod: 26 | Performed by: RADIOLOGY

## 2024-01-01 PROCEDURE — 258N000001 HC RX 258

## 2024-01-01 PROCEDURE — 99223 1ST HOSP IP/OBS HIGH 75: CPT | Performed by: INTERNAL MEDICINE

## 2024-01-01 PROCEDURE — 99306 1ST NF CARE HIGH MDM 50: CPT | Performed by: STUDENT IN AN ORGANIZED HEALTH CARE EDUCATION/TRAINING PROGRAM

## 2024-01-01 PROCEDURE — 84132 ASSAY OF SERUM POTASSIUM: CPT | Performed by: STUDENT IN AN ORGANIZED HEALTH CARE EDUCATION/TRAINING PROGRAM

## 2024-01-01 PROCEDURE — 82610 CYSTATIN C: CPT | Performed by: INTERNAL MEDICINE

## 2024-01-01 PROCEDURE — 99207 PR NO BILLABLE SERVICE THIS VISIT: CPT | Performed by: INTERNAL MEDICINE

## 2024-01-01 PROCEDURE — 250N000011 HC RX IP 250 OP 636: Mod: JZ | Performed by: INTERNAL MEDICINE

## 2024-01-01 PROCEDURE — 81003 URINALYSIS AUTO W/O SCOPE: CPT | Performed by: STUDENT IN AN ORGANIZED HEALTH CARE EDUCATION/TRAINING PROGRAM

## 2024-01-01 PROCEDURE — 85014 HEMATOCRIT: CPT | Performed by: INTERNAL MEDICINE

## 2024-01-01 PROCEDURE — 92526 ORAL FUNCTION THERAPY: CPT | Mod: GN

## 2024-01-01 PROCEDURE — 83880 ASSAY OF NATRIURETIC PEPTIDE: CPT

## 2024-01-01 PROCEDURE — 250N000011 HC RX IP 250 OP 636

## 2024-01-01 PROCEDURE — 82565 ASSAY OF CREATININE: CPT | Performed by: STUDENT IN AN ORGANIZED HEALTH CARE EDUCATION/TRAINING PROGRAM

## 2024-01-01 PROCEDURE — 99207 PR NO BILLABLE SERVICE THIS VISIT: CPT | Performed by: PHYSICIAN ASSISTANT

## 2024-01-01 RX ORDER — NALOXONE HYDROCHLORIDE 0.4 MG/ML
0.2 INJECTION, SOLUTION INTRAMUSCULAR; INTRAVENOUS; SUBCUTANEOUS
Status: DISCONTINUED | OUTPATIENT
Start: 2024-01-01 | End: 2024-01-01

## 2024-01-01 RX ORDER — MEROPENEM 500 MG/1
500 INJECTION, POWDER, FOR SOLUTION INTRAVENOUS EVERY 24 HOURS
Status: DISCONTINUED | OUTPATIENT
Start: 2024-01-01 | End: 2024-01-01

## 2024-01-01 RX ORDER — ONDANSETRON 4 MG/1
4 TABLET, ORALLY DISINTEGRATING ORAL EVERY 6 HOURS PRN
Status: DISCONTINUED | OUTPATIENT
Start: 2024-01-01 | End: 2024-01-01 | Stop reason: HOSPADM

## 2024-01-01 RX ORDER — HALOPERIDOL 2 MG/ML
1 SOLUTION ORAL EVERY 6 HOURS
Qty: 5 ML | Refills: 0 | Status: SHIPPED | OUTPATIENT
Start: 2024-01-01 | End: 2024-01-01

## 2024-01-01 RX ORDER — IOPAMIDOL 755 MG/ML
30 INJECTION, SOLUTION INTRAVASCULAR ONCE
Status: COMPLETED | OUTPATIENT
Start: 2024-01-01 | End: 2024-01-01

## 2024-01-01 RX ORDER — ACETAMINOPHEN 650 MG/1
650 SUPPOSITORY RECTAL EVERY 4 HOURS PRN
Status: DISCONTINUED | OUTPATIENT
Start: 2024-01-01 | End: 2024-01-01

## 2024-01-01 RX ORDER — ACETAMINOPHEN 650 MG/1
650 SUPPOSITORY RECTAL EVERY 4 HOURS PRN
Status: ON HOLD | DISCHARGE
Start: 2024-01-01 | End: 2024-01-01

## 2024-01-01 RX ORDER — ONDANSETRON 4 MG/1
4 TABLET, FILM COATED ORAL EVERY 8 HOURS
Status: DISCONTINUED | OUTPATIENT
Start: 2024-01-01 | End: 2024-01-01 | Stop reason: HOSPADM

## 2024-01-01 RX ORDER — CALCIUM CARBONATE 500 MG/1
1000 TABLET, CHEWABLE ORAL 4 TIMES DAILY PRN
Status: DISCONTINUED | OUTPATIENT
Start: 2024-01-01 | End: 2024-01-01 | Stop reason: HOSPADM

## 2024-01-01 RX ORDER — SACCHAROMYCES BOULARDII 250 MG
250 CAPSULE ORAL 2 TIMES DAILY
Status: DISCONTINUED | OUTPATIENT
Start: 2024-01-01 | End: 2024-01-01

## 2024-01-01 RX ORDER — ACETAMINOPHEN 325 MG/10.15ML
650 LIQUID ORAL EVERY 8 HOURS
Status: ON HOLD | DISCHARGE
Start: 2024-01-01 | End: 2024-01-01

## 2024-01-01 RX ORDER — LIDOCAINE 4 G/G
1-2 PATCH TOPICAL
Status: DISCONTINUED | OUTPATIENT
Start: 2024-01-01 | End: 2024-01-01 | Stop reason: HOSPADM

## 2024-01-01 RX ORDER — DEXTROSE MONOHYDRATE 100 MG/ML
INJECTION, SOLUTION INTRAVENOUS CONTINUOUS PRN
Status: DISCONTINUED | OUTPATIENT
Start: 2024-01-01 | End: 2024-01-01 | Stop reason: HOSPADM

## 2024-01-01 RX ORDER — DEXTROSE MONOHYDRATE 25 G/50ML
25-50 INJECTION, SOLUTION INTRAVENOUS
Status: DISCONTINUED | OUTPATIENT
Start: 2024-01-01 | End: 2024-01-01 | Stop reason: HOSPADM

## 2024-01-01 RX ORDER — CARBOXYMETHYLCELLULOSE SODIUM 5 MG/ML
1-2 SOLUTION/ DROPS OPHTHALMIC
Status: DISCONTINUED | OUTPATIENT
Start: 2024-01-01 | End: 2024-01-01 | Stop reason: HOSPADM

## 2024-01-01 RX ORDER — NICOTINE POLACRILEX 4 MG
15-30 LOZENGE BUCCAL
Status: DISCONTINUED | OUTPATIENT
Start: 2024-01-01 | End: 2024-01-01 | Stop reason: HOSPADM

## 2024-01-01 RX ORDER — SODIUM CHLORIDE 9 MG/ML
INJECTION, SOLUTION INTRAVENOUS CONTINUOUS
Status: DISCONTINUED | OUTPATIENT
Start: 2024-01-01 | End: 2024-01-01

## 2024-01-01 RX ORDER — ONDANSETRON 4 MG/1
4 TABLET, FILM COATED ORAL EVERY 6 HOURS
Status: DISCONTINUED | OUTPATIENT
Start: 2024-01-01 | End: 2024-01-01

## 2024-01-01 RX ORDER — GUAIFENESIN 600 MG/1
15 TABLET, EXTENDED RELEASE ORAL DAILY
Qty: 474 ML | Refills: 0 | Status: ON HOLD | OUTPATIENT
Start: 2024-01-01 | End: 2024-01-01

## 2024-01-01 RX ORDER — AMOXICILLIN 250 MG
2 CAPSULE ORAL 2 TIMES DAILY
Status: DISCONTINUED | OUTPATIENT
Start: 2024-01-01 | End: 2024-01-01

## 2024-01-01 RX ORDER — OXYBUTYNIN CHLORIDE 5 MG/5ML
2.5 SYRUP ORAL 2 TIMES DAILY
Qty: 473 ML | Refills: 0 | Status: ON HOLD | OUTPATIENT
Start: 2024-01-01 | End: 2024-01-01

## 2024-01-01 RX ORDER — POLYETHYLENE GLYCOL 3350 17 G/17G
17 POWDER, FOR SOLUTION ORAL DAILY
Status: DISCONTINUED | OUTPATIENT
Start: 2024-01-01 | End: 2024-01-01

## 2024-01-01 RX ORDER — CEFTRIAXONE 1 G/1
1 INJECTION, POWDER, FOR SOLUTION INTRAMUSCULAR; INTRAVENOUS EVERY 24 HOURS
Status: DISCONTINUED | OUTPATIENT
Start: 2024-01-01 | End: 2024-01-01

## 2024-01-01 RX ORDER — AMOXICILLIN 250 MG
1 CAPSULE ORAL 2 TIMES DAILY PRN
Status: DISCONTINUED | OUTPATIENT
Start: 2024-01-01 | End: 2024-01-01

## 2024-01-01 RX ORDER — ACETAMINOPHEN 325 MG/1
650 TABLET ORAL EVERY 4 HOURS PRN
Status: DISCONTINUED | OUTPATIENT
Start: 2024-01-01 | End: 2024-01-01

## 2024-01-01 RX ORDER — POTASSIUM CHLORIDE 1.5 G/1.58G
40 POWDER, FOR SOLUTION ORAL ONCE
Status: COMPLETED | OUTPATIENT
Start: 2024-01-01 | End: 2024-01-01

## 2024-01-01 RX ORDER — OXYCODONE HCL 20 MG/ML
5 CONCENTRATE, ORAL ORAL 2 TIMES DAILY
Qty: 5 ML | Refills: 0 | Status: SHIPPED | OUTPATIENT
Start: 2024-01-01 | End: 2024-01-01

## 2024-01-01 RX ORDER — SCOLOPAMINE TRANSDERMAL SYSTEM 1 MG/1
1 PATCH, EXTENDED RELEASE TRANSDERMAL
Status: DISCONTINUED | OUTPATIENT
Start: 2024-01-01 | End: 2024-01-01 | Stop reason: HOSPADM

## 2024-01-01 RX ORDER — ROSUVASTATIN CALCIUM 5 MG/1
5 TABLET, COATED ORAL AT BEDTIME
Status: ON HOLD | COMMUNITY
Start: 2024-01-01 | End: 2024-01-01

## 2024-01-01 RX ORDER — ONDANSETRON 4 MG/1
4 TABLET, ORALLY DISINTEGRATING ORAL PRN
Status: ON HOLD | COMMUNITY
Start: 2024-01-01 | End: 2024-01-01

## 2024-01-01 RX ORDER — ONDANSETRON 4 MG/1
4 TABLET, FILM COATED ORAL EVERY 8 HOURS
Qty: 90 TABLET | Refills: 0 | Status: ON HOLD | OUTPATIENT
Start: 2024-01-01 | End: 2024-01-01

## 2024-01-01 RX ORDER — HYDROMORPHONE HYDROCHLORIDE 1 MG/ML
1 SOLUTION ORAL EVERY 4 HOURS PRN
Status: ON HOLD | DISCHARGE
Start: 2024-01-01 | End: 2024-01-01

## 2024-01-01 RX ORDER — ONDANSETRON 4 MG/1
4 TABLET, ORALLY DISINTEGRATING ORAL 2 TIMES DAILY
Status: DISCONTINUED | OUTPATIENT
Start: 2024-01-01 | End: 2024-01-01

## 2024-01-01 RX ORDER — SENNOSIDES 8.6 MG
1 TABLET ORAL 2 TIMES DAILY PRN
Status: DISCONTINUED | OUTPATIENT
Start: 2024-01-01 | End: 2024-01-01 | Stop reason: HOSPADM

## 2024-01-01 RX ORDER — IOPAMIDOL 408 MG/ML
100 INJECTION, SOLUTION INTRAVASCULAR ONCE
Status: COMPLETED | OUTPATIENT
Start: 2024-01-01 | End: 2024-01-01

## 2024-01-01 RX ORDER — BISACODYL 10 MG
10 SUPPOSITORY, RECTAL RECTAL DAILY PRN
Status: DISCONTINUED | OUTPATIENT
Start: 2024-01-01 | End: 2024-01-01 | Stop reason: HOSPADM

## 2024-01-01 RX ORDER — SACCHAROMYCES BOULARDII 250 MG
250 CAPSULE ORAL 2 TIMES DAILY
Status: DISCONTINUED | OUTPATIENT
Start: 2024-01-01 | End: 2024-01-01 | Stop reason: HOSPADM

## 2024-01-01 RX ORDER — CARVEDILOL 3.12 MG/1
3.12 TABLET ORAL 2 TIMES DAILY WITH MEALS
Status: DISCONTINUED | OUTPATIENT
Start: 2024-01-01 | End: 2024-01-01

## 2024-01-01 RX ORDER — CEFTRIAXONE 1 G/1
1 INJECTION, POWDER, FOR SOLUTION INTRAMUSCULAR; INTRAVENOUS ONCE
Status: COMPLETED | OUTPATIENT
Start: 2024-01-01 | End: 2024-01-01

## 2024-01-01 RX ORDER — LIDOCAINE 40 MG/G
CREAM TOPICAL
Status: DISCONTINUED | OUTPATIENT
Start: 2024-01-01 | End: 2024-01-01 | Stop reason: HOSPADM

## 2024-01-01 RX ORDER — OXYCODONE HCL 20 MG/ML
5 CONCENTRATE, ORAL ORAL 3 TIMES DAILY
Status: DISCONTINUED | OUTPATIENT
Start: 2024-01-01 | End: 2024-01-01 | Stop reason: HOSPADM

## 2024-01-01 RX ORDER — PIPERACILLIN SODIUM, TAZOBACTAM SODIUM 3; .375 G/15ML; G/15ML
3.38 INJECTION, POWDER, LYOPHILIZED, FOR SOLUTION INTRAVENOUS EVERY 6 HOURS
Status: DISCONTINUED | OUTPATIENT
Start: 2024-01-01 | End: 2024-01-01

## 2024-01-01 RX ORDER — ACETAMINOPHEN 325 MG/10.15ML
650 LIQUID ORAL EVERY 4 HOURS PRN
Status: CANCELLED | OUTPATIENT
Start: 2024-01-01

## 2024-01-01 RX ORDER — HYDROMORPHONE HYDROCHLORIDE 1 MG/ML
0.5 SOLUTION ORAL EVERY 4 HOURS PRN
Status: ON HOLD | DISCHARGE
Start: 2024-01-01 | End: 2024-01-01

## 2024-01-01 RX ORDER — ONDANSETRON HYDROCHLORIDE 4 MG/5ML
4 SOLUTION ORAL ONCE
Status: COMPLETED | OUTPATIENT
Start: 2024-01-01 | End: 2024-01-01

## 2024-01-01 RX ORDER — SACCHAROMYCES BOULARDII 250 MG
250 CAPSULE ORAL 2 TIMES DAILY
Qty: 60 CAPSULE | Refills: 0 | Status: ON HOLD | OUTPATIENT
Start: 2024-01-01 | End: 2024-01-01

## 2024-01-01 RX ORDER — GUAR GUM
1 PACKET (EA) ORAL 2 TIMES DAILY
Status: DISCONTINUED | OUTPATIENT
Start: 2024-01-01 | End: 2024-01-01

## 2024-01-01 RX ORDER — FLUMAZENIL 0.1 MG/ML
0.2 INJECTION, SOLUTION INTRAVENOUS
Status: DISCONTINUED | OUTPATIENT
Start: 2024-01-01 | End: 2024-01-01

## 2024-01-01 RX ORDER — POLYETHYLENE GLYCOL 3350 17 G/17G
17 POWDER, FOR SOLUTION ORAL 2 TIMES DAILY PRN
Status: DISCONTINUED | OUTPATIENT
Start: 2024-01-01 | End: 2024-01-01

## 2024-01-01 RX ORDER — CARVEDILOL 3.12 MG/1
3.12 TABLET ORAL 2 TIMES DAILY WITH MEALS
Status: DISCONTINUED | OUTPATIENT
Start: 2024-01-01 | End: 2024-01-01 | Stop reason: HOSPADM

## 2024-01-01 RX ORDER — NALOXONE HYDROCHLORIDE 0.4 MG/ML
0.2 INJECTION, SOLUTION INTRAMUSCULAR; INTRAVENOUS; SUBCUTANEOUS
Status: DISCONTINUED | OUTPATIENT
Start: 2024-01-01 | End: 2024-01-01 | Stop reason: HOSPADM

## 2024-01-01 RX ORDER — SACCHAROMYCES BOULARDII 250 MG
250 CAPSULE ORAL 2 TIMES DAILY
Status: ON HOLD | DISCHARGE
Start: 2024-01-01 | End: 2024-01-01

## 2024-01-01 RX ORDER — OXYCODONE HCL 20 MG/ML
10 CONCENTRATE, ORAL ORAL EVERY 4 HOURS PRN
Status: DISCONTINUED | OUTPATIENT
Start: 2024-01-01 | End: 2024-01-01 | Stop reason: HOSPADM

## 2024-01-01 RX ORDER — NALOXONE HYDROCHLORIDE 0.4 MG/ML
0.4 INJECTION, SOLUTION INTRAMUSCULAR; INTRAVENOUS; SUBCUTANEOUS
Status: DISCONTINUED | OUTPATIENT
Start: 2024-01-01 | End: 2024-01-01 | Stop reason: HOSPADM

## 2024-01-01 RX ORDER — ONDANSETRON 4 MG/1
4 TABLET, ORALLY DISINTEGRATING ORAL EVERY 6 HOURS PRN
Status: DISCONTINUED | OUTPATIENT
Start: 2024-01-01 | End: 2024-01-01 | Stop reason: SINTOL

## 2024-01-01 RX ORDER — OXYCODONE HCL 20 MG/ML
5 CONCENTRATE, ORAL ORAL 2 TIMES DAILY
Qty: 5 ML | Refills: 0 | Status: SHIPPED | OUTPATIENT
Start: 2024-01-01

## 2024-01-01 RX ORDER — ONDANSETRON HYDROCHLORIDE 4 MG/5ML
8 SOLUTION ORAL EVERY 8 HOURS
Status: DISCONTINUED | OUTPATIENT
Start: 2024-01-01 | End: 2024-01-01

## 2024-01-01 RX ORDER — HYDROMORPHONE HYDROCHLORIDE 1 MG/ML
0.5 SOLUTION ORAL EVERY 4 HOURS PRN
Status: DISCONTINUED | OUTPATIENT
Start: 2024-01-01 | End: 2024-01-01 | Stop reason: HOSPADM

## 2024-01-01 RX ORDER — MAGNESIUM HYDROXIDE 1200 MG/15ML
LIQUID ORAL
Status: DISPENSED
Start: 2024-01-01 | End: 2024-01-01

## 2024-01-01 RX ORDER — AMOXICILLIN 250 MG
2 CAPSULE ORAL 2 TIMES DAILY PRN
Status: DISCONTINUED | OUTPATIENT
Start: 2024-01-01 | End: 2024-01-01

## 2024-01-01 RX ORDER — FLUCONAZOLE 150 MG/1
150 TABLET ORAL ONCE
Qty: 1 TABLET | Refills: 0 | Status: COMPLETED | OUTPATIENT
Start: 2024-01-01 | End: 2024-01-01

## 2024-01-01 RX ORDER — MIRTAZAPINE 7.5 MG/1
7.5 TABLET, FILM COATED ORAL AT BEDTIME
Status: DISCONTINUED | OUTPATIENT
Start: 2024-01-01 | End: 2024-01-01

## 2024-01-01 RX ORDER — DEXTROSE MONOHYDRATE 100 MG/ML
INJECTION, SOLUTION INTRAVENOUS CONTINUOUS PRN
Status: DISCONTINUED | OUTPATIENT
Start: 2024-01-01 | End: 2024-01-01

## 2024-01-01 RX ORDER — OLANZAPINE 5 MG/1
5 TABLET ORAL DAILY
Status: ON HOLD | COMMUNITY
Start: 2024-01-01 | End: 2024-01-01

## 2024-01-01 RX ORDER — OXYBUTYNIN CHLORIDE 5 MG/5ML
2.5 SYRUP ORAL 2 TIMES DAILY
Status: DISCONTINUED | OUTPATIENT
Start: 2024-01-01 | End: 2024-01-01 | Stop reason: HOSPADM

## 2024-01-01 RX ORDER — ACETAMINOPHEN 325 MG/1
650 TABLET ORAL EVERY 4 HOURS PRN
Status: DISCONTINUED | OUTPATIENT
Start: 2024-01-01 | End: 2024-01-01 | Stop reason: HOSPADM

## 2024-01-01 RX ORDER — ONDANSETRON 4 MG/1
4 TABLET, FILM COATED ORAL EVERY 6 HOURS
Status: DISCONTINUED | OUTPATIENT
Start: 2024-01-01 | End: 2024-01-01 | Stop reason: HOSPADM

## 2024-01-01 RX ORDER — FENTANYL CITRATE 50 UG/ML
25-50 INJECTION, SOLUTION INTRAMUSCULAR; INTRAVENOUS EVERY 5 MIN PRN
Status: DISCONTINUED | OUTPATIENT
Start: 2024-01-01 | End: 2024-01-01

## 2024-01-01 RX ORDER — CALCIUM CARBONATE 500 MG/1
1000 TABLET, CHEWABLE ORAL 4 TIMES DAILY PRN
Status: DISCONTINUED | OUTPATIENT
Start: 2024-01-01 | End: 2024-01-01

## 2024-01-01 RX ORDER — ACETAMINOPHEN 325 MG/1
650 TABLET ORAL EVERY 4 HOURS PRN
Status: ON HOLD | DISCHARGE
Start: 2024-01-01 | End: 2024-01-01

## 2024-01-01 RX ORDER — IOPAMIDOL 510 MG/ML
100 INJECTION, SOLUTION INTRAVASCULAR ONCE
Status: COMPLETED | OUTPATIENT
Start: 2024-01-01 | End: 2024-01-01

## 2024-01-01 RX ORDER — LORAZEPAM 2 MG/ML
0.5 INJECTION INTRAMUSCULAR
Status: DISCONTINUED | OUTPATIENT
Start: 2024-01-01 | End: 2024-01-01 | Stop reason: HOSPADM

## 2024-01-01 RX ORDER — HYDROMORPHONE HCL IN WATER/PF 6 MG/30 ML
0.2 PATIENT CONTROLLED ANALGESIA SYRINGE INTRAVENOUS EVERY 6 HOURS PRN
Status: DISCONTINUED | OUTPATIENT
Start: 2024-01-01 | End: 2024-01-01 | Stop reason: HOSPADM

## 2024-01-01 RX ORDER — NICOTINE POLACRILEX 4 MG
15-30 LOZENGE BUCCAL
Status: DISCONTINUED | OUTPATIENT
Start: 2024-01-01 | End: 2024-01-01

## 2024-01-01 RX ORDER — NALOXONE HYDROCHLORIDE 0.4 MG/ML
0.4 INJECTION, SOLUTION INTRAMUSCULAR; INTRAVENOUS; SUBCUTANEOUS
Status: DISCONTINUED | OUTPATIENT
Start: 2024-01-01 | End: 2024-01-01

## 2024-01-01 RX ORDER — ROSUVASTATIN CALCIUM 5 MG/1
5 TABLET, COATED ORAL AT BEDTIME
Qty: 30 TABLET | Refills: 0 | Status: ON HOLD | OUTPATIENT
Start: 2024-01-01 | End: 2024-01-01

## 2024-01-01 RX ORDER — SODIUM BICARBONATE 650 MG/1
650 TABLET ORAL 3 TIMES DAILY
Status: DISCONTINUED | OUTPATIENT
Start: 2024-01-01 | End: 2024-01-01 | Stop reason: HOSPADM

## 2024-01-01 RX ORDER — DEXTROSE MONOHYDRATE AND SODIUM CHLORIDE 5; .9 G/100ML; G/100ML
INJECTION, SOLUTION INTRAVENOUS CONTINUOUS
Status: ACTIVE | OUTPATIENT
Start: 2024-01-01 | End: 2024-01-01

## 2024-01-01 RX ORDER — HALOPERIDOL 2 MG/ML
1 SOLUTION ORAL EVERY 6 HOURS PRN
Qty: 5 ML | Refills: 0 | Status: SHIPPED | OUTPATIENT
Start: 2024-01-01

## 2024-01-01 RX ORDER — SODIUM CHLORIDE, SODIUM LACTATE, POTASSIUM CHLORIDE, CALCIUM CHLORIDE 600; 310; 30; 20 MG/100ML; MG/100ML; MG/100ML; MG/100ML
INJECTION, SOLUTION INTRAVENOUS CONTINUOUS
Status: DISCONTINUED | OUTPATIENT
Start: 2024-01-01 | End: 2024-01-01

## 2024-01-01 RX ORDER — ONDANSETRON 2 MG/ML
4 INJECTION INTRAMUSCULAR; INTRAVENOUS ONCE
Status: COMPLETED | OUTPATIENT
Start: 2024-01-01 | End: 2024-01-01

## 2024-01-01 RX ORDER — OXYCODONE HYDROCHLORIDE 5 MG/1
5 TABLET ORAL EVERY 4 HOURS PRN
Status: DISCONTINUED | OUTPATIENT
Start: 2024-01-01 | End: 2024-01-01 | Stop reason: HOSPADM

## 2024-01-01 RX ORDER — POLYETHYLENE GLYCOL 3350 17 G/17G
17 POWDER, FOR SOLUTION ORAL DAILY
Status: DISCONTINUED | OUTPATIENT
Start: 2024-01-01 | End: 2024-01-01 | Stop reason: HOSPADM

## 2024-01-01 RX ORDER — PROCHLORPERAZINE MALEATE 5 MG
5 TABLET ORAL PRN
Status: ON HOLD | COMMUNITY
Start: 2024-01-01 | End: 2024-01-01

## 2024-01-01 RX ORDER — CARVEDILOL 3.12 MG/1
3.12 TABLET ORAL 2 TIMES DAILY WITH MEALS
Qty: 60 TABLET | Refills: 0 | Status: ON HOLD | OUTPATIENT
Start: 2024-01-01 | End: 2024-01-01

## 2024-01-01 RX ORDER — FAMOTIDINE 40 MG/5ML
20 POWDER, FOR SUSPENSION ORAL
Status: DISCONTINUED | OUTPATIENT
Start: 2024-01-01 | End: 2024-01-01 | Stop reason: HOSPADM

## 2024-01-01 RX ORDER — LOPERAMIDE HYDROCHLORIDE 2 MG/1
2 TABLET ORAL 4 TIMES DAILY PRN
Qty: 30 TABLET | Refills: 0 | Status: SHIPPED | OUTPATIENT
Start: 2024-01-01 | End: 2024-01-01

## 2024-01-01 RX ORDER — MAGNESIUM SULFATE HEPTAHYDRATE 40 MG/ML
2 INJECTION, SOLUTION INTRAVENOUS ONCE
Status: COMPLETED | OUTPATIENT
Start: 2024-01-01 | End: 2024-01-01

## 2024-01-01 RX ORDER — MEROPENEM 1 G/1
1 INJECTION, POWDER, FOR SOLUTION INTRAVENOUS EVERY 8 HOURS
Status: DISCONTINUED | OUTPATIENT
Start: 2024-01-01 | End: 2024-01-01

## 2024-01-01 RX ORDER — ACETAMINOPHEN 325 MG/1
650 TABLET ORAL EVERY 4 HOURS PRN
Qty: 30 TABLET | Refills: 0 | Status: ON HOLD | OUTPATIENT
Start: 2024-01-01 | End: 2024-01-01

## 2024-01-01 RX ORDER — SODIUM BICARBONATE 650 MG/1
650 TABLET ORAL 3 TIMES DAILY
Status: ON HOLD | DISCHARGE
Start: 2024-01-01 | End: 2024-01-01

## 2024-01-01 RX ORDER — FAMOTIDINE 40 MG/5ML
20 POWDER, FOR SUSPENSION ORAL
Status: DISCONTINUED | OUTPATIENT
Start: 2024-01-01 | End: 2024-01-01

## 2024-01-01 RX ORDER — GUAIFENESIN 600 MG/1
15 TABLET, EXTENDED RELEASE ORAL DAILY
Status: DISCONTINUED | OUTPATIENT
Start: 2024-01-01 | End: 2024-01-01

## 2024-01-01 RX ORDER — SIMETHICONE 80 MG
80 TABLET,CHEWABLE ORAL EVERY 6 HOURS PRN
Status: DISCONTINUED | OUTPATIENT
Start: 2024-01-01 | End: 2024-01-01 | Stop reason: HOSPADM

## 2024-01-01 RX ORDER — CARVEDILOL 12.5 MG/1
12.5 TABLET ORAL DAILY
Status: ON HOLD | COMMUNITY
Start: 2024-01-01 | End: 2024-01-01

## 2024-01-01 RX ORDER — CEFAZOLIN SODIUM 2 G/100ML
2 INJECTION, SOLUTION INTRAVENOUS
Status: COMPLETED | OUTPATIENT
Start: 2024-01-01 | End: 2024-01-01

## 2024-01-01 RX ORDER — LOPERAMIDE HCL 2 MG
2 CAPSULE ORAL 4 TIMES DAILY PRN
Status: DISCONTINUED | OUTPATIENT
Start: 2024-01-01 | End: 2024-01-01

## 2024-01-01 RX ORDER — CALCIUM CARBONATE 500 MG/1
1000 TABLET, CHEWABLE ORAL 4 TIMES DAILY PRN
Status: DISCONTINUED | OUTPATIENT
Start: 2024-01-01 | End: 2024-01-01 | Stop reason: ALTCHOICE

## 2024-01-01 RX ORDER — HEPARIN SODIUM 5000 [USP'U]/.5ML
5000 INJECTION, SOLUTION INTRAVENOUS; SUBCUTANEOUS EVERY 8 HOURS
Status: DISCONTINUED | OUTPATIENT
Start: 2024-01-01 | End: 2024-01-01

## 2024-01-01 RX ORDER — ACETAMINOPHEN 160 MG/5ML
650 LIQUID ORAL EVERY 4 HOURS PRN
Qty: 20.3 ML | Refills: 0 | Status: SHIPPED | OUTPATIENT
Start: 2024-01-01

## 2024-01-01 RX ORDER — IOPAMIDOL 755 MG/ML
100 INJECTION, SOLUTION INTRAVASCULAR ONCE
Status: COMPLETED | OUTPATIENT
Start: 2024-01-01 | End: 2024-01-01

## 2024-01-01 RX ORDER — ONDANSETRON HYDROCHLORIDE 4 MG/5ML
4 SOLUTION ORAL EVERY 8 HOURS
Status: DISCONTINUED | OUTPATIENT
Start: 2024-01-01 | End: 2024-01-01

## 2024-01-01 RX ORDER — LORAZEPAM 0.5 MG/1
0.5 TABLET ORAL EVERY 4 HOURS PRN
Qty: 10 TABLET | Refills: 0 | Status: SHIPPED | OUTPATIENT
Start: 2024-01-01

## 2024-01-01 RX ORDER — OXYCODONE HCL 20 MG/ML
5 CONCENTRATE, ORAL ORAL EVERY 4 HOURS PRN
Qty: 5 ML | Refills: 0 | Status: SHIPPED | OUTPATIENT
Start: 2024-01-01 | End: 2024-01-01

## 2024-01-01 RX ORDER — PANTOPRAZOLE SODIUM 40 MG/1
40 TABLET, DELAYED RELEASE ORAL
Status: DISCONTINUED | OUTPATIENT
Start: 2024-01-01 | End: 2024-01-01

## 2024-01-01 RX ORDER — DEXTROSE MONOHYDRATE 50 MG/ML
INJECTION, SOLUTION INTRAVENOUS CONTINUOUS
Status: DISCONTINUED | OUTPATIENT
Start: 2024-01-01 | End: 2024-01-01

## 2024-01-01 RX ORDER — AMOXICILLIN AND CLAVULANATE POTASSIUM 500; 125 MG/1; MG/1
1 TABLET, FILM COATED ORAL EVERY 12 HOURS
Status: DISCONTINUED | OUTPATIENT
Start: 2024-01-01 | End: 2024-01-01 | Stop reason: HOSPADM

## 2024-01-01 RX ORDER — POLYETHYLENE GLYCOL 3350 17 G/17G
17 POWDER, FOR SOLUTION ORAL DAILY PRN
Status: DISCONTINUED | OUTPATIENT
Start: 2024-01-01 | End: 2024-01-01 | Stop reason: HOSPADM

## 2024-01-01 RX ORDER — ROSUVASTATIN CALCIUM 5 MG/1
5 TABLET, COATED ORAL AT BEDTIME
Status: DISCONTINUED | OUTPATIENT
Start: 2024-01-01 | End: 2024-01-01 | Stop reason: HOSPADM

## 2024-01-01 RX ORDER — LIDOCAINE HYDROCHLORIDE 20 MG/ML
5 SOLUTION OROPHARYNGEAL ONCE
Status: COMPLETED | OUTPATIENT
Start: 2024-01-01 | End: 2024-01-01

## 2024-01-01 RX ORDER — BISACODYL 10 MG
10 SUPPOSITORY, RECTAL RECTAL 2 TIMES DAILY PRN
Status: DISCONTINUED | OUTPATIENT
Start: 2024-01-01 | End: 2024-01-01 | Stop reason: HOSPADM

## 2024-01-01 RX ORDER — PIPERACILLIN SODIUM, TAZOBACTAM SODIUM 3; .375 G/15ML; G/15ML
3.38 INJECTION, POWDER, LYOPHILIZED, FOR SOLUTION INTRAVENOUS EVERY 6 HOURS
Status: CANCELLED | OUTPATIENT
Start: 2024-01-01

## 2024-01-01 RX ORDER — MINERAL OIL/HYDROPHIL PETROLAT
OINTMENT (GRAM) TOPICAL
Status: DISCONTINUED | OUTPATIENT
Start: 2024-01-01 | End: 2024-01-01 | Stop reason: HOSPADM

## 2024-01-01 RX ORDER — DEXTROSE MONOHYDRATE AND SODIUM CHLORIDE 5; .45 G/100ML; G/100ML
INJECTION, SOLUTION INTRAVENOUS CONTINUOUS
Status: DISCONTINUED | OUTPATIENT
Start: 2024-01-01 | End: 2024-01-01

## 2024-01-01 RX ORDER — ACETAMINOPHEN 500 MG
1000 TABLET ORAL 3 TIMES DAILY PRN
Status: DISCONTINUED | OUTPATIENT
Start: 2024-01-01 | End: 2024-01-01

## 2024-01-01 RX ORDER — ACETAMINOPHEN 650 MG/1
650 SUPPOSITORY RECTAL EVERY 4 HOURS PRN
Status: DISCONTINUED | OUTPATIENT
Start: 2024-01-01 | End: 2024-01-01 | Stop reason: HOSPADM

## 2024-01-01 RX ORDER — SCOLOPAMINE TRANSDERMAL SYSTEM 1 MG/1
1 PATCH, EXTENDED RELEASE TRANSDERMAL
Qty: 2 PATCH | Refills: 0 | Status: SHIPPED | OUTPATIENT
Start: 2024-01-01

## 2024-01-01 RX ORDER — PROCHLORPERAZINE 25 MG
12.5 SUPPOSITORY, RECTAL RECTAL EVERY 12 HOURS PRN
Status: DISCONTINUED | OUTPATIENT
Start: 2024-01-01 | End: 2024-01-01 | Stop reason: HOSPADM

## 2024-01-01 RX ORDER — VANCOMYCIN HYDROCHLORIDE 50 MG/ML
125 KIT ORAL 4 TIMES DAILY
Status: COMPLETED | OUTPATIENT
Start: 2024-01-01 | End: 2024-01-01

## 2024-01-01 RX ORDER — MULTIPLE VITAMINS W/ MINERALS TAB 9MG-400MCG
1 TAB ORAL DAILY
Status: ON HOLD | DISCHARGE
Start: 2024-01-01 | End: 2024-01-01

## 2024-01-01 RX ORDER — LIDOCAINE 4 G/G
2 PATCH TOPICAL
Status: DISCONTINUED | OUTPATIENT
Start: 2024-01-01 | End: 2024-01-01 | Stop reason: HOSPADM

## 2024-01-01 RX ORDER — PROCHLORPERAZINE MALEATE 5 MG
5 TABLET ORAL EVERY 6 HOURS PRN
Status: DISCONTINUED | OUTPATIENT
Start: 2024-01-01 | End: 2024-01-01 | Stop reason: HOSPADM

## 2024-01-01 RX ORDER — HALOPERIDOL 2 MG/ML
1 SOLUTION ORAL EVERY 6 HOURS PRN
Status: DISCONTINUED | OUTPATIENT
Start: 2024-01-01 | End: 2024-01-01

## 2024-01-01 RX ORDER — OXYCODONE HYDROCHLORIDE 5 MG/1
5 TABLET ORAL EVERY 4 HOURS PRN
Status: DISCONTINUED | OUTPATIENT
Start: 2024-01-01 | End: 2024-01-01

## 2024-01-01 RX ORDER — ROSUVASTATIN CALCIUM 5 MG/1
5 TABLET, COATED ORAL AT BEDTIME
Status: DISCONTINUED | OUTPATIENT
Start: 2024-01-01 | End: 2024-01-01

## 2024-01-01 RX ORDER — AMOXICILLIN AND CLAVULANATE POTASSIUM 500; 125 MG/1; MG/1
1 TABLET, FILM COATED ORAL EVERY 12 HOURS
Status: DISCONTINUED | OUTPATIENT
Start: 2024-01-01 | End: 2024-01-01

## 2024-01-01 RX ORDER — POLYETHYLENE GLYCOL 3350 17 G/17G
17 POWDER, FOR SOLUTION ORAL DAILY PRN
Status: DISCONTINUED | OUTPATIENT
Start: 2024-01-01 | End: 2024-01-01

## 2024-01-01 RX ORDER — MULTIPLE VITAMINS W/ MINERALS TAB 9MG-400MCG
1 TAB ORAL DAILY
Status: DISCONTINUED | OUTPATIENT
Start: 2024-01-01 | End: 2024-01-01

## 2024-01-01 RX ORDER — MAGNESIUM HYDROXIDE 1200 MG/15ML
LIQUID ORAL
Status: COMPLETED
Start: 2024-01-01 | End: 2024-01-01

## 2024-01-01 RX ORDER — HYDROMORPHONE HYDROCHLORIDE 1 MG/ML
1 SOLUTION ORAL EVERY 4 HOURS PRN
Status: DISCONTINUED | OUTPATIENT
Start: 2024-01-01 | End: 2024-01-01 | Stop reason: HOSPADM

## 2024-01-01 RX ORDER — SENNOSIDES 8.6 MG
1 TABLET ORAL 2 TIMES DAILY PRN
Qty: 10 TABLET | Refills: 0 | Status: SHIPPED | OUTPATIENT
Start: 2024-01-01

## 2024-01-01 RX ORDER — OXYCODONE HYDROCHLORIDE 5 MG/1
5 TABLET ORAL EVERY 4 HOURS PRN
Status: ON HOLD | DISCHARGE
Start: 2024-01-01 | End: 2024-01-01

## 2024-01-01 RX ORDER — DRONABINOL 2.5 MG/1
2.5 CAPSULE ORAL
Status: DISCONTINUED | OUTPATIENT
Start: 2024-01-01 | End: 2024-01-01

## 2024-01-01 RX ORDER — ONDANSETRON 4 MG/1
4 TABLET, FILM COATED ORAL EVERY 6 HOURS
Status: ON HOLD | DISCHARGE
Start: 2024-01-01 | End: 2024-01-01

## 2024-01-01 RX ORDER — MIRTAZAPINE 15 MG/1
15 TABLET, ORALLY DISINTEGRATING ORAL AT BEDTIME
Status: DISCONTINUED | OUTPATIENT
Start: 2024-01-01 | End: 2024-01-01 | Stop reason: HOSPADM

## 2024-01-01 RX ORDER — ONDANSETRON 4 MG/1
4 TABLET, FILM COATED ORAL EVERY 12 HOURS
Status: DISCONTINUED | OUTPATIENT
Start: 2024-01-01 | End: 2024-01-01

## 2024-01-01 RX ORDER — OXYCODONE HCL 20 MG/ML
5 CONCENTRATE, ORAL ORAL EVERY 4 HOURS PRN
Qty: 5 ML | Refills: 0 | Status: SHIPPED | OUTPATIENT
Start: 2024-01-01

## 2024-01-01 RX ORDER — ONDANSETRON 2 MG/ML
4 INJECTION INTRAMUSCULAR; INTRAVENOUS EVERY 6 HOURS PRN
Status: DISCONTINUED | OUTPATIENT
Start: 2024-01-01 | End: 2024-01-01 | Stop reason: HOSPADM

## 2024-01-01 RX ORDER — VANCOMYCIN HYDROCHLORIDE 1 G/200ML
1000 INJECTION, SOLUTION INTRAVENOUS ONCE
Status: COMPLETED | OUTPATIENT
Start: 2024-01-01 | End: 2024-01-01

## 2024-01-01 RX ORDER — HYDROMORPHONE HCL IN WATER/PF 6 MG/30 ML
0.2 PATIENT CONTROLLED ANALGESIA SYRINGE INTRAVENOUS EVERY 4 HOURS PRN
Status: ON HOLD | DISCHARGE
Start: 2024-01-01 | End: 2024-01-01

## 2024-01-01 RX ORDER — ONDANSETRON 2 MG/ML
4 INJECTION INTRAMUSCULAR; INTRAVENOUS EVERY 6 HOURS
Status: DISCONTINUED | OUTPATIENT
Start: 2024-01-01 | End: 2024-01-01

## 2024-01-01 RX ORDER — ONDANSETRON 2 MG/ML
4 INJECTION INTRAMUSCULAR; INTRAVENOUS EVERY 6 HOURS PRN
Status: COMPLETED | OUTPATIENT
Start: 2024-01-01 | End: 2024-01-01

## 2024-01-01 RX ORDER — METOCLOPRAMIDE HYDROCHLORIDE 5 MG/ML
5 INJECTION INTRAMUSCULAR; INTRAVENOUS ONCE
Status: COMPLETED | OUTPATIENT
Start: 2024-01-01 | End: 2024-01-01

## 2024-01-01 RX ORDER — AMOXICILLIN AND CLAVULANATE POTASSIUM 500; 125 MG/1; MG/1
1 TABLET, FILM COATED ORAL EVERY 12 HOURS SCHEDULED
Status: DISCONTINUED | OUTPATIENT
Start: 2024-01-01 | End: 2024-01-01 | Stop reason: HOSPADM

## 2024-01-01 RX ORDER — HEPARIN SODIUM 5000 [USP'U]/.5ML
5000 INJECTION, SOLUTION INTRAVENOUS; SUBCUTANEOUS EVERY 12 HOURS
Status: DISCONTINUED | OUTPATIENT
Start: 2024-01-01 | End: 2024-01-01

## 2024-01-01 RX ORDER — CEFTRIAXONE 1 G/1
1 INJECTION, POWDER, FOR SOLUTION INTRAMUSCULAR; INTRAVENOUS EVERY 24 HOURS
Status: COMPLETED | OUTPATIENT
Start: 2024-01-01 | End: 2024-01-01

## 2024-01-01 RX ORDER — LIDOCAINE 4 G/G
1-2 PATCH TOPICAL
Status: DISCONTINUED | OUTPATIENT
Start: 2024-01-01 | End: 2024-01-01

## 2024-01-01 RX ORDER — HALOPERIDOL 2 MG/ML
1 SOLUTION ORAL EVERY 6 HOURS
Status: DISCONTINUED | OUTPATIENT
Start: 2024-01-01 | End: 2024-01-01 | Stop reason: HOSPADM

## 2024-01-01 RX ORDER — BISACODYL 10 MG
10 SUPPOSITORY, RECTAL RECTAL DAILY PRN
Qty: 5 SUPPOSITORY | Refills: 0 | Status: SHIPPED | OUTPATIENT
Start: 2024-01-01

## 2024-01-01 RX ORDER — DEXTROSE MONOHYDRATE 25 G/50ML
25-50 INJECTION, SOLUTION INTRAVENOUS
Status: DISCONTINUED | OUTPATIENT
Start: 2024-01-01 | End: 2024-01-01

## 2024-01-01 RX ORDER — GUAIFENESIN 600 MG/1
15 TABLET, EXTENDED RELEASE ORAL DAILY
Status: DISCONTINUED | OUTPATIENT
Start: 2024-01-01 | End: 2024-01-01 | Stop reason: HOSPADM

## 2024-01-01 RX ORDER — CALCIUM CARBONATE 500 MG/1
2 TABLET, CHEWABLE ORAL 4 TIMES DAILY PRN
Status: ON HOLD | DISCHARGE
Start: 2024-01-01 | End: 2024-01-01

## 2024-01-01 RX ORDER — METOCLOPRAMIDE HYDROCHLORIDE 5 MG/5ML
5 SOLUTION ORAL ONCE
Status: COMPLETED | OUTPATIENT
Start: 2024-01-01 | End: 2024-01-01

## 2024-01-01 RX ORDER — MIRTAZAPINE 15 MG/1
7.5 TABLET, ORALLY DISINTEGRATING ORAL AT BEDTIME
Status: DISCONTINUED | OUTPATIENT
Start: 2024-01-01 | End: 2024-01-01

## 2024-01-01 RX ORDER — AMOXICILLIN AND CLAVULANATE POTASSIUM 500; 125 MG/1; MG/1
1 TABLET, FILM COATED ORAL EVERY 12 HOURS
Status: ON HOLD | DISCHARGE
Start: 2024-01-01 | End: 2024-01-01

## 2024-01-01 RX ORDER — LORAZEPAM 0.5 MG/1
0.5 TABLET ORAL
Status: DISCONTINUED | OUTPATIENT
Start: 2024-01-01 | End: 2024-01-01 | Stop reason: HOSPADM

## 2024-01-01 RX ORDER — ONDANSETRON HYDROCHLORIDE 4 MG/5ML
8 SOLUTION ORAL EVERY 8 HOURS
Status: COMPLETED | OUTPATIENT
Start: 2024-01-01 | End: 2024-01-01

## 2024-01-01 RX ORDER — ATROPINE SULFATE 10 MG/ML
2 SOLUTION/ DROPS OPHTHALMIC EVERY 4 HOURS PRN
Status: DISCONTINUED | OUTPATIENT
Start: 2024-01-01 | End: 2024-01-01 | Stop reason: HOSPADM

## 2024-01-01 RX ORDER — AMOXICILLIN AND CLAVULANATE POTASSIUM 400; 57 MG/5ML; MG/5ML
875 POWDER, FOR SUSPENSION ORAL 2 TIMES DAILY
Status: CANCELLED | OUTPATIENT
Start: 2024-01-01

## 2024-01-01 RX ORDER — POTASSIUM PHOS IN 0.9 % NACL 15MMOL/250
15 PLASTIC BAG, INJECTION (ML) INTRAVENOUS ONCE
Qty: 250 ML | Refills: 0 | Status: COMPLETED | OUTPATIENT
Start: 2024-01-01 | End: 2024-01-01

## 2024-01-01 RX ORDER — ONDANSETRON 2 MG/ML
4 INJECTION INTRAMUSCULAR; INTRAVENOUS EVERY 6 HOURS PRN
Status: DISCONTINUED | OUTPATIENT
Start: 2024-01-01 | End: 2024-01-01 | Stop reason: SINTOL

## 2024-01-01 RX ORDER — LANOLIN ALCOHOL/MO/W.PET/CERES
CREAM (GRAM) TOPICAL
Status: DISCONTINUED | OUTPATIENT
Start: 2024-01-01 | End: 2024-01-01 | Stop reason: HOSPADM

## 2024-01-01 RX ORDER — CARVEDILOL 3.12 MG/1
3.12 TABLET ORAL 2 TIMES DAILY WITH MEALS
Status: ON HOLD | DISCHARGE
Start: 2024-01-01 | End: 2024-01-01

## 2024-01-01 RX ORDER — OXYCODONE HCL 20 MG/ML
5 CONCENTRATE, ORAL ORAL EVERY 4 HOURS PRN
Status: DISCONTINUED | OUTPATIENT
Start: 2024-01-01 | End: 2024-01-01 | Stop reason: HOSPADM

## 2024-01-01 RX ORDER — POLYETHYLENE GLYCOL 3350 17 G/17G
17 POWDER, FOR SOLUTION ORAL 2 TIMES DAILY
Status: DISCONTINUED | OUTPATIENT
Start: 2024-01-01 | End: 2024-01-01

## 2024-01-01 RX ORDER — AMOXICILLIN AND CLAVULANATE POTASSIUM 500; 125 MG/1; MG/1
1 TABLET, FILM COATED ORAL EVERY 12 HOURS
Qty: 78 TABLET | Refills: 0 | Status: SHIPPED | OUTPATIENT
Start: 2024-01-01 | End: 2024-01-01

## 2024-01-01 RX ORDER — ATROPINE SULFATE 10 MG/ML
2 SOLUTION/ DROPS OPHTHALMIC EVERY 4 HOURS PRN
Qty: 5 ML | Refills: 0 | Status: SHIPPED | OUTPATIENT
Start: 2024-01-01

## 2024-01-01 RX ORDER — ACETAMINOPHEN 325 MG/10.15ML
650 LIQUID ORAL EVERY 8 HOURS
Status: DISCONTINUED | OUTPATIENT
Start: 2024-01-01 | End: 2024-01-01 | Stop reason: HOSPADM

## 2024-01-01 RX ORDER — ONDANSETRON 4 MG/1
4 TABLET, ORALLY DISINTEGRATING ORAL EVERY 6 HOURS PRN
Status: DISCONTINUED | OUTPATIENT
Start: 2024-01-01 | End: 2024-01-01

## 2024-01-01 RX ORDER — BISACODYL 10 MG
10 SUPPOSITORY, RECTAL RECTAL
Status: DISCONTINUED | OUTPATIENT
Start: 2024-01-01 | End: 2024-01-01 | Stop reason: HOSPADM

## 2024-01-01 RX ORDER — HYDROMORPHONE HCL IN WATER/PF 6 MG/30 ML
0.2 PATIENT CONTROLLED ANALGESIA SYRINGE INTRAVENOUS EVERY 4 HOURS PRN
Status: DISCONTINUED | OUTPATIENT
Start: 2024-01-01 | End: 2024-01-01 | Stop reason: HOSPADM

## 2024-01-01 RX ORDER — MULTIPLE VITAMINS W/ MINERALS TAB 9MG-400MCG
1 TAB ORAL DAILY
Status: DISCONTINUED | OUTPATIENT
Start: 2024-01-01 | End: 2024-01-01 | Stop reason: HOSPADM

## 2024-01-01 RX ORDER — HEPARIN SODIUM 5000 [USP'U]/.5ML
5000 INJECTION, SOLUTION INTRAVENOUS; SUBCUTANEOUS EVERY 12 HOURS
Status: DISCONTINUED | OUTPATIENT
Start: 2024-01-01 | End: 2024-01-01 | Stop reason: HOSPADM

## 2024-01-01 RX ORDER — ACETAMINOPHEN 325 MG/10.15ML
650 LIQUID ORAL EVERY 6 HOURS PRN
Status: DISCONTINUED | OUTPATIENT
Start: 2024-01-01 | End: 2024-01-01 | Stop reason: HOSPADM

## 2024-01-01 RX ORDER — SODIUM BICARBONATE 650 MG/1
650 TABLET ORAL 3 TIMES DAILY
Status: DISCONTINUED | OUTPATIENT
Start: 2024-01-01 | End: 2024-01-01

## 2024-01-01 RX ADMIN — Medication 40 MG: at 05:50

## 2024-01-01 RX ADMIN — SCOPOLAMINE 1 PATCH: 1.5 PATCH, EXTENDED RELEASE TRANSDERMAL at 16:54

## 2024-01-01 RX ADMIN — Medication 1 TABLET: at 09:19

## 2024-01-01 RX ADMIN — Medication 15 ML: at 10:03

## 2024-01-01 RX ADMIN — HALOPERIDOL 1 MG: 2 SOLUTION ORAL at 16:11

## 2024-01-01 RX ADMIN — INSULIN ASPART 1 UNITS: 100 INJECTION, SOLUTION INTRAVENOUS; SUBCUTANEOUS at 06:19

## 2024-01-01 RX ADMIN — Medication 250 MG: at 20:54

## 2024-01-01 RX ADMIN — ONDANSETRON 4 MG: 2 INJECTION INTRAMUSCULAR; INTRAVENOUS at 08:45

## 2024-01-01 RX ADMIN — HEPARIN SODIUM 5000 UNITS: 5000 INJECTION, SOLUTION INTRAVENOUS; SUBCUTANEOUS at 09:16

## 2024-01-01 RX ADMIN — Medication 15 ML: at 09:36

## 2024-01-01 RX ADMIN — Medication 40 MG: at 09:24

## 2024-01-01 RX ADMIN — VANCOMYCIN HYDROCHLORIDE 125 MG: KIT at 16:35

## 2024-01-01 RX ADMIN — SODIUM CHLORIDE, POTASSIUM CHLORIDE, SODIUM LACTATE AND CALCIUM CHLORIDE: 600; 310; 30; 20 INJECTION, SOLUTION INTRAVENOUS at 13:17

## 2024-01-01 RX ADMIN — HALOPERIDOL 1 MG: 2 SOLUTION ORAL at 04:03

## 2024-01-01 RX ADMIN — SODIUM BICARBONATE 650 MG: 650 TABLET ORAL at 20:37

## 2024-01-01 RX ADMIN — POTASSIUM CHLORIDE 40 MEQ: 1.5 POWDER, FOR SOLUTION ORAL at 12:28

## 2024-01-01 RX ADMIN — SODIUM CHLORIDE: 9 INJECTION, SOLUTION INTRAVENOUS at 02:01

## 2024-01-01 RX ADMIN — SODIUM BICARBONATE 650 MG TABLET 650 MG: at 21:24

## 2024-01-01 RX ADMIN — THIAMINE HCL TAB 100 MG 100 MG: 100 TAB at 09:23

## 2024-01-01 RX ADMIN — SODIUM BICARBONATE 650 MG: 650 TABLET ORAL at 13:55

## 2024-01-01 RX ADMIN — HEPARIN SODIUM 5000 UNITS: 5000 INJECTION, SOLUTION INTRAVENOUS; SUBCUTANEOUS at 09:25

## 2024-01-01 RX ADMIN — OXYCODONE HYDROCHLORIDE 5 MG: 100 SOLUTION ORAL at 14:33

## 2024-01-01 RX ADMIN — ONDANSETRON HYDROCHLORIDE 4 MG: 4 TABLET, FILM COATED ORAL at 20:00

## 2024-01-01 RX ADMIN — OXYCODONE HYDROCHLORIDE 5 MG: 5 TABLET ORAL at 17:06

## 2024-01-01 RX ADMIN — PIPERACILLIN AND TAZOBACTAM 3.38 G: 3; .375 INJECTION, POWDER, LYOPHILIZED, FOR SOLUTION INTRAVENOUS at 03:51

## 2024-01-01 RX ADMIN — Medication 250 MG: at 09:22

## 2024-01-01 RX ADMIN — PROCHLORPERAZINE EDISYLATE 5 MG: 5 INJECTION INTRAMUSCULAR; INTRAVENOUS at 18:05

## 2024-01-01 RX ADMIN — Medication 250 MG: at 09:19

## 2024-01-01 RX ADMIN — AMOXICILLIN AND CLAVULANATE POTASSIUM 1 TABLET: 500; 125 TABLET, FILM COATED ORAL at 21:11

## 2024-01-01 RX ADMIN — AMOXICILLIN AND CLAVULANATE POTASSIUM 1 TABLET: 500; 125 TABLET, FILM COATED ORAL at 20:10

## 2024-01-01 RX ADMIN — INSULIN ASPART 1 UNITS: 100 INJECTION, SOLUTION INTRAVENOUS; SUBCUTANEOUS at 10:12

## 2024-01-01 RX ADMIN — Medication 1 TABLET: at 09:30

## 2024-01-01 RX ADMIN — PROCHLORPERAZINE EDISYLATE 5 MG: 5 INJECTION INTRAMUSCULAR; INTRAVENOUS at 10:33

## 2024-01-01 RX ADMIN — ONDANSETRON HYDROCHLORIDE 8 MG: 4 SOLUTION ORAL at 06:47

## 2024-01-01 RX ADMIN — AMOXICILLIN AND CLAVULANATE POTASSIUM 1 TABLET: 500; 125 TABLET, FILM COATED ORAL at 21:47

## 2024-01-01 RX ADMIN — OXYCODONE HYDROCHLORIDE 5 MG: 100 SOLUTION ORAL at 09:01

## 2024-01-01 RX ADMIN — ONDANSETRON HYDROCHLORIDE 4 MG: 4 TABLET, FILM COATED ORAL at 20:19

## 2024-01-01 RX ADMIN — VANCOMYCIN HYDROCHLORIDE 125 MG: KIT at 17:19

## 2024-01-01 RX ADMIN — OXYCODONE HYDROCHLORIDE 5 MG: 100 SOLUTION ORAL at 19:54

## 2024-01-01 RX ADMIN — HALOPERIDOL 1 MG: 2 SOLUTION ORAL at 21:53

## 2024-01-01 RX ADMIN — Medication 1 PACKET: at 19:41

## 2024-01-01 RX ADMIN — HEPARIN SODIUM 5000 UNITS: 5000 INJECTION, SOLUTION INTRAVENOUS; SUBCUTANEOUS at 21:45

## 2024-01-01 RX ADMIN — ONDANSETRON 4 MG: 2 INJECTION INTRAMUSCULAR; INTRAVENOUS at 15:07

## 2024-01-01 RX ADMIN — VANCOMYCIN HYDROCHLORIDE 125 MG: KIT at 15:14

## 2024-01-01 RX ADMIN — Medication 250 MG: at 21:24

## 2024-01-01 RX ADMIN — HEPARIN SODIUM 5000 UNITS: 5000 INJECTION, SOLUTION INTRAVENOUS; SUBCUTANEOUS at 18:00

## 2024-01-01 RX ADMIN — VANCOMYCIN HYDROCHLORIDE 125 MG: KIT at 12:27

## 2024-01-01 RX ADMIN — HEPARIN SODIUM 5000 UNITS: 5000 INJECTION, SOLUTION INTRAVENOUS; SUBCUTANEOUS at 17:55

## 2024-01-01 RX ADMIN — AMOXICILLIN AND CLAVULANATE POTASSIUM 1 TABLET: 875; 125 TABLET, FILM COATED ORAL at 08:52

## 2024-01-01 RX ADMIN — OXYCODONE HYDROCHLORIDE 5 MG: 100 SOLUTION ORAL at 15:04

## 2024-01-01 RX ADMIN — HALOPERIDOL 1 MG: 2 SOLUTION ORAL at 09:30

## 2024-01-01 RX ADMIN — ONDANSETRON 4 MG: 2 INJECTION INTRAMUSCULAR; INTRAVENOUS at 21:33

## 2024-01-01 RX ADMIN — Medication 1 TABLET: at 09:02

## 2024-01-01 RX ADMIN — Medication 1 TABLET: at 09:27

## 2024-01-01 RX ADMIN — VANCOMYCIN HYDROCHLORIDE 125 MG: KIT at 21:12

## 2024-01-01 RX ADMIN — MIRTAZAPINE 15 MG: 15 TABLET, ORALLY DISINTEGRATING ORAL at 22:14

## 2024-01-01 RX ADMIN — Medication 20 MG: at 19:08

## 2024-01-01 RX ADMIN — CEFTRIAXONE SODIUM 1 G: 1 INJECTION, POWDER, FOR SOLUTION INTRAMUSCULAR; INTRAVENOUS at 19:14

## 2024-01-01 RX ADMIN — HALOPERIDOL 1 MG: 2 SOLUTION ORAL at 21:21

## 2024-01-01 RX ADMIN — PIPERACILLIN AND TAZOBACTAM 3.38 G: 3; .375 INJECTION, POWDER, LYOPHILIZED, FOR SOLUTION INTRAVENOUS at 12:08

## 2024-01-01 RX ADMIN — PIPERACILLIN AND TAZOBACTAM 3.38 G: 3; .375 INJECTION, POWDER, LYOPHILIZED, FOR SOLUTION INTRAVENOUS at 22:17

## 2024-01-01 RX ADMIN — ROSUVASTATIN CALCIUM 5 MG: 5 TABLET, FILM COATED ORAL at 21:24

## 2024-01-01 RX ADMIN — CARVEDILOL 3.12 MG: 3.12 TABLET, FILM COATED ORAL at 11:18

## 2024-01-01 RX ADMIN — ONDANSETRON HYDROCHLORIDE 4 MG: 4 TABLET, FILM COATED ORAL at 08:17

## 2024-01-01 RX ADMIN — MIRTAZAPINE 15 MG: 15 TABLET, ORALLY DISINTEGRATING ORAL at 21:12

## 2024-01-01 RX ADMIN — ONDANSETRON HYDROCHLORIDE 4 MG: 4 TABLET, FILM COATED ORAL at 19:44

## 2024-01-01 RX ADMIN — POLYETHYLENE GLYCOL 3350 17 G: 17 POWDER, FOR SOLUTION ORAL at 09:27

## 2024-01-01 RX ADMIN — CARVEDILOL 3.12 MG: 3.12 TABLET, FILM COATED ORAL at 17:58

## 2024-01-01 RX ADMIN — ONDANSETRON HYDROCHLORIDE 4 MG: 4 TABLET, FILM COATED ORAL at 00:47

## 2024-01-01 RX ADMIN — HALOPERIDOL 1 MG: 2 SOLUTION ORAL at 15:04

## 2024-01-01 RX ADMIN — PIPERACILLIN AND TAZOBACTAM 3.38 G: 3; .375 INJECTION, POWDER, LYOPHILIZED, FOR SOLUTION INTRAVENOUS at 18:50

## 2024-01-01 RX ADMIN — VANCOMYCIN HYDROCHLORIDE 125 MG: KIT at 10:42

## 2024-01-01 RX ADMIN — Medication 250 MG: at 20:00

## 2024-01-01 RX ADMIN — Medication 20 MG: at 17:07

## 2024-01-01 RX ADMIN — Medication 15 ML: at 09:30

## 2024-01-01 RX ADMIN — MIRTAZAPINE 7.5 MG: 7.5 TABLET, FILM COATED ORAL at 22:25

## 2024-01-01 RX ADMIN — ONDANSETRON HYDROCHLORIDE 4 MG: 4 TABLET, FILM COATED ORAL at 08:12

## 2024-01-01 RX ADMIN — VANCOMYCIN HYDROCHLORIDE 125 MG: KIT at 09:30

## 2024-01-01 RX ADMIN — ONDANSETRON HYDROCHLORIDE 4 MG: 4 TABLET, FILM COATED ORAL at 17:39

## 2024-01-01 RX ADMIN — Medication 250 MG: at 09:10

## 2024-01-01 RX ADMIN — SENNOSIDES AND DOCUSATE SODIUM 2 TABLET: 50; 8.6 TABLET ORAL at 19:39

## 2024-01-01 RX ADMIN — Medication 1 TABLET: at 09:24

## 2024-01-01 RX ADMIN — TUBERCULIN PURIFIED PROTEIN DERIVATIVE 5 UNITS: 5 INJECTION, SOLUTION INTRADERMAL at 10:10

## 2024-01-01 RX ADMIN — CARVEDILOL 3.12 MG: 3.12 TABLET, FILM COATED ORAL at 17:54

## 2024-01-01 RX ADMIN — VANCOMYCIN HYDROCHLORIDE 125 MG: KIT at 10:43

## 2024-01-01 RX ADMIN — CEFTRIAXONE SODIUM 1 G: 1 INJECTION, POWDER, FOR SOLUTION INTRAMUSCULAR; INTRAVENOUS at 19:00

## 2024-01-01 RX ADMIN — SODIUM BICARBONATE 650 MG: 650 TABLET ORAL at 09:30

## 2024-01-01 RX ADMIN — CARVEDILOL 3.12 MG: 3.12 TABLET, FILM COATED ORAL at 17:41

## 2024-01-01 RX ADMIN — ONDANSETRON HYDROCHLORIDE 4 MG: 4 TABLET, FILM COATED ORAL at 08:45

## 2024-01-01 RX ADMIN — ONDANSETRON HYDROCHLORIDE 4 MG: 4 TABLET, FILM COATED ORAL at 08:30

## 2024-01-01 RX ADMIN — ONDANSETRON HYDROCHLORIDE 4 MG: 4 TABLET, FILM COATED ORAL at 09:02

## 2024-01-01 RX ADMIN — OXYCODONE HYDROCHLORIDE 5 MG: 100 SOLUTION ORAL at 09:27

## 2024-01-01 RX ADMIN — SODIUM BICARBONATE 650 MG: 650 TABLET ORAL at 14:48

## 2024-01-01 RX ADMIN — HEPARIN SODIUM 5000 UNITS: 5000 INJECTION, SOLUTION INTRAVENOUS; SUBCUTANEOUS at 10:03

## 2024-01-01 RX ADMIN — SODIUM CHLORIDE 56 ML: 9 INJECTION, SOLUTION INTRAVENOUS at 16:37

## 2024-01-01 RX ADMIN — Medication 250 MG: at 09:24

## 2024-01-01 RX ADMIN — LIDOCAINE 2 PATCH: 4 PATCH TOPICAL at 09:25

## 2024-01-01 RX ADMIN — SCOPOLAMINE 1 PATCH: 1.5 PATCH, EXTENDED RELEASE TRANSDERMAL at 17:23

## 2024-01-01 RX ADMIN — ONDANSETRON 4 MG: 4 TABLET, ORALLY DISINTEGRATING ORAL at 19:28

## 2024-01-01 RX ADMIN — MIRTAZAPINE 15 MG: 15 TABLET, ORALLY DISINTEGRATING ORAL at 21:53

## 2024-01-01 RX ADMIN — Medication 1 TABLET: at 08:54

## 2024-01-01 RX ADMIN — DEXTROSE MONOHYDRATE 1000 ML: 50 INJECTION, SOLUTION INTRAVENOUS at 14:03

## 2024-01-01 RX ADMIN — Medication 250 MG: at 09:03

## 2024-01-01 RX ADMIN — Medication 1 TABLET: at 08:24

## 2024-01-01 RX ADMIN — HALOPERIDOL 1 MG: 2 SOLUTION ORAL at 09:41

## 2024-01-01 RX ADMIN — PIPERACILLIN AND TAZOBACTAM 3.38 G: 3; .375 INJECTION, POWDER, LYOPHILIZED, FOR SOLUTION INTRAVENOUS at 14:47

## 2024-01-01 RX ADMIN — AMOXICILLIN AND CLAVULANATE POTASSIUM 1 TABLET: 875; 125 TABLET, FILM COATED ORAL at 21:01

## 2024-01-01 RX ADMIN — HEPARIN SODIUM 5000 UNITS: 5000 INJECTION, SOLUTION INTRAVENOUS; SUBCUTANEOUS at 20:19

## 2024-01-01 RX ADMIN — ACETAMINOPHEN 650 MG: 325 SOLUTION ORAL at 11:27

## 2024-01-01 RX ADMIN — ONDANSETRON 4 MG: 2 INJECTION INTRAMUSCULAR; INTRAVENOUS at 04:16

## 2024-01-01 RX ADMIN — PIPERACILLIN AND TAZOBACTAM 3.38 G: 3; .375 INJECTION, POWDER, LYOPHILIZED, FOR SOLUTION INTRAVENOUS at 17:11

## 2024-01-01 RX ADMIN — CARVEDILOL 3.12 MG: 3.12 TABLET, FILM COATED ORAL at 17:26

## 2024-01-01 RX ADMIN — ONDANSETRON HYDROCHLORIDE 4 MG: 4 TABLET, FILM COATED ORAL at 20:10

## 2024-01-01 RX ADMIN — HEPARIN SODIUM 5000 UNITS: 5000 INJECTION, SOLUTION INTRAVENOUS; SUBCUTANEOUS at 04:36

## 2024-01-01 RX ADMIN — SODIUM BICARBONATE 650 MG: 650 TABLET ORAL at 08:51

## 2024-01-01 RX ADMIN — ACETAMINOPHEN 650 MG: 325 TABLET, FILM COATED ORAL at 01:54

## 2024-01-01 RX ADMIN — Medication 250 MG: at 21:16

## 2024-01-01 RX ADMIN — CARVEDILOL 3.12 MG: 3.12 TABLET, FILM COATED ORAL at 17:57

## 2024-01-01 RX ADMIN — MICONAZOLE NITRATE 100 MG: 100 SUPPOSITORY VAGINAL at 22:42

## 2024-01-01 RX ADMIN — SODIUM CHLORIDE 1000 ML: 900 IRRIGANT IRRIGATION at 21:12

## 2024-01-01 RX ADMIN — Medication 15 ML: at 09:26

## 2024-01-01 RX ADMIN — ONDANSETRON HYDROCHLORIDE 4 MG: 4 TABLET, FILM COATED ORAL at 21:46

## 2024-01-01 RX ADMIN — MIRTAZAPINE 15 MG: 15 TABLET, ORALLY DISINTEGRATING ORAL at 21:25

## 2024-01-01 RX ADMIN — Medication 1 TABLET: at 08:43

## 2024-01-01 RX ADMIN — Medication 20 MG: at 09:58

## 2024-01-01 RX ADMIN — FAMOTIDINE 20 MG: 40 POWDER, FOR SUSPENSION ORAL at 17:02

## 2024-01-01 RX ADMIN — Medication 40 MG: at 06:08

## 2024-01-01 RX ADMIN — SODIUM BICARBONATE 650 MG: 650 TABLET ORAL at 21:01

## 2024-01-01 RX ADMIN — HALOPERIDOL 1 MG: 2 SOLUTION ORAL at 15:47

## 2024-01-01 RX ADMIN — Medication 250 MG: at 08:51

## 2024-01-01 RX ADMIN — CARVEDILOL 3.12 MG: 3.12 TABLET, FILM COATED ORAL at 09:31

## 2024-01-01 RX ADMIN — HEPARIN SODIUM 5000 UNITS: 5000 INJECTION, SOLUTION INTRAVENOUS; SUBCUTANEOUS at 09:05

## 2024-01-01 RX ADMIN — MIRTAZAPINE 15 MG: 15 TABLET, ORALLY DISINTEGRATING ORAL at 21:08

## 2024-01-01 RX ADMIN — Medication 250 MG: at 09:30

## 2024-01-01 RX ADMIN — AMOXICILLIN AND CLAVULANATE POTASSIUM 1 TABLET: 500; 125 TABLET, FILM COATED ORAL at 08:45

## 2024-01-01 RX ADMIN — POLYETHYLENE GLYCOL 3350 17 G: 17 POWDER, FOR SOLUTION ORAL at 20:18

## 2024-01-01 RX ADMIN — SODIUM CHLORIDE, POTASSIUM CHLORIDE, SODIUM LACTATE AND CALCIUM CHLORIDE 500 ML: 600; 310; 30; 20 INJECTION, SOLUTION INTRAVENOUS at 20:06

## 2024-01-01 RX ADMIN — CARVEDILOL 3.12 MG: 3.12 TABLET, FILM COATED ORAL at 17:40

## 2024-01-01 RX ADMIN — POLYETHYLENE GLYCOL 3350 17 G: 17 POWDER, FOR SOLUTION ORAL at 09:03

## 2024-01-01 RX ADMIN — AMOXICILLIN AND CLAVULANATE POTASSIUM 1 TABLET: 875; 125 TABLET, FILM COATED ORAL at 20:17

## 2024-01-01 RX ADMIN — FAMOTIDINE 20 MG: 40 POWDER, FOR SUSPENSION ORAL at 17:20

## 2024-01-01 RX ADMIN — ONDANSETRON HYDROCHLORIDE 4 MG: 4 TABLET, FILM COATED ORAL at 13:30

## 2024-01-01 RX ADMIN — ACETAMINOPHEN 650 MG: 325 SOLUTION ORAL at 19:42

## 2024-01-01 RX ADMIN — HALOPERIDOL 1 MG: 2 SOLUTION ORAL at 16:46

## 2024-01-01 RX ADMIN — PIPERACILLIN AND TAZOBACTAM 3.38 G: 3; .375 INJECTION, POWDER, LYOPHILIZED, FOR SOLUTION INTRAVENOUS at 04:12

## 2024-01-01 RX ADMIN — ACETAMINOPHEN 650 MG: 325 TABLET ORAL at 05:50

## 2024-01-01 RX ADMIN — SODIUM CHLORIDE: 9 INJECTION, SOLUTION INTRAVENOUS at 18:59

## 2024-01-01 RX ADMIN — PROCHLORPERAZINE EDISYLATE 5 MG: 5 INJECTION INTRAMUSCULAR; INTRAVENOUS at 20:13

## 2024-01-01 RX ADMIN — Medication 40 MG: at 09:18

## 2024-01-01 RX ADMIN — MICONAZOLE NITRATE 100 MG: 100 SUPPOSITORY VAGINAL at 23:50

## 2024-01-01 RX ADMIN — HEPARIN SODIUM 5000 UNITS: 5000 INJECTION, SOLUTION INTRAVENOUS; SUBCUTANEOUS at 01:28

## 2024-01-01 RX ADMIN — ACETAMINOPHEN 650 MG: 325 TABLET, FILM COATED ORAL at 16:29

## 2024-01-01 RX ADMIN — ONDANSETRON HYDROCHLORIDE 4 MG: 4 TABLET, FILM COATED ORAL at 20:37

## 2024-01-01 RX ADMIN — AMOXICILLIN AND CLAVULANATE POTASSIUM 1 TABLET: 500; 125 TABLET, FILM COATED ORAL at 09:26

## 2024-01-01 RX ADMIN — POLYETHYLENE GLYCOL 3350 17 G: 17 POWDER, FOR SOLUTION ORAL at 09:09

## 2024-01-01 RX ADMIN — ONDANSETRON 4 MG: 2 INJECTION INTRAMUSCULAR; INTRAVENOUS at 06:33

## 2024-01-01 RX ADMIN — ONDANSETRON 4 MG: 4 TABLET, ORALLY DISINTEGRATING ORAL at 12:01

## 2024-01-01 RX ADMIN — VANCOMYCIN HYDROCHLORIDE 125 MG: KIT at 21:10

## 2024-01-01 RX ADMIN — CEFTRIAXONE SODIUM 1 G: 1 INJECTION, POWDER, FOR SOLUTION INTRAMUSCULAR; INTRAVENOUS at 19:44

## 2024-01-01 RX ADMIN — OXYCODONE HYDROCHLORIDE 5 MG: 100 SOLUTION ORAL at 20:42

## 2024-01-01 RX ADMIN — HEPARIN SODIUM 5000 UNITS: 5000 INJECTION, SOLUTION INTRAVENOUS; SUBCUTANEOUS at 22:01

## 2024-01-01 RX ADMIN — VANCOMYCIN HYDROCHLORIDE 125 MG: KIT at 08:38

## 2024-01-01 RX ADMIN — ACETAMINOPHEN 650 MG: 325 SOLUTION ORAL at 20:18

## 2024-01-01 RX ADMIN — MICONAZOLE NITRATE 100 MG: 100 SUPPOSITORY VAGINAL at 01:07

## 2024-01-01 RX ADMIN — Medication 250 MG: at 08:45

## 2024-01-01 RX ADMIN — MIRTAZAPINE 15 MG: 15 TABLET, ORALLY DISINTEGRATING ORAL at 22:27

## 2024-01-01 RX ADMIN — HALOPERIDOL 1 MG: 2 SOLUTION ORAL at 09:27

## 2024-01-01 RX ADMIN — CARVEDILOL 3.12 MG: 3.12 TABLET, FILM COATED ORAL at 09:03

## 2024-01-01 RX ADMIN — Medication 250 MG: at 19:44

## 2024-01-01 RX ADMIN — HEPARIN SODIUM 5000 UNITS: 5000 INJECTION, SOLUTION INTRAVENOUS; SUBCUTANEOUS at 21:03

## 2024-01-01 RX ADMIN — PIPERACILLIN AND TAZOBACTAM 3.38 G: 3; .375 INJECTION, POWDER, LYOPHILIZED, FOR SOLUTION INTRAVENOUS at 00:21

## 2024-01-01 RX ADMIN — Medication 250 MG: at 20:10

## 2024-01-01 RX ADMIN — HEPARIN SODIUM 5000 UNITS: 5000 INJECTION, SOLUTION INTRAVENOUS; SUBCUTANEOUS at 08:43

## 2024-01-01 RX ADMIN — HEPARIN SODIUM 5000 UNITS: 5000 INJECTION, SOLUTION INTRAVENOUS; SUBCUTANEOUS at 21:16

## 2024-01-01 RX ADMIN — VANCOMYCIN HYDROCHLORIDE 125 MG: 125 CAPSULE ORAL at 17:20

## 2024-01-01 RX ADMIN — IOPAMIDOL 15 ML: 510 INJECTION, SOLUTION INTRAVASCULAR at 14:13

## 2024-01-01 RX ADMIN — Medication 20 MG: at 08:37

## 2024-01-01 RX ADMIN — POLYETHYLENE GLYCOL 3350 17 G: 17 POWDER, FOR SOLUTION ORAL at 08:09

## 2024-01-01 RX ADMIN — AMOXICILLIN AND CLAVULANATE POTASSIUM 1 TABLET: 875; 125 TABLET, FILM COATED ORAL at 09:45

## 2024-01-01 RX ADMIN — HALOPERIDOL 1 MG: 2 SOLUTION ORAL at 03:22

## 2024-01-01 RX ADMIN — OXYBUTYNIN CHLORIDE 2.5 MG: 5 SYRUP ORAL at 08:45

## 2024-01-01 RX ADMIN — SODIUM CHLORIDE: 9 INJECTION, SOLUTION INTRAVENOUS at 15:06

## 2024-01-01 RX ADMIN — ONDANSETRON HYDROCHLORIDE 4 MG: 4 TABLET, FILM COATED ORAL at 01:02

## 2024-01-01 RX ADMIN — AMOXICILLIN AND CLAVULANATE POTASSIUM 1 TABLET: 500; 125 TABLET, FILM COATED ORAL at 09:24

## 2024-01-01 RX ADMIN — OXYCODONE HYDROCHLORIDE 5 MG: 5 TABLET ORAL at 01:37

## 2024-01-01 RX ADMIN — HALOPERIDOL 1 MG: 2 SOLUTION ORAL at 11:03

## 2024-01-01 RX ADMIN — HEPARIN SODIUM 5000 UNITS: 5000 INJECTION, SOLUTION INTRAVENOUS; SUBCUTANEOUS at 01:37

## 2024-01-01 RX ADMIN — ONDANSETRON HYDROCHLORIDE 8 MG: 4 SOLUTION ORAL at 07:02

## 2024-01-01 RX ADMIN — Medication 1 TABLET: at 09:22

## 2024-01-01 RX ADMIN — VANCOMYCIN HYDROCHLORIDE 125 MG: KIT at 16:13

## 2024-01-01 RX ADMIN — Medication 20 MG: at 10:03

## 2024-01-01 RX ADMIN — OXYBUTYNIN CHLORIDE 2.5 MG: 5 SYRUP ORAL at 08:56

## 2024-01-01 RX ADMIN — SODIUM CHLORIDE 1000 ML: 9 INJECTION, SOLUTION INTRAVENOUS at 22:20

## 2024-01-01 RX ADMIN — AMOXICILLIN AND CLAVULANATE POTASSIUM 1 TABLET: 500; 125 TABLET, FILM COATED ORAL at 21:24

## 2024-01-01 RX ADMIN — CARVEDILOL 3.12 MG: 3.12 TABLET, FILM COATED ORAL at 08:12

## 2024-01-01 RX ADMIN — HALOPERIDOL 1 MG: 2 SOLUTION ORAL at 21:25

## 2024-01-01 RX ADMIN — MICONAZOLE NITRATE 100 MG: 100 SUPPOSITORY VAGINAL at 21:57

## 2024-01-01 RX ADMIN — POLYETHYLENE GLYCOL 3350 17 G: 17 POWDER, FOR SOLUTION ORAL at 13:36

## 2024-01-01 RX ADMIN — SODIUM BICARBONATE 650 MG: 650 TABLET ORAL at 08:58

## 2024-01-01 RX ADMIN — POLYETHYLENE GLYCOL 3350 17 G: 17 POWDER, FOR SOLUTION ORAL at 09:01

## 2024-01-01 RX ADMIN — DEXTROSE AND SODIUM CHLORIDE: 5; 900 INJECTION, SOLUTION INTRAVENOUS at 21:26

## 2024-01-01 RX ADMIN — ONDANSETRON HYDROCHLORIDE 8 MG: 4 SOLUTION ORAL at 13:01

## 2024-01-01 RX ADMIN — CEFTRIAXONE SODIUM 1 G: 1 INJECTION, POWDER, FOR SOLUTION INTRAMUSCULAR; INTRAVENOUS at 10:37

## 2024-01-01 RX ADMIN — Medication 1 TABLET: at 08:51

## 2024-01-01 RX ADMIN — ONDANSETRON HYDROCHLORIDE 4 MG: 4 TABLET, FILM COATED ORAL at 09:24

## 2024-01-01 RX ADMIN — HALOPERIDOL 1 MG: 2 SOLUTION ORAL at 09:05

## 2024-01-01 RX ADMIN — VANCOMYCIN HYDROCHLORIDE 125 MG: KIT at 22:03

## 2024-01-01 RX ADMIN — Medication 1 TABLET: at 09:10

## 2024-01-01 RX ADMIN — PIPERACILLIN AND TAZOBACTAM 3.38 G: 3; .375 INJECTION, POWDER, LYOPHILIZED, FOR SOLUTION INTRAVENOUS at 12:28

## 2024-01-01 RX ADMIN — Medication 1 TABLET: at 11:18

## 2024-01-01 RX ADMIN — INSULIN ASPART 1 UNITS: 100 INJECTION, SOLUTION INTRAVENOUS; SUBCUTANEOUS at 22:09

## 2024-01-01 RX ADMIN — AMOXICILLIN AND CLAVULANATE POTASSIUM 1 TABLET: 875; 125 TABLET, FILM COATED ORAL at 19:50

## 2024-01-01 RX ADMIN — POTASSIUM PHOSPHATE, MONOBASIC AND POTASSIUM PHOSPHATE, DIBASIC 9 MMOL: 224; 236 INJECTION, SOLUTION, CONCENTRATE INTRAVENOUS at 10:45

## 2024-01-01 RX ADMIN — CARVEDILOL 3.12 MG: 3.12 TABLET, FILM COATED ORAL at 19:05

## 2024-01-01 RX ADMIN — HALOPERIDOL 1 MG: 2 SOLUTION ORAL at 16:13

## 2024-01-01 RX ADMIN — PROCHLORPERAZINE EDISYLATE 5 MG: 5 INJECTION INTRAMUSCULAR; INTRAVENOUS at 10:37

## 2024-01-01 RX ADMIN — PROCHLORPERAZINE EDISYLATE 5 MG: 5 INJECTION INTRAMUSCULAR; INTRAVENOUS at 05:33

## 2024-01-01 RX ADMIN — PROCHLORPERAZINE EDISYLATE 5 MG: 5 INJECTION INTRAMUSCULAR; INTRAVENOUS at 06:23

## 2024-01-01 RX ADMIN — ACETAMINOPHEN 650 MG: 325 TABLET, FILM COATED ORAL at 11:34

## 2024-01-01 RX ADMIN — AMOXICILLIN AND CLAVULANATE POTASSIUM 1 TABLET: 875; 125 TABLET, FILM COATED ORAL at 21:16

## 2024-01-01 RX ADMIN — OXYCODONE HYDROCHLORIDE 5 MG: 100 SOLUTION ORAL at 14:02

## 2024-01-01 RX ADMIN — HEPARIN SODIUM 5000 UNITS: 5000 INJECTION, SOLUTION INTRAVENOUS; SUBCUTANEOUS at 19:43

## 2024-01-01 RX ADMIN — HALOPERIDOL 1 MG: 2 SOLUTION ORAL at 04:18

## 2024-01-01 RX ADMIN — Medication 250 MG: at 11:18

## 2024-01-01 RX ADMIN — POTASSIUM PHOSPHATE, MONOBASIC AND POTASSIUM PHOSPHATE, DIBASIC 9 MMOL: 224; 236 INJECTION, SOLUTION, CONCENTRATE INTRAVENOUS at 20:53

## 2024-01-01 RX ADMIN — HEPARIN SODIUM 5000 UNITS: 5000 INJECTION, SOLUTION INTRAVENOUS; SUBCUTANEOUS at 09:58

## 2024-01-01 RX ADMIN — HALOPERIDOL 1 MG: 2 SOLUTION ORAL at 11:07

## 2024-01-01 RX ADMIN — PIPERACILLIN AND TAZOBACTAM 3.38 G: 3; .375 INJECTION, POWDER, LYOPHILIZED, FOR SOLUTION INTRAVENOUS at 04:24

## 2024-01-01 RX ADMIN — LIDOCAINE 2 PATCH: 4 PATCH TOPICAL at 20:32

## 2024-01-01 RX ADMIN — Medication 1 MG: at 13:47

## 2024-01-01 RX ADMIN — OXYCODONE HYDROCHLORIDE 5 MG: 100 SOLUTION ORAL at 20:41

## 2024-01-01 RX ADMIN — POTASSIUM PHOSPHATE, MONOBASIC AND POTASSIUM PHOSPHATE, DIBASIC 9 MMOL: 224; 236 INJECTION, SOLUTION, CONCENTRATE INTRAVENOUS at 10:11

## 2024-01-01 RX ADMIN — CARVEDILOL 3.12 MG: 3.12 TABLET, FILM COATED ORAL at 18:35

## 2024-01-01 RX ADMIN — FENTANYL CITRATE 25 MCG: 50 INJECTION, SOLUTION INTRAMUSCULAR; INTRAVENOUS at 13:37

## 2024-01-01 RX ADMIN — Medication 1 PACKET: at 12:36

## 2024-01-01 RX ADMIN — Medication 20 MG: at 10:43

## 2024-01-01 RX ADMIN — PIPERACILLIN AND TAZOBACTAM 3.38 G: 3; .375 INJECTION, POWDER, LYOPHILIZED, FOR SOLUTION INTRAVENOUS at 06:46

## 2024-01-01 RX ADMIN — Medication 1 PACKET: at 14:49

## 2024-01-01 RX ADMIN — HALOPERIDOL 1 MG: 2 SOLUTION ORAL at 22:18

## 2024-01-01 RX ADMIN — ONDANSETRON HYDROCHLORIDE 4 MG: 4 TABLET, FILM COATED ORAL at 02:47

## 2024-01-01 RX ADMIN — SENNOSIDES AND DOCUSATE SODIUM 2 TABLET: 50; 8.6 TABLET ORAL at 09:40

## 2024-01-01 RX ADMIN — MIRTAZAPINE 15 MG: 15 TABLET, ORALLY DISINTEGRATING ORAL at 22:03

## 2024-01-01 RX ADMIN — Medication 250 MG: at 21:01

## 2024-01-01 RX ADMIN — CARVEDILOL 3.12 MG: 3.12 TABLET, FILM COATED ORAL at 09:10

## 2024-01-01 RX ADMIN — CEFAZOLIN SODIUM 2 G: 2 INJECTION, SOLUTION INTRAVENOUS at 13:30

## 2024-01-01 RX ADMIN — ONDANSETRON 4 MG: 2 INJECTION INTRAMUSCULAR; INTRAVENOUS at 11:33

## 2024-01-01 RX ADMIN — ACETAMINOPHEN 650 MG: 325 SOLUTION ORAL at 04:20

## 2024-01-01 RX ADMIN — ONDANSETRON 4 MG: 2 INJECTION INTRAMUSCULAR; INTRAVENOUS at 06:30

## 2024-01-01 RX ADMIN — VANCOMYCIN HYDROCHLORIDE 125 MG: KIT at 18:10

## 2024-01-01 RX ADMIN — AMOXICILLIN AND CLAVULANATE POTASSIUM 1 TABLET: 875; 125 TABLET, FILM COATED ORAL at 09:04

## 2024-01-01 RX ADMIN — ONDANSETRON 4 MG: 2 INJECTION INTRAMUSCULAR; INTRAVENOUS at 22:45

## 2024-01-01 RX ADMIN — CARVEDILOL 3.12 MG: 3.12 TABLET, FILM COATED ORAL at 17:11

## 2024-01-01 RX ADMIN — HEPARIN SODIUM 5000 UNITS: 5000 INJECTION, SOLUTION INTRAVENOUS; SUBCUTANEOUS at 09:42

## 2024-01-01 RX ADMIN — Medication 1 TABLET: at 09:05

## 2024-01-01 RX ADMIN — DEXTROSE AND SODIUM CHLORIDE: 5; 900 INJECTION, SOLUTION INTRAVENOUS at 09:23

## 2024-01-01 RX ADMIN — HEPARIN SODIUM 5000 UNITS: 5000 INJECTION, SOLUTION INTRAVENOUS; SUBCUTANEOUS at 12:01

## 2024-01-01 RX ADMIN — CARVEDILOL 3.12 MG: 3.12 TABLET, FILM COATED ORAL at 17:39

## 2024-01-01 RX ADMIN — CARVEDILOL 3.12 MG: 3.12 TABLET, FILM COATED ORAL at 08:51

## 2024-01-01 RX ADMIN — Medication 40 MG: at 05:55

## 2024-01-01 RX ADMIN — THIAMINE HCL TAB 100 MG 100 MG: 100 TAB at 13:47

## 2024-01-01 RX ADMIN — VANCOMYCIN HYDROCHLORIDE 125 MG: KIT at 13:05

## 2024-01-01 RX ADMIN — SODIUM CHLORIDE 1000 ML: 9 INJECTION, SOLUTION INTRAVENOUS at 00:34

## 2024-01-01 RX ADMIN — HEPARIN SODIUM 5000 UNITS: 5000 INJECTION, SOLUTION INTRAVENOUS; SUBCUTANEOUS at 10:41

## 2024-01-01 RX ADMIN — ONDANSETRON 4 MG: 2 INJECTION INTRAMUSCULAR; INTRAVENOUS at 18:03

## 2024-01-01 RX ADMIN — CARVEDILOL 3.12 MG: 3.12 TABLET, FILM COATED ORAL at 08:58

## 2024-01-01 RX ADMIN — ACETAMINOPHEN 650 MG: 325 TABLET, FILM COATED ORAL at 15:51

## 2024-01-01 RX ADMIN — ACETAMINOPHEN 650 MG: 650 SUPPOSITORY RECTAL at 15:06

## 2024-01-01 RX ADMIN — ACETAMINOPHEN 650 MG: 325 TABLET, FILM COATED ORAL at 22:13

## 2024-01-01 RX ADMIN — PIPERACILLIN AND TAZOBACTAM 3.38 G: 3; .375 INJECTION, POWDER, LYOPHILIZED, FOR SOLUTION INTRAVENOUS at 22:41

## 2024-01-01 RX ADMIN — Medication 250 MG: at 08:30

## 2024-01-01 RX ADMIN — SODIUM BICARBONATE 650 MG TABLET 650 MG: at 16:29

## 2024-01-01 RX ADMIN — MIRTAZAPINE 15 MG: 15 TABLET, ORALLY DISINTEGRATING ORAL at 21:57

## 2024-01-01 RX ADMIN — Medication 250 MG: at 09:04

## 2024-01-01 RX ADMIN — HALOPERIDOL 1 MG: 2 SOLUTION ORAL at 10:54

## 2024-01-01 RX ADMIN — OXYBUTYNIN CHLORIDE 2.5 MG: 5 SYRUP ORAL at 09:07

## 2024-01-01 RX ADMIN — Medication 40 MG: at 08:41

## 2024-01-01 RX ADMIN — ONDANSETRON 4 MG: 2 INJECTION INTRAMUSCULAR; INTRAVENOUS at 12:07

## 2024-01-01 RX ADMIN — ROSUVASTATIN CALCIUM 5 MG: 5 TABLET, FILM COATED ORAL at 22:11

## 2024-01-01 RX ADMIN — LIDOCAINE 1 PATCH: 4 PATCH TOPICAL at 11:33

## 2024-01-01 RX ADMIN — PIPERACILLIN AND TAZOBACTAM 3.38 G: 3; .375 INJECTION, POWDER, LYOPHILIZED, FOR SOLUTION INTRAVENOUS at 06:11

## 2024-01-01 RX ADMIN — ROSUVASTATIN CALCIUM 5 MG: 5 TABLET, FILM COATED ORAL at 21:46

## 2024-01-01 RX ADMIN — OXYCODONE HYDROCHLORIDE 5 MG: 100 SOLUTION ORAL at 20:21

## 2024-01-01 RX ADMIN — Medication 40 MG: at 08:25

## 2024-01-01 RX ADMIN — OXYCODONE HYDROCHLORIDE 5 MG: 100 SOLUTION ORAL at 13:26

## 2024-01-01 RX ADMIN — ROSUVASTATIN CALCIUM 5 MG: 5 TABLET, FILM COATED ORAL at 21:26

## 2024-01-01 RX ADMIN — HALOPERIDOL 1 MG: 2 SOLUTION ORAL at 16:36

## 2024-01-01 RX ADMIN — VANCOMYCIN HYDROCHLORIDE 125 MG: KIT at 09:05

## 2024-01-01 RX ADMIN — SODIUM BICARBONATE 650 MG: 650 TABLET ORAL at 13:30

## 2024-01-01 RX ADMIN — Medication 40 MG: at 07:04

## 2024-01-01 RX ADMIN — AMOXICILLIN AND CLAVULANATE POTASSIUM 1 TABLET: 500; 125 TABLET, FILM COATED ORAL at 22:10

## 2024-01-01 RX ADMIN — ONDANSETRON HYDROCHLORIDE 4 MG: 4 TABLET, FILM COATED ORAL at 02:51

## 2024-01-01 RX ADMIN — CEFTRIAXONE SODIUM 1 G: 1 INJECTION, POWDER, FOR SOLUTION INTRAMUSCULAR; INTRAVENOUS at 03:16

## 2024-01-01 RX ADMIN — MIRTAZAPINE 15 MG: 15 TABLET, ORALLY DISINTEGRATING ORAL at 22:05

## 2024-01-01 RX ADMIN — SODIUM BICARBONATE 650 MG: 650 TABLET ORAL at 09:28

## 2024-01-01 RX ADMIN — HEPARIN SODIUM 5000 UNITS: 5000 INJECTION, SOLUTION INTRAVENOUS; SUBCUTANEOUS at 09:24

## 2024-01-01 RX ADMIN — POLYETHYLENE GLYCOL 3350 17 G: 17 POWDER, FOR SOLUTION ORAL at 09:24

## 2024-01-01 RX ADMIN — OXYCODONE HYDROCHLORIDE 5 MG: 100 SOLUTION ORAL at 10:42

## 2024-01-01 RX ADMIN — SCOPOLAMINE 1 PATCH: 1.5 PATCH, EXTENDED RELEASE TRANSDERMAL at 16:43

## 2024-01-01 RX ADMIN — HEPARIN SODIUM 5000 UNITS: 5000 INJECTION, SOLUTION INTRAVENOUS; SUBCUTANEOUS at 19:03

## 2024-01-01 RX ADMIN — VANCOMYCIN HYDROCHLORIDE 125 MG: KIT at 09:27

## 2024-01-01 RX ADMIN — VANCOMYCIN HYDROCHLORIDE 125 MG: KIT at 16:39

## 2024-01-01 RX ADMIN — ACETAMINOPHEN 650 MG: 325 TABLET, FILM COATED ORAL at 17:06

## 2024-01-01 RX ADMIN — Medication 20 MG: at 16:29

## 2024-01-01 RX ADMIN — SENNOSIDES AND DOCUSATE SODIUM 2 TABLET: 50; 8.6 TABLET ORAL at 11:34

## 2024-01-01 RX ADMIN — HEPARIN SODIUM 5000 UNITS: 5000 INJECTION, SOLUTION INTRAVENOUS; SUBCUTANEOUS at 09:06

## 2024-01-01 RX ADMIN — Medication 1 TABLET: at 09:41

## 2024-01-01 RX ADMIN — PIPERACILLIN AND TAZOBACTAM 3.38 G: 3; .375 INJECTION, POWDER, LYOPHILIZED, FOR SOLUTION INTRAVENOUS at 18:03

## 2024-01-01 RX ADMIN — HEPARIN SODIUM 5000 UNITS: 5000 INJECTION, SOLUTION INTRAVENOUS; SUBCUTANEOUS at 21:59

## 2024-01-01 RX ADMIN — HEPARIN SODIUM 5000 UNITS: 5000 INJECTION, SOLUTION INTRAVENOUS; SUBCUTANEOUS at 11:18

## 2024-01-01 RX ADMIN — HEPARIN SODIUM 5000 UNITS: 5000 INJECTION, SOLUTION INTRAVENOUS; SUBCUTANEOUS at 19:39

## 2024-01-01 RX ADMIN — FAMOTIDINE 20 MG: 40 POWDER, FOR SUSPENSION ORAL at 14:55

## 2024-01-01 RX ADMIN — HEPARIN SODIUM 5000 UNITS: 5000 INJECTION, SOLUTION INTRAVENOUS; SUBCUTANEOUS at 13:30

## 2024-01-01 RX ADMIN — Medication 15 ML: at 09:31

## 2024-01-01 RX ADMIN — ACETAMINOPHEN 650 MG: 325 SOLUTION ORAL at 20:01

## 2024-01-01 RX ADMIN — SODIUM BICARBONATE 650 MG: 650 TABLET ORAL at 15:45

## 2024-01-01 RX ADMIN — CARVEDILOL 3.12 MG: 3.12 TABLET, FILM COATED ORAL at 09:04

## 2024-01-01 RX ADMIN — Medication 20 MG: at 16:43

## 2024-01-01 RX ADMIN — AMOXICILLIN AND CLAVULANATE POTASSIUM 1 TABLET: 500; 125 TABLET, FILM COATED ORAL at 08:43

## 2024-01-01 RX ADMIN — CARVEDILOL 3.12 MG: 3.12 TABLET, FILM COATED ORAL at 09:13

## 2024-01-01 RX ADMIN — Medication 250 MG: at 19:41

## 2024-01-01 RX ADMIN — ONDANSETRON HYDROCHLORIDE 4 MG: 4 TABLET, FILM COATED ORAL at 21:24

## 2024-01-01 RX ADMIN — ONDANSETRON 4 MG: 2 INJECTION INTRAMUSCULAR; INTRAVENOUS at 01:09

## 2024-01-01 RX ADMIN — POLYETHYLENE GLYCOL 3350 17 G: 17 POWDER, FOR SOLUTION ORAL at 21:59

## 2024-01-01 RX ADMIN — CARVEDILOL 3.12 MG: 3.12 TABLET, FILM COATED ORAL at 09:44

## 2024-01-01 RX ADMIN — CARVEDILOL 3.12 MG: 3.12 TABLET, FILM COATED ORAL at 18:24

## 2024-01-01 RX ADMIN — CARVEDILOL 3.12 MG: 3.12 TABLET, FILM COATED ORAL at 08:43

## 2024-01-01 RX ADMIN — AMOXICILLIN AND CLAVULANATE POTASSIUM 1 TABLET: 875; 125 TABLET, FILM COATED ORAL at 09:19

## 2024-01-01 RX ADMIN — VANCOMYCIN HYDROCHLORIDE 125 MG: KIT at 11:03

## 2024-01-01 RX ADMIN — Medication 250 MG: at 09:31

## 2024-01-01 RX ADMIN — HALOPERIDOL 1 MG: 2 SOLUTION ORAL at 04:37

## 2024-01-01 RX ADMIN — VANCOMYCIN HYDROCHLORIDE 125 MG: KIT at 11:59

## 2024-01-01 RX ADMIN — ONDANSETRON 4 MG: 2 INJECTION INTRAMUSCULAR; INTRAVENOUS at 20:56

## 2024-01-01 RX ADMIN — MIRTAZAPINE 15 MG: 15 TABLET, ORALLY DISINTEGRATING ORAL at 22:18

## 2024-01-01 RX ADMIN — CARVEDILOL 3.12 MG: 3.12 TABLET, FILM COATED ORAL at 09:28

## 2024-01-01 RX ADMIN — AMOXICILLIN AND CLAVULANATE POTASSIUM 1 TABLET: 500; 125 TABLET, FILM COATED ORAL at 09:05

## 2024-01-01 RX ADMIN — MIRTAZAPINE 15 MG: 15 TABLET, ORALLY DISINTEGRATING ORAL at 21:13

## 2024-01-01 RX ADMIN — AMOXICILLIN AND CLAVULANATE POTASSIUM 1 TABLET: 500; 125 TABLET, FILM COATED ORAL at 08:47

## 2024-01-01 RX ADMIN — Medication 15 ML: at 08:39

## 2024-01-01 RX ADMIN — ONDANSETRON HYDROCHLORIDE 4 MG: 4 TABLET, FILM COATED ORAL at 14:53

## 2024-01-01 RX ADMIN — HYDROMORPHONE HYDROCHLORIDE 1 MG: 5 SOLUTION ORAL at 02:45

## 2024-01-01 RX ADMIN — Medication 20 MG: at 18:15

## 2024-01-01 RX ADMIN — SODIUM CHLORIDE: 9 INJECTION, SOLUTION INTRAVENOUS at 00:27

## 2024-01-01 RX ADMIN — Medication 1 PACKET: at 19:38

## 2024-01-01 RX ADMIN — SENNOSIDES AND DOCUSATE SODIUM 1 TABLET: 50; 8.6 TABLET ORAL at 21:59

## 2024-01-01 RX ADMIN — ONDANSETRON 4 MG: 2 INJECTION INTRAMUSCULAR; INTRAVENOUS at 14:55

## 2024-01-01 RX ADMIN — POLYETHYLENE GLYCOL 3350 17 G: 17 POWDER, FOR SOLUTION ORAL at 10:43

## 2024-01-01 RX ADMIN — POLYETHYLENE GLYCOL 3350 17 G: 17 POWDER, FOR SOLUTION ORAL at 10:03

## 2024-01-01 RX ADMIN — VANCOMYCIN HYDROCHLORIDE 125 MG: KIT at 17:06

## 2024-01-01 RX ADMIN — Medication 1 TABLET: at 12:26

## 2024-01-01 RX ADMIN — Medication 20 MG: at 16:52

## 2024-01-01 RX ADMIN — THIAMINE HCL TAB 100 MG 100 MG: 100 TAB at 10:30

## 2024-01-01 RX ADMIN — AMOXICILLIN AND CLAVULANATE POTASSIUM 1 TABLET: 875; 125 TABLET, FILM COATED ORAL at 09:28

## 2024-01-01 RX ADMIN — ONDANSETRON 4 MG: 2 INJECTION INTRAMUSCULAR; INTRAVENOUS at 18:58

## 2024-01-01 RX ADMIN — ONDANSETRON HYDROCHLORIDE 4 MG: 4 TABLET, FILM COATED ORAL at 01:30

## 2024-01-01 RX ADMIN — INSULIN ASPART 1 UNITS: 100 INJECTION, SOLUTION INTRAVENOUS; SUBCUTANEOUS at 21:54

## 2024-01-01 RX ADMIN — AMOXICILLIN AND CLAVULANATE POTASSIUM 1 TABLET: 875; 125 TABLET, FILM COATED ORAL at 09:30

## 2024-01-01 RX ADMIN — ONDANSETRON 4 MG: 2 INJECTION INTRAMUSCULAR; INTRAVENOUS at 06:06

## 2024-01-01 RX ADMIN — AMOXICILLIN AND CLAVULANATE POTASSIUM 1 TABLET: 500; 125 TABLET, FILM COATED ORAL at 09:03

## 2024-01-01 RX ADMIN — OXYBUTYNIN CHLORIDE 2.5 MG: 5 SYRUP ORAL at 08:46

## 2024-01-01 RX ADMIN — Medication 250 MG: at 19:38

## 2024-01-01 RX ADMIN — OXYCODONE HYDROCHLORIDE 5 MG: 100 SOLUTION ORAL at 15:14

## 2024-01-01 RX ADMIN — ONDANSETRON 4 MG: 2 INJECTION INTRAMUSCULAR; INTRAVENOUS at 09:41

## 2024-01-01 RX ADMIN — FAMOTIDINE 20 MG: 20 INJECTION, SOLUTION INTRAVENOUS at 14:59

## 2024-01-01 RX ADMIN — PIPERACILLIN AND TAZOBACTAM 3.38 G: 3; .375 INJECTION, POWDER, LYOPHILIZED, FOR SOLUTION INTRAVENOUS at 18:40

## 2024-01-01 RX ADMIN — SODIUM BICARBONATE 650 MG: 650 TABLET ORAL at 19:28

## 2024-01-01 RX ADMIN — ROSUVASTATIN CALCIUM 5 MG: 5 TABLET, FILM COATED ORAL at 21:01

## 2024-01-01 RX ADMIN — Medication 1 PACKET: at 22:00

## 2024-01-01 RX ADMIN — DRONABINOL 2.5 MG: 5 SOLUTION ORAL at 09:21

## 2024-01-01 RX ADMIN — PIPERACILLIN AND TAZOBACTAM 3.38 G: 3; .375 INJECTION, POWDER, LYOPHILIZED, FOR SOLUTION INTRAVENOUS at 09:44

## 2024-01-01 RX ADMIN — ONDANSETRON HYDROCHLORIDE 4 MG: 4 TABLET, FILM COATED ORAL at 08:43

## 2024-01-01 RX ADMIN — AMOXICILLIN AND CLAVULANATE POTASSIUM 1 TABLET: 500; 125 TABLET, FILM COATED ORAL at 20:53

## 2024-01-01 RX ADMIN — Medication 20 MG: at 17:20

## 2024-01-01 RX ADMIN — SODIUM BICARBONATE 650 MG: 650 TABLET ORAL at 14:53

## 2024-01-01 RX ADMIN — HEPARIN SODIUM 5000 UNITS: 5000 INJECTION, SOLUTION INTRAVENOUS; SUBCUTANEOUS at 21:14

## 2024-01-01 RX ADMIN — PIPERACILLIN AND TAZOBACTAM 3.38 G: 3; .375 INJECTION, POWDER, LYOPHILIZED, FOR SOLUTION INTRAVENOUS at 01:09

## 2024-01-01 RX ADMIN — ROSUVASTATIN CALCIUM 5 MG: 5 TABLET, FILM COATED ORAL at 21:11

## 2024-01-01 RX ADMIN — CARVEDILOL 3.12 MG: 3.12 TABLET, FILM COATED ORAL at 09:41

## 2024-01-01 RX ADMIN — PIPERACILLIN AND TAZOBACTAM 3.38 G: 3; .375 INJECTION, POWDER, LYOPHILIZED, FOR SOLUTION INTRAVENOUS at 04:18

## 2024-01-01 RX ADMIN — HALOPERIDOL 1 MG: 2 SOLUTION ORAL at 10:22

## 2024-01-01 RX ADMIN — HEPARIN SODIUM 5000 UNITS: 5000 INJECTION, SOLUTION INTRAVENOUS; SUBCUTANEOUS at 03:48

## 2024-01-01 RX ADMIN — LOPERAMIDE HYDROCHLORIDE 2 MG: 2 CAPSULE ORAL at 12:24

## 2024-01-01 RX ADMIN — SODIUM BICARBONATE 650 MG: 650 TABLET ORAL at 09:44

## 2024-01-01 RX ADMIN — VANCOMYCIN HYDROCHLORIDE 125 MG: 125 CAPSULE ORAL at 12:26

## 2024-01-01 RX ADMIN — CARVEDILOL 3.12 MG: 3.12 TABLET, FILM COATED ORAL at 08:18

## 2024-01-01 RX ADMIN — MIRTAZAPINE 7.5 MG: 7.5 TABLET, FILM COATED ORAL at 23:42

## 2024-01-01 RX ADMIN — ACETAMINOPHEN 650 MG: 325 TABLET, FILM COATED ORAL at 17:17

## 2024-01-01 RX ADMIN — ONDANSETRON 4 MG: 2 INJECTION INTRAMUSCULAR; INTRAVENOUS at 09:25

## 2024-01-01 RX ADMIN — MICONAZOLE NITRATE 100 MG: 100 SUPPOSITORY VAGINAL at 22:11

## 2024-01-01 RX ADMIN — LIDOCAINE HYDROCHLORIDE 5 ML: 20 SOLUTION OROPHARYNGEAL at 13:00

## 2024-01-01 RX ADMIN — HEPARIN SODIUM 5000 UNITS: 5000 INJECTION, SOLUTION INTRAVENOUS; SUBCUTANEOUS at 18:40

## 2024-01-01 RX ADMIN — ACETAMINOPHEN 650 MG: 325 TABLET, FILM COATED ORAL at 01:37

## 2024-01-01 RX ADMIN — VANCOMYCIN HYDROCHLORIDE 125 MG: KIT at 09:01

## 2024-01-01 RX ADMIN — CARVEDILOL 3.12 MG: 3.12 TABLET, FILM COATED ORAL at 09:05

## 2024-01-01 RX ADMIN — HEPARIN SODIUM 5000 UNITS: 5000 INJECTION, SOLUTION INTRAVENOUS; SUBCUTANEOUS at 20:56

## 2024-01-01 RX ADMIN — VANCOMYCIN HYDROCHLORIDE 1000 MG: 1 INJECTION, SOLUTION INTRAVENOUS at 09:23

## 2024-01-01 RX ADMIN — SENNOSIDES AND DOCUSATE SODIUM 2 TABLET: 50; 8.6 TABLET ORAL at 09:26

## 2024-01-01 RX ADMIN — HEPARIN SODIUM 5000 UNITS: 5000 INJECTION, SOLUTION INTRAVENOUS; SUBCUTANEOUS at 09:44

## 2024-01-01 RX ADMIN — Medication 40 MG: at 05:32

## 2024-01-01 RX ADMIN — SCOPOLAMINE 1 PATCH: 1.5 PATCH, EXTENDED RELEASE TRANSDERMAL at 17:19

## 2024-01-01 RX ADMIN — Medication 20 MG: at 09:36

## 2024-01-01 RX ADMIN — Medication 1 TABLET: at 09:14

## 2024-01-01 RX ADMIN — HEPARIN SODIUM 5000 UNITS: 5000 INJECTION, SOLUTION INTRAVENOUS; SUBCUTANEOUS at 05:17

## 2024-01-01 RX ADMIN — Medication 250 MG: at 19:51

## 2024-01-01 RX ADMIN — HEPARIN SODIUM 5000 UNITS: 5000 INJECTION, SOLUTION INTRAVENOUS; SUBCUTANEOUS at 09:31

## 2024-01-01 RX ADMIN — CARVEDILOL 3.12 MG: 3.12 TABLET, FILM COATED ORAL at 08:25

## 2024-01-01 RX ADMIN — PIPERACILLIN AND TAZOBACTAM 3.38 G: 3; .375 INJECTION, POWDER, LYOPHILIZED, FOR SOLUTION INTRAVENOUS at 16:27

## 2024-01-01 RX ADMIN — HALOPERIDOL 1 MG: 2 SOLUTION ORAL at 04:14

## 2024-01-01 RX ADMIN — AMOXICILLIN AND CLAVULANATE POTASSIUM 1 TABLET: 500; 125 TABLET, FILM COATED ORAL at 20:58

## 2024-01-01 RX ADMIN — SENNOSIDES AND DOCUSATE SODIUM 2 TABLET: 50; 8.6 TABLET ORAL at 09:41

## 2024-01-01 RX ADMIN — Medication 15 ML: at 13:36

## 2024-01-01 RX ADMIN — SODIUM CHLORIDE, POTASSIUM CHLORIDE, SODIUM LACTATE AND CALCIUM CHLORIDE: 600; 310; 30; 20 INJECTION, SOLUTION INTRAVENOUS at 06:23

## 2024-01-01 RX ADMIN — HALOPERIDOL 1 MG: 2 SOLUTION ORAL at 03:06

## 2024-01-01 RX ADMIN — HALOPERIDOL 1 MG: 2 SOLUTION ORAL at 17:02

## 2024-01-01 RX ADMIN — FENTANYL CITRATE 25 MCG: 50 INJECTION, SOLUTION INTRAMUSCULAR; INTRAVENOUS at 13:51

## 2024-01-01 RX ADMIN — SODIUM BICARBONATE 650 MG: 650 TABLET ORAL at 13:46

## 2024-01-01 RX ADMIN — VANCOMYCIN HYDROCHLORIDE 125 MG: 125 CAPSULE ORAL at 09:42

## 2024-01-01 RX ADMIN — Medication 40 MG: at 06:35

## 2024-01-01 RX ADMIN — ONDANSETRON HYDROCHLORIDE 4 MG: 4 TABLET, FILM COATED ORAL at 20:11

## 2024-01-01 RX ADMIN — SODIUM BICARBONATE 650 MG: 650 TABLET ORAL at 19:41

## 2024-01-01 RX ADMIN — HALOPERIDOL 1 MG: 2 SOLUTION ORAL at 04:17

## 2024-01-01 RX ADMIN — CARVEDILOL 3.12 MG: 3.12 TABLET, FILM COATED ORAL at 19:02

## 2024-01-01 RX ADMIN — OXYCODONE HYDROCHLORIDE 10 MG: 100 SOLUTION ORAL at 17:20

## 2024-01-01 RX ADMIN — ONDANSETRON HYDROCHLORIDE 4 MG: 4 TABLET, FILM COATED ORAL at 13:55

## 2024-01-01 RX ADMIN — PROCHLORPERAZINE EDISYLATE 5 MG: 5 INJECTION INTRAMUSCULAR; INTRAVENOUS at 22:02

## 2024-01-01 RX ADMIN — HALOPERIDOL 1 MG: 2 SOLUTION ORAL at 21:20

## 2024-01-01 RX ADMIN — Medication 250 MG: at 21:59

## 2024-01-01 RX ADMIN — POLYETHYLENE GLYCOL 3350 17 G: 17 POWDER, FOR SOLUTION ORAL at 08:29

## 2024-01-01 RX ADMIN — HEPARIN SODIUM 5000 UNITS: 5000 INJECTION, SOLUTION INTRAVENOUS; SUBCUTANEOUS at 12:16

## 2024-01-01 RX ADMIN — Medication 250 MG: at 08:25

## 2024-01-01 RX ADMIN — Medication 1 TABLET: at 08:17

## 2024-01-01 RX ADMIN — AMOXICILLIN AND CLAVULANATE POTASSIUM 1 TABLET: 500; 125 TABLET, FILM COATED ORAL at 08:30

## 2024-01-01 RX ADMIN — VANCOMYCIN HYDROCHLORIDE 125 MG: KIT at 13:27

## 2024-01-01 RX ADMIN — HEPARIN SODIUM 5000 UNITS: 5000 INJECTION, SOLUTION INTRAVENOUS; SUBCUTANEOUS at 21:27

## 2024-01-01 RX ADMIN — SODIUM BICARBONATE 650 MG: 650 TABLET ORAL at 09:10

## 2024-01-01 RX ADMIN — SODIUM BICARBONATE 650 MG: 650 TABLET ORAL at 09:05

## 2024-01-01 RX ADMIN — VANCOMYCIN HYDROCHLORIDE 125 MG: KIT at 21:25

## 2024-01-01 RX ADMIN — MICONAZOLE NITRATE 100 MG: 100 SUPPOSITORY VAGINAL at 22:15

## 2024-01-01 RX ADMIN — CARVEDILOL 3.12 MG: 3.12 TABLET, FILM COATED ORAL at 09:24

## 2024-01-01 RX ADMIN — OXYCODONE HYDROCHLORIDE 5 MG: 100 SOLUTION ORAL at 08:41

## 2024-01-01 RX ADMIN — DRONABINOL 2.5 MG: 5 SOLUTION ORAL at 16:01

## 2024-01-01 RX ADMIN — PIPERACILLIN AND TAZOBACTAM 3.38 G: 3; .375 INJECTION, POWDER, LYOPHILIZED, FOR SOLUTION INTRAVENOUS at 23:16

## 2024-01-01 RX ADMIN — CARVEDILOL 3.12 MG: 3.12 TABLET, FILM COATED ORAL at 08:17

## 2024-01-01 RX ADMIN — ONDANSETRON HYDROCHLORIDE 4 MG: 4 TABLET, FILM COATED ORAL at 14:13

## 2024-01-01 RX ADMIN — DEXTROSE AND SODIUM CHLORIDE: 5; 450 INJECTION, SOLUTION INTRAVENOUS at 11:10

## 2024-01-01 RX ADMIN — Medication 20 MG: at 17:01

## 2024-01-01 RX ADMIN — OXYBUTYNIN CHLORIDE 2.5 MG: 5 SYRUP ORAL at 08:18

## 2024-01-01 RX ADMIN — OXYCODONE HYDROCHLORIDE 5 MG: 100 SOLUTION ORAL at 08:08

## 2024-01-01 RX ADMIN — OXYCODONE HYDROCHLORIDE 5 MG: 5 TABLET ORAL at 05:06

## 2024-01-01 RX ADMIN — ONDANSETRON 4 MG: 2 INJECTION INTRAMUSCULAR; INTRAVENOUS at 09:02

## 2024-01-01 RX ADMIN — DICLOFENAC SODIUM TOPICAL GEL, 1% 2 G: 10 GEL TOPICAL at 11:19

## 2024-01-01 RX ADMIN — POTASSIUM PHOSPHATE, MONOBASIC AND POTASSIUM PHOSPHATE, DIBASIC 15 MMOL: 224; 236 INJECTION, SOLUTION, CONCENTRATE INTRAVENOUS at 13:26

## 2024-01-01 RX ADMIN — HEPARIN SODIUM 5000 UNITS: 5000 INJECTION, SOLUTION INTRAVENOUS; SUBCUTANEOUS at 13:46

## 2024-01-01 RX ADMIN — ACETAMINOPHEN 650 MG: 325 SOLUTION ORAL at 20:11

## 2024-01-01 RX ADMIN — CARVEDILOL 3.12 MG: 3.12 TABLET, FILM COATED ORAL at 20:18

## 2024-01-01 RX ADMIN — HEPARIN SODIUM 5000 UNITS: 5000 INJECTION, SOLUTION INTRAVENOUS; SUBCUTANEOUS at 20:47

## 2024-01-01 RX ADMIN — Medication 15 ML: at 09:06

## 2024-01-01 RX ADMIN — Medication 20 MG: at 09:31

## 2024-01-01 RX ADMIN — INSULIN ASPART 1 UNITS: 100 INJECTION, SOLUTION INTRAVENOUS; SUBCUTANEOUS at 14:15

## 2024-01-01 RX ADMIN — SODIUM BICARBONATE 650 MG TABLET 650 MG: at 14:12

## 2024-01-01 RX ADMIN — FAMOTIDINE 20 MG: 40 POWDER, FOR SUSPENSION ORAL at 14:33

## 2024-01-01 RX ADMIN — HEPARIN SODIUM 5000 UNITS: 5000 INJECTION, SOLUTION INTRAVENOUS; SUBCUTANEOUS at 21:08

## 2024-01-01 RX ADMIN — Medication 15 ML: at 09:05

## 2024-01-01 RX ADMIN — Medication 15 ML: at 09:15

## 2024-01-01 RX ADMIN — ACETAMINOPHEN 650 MG: 325 SOLUTION ORAL at 20:37

## 2024-01-01 RX ADMIN — Medication 250 MG: at 09:13

## 2024-01-01 RX ADMIN — CARVEDILOL 3.12 MG: 3.12 TABLET, FILM COATED ORAL at 18:00

## 2024-01-01 RX ADMIN — MEROPENEM 500 MG: 500 INJECTION, POWDER, FOR SOLUTION INTRAVENOUS at 08:31

## 2024-01-01 RX ADMIN — PIPERACILLIN AND TAZOBACTAM 3.38 G: 3; .375 INJECTION, POWDER, LYOPHILIZED, FOR SOLUTION INTRAVENOUS at 01:10

## 2024-01-01 RX ADMIN — SCOPOLAMINE 1 PATCH: 1.5 PATCH, EXTENDED RELEASE TRANSDERMAL at 16:13

## 2024-01-01 RX ADMIN — THIAMINE HCL TAB 100 MG 100 MG: 100 TAB at 09:42

## 2024-01-01 RX ADMIN — OXYBUTYNIN CHLORIDE 2.5 MG: 5 SYRUP ORAL at 20:59

## 2024-01-01 RX ADMIN — Medication 15 ML: at 08:17

## 2024-01-01 RX ADMIN — LIDOCAINE 2 PATCH: 4 PATCH TOPICAL at 08:42

## 2024-01-01 RX ADMIN — ONDANSETRON 4 MG: 2 INJECTION INTRAMUSCULAR; INTRAVENOUS at 16:46

## 2024-01-01 RX ADMIN — HYDROMORPHONE HYDROCHLORIDE 0.2 MG: 0.2 INJECTION, SOLUTION INTRAMUSCULAR; INTRAVENOUS; SUBCUTANEOUS at 20:31

## 2024-01-01 RX ADMIN — MIRTAZAPINE 15 MG: 15 TABLET, ORALLY DISINTEGRATING ORAL at 22:36

## 2024-01-01 RX ADMIN — MIDAZOLAM 0.5 MG: 1 INJECTION INTRAMUSCULAR; INTRAVENOUS at 13:51

## 2024-01-01 RX ADMIN — HEPARIN SODIUM 5000 UNITS: 5000 INJECTION, SOLUTION INTRAVENOUS; SUBCUTANEOUS at 09:37

## 2024-01-01 RX ADMIN — HEPARIN SODIUM 5000 UNITS: 5000 INJECTION, SOLUTION INTRAVENOUS; SUBCUTANEOUS at 09:40

## 2024-01-01 RX ADMIN — HALOPERIDOL 1 MG: 2 SOLUTION ORAL at 04:27

## 2024-01-01 RX ADMIN — SODIUM CHLORIDE, POTASSIUM CHLORIDE, SODIUM LACTATE AND CALCIUM CHLORIDE: 600; 310; 30; 20 INJECTION, SOLUTION INTRAVENOUS at 18:38

## 2024-01-01 RX ADMIN — INSULIN ASPART 1 UNITS: 100 INJECTION, SOLUTION INTRAVENOUS; SUBCUTANEOUS at 19:15

## 2024-01-01 RX ADMIN — MIRTAZAPINE 15 MG: 15 TABLET, ORALLY DISINTEGRATING ORAL at 21:14

## 2024-01-01 RX ADMIN — SODIUM BICARBONATE 650 MG: 650 TABLET ORAL at 14:46

## 2024-01-01 RX ADMIN — AMOXICILLIN AND CLAVULANATE POTASSIUM 1 TABLET: 500; 125 TABLET, FILM COATED ORAL at 20:04

## 2024-01-01 RX ADMIN — PROCHLORPERAZINE MALEATE 5 MG: 5 TABLET ORAL at 13:05

## 2024-01-01 RX ADMIN — MIRTAZAPINE 15 MG: 15 TABLET, ORALLY DISINTEGRATING ORAL at 21:20

## 2024-01-01 RX ADMIN — CARVEDILOL 3.12 MG: 3.12 TABLET, FILM COATED ORAL at 08:45

## 2024-01-01 RX ADMIN — FLUCONAZOLE 150 MG: 150 TABLET ORAL at 18:35

## 2024-01-01 RX ADMIN — MIRTAZAPINE 15 MG: 15 TABLET, ORALLY DISINTEGRATING ORAL at 21:21

## 2024-01-01 RX ADMIN — DEXTROSE MONOHYDRATE 1000 ML: 100 INJECTION, SOLUTION INTRAVENOUS at 06:43

## 2024-01-01 RX ADMIN — ONDANSETRON 4 MG: 2 INJECTION INTRAMUSCULAR; INTRAVENOUS at 03:51

## 2024-01-01 RX ADMIN — HEPARIN SODIUM 5000 UNITS: 5000 INJECTION, SOLUTION INTRAVENOUS; SUBCUTANEOUS at 13:54

## 2024-01-01 RX ADMIN — VANCOMYCIN HYDROCHLORIDE 125 MG: KIT at 09:58

## 2024-01-01 RX ADMIN — ONDANSETRON 4 MG: 2 INJECTION INTRAMUSCULAR; INTRAVENOUS at 06:29

## 2024-01-01 RX ADMIN — HEPARIN SODIUM 5000 UNITS: 5000 INJECTION, SOLUTION INTRAVENOUS; SUBCUTANEOUS at 19:05

## 2024-01-01 RX ADMIN — Medication 1 PACKET: at 09:42

## 2024-01-01 RX ADMIN — HEPARIN SODIUM 5000 UNITS: 5000 INJECTION, SOLUTION INTRAVENOUS; SUBCUTANEOUS at 21:02

## 2024-01-01 RX ADMIN — OXYCODONE HYDROCHLORIDE 5 MG: 100 SOLUTION ORAL at 14:06

## 2024-01-01 RX ADMIN — MAGNESIUM SULFATE HEPTAHYDRATE 2 G: 2 INJECTION, SOLUTION INTRAVENOUS at 12:56

## 2024-01-01 RX ADMIN — Medication 20 MG: at 16:46

## 2024-01-01 RX ADMIN — ONDANSETRON HYDROCHLORIDE 4 MG: 4 TABLET, FILM COATED ORAL at 08:47

## 2024-01-01 RX ADMIN — ONDANSETRON HYDROCHLORIDE 8 MG: 4 SOLUTION ORAL at 00:27

## 2024-01-01 RX ADMIN — HALOPERIDOL 1 MG: 2 SOLUTION ORAL at 21:12

## 2024-01-01 RX ADMIN — ONDANSETRON HYDROCHLORIDE 4 MG: 4 TABLET, FILM COATED ORAL at 02:16

## 2024-01-01 RX ADMIN — DRONABINOL 2.5 MG: 5 SOLUTION ORAL at 05:32

## 2024-01-01 RX ADMIN — ONDANSETRON HYDROCHLORIDE 4 MG: 4 TABLET, FILM COATED ORAL at 09:03

## 2024-01-01 RX ADMIN — ROSUVASTATIN CALCIUM 5 MG: 5 TABLET, COATED ORAL at 21:23

## 2024-01-01 RX ADMIN — ACETAMINOPHEN 650 MG: 325 SOLUTION ORAL at 11:58

## 2024-01-01 RX ADMIN — HEPARIN SODIUM 5000 UNITS: 5000 INJECTION, SOLUTION INTRAVENOUS; SUBCUTANEOUS at 08:39

## 2024-01-01 RX ADMIN — ONDANSETRON HYDROCHLORIDE 4 MG: 4 TABLET, FILM COATED ORAL at 09:15

## 2024-01-01 RX ADMIN — SODIUM CHLORIDE, POTASSIUM CHLORIDE, SODIUM LACTATE AND CALCIUM CHLORIDE: 600; 310; 30; 20 INJECTION, SOLUTION INTRAVENOUS at 09:03

## 2024-01-01 RX ADMIN — Medication 1 TABLET: at 08:30

## 2024-01-01 RX ADMIN — SENNOSIDES AND DOCUSATE SODIUM 2 TABLET: 50; 8.6 TABLET ORAL at 14:41

## 2024-01-01 RX ADMIN — Medication 250 MG: at 08:17

## 2024-01-01 RX ADMIN — AMOXICILLIN AND CLAVULANATE POTASSIUM 1 TABLET: 500; 125 TABLET, FILM COATED ORAL at 09:02

## 2024-01-01 RX ADMIN — ACETAMINOPHEN 650 MG: 325 SOLUTION ORAL at 03:43

## 2024-01-01 RX ADMIN — Medication 250 MG: at 20:58

## 2024-01-01 RX ADMIN — CARVEDILOL 3.12 MG: 3.12 TABLET, FILM COATED ORAL at 19:06

## 2024-01-01 RX ADMIN — HALOPERIDOL 1 MG: 2 SOLUTION ORAL at 09:10

## 2024-01-01 RX ADMIN — AMOXICILLIN AND CLAVULANATE POTASSIUM 1 TABLET: 500; 125 TABLET, FILM COATED ORAL at 20:56

## 2024-01-01 RX ADMIN — ONDANSETRON 4 MG: 2 INJECTION INTRAMUSCULAR; INTRAVENOUS at 06:25

## 2024-01-01 RX ADMIN — ACETAMINOPHEN 650 MG: 325 TABLET, FILM COATED ORAL at 05:06

## 2024-01-01 RX ADMIN — ACETAMINOPHEN 650 MG: 325 TABLET, FILM COATED ORAL at 20:19

## 2024-01-01 RX ADMIN — PIPERACILLIN AND TAZOBACTAM 3.38 G: 3; .375 INJECTION, POWDER, LYOPHILIZED, FOR SOLUTION INTRAVENOUS at 10:13

## 2024-01-01 RX ADMIN — HEPARIN SODIUM 5000 UNITS: 5000 INJECTION, SOLUTION INTRAVENOUS; SUBCUTANEOUS at 09:02

## 2024-01-01 RX ADMIN — VANCOMYCIN HYDROCHLORIDE 125 MG: KIT at 16:46

## 2024-01-01 RX ADMIN — SODIUM CHLORIDE: 9 INJECTION, SOLUTION INTRAVENOUS at 09:44

## 2024-01-01 RX ADMIN — SODIUM BICARBONATE 650 MG: 650 TABLET ORAL at 09:04

## 2024-01-01 RX ADMIN — Medication 40 MG: at 13:25

## 2024-01-01 RX ADMIN — ONDANSETRON HYDROCHLORIDE 4 MG: 4 TABLET, FILM COATED ORAL at 17:02

## 2024-01-01 RX ADMIN — Medication 20 MG: at 06:47

## 2024-01-01 RX ADMIN — ONDANSETRON 4 MG: 2 INJECTION INTRAMUSCULAR; INTRAVENOUS at 17:55

## 2024-01-01 RX ADMIN — OXYBUTYNIN CHLORIDE 2.5 MG: 5 SYRUP ORAL at 20:54

## 2024-01-01 RX ADMIN — Medication 1 TABLET: at 09:45

## 2024-01-01 RX ADMIN — OXYCODONE HYDROCHLORIDE 5 MG: 100 SOLUTION ORAL at 20:03

## 2024-01-01 RX ADMIN — Medication 250 MG: at 09:42

## 2024-01-01 RX ADMIN — ONDANSETRON HYDROCHLORIDE 8 MG: 4 SOLUTION ORAL at 16:29

## 2024-01-01 RX ADMIN — CARVEDILOL 3.12 MG: 3.12 TABLET, FILM COATED ORAL at 17:52

## 2024-01-01 RX ADMIN — Medication 20 MG: at 17:25

## 2024-01-01 RX ADMIN — HEPARIN SODIUM 5000 UNITS: 5000 INJECTION, SOLUTION INTRAVENOUS; SUBCUTANEOUS at 17:11

## 2024-01-01 RX ADMIN — POLYETHYLENE GLYCOL 3350 17 G: 17 POWDER, FOR SOLUTION ORAL at 09:42

## 2024-01-01 RX ADMIN — OXYBUTYNIN CHLORIDE 2.5 MG: 5 SYRUP ORAL at 14:14

## 2024-01-01 RX ADMIN — Medication 1 PACKET: at 14:28

## 2024-01-01 RX ADMIN — AMOXICILLIN AND CLAVULANATE POTASSIUM 1 TABLET: 500; 125 TABLET, FILM COATED ORAL at 08:12

## 2024-01-01 RX ADMIN — VANCOMYCIN HYDROCHLORIDE 125 MG: KIT at 19:57

## 2024-01-01 RX ADMIN — ROSUVASTATIN CALCIUM 5 MG: 5 TABLET, FILM COATED ORAL at 22:31

## 2024-01-01 RX ADMIN — HEPARIN SODIUM 5000 UNITS: 5000 INJECTION, SOLUTION INTRAVENOUS; SUBCUTANEOUS at 20:13

## 2024-01-01 RX ADMIN — ONDANSETRON HYDROCHLORIDE 4 MG: 4 TABLET, FILM COATED ORAL at 00:22

## 2024-01-01 RX ADMIN — OXYCODONE HYDROCHLORIDE 5 MG: 100 SOLUTION ORAL at 21:26

## 2024-01-01 RX ADMIN — FAMOTIDINE 20 MG: 40 POWDER, FOR SUSPENSION ORAL at 17:07

## 2024-01-01 RX ADMIN — HALOPERIDOL 1 MG: 2 SOLUTION ORAL at 22:37

## 2024-01-01 RX ADMIN — Medication 250 MG: at 20:17

## 2024-01-01 RX ADMIN — PIPERACILLIN AND TAZOBACTAM 3.38 G: 3; .375 INJECTION, POWDER, LYOPHILIZED, FOR SOLUTION INTRAVENOUS at 00:26

## 2024-01-01 RX ADMIN — SODIUM BICARBONATE 650 MG: 650 TABLET ORAL at 13:25

## 2024-01-01 RX ADMIN — Medication 1 TABLET: at 09:04

## 2024-01-01 RX ADMIN — SENNOSIDES AND DOCUSATE SODIUM 2 TABLET: 50; 8.6 TABLET ORAL at 10:04

## 2024-01-01 RX ADMIN — Medication 250 MG: at 09:44

## 2024-01-01 RX ADMIN — SODIUM BICARBONATE 650 MG: 650 TABLET ORAL at 09:25

## 2024-01-01 RX ADMIN — Medication 15 ML: at 09:41

## 2024-01-01 RX ADMIN — SODIUM BICARBONATE 650 MG: 650 TABLET ORAL at 19:50

## 2024-01-01 RX ADMIN — ACETAMINOPHEN 650 MG: 325 SOLUTION ORAL at 05:31

## 2024-01-01 RX ADMIN — SENNOSIDES AND DOCUSATE SODIUM 2 TABLET: 50; 8.6 TABLET ORAL at 09:15

## 2024-01-01 RX ADMIN — Medication 250 MG: at 19:27

## 2024-01-01 RX ADMIN — AMOXICILLIN AND CLAVULANATE POTASSIUM 1 TABLET: 500; 125 TABLET, FILM COATED ORAL at 08:25

## 2024-01-01 RX ADMIN — POLYETHYLENE GLYCOL 3350 17 G: 17 POWDER, FOR SOLUTION ORAL at 08:40

## 2024-01-01 RX ADMIN — VANCOMYCIN HYDROCHLORIDE 125 MG: KIT at 00:17

## 2024-01-01 RX ADMIN — SODIUM BICARBONATE 650 MG: 650 TABLET ORAL at 20:17

## 2024-01-01 RX ADMIN — Medication 2.5 MG: at 12:06

## 2024-01-01 RX ADMIN — Medication 20 MG: at 09:05

## 2024-01-01 RX ADMIN — HALOPERIDOL 1 MG: 2 SOLUTION ORAL at 16:26

## 2024-01-01 RX ADMIN — HEPARIN SODIUM 5000 UNITS: 5000 INJECTION, SOLUTION INTRAVENOUS; SUBCUTANEOUS at 01:09

## 2024-01-01 RX ADMIN — SODIUM BICARBONATE 650 MG: 650 TABLET ORAL at 08:42

## 2024-01-01 RX ADMIN — ACETAMINOPHEN 650 MG: 325 SOLUTION ORAL at 13:13

## 2024-01-01 RX ADMIN — VANCOMYCIN HYDROCHLORIDE 125 MG: KIT at 08:58

## 2024-01-01 RX ADMIN — Medication 15 ML: at 09:58

## 2024-01-01 RX ADMIN — PIPERACILLIN AND TAZOBACTAM 3.38 G: 3; .375 INJECTION, POWDER, LYOPHILIZED, FOR SOLUTION INTRAVENOUS at 19:41

## 2024-01-01 RX ADMIN — Medication 20 MG: at 09:39

## 2024-01-01 RX ADMIN — ACETAMINOPHEN 650 MG: 325 TABLET, FILM COATED ORAL at 08:38

## 2024-01-01 RX ADMIN — ONDANSETRON 4 MG: 2 INJECTION INTRAMUSCULAR; INTRAVENOUS at 15:24

## 2024-01-01 RX ADMIN — CARVEDILOL 3.12 MG: 3.12 TABLET, FILM COATED ORAL at 09:19

## 2024-01-01 RX ADMIN — ONDANSETRON HYDROCHLORIDE 4 MG: 4 TABLET, FILM COATED ORAL at 09:05

## 2024-01-01 RX ADMIN — VANCOMYCIN HYDROCHLORIDE 125 MG: KIT at 13:34

## 2024-01-01 RX ADMIN — HEPARIN SODIUM 5000 UNITS: 5000 INJECTION, SOLUTION INTRAVENOUS; SUBCUTANEOUS at 08:57

## 2024-01-01 RX ADMIN — Medication 40 MG: at 09:27

## 2024-01-01 RX ADMIN — DEXTROSE MONOHYDRATE: 50 INJECTION, SOLUTION INTRAVENOUS at 11:04

## 2024-01-01 RX ADMIN — Medication 250 MG: at 20:05

## 2024-01-01 RX ADMIN — ONDANSETRON HYDROCHLORIDE 4 MG: 4 TABLET, FILM COATED ORAL at 14:48

## 2024-01-01 RX ADMIN — DICLOFENAC SODIUM TOPICAL GEL, 1% 2 G: 10 GEL TOPICAL at 17:39

## 2024-01-01 RX ADMIN — HEPARIN SODIUM 5000 UNITS: 5000 INJECTION, SOLUTION INTRAVENOUS; SUBCUTANEOUS at 20:08

## 2024-01-01 RX ADMIN — HEPARIN SODIUM 5000 UNITS: 5000 INJECTION, SOLUTION INTRAVENOUS; SUBCUTANEOUS at 03:40

## 2024-01-01 RX ADMIN — SODIUM BICARBONATE 650 MG: 650 TABLET ORAL at 20:56

## 2024-01-01 RX ADMIN — ACETAMINOPHEN 650 MG: 325 SOLUTION ORAL at 12:49

## 2024-01-01 RX ADMIN — SODIUM BICARBONATE 650 MG: 650 TABLET ORAL at 15:42

## 2024-01-01 RX ADMIN — Medication 15 ML: at 08:34

## 2024-01-01 RX ADMIN — FAMOTIDINE 20 MG: 40 POWDER, FOR SUSPENSION ORAL at 16:46

## 2024-01-01 RX ADMIN — Medication 15 ML: at 09:02

## 2024-01-01 RX ADMIN — Medication 250 MG: at 21:11

## 2024-01-01 RX ADMIN — Medication 250 MG: at 20:37

## 2024-01-01 RX ADMIN — LIDOCAINE 1 PATCH: 4 PATCH TOPICAL at 09:25

## 2024-01-01 RX ADMIN — SCOPOLAMINE 1 PATCH: 1.5 PATCH, EXTENDED RELEASE TRANSDERMAL at 16:26

## 2024-01-01 RX ADMIN — SODIUM BICARBONATE 650 MG: 650 TABLET ORAL at 14:49

## 2024-01-01 RX ADMIN — HEPARIN SODIUM 5000 UNITS: 5000 INJECTION, SOLUTION INTRAVENOUS; SUBCUTANEOUS at 19:57

## 2024-01-01 RX ADMIN — OXYCODONE HYDROCHLORIDE 5 MG: 100 SOLUTION ORAL at 19:57

## 2024-01-01 RX ADMIN — CARVEDILOL 3.12 MG: 3.12 TABLET, FILM COATED ORAL at 18:04

## 2024-01-01 RX ADMIN — AMOXICILLIN AND CLAVULANATE POTASSIUM 1 TABLET: 875; 125 TABLET, FILM COATED ORAL at 19:28

## 2024-01-01 RX ADMIN — Medication 250 MG: at 21:46

## 2024-01-01 RX ADMIN — Medication 1 TABLET: at 08:58

## 2024-01-01 RX ADMIN — SODIUM BICARBONATE 650 MG: 650 TABLET ORAL at 20:11

## 2024-01-01 RX ADMIN — CARVEDILOL 3.12 MG: 3.12 TABLET, FILM COATED ORAL at 18:40

## 2024-01-01 RX ADMIN — METOCLOPRAMIDE 5 MG: 5 INJECTION, SOLUTION INTRAMUSCULAR; INTRAVENOUS at 12:58

## 2024-01-01 RX ADMIN — OXYBUTYNIN CHLORIDE 2.5 MG: 5 SYRUP ORAL at 21:11

## 2024-01-01 RX ADMIN — SODIUM BICARBONATE 650 MG: 650 TABLET ORAL at 21:16

## 2024-01-01 RX ADMIN — SODIUM BICARBONATE 650 MG: 650 TABLET ORAL at 19:40

## 2024-01-01 RX ADMIN — SODIUM BICARBONATE 650 MG TABLET 650 MG: at 20:05

## 2024-01-01 RX ADMIN — FAMOTIDINE 20 MG: 40 POWDER, FOR SUSPENSION ORAL at 14:45

## 2024-01-01 RX ADMIN — Medication 20 MG: at 18:10

## 2024-01-01 RX ADMIN — Medication 20 MG: at 16:39

## 2024-01-01 RX ADMIN — Medication 250 MG: at 20:56

## 2024-01-01 RX ADMIN — Medication 250 MG: at 09:05

## 2024-01-01 RX ADMIN — Medication 250 MG: at 08:43

## 2024-01-01 RX ADMIN — ONDANSETRON 4 MG: 2 INJECTION INTRAMUSCULAR; INTRAVENOUS at 10:38

## 2024-01-01 RX ADMIN — OXYCODONE HYDROCHLORIDE 5 MG: 5 TABLET ORAL at 11:34

## 2024-01-01 RX ADMIN — Medication 250 MG: at 08:12

## 2024-01-01 RX ADMIN — MICONAZOLE NITRATE 100 MG: 100 SUPPOSITORY VAGINAL at 23:16

## 2024-01-01 RX ADMIN — ONDANSETRON HYDROCHLORIDE 4 MG: 4 TABLET, FILM COATED ORAL at 02:46

## 2024-01-01 RX ADMIN — METOCLOPRAMIDE 5 MG: 5 SOLUTION ORAL at 13:08

## 2024-01-01 RX ADMIN — Medication 20 MG: at 08:39

## 2024-01-01 RX ADMIN — HALOPERIDOL 1 MG: 2 SOLUTION ORAL at 04:25

## 2024-01-01 RX ADMIN — POLYETHYLENE GLYCOL 3350 17 G: 17 POWDER, FOR SOLUTION ORAL at 12:55

## 2024-01-01 RX ADMIN — ONDANSETRON HYDROCHLORIDE 4 MG: 4 TABLET, FILM COATED ORAL at 17:52

## 2024-01-01 RX ADMIN — OXYCODONE HYDROCHLORIDE 5 MG: 100 SOLUTION ORAL at 09:30

## 2024-01-01 RX ADMIN — AMOXICILLIN AND CLAVULANATE POTASSIUM 1 TABLET: 875; 125 TABLET, FILM COATED ORAL at 19:51

## 2024-01-01 RX ADMIN — CARVEDILOL 3.12 MG: 3.12 TABLET, FILM COATED ORAL at 09:22

## 2024-01-01 RX ADMIN — FAMOTIDINE 20 MG: 40 POWDER, FOR SUSPENSION ORAL at 15:47

## 2024-01-01 RX ADMIN — VANCOMYCIN HYDROCHLORIDE 125 MG: KIT at 20:41

## 2024-01-01 RX ADMIN — ROSUVASTATIN CALCIUM 5 MG: 5 TABLET, FILM COATED ORAL at 21:00

## 2024-01-01 RX ADMIN — Medication 40 MG: at 09:04

## 2024-01-01 RX ADMIN — Medication 250 MG: at 22:10

## 2024-01-01 RX ADMIN — IOPAMIDOL 64 ML: 755 INJECTION, SOLUTION INTRAVENOUS at 16:36

## 2024-01-01 RX ADMIN — VANCOMYCIN HYDROCHLORIDE 125 MG: KIT at 16:40

## 2024-01-01 RX ADMIN — MIRTAZAPINE 15 MG: 15 TABLET, ORALLY DISINTEGRATING ORAL at 00:25

## 2024-01-01 RX ADMIN — AMOXICILLIN AND CLAVULANATE POTASSIUM 1 TABLET: 500; 125 TABLET, FILM COATED ORAL at 08:18

## 2024-01-01 RX ADMIN — Medication 1 PACKET: at 09:23

## 2024-01-01 RX ADMIN — POLYETHYLENE GLYCOL 3350 17 G: 17 POWDER, FOR SOLUTION ORAL at 11:34

## 2024-01-01 RX ADMIN — Medication 15 ML: at 10:43

## 2024-01-01 RX ADMIN — CARVEDILOL 3.12 MG: 3.12 TABLET, FILM COATED ORAL at 18:37

## 2024-01-01 RX ADMIN — SODIUM BICARBONATE 650 MG TABLET 650 MG: at 09:13

## 2024-01-01 RX ADMIN — ONDANSETRON HYDROCHLORIDE 4 MG: 4 TABLET, FILM COATED ORAL at 17:26

## 2024-01-01 RX ADMIN — SODIUM BICARBONATE 650 MG: 650 TABLET ORAL at 19:51

## 2024-01-01 RX ADMIN — Medication 250 MG: at 09:02

## 2024-01-01 RX ADMIN — ONDANSETRON HYDROCHLORIDE 4 MG: 4 TABLET, FILM COATED ORAL at 08:25

## 2024-01-01 RX ADMIN — ONDANSETRON HYDROCHLORIDE 4 MG: 4 TABLET, FILM COATED ORAL at 09:04

## 2024-01-01 RX ADMIN — PIPERACILLIN AND TAZOBACTAM 3.38 G: 3; .375 INJECTION, POWDER, LYOPHILIZED, FOR SOLUTION INTRAVENOUS at 17:39

## 2024-01-01 RX ADMIN — Medication 250 MG: at 19:49

## 2024-01-01 RX ADMIN — AMOXICILLIN AND CLAVULANATE POTASSIUM 1 TABLET: 500; 125 TABLET, FILM COATED ORAL at 20:11

## 2024-01-01 RX ADMIN — Medication 40 MG: at 06:17

## 2024-01-01 RX ADMIN — CEFTRIAXONE SODIUM 1 G: 1 INJECTION, POWDER, FOR SOLUTION INTRAMUSCULAR; INTRAVENOUS at 19:23

## 2024-01-01 RX ADMIN — VANCOMYCIN HYDROCHLORIDE 125 MG: KIT at 21:54

## 2024-01-01 RX ADMIN — ONDANSETRON HYDROCHLORIDE 4 MG: 4 TABLET, FILM COATED ORAL at 17:11

## 2024-01-01 RX ADMIN — PIPERACILLIN AND TAZOBACTAM 3.38 G: 3; .375 INJECTION, POWDER, LYOPHILIZED, FOR SOLUTION INTRAVENOUS at 06:18

## 2024-01-01 RX ADMIN — INSULIN ASPART 1 UNITS: 100 INJECTION, SOLUTION INTRAVENOUS; SUBCUTANEOUS at 02:15

## 2024-01-01 RX ADMIN — DEXTROSE MONOHYDRATE 50 ML: 25 INJECTION, SOLUTION INTRAVENOUS at 08:31

## 2024-01-01 RX ADMIN — ONDANSETRON HYDROCHLORIDE 4 MG: 4 SOLUTION ORAL at 22:50

## 2024-01-01 RX ADMIN — OXYBUTYNIN CHLORIDE 2.5 MG: 5 SYRUP ORAL at 22:10

## 2024-01-01 RX ADMIN — ONDANSETRON 4 MG: 2 INJECTION INTRAMUSCULAR; INTRAVENOUS at 01:54

## 2024-01-01 RX ADMIN — VANCOMYCIN HYDROCHLORIDE 125 MG: KIT at 12:08

## 2024-01-01 RX ADMIN — ONDANSETRON HYDROCHLORIDE 4 MG: 4 TABLET, FILM COATED ORAL at 01:09

## 2024-01-01 RX ADMIN — HALOPERIDOL 1 MG: 2 SOLUTION ORAL at 16:05

## 2024-01-01 RX ADMIN — AMOXICILLIN AND CLAVULANATE POTASSIUM 1 TABLET: 500; 125 TABLET, FILM COATED ORAL at 21:23

## 2024-01-01 RX ADMIN — AMOXICILLIN AND CLAVULANATE POTASSIUM 1 TABLET: 500; 125 TABLET, FILM COATED ORAL at 20:17

## 2024-01-01 RX ADMIN — OXYCODONE HYDROCHLORIDE 5 MG: 100 SOLUTION ORAL at 08:26

## 2024-01-01 RX ADMIN — AMOXICILLIN AND CLAVULANATE POTASSIUM 1 TABLET: 875; 125 TABLET, FILM COATED ORAL at 08:17

## 2024-01-01 RX ADMIN — THIAMINE HCL TAB 100 MG 100 MG: 100 TAB at 09:27

## 2024-01-01 RX ADMIN — CARVEDILOL 3.12 MG: 3.12 TABLET, FILM COATED ORAL at 09:02

## 2024-01-01 RX ADMIN — OXYCODONE HYDROCHLORIDE 5 MG: 100 SOLUTION ORAL at 19:53

## 2024-01-01 RX ADMIN — HEPARIN SODIUM 5000 UNITS: 5000 INJECTION, SOLUTION INTRAVENOUS; SUBCUTANEOUS at 09:04

## 2024-01-01 RX ADMIN — HALOPERIDOL 1 MG: 2 SOLUTION ORAL at 22:05

## 2024-01-01 RX ADMIN — HEPARIN SODIUM 5000 UNITS: 5000 INJECTION, SOLUTION INTRAVENOUS; SUBCUTANEOUS at 20:11

## 2024-01-01 RX ADMIN — SODIUM BICARBONATE 650 MG: 650 TABLET ORAL at 20:01

## 2024-01-01 RX ADMIN — HEPARIN SODIUM 5000 UNITS: 5000 INJECTION, SOLUTION INTRAVENOUS; SUBCUTANEOUS at 08:45

## 2024-01-01 RX ADMIN — SODIUM BICARBONATE 650 MG: 650 TABLET ORAL at 09:19

## 2024-01-01 RX ADMIN — Medication 20 MG: at 09:06

## 2024-01-01 RX ADMIN — Medication 1 PACKET: at 09:21

## 2024-01-01 RX ADMIN — Medication 1 PACKET: at 20:25

## 2024-01-01 RX ADMIN — MIDAZOLAM 0.5 MG: 1 INJECTION INTRAMUSCULAR; INTRAVENOUS at 13:36

## 2024-01-01 RX ADMIN — HEPARIN SODIUM 5000 UNITS: 5000 INJECTION, SOLUTION INTRAVENOUS; SUBCUTANEOUS at 12:19

## 2024-01-01 RX ADMIN — SODIUM BICARBONATE 650 MG: 650 TABLET ORAL at 09:24

## 2024-01-01 RX ADMIN — HEPARIN SODIUM 5000 UNITS: 5000 INJECTION, SOLUTION INTRAVENOUS; SUBCUTANEOUS at 04:39

## 2024-01-01 RX ADMIN — AMOXICILLIN AND CLAVULANATE POTASSIUM 1 TABLET: 875; 125 TABLET, FILM COATED ORAL at 09:10

## 2024-01-01 RX ADMIN — CEFTRIAXONE SODIUM 1 G: 1 INJECTION, POWDER, FOR SOLUTION INTRAMUSCULAR; INTRAVENOUS at 09:58

## 2024-01-01 RX ADMIN — AMOXICILLIN AND CLAVULANATE POTASSIUM 1 TABLET: 500; 125 TABLET, FILM COATED ORAL at 20:00

## 2024-01-01 RX ADMIN — Medication 250 MG: at 20:25

## 2024-01-01 RX ADMIN — Medication 1 TABLET: at 08:45

## 2024-01-01 RX ADMIN — DRONABINOL 2.5 MG: 5 SOLUTION ORAL at 17:37

## 2024-01-01 RX ADMIN — DEXTROSE MONOHYDRATE 1000 ML: 100 INJECTION, SOLUTION INTRAVENOUS at 00:03

## 2024-01-01 RX ADMIN — Medication 1 PACKET: at 16:47

## 2024-01-01 RX ADMIN — ONDANSETRON 4 MG: 4 TABLET, ORALLY DISINTEGRATING ORAL at 08:56

## 2024-01-01 RX ADMIN — CARVEDILOL 3.12 MG: 3.12 TABLET, FILM COATED ORAL at 18:45

## 2024-01-01 RX ADMIN — CARVEDILOL 3.12 MG: 3.12 TABLET, FILM COATED ORAL at 08:30

## 2024-01-01 RX ADMIN — SODIUM BICARBONATE 650 MG TABLET 650 MG: at 09:02

## 2024-01-01 RX ADMIN — OXYCODONE HYDROCHLORIDE 5 MG: 100 SOLUTION ORAL at 09:09

## 2024-01-01 RX ADMIN — Medication 250 MG: at 20:12

## 2024-01-01 RX ADMIN — ONDANSETRON HYDROCHLORIDE 4 MG: 4 TABLET, FILM COATED ORAL at 20:05

## 2024-01-01 RX ADMIN — HALOPERIDOL 1 MG: 2 SOLUTION ORAL at 10:42

## 2024-01-01 RX ADMIN — SODIUM BICARBONATE 650 MG: 650 TABLET ORAL at 19:44

## 2024-01-01 RX ADMIN — OXYBUTYNIN CHLORIDE 2.5 MG: 5 SYRUP ORAL at 21:24

## 2024-01-01 RX ADMIN — HALOPERIDOL 1 MG: 2 SOLUTION ORAL at 03:59

## 2024-01-01 RX ADMIN — HEPARIN SODIUM 5000 UNITS: 5000 INJECTION, SOLUTION INTRAVENOUS; SUBCUTANEOUS at 04:22

## 2024-01-01 RX ADMIN — HEPARIN SODIUM 5000 UNITS: 5000 INJECTION, SOLUTION INTRAVENOUS; SUBCUTANEOUS at 19:02

## 2024-01-01 RX ADMIN — HEPARIN SODIUM 5000 UNITS: 5000 INJECTION, SOLUTION INTRAVENOUS; SUBCUTANEOUS at 04:13

## 2024-01-01 RX ADMIN — VANCOMYCIN HYDROCHLORIDE 125 MG: KIT at 21:14

## 2024-01-01 RX ADMIN — AMOXICILLIN AND CLAVULANATE POTASSIUM 1 TABLET: 500; 125 TABLET, FILM COATED ORAL at 09:15

## 2024-01-01 RX ADMIN — ONDANSETRON HYDROCHLORIDE 4 MG: 4 TABLET, FILM COATED ORAL at 16:01

## 2024-01-01 RX ADMIN — ONDANSETRON HYDROCHLORIDE 4 MG: 4 TABLET, FILM COATED ORAL at 14:39

## 2024-01-01 RX ADMIN — Medication 1 TABLET: at 09:28

## 2024-01-01 RX ADMIN — INSULIN ASPART 1 UNITS: 100 INJECTION, SOLUTION INTRAVENOUS; SUBCUTANEOUS at 10:11

## 2024-01-01 RX ADMIN — CARVEDILOL 3.12 MG: 3.12 TABLET, FILM COATED ORAL at 19:27

## 2024-01-01 RX ADMIN — OXYCODONE HYDROCHLORIDE 2.5 MG: 5 TABLET ORAL at 10:58

## 2024-01-01 RX ADMIN — SODIUM BICARBONATE 650 MG: 650 TABLET ORAL at 09:02

## 2024-01-01 RX ADMIN — ACETAMINOPHEN 650 MG: 325 TABLET, FILM COATED ORAL at 09:26

## 2024-01-01 RX ADMIN — PIPERACILLIN AND TAZOBACTAM 3.38 G: 3; .375 INJECTION, POWDER, LYOPHILIZED, FOR SOLUTION INTRAVENOUS at 19:56

## 2024-01-01 RX ADMIN — ONDANSETRON 4 MG: 2 INJECTION INTRAMUSCULAR; INTRAVENOUS at 10:20

## 2024-01-01 RX ADMIN — IOPAMIDOL 25 ML: 755 INJECTION, SOLUTION INTRAVENOUS at 11:13

## 2024-01-01 RX ADMIN — SODIUM BICARBONATE 650 MG: 650 TABLET ORAL at 08:17

## 2024-01-01 RX ADMIN — PIPERACILLIN AND TAZOBACTAM 3.38 G: 3; .375 INJECTION, POWDER, LYOPHILIZED, FOR SOLUTION INTRAVENOUS at 06:54

## 2024-01-01 RX ADMIN — HEPARIN SODIUM 5000 UNITS: 5000 INJECTION, SOLUTION INTRAVENOUS; SUBCUTANEOUS at 09:36

## 2024-01-01 RX ADMIN — SODIUM BICARBONATE 650 MG: 650 TABLET ORAL at 17:12

## 2024-01-01 RX ADMIN — HALOPERIDOL 1 MG: 2 SOLUTION ORAL at 22:03

## 2024-01-01 RX ADMIN — HEPARIN SODIUM 5000 UNITS: 5000 INJECTION, SOLUTION INTRAVENOUS; SUBCUTANEOUS at 17:54

## 2024-01-01 RX ADMIN — PIPERACILLIN AND TAZOBACTAM 3.38 G: 3; .375 INJECTION, POWDER, LYOPHILIZED, FOR SOLUTION INTRAVENOUS at 10:37

## 2024-01-01 RX ADMIN — HYDROMORPHONE HYDROCHLORIDE 0.2 MG: 0.2 INJECTION, SOLUTION INTRAMUSCULAR; INTRAVENOUS; SUBCUTANEOUS at 16:29

## 2024-01-01 RX ADMIN — HEPARIN SODIUM 5000 UNITS: 5000 INJECTION, SOLUTION INTRAVENOUS; SUBCUTANEOUS at 19:27

## 2024-01-01 RX ADMIN — HALOPERIDOL 1 MG: 2 SOLUTION ORAL at 04:01

## 2024-01-01 RX ADMIN — ONDANSETRON 4 MG: 4 TABLET, ORALLY DISINTEGRATING ORAL at 12:55

## 2024-01-01 RX ADMIN — VANCOMYCIN HYDROCHLORIDE 125 MG: KIT at 12:18

## 2024-01-01 ASSESSMENT — ACTIVITIES OF DAILY LIVING (ADL)
ADLS_ACUITY_SCORE: 31
ADLS_ACUITY_SCORE: 38
ADLS_ACUITY_SCORE: 52
ADLS_ACUITY_SCORE: 50
ADLS_ACUITY_SCORE: 47
ADLS_ACUITY_SCORE: 40
ADLS_ACUITY_SCORE: 46
ADLS_ACUITY_SCORE: 50
ADLS_ACUITY_SCORE: 40
ADLS_ACUITY_SCORE: 46
ADLS_ACUITY_SCORE: 52
ADLS_ACUITY_SCORE: 43
ADLS_ACUITY_SCORE: 52
ADLS_ACUITY_SCORE: 33
ADLS_ACUITY_SCORE: 52
ADLS_ACUITY_SCORE: 53
ADLS_ACUITY_SCORE: 46
ADLS_ACUITY_SCORE: 37
ADLS_ACUITY_SCORE: 31
ADLS_ACUITY_SCORE: 40
ADLS_ACUITY_SCORE: 42
ADLS_ACUITY_SCORE: 48
ADLS_ACUITY_SCORE: 48
ADLS_ACUITY_SCORE: 46
ADLS_ACUITY_SCORE: 33
ADLS_ACUITY_SCORE: 51
ADLS_ACUITY_SCORE: 42
ADLS_ACUITY_SCORE: 48
ADLS_ACUITY_SCORE: 40
ADLS_ACUITY_SCORE: 33
ADLS_ACUITY_SCORE: 47
ADLS_ACUITY_SCORE: 38
ADLS_ACUITY_SCORE: 33
ADLS_ACUITY_SCORE: 43
ADLS_ACUITY_SCORE: 40
ADLS_ACUITY_SCORE: 44
ADLS_ACUITY_SCORE: 40
ADLS_ACUITY_SCORE: 33
ADLS_ACUITY_SCORE: 48
ADLS_ACUITY_SCORE: 50
ADLS_ACUITY_SCORE: 40
ADLS_ACUITY_SCORE: 37
ADLS_ACUITY_SCORE: 43
ADLS_ACUITY_SCORE: 40
ADLS_ACUITY_SCORE: 51
ADLS_ACUITY_SCORE: 42
ADLS_ACUITY_SCORE: 40
ADLS_ACUITY_SCORE: 47
ADLS_ACUITY_SCORE: 52
ADLS_ACUITY_SCORE: 33
ADLS_ACUITY_SCORE: 40
ADLS_ACUITY_SCORE: 47
ADLS_ACUITY_SCORE: 48
ADLS_ACUITY_SCORE: 47
ADLS_ACUITY_SCORE: 40
ADLS_ACUITY_SCORE: 40
ADLS_ACUITY_SCORE: 52
ADLS_ACUITY_SCORE: 44
ADLS_ACUITY_SCORE: 42
ADLS_ACUITY_SCORE: 42
ADLS_ACUITY_SCORE: 39
ADLS_ACUITY_SCORE: 33
ADLS_ACUITY_SCORE: 52
ADLS_ACUITY_SCORE: 48
ADLS_ACUITY_SCORE: 44
ADLS_ACUITY_SCORE: 41
ADLS_ACUITY_SCORE: 44
ADLS_ACUITY_SCORE: 50
ADLS_ACUITY_SCORE: 52
ADLS_ACUITY_SCORE: 43
ADLS_ACUITY_SCORE: 47
ADLS_ACUITY_SCORE: 46
ADLS_ACUITY_SCORE: 38
ADLS_ACUITY_SCORE: 52
ADLS_ACUITY_SCORE: 40
ADLS_ACUITY_SCORE: 47
ADLS_ACUITY_SCORE: 50
ADLS_ACUITY_SCORE: 40
ADLS_ACUITY_SCORE: 42
ADLS_ACUITY_SCORE: 40
ADLS_ACUITY_SCORE: 53
ADLS_ACUITY_SCORE: 42
ADLS_ACUITY_SCORE: 42
ADLS_ACUITY_SCORE: 48
ADLS_ACUITY_SCORE: 50
ADLS_ACUITY_SCORE: 44
ADLS_ACUITY_SCORE: 47
ADLS_ACUITY_SCORE: 33
ADLS_ACUITY_SCORE: 52
ADLS_ACUITY_SCORE: 46
ADLS_ACUITY_SCORE: 52
ADLS_ACUITY_SCORE: 51
ADLS_ACUITY_SCORE: 48
ADLS_ACUITY_SCORE: 55
ADLS_ACUITY_SCORE: 49
ADLS_ACUITY_SCORE: 47
ADLS_ACUITY_SCORE: 40
ADLS_ACUITY_SCORE: 44
ADLS_ACUITY_SCORE: 42
ADLS_ACUITY_SCORE: 40
ADLS_ACUITY_SCORE: 42
ADLS_ACUITY_SCORE: 47
ADLS_ACUITY_SCORE: 41
ADLS_ACUITY_SCORE: 40
ADLS_ACUITY_SCORE: 48
ADLS_ACUITY_SCORE: 43
ADLS_ACUITY_SCORE: 48
ADLS_ACUITY_SCORE: 47
ADLS_ACUITY_SCORE: 47
ADLS_ACUITY_SCORE: 51
ADLS_ACUITY_SCORE: 40
ADLS_ACUITY_SCORE: 40
ADLS_ACUITY_SCORE: 42
ADLS_ACUITY_SCORE: 37
ADLS_ACUITY_SCORE: 42
ADLS_ACUITY_SCORE: 52
ADLS_ACUITY_SCORE: 46
ADLS_ACUITY_SCORE: 39
ADLS_ACUITY_SCORE: 40
ADLS_ACUITY_SCORE: 48
ADLS_ACUITY_SCORE: 31
ADLS_ACUITY_SCORE: 53
ADLS_ACUITY_SCORE: 47
ADLS_ACUITY_SCORE: 47
ADLS_ACUITY_SCORE: 46
ADLS_ACUITY_SCORE: 33
ADLS_ACUITY_SCORE: 53
ADLS_ACUITY_SCORE: 52
ADLS_ACUITY_SCORE: 55
ADLS_ACUITY_SCORE: 50
ADLS_ACUITY_SCORE: 37
ADLS_ACUITY_SCORE: 33
ADLS_ACUITY_SCORE: 37
ADLS_ACUITY_SCORE: 40
ADLS_ACUITY_SCORE: 40
ADLS_ACUITY_SCORE: 46
ADLS_ACUITY_SCORE: 46
ADLS_ACUITY_SCORE: 40
ADLS_ACUITY_SCORE: 40
ADLS_ACUITY_SCORE: 41
ADLS_ACUITY_SCORE: 40
ADLS_ACUITY_SCORE: 40
ADLS_ACUITY_SCORE: 55
ADLS_ACUITY_SCORE: 53
ADLS_ACUITY_SCORE: 47
ADLS_ACUITY_SCORE: 37
ADLS_ACUITY_SCORE: 47
ADLS_ACUITY_SCORE: 40
ADLS_ACUITY_SCORE: 37
ADLS_ACUITY_SCORE: 47
ADLS_ACUITY_SCORE: 43
ADLS_ACUITY_SCORE: 42
ADLS_ACUITY_SCORE: 40
ADLS_ACUITY_SCORE: 43
ADLS_ACUITY_SCORE: 48
DEPENDENT_IADLS:: SHOPPING;TRANSPORTATION
ADLS_ACUITY_SCORE: 42
ADLS_ACUITY_SCORE: 47
ADLS_ACUITY_SCORE: 33
ADLS_ACUITY_SCORE: 44
ADLS_ACUITY_SCORE: 42
ADLS_ACUITY_SCORE: 49
ADLS_ACUITY_SCORE: 50
ADLS_ACUITY_SCORE: 48
ADLS_ACUITY_SCORE: 53
ADLS_ACUITY_SCORE: 50
ADLS_ACUITY_SCORE: 47
ADLS_ACUITY_SCORE: 48
ADLS_ACUITY_SCORE: 47
ADLS_ACUITY_SCORE: 40
ADLS_ACUITY_SCORE: 37
ADLS_ACUITY_SCORE: 47
ADLS_ACUITY_SCORE: 50
ADLS_ACUITY_SCORE: 41
ADLS_ACUITY_SCORE: 42
ADLS_ACUITY_SCORE: 43
ADLS_ACUITY_SCORE: 50
ADLS_ACUITY_SCORE: 47
ADLS_ACUITY_SCORE: 40
ADLS_ACUITY_SCORE: 42
ADLS_ACUITY_SCORE: 55
ADLS_ACUITY_SCORE: 40
ADLS_ACUITY_SCORE: 48
ADLS_ACUITY_SCORE: 48
ADLS_ACUITY_SCORE: 42
ADLS_ACUITY_SCORE: 40
ADLS_ACUITY_SCORE: 50
ADLS_ACUITY_SCORE: 50
ADLS_ACUITY_SCORE: 33
ADLS_ACUITY_SCORE: 47
ADLS_ACUITY_SCORE: 51
ADLS_ACUITY_SCORE: 50
ADLS_ACUITY_SCORE: 51
ADLS_ACUITY_SCORE: 50
ADLS_ACUITY_SCORE: 42
ADLS_ACUITY_SCORE: 33
ADLS_ACUITY_SCORE: 48
ADLS_ACUITY_SCORE: 47
ADLS_ACUITY_SCORE: 53
ADLS_ACUITY_SCORE: 44
ADLS_ACUITY_SCORE: 46
ADLS_ACUITY_SCORE: 40
ADLS_ACUITY_SCORE: 48
ADLS_ACUITY_SCORE: 37
ADLS_ACUITY_SCORE: 47
ADLS_ACUITY_SCORE: 46
ADLS_ACUITY_SCORE: 51
ADLS_ACUITY_SCORE: 37
ADLS_ACUITY_SCORE: 37
ADLS_ACUITY_SCORE: 48
ADLS_ACUITY_SCORE: 37
ADLS_ACUITY_SCORE: 37
ADLS_ACUITY_SCORE: 51
ADLS_ACUITY_SCORE: 43
ADLS_ACUITY_SCORE: 42
ADLS_ACUITY_SCORE: 40
ADLS_ACUITY_SCORE: 41
ADLS_ACUITY_SCORE: 37
ADLS_ACUITY_SCORE: 48
ADLS_ACUITY_SCORE: 40
ADLS_ACUITY_SCORE: 37
ADLS_ACUITY_SCORE: 48
ADLS_ACUITY_SCORE: 47
ADLS_ACUITY_SCORE: 46
ADLS_ACUITY_SCORE: 47
ADLS_ACUITY_SCORE: 51
ADLS_ACUITY_SCORE: 40
ADLS_ACUITY_SCORE: 47
ADLS_ACUITY_SCORE: 47
ADLS_ACUITY_SCORE: 40
ADLS_ACUITY_SCORE: 33
ADLS_ACUITY_SCORE: 47
ADLS_ACUITY_SCORE: 38
ADLS_ACUITY_SCORE: 41
ADLS_ACUITY_SCORE: 33
ADLS_ACUITY_SCORE: 40
ADLS_ACUITY_SCORE: 48
ADLS_ACUITY_SCORE: 46
ADLS_ACUITY_SCORE: 50
ADLS_ACUITY_SCORE: 53
ADLS_ACUITY_SCORE: 52
ADLS_ACUITY_SCORE: 43
ADLS_ACUITY_SCORE: 46
ADLS_ACUITY_SCORE: 40
ADLS_ACUITY_SCORE: 38
ADLS_ACUITY_SCORE: 50
ADLS_ACUITY_SCORE: 55
ADLS_ACUITY_SCORE: 40
ADLS_ACUITY_SCORE: 47
ADLS_ACUITY_SCORE: 51
ADLS_ACUITY_SCORE: 50
ADLS_ACUITY_SCORE: 43
ADLS_ACUITY_SCORE: 52
ADLS_ACUITY_SCORE: 33
ADLS_ACUITY_SCORE: 48
ADLS_ACUITY_SCORE: 40
ADLS_ACUITY_SCORE: 40
ADLS_ACUITY_SCORE: 42
ADLS_ACUITY_SCORE: 40
ADLS_ACUITY_SCORE: 40
ADLS_ACUITY_SCORE: 48
ADLS_ACUITY_SCORE: 44
ADLS_ACUITY_SCORE: 46
ADLS_ACUITY_SCORE: 43
ADLS_ACUITY_SCORE: 52
ADLS_ACUITY_SCORE: 41
ADLS_ACUITY_SCORE: 43
ADLS_ACUITY_SCORE: 52
ADLS_ACUITY_SCORE: 52
ADLS_ACUITY_SCORE: 47
ADLS_ACUITY_SCORE: 33
ADLS_ACUITY_SCORE: 40
ADLS_ACUITY_SCORE: 40
ADLS_ACUITY_SCORE: 41
ADLS_ACUITY_SCORE: 52
ADLS_ACUITY_SCORE: 51
ADLS_ACUITY_SCORE: 51
ADLS_ACUITY_SCORE: 46
ADLS_ACUITY_SCORE: 42
ADLS_ACUITY_SCORE: 47
ADLS_ACUITY_SCORE: 53
ADLS_ACUITY_SCORE: 40
ADLS_ACUITY_SCORE: 42
ADLS_ACUITY_SCORE: 33
ADLS_ACUITY_SCORE: 37
ADLS_ACUITY_SCORE: 40
ADLS_ACUITY_SCORE: 42
ADLS_ACUITY_SCORE: 40
ADLS_ACUITY_SCORE: 52
ADLS_ACUITY_SCORE: 40
ADLS_ACUITY_SCORE: 42
ADLS_ACUITY_SCORE: 38
ADLS_ACUITY_SCORE: 48
ADLS_ACUITY_SCORE: 44
ADLS_ACUITY_SCORE: 42
ADLS_ACUITY_SCORE: 51
ADLS_ACUITY_SCORE: 46
ADLS_ACUITY_SCORE: 40
ADLS_ACUITY_SCORE: 47
ADLS_ACUITY_SCORE: 49
ADLS_ACUITY_SCORE: 41
ADLS_ACUITY_SCORE: 40
ADLS_ACUITY_SCORE: 41
ADLS_ACUITY_SCORE: 52
ADLS_ACUITY_SCORE: 38
ADLS_ACUITY_SCORE: 40
ADLS_ACUITY_SCORE: 46
ADLS_ACUITY_SCORE: 46
ADLS_ACUITY_SCORE: 47
ADLS_ACUITY_SCORE: 46
ADLS_ACUITY_SCORE: 42
ADLS_ACUITY_SCORE: 47
ADLS_ACUITY_SCORE: 42
ADLS_ACUITY_SCORE: 53
ADLS_ACUITY_SCORE: 46
ADLS_ACUITY_SCORE: 47
ADLS_ACUITY_SCORE: 43
ADLS_ACUITY_SCORE: 38
ADLS_ACUITY_SCORE: 41
ADLS_ACUITY_SCORE: 43
ADLS_ACUITY_SCORE: 40
ADLS_ACUITY_SCORE: 33
ADLS_ACUITY_SCORE: 52
ADLS_ACUITY_SCORE: 53
ADLS_ACUITY_SCORE: 47
ADLS_ACUITY_SCORE: 40
ADLS_ACUITY_SCORE: 55
ADLS_ACUITY_SCORE: 53
ADLS_ACUITY_SCORE: 52
ADLS_ACUITY_SCORE: 43
ADLS_ACUITY_SCORE: 55
ADLS_ACUITY_SCORE: 40
ADLS_ACUITY_SCORE: 42
ADLS_ACUITY_SCORE: 53
ADLS_ACUITY_SCORE: 43
ADLS_ACUITY_SCORE: 43
ADLS_ACUITY_SCORE: 41
ADLS_ACUITY_SCORE: 40
ADLS_ACUITY_SCORE: 50
ADLS_ACUITY_SCORE: 51
ADLS_ACUITY_SCORE: 48
ADLS_ACUITY_SCORE: 41
ADLS_ACUITY_SCORE: 48
ADLS_ACUITY_SCORE: 46
ADLS_ACUITY_SCORE: 51
ADLS_ACUITY_SCORE: 42
ADLS_ACUITY_SCORE: 47
ADLS_ACUITY_SCORE: 57
ADLS_ACUITY_SCORE: 40
ADLS_ACUITY_SCORE: 33
ADLS_ACUITY_SCORE: 49
ADLS_ACUITY_SCORE: 43
ADLS_ACUITY_SCORE: 55
ADLS_ACUITY_SCORE: 42
ADLS_ACUITY_SCORE: 46
ADLS_ACUITY_SCORE: 40
ADLS_ACUITY_SCORE: 47
ADLS_ACUITY_SCORE: 51
ADLS_ACUITY_SCORE: 40
ADLS_ACUITY_SCORE: 43
ADLS_ACUITY_SCORE: 50
ADLS_ACUITY_SCORE: 40
ADLS_ACUITY_SCORE: 41
ADLS_ACUITY_SCORE: 42
ADLS_ACUITY_SCORE: 40
ADLS_ACUITY_SCORE: 40
ADLS_ACUITY_SCORE: 50
ADLS_ACUITY_SCORE: 57
ADLS_ACUITY_SCORE: 40
ADLS_ACUITY_SCORE: 40
ADLS_ACUITY_SCORE: 51
ADLS_ACUITY_SCORE: 42
ADLS_ACUITY_SCORE: 43
ADLS_ACUITY_SCORE: 47
ADLS_ACUITY_SCORE: 55
ADLS_ACUITY_SCORE: 50
ADLS_ACUITY_SCORE: 31
ADLS_ACUITY_SCORE: 46
ADLS_ACUITY_SCORE: 40
ADLS_ACUITY_SCORE: 48
ADLS_ACUITY_SCORE: 47
ADLS_ACUITY_SCORE: 53
ADLS_ACUITY_SCORE: 47
ADLS_ACUITY_SCORE: 51
ADLS_ACUITY_SCORE: 48
ADLS_ACUITY_SCORE: 42
ADLS_ACUITY_SCORE: 40
ADLS_ACUITY_SCORE: 46
ADLS_ACUITY_SCORE: 51
ADLS_ACUITY_SCORE: 40
ADLS_ACUITY_SCORE: 33
ADLS_ACUITY_SCORE: 40
ADLS_ACUITY_SCORE: 33
ADLS_ACUITY_SCORE: 40
ADLS_ACUITY_SCORE: 47
ADLS_ACUITY_SCORE: 40
ADLS_ACUITY_SCORE: 48
ADLS_ACUITY_SCORE: 48
ADLS_ACUITY_SCORE: 46
ADLS_ACUITY_SCORE: 43
ADLS_ACUITY_SCORE: 48
ADLS_ACUITY_SCORE: 47
ADLS_ACUITY_SCORE: 47
ADLS_ACUITY_SCORE: 48
ADLS_ACUITY_SCORE: 33
ADLS_ACUITY_SCORE: 46
ADLS_ACUITY_SCORE: 47
ADLS_ACUITY_SCORE: 50
ADLS_ACUITY_SCORE: 42
ADLS_ACUITY_SCORE: 40
ADLS_ACUITY_SCORE: 45
ADLS_ACUITY_SCORE: 55
ADLS_ACUITY_SCORE: 31
ADLS_ACUITY_SCORE: 52
ADLS_ACUITY_SCORE: 40
ADLS_ACUITY_SCORE: 55
ADLS_ACUITY_SCORE: 42
ADLS_ACUITY_SCORE: 42
ADLS_ACUITY_SCORE: 55
ADLS_ACUITY_SCORE: 50
ADLS_ACUITY_SCORE: 41
ADLS_ACUITY_SCORE: 40
ADLS_ACUITY_SCORE: 42
ADLS_ACUITY_SCORE: 50
ADLS_ACUITY_SCORE: 43
ADLS_ACUITY_SCORE: 40
ADLS_ACUITY_SCORE: 51
ADLS_ACUITY_SCORE: 33
ADLS_ACUITY_SCORE: 43
ADLS_ACUITY_SCORE: 47
ADLS_ACUITY_SCORE: 33
ADLS_ACUITY_SCORE: 47
ADLS_ACUITY_SCORE: 47
ADLS_ACUITY_SCORE: 41
ADLS_ACUITY_SCORE: 47
ADLS_ACUITY_SCORE: 40
ADLS_ACUITY_SCORE: 48
ADLS_ACUITY_SCORE: 51
ADLS_ACUITY_SCORE: 48
ADLS_ACUITY_SCORE: 42
ADLS_ACUITY_SCORE: 44
ADLS_ACUITY_SCORE: 44
ADLS_ACUITY_SCORE: 37
ADLS_ACUITY_SCORE: 50
ADLS_ACUITY_SCORE: 40
ADLS_ACUITY_SCORE: 47
ADLS_ACUITY_SCORE: 47
ADLS_ACUITY_SCORE: 43
ADLS_ACUITY_SCORE: 39
ADLS_ACUITY_SCORE: 41
ADLS_ACUITY_SCORE: 41
ADLS_ACUITY_SCORE: 52
ADLS_ACUITY_SCORE: 51
ADLS_ACUITY_SCORE: 53
ADLS_ACUITY_SCORE: 40
ADLS_ACUITY_SCORE: 48
ADLS_ACUITY_SCORE: 48
ADLS_ACUITY_SCORE: 33
ADLS_ACUITY_SCORE: 50
ADLS_ACUITY_SCORE: 43
ADLS_ACUITY_SCORE: 51
ADLS_ACUITY_SCORE: 33
ADLS_ACUITY_SCORE: 39
ADLS_ACUITY_SCORE: 40
ADLS_ACUITY_SCORE: 50
ADLS_ACUITY_SCORE: 51
ADLS_ACUITY_SCORE: 40
ADLS_ACUITY_SCORE: 53
ADLS_ACUITY_SCORE: 40
ADLS_ACUITY_SCORE: 47
ADLS_ACUITY_SCORE: 40
ADLS_ACUITY_SCORE: 42
ADLS_ACUITY_SCORE: 48
ADLS_ACUITY_SCORE: 40
ADLS_ACUITY_SCORE: 33
ADLS_ACUITY_SCORE: 49
ADLS_ACUITY_SCORE: 40
ADLS_ACUITY_SCORE: 47
ADLS_ACUITY_SCORE: 53
ADLS_ACUITY_SCORE: 47
ADLS_ACUITY_SCORE: 52
ADLS_ACUITY_SCORE: 41
ADLS_ACUITY_SCORE: 53
ADLS_ACUITY_SCORE: 33
ADLS_ACUITY_SCORE: 33
ADLS_ACUITY_SCORE: 42
ADLS_ACUITY_SCORE: 40
ADLS_ACUITY_SCORE: 38
ADLS_ACUITY_SCORE: 39
ADLS_ACUITY_SCORE: 52
ADLS_ACUITY_SCORE: 31
ADLS_ACUITY_SCORE: 37
ADLS_ACUITY_SCORE: 46
ADLS_ACUITY_SCORE: 48
ADLS_ACUITY_SCORE: 33
ADLS_ACUITY_SCORE: 55
ADLS_ACUITY_SCORE: 40
ADLS_ACUITY_SCORE: 33
ADLS_ACUITY_SCORE: 47
ADLS_ACUITY_SCORE: 46
ADLS_ACUITY_SCORE: 47
ADLS_ACUITY_SCORE: 47
ADLS_ACUITY_SCORE: 51
ADLS_ACUITY_SCORE: 43
ADLS_ACUITY_SCORE: 40
ADLS_ACUITY_SCORE: 42
ADLS_ACUITY_SCORE: 43
ADLS_ACUITY_SCORE: 39
ADLS_ACUITY_SCORE: 40
ADLS_ACUITY_SCORE: 37
ADLS_ACUITY_SCORE: 48
ADLS_ACUITY_SCORE: 57
ADLS_ACUITY_SCORE: 47
ADLS_ACUITY_SCORE: 40
ADLS_ACUITY_SCORE: 42
ADLS_ACUITY_SCORE: 52
ADLS_ACUITY_SCORE: 41
ADLS_ACUITY_SCORE: 40
ADLS_ACUITY_SCORE: 55
ADLS_ACUITY_SCORE: 50
ADLS_ACUITY_SCORE: 50
ADLS_ACUITY_SCORE: 43
ADLS_ACUITY_SCORE: 47
ADLS_ACUITY_SCORE: 40
ADLS_ACUITY_SCORE: 42
ADLS_ACUITY_SCORE: 42
ADLS_ACUITY_SCORE: 57
ADLS_ACUITY_SCORE: 40
ADLS_ACUITY_SCORE: 40
ADLS_ACUITY_SCORE: 55
ADLS_ACUITY_SCORE: 41
ADLS_ACUITY_SCORE: 55
ADLS_ACUITY_SCORE: 50
ADLS_ACUITY_SCORE: 47
ADLS_ACUITY_SCORE: 41
ADLS_ACUITY_SCORE: 48
ADLS_ACUITY_SCORE: 42
ADLS_ACUITY_SCORE: 40
ADLS_ACUITY_SCORE: 37
ADLS_ACUITY_SCORE: 46
ADLS_ACUITY_SCORE: 47
ADLS_ACUITY_SCORE: 47
ADLS_ACUITY_SCORE: 52
ADLS_ACUITY_SCORE: 46
ADLS_ACUITY_SCORE: 41
ADLS_ACUITY_SCORE: 46
ADLS_ACUITY_SCORE: 47
ADLS_ACUITY_SCORE: 46
ADLS_ACUITY_SCORE: 42
ADLS_ACUITY_SCORE: 40
ADLS_ACUITY_SCORE: 46
ADLS_ACUITY_SCORE: 47
ADLS_ACUITY_SCORE: 37
ADLS_ACUITY_SCORE: 40
ADLS_ACUITY_SCORE: 51
ADLS_ACUITY_SCORE: 33
ADLS_ACUITY_SCORE: 47
ADLS_ACUITY_SCORE: 38
ADLS_ACUITY_SCORE: 47
ADLS_ACUITY_SCORE: 52
ADLS_ACUITY_SCORE: 40
ADLS_ACUITY_SCORE: 40
ADLS_ACUITY_SCORE: 51
ADLS_ACUITY_SCORE: 33
ADLS_ACUITY_SCORE: 47
ADLS_ACUITY_SCORE: 40
ADLS_ACUITY_SCORE: 47
ADLS_ACUITY_SCORE: 53
ADLS_ACUITY_SCORE: 51
ADLS_ACUITY_SCORE: 53
ADLS_ACUITY_SCORE: 40
ADLS_ACUITY_SCORE: 55
ADLS_ACUITY_SCORE: 55
ADLS_ACUITY_SCORE: 37
ADLS_ACUITY_SCORE: 51
ADLS_ACUITY_SCORE: 37
ADLS_ACUITY_SCORE: 40
ADLS_ACUITY_SCORE: 52
ADLS_ACUITY_SCORE: 40
ADLS_ACUITY_SCORE: 44
ADLS_ACUITY_SCORE: 40
ADLS_ACUITY_SCORE: 42
ADLS_ACUITY_SCORE: 37
ADLS_ACUITY_SCORE: 37
ADLS_ACUITY_SCORE: 42
ADLS_ACUITY_SCORE: 37
ADLS_ACUITY_SCORE: 37
ADLS_ACUITY_SCORE: 41
ADLS_ACUITY_SCORE: 42
ADLS_ACUITY_SCORE: 55
ADLS_ACUITY_SCORE: 40
ADLS_ACUITY_SCORE: 46
ADLS_ACUITY_SCORE: 51
ADLS_ACUITY_SCORE: 40
ADLS_ACUITY_SCORE: 40
ADLS_ACUITY_SCORE: 33
ADLS_ACUITY_SCORE: 42
ADLS_ACUITY_SCORE: 47
ADLS_ACUITY_SCORE: 40
ADLS_ACUITY_SCORE: 42
ADLS_ACUITY_SCORE: 48
ADLS_ACUITY_SCORE: 40
ADLS_ACUITY_SCORE: 42
ADLS_ACUITY_SCORE: 40
ADLS_ACUITY_SCORE: 37
ADLS_ACUITY_SCORE: 38
ADLS_ACUITY_SCORE: 47
ADLS_ACUITY_SCORE: 46
ADLS_ACUITY_SCORE: 47
ADLS_ACUITY_SCORE: 33
ADLS_ACUITY_SCORE: 47
ADLS_ACUITY_SCORE: 47
ADLS_ACUITY_SCORE: 37
ADLS_ACUITY_SCORE: 38
ADLS_ACUITY_SCORE: 52
ADLS_ACUITY_SCORE: 47
ADLS_ACUITY_SCORE: 40
ADLS_ACUITY_SCORE: 37
ADLS_ACUITY_SCORE: 47
ADLS_ACUITY_SCORE: 40
ADLS_ACUITY_SCORE: 55
ADLS_ACUITY_SCORE: 52
ADLS_ACUITY_SCORE: 40
ADLS_ACUITY_SCORE: 55
ADLS_ACUITY_SCORE: 33
ADLS_ACUITY_SCORE: 40
ADLS_ACUITY_SCORE: 33
ADLS_ACUITY_SCORE: 40
ADLS_ACUITY_SCORE: 33
ADLS_ACUITY_SCORE: 52
ADLS_ACUITY_SCORE: 52
ADLS_ACUITY_SCORE: 46
ADLS_ACUITY_SCORE: 47
ADLS_ACUITY_SCORE: 48
ADLS_ACUITY_SCORE: 47
ADLS_ACUITY_SCORE: 47
ADLS_ACUITY_SCORE: 33
ADLS_ACUITY_SCORE: 43
ADLS_ACUITY_SCORE: 48
ADLS_ACUITY_SCORE: 40
ADLS_ACUITY_SCORE: 42
ADLS_ACUITY_SCORE: 52
ADLS_ACUITY_SCORE: 38
ADLS_ACUITY_SCORE: 47
ADLS_ACUITY_SCORE: 38
ADLS_ACUITY_SCORE: 52
ADLS_ACUITY_SCORE: 40
ADLS_ACUITY_SCORE: 40
ADLS_ACUITY_SCORE: 33
ADLS_ACUITY_SCORE: 48
ADLS_ACUITY_SCORE: 48
ADLS_ACUITY_SCORE: 42
ADLS_ACUITY_SCORE: 33
ADLS_ACUITY_SCORE: 53
ADLS_ACUITY_SCORE: 38
ADLS_ACUITY_SCORE: 47
ADLS_ACUITY_SCORE: 37
ADLS_ACUITY_SCORE: 51
ADLS_ACUITY_SCORE: 41
ADLS_ACUITY_SCORE: 55
ADLS_ACUITY_SCORE: 55
ADLS_ACUITY_SCORE: 52
ADLS_ACUITY_SCORE: 45
ADLS_ACUITY_SCORE: 47
ADLS_ACUITY_SCORE: 40
ADLS_ACUITY_SCORE: 50
ADLS_ACUITY_SCORE: 47
ADLS_ACUITY_SCORE: 51
ADLS_ACUITY_SCORE: 40
ADLS_ACUITY_SCORE: 52
ADLS_ACUITY_SCORE: 51
ADLS_ACUITY_SCORE: 46
ADLS_ACUITY_SCORE: 51
ADLS_ACUITY_SCORE: 52
ADLS_ACUITY_SCORE: 41
ADLS_ACUITY_SCORE: 40
ADLS_ACUITY_SCORE: 52
ADLS_ACUITY_SCORE: 40
ADLS_ACUITY_SCORE: 37
ADLS_ACUITY_SCORE: 53
ADLS_ACUITY_SCORE: 37
ADLS_ACUITY_SCORE: 47
ADLS_ACUITY_SCORE: 38
ADLS_ACUITY_SCORE: 42
ADLS_ACUITY_SCORE: 53
ADLS_ACUITY_SCORE: 57
ADLS_ACUITY_SCORE: 48
ADLS_ACUITY_SCORE: 55
ADLS_ACUITY_SCORE: 52
ADLS_ACUITY_SCORE: 53
ADLS_ACUITY_SCORE: 47
ADLS_ACUITY_SCORE: 48
ADLS_ACUITY_SCORE: 40
ADLS_ACUITY_SCORE: 33
ADLS_ACUITY_SCORE: 50
ADLS_ACUITY_SCORE: 48
ADLS_ACUITY_SCORE: 42
ADLS_ACUITY_SCORE: 49
ADLS_ACUITY_SCORE: 52
ADLS_ACUITY_SCORE: 53
ADLS_ACUITY_SCORE: 53
ADLS_ACUITY_SCORE: 47
ADLS_ACUITY_SCORE: 55
ADLS_ACUITY_SCORE: 52
ADLS_ACUITY_SCORE: 52
ADLS_ACUITY_SCORE: 48
ADLS_ACUITY_SCORE: 48
ADLS_ACUITY_SCORE: 40
ADLS_ACUITY_SCORE: 52
ADLS_ACUITY_SCORE: 47
ADLS_ACUITY_SCORE: 52
ADLS_ACUITY_SCORE: 38
ADLS_ACUITY_SCORE: 53
ADLS_ACUITY_SCORE: 43
ADLS_ACUITY_SCORE: 41
ADLS_ACUITY_SCORE: 33
ADLS_ACUITY_SCORE: 48
ADLS_ACUITY_SCORE: 47
ADLS_ACUITY_SCORE: 55
ADLS_ACUITY_SCORE: 40
ADLS_ACUITY_SCORE: 47
ADLS_ACUITY_SCORE: 50
ADLS_ACUITY_SCORE: 33
ADLS_ACUITY_SCORE: 41
ADLS_ACUITY_SCORE: 55
ADLS_ACUITY_SCORE: 40
ADLS_ACUITY_SCORE: 40
ADLS_ACUITY_SCORE: 50
ADLS_ACUITY_SCORE: 55
ADLS_ACUITY_SCORE: 44
ADLS_ACUITY_SCORE: 55
ADLS_ACUITY_SCORE: 37
ADLS_ACUITY_SCORE: 43
ADLS_ACUITY_SCORE: 40
ADLS_ACUITY_SCORE: 48
ADLS_ACUITY_SCORE: 41
ADLS_ACUITY_SCORE: 57
ADLS_ACUITY_SCORE: 52
ADLS_ACUITY_SCORE: 42
ADLS_ACUITY_SCORE: 47
ADLS_ACUITY_SCORE: 55
ADLS_ACUITY_SCORE: 42
ADLS_ACUITY_SCORE: 40
ADLS_ACUITY_SCORE: 40
ADLS_ACUITY_SCORE: 47
ADLS_ACUITY_SCORE: 47
ADLS_ACUITY_SCORE: 48
ADLS_ACUITY_SCORE: 43
ADLS_ACUITY_SCORE: 38
ADLS_ACUITY_SCORE: 50
ADLS_ACUITY_SCORE: 40
ADLS_ACUITY_SCORE: 43
ADLS_ACUITY_SCORE: 40
ADLS_ACUITY_SCORE: 33
ADLS_ACUITY_SCORE: 46
ADLS_ACUITY_SCORE: 37
ADLS_ACUITY_SCORE: 44
ADLS_ACUITY_SCORE: 57
ADLS_ACUITY_SCORE: 40
ADLS_ACUITY_SCORE: 42
ADLS_ACUITY_SCORE: 46
ADLS_ACUITY_SCORE: 51
ADLS_ACUITY_SCORE: 47
ADLS_ACUITY_SCORE: 51
ADLS_ACUITY_SCORE: 47
ADLS_ACUITY_SCORE: 38
ADLS_ACUITY_SCORE: 33
ADLS_ACUITY_SCORE: 48
ADLS_ACUITY_SCORE: 42
ADLS_ACUITY_SCORE: 43
ADLS_ACUITY_SCORE: 43
ADLS_ACUITY_SCORE: 45
ADLS_ACUITY_SCORE: 47
ADLS_ACUITY_SCORE: 40
ADLS_ACUITY_SCORE: 41
ADLS_ACUITY_SCORE: 33
ADLS_ACUITY_SCORE: 52
ADLS_ACUITY_SCORE: 55
ADLS_ACUITY_SCORE: 50
ADLS_ACUITY_SCORE: 37
ADLS_ACUITY_SCORE: 42
ADLS_ACUITY_SCORE: 47
ADLS_ACUITY_SCORE: 51
ADLS_ACUITY_SCORE: 46
ADLS_ACUITY_SCORE: 42
ADLS_ACUITY_SCORE: 41
ADLS_ACUITY_SCORE: 42
ADLS_ACUITY_SCORE: 40
ADLS_ACUITY_SCORE: 46
ADLS_ACUITY_SCORE: 43
ADLS_ACUITY_SCORE: 48
ADLS_ACUITY_SCORE: 33
ADLS_ACUITY_SCORE: 41
ADLS_ACUITY_SCORE: 47
ADLS_ACUITY_SCORE: 42
ADLS_ACUITY_SCORE: 40
ADLS_ACUITY_SCORE: 47
ADLS_ACUITY_SCORE: 47
ADLS_ACUITY_SCORE: 41
ADLS_ACUITY_SCORE: 40
ADLS_ACUITY_SCORE: 33
ADLS_ACUITY_SCORE: 46
ADLS_ACUITY_SCORE: 57
ADLS_ACUITY_SCORE: 48
ADLS_ACUITY_SCORE: 47
ADLS_ACUITY_SCORE: 42
ADLS_ACUITY_SCORE: 53
ADLS_ACUITY_SCORE: 50
ADLS_ACUITY_SCORE: 41
ADLS_ACUITY_SCORE: 47
ADLS_ACUITY_SCORE: 42
ADLS_ACUITY_SCORE: 46
ADLS_ACUITY_SCORE: 55
ADLS_ACUITY_SCORE: 40
ADLS_ACUITY_SCORE: 47
ADLS_ACUITY_SCORE: 50
ADLS_ACUITY_SCORE: 48
ADLS_ACUITY_SCORE: 42
ADLS_ACUITY_SCORE: 52
ADLS_ACUITY_SCORE: 52
ADLS_ACUITY_SCORE: 40
ADLS_ACUITY_SCORE: 40
ADLS_ACUITY_SCORE: 43
ADLS_ACUITY_SCORE: 42
ADLS_ACUITY_SCORE: 48
ADLS_ACUITY_SCORE: 42
ADLS_ACUITY_SCORE: 37
ADLS_ACUITY_SCORE: 42
ADLS_ACUITY_SCORE: 48
ADLS_ACUITY_SCORE: 55
ADLS_ACUITY_SCORE: 51
ADLS_ACUITY_SCORE: 40
ADLS_ACUITY_SCORE: 46
ADLS_ACUITY_SCORE: 42
ADLS_ACUITY_SCORE: 40
ADLS_ACUITY_SCORE: 43
ADLS_ACUITY_SCORE: 48
ADLS_ACUITY_SCORE: 50
ADLS_ACUITY_SCORE: 41
ADLS_ACUITY_SCORE: 42
ADLS_ACUITY_SCORE: 40
ADLS_ACUITY_SCORE: 52
ADLS_ACUITY_SCORE: 53
ADLS_ACUITY_SCORE: 55
ADLS_ACUITY_SCORE: 42
ADLS_ACUITY_SCORE: 44
ADLS_ACUITY_SCORE: 41
ADLS_ACUITY_SCORE: 37
ADLS_ACUITY_SCORE: 44
ADLS_ACUITY_SCORE: 43
ADLS_ACUITY_SCORE: 47
ADLS_ACUITY_SCORE: 40
ADLS_ACUITY_SCORE: 42
ADLS_ACUITY_SCORE: 52
ADLS_ACUITY_SCORE: 47
ADLS_ACUITY_SCORE: 50
ADLS_ACUITY_SCORE: 55
ADLS_ACUITY_SCORE: 53
ADLS_ACUITY_SCORE: 40
ADLS_ACUITY_SCORE: 42
ADLS_ACUITY_SCORE: 47
ADLS_ACUITY_SCORE: 40
ADLS_ACUITY_SCORE: 40
ADLS_ACUITY_SCORE: 49
ADLS_ACUITY_SCORE: 33
ADLS_ACUITY_SCORE: 40
ADLS_ACUITY_SCORE: 33
ADLS_ACUITY_SCORE: 42
ADLS_ACUITY_SCORE: 49
ADLS_ACUITY_SCORE: 52
ADLS_ACUITY_SCORE: 50
ADLS_ACUITY_SCORE: 42
ADLS_ACUITY_SCORE: 50
ADLS_ACUITY_SCORE: 43
ADLS_ACUITY_SCORE: 47
ADLS_ACUITY_SCORE: 33
ADLS_ACUITY_SCORE: 51
ADLS_ACUITY_SCORE: 47
ADLS_ACUITY_SCORE: 37
ADLS_ACUITY_SCORE: 48
ADLS_ACUITY_SCORE: 44
ADLS_ACUITY_SCORE: 42
ADLS_ACUITY_SCORE: 46
ADLS_ACUITY_SCORE: 55
ADLS_ACUITY_SCORE: 42
ADLS_ACUITY_SCORE: 37
ADLS_ACUITY_SCORE: 46
ADLS_ACUITY_SCORE: 40
ADLS_ACUITY_SCORE: 47
ADLS_ACUITY_SCORE: 42
ADLS_ACUITY_SCORE: 52
ADLS_ACUITY_SCORE: 41
ADLS_ACUITY_SCORE: 49
ADLS_ACUITY_SCORE: 40
ADLS_ACUITY_SCORE: 50
ADLS_ACUITY_SCORE: 47
ADLS_ACUITY_SCORE: 47
ADLS_ACUITY_SCORE: 52
ADLS_ACUITY_SCORE: 47
ADLS_ACUITY_SCORE: 33
ADLS_ACUITY_SCORE: 47
ADLS_ACUITY_SCORE: 40
ADLS_ACUITY_SCORE: 43
ADLS_ACUITY_SCORE: 42
ADLS_ACUITY_SCORE: 40
ADLS_ACUITY_SCORE: 52
ADLS_ACUITY_SCORE: 52
ADLS_ACUITY_SCORE: 40
ADLS_ACUITY_SCORE: 33
ADLS_ACUITY_SCORE: 50
ADLS_ACUITY_SCORE: 40
ADLS_ACUITY_SCORE: 43
ADLS_ACUITY_SCORE: 46
ADLS_ACUITY_SCORE: 37
ADLS_ACUITY_SCORE: 40
ADLS_ACUITY_SCORE: 44
ADLS_ACUITY_SCORE: 47
ADLS_ACUITY_SCORE: 47
ADLS_ACUITY_SCORE: 40
ADLS_ACUITY_SCORE: 47
ADLS_ACUITY_SCORE: 33
ADLS_ACUITY_SCORE: 37
ADLS_ACUITY_SCORE: 41
ADLS_ACUITY_SCORE: 41
ADLS_ACUITY_SCORE: 38
ADLS_ACUITY_SCORE: 41
ADLS_ACUITY_SCORE: 42
ADLS_ACUITY_SCORE: 46
ADLS_ACUITY_SCORE: 50
ADLS_ACUITY_SCORE: 37
ADLS_ACUITY_SCORE: 43
ADLS_ACUITY_SCORE: 47
ADLS_ACUITY_SCORE: 47
ADLS_ACUITY_SCORE: 48
ADLS_ACUITY_SCORE: 42
ADLS_ACUITY_SCORE: 43
ADLS_ACUITY_SCORE: 37
ADLS_ACUITY_SCORE: 50
ADLS_ACUITY_SCORE: 52
ADLS_ACUITY_SCORE: 46
ADLS_ACUITY_SCORE: 46
ADLS_ACUITY_SCORE: 52
ADLS_ACUITY_SCORE: 41
ADLS_ACUITY_SCORE: 47
ADLS_ACUITY_SCORE: 40
ADLS_ACUITY_SCORE: 42
ADLS_ACUITY_SCORE: 55
ADLS_ACUITY_SCORE: 50
ADLS_ACUITY_SCORE: 33
ADLS_ACUITY_SCORE: 47
ADLS_ACUITY_SCORE: 46
ADLS_ACUITY_SCORE: 48
ADLS_ACUITY_SCORE: 42
ADLS_ACUITY_SCORE: 55
ADLS_ACUITY_SCORE: 42
ADLS_ACUITY_SCORE: 31
ADLS_ACUITY_SCORE: 38
ADLS_ACUITY_SCORE: 52
ADLS_ACUITY_SCORE: 33
ADLS_ACUITY_SCORE: 41
ADLS_ACUITY_SCORE: 33
ADLS_ACUITY_SCORE: 37
ADLS_ACUITY_SCORE: 40
ADLS_ACUITY_SCORE: 40
ADLS_ACUITY_SCORE: 33
ADLS_ACUITY_SCORE: 48
ADLS_ACUITY_SCORE: 40
ADLS_ACUITY_SCORE: 40
ADLS_ACUITY_SCORE: 42
ADLS_ACUITY_SCORE: 40
ADLS_ACUITY_SCORE: 41
ADLS_ACUITY_SCORE: 42
ADLS_ACUITY_SCORE: 47
ADLS_ACUITY_SCORE: 39
ADLS_ACUITY_SCORE: 40
ADLS_ACUITY_SCORE: 52
ADLS_ACUITY_SCORE: 52
ADLS_ACUITY_SCORE: 40
ADLS_ACUITY_SCORE: 44
ADLS_ACUITY_SCORE: 40
ADLS_ACUITY_SCORE: 40
ADLS_ACUITY_SCORE: 45
ADLS_ACUITY_SCORE: 41
ADLS_ACUITY_SCORE: 52
ADLS_ACUITY_SCORE: 47
ADLS_ACUITY_SCORE: 42
ADLS_ACUITY_SCORE: 48
ADLS_ACUITY_SCORE: 51
ADLS_ACUITY_SCORE: 40
ADLS_ACUITY_SCORE: 42
ADLS_ACUITY_SCORE: 50
ADLS_ACUITY_SCORE: 55
ADLS_ACUITY_SCORE: 33
ADLS_ACUITY_SCORE: 40
ADLS_ACUITY_SCORE: 28
ADLS_ACUITY_SCORE: 42
ADLS_ACUITY_SCORE: 53
ADLS_ACUITY_SCORE: 46
ADLS_ACUITY_SCORE: 44
ADLS_ACUITY_SCORE: 47
ADLS_ACUITY_SCORE: 41
ADLS_ACUITY_SCORE: 53
ADLS_ACUITY_SCORE: 48
ADLS_ACUITY_SCORE: 47
ADLS_ACUITY_SCORE: 47
ADLS_ACUITY_SCORE: 48
ADLS_ACUITY_SCORE: 40
ADLS_ACUITY_SCORE: 41
ADLS_ACUITY_SCORE: 51
ADLS_ACUITY_SCORE: 40
ADLS_ACUITY_SCORE: 50
ADLS_ACUITY_SCORE: 40
ADLS_ACUITY_SCORE: 33
ADLS_ACUITY_SCORE: 37
ADLS_ACUITY_SCORE: 42
ADLS_ACUITY_SCORE: 52
ADLS_ACUITY_SCORE: 48
ADLS_ACUITY_SCORE: 40
ADLS_ACUITY_SCORE: 47
ADLS_ACUITY_SCORE: 47
ADLS_ACUITY_SCORE: 31
ADLS_ACUITY_SCORE: 40
ADLS_ACUITY_SCORE: 42
ADLS_ACUITY_SCORE: 40
ADLS_ACUITY_SCORE: 40
ADLS_ACUITY_SCORE: 47
ADLS_ACUITY_SCORE: 55
ADLS_ACUITY_SCORE: 47
ADLS_ACUITY_SCORE: 48
ADLS_ACUITY_SCORE: 44
ADLS_ACUITY_SCORE: 38
ADLS_ACUITY_SCORE: 47
ADLS_ACUITY_SCORE: 48
ADLS_ACUITY_SCORE: 47
ADLS_ACUITY_SCORE: 48
ADLS_ACUITY_SCORE: 48
ADLS_ACUITY_SCORE: 50
ADLS_ACUITY_SCORE: 49
ADLS_ACUITY_SCORE: 42
ADLS_ACUITY_SCORE: 40
ADLS_ACUITY_SCORE: 52
ADLS_ACUITY_SCORE: 40
ADLS_ACUITY_SCORE: 52
ADLS_ACUITY_SCORE: 48
ADLS_ACUITY_SCORE: 33
ADLS_ACUITY_SCORE: 50
ADLS_ACUITY_SCORE: 41
ADLS_ACUITY_SCORE: 50
ADLS_ACUITY_SCORE: 48
ADLS_ACUITY_SCORE: 42
ADLS_ACUITY_SCORE: 46
ADLS_ACUITY_SCORE: 40
ADLS_ACUITY_SCORE: 47
ADLS_ACUITY_SCORE: 41
ADLS_ACUITY_SCORE: 47
ADLS_ACUITY_SCORE: 55
ADLS_ACUITY_SCORE: 52
ADLS_ACUITY_SCORE: 47
ADLS_ACUITY_SCORE: 55
ADLS_ACUITY_SCORE: 51
ADLS_ACUITY_SCORE: 53
ADLS_ACUITY_SCORE: 53
ADLS_ACUITY_SCORE: 50
ADLS_ACUITY_SCORE: 46
ADLS_ACUITY_SCORE: 48
ADLS_ACUITY_SCORE: 40
ADLS_ACUITY_SCORE: 48
ADLS_ACUITY_SCORE: 47
ADLS_ACUITY_SCORE: 50
ADLS_ACUITY_SCORE: 51
ADLS_ACUITY_SCORE: 47
ADLS_ACUITY_SCORE: 47
ADLS_ACUITY_SCORE: 50
ADLS_ACUITY_SCORE: 46
ADLS_ACUITY_SCORE: 48
ADLS_ACUITY_SCORE: 52
ADLS_ACUITY_SCORE: 41
ADLS_ACUITY_SCORE: 43
ADLS_ACUITY_SCORE: 51
ADLS_ACUITY_SCORE: 44
ADLS_ACUITY_SCORE: 40
ADLS_ACUITY_SCORE: 55
ADLS_ACUITY_SCORE: 43
ADLS_ACUITY_SCORE: 55
ADLS_ACUITY_SCORE: 40
ADLS_ACUITY_SCORE: 45
ADLS_ACUITY_SCORE: 42
ADLS_ACUITY_SCORE: 40
ADLS_ACUITY_SCORE: 43
ADLS_ACUITY_SCORE: 52
ADLS_ACUITY_SCORE: 40
ADLS_ACUITY_SCORE: 33
ADLS_ACUITY_SCORE: 44
ADLS_ACUITY_SCORE: 48
ADLS_ACUITY_SCORE: 52
ADLS_ACUITY_SCORE: 50
ADLS_ACUITY_SCORE: 33
ADLS_ACUITY_SCORE: 47
ADLS_ACUITY_SCORE: 47
ADLS_ACUITY_SCORE: 48
ADLS_ACUITY_SCORE: 40
ADLS_ACUITY_SCORE: 48
ADLS_ACUITY_SCORE: 40
ADLS_ACUITY_SCORE: 40
ADLS_ACUITY_SCORE: 43
ADLS_ACUITY_SCORE: 42
ADLS_ACUITY_SCORE: 40
ADLS_ACUITY_SCORE: 41
ADLS_ACUITY_SCORE: 53
ADLS_ACUITY_SCORE: 40
ADLS_ACUITY_SCORE: 33
ADLS_ACUITY_SCORE: 51
ADLS_ACUITY_SCORE: 40
DEPENDENT_IADLS:: SHOPPING;TRANSPORTATION
ADLS_ACUITY_SCORE: 50
ADLS_ACUITY_SCORE: 47
ADLS_ACUITY_SCORE: 40
ADLS_ACUITY_SCORE: 45
ADLS_ACUITY_SCORE: 52
ADLS_ACUITY_SCORE: 49
ADLS_ACUITY_SCORE: 33
ADLS_ACUITY_SCORE: 51
ADLS_ACUITY_SCORE: 43
ADLS_ACUITY_SCORE: 40
ADLS_ACUITY_SCORE: 37
ADLS_ACUITY_SCORE: 47
ADLS_ACUITY_SCORE: 48
ADLS_ACUITY_SCORE: 42
ADLS_ACUITY_SCORE: 50
ADLS_ACUITY_SCORE: 53
ADLS_ACUITY_SCORE: 52
ADLS_ACUITY_SCORE: 51
ADLS_ACUITY_SCORE: 40
ADLS_ACUITY_SCORE: 47
ADLS_ACUITY_SCORE: 43
ADLS_ACUITY_SCORE: 50
ADLS_ACUITY_SCORE: 42
ADLS_ACUITY_SCORE: 52
ADLS_ACUITY_SCORE: 48
ADLS_ACUITY_SCORE: 42
ADLS_ACUITY_SCORE: 46
ADLS_ACUITY_SCORE: 33
ADLS_ACUITY_SCORE: 40
ADLS_ACUITY_SCORE: 42
ADLS_ACUITY_SCORE: 42
ADLS_ACUITY_SCORE: 43
ADLS_ACUITY_SCORE: 40
ADLS_ACUITY_SCORE: 45
ADLS_ACUITY_SCORE: 40
ADLS_ACUITY_SCORE: 55
ADLS_ACUITY_SCORE: 52
ADLS_ACUITY_SCORE: 33
ADLS_ACUITY_SCORE: 46
ADLS_ACUITY_SCORE: 37
ADLS_ACUITY_SCORE: 40
ADLS_ACUITY_SCORE: 47
ADLS_ACUITY_SCORE: 41
ADLS_ACUITY_SCORE: 40
ADLS_ACUITY_SCORE: 43
ADLS_ACUITY_SCORE: 43
ADLS_ACUITY_SCORE: 48
ADLS_ACUITY_SCORE: 40
ADLS_ACUITY_SCORE: 37
ADLS_ACUITY_SCORE: 40
ADLS_ACUITY_SCORE: 40
ADLS_ACUITY_SCORE: 33
ADLS_ACUITY_SCORE: 50
ADLS_ACUITY_SCORE: 43
ADLS_ACUITY_SCORE: 51
ADLS_ACUITY_SCORE: 33
ADLS_ACUITY_SCORE: 40
ADLS_ACUITY_SCORE: 52
ADLS_ACUITY_SCORE: 42
ADLS_ACUITY_SCORE: 46
ADLS_ACUITY_SCORE: 51
ADLS_ACUITY_SCORE: 47
ADLS_ACUITY_SCORE: 40
ADLS_ACUITY_SCORE: 42
ADLS_ACUITY_SCORE: 41
ADLS_ACUITY_SCORE: 44
ADLS_ACUITY_SCORE: 47
ADLS_ACUITY_SCORE: 40
ADLS_ACUITY_SCORE: 47
ADLS_ACUITY_SCORE: 40
ADLS_ACUITY_SCORE: 33
ADLS_ACUITY_SCORE: 48
ADLS_ACUITY_SCORE: 53
ADLS_ACUITY_SCORE: 40
ADLS_ACUITY_SCORE: 53
ADLS_ACUITY_SCORE: 40
ADLS_ACUITY_SCORE: 51
ADLS_ACUITY_SCORE: 41
ADLS_ACUITY_SCORE: 33
ADLS_ACUITY_SCORE: 40
ADLS_ACUITY_SCORE: 39
ADLS_ACUITY_SCORE: 33
ADLS_ACUITY_SCORE: 40
ADLS_ACUITY_SCORE: 48
ADLS_ACUITY_SCORE: 40
ADLS_ACUITY_SCORE: 52
ADLS_ACUITY_SCORE: 40
ADLS_ACUITY_SCORE: 52
ADLS_ACUITY_SCORE: 40
ADLS_ACUITY_SCORE: 47
ADLS_ACUITY_SCORE: 52
ADLS_ACUITY_SCORE: 53
ADLS_ACUITY_SCORE: 50
ADLS_ACUITY_SCORE: 51
ADLS_ACUITY_SCORE: 33
ADLS_ACUITY_SCORE: 51
ADLS_ACUITY_SCORE: 50
ADLS_ACUITY_SCORE: 47
ADLS_ACUITY_SCORE: 46
ADLS_ACUITY_SCORE: 41
ADLS_ACUITY_SCORE: 48
ADLS_ACUITY_SCORE: 48
ADLS_ACUITY_SCORE: 53
ADLS_ACUITY_SCORE: 48
ADLS_ACUITY_SCORE: 50
ADLS_ACUITY_SCORE: 52
ADLS_ACUITY_SCORE: 48
ADLS_ACUITY_SCORE: 47
ADLS_ACUITY_SCORE: 40
ADLS_ACUITY_SCORE: 38
ADLS_ACUITY_SCORE: 40
ADLS_ACUITY_SCORE: 52
ADLS_ACUITY_SCORE: 41
ADLS_ACUITY_SCORE: 48
ADLS_ACUITY_SCORE: 42
ADLS_ACUITY_SCORE: 37
ADLS_ACUITY_SCORE: 47
ADLS_ACUITY_SCORE: 42
ADLS_ACUITY_SCORE: 46
ADLS_ACUITY_SCORE: 40
ADLS_ACUITY_SCORE: 46
ADLS_ACUITY_SCORE: 51
ADLS_ACUITY_SCORE: 38
ADLS_ACUITY_SCORE: 46
ADLS_ACUITY_SCORE: 50
ADLS_ACUITY_SCORE: 40
ADLS_ACUITY_SCORE: 43
ADLS_ACUITY_SCORE: 40
ADLS_ACUITY_SCORE: 38
ADLS_ACUITY_SCORE: 55
ADLS_ACUITY_SCORE: 50
ADLS_ACUITY_SCORE: 42
ADLS_ACUITY_SCORE: 51
ADLS_ACUITY_SCORE: 42
ADLS_ACUITY_SCORE: 47
ADLS_ACUITY_SCORE: 31
ADLS_ACUITY_SCORE: 41
ADLS_ACUITY_SCORE: 42
ADLS_ACUITY_SCORE: 42
ADLS_ACUITY_SCORE: 50
ADLS_ACUITY_SCORE: 47
ADLS_ACUITY_SCORE: 43
ADLS_ACUITY_SCORE: 43
ADLS_ACUITY_SCORE: 40
ADLS_ACUITY_SCORE: 52
ADLS_ACUITY_SCORE: 48
ADLS_ACUITY_SCORE: 51
ADLS_ACUITY_SCORE: 46
ADLS_ACUITY_SCORE: 28

## 2024-01-01 ASSESSMENT — COLUMBIA-SUICIDE SEVERITY RATING SCALE - C-SSRS
2. HAVE YOU ACTUALLY HAD ANY THOUGHTS OF KILLING YOURSELF IN THE PAST MONTH?: NO
6. HAVE YOU EVER DONE ANYTHING, STARTED TO DO ANYTHING, OR PREPARED TO DO ANYTHING TO END YOUR LIFE?: NO
1. IN THE PAST MONTH, HAVE YOU WISHED YOU WERE DEAD OR WISHED YOU COULD GO TO SLEEP AND NOT WAKE UP?: NO

## 2024-01-01 ASSESSMENT — PAIN SCALES - GENERAL: PAINLEVEL: NO PAIN (0)

## 2024-05-29 PROBLEM — N17.9 ACUTE KIDNEY INJURY (H): Status: ACTIVE | Noted: 2024-01-01

## 2024-05-29 PROBLEM — N30.00 ACUTE CYSTITIS WITHOUT HEMATURIA: Status: ACTIVE | Noted: 2024-01-01

## 2024-05-29 NOTE — LETTER
[facility] [address] [place]  [address] [address]  Phone: [phone]    Anjana 15, 2024    [place]      Myla Glynn  [address]    [facility] [address]      To whom it may concern:    RE: Myla Glynn    This is to confirm that Ms. Glynn is admitted at the [facility], [place], [place] since May 29, 2024. She is suffering from a complex medical condition that needs complex medical care once she is eventually discharged home. Her granddaughters: Cande Hardwick born on July 1st 1996 and Josh Hardwick born February 21 1998, live in [place] and if granted visas to enter the [place] can be of significant help to the patient's daughter to provide care for the patient.  This would help alleviate the burden on the family and would also help prevent her from requiring placement in a long term care facility.     Please contact me for questions or concerns.    Sincerely,      Victorina Vizcarra MD   of Medicine  Division of [facility] Medicine

## 2024-05-29 NOTE — LETTER
Transition Communication Hand-off for Care Transitions to Next Level of Care Provider    Name: Myla Glynn  : 1945  MRN #: [id number]  Primary Care Provider: Jami Bundy     Primary Clinic: [address] [place] [id number]     Reason for Hospitalization:  Bladder mass [N32.89]  Acute kidney injury (H24) [N17.9]  Acute cystitis without hematuria [N30.00]  Admit Date/Time: 2024  7:48 PM  Discharge Date: 24

## 2024-05-30 NOTE — ED PROVIDER NOTES
[place] EMERGENCY DEPARTMENT ([facility])    5/29/24       ED PROVIDER NOTE      History     Chief Complaint   Patient presents with    Fatigue     The history is provided by the patient, medical records and a relative. The history is limited by a language barrier. A  was used (Family Member).     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  Patient with her daughter and niece at bedside.  Her niece is a medical professional at this facility, and is able to translate and provide pertinent history.    The patient's niece states that the patient had returned home from her extensive hospital admission about a month and a half ago.  She states that the patient's oral intake had been steadily decreasing since her discharge on 4/13, but that her appetite has more significantly declined within the past 2 days.  The patient tends to drink about half a can of Ensure per day, but no solid meals.  In addition the patient had seemed more active and had been able to get out of bed right after returning home from the hospital, but she has no longer been doing so. Her daughter and niece state  that the patient has not been complaining of any pain, just some nausea and vomiting.  The patient currently reports experiencing some nausea and lightheadedness.    Patient has been compliant with her blood pressure medications (Coreg and Lipitor).  She also takes Zyprexa at night for nausea, but recently decreased this dose due to the medication seeming to sedate her.  Patient is not currently undergoing chemoradiation.  The cancer has not spread elsewhere.  Her niece notes that the patient's internal fistula has since healed. The patient has been urinating as typical, she denies recent urinary symptoms.  She has had no recent fevers.  Patient denies abdominal or chest pain.    Past Medical History  Past Medical History:   Diagnosis Date    Anemia     Bladder mass     Controlled type 2 diabetes mellitus without complication, without long-term current use of insulin (H)     Gastroesophageal reflux disease     HTN (hypertension)     Ileostomy status (H)     Malignant neoplasm of sigmoid colon (H)     Renal insufficiency      Past Surgical History:   Procedure Laterality Date    COLONOSCOPY      CYSTECTOMY BLADDER RADICAL, ILEAL DIVERSION, COMBINED N/A 1/5/2021    Procedure: Partial Cystectomy, Cystourethroscopy,;  Surgeon: Angel Newsome MD;  Location: [facility] OR    HYSTERECTOMY TOTAL ABDOMINAL  1/5/2021    Procedure: Hysterectomy total abdominal;  Surgeon: Jimy Jackson MD;  Location: [facility]    LAPAROSCOPY OPERATIVE ADULT  1/5/2021    Procedure: Laparoscopy Operative Adult, takedown of splenic flexure, appendectomy;  Surgeon: Remi Moscoso MD;  Location: [facility] OR    SALPINGO-OOPHORECTOMY BILATERAL  1/5/2021    Procedure: Salpingo-oophorectomy bilateral;  Surgeon: Jimy Jackson MD;  Location: [facility]    SIGMOIDOSCOPY FLEXIBLE N/A 1/5/2021    Procedure: SIGMOIDOSCOPY, FLEXIBLE;  Surgeon: Remi Moscoso MD;  Location: [facility]    TAKEDOWN ILEOSTOMY N/A 5/4/2021     Procedure: ILEOSTOMY CLOSURE, LYSIS OF ADHESION;  Surgeon: Remi Moscoso MD;  Location: [facility] OR     acetaminophen (TYLENOL) 500 MG tablet  bisacodyl (DULCOLAX) 5 MG EC tablet  mirtazapine (REMERON SOL-TAB) 15 MG ODT  omeprazole (PRILOSEC) 20 MG DR capsule  polyethylene glycol (GOLYTELY) 236 g suspension  traMADol (ULTRAM) 50 MG tablet      No Known Allergies  Family History  Family History   Problem Relation Age of Onset    No Known Problems Mother     No Known Problems Father     No Known Problems Sister     No Known Problems Brother      Social History   Social History     Tobacco Use    Smoking status: Never    Smokeless tobacco: Never   Substance Use Topics    Alcohol use: Not Currently    Drug use: Not Currently      Past medical history, past surgical history, medications, allergies, family history, and social history were reviewed with the patient. No additional pertinent items.   A complete review of systems was performed with pertinent positives and negatives noted in the HPI, and all other systems negative.    Physical Exam   BP: 96/51  Pulse: 63  Temp: 97.9  F (36.6  C)  Resp: 16  SpO2: 100 %  Physical Exam  Constitutional:       General: She is not in acute distress.     Appearance: She is not ill-appearing.      Comments: Flat affect with answers of only brief questions; no acute distress; eyes open; sitting comfortably   HENT:      Head: Normocephalic and atraumatic.      Mouth/Throat:      Mouth: Mucous membranes are dry.   Cardiovascular:      Rate and Rhythm: Normal rate.   Pulmonary:      Effort: Pulmonary effort is normal.      Breath sounds: Normal breath sounds.   Abdominal:      General: There is no distension.      Palpations: There is no mass.      Tenderness: There is no abdominal tenderness.      Hernia: No hernia is present.      Comments: Left-sided MARIE drain in place.  No abdominal tenderness to palpation.   Neurological:      Mental Status: Mental status is at baseline.            ED Course, Procedures, & Data    8:16 PM  The patient was seen and examined by Cleo Cha   in Room ED17.     Procedures         Results for orders placed or performed during the hospital encounter of 05/29/24   XR Chest 2 Views     Status: None    Narrative    EXAM: XR CHEST 2 VIEWS  LOCATION: [facility]  DATE: 5/29/2024    INDICATION: fatigue; weakness  COMPARISON: CT of the chest 11/17/2021      Impression    IMPRESSION:     Cardiac silhouette is normal in size. Mediastinal borders are well-defined. Hilar contours and lung vascularity are normal.    The lungs are mildly underinflated. Questionable subsegmental opacity in the left lower lobe projecting behind the heart which does not obscure lung vessels, most likely atelectasis, difficult to localize on the lateral view as artifact from fabric   overlies the posterior lungs. There are no right lung opacities. Diaphragmatic curvature is normal. No pleural effusion.    There are embolic coils in the right upper quadrant and a percutaneous drain projects in the left upper quadrant.   CT Abdomen Pelvis w/o Contrast     Status: None    Narrative    EXAM: CT ABDOMEN PELVIS W/O CONTRAST  LOCATION: [facility]  DATE: 5/29/2024    INDICATION: acute kidney injury; recent abd surgery cystectomy  COMPARISON: 6/30/2023.  TECHNIQUE: CT scan of the abdomen and pelvis was performed without IV contrast. Multiplanar reformats were obtained. Dose reduction techniques were used.  CONTRAST: None.    FINDINGS:   LOWER CHEST: Small collection of left pleural fluid which has a thin peripheral hyperdense rim is new from the available prior study of 6/30/2023. Left basilar atelectasis.    HEPATOBILIARY: Normal.    PANCREAS: Normal.    SPLEEN: Normal.    ADRENAL GLANDS: Normal.    KIDNEYS/BLADDER: Percutaneous drainage catheter terminates in the left posterior pararenal fat. Mild  focal soft tissue thickening and small amount of gas at this site. No significant residual drainable fluid collection. Extensive postoperative changes of   the pelvis including of the urinary bladder which has an abnormal lobulated morphology and slightly irregular wall. The bladder mass reported on 6/30/2023 is not well seen separately. A small crescent of fluid in the anterior extra peritoneal space of   the pelvis anterior to the bladder may be contiguous with the bladder lumen (images 174-183 of axial series 5). Correlation with patient's surgical history recommended. Embolization material is present in the right renal hilum. Tiny lower pole cortical   calcification of the right kidney. No hydronephrosis on either side.    BOWEL: Multiple bowel anastomoses in the pelvis. No bowel obstruction or focal inflammation. Post appendectomy.    LYMPH NODES: Normal.    VASCULATURE: Mild to moderate aortoiliac atherosclerosis. No aneurysm.    PELVIC ORGANS: Post hysterectomy.    MUSCULOSKELETAL: Degenerative changes of the spine.      Impression    IMPRESSION:   1.  Percutaneous drainage catheter terminates in the left posterior pararenal fat. Mild focal soft tissue thickening and small amount of gas at this site. No significant residual drainable fluid collection.   2.  Extensive postoperative changes of the pelvis including urinary bladder which has an abnormal lobulated morphology and slightly irregular wall. The bladder mass reported on 6/30/2023 is not well seen separately.   3.  Small crescent of fluid in the anterior extra peritoneal space of the pelvis along the anterior aspect of the bladder may be contiguous with the bladder lumen. Correlation with patient's surgical history recommended.   4.  Embolization material is present in the right renal hilum.   5.  Multiple bowel anastomoses in the pelvis. No bowel obstruction or focal inflammation.  6.  Small collection of hypodense left pleural fluid with thin  hyperdense rim is new from the available prior study of 6/30/2023. This may be a chronic hemothorax. Empyema not excluded. Clinical correlation and comparison with any other available prior   studies recommended.     [place] Draw     Status: None    Narrative    The following orders were created for panel order [place] Draw.  Procedure                               Abnormality         Status                     ---------                               -----------         ------                     Extra Blue Top Tube[[id number]]                              Final result               Extra Red Top Tube[[id number]]                               Final result               Extra Green Top (Lithium...[[id number]]                      Final result               Extra Purple Top Tube[[id number]]                            Final result                 Please view results for these tests on the individual orders.   Extra Blue Top Tube     Status: None   Result Value Ref Range    Hold Specimen JIC    Extra Red Top Tube     Status: None   Result Value Ref Range    Hold Specimen JIC    Extra Green Top (Lithium Heparin) Tube     Status: None   Result Value Ref Range    Hold Specimen JIC    Extra Purple Top Tube     Status: None   Result Value Ref Range    Hold Specimen JIC    Comprehensive metabolic panel     Status: Abnormal   Result Value Ref Range    Sodium 133 (L) 135 - 145 mmol/L    Potassium 4.6 3.4 - 5.3 mmol/L    Carbon Dioxide (CO2) 12 (L) 22 - 29 mmol/L    Anion Gap 20 (H) 7 - 15 mmol/L    Urea Nitrogen 83.9 (H) 8.0 - 23.0 mg/dL    Creatinine 4.48 (H) 0.51 - 0.95 mg/dL    GFR Estimate 10 (L) >60 mL/min/1.73m2    Calcium 9.1 8.8 - 10.2 mg/dL    Chloride 101 98 - 107 mmol/L    Glucose 96 70 - 99 mg/dL    Alkaline Phosphatase 74 40 - 150 U/L    AST 15 0 - 45 U/L    ALT <5 0 - 50 U/L    Protein Total 7.4 6.4 - 8.3 g/dL    Albumin 3.1 (L) 3.5 - 5.2 g/dL    Bilirubin Total 0.3 <=1.2 mg/dL   INR     Status: Abnormal   Result  Value Ref Range    INR 1.21 (H) 0.85 - 1.15   Lactic acid whole blood with 1x repeat in 2 hr when >2     Status: Normal   Result Value Ref Range    Lactic Acid, Initial 1.1 0.7 - 2.0 mmol/L   UA with Microscopic reflex to Culture     Status: Abnormal    Specimen: Urine, Midstream   Result Value Ref Range    Color Urine Orange (A) Colorless, Straw, Light Yellow, Yellow    Appearance Urine Cloudy (A) Clear    Glucose Urine Negative Negative mg/dL    Bilirubin Urine Negative Negative    Ketones Urine Trace (A) Negative mg/dL    Specific Gravity Urine 1.008 1.003 - 1.035    Blood Urine Moderate (A) Negative    pH Urine 6.0 5.0 - 7.0    Protein Albumin Urine 300 (A) Negative mg/dL    Urobilinogen Urine Normal Normal, 2.0 mg/dL    Nitrite Urine Negative Negative    Leukocyte Esterase Urine Large (A) Negative    Bacteria Urine Many (A) None Seen /HPF    WBC Clumps Urine Present (A) None Seen /HPF    RBC Urine 10 (H) <=2 /HPF    WBC Urine >182 (H) <=5 /HPF    Squamous Epithelials Urine 5 (H) <=1 /HPF    Narrative    Urine Culture ordered based on laboratory criteria   CBC with platelets and differential     Status: Abnormal   Result Value Ref Range    WBC Count 6.5 4.0 - 11.0 10e3/uL    RBC Count 3.73 (L) 3.80 - 5.20 10e6/uL    Hemoglobin 10.5 (L) 11.7 - 15.7 g/dL    Hematocrit 33.1 (L) 35.0 - 47.0 %    MCV 89 78 - 100 fL    MCH 28.2 26.5 - 33.0 pg    MCHC 31.7 31.5 - 36.5 g/dL    RDW 15.2 (H) 10.0 - 15.0 %    Platelet Count 298 150 - 450 10e3/uL    % Neutrophils 76 %    % Lymphocytes 14 %    % Monocytes 9 %    % Eosinophils 1 %    % Basophils 0 %    % Immature Granulocytes 0 %    NRBCs per 100 WBC 0 <1 /100    Absolute Neutrophils 4.9 1.6 - 8.3 10e3/uL    Absolute Lymphocytes 0.9 0.8 - 5.3 10e3/uL    Absolute Monocytes 0.6 0.0 - 1.3 10e3/uL    Absolute Eosinophils 0.1 0.0 - 0.7 10e3/uL    Absolute Basophils 0.0 0.0 - 0.2 10e3/uL    Absolute Immature Granulocytes 0.0 <=0.4 10e3/uL    Absolute NRBCs 0.0 10e3/uL   CBC with  platelets differential     Status: Abnormal    Narrative    The following orders were created for panel order CBC with platelets differential.  Procedure                               Abnormality         Status                     ---------                               -----------         ------                     CBC with platelets and d...[[id number]]  Abnormal            Final result                 Please view results for these tests on the individual orders.     Medications   sodium chloride 0.9% BOLUS 1,000 mL (1,000 mLs Intravenous $New Bag 5/29/24 2220)         Per ID note on 5/10/24:  Myla Glynn is a 79 y.o. female with locally recurrent colorectal cancer within the bladder s/p partial cystectomy with small bowel resection and anastomosis (01/09/2024). The patient has had prolonged hospitalization from 02/01/2024 to 04/13/2024 with enterovesical fistula and right lower quadrant abscess. Drain was placed in the abscess (02/02/2024) which grew polymicrobial enteric jonathan and Candida albicans. She underwent urinary diversion with bilateral nephrostomy tube placement on 2/7/2024 complicated by lumbar artery bleed s/p IR embolization on 2/7/2024. She received Zosyn until 02/14/2024 and caspofungin until 2/13/2024. Additionally, she received 5 day course of ceftriaxone (03/06-03/10/2024) for urine culture positive for Proteus hauseri/vulgaris. Sinogram 2/23/2024 showed persistent fistulous communication between the urinary bladder and pelvic drain. Due to minimal output, drain was removed on 02/24/2024. Cystogram on 03/07/2024 showed persistent but improving bladder leak. She developed sepsis and CT abdomen showed a large left retroperitoneal hematoma with rim enhancement. 10F catheter was placed to this hematoma on 03/09/2024. Culture from this fluid is growing Proteus hauseri/vulgaris. The patient was restarted on ceftriaxone/then cefepime. Blood cultures on 03/19/2024 grew Candida albicans - likely  from line. She was taken for IR coil embolization of the inferior renal artery branch of the neck of pseudoaneurysms. The patient had received micafungin and ceftriaxone followed by ceftriaxone and fluconazole until 3/29/2024. The patient underwent multiple drain upsizing/aspirations. CT imaging on 4/5/2024 showed no change in the size of the left retroperitoneal hematoma. ID team recommended either Augmentin for another week until 04/15/2024. Sinogram on 4/22/2024 showed drain occlusion - thus, exchange of the 16 Guyanese locking loop. The patient presented to the clinic today along with her daughter for follow up. The daughter helps with interpreting. The patient is doing well since dismissal, but continues to feel weak. She denies fever, chills, abdominal pain. She has not been taking antibiotic for more than 3 weeks now, no new issue.       No results found for any visits on 05/29/24.  Medications - No data to display  Labs Ordered and Resulted from Time of ED Arrival to Time of ED Departure - No data to display  No orders to display          Critical care was not performed.     Medical Decision Making  The patient's presentation was of high complexity (an acute health issue posing potential threat to life or bodily function).    The patient's evaluation involved:  review of external note(s) from 3+ sources (see separate area of note for details)  review of 3+ test result(s) ordered prior to this encounter (see separate area of note for details)  ordering and/or review of 3+ test(s) in this encounter (see separate area of note for details)  discussion of management or test interpretation with another health professional (see separate area of note for details)      The patient's management necessitated moderate risk (prescription drug management including medications given in the ED) and high risk (a decision regarding hospitalization).    Assessment & Plan    Patient is a 79-year-old female with a very complicated  past medical history with a known history of a partial cystectomy with recurrent infections in her abdomen as well as PNT tubes that have now been removed.  Patient had developed an enterovesicular fistula.  Patient currently is not on any antibiotics.  Patient brought to the ER by family due to decreased oral intake and decreased interaction.  Patient here had stable vital signs.  She was not acutely dyspneic and not acutely tender.  Patient was found to have an acute ALFREDA with a creatinine that is above 4 with a baseline around 1.  His urine also appears infected.  She started on broad-spectrum antibiotics.  We obtained a CT abdomen and pelvis that shows no acute hydro but ongoing chronic issues that look concerning.  I did discuss this with the urology resident as well.  Urology feels the patient has no acute emergent issues at this time but recommends admission will follow as a consult.     Spoke to internal medicine here at the [facility].  They wanted to see if we could transfer the patient directly to [facility].  I spoke to the patient her daughter and the patient's cousin regarding transfer to male.  The daughter who is a nurse at our hospital prefers to keep the patient here.  She says they spoke to urology at [place] today and they were told that there is no acute urologic intervention that is needed.  He said they also went to [place] earlier this month and was seen by infectious disease.  Family is concerned that this is mostly just an infection and dehydration that can likely be treated at the [facility].  At this time they do not want to transfer the patient to [facility] despite conversations with 2 different family members and the patient.  I also spoke to urology again here at the [facility].  Urology recommends a Cameron catheter be placed.  Urology also recommends he be admitted to medicine with plan for cystoscopy by urology in the morning.  I will again reconnect with  internal medicine regarding possibly admitting the patient here.  Since the patient does not want to be transferred she will need to be admitted to [facility] for further treatment until her condition is improved.    I have reviewed the nursing notes. I have reviewed the findings, diagnosis, plan and need for follow up with the patient.    New Prescriptions    No medications on file       Final diagnoses:   Acute kidney injury (H24)   Acute cystitis without hematuria   Bladder mass   [place], am serving as a trained medical scribe to document services personally performed by Cleo Cha MD, based on the provider's statements to me.      ICleo MD, was physically present and have reviewed and verified the accuracy of this note documented by [place] Martines.       Cleo Cha MD  [facility] EMERGENCY DEPARTMENT  5/29/2024     Cleo Cha MD  05/29/24 [id number]

## 2024-05-30 NOTE — PLAN OF CARE
Goal Outcome Evaluation:      Plan of Care Reviewed With: patient    Overall Patient Progress: no changeOverall Patient Progress: no change    Outcome Evaluation: Px alert and oriented x4, able to make needs known. No pain no nausea. c/o feeling cold hallway thermostat adjusted. Px slept most the the shift. BG was 68 this morning, PRN dextrose IV given, provider made aware. BG rechecked and was 132. PTT blood sample was clotted according to lab, provider made aware, did not ordered a recheck. Went to CT scan via bed. Came back from scan, MARIE drain accidentally pulled out, provider made aware. Px c/o pain on previous MARIE site, PRN oxycodone offered but was refused, stating she will not feel well when she take anything by mouth. Continue with POC.

## 2024-05-30 NOTE — H&P
[facility]    History and Physical        Date of Admission:  5/29/2024    Assessment & Plan      Myla Glynn is a 79 year old female admitted on 5/29/2024.  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      #ALFREDA   #UTI  #Weakness   #Infected organized left retroperitoneal hematoma with Proteus hauseri/vulgaris s/p IR coil embolization and drain placement 3/9/2024 followed by multiple drain upsized/exchange and antibiotics until 4/15/2024  # Persistent bladder leak s/p urinary diversion with bilateral nephrostomy tubes (now removed)  # Hx of Right renal segmental artery bleed with subsequent clot formation in the renal collecting ducts, ureter, and urinary bladder s/p IR embolization  # Recurrent colorectal cancer with local recurrence within the bladder s/p partial cystectomy with small bowel resection and anastomosis (01/09/2024)  Patient presenting with weakness and elevated Cr 4.48 (baseline  1.2-1.5). CT shows percutaneous drainage catheter terminates in the left posterior pararenal fat without residual drain able fluid collection, there is also small fluid around the  anterior extra peritoneal space of the pelvies along the anterior aspect of the bladder. Urology consult, plan for cystoscopy in the AM to evaluate extension of bladder leak. UA appears infectious with elevated WBC and + LE. Will start ceftriaxone based on prior cultures while awaiting UC. Cameron in place for bladder decompression. Suspect component of hypovolemia contributing as patient reports very poor PO intake over the last 5 days. Will get infectious workup including BCX given weakness.   - Ceftriaxone 1g Q24 hr for complex UTI  - Urology following, plan for Ct cystogram  in AM  - Push outside images to PACs  - Cameron in place  - Strict I/Os  - UC pending  - Consider ID consult in the AM   - BCX pending  - S/p 2 L fluid resuscitation   - Will likely benefit from palliative care consult given complexity of patients ongoing medical complications and deconditioning/malnourishment   - May benefit from ID consult given recent infected peritoneal hematoma     #Pleural Effusion   #Atelectasis   CT with L pleural effusion which may represent hemothorax vs. Empyema. Patient denies respiratory symptoms and is breathing on room air.   - CTM     #HFrEF  #HTN   BP soft on admission, now improved s/p 2L fluid resuscitation. Per discharge summary from 4/2024 patient was scheduled to follow up with cardiology on 7/24. Recent ECHO on 4/4/24 with EF 29% .   - Hold Carvedilol 12.5mg BID, likely restart in AM   - BNP    #Chronic Normocytic Anemia   Baseline between 10-11, on admission Hgb 10.5. Likely 2/2 anemia of chronic disease and suspect 2/2 nutritional deficency.   - AM CBC     #T2DM, diet controlled  - Hypoglycemic protocol     #Nausea   EKG with Qtc 462.   - PTA Zofran   - PTA Compazine     #Hyponatremia   Suspect 2/2 malnourishment. Does not appear volume overloaded.   - Urine lytes pending  - AM BMP    #Malnurishment  Patient currently on olanzapine for appetite and sleep, plan was to wean this as nausea/appetite  improves.   - May benefit from nutrition consult in the AM  - Hold PTA olanzapine 5mg daily    #Deconditioning   -PT/OT consult in AM      Diet:  NPO for now given possible procedure  DVT Prophylaxis: Pneumatic Compression Devices  Cameron Catheter: Not present  Lines: None     Cardiac Monitoring: None  Code Status:  Full Code; however, patient & family going to continue to discuss given overall poor functional status and chronic medical complications.     Clinically Significant Risk Factors Present on Admission             # Anion Gap Metabolic Acidosis: Highest Anion Gap = 20 mmol/L in last 2 days, will monitor and treat as appropriate  # Hypoalbuminemia: Lowest albumin = 3.1 g/dL at 5/29/2024  8:13 PM, will monitor as appropriate  # Coagulation Defect: INR = 1.21 (Ref range: 0.85 - 1.15) and/or PTT = N/A, will monitor for bleeding    # Hypertension: Noted on problem list                   Patient to be formally staffed in the AM.     Marissa Lucia MD  Internal Medicine PGY1  ______________________________________________________________________    Chief Complaint   Weakness    History is obtained from the patient and patient's daughter.      History of Present Illness   Myla Glynn is a 79 year old female admitted on 5/29/2024.  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      Patients daughter interpreted during our visit. Patient reports feeling unwell for the past 5 days. She has had poor appetite and has not had a full meal in 5 days. She reports decreased fluid intake over the last 2 days. She has had dysuria for the past 3 days. No fever. Worsening weakness. She denies any falls. Was nauseaus yesterday and vomited x1. She has had significant weight loss over the past several months. She used to live independently but now is living with her daughter. Has been having L sided flank pain for the last week with dark red fluid drainging, previously was yellow fluid.     ED: afebrile, soft BP given 2L fluids. CT A/P and urology consulted. Recommended alejandra insertion and will plan for cystoscopy tomorrow. UA and UC obtained. BCX pending.     Past Medical History    Past Medical History:   Diagnosis Date    Anemia     Bladder mass     Controlled type 2 diabetes mellitus without complication, without long-term current use of insulin (H)     Gastroesophageal reflux disease     HTN (hypertension)     Ileostomy status (H)     Malignant neoplasm of sigmoid colon (H)     Renal insufficiency        Past Surgical History   Past Surgical History:   Procedure Laterality Date    COLONOSCOPY      CYSTECTOMY BLADDER RADICAL, ILEAL DIVERSION, COMBINED N/A 1/5/2021    Procedure: Partial Cystectomy, Cystourethroscopy,;  Surgeon: Angle Newsome MD;  Location: [facility] OR    HYSTERECTOMY TOTAL ABDOMINAL  1/5/2021    Procedure: Hysterectomy total abdominal;  Surgeon: Jimy Jackson MD;  Location: [facility] OR    LAPAROSCOPY OPERATIVE ADULT  1/5/2021    Procedure: Laparoscopy Operative Adult, takedown of splenic flexure, appendectomy;  Surgeon: Remi Moscoso MD;  Location: [facility] OR    SALPINGO-OOPHORECTOMY BILATERAL  1/5/2021     Procedure: Salpingo-oophorectomy bilateral;  Surgeon: Jimy Jackson MD;  Location: [facility] OR    SIGMOIDOSCOPY FLEXIBLE N/A 2021    Procedure: SIGMOIDOSCOPY, FLEXIBLE;  Surgeon: Remi Moscoso MD;  Location: [facility] OR    TAKEDOWN ILEOSTOMY N/A 2021    Procedure: ILEOSTOMY CLOSURE, LYSIS OF ADHESION;  Surgeon: Remi Moscoso MD;  Location: [facility] OR       Prior to Admission Medications   Prior to Admission Medications   Prescriptions Last Dose Informant Patient Reported? Taking?   acetaminophen (TYLENOL) 500 MG tablet   No No   Sig: Take 2 tablets (1,000 mg) by mouth 3 times daily as needed for mild pain   Patient not taking: Reported on 2021   bisacodyl (DULCOLAX) 5 MG EC tablet   No No   Sig: Take 2 tablets by mouth at 10am the day before procedure.   mirtazapine (REMERON SOL-TAB) 15 MG ODT   No No   Si tablet (15 mg) by Orally disintegrating tablet route At Bedtime   Patient not taking: Reported on 2021   omeprazole (PRILOSEC) 20 MG DR capsule   Yes No   Sig: Take 20 mg by mouth every morning    Patient not taking: Reported on 2021   polyethylene glycol (GOLYTELY) 236 g suspension   No No   Sig: Take as directed. At 3:00pm drink one 8oz glass every 10-15 minutes until half of the first container is empty. Store the remainder in the refrigerator. At 8:00pm drink the second half of the first container until it is gone. Before you go to bed mix the second container with water and put in refrigerator. Six hours before your check in time drink one 8oz glass every 10-15 minutes until half of container is empty. Discard the remainder of solution.   traMADol (ULTRAM) 50 MG tablet   No No   Sig: Take 0.5 tablets (25 mg) by mouth every 6 hours as needed for moderate pain (mild-moderate pain)   Patient not taking: Reported on 2021      Facility-Administered Medications: None        Review of Systems    The 10 point Review of Systems is negative other than noted in the  HPI or here.      Physical Exam   Vital Signs: Temp: 98.1  F (36.7  C) Temp src: Oral BP: 106/45 Pulse: 63   Resp: 16 SpO2: 100 % O2 Device: None (Room air)    Weight: 0 lbs 0 oz    General Appearance: Cachectic, elderly women, resting comfortably in bed  Respiratory: no increased work of breathing, decreased respiration in the L>R bases, no crackles,no wheeze  Cardiovascular: RRR, no murmur, cap refill <2-3 seconds  GI: soft,non-tender, well-healed scars, MARIE drain on the L flank with sanguinous fluid  Skin: no concerning skin lesions, no bruising or petechiae         Data     I have personally reviewed the following data over the past 24 hrs:    6.5  \   10.5 (L)   / 298     133 (L) 101 83.9 (H) /  96   4.6 12 (L) 4.48 (H) \     ALT: <5 AST: 15 AP: 74 TBILI: 0.3   ALB: 3.1 (L) TOT PROTEIN: 7.4 LIPASE: N/A     Procal: N/A CRP: N/A Lactic Acid: 1.1       INR:  1.21 (H) PTT:  N/A   D-dimer:  N/A Fibrinogen:  N/A       Imaging results reviewed over the past 24 hrs:   Recent Results (from the past 24 hour(s))   XR Chest 2 Views    Narrative    EXAM: XR CHEST 2 VIEWS  LOCATION: [facility]  DATE: 5/29/2024    INDICATION: fatigue; weakness  COMPARISON: CT of the chest 11/17/2021      Impression    IMPRESSION:     Cardiac silhouette is normal in size. Mediastinal borders are well-defined. Hilar contours and lung vascularity are normal.    The lungs are mildly underinflated. Questionable subsegmental opacity in the left lower lobe projecting behind the heart which does not obscure lung vessels, most likely atelectasis, difficult to localize on the lateral view as artifact from fabric   overlies the posterior lungs. There are no right lung opacities. Diaphragmatic curvature is normal. No pleural effusion.    There are embolic coils in the right upper quadrant and a percutaneous drain projects in the left upper quadrant.   CT Abdomen Pelvis w/o Contrast    Narrative    EXAM: CT  ABDOMEN PELVIS W/O CONTRAST  LOCATION: [facility]  DATE: 5/29/2024    INDICATION: acute kidney injury; recent abd surgery cystectomy  COMPARISON: 6/30/2023.  TECHNIQUE: CT scan of the abdomen and pelvis was performed without IV contrast. Multiplanar reformats were obtained. Dose reduction techniques were used.  CONTRAST: None.    FINDINGS:   LOWER CHEST: Small collection of left pleural fluid which has a thin peripheral hyperdense rim is new from the available prior study of 6/30/2023. Left basilar atelectasis.    HEPATOBILIARY: Normal.    PANCREAS: Normal.    SPLEEN: Normal.    ADRENAL GLANDS: Normal.    KIDNEYS/BLADDER: Percutaneous drainage catheter terminates in the left posterior pararenal fat. Mild focal soft tissue thickening and small amount of gas at this site. No significant residual drainable fluid collection. Extensive postoperative changes of   the pelvis including of the urinary bladder which has an abnormal lobulated morphology and slightly irregular wall. The bladder mass reported on 6/30/2023 is not well seen separately. A small crescent of fluid in the anterior extra peritoneal space of   the pelvis anterior to the bladder may be contiguous with the bladder lumen (images 174-183 of axial series 5). Correlation with patient's surgical history recommended. Embolization material is present in the right renal hilum. Tiny lower pole cortical   calcification of the right kidney. No hydronephrosis on either side.    BOWEL: Multiple bowel anastomoses in the pelvis. No bowel obstruction or focal inflammation. Post appendectomy.    LYMPH NODES: Normal.    VASCULATURE: Mild to moderate aortoiliac atherosclerosis. No aneurysm.    PELVIC ORGANS: Post hysterectomy.    MUSCULOSKELETAL: Degenerative changes of the spine.      Impression    IMPRESSION:   1.  Percutaneous drainage catheter terminates in the left posterior pararenal fat. Mild focal soft tissue thickening  and small amount of gas at this site. No significant residual drainable fluid collection.   2.  Extensive postoperative changes of the pelvis including urinary bladder which has an abnormal lobulated morphology and slightly irregular wall. The bladder mass reported on 6/30/2023 is not well seen separately.   3.  Small crescent of fluid in the anterior extra peritoneal space of the pelvis along the anterior aspect of the bladder may be contiguous with the bladder lumen. Correlation with patient's surgical history recommended.   4.  Embolization material is present in the right renal hilum.   5.  Multiple bowel anastomoses in the pelvis. No bowel obstruction or focal inflammation.  6.  Small collection of hypodense left pleural fluid with thin hyperdense rim is new from the available prior study of 6/30/2023. This may be a chronic hemothorax. Empyema not excluded. Clinical correlation and comparison with any other available prior   studies recommended.

## 2024-05-30 NOTE — CONSULTS
Urology Consult    Name: Myla Glynn    MRN: [id number]   YOB: 1945               Chief Complaint:   Acute kidney injury, complicated urologic surgery with history of bladder leak 3/2024    History is obtained from the patient and chart review          History of Present Illness:     The history is provided by the patient, medical records and a relative. The history is limited by a language barrier. A  was used (Family Member).     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       CT abdomen pelvis with IV and enteric contrast on admission demonstrated enterovesical fistula with connection between the repaired portion of the bladder to segment of small bowel that appears away from the bowel anastomosis.     She was readmitted to [facility] and started on CPN. A CT-guided drain was placed adjacent to the enterovesical fistula. She underwent urinary diversion with bilateral nephrostomy tube placement on 2/7 complicated by lumbar artery bleed s/p IR embolization on 2/7. She was in the ICU for this. Transferred back to ICU for HTN urgency 2/11/24; this was controlled with nifedipine and she was transferred back to floor.      The last sinogram demonstrated closure of the fistula, there was minimal output from the drain even with PO intake, and so the abdominal drain was removed on 2/24/24.     CPN stopped 3/1/24. Her PO intake was improving around this time, and she overall was appearing more alert and active with ambulation. The nausea / dysphagia associated with medication intake was improving  but then developed  several new problems as described below:     She develop cough and increased O2 requirements; CXR and then CT chest on 3/2 revealed left pleural effusion. IP performed bedside thoracentesis on 3/3/24.     Despite thoracentesis, she had ongoing tachypnea in oxygen requirements in the last 24 hours. In addition, she has ALFREDA with increasing creatinine.  aggressive bowel regimen and volume repletion which improved her bowel function and renal function. Her respiratory issues resolved.      3/7 cystogram showed persistent but improving bladder leak, so the Cameron catheter was exchanged and her nephrostomy tubes were returned to gravity drainage to maximize urinary diversion.     She was doing much better with p.o. intake and activity but then overnight 3/7 developed increasing tachycardia, febrile to 38.3, and began to feel short of breath and fatigued.     On 3/8, CT scan was obtained which shows a large left retroperitoneal hematoma with rim enhancement as well as concerns for atelectasis.       On 3/9, she underwent drain placement with initial cultures growing gram negative bacillus. She is maintained on vancomycin and cefepime until cultures result. Lower extremity ultrasound 3/10 was negative.     On 03/13/2024 drain study demonstrated persistent hematoma.  The drainage upsized to a 14 Taiwanese drain.  NG tube was placed and tube feeds were started.     03/19/2024 underwent bilateral nephrostomy tube and bilateral ureteral stent removal.     Repeat CT scan on 03/19/2024 demonstrated slightly decreased left retroperitoneal hematoma.  Sinogram showed the drain is in good position and is functioning appropriately.  This is likely ongoing source of infection, though she is not a candidate for operative evacuation of the hematoma so we should continue with aggressive drain flushes and hopefully slowly breakdown hematoma over time.       03/21/2024, blood cultures positive for Candida, infectious Disease reengaged and she  was started on the appropriate antibiotics, PICC lined removed.  She also had persistent bleeding from the right kidney as a result of her right nephrostomy tube removal and ureteral stent removal.  Overnight into the early morning 3/22 CTA showed active bleed from a segmental renal artery with significant clot burden in the right renal pelvis and upper tract as well as in the bladder.  She was also in clot retention at this time requiring removal with bedside irrigation. Went for IR embolization of a right renal pseudoaneurysm on the morning of 3/22.       Undwent bilateral indwelling ureteral stent placement on 03/24/2024 for suspected clot obstruction on the right in the setting of compromised renal function on the left from chronic compression from a left retroperitoneal hematoma.  She has had some delirium before and after stent placement.      3/27 I had an updated conversation with patient's daughter, Reena, regarding Myla's code status. I explained that I would have significant doubts that Myla would make a satisfactory or meaningful recovery if she were to require resuscitation for a code event (e.g. chest compressions, intubation). Nevertheless, Reena requested that Myla remain full code.      Over 3/30-4/2 her delirium slowly improved and ultimately resolved. PO intake initially improved.     4/3 to 4/8 overall remained stable with slowly improving renal function but had inconsistent p.o. intake that overall was not improving and insufficient from a nutritional standpoint.     4/10 NGT removed. Patient discharged per family wishes despite poor po intake.       She is admitted for failure to thrive, generalized weakness.    WBC normal, UA abonrmal, Cr with ALFREDA up to 4.4. Lactate 1.1       Past Medical History:     Past Medical History:   Diagnosis Date    Anemia     Bladder mass     Controlled type 2 diabetes mellitus without complication, without long-term current use of insulin (H)      Gastroesophageal reflux disease     HTN (hypertension)     Ileostomy status (H)     Malignant neoplasm of sigmoid colon (H)     Renal insufficiency             Past Surgical History:     Past Surgical History:   Procedure Laterality Date    COLONOSCOPY      CYSTECTOMY BLADDER RADICAL, ILEAL DIVERSION, COMBINED N/A 1/5/2021    Procedure: Partial Cystectomy, Cystourethroscopy,;  Surgeon: Angel Newsome MD;  [facility]    HYSTERECTOMY TOTAL ABDOMINAL  1/5/2021    Procedure: Hysterectomy total abdominal;  Surgeon: Jimy Jackson MD;  [facility]    LAPAROSCOPY OPERATIVE ADULT  1/5/2021    Procedure: Laparoscopy Operative Adult, takedown of splenic flexure, appendectomy;  Surgeon: Remi Moscoso MD;  [facility]    SALPINGO-OOPHORECTOMY BILATERAL  1/5/2021    Procedure: Salpingo-oophorectomy bilateral;  Surgeon: Jimy Jackson MD;  [facility]    SIGMOIDOSCOPY FLEXIBLE N/A 1/5/2021    Procedure: SIGMOIDOSCOPY, FLEXIBLE;  Surgeon: Remi Moscoso MD;  [facility]    TAKEDOWN ILEOSTOMY N/A 5/4/2021    Procedure: ILEOSTOMY CLOSURE, LYSIS OF ADHESION;  Surgeon: Remi Moscoso MD;  Location[facility]            Social History:     Social History     Tobacco Use    Smoking status: Never    Smokeless tobacco: Never   Substance Use Topics    Alcohol use: Not Currently            Family History:     Family History   Problem Relation Age of Onset    No Known Problems Mother     No Known Problems Father     No Known Problems Sister     No Known Problems Brother             Allergies:   No Known Allergies         Medications:     No current facility-administered medications for this encounter.     Current Outpatient Medications   Medication Sig Dispense Refill    acetaminophen (TYLENOL) 500 MG tablet Take 2 tablets (1,000 mg) by mouth 3 times daily as needed for mild pain (Patient not taking: Reported on 12/13/2021) 100 tablet 0    bisacodyl  (DULCOLAX) 5 MG EC tablet Take 2 tablets by mouth at 10am the day before procedure. 2 tablet 0    mirtazapine (REMERON SOL-TAB) 15 MG ODT 1 tablet (15 mg) by Orally disintegrating tablet route At Bedtime (Patient not taking: Reported on 4/20/2021) 30 tablet 3    omeprazole (PRILOSEC) 20 MG DR capsule Take 20 mg by mouth every morning  (Patient not taking: Reported on 12/13/2021)      polyethylene glycol (GOLYTELY) 236 g suspension Take as directed. At 3:00pm drink one 8oz glass every 10-15 minutes until half of the first container is empty. Store the remainder in the refrigerator. At 8:00pm drink the second half of the first container until it is gone. Before you go to bed mix the second container with water and put in refrigerator. Six hours before your check in time drink one 8oz glass every 10-15 minutes until half of container is empty. Discard the remainder of solution. 8000 mL 0    traMADol (ULTRAM) 50 MG tablet Take 0.5 tablets (25 mg) by mouth every 6 hours as needed for moderate pain (mild-moderate pain) (Patient not taking: Reported on 9/13/2021) 15 tablet 0             Review of Systems:    ROS: 10 point ROS neg other than the symptoms noted above in the HPI           Physical Exam:   VS:  T: 98.1    HR: 63    BP: 106/45    RR: 16   GEN:  AOx3.  NAD.    LUNGS: Non-labored breathing.   BACK:  No midline or CVA tenderness.  ABD:  Soft.  NT.  ND.  No rebound or guarding.  No masses.   MARIE serosang scant output       Data:   All laboratory data reviewed:    Recent Labs   Lab 05/29/24 2013   WBC 6.5   HGB 10.5*          Recent Labs   Lab 05/29/24 2013   *   POTASSIUM 4.6   CHLORIDE 101   CO2 12*   BUN 83.9*   CR 4.48*   GLC 96   DICKSON 9.1       Recent Labs   Lab 05/29/24 2103   COLOR Orange*   APPEARANCE Cloudy*   URINEGLC Negative   URINEBILI Negative   URINEKETONE Trace*   SG 1.008   URINEPH 6.0   PROTEIN 300*   NITRITE Negative   LEUKEST Large*   RBCU 10*   WBCU >182*       All pertinent  imaging reviewed:    CT scan of the abdomen:   1.  Percutaneous drainage catheter terminates in the left posterior pararenal fat. Mild focal soft tissue thickening and small amount of gas at this site. No significant residual drainable fluid collection.   2.  Extensive postoperative changes of the pelvis including urinary bladder which has an abnormal lobulated morphology and slightly irregular wall. The bladder mass reported on 6/30/2023 is not well seen separately.   3.  Small crescent of fluid in the anterior extra peritoneal space of the pelvis along the anterior aspect of the bladder may be contiguous with the bladder lumen. Correlation with patient's surgical history recommended.   4.  Embolization material is present in the right renal hilum.   5.  Multiple bowel anastomoses in the pelvis. No bowel obstruction or focal inflammation.  6.  Small collection of hypodense left pleural fluid with thin hyperdense rim is new from the available prior study of 6/30/2023. This may be a chronic hemothorax. Empyema not excluded. Clinical correlation and comparison with any other available prior   studies recommended.              Impression and Plan:   Impression:   Myla Glynn is a 79 year old female with PMH very complex post op course for partial cystectomy for locally invasive colorectal cancer. Post op course notable for persistent urine leak, lumbar artery injury during PNT placement requiring embolization, hematoma formation s/p PNT removal requiring drain placement. Admitted for failure to thrive and weakness. Patient is likely very dehydrated and malnourished given poor PO intake at home and would benefit from optimization. Would benefit from alejandra placement while cystogram is being performed to decompress bladder and evaluate for extent of leak given no access to prior imaging.       Plan:     - fluid resuscitation    - alejandra    - Ct cystogram in the morning  - please have imaging pushed over from [facility]  system   - antibiosis per primary, tailor to culture if initiated  - palliative care consult    - Urology will continue to follow. Please contact resident/PA on call with any questions or concerns.         This patient's exam findings, labs, and imaging discussed with urology staff surgeon Dr Blanco, who developed the treatment plan.    Ubaldo Escobedo MD  Urology Resident

## 2024-05-30 NOTE — PHARMACY-VANCOMYCIN DOSING SERVICE
Pharmacy Vancomycin Initial Note  Date of Service May 30, 2024  Patient's  1945  79 year old, female    Indication: Urinary Tract Infection    Current estimated CrCl = CrCl cannot be calculated (Unknown ideal weight.).    Creatinine for last 3 days  2024:  8:13 PM Creatinine 4.48 mg/dL    Recent Vancomycin Level(s) for last 3 days  No results found for requested labs within last 3 days.      Vancomycin IV Administrations (past 72 hours)        No vancomycin orders with administrations in past 72 hours.                    Nephrotoxins and other renal medications (From now, onward)      Start     Dose/Rate Route Frequency Ordered Stop    24 0830  vancomycin (VANCOCIN) 1,000 mg in 200 mL dextrose intermittent infusion         1,000 mg  200 mL/hr over 1 Hours Intravenous ONCE 24 0800      24 0800  vancomycin place fuller - receiving intermittent dosing         1 each Intravenous SEE ADMIN INSTRUCTIONS 24 0801              Contrast Orders - past 72 hours (72h ago, onward)      None                    Plan:  Give vancomycin 1000mg IV x1 now. Will dose intermittently based on levels for now given ALFREDA  Vancomycin monitoring method: Trough (Method 2 = manual dose calculation)  Vancomycin therapeutic monitoring goal: 10-15 mg/L  Pharmacy will check vancomycin levels as appropriate in 1-3 Days.    Serum creatinine levels will be ordered daily for the first week of therapy and at least twice weekly for subsequent weeks.      Edvin Rosario, PharmD, BCPS

## 2024-05-30 NOTE — CONSULTS
[facility] ID Service: Initial Consultation     Patient:  Myla Glynn, Date of birth 1945, Medical record number [id number]  Date of Visit:  May 30, 2024  Consult Requested by: Sophia FREEMAN MD         Assessment and Recommendations:     Myla Glynn is a 79 year old female with a past medical history of sigmoid colon adenocarcinoma complicated by advancement into the bladder and underwent   partial cystectomy  which was followed by recurrence of malignant neoplasm of colon and bladder s/p partial cystectomy with small bowel resection and anastomosis. She later developed enterovesical fistula and is currently S/P bilateral nephrostomy tube placement which required extensive admission 2/1 - 4/13; hospital stay complicated by sepsis, ALFREDA, pleural effusion, known retroperitoneal hematoma s/p multiple unsuccessful drain upsizing/aspirations. She is readmitted for failure to thrive May 29, 2024.    CT urogram showed Anterior bladder wall defect measuring 1.8 cm with an associated air and fluid containing collection measuring 2.0 x 10.2 x 1.8 cm in the low anterior pelvis. The collection extends right superolaterally to a small bowel anastomotic staple line in the right lower quadrant/right anterior pelvis without clear fistulization with the small bowel lumen, though evaluation is limited by lack of intravenous contrast and incomplete contrast filling of the collection.    We would need to evaluate if there is an enteric fistula so we can plan for the therapy. We might need a more advanced imaging to check for this. Piptazo would cover enteric pathogens so this would be appropriate for her at this point.     ID PROBLEM LIST:  Anterior bladder wall defect with low anterior pelvis fluid collection extending to small bowel anastomotic staple, rule out enteric fistula   Infected organized left retroperitoneal hematoma  s/p IR coil embolization and drain placement 3/9/2024 followed by  multiple drain upsized/exchange and antibiotics until 4/15/2024 (resolved)  Candida albicans bloodstream infection - line-related s/p 2 weeks of antifungal therapy ending 3/29/2024  Sigmoid colon adenocarcinoma- s/p partial cystectomy with small bowel resection and anastomosis Jan 9, 2024    RECOMMENDATIONS:  Stop vancomycin, Meropenem (ordered)  Start IV Piperacillin tazobactam 3.375mg every 6 hours (ordered).  Consider MR enterography    Thank you for this consult. ID will continue to follow. Don't hesitate to call with questions.     Patient was discussed with the primary team today.     Note prepared by ELIEZER Venegas    90 MINUTES SPENT BY ME on the date of service doing chart review, history, exam, documentation & further activities per the note.    EDMUNDO BANDA MD  Date of Service (when I saw the patient): 05/30/24        History of Present Illness:     Myla Glynn is a 79 y.o. female with PMH of sigmoid colon adenocarcinoma which was diagnosed in 2020 and was complicated by advancement into the bladder and she underwent  partial cystectomy which was followed by recurrence of malignant neoplasm of colon and bladder s/p partial cystectomy with small bowel resection and anastomosis. For this she was hospitalized from 1/9/2024-1/20/24.      At her follow up visit on 2/1/24 she had complains of pain and cystogram demonstrated enterovesical fistula with connection between the repaired portion of the bladder to segment of small bowel. She underwent CT-guided drain placement in the abscess on 2/2/24 and cultures grew enteric jonathan and Candida albicans for which she was treated with piperacillin-tazobactam until 2/14/24 and caspofungin until 2/13/24.      On 2/7/24 she underwent urinary diversion with bilateral nephrostomy tube insertion and her hospital course was complicated by a left L1 lumbar artery bleed for which she had urgent IR embolization on the same day. She was treated with  piperacillin-tazobactam  and caspofungin till 2/14/24.      On 3/7/24 cystogram showed persistent but improving bladder leak . Her course was further complicated by a large left retroperitoneal bleed which became a infected hematoma and she had drain insertion into hematoma on 3/9/24. Hematoma culture grew for Proteus hauseri/vulgaris treated with ceftriaxone.   On 03/19/2024 she underwent bilateral nephrostomy tube and bilateral ureteral stent removal and the next day blood cultures positive for Candida. She then had a large pseudoaneurysm with associated AV fistula arising from an inferior branch of the right renal artery for which she underwent urgent IR embolization on 3/22/24. She later had bilateral indwelling ureteral stent placement on 03/24/2024 for suspected clot obstruction on the right in the setting of compromised renal function on the left from chronic compression from a left retroperitoneal hematoma.      On 3/29/24 TTE revealed EF of 28% with global LV dysfunction and dilated IVC. A  repeat abdominal imaging was done on 04/05 which demonstrated the persistence of infected left retroperitoneal hematoma without a decrease in size despite a well-positioned drain. Sonogram performed on 4/11/24 showed that the catheter was poorly controlling the hematoma and  was upsized to a 16 Fr catheter with 20ml of hematoma contents aspirated. She was continued on ceftriaxone from 03/10/2024 with one additional week of antimicrobial therapy with either Ceftriaxone IV or co-amoxiclav PO via NG tube with stop date on 04/15/2023. She was discharged on 4/12/24 and a follow up sinogram was scheduled on 4/22/24 which demonstrated occlusion of the distal drain.      She then presented to [facility] on 5/29/24 with reports feeling unwell for the last 5 days. She had poor appetite and reports decreased fluid intake over the last 2 days. She had dysuria for the past 3 days. No fever/chills/rigor. She endorsed worsening of  weakness.She  had significant weight loss over the past several months. She denied any abdominal pain, vomiting. She reported feeling nauseated when she takes food orally. She also reported pain at her L MARIE drain site which got accidentally pulled out during transfer.        Past Medical History:     Past Medical History:   Diagnosis Date    Anemia     Bladder mass     Controlled type 2 diabetes mellitus without complication, without long-term current use of insulin (H)     Gastroesophageal reflux disease     HTN (hypertension)     Ileostomy status (H)     Malignant neoplasm of sigmoid colon (H)     Renal insufficiency      Past Surgical History:   Procedure Laterality Date    COLONOSCOPY      CYSTECTOMY BLADDER RADICAL, ILEAL DIVERSION, COMBINED N/A 1/5/2021    Procedure: Partial Cystectomy, Cystourethroscopy,;  Surgeon: Angel Newsome MD;  [facility]    HYSTERECTOMY TOTAL ABDOMINAL  1/5/2021    Procedure: Hysterectomy total abdominal;  Surgeon: Jimy Jackson MD;  [facility]    LAPAROSCOPY OPERATIVE ADULT  1/5/2021    Procedure: Laparoscopy Operative Adult, takedown of splenic flexure, appendectomy;  Surgeon: Remi Moscoso MD;  [facility]    SALPINGO-OOPHORECTOMY BILATERAL  1/5/2021    Procedure: Salpingo-oophorectomy bilateral;  Surgeon: Jimy Jackson MD;  [facility]    SIGMOIDOSCOPY FLEXIBLE N/A 1/5/2021    Procedure: SIGMOIDOSCOPY, FLEXIBLE;  Surgeon: Remi Moscoso MD;  [facility]    TAKEDOWN ILEOSTOMY N/A 5/4/2021    Procedure: ILEOSTOMY CLOSURE, LYSIS OF ADHESION;  Surgeon: Remi Moscoso MD;  [facility]         Allergies:    No Known Allergies         Current Antimicrobials:   IV Meropenem 500mg once a day(5/30/24-  IV Vancomycin 1000mg once a day(5/30/24-  IV ceftriaxone 1g once a day(5/30/24-         Physical Exam:   Ranges forvital signs:  Temp:  [97.8  F (36.6  C)-98.1  F (36.7  C)] 97.8  F (36.6   C)  Pulse:  [61-69] 63  Resp:  [16-18] 18  BP: ()/(40-53) 97/52  SpO2:  [100 %] 100 %  GENERAL: cachectic, sitting in bed in no acute distress.   LUNGS:  Clear to auscultation.  CARDIOVASCULAR:  Regular rate and rhythm with no murmurs, gallops or rubs.  ABDOMEN:  Normal bowel sounds, soft, nontender, MARIE drain in left side  EXT: Extremities warm and without edema.  SKIN:  No acute rashes.  Line is in place without any surrounding erythema.  NEUROLOGIC:  Grossly nonfocal.         Laboratory Data:   Reviewed.  Pertinent for:  3/29/24([facility])- RSV    Creatinine   Date Value Ref Range Status   05/30/2024 0.63 0.51 - 0.95 mg/dL Final   06/07/2021 1.30 (H) 0.52 - 1.04 mg/dL Final      Culture data:  5/10/24 Retroperitoneal Fluid ([facility])- 1+ Acinetobacter ursingii and 1+Enterococcus faecium  5/29/30 urine culture - mixed urogenital jonathan  5/29/30 blood culture - no growth after 12 hours  2/2/24 drain from abscess- polymicrobial enteric jonathan and Candida albicans.         Imaging:    CT CYSTOGRAM W AND WO CONTRAST 5/30 (PRELIMINARY)  IMPRESSION:   Postoperative changes of the pelvis with crescentic fluid collection  anterior to the bladder with faint filling on postcontrast images.  This may represent irregular bladder contour related to prior surgery  versus contained bladder leak.     CT ABDOMEN PELVIS W/O CONTRAST 5/29/24  IMPRESSION:   1.  Percutaneous drainage catheter terminates in the left posterior pararenal fat. Mild focal soft tissue thickening and small amount of gas at this site. No significant residual drainable fluid collection.   2.  Extensive postoperative changes of the pelvis including urinary bladder which has an abnormal lobulated morphology and slightly irregular wall. The bladder mass reported on 6/30/2023 is not well seen separately.   3.  Small crescent of fluid in the anterior extra peritoneal space of the pelvis along the anterior aspect of the bladder may be contiguous with the bladder lumen.  Correlation with patient's surgical history recommended.   4.  Embolization material is present in the right renal hilum.   5.  Multiple bowel anastomoses in the pelvis. No bowel obstruction or focal inflammation.  6.  Small collection of hypodense left pleural fluid with thin hyperdense rim is new from the available prior study of 6/30/2023. This may be a chronic hemothorax. Empyema not excluded. Clinical correlation and comparison with any other available prior   studies recommended.

## 2024-05-30 NOTE — CONSULTS
"Palliative Care Consultation Note  [facility]      Patient: Myla Glynn  Date of Admission:  5/29/2024    Requesting Clinician / Team: Madyson Cortes MD   Reason for consult: Goals of care  Decisional support  Patient and family support    \"78 yo F w/ very complicated urologic history related to CRC w/ complications, admitted with weakness and FTT, help with GOC planning, code status discussion w/ family\"     Recommendations & Counseling     GOALS OF CARE:   {PALLGOC:969965}    ADVANCE CARE PLANNING:  {ACP HCD:419296}  {ACP POLST:391295}  Code status: Full Code    MEDICAL MANAGEMENT:   {Palliative symptom packages:988239}    PSYCHOSOCIAL/SPIRITUAL SUPPORT:  {pallsocialsupportpack:929403}    Palliative Care will continue to follow***. Thank you for the consult and allowing us to aid in the care of Myla Glynn.    These recommendations have been discussed with ***.    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      Today, the patient was seen for:  ***    History of Present Illness   Met with ***.   {PALLSUMMARY:931105}    Prognosis, Goals, & Planning:   Functional Status just prior to this current hospitalization:  {Palliative Functional Status " Prior:077899}    Prognosis, Goals, and/or Advance Care Planning:  {Palliative Goals Data new:366719}    Code Status was addressed today:   {Palliative Code Status:102145}    {Patient's decision making preferences:492463}        Patient has decision-making capacity today for complex decisions: { :6458007}          Coping, Meaning, & Spirituality:   {Mood, coping, and/or meaning in the context of serious illness were addressed today:497227}    Social:   {Palliative Social V2:467726}    Medications:  Reviewed this patient's medication profile and medications from this hospitalization. Notable medications:***   [place] Board of Pharmacy Data Base Reviewed: { PALL REVIEW:247330}    ROS:  Comprehensive ROS is reviewed and is negative except as here & per HPI:     Physical Exam   Vital Signs with Ranges  Temp:  [97.8  F (36.6  C)-98.1  F (36.7  C)] 97.8  F (36.6  C)  Pulse:  [61-69] 63  Resp:  [16-18] 18  BP: ()/(40-53) 97/52  SpO2:  [100 %] 100 %  Wt Readings from Last 10 Encounters:   12/13/21 59.2 kg (130 lb 8 oz)   09/13/21 56.1 kg (123 lb 11.2 oz)   06/14/21 50.6 kg (111 lb 9.6 oz)   06/07/21 50.1 kg (110 lb 8 oz)   05/20/21 50.4 kg (111 lb 3.2 oz)   05/07/21 52.6 kg (115 lb 14.4 oz)   04/20/21 49.4 kg (108 lb 12.8 oz)   04/05/21 49.8 kg (109 lb 11.2 oz)   02/15/21 47.8 kg (105 lb 6.1 oz)   02/15/21 47.8 kg (105 lb 6.4 oz)     0 lbs 0 oz    PHYSICAL EXAM:  {:308773}    Data reviewed:  {What labs and imaging do you want to display?:187551}    Medical Decision Making   { TIP   MDM Calculator    MDM grid (w/ times)    Coding Support Chat  Billing is now based on time OR medical decision making complexity. Medical decision making included in the A&P does NOT need to be re-documented. Documented time is for the billing provider only (not including resident/fellow time).    :05103}    {Medical Decision Making (OPTIONAL)   :650119}

## 2024-05-30 NOTE — MEDICATION SCRIBE - ADMISSION MEDICATION HISTORY
Medication Scribe Admission Medication History    Admission medication history is complete. The information provided in this note is only as accurate as the sources available at the time of the update.    Information Source(s): Family member via in-person    Pertinent Information: Per pt daughter, pt is taking medications on PTA medication list as directed.     Changes made to PTA medication list:  Added: Carvedilol 12.5 mg tablet, Olanzapine 5 mg tablet,  Ondansetron 4 mg ODT tab, Prochlorperazine 5 mg tablet, Rosuvastatin 5 mg tablet.    Deleted: Bisacodyl 5 mg EC tablet, Mirtazapine 15 mg ODT, Omeprazole 20 mg DR capsule, Polyethylene glycol 236 g suspension, Tramadol 50 mg tablet.   Changed: None    Allergies reviewed with patient and updates made in EHR: yes    Medication History Completed By: Kerline Santiago 5/30/2024 3:54 AM    PTA Med List   Medication Sig Last Dose    acetaminophen (TYLENOL) 500 MG tablet Take 2 tablets (1,000 mg) by mouth 3 times daily as needed for mild pain Unknown    carvedilol (COREG) 12.5 MG tablet Take 12.5 mg by mouth daily 5/29/2024 at am    OLANZapine (ZYPREXA) 5 MG tablet Take 5 mg by mouth daily 5/29/2024 at am    ondansetron (ZOFRAN ODT) 4 MG ODT tab Take 4 mg by mouth as needed     prochlorperazine (COMPAZINE) 5 MG tablet Take 5 mg by mouth as needed     rosuvastatin (CRESTOR) 5 MG tablet Take 5 mg by mouth at bedtime 5/28/2024 at hs

## 2024-05-30 NOTE — PROGRESS NOTES
"Neuro: A&Ox4.   Cardiac: SR. VSS.   Respiratory: Sating 100 on RA.  GI/: Pt has alejandra catheter in place. Draining well. Cloudy urine output. 250mL.  Diet/appetite: Pt NPO. Does not want to eat or drink anything as she thinks she will vomit.   Activity:  Pt has been lying in bed throughout writers shift.  Pain: Pt has 5/10 pain but denies wanting to take any pain medication.    Skin: No new deficits noted.  LDA's: LPIV infusing 5% dextrose @100mL/hr.     Pt trying to sleep throughout shift but states \"I'm unable to sleep at all\".     Plan: Continue with POC. Notify primary team with changes.  "

## 2024-05-30 NOTE — ED TRIAGE NOTES
BIBA from complaining of generalized weakness. Has had poor appetite the last three days with poor intake per family. Needs [facility] .      Triage Assessment (Adult)       Row Name 05/29/24 1952          Triage Assessment    Airway WDL WDL        Respiratory WDL    Respiratory WDL WDL        Skin Circulation/Temperature WDL    Skin Circulation/Temperature WDL WDL        Cardiac WDL    Cardiac WDL WDL     Cardiac Rhythm NSR        Cognitive/Neuro/Behavioral WDL    Cognitive/Neuro/Behavioral WDL WDL

## 2024-05-31 NOTE — PLAN OF CARE
"/49 (BP Location: Right arm)   Pulse 62   Temp 97.8  F (36.6  C) (Oral)   Resp 18   Ht 1.499 m (4' 11\")   SpO2 100%   BMI 26.36 kg/m     Time: 8241-3068  Patient is alert and oriented, slept well most of the night, NPO, Cameron, adequate urine output,  denied pain or discomfort, PIV, dextrose 5% and NaCl infusing at the rate of 100 ml/hr, VS stable.     "

## 2024-05-31 NOTE — PROGRESS NOTES
Pt up with assist of 1, ambulates to bathroom.     VSS. Denies pain.     INC of bowels x2 requiring full linen change. BM consistency liquid.     Cameron in place with adequate output. Urine yellow with sediment. Cath cares completed.     Pt has poor nutritional intake, feeding tube placed at bedside by flyer. X-ray obtained for placement. [place] 86cm. Awaiting patient care order for okay to use feeding tube. Daughter at bedside, ordered ensure for pt, able to tolerate. Tube feeds to start 10mL/hr. after patient care order placed.     Pt refused AM meds initially, daughter arrived and patient agreeable to take meds    Right PIV saline locked. Left PIV discontinued due to infiltration.     Denies chest pain, SOB, lightheadness, dizziness, nausea.     Continue with plan of care.

## 2024-05-31 NOTE — PROGRESS NOTES
CLINICAL NUTRITION SERVICES - ASSESSMENT NOTE     Nutrition Prescription    RECOMMENDATIONS FOR MDs/PROVIDERS TO ORDER:  Diet adv v nutrition support within 2-3 days.  Optimize antiemetics  Please obtain updated weight as able  Malnutrition Status:    Severe malnutrition in the context of acute illness/disease    Recommendations already ordered by Registered Dietitian (RD):  MVI + minerals daily    For EN,  EN access: awaiting access  Goal TF: TwoCal HN @ 35 ml/hr. Provides 840 ml, 1680 kcals, 71 g protein, 184 g CHO, 4 g fiber, 588 ml free water.   Initiate @ 10 ml/hr and advance by 10 ml Q8H pending pt's tolerance.  Do not advance TF rate unless Mg++ > 1.5, K+ is > 3, and phos > 1.9  Recommend 60 ml Q4H fluid flushes for tube patency. Additional fluids and/or adjustments per MD.    Ordered multivitamin/minerals to help ensure micronutrient needs being met with suspected hypermetabolic demands and potential interruptions to TF infusions.    Additional 100 mg Thiamine x 5-7 days to prevent lyte depletion if at risk for refeeding syndrome   If gastric enteral access: HOB > 30 degrees       Future/Additional Recommendations:  Ensure Clear with meals once diet progressed  Outside food with protein sources provided by family to promote adequate intake  Additional supplements/snacks PRN  Encourage small, frequent PO attempts  Once pt tolerating continuous feeds, could consider cycling if tolerating some PO intake and adjust recs based on PO adequacy  If renal fxn declining or lytes become elevated, rec Novasource renal @ 35ml/hr.     REASON FOR ASSESSMENT  Myla Glynn is a/an 79 year old female assessed by the dietitian for Provider Order - malnutrition, FTT  Update: New consult placed for Registered Dietitian to Assess and Order TF per Medical Nutrition Therapy Recommendations  Patient admitted for  worsening generalized weakness and fatigue found to have new ALFREDA.     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 Had difficulties with patient being able to hear phone  so visit cut short. Called pt's daughter on the phone who was able to provide some nutrition hx for pt. Per daughter pt had had poor appetite/intake for awhile but it has gotten worse over the last ~ week. Pt had some N/V on Sunday per daughter's report and pt has been afraid to eat since that. Daughter reports occasional difficulties with diarrhea/constipation. Pt typically prefers softer food per daughter. Pt has been drinking some Ensure clear and eating some softer foods provided by family. Per daughter, family will plan to bring in some food for patient once her diet is progressed. Discussed importance of protein sources. Daughter endorses wt loss but is unsure of amount and is unsure what pt has been weighing recently.   Update: Per primary, family would like to start enteral feeds to help pt meet nutrition goals. Will updated with regimen recs. Per notes from RD at [facility], pt has previously tolerated Nutren 2.0.  CURRENT NUTRITION ORDERS  Diet: NPO    Intake/Tolerance: No documented intakes to assess.    LABS  Cl 113  BUN 49  AST 53  Glu 151  Hgb 10.5  Hematocrit 33.1    MEDICATIONS  Abx  D5 and NS @ 100ml/hr    ANTHROPOMETRICS  Height:  "149.9 cm (4' 11\")  Most Recent Weight:      IBW: 44.5 kg  Body mass index is 26.36 kg/m . BMI Category: Overweight BMI 25-29.9  Weight History: need updated weight  Wt Readings from Last 20 Encounters:   12/13/21 59.2 kg (130 lb 8 oz)   09/13/21 56.1 kg (123 lb 11.2 oz)   06/14/21 50.6 kg (111 lb 9.6 oz)   06/07/21 50.1 kg (110 lb 8 oz)   05/20/21 50.4 kg (111 lb 3.2 oz)   05/07/21 52.6 kg (115 lb 14.4 oz)   04/20/21 49.4 kg (108 lb 12.8 oz)   04/05/21 49.8 kg (109 lb 11.2 oz)   02/15/21 47.8 kg (105 lb 6.1 oz)   02/15/21 47.8 kg (105 lb 6.4 oz)   01/22/21 49.4 kg (108 lb 12.8 oz)   01/10/21 56.5 kg (124 lb 8 oz)   12/29/20 54 kg (119 lb)   Per care everywhere,  58.2 kg on 4/11/24  68.7 kg on 1/18/24  67.9 kg on 12/15/23  67.6 kg on 9/29/23  70 kg on 6/20/23    ASSESSED NUTRITION NEEDS  Dosing Weight: 58.2 kg ( most recent available weight )   Estimated Energy Needs: 3346-0597 kcals/day (25 - 35 kcal/kg)  Justification: Maintenance  Estimated Protein Needs: 70-87 grams protein/day (1.2 - 1.5 grams of pro/kg)  Justification: Increased needs  Estimated Fluid Needs: (1 mL/kcal)   Justification: Maintenance    PHYSICAL FINDINGS  See malnutrition section below.    Mateusz Score: 16  Per EMR:      MALNUTRITION  % Intake: </= 50% for >/= 5 days (severe)  % Weight Loss: Unable to assess   Subcutaneous Fat Loss: Facial region:  mild and Upper arm:  mild   Muscle Loss: Temporal:  mild and Thoracic region (clavicle, acromium bone, deltoid, trapezius, pectoral):  moderate  Fluid Accumulation/Edema: None noted  Malnutrition Diagnosis: Severe malnutrition in the context of acute illness/disease    NUTRITION DIAGNOSIS  Inadequate oral intake related to N/V/poor appetite as evidenced by family report.      INTERVENTIONS  Implementation  Nutrition Education: will be provided if nutrition education needs arise.  and Nutrition Education: Introduced role of RD   Medical food supplement therapy  Multivitamin/mineral supplement therapy "     Goals  Diet adv v nutrition support within 2-3 days.        Monitoring/Evaluation  Progress toward goals will be monitored and evaluated per protocol.  Xenia Koenig MS, RD, LD, [facility]    6C (beds 0841-6300) + 7C (beds 4093-9605) + ED   Available in Vocera by name or unit dietitian  No longer available via pager

## 2024-05-31 NOTE — PROGRESS NOTES
"Urology  Progress Note    THE AUTHOR OF THIS NOTE MAY NOT BE ON CALL; TO AVOID DELAYS IN PATIENT CARE PLEASE USE AMCOM TO FIND APPROPRIATE ON CALL PERSON TO PAGE    24 hour events/Subjective:     - left retroperitoneal drain pulled out during transfer to MRI yest pm   - CT cystogram reveals well walled off collection adjacent to bladder   - Pain well controlled on current regimen    - Remains NPO ; no nausea or vomiting   - Passing flatus, no bowel movement   - Not yet ambulating.      Exam  /49 (BP Location: Right arm)   Pulse 62   Temp 97.8  F (36.6  C) (Oral)   Resp 18   Ht 1.499 m (4' 11\")   SpO2 100%   BMI 26.36 kg/m    Tired appearing, cachectic  Unlabored breathing on RA  Abdomen soft, appropriately tender, nondistended  alejandra draining clear yellow urine      Intake/Output Summary (Last 24 hours) at 5/31/2024 0658  Last data filed at 5/31/2024 0600  Gross per 24 hour   Intake 705 ml   Output 1575 ml   Net -870 ml       UOP 1575/-    Labs  Recent Labs   Lab Test 05/30/24  0827 05/29/24 2013 12/13/21  1341 11/17/21  1345 09/13/21  1351   WBC  --  6.5 6.3  --  5.9   HGB  --  10.5* 12.3  --  11.7   CR 0.63 4.48* 1.21*   < > 1.26*    < > = values in this interval not displayed.      AM labs pending    7-Day Micro Results       Collected Updated Procedure Result Status      05/29/2024 2328 05/31/2024 0001 Blood Culture Peripheral Blood [[id number]]   Peripheral Blood    Preliminary result Component Value   Culture No growth after 1 day  [P]                05/29/2024 2227 05/30/2024 2302 Blood Culture Peripheral Blood [[id number]]   Peripheral Blood    Preliminary result Component Value   Culture No growth after 1 day  [P]                05/29/2024 2103 05/30/2024 1310 Urine Culture [[id number]]    Urine, Midstream    Final result Component Value   Culture >100,000 CFU/mL Mixture of Urogenital Madiha                     Assessment/Plan  Myla Glynn is a 79 year old female with PMH very " complex post op course for partial cystectomy for locally invasive colorectal cancer. Post op course notable for persistent urine leak, lumbar artery injury during PNT placement requiring embolization, hematoma formation s/p PNT removal requiring drain placement. Admitted for failure to thrive and weakness. Patient is likely very dehydrated and malnourished given poor PO intake at home and would benefit from optimization. Would benefit from alejandra placement while cystogram is being performed to decompress bladder and evaluate for extent of leak given no access to prior imaging.       Plan:       - agree with fluid resuscitation     - alejandra to remain in place     - CT cystogram reveals walled off pocket around urine leak, no indication for intervention at this time given her overall picture and nontoxic presentation. Should she decompensate may consider drain placement into this collection  - antibiosis per primary, tailor to culture if initiated  - palliative care consult     - Urology will sign off. Please contact resident/PA on call with any questions or concerns.       Seen and examined with the chief resident. Will discuss with Dr Francis EATON.    Ryan Khan MD, PGY-2  Urology Resident      PTA medications:   Prior to Admission medications    Medication Sig Start Date End Date Taking? Authorizing Provider   acetaminophen (TYLENOL) 500 MG tablet Take 2 tablets (1,000 mg) by mouth 3 times daily as needed for mild pain 5/10/21  Yes Miri Keating MD   carvedilol (COREG) 12.5 MG tablet Take 12.5 mg by mouth daily 4/12/24  Yes Reported, Patient   OLANZapine (ZYPREXA) 5 MG tablet Take 5 mg by mouth daily 4/12/24  Yes Reported, Patient   ondansetron (ZOFRAN ODT) 4 MG ODT tab Take 4 mg by mouth as needed 4/12/24  Yes Reported, Patient   prochlorperazine (COMPAZINE) 5 MG tablet Take 5 mg by mouth as needed 4/12/24  Yes Reported, Patient   rosuvastatin (CRESTOR) 5 MG tablet Take 5 mg by mouth at bedtime 4/12/24  Yes  "Reported, Patient          Contacting the urology team: Please see Amcom and page on-call clinician with any questions or concerns regarding this patient. Note writer may be unavailable.     To access [employer] from intranet: under \"Applications\" --> \"Business Applications\" select \"[email or web address]\" and search \"Urology Adult & Pediatric/[facility].\" Please note that any question about a urology inpatient, [place] or [place], should go to job code [id number].     "

## 2024-05-31 NOTE — PLAN OF CARE
Goal Outcome Evaluation:  Care from 7 pm to 11 pm  A&Ox4. AVSS.Denied pain. NPO. Incontinent of BM. Cameron with 150 ml cloudy urine OP for the last 4 hrs. Turn Q 2 hrs. D5% Ns is infusing at 100ml/hr. Daughter called to ask how pt is doing.Continue with POC

## 2024-05-31 NOTE — PHARMACY
Pharmacy Tube Feeding Consult    Medication reviewed for administration by feeding tube and for potential food/drug interactions.    Recommendation: No changes are needed at this time.     Pharmacy will continue to follow as new medications are ordered.    Zandra Ng, PharmD, [facility], BCPS

## 2024-05-31 NOTE — PROGRESS NOTES
Bridle Placement:   Reason for bridle placement: securement of nasogastric feeding tube   Medicine delivered during procedure: lidocaine gel   Procedure: Successful   Location of top of clip on FT: @ 88cm marker   Condition of nose/skin at time of bridle placement: Unremarkable  Face to Face time with patient: < 5 minutes.  Small Bowel Feeding Tube Placement Assessment  Reason for Feeding Tube Placement: post pyloric enteral access for nutrition and medication administration  Cortrak Start Time: 1255  Cortrak End Time: 1315  Medicine Delivered During Procedure: 2% viscous lidocaine gel  and metoclopramide (reglan) injection  Placement Successful: yes per Cortrak tracing pending AXR confirmation      Procedure Complications: none  Final Placement Alexandru at exit of nare:  86cm  Face to Face time with patient: 20 minutes.  Benito Contreras RN on 5/31/2024 at 1:16 PM

## 2024-05-31 NOTE — PROGRESS NOTES
[facility]    Medicine Progress Note - Hospitalist Service, GOLD TEAM 8    Date of Admission:  5/29/2024    Assessment & Plan   Myla Glynn is a 79 year old female admitted on 5/29/2024.  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     Updates:  - D/w IR, given imaging findings indicating resolved fluid collection, no need for MARIE drain replacement for RP hematoma  - Appreciate ID input on antibiotic management  - D/w family, given poor PO intake preceding hospitalization, will begin trial of enteric nutrition and monitor response    #UTI  # Persistent bladder leak s/p urinary diversion with bilateral nephrostomy tubes (now removed)  # Hx of Right renal segmental artery bleed with subsequent clot formation in the renal collecting ducts, ureter, and urinary bladder s/p IR embolization  # Recurrent colorectal cancer with local recurrence within the bladder s/p partial cystectomy with small bowel resection and anastomosis (01/09/2024)  Patient presenting with weakness. UA appears infectious with elevated WBC and + LE. Will start ceftriaxone based on prior cultures  while awaiting UC. Alejandra in place for bladder decompression. Urology performed CT cystogram with findings showing: anterior bladder wall defect with associated air and fluid collection in low anterior pelvis. Per urology, this collection appears walled off so they have no plans for decompression or drainage at this time.  -  Bcx with NGTD, urine culture with >100k urogenital jonathan  - Urology recommending IR drainage of fluid collection if signs of hemodynamic instability, but pt currently stable  - Continue Zosyn for empiric antibiosis  - ID following, appreciate input   - MR enterography could be considered to evaluate for persistent fistula    #ALFREDA   Pt presented with acute kidney injury (Cr 4.5, baseline 1.2-1.5). Suspect pre-renal in the setting of poor PO intake, but obstruction likely contributed as well (alejandra placed for bladder decompression upon arrival to ED). Has had good urine output since admission. Cr improved to baseline after HD#1.   - Continue alejandra catheter for bladder decompression  - Continue IVF until patient taking more consistent PO  - Trend BMP daily  - Avoiding nephrotoxins and renally dosing medications  - Strict I/Os    #Infected organized left retroperitoneal hematoma with Proteus hauseri/vulgaris s/p IR coil embolization and drain placement 3/9/2024 followed by multiple drain upsized/exchange and antibiotics until 4/15/2024   Developed during prolonged hospitalization at [facility]. IR placed MARIE drain with multiple attempts at upsizing to drain all of the residual fluid collections. Completed several courses of antibiotics for treatment of infected hematoma, last finished Augmentin on 4/15/24. Last seen by ID as outpatient on 5/10, with plans to monitor off abx therapy. Here, CT AP on admission showed minimal residual fluid collection. She had the MARIE drain in place, but this was removed accidentally upon transfer to CT on 5/30.  - D/w IR who reviewed imaging, minimal residual fluid collection  with minimal drainage from MARIE drain prior to admission suggests there is not likely benefit to replacing, d/w family  - No signs of ongoing infection in hematoma    #Malnutrition  #FTT  Poor PO intake, weight loss, deconditioning for the past several weeks PTA. Suspect infection may be contributing, but likely also represents prolonged hospitalization and underlying CRC.  - Nutrition consulted  - Gentle IVF  - Discussed with pt and family, who are agreeable to trial of enteral feeding. Will place NJ for access and begin tube feeds, appreciate dietician assistance with this    #Pleural Effusion   #Atelectasis   CT with L pleural effusion. Patient denies respiratory symptoms and is breathing on room air.   - CTM      #HFrEF  #HTN   BP soft on admission, now improved s/p 2L fluid resuscitation. Per discharge summary from 4/2024 patient was scheduled to follow up with cardiology on 7/24. Recent ECHO on 4/4/24 with EF 29% .   - Holding Carvedilol 12.5mg BID, anticipate resuming at lower dose on 6/1  - BNP 1514, no comparisons available     #Chronic Normocytic Anemia   Baseline between 10-11, on admission Hgb 10.5. Likely 2/2 anemia of chronic disease and suspect 2/2 nutritional deficency.   - Trending CBC     #T2DM, diet controlled  - Hypoglycemic protocol      #Nausea   EKG with Qtc 462.   - PTA Zofran   - PTA Compazine   - Added Remeron for appetite stimulation on 5/31     #Hyponatremia   Suspect 2/2 malnourishment. Does not appear volume overloaded.   - Trend BMP    #Deconditioning   -PT/OT consults        Diet: NPO for Medical/Clinical Reasons Except for: Meds    DVT Prophylaxis: Pneumatic Compression Devices  Cameron Catheter: PRESENT, indication: Gross hematuria  Lines: None     Cardiac Monitoring: None  Code Status: Full Code      Clinically Significant Risk Factors           # Hypercalcemia: corrected calcium is >10.1, will monitor as appropriate   # Anion Gap Metabolic Acidosis: Highest Anion Gap = 20 mmol/L in  last 2 days, will monitor and treat as appropriate  # Hypoalbuminemia: Lowest albumin = 2 g/dL at 5/30/2024  8:27 AM, will monitor as appropriate  # Coagulation Defect: INR = 1.23 (Ref range: 0.85 - 1.15) and/or PTT = N/A, will monitor for bleeding    # Hypertension: Noted on problem list                   Disposition Plan     Medically Ready for Discharge: Anticipated in 2-4 Days             Madyson Cortes MD  Hospitalist Service, GOLD TEAM 8  [facility]  Securely message with [employer] (more info)  Text page via [facility] Paging/Directory   See signed in provider for up to date coverage information  ______________________________________________________________________    Interval History   Doing OK today. Quite sleepy on my evaluation, but awoke to voice, denied any pain or discomfort. Otherwise was resting comfortably. Spoke to daughters on the phone, reviewed results from the past 24 hours including imaging results and Urology recommendations. They are concerned about her nutrition status and note that she is open to enteral feeding - they would like to start this and see if this helps her energy and fatigue improve. They are doubtful that treating infection alone, or giving her more time, will result in improved nutrition.    Physical Exam   Vital Signs: Temp: 97.8  F (36.6  C) Temp src: Oral BP: 113/49 Pulse: 62   Resp: 18 SpO2: 100 % O2 Device: None (Room air)    Weight: 0 lbs 0 oz    General Appearance: Frail elderly woman, sleeping but arouses to voice, alert  Respiratory: Breathing comfortably on room air, lungs CTAB  Cardiovascular: RRR, no m/r/g.  GI: soft, non-distended, non-tender to palpation. NO pain over MARIE site removal.  Skin: Pallor present, no skin rash  Other: Cameron draining clear urine with white sediment.     Medical Decision Making       50 MINUTES SPENT BY ME on the date of service doing chart review, history, exam, documentation & further  activities per the note.      Data     I have personally reviewed the following data over the past 24 hrs:    Procal: N/A CRP: N/A Lactic Acid: N/A         Imaging results reviewed over the past 24 hrs:   No results found for this or any previous visit (from the past 24 hour(s)).

## 2024-05-31 NOTE — CONSULTS
Interventional Radiology Consult Service Note    IR consulted for possible left RP drain replacement.    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 Pt with left RP drain in place from OSH draining infected hematoma since 3/9. Last sinogram at OSH 5/10 with ongoing residual cavity. Outputs unclear at that time. Primary team reports this drain was inadvertently pulled during transfer 5/30. IR is consulted for consideration of replacement.     Case and imaging was reviewed with Dr. Rodriguez from IR. Based on most recent imaging from 5/30, there is minimal residual collection remaining in the left RP around pigtail drain. Given the low outputs (as reported by patient prior to admission), IR would not recommend drain replacement. Recommend following clinically and re-imaging as needed. If new collection forms and clinical scenario warrants drainage, please re-consult IR.    Recommendations were reviewed with Dr. Cortes.    Vitals:   /50 (BP Location: Right arm, Patient Position: Semi-Vasquez's, Cuff Size: Adult Small)   Pulse 64   Temp 97.5  F (36.4  C) (Oral)   Resp 16   Ht 1.499  "m (4' 11\")   SpO2 100%   BMI 26.36 kg/m      Pertinent Labs:     Lab Results   Component Value Date    WBC 6.5 05/29/2024    WBC 6.3 12/13/2021    WBC 5.9 09/13/2021    WBC 5.9 06/07/2021    WBC 5.4 04/20/2021    WBC 7.1 02/15/2021       Lab Results   Component Value Date    HGB 10.5 05/29/2024    HGB 12.3 12/13/2021    HGB 11.7 09/13/2021    HGB 10.5 06/07/2021    HGB 8.6 05/09/2021    HGB 8.5 05/08/2021       Lab Results   Component Value Date     05/29/2024     12/13/2021     09/13/2021     06/07/2021     05/05/2021     04/20/2021       Lab Results   Component Value Date    INR 1.23 (H) 05/30/2024    INR 1.56 (H) 01/06/2021    PTT  05/30/2024      Comment:      Clotted. Disregard results.  This is a corrected result. Previous result was 20 Seconds on 5/30/2024 at  9:20 AM CDT       Lab Results   Component Value Date    POTASSIUM 3.9 05/30/2024    POTASSIUM 4.2 12/13/2021    POTASSIUM 3.9 06/07/2021        WILL Hlyton CNP  Interventional Radiology   Pager: [phone]   "

## 2024-05-31 NOTE — PROGRESS NOTES
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 She later developed enterovesical fistula and is currently S/P bilateral nephrostomy tube placement which required extensive admission 2/1 - 4/13; hospital stay complicated by sepsis, ALFREDA, pleural effusion, known retroperitoneal hematoma s/p multiple unsuccessful drain upsizing/aspirations. She is readmitted for failure to thrive May 29, 2024. CT urogram 5/30 showed Anterior bladder wall defect measuring 1.8 cm with an associated air and fluid containing collection.     I discussed  the case with [facility] who told me that as far as they know her bladder wall defect has resolved from before as she had cystoscopies in Mar and April and they did not note that. Since this might be new and can be infected or contaminated, would continue antibiotics until they see her in the clinic.     ID Problem List:  Anterior bladder wall defect with low anterior pelvis fluid collection extending to small bowel anastomotic staple  Persistent bladder leak s/p urinary diversion with bilateral nephrostomy tubes (removed)  History of Infected organized left retroperitoneal hematoma  s/p IR coil embolization and drain placement 3/9/2024 followed by multiple drain upsized/exchange and antibiotics until 4/15/2024 (resolved)  History of Candida albicans bloodstream infection - line-related s/p 2 weeks of antifungal therapy ending 3/29/2024  Sigmoid colon adenocarcinoma- s/p partial cystectomy with small  bowel resection and anastomosis Jan 9, 2024     Recommendations:  Continue IV piperacillin tazobactam 3.375g every 6 hours while inpatient. Switch to oral amox-clav 875-125mg if discharging. Continue until Jun 19, 2024 (Urology visit)    Since we have nothing to add, we will sign off. Don't hesitate to call with questions.     EDMUNDO BANDA MD  Date of Service (when I saw the patient): 05/31/24    MDM: >3 notes and tests reviewed, discussed with primary team, urology, and radiology. Complicated case.            Interval History:   Patient was reviewed bedside. She has had no acute events overnight. She has no complains of pain/fever/chills/rigor. No reports of abdominal pain/nausea/vomiting.      History of Presenting Illness:   Myla Glynn is a 79 y.o. female with PMH of sigmoid colon adenocarcinoma which was diagnosed in 2020 and was complicated by advancement into the bladder and she underwent  partial cystectomy which was followed by recurrence of malignant neoplasm of colon and bladder s/p partial cystectomy with small bowel resection and anastomosis. For this she was hospitalized from 1/9/2024-1/20/24.     At her follow up visit on 2/1/24 she had complains of pain and cystogram demonstrated enterovesical fistula with connection between the repaired portion of the bladder to segment of small bowel. She underwent CT-guided drain placement in the abscess on 2/2/24 and cultures grew enteric jonathan and Candida albicans for which she was treated with piperacillin-tazobactam until 2/14/24 and caspofungin until 2/13/24.     On 2/7/24 she underwent urinary diversion with bilateral nephrostomy tube insertion and her hospital course was complicated by a left L1 lumbar artery bleed for which she had urgent IR embolization on the same day. She was treated with piperacillin-tazobactam  and caspofungin till 2/14/24.     On 3/7/24 cystogram showed persistent but improving bladder leak . Her course was further  complicated by a large left retroperitoneal bleed which became a infected hematoma and she had drain insertion into hematoma on 3/9/24. Hematoma culture grew for Proteus hauseri/vulgaris treated with ceftriaxone.   On 03/19/2024 she underwent bilateral nephrostomy tube and bilateral ureteral stent removal and the next day blood cultures positive for Candida. She then had a large pseudoaneurysm with associated AV fistula arising from an inferior branch of the right renal artery for which she underwent urgent IR embolization on 3/22/24. She later had bilateral indwelling ureteral stent placement on 03/24/2024 for suspected clot obstruction on the right in the setting of compromised renal function on the left from chronic compression from a left retroperitoneal hematoma.     On 3/29/24 TTE revealed EF of 28% with global LV dysfunction and dilated IVC. A  repeat abdominal imaging was done on 04/05 which demonstrated the persistence of infected left retroperitoneal hematoma without a decrease in size despite a well-positioned drain. Sonogram performed on 4/11/24 showed that the catheter was poorly controlling the hematoma and  was upsized to a 16 Fr catheter with 20ml of hematoma contents aspirated. She was continued on ceftriaxone from 03/10/2024 with one additional week of antimicrobial therapy with either Ceftriaxone IV or co-amoxiclav PO via NG tube with stop date on 04/15/2023. She was discharged on 4/12/24 and a follow up sinogram was scheduled on 4/22/24 which demonstrated occlusion of the distal drain.     She then presented to [facility] on 5/29/24 with reports feeling unwell for the last 5 days. She had poor appetite and reports decreased fluid intake over the last 2 days. She had dysuria for the past 3 days. No fever/chills/rigor. She endorsed worsening of weakness.She  had significant weight loss over the past several months. She denied any abdominal pain, vomiting. She reported feeling nauseated when she takes  food orally. She also reported pain at her L MARIE drain site which got accidentally pulled out during transfer.          Current Antimicrobials   Current:  Piperacillin tazobactam 5/30/24-    Prior:  IV Meropenem 500mg once a day(5/30/24)  IV Vancomycin 1000mg once a day(5/30/24)  IV ceftriaxone 1g once a day(5/30/24-5/31/24)         Physical Exam:   Ranges for vital signs:  Temp:  [97.5  F (36.4  C)-98.2  F (36.8  C)] 97.5  F (36.4  C)  Pulse:  [62-64] 64  Resp:  [16-20] 16  BP: (108-120)/(48-52) 117/50  SpO2:  [100 %] 100 %    Intake/Output Summary (Last 24 hours) at 5/31/2024 1133  Last data filed at 5/31/2024 0700  Gross per 24 hour   Intake 1505 ml   Output 1150 ml   Net 355 ml     Exam:  GENERAL:  malnourished, lethargic looking, sitting in bed in no acute distress. .  LUNGS:  Clear to auscultation.  CARDIOVASCULAR:  Regular rate and rhythm with no murmurs, gallops or rubs.  ABDOMEN:  Normal bowel sounds, soft, nontender.  EXT: Extremities warm and without edema.  NEUROLOGIC:  Grossly nonfocal.         Laboratory Data:   Reviewed.  Pertinent for:  3/29/24(OSH)- RSV   Creatinine   Date Value Ref Range Status   05/30/2024 0.63 0.51 - 0.95 mg/dL Final   06/07/2021 1.30 (H) 0.52 - 1.04 mg/dL Final      CRP Inflammation   Date Value Ref Range Status   05/30/2024 160.00 (H) <5.00 mg/L Final      Culture data:  5/10/24 Retroperitoneal Fluid (OSH)- 1+ Acinetobacter ursingii and 1+Enterococcus faecium  5/29/30 urine culture - mixed urogenital jonathan  5/29/30 blood culture - no growth  2/2/24 drain from abscess- polymicrobial enteric jonathan and Candida albicans.         Imaging:    CT CYSTOGRAM WO AND W CONTRAST 5/30/24  IMPRESSION:    1. Anterior bladder wall defect measuring 1.8 cm with an associated air and fluid containing collection measuring 2.0 x 10.2 x 1.8 cm in the low anterior pelvis. The collection extends right superolaterally to a small bowel anastomotic staple line in the right lower quadrant/right anterior  pelvis without clear fistulization with the small bowel lumen, though evaluation is limited by lack of intravenous contrast and incomplete contrast filling of the collection.  2. Circumferential bladder wall thickening and irregularity, likely reflecting cystitis, though bladder malignancy is not excluded.      CT ABDOMEN PELVIS W/O CONTRAST 5/29/24  IMPRESSION:   1.  Percutaneous drainage catheter terminates in the left posterior pararenal fat. Mild focal soft tissue thickening and small amount of gas at this site. No significant residual drainable fluid collection.   2.  Extensive postoperative changes of the pelvis including urinary bladder which has an abnormal lobulated morphology and slightly irregular wall. The bladder mass reported on 6/30/2023 is not well seen separately.   3.  Small crescent of fluid in the anterior extra peritoneal space of the pelvis along the anterior aspect of the bladder may be contiguous with the bladder lumen. Correlation with patient's surgical history recommended.   4.  Embolization material is present in the right renal hilum.   5.  Multiple bowel anastomoses in the pelvis. No bowel obstruction or focal inflammation.  6.  Small collection of hypodense left pleural fluid with thin hyperdense rim is new from the available prior study of 6/30/2023. This may be a chronic hemothorax. Empyema not excluded. Clinical correlation and comparison with any other available prior   studies recommended.

## 2024-06-01 NOTE — PROGRESS NOTES
Admitted/transferred from: ED  2 RN full  skin assessment completed by Prachi Galeana, RN and Edvin Wright RN.  Skin assessment finding: Healed scar top of right foot. Healed abdominal midline incisional scar. PIV TKO. TF running via NG.   Interventions/actions: Will continue to monitor.

## 2024-06-01 NOTE — PLAN OF CARE
"Vital signs:  Temp: 97.9  F (36.6  C) Temp src: Oral BP: 118/56 Pulse: 69   Resp: 18 SpO2: 100 % O2 Device: None (Room air)   Height: 149.9 cm (4' 11\")      Patient arrived onto 7B at 0135 and was oriented to room and call light. Patient refused admission questions. Will pass onto next shift RN.     Activity: Repositioned in bed with assist of 2.   Neuros: A & Ox4. Neuro intact. Tigrinya speaking.  Cardiac: WDL. Asymptomatic.  Respiratory: LS diminished in bases. O2 sats high 90s on RA. Denies SOB. Unlabored.   GI/: BS+, passing flatus, incontinent of bowel x1. Cameron intact, Cameron cares completed, has adequate urine output.  Diet: TF running at 20 mL/hr via NG, will need to advance TF to 30 mL/hr at 0800 this AM. Regular diet, has poor intake, drank sips of water.  Skin: Healed scar top of right foot. Healed incisional scar midline abdominal.  Lines: Right PIV TKO. Zosyn infused per orders.  Labs: Reviewed.  at 0400. Notified provider regarding K+ 3.1, Mg 1.6, no new orders.  Pain/nausea: Denies pain. Denies nausea.   New changes this shift: Admit onto 7B.   Plan: Continue POC.     "

## 2024-06-01 NOTE — PLAN OF CARE
"Goal Outcome Evaluation:         Shift:1900-0130    V/S & Pain: VSS on RA. Pain managed with heat pads  Neuro: AOx3, calm and cooperative  Respiratory: Stable on RA, lung sounds clear bilaterally  Cardiac: WDL  Skin: No new skin concerns  GI/: VERONICA Cameron 5/31  Nutrition: Regular diet with poorappetite,denies N/V, BG 87?  L/D: R PIV,  NG 86cm    Events this shift: no acute events this shift. Pt remains vitally stable on RA. Call light within reach, calls appropriately         /49 (BP Location: Left arm, Patient Position: Semi-Vasquez's, Cuff Size: Adult Small)   Pulse 72   Temp 98.1  F (36.7  C) (Oral)   Resp 16   Ht 1.499 m (4' 11\")   SpO2 100%   BMI 26.36 kg/m        Report given to 7B      "

## 2024-06-01 NOTE — PROGRESS NOTES
[facility]    Medicine Progress Note - Hospitalist Service, GOLD TEAM 8    Date of Admission:  5/29/2024    Assessment & Plan   Myla Glynn is a 79 year old female admitted on 5/29/2024.  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     Updates:  - Continue Zosyn, plan to transition to Augmentin upon discharge and until follow-up with [facility]  - Pt and family would not like to transfer to [facility], would rather remain at [facility]   - Continue to titrate TFs  - PT/OT    #UTI  # Persistent bladder leak s/p urinary diversion with bilateral nephrostomy tubes (now removed)  # Hx of Right renal segmental artery bleed with subsequent clot formation in the renal collecting ducts, ureter, and urinary bladder s/p IR embolization  # Recurrent colorectal cancer with local recurrence within the bladder s/p partial cystectomy with small bowel resection and anastomosis (01/09/2024)  Patient presenting with weakness. UA appears infectious with elevated WBC and + LE. Will start ceftriaxone based on prior cultures while awaiting UC. Cameron in place for bladder decompression. Urology  performed CT cystogram with findings showing: anterior bladder wall defect with associated air and fluid collection in low anterior pelvis. Per urology, this collection appears walled off so they have no plans for decompression or drainage at this time.  -  Bcx with NGTD, urine culture with >100k urogenital jonathan  - Urology recommending IR drainage of fluid collection if signs of hemodynamic instability, but pt currently stable  - Continue Zosyn for empiric antibiosis   - Upon discharge, transition to Augmentin per ID  - ID following, appreciate input   - MR enterography could be considered to evaluate for persistent fistula, but unlikely to change current management   - D/w [facility] (Facundo) who feels this anterior bladder wall leak appears to be chronic in nature, not an acute change    - Pt and family would like to remain at [facility] rather than transfer to [place]    #ALFREDA   Pt presented with acute kidney injury (Cr 4.5, baseline 1.2-1.5). Suspect pre-renal in the setting of poor PO intake, but obstruction likely contributed as well (alejandra placed for bladder decompression upon arrival to ED). Has had good urine output since admission. Cr now improving, but still not returned to baseline  - Continue alejandra catheter for bladder decompression  - Continue IVF until patient taking more consistent PO, starting enteral access as below  - Trend BMP daily  - Avoiding nephrotoxins and renally dosing medications  - Strict I/Os    #Infected organized left retroperitoneal hematoma with Proteus hauseri/vulgaris s/p IR coil embolization and drain placement 3/9/2024 followed by multiple drain upsized/exchange and antibiotics until 4/15/2024   Developed during prolonged hospitalization at [place]. IR placed MARIE drain with multiple attempts at upsizing to drain all of the residual fluid collections. Completed several courses of antibiotics for treatment of infected hematoma, last finished Augmentin on 4/15/24. Last seen by ID as  outpatient on 5/10, with plans to monitor off abx therapy. Here, CT AP on admission showed minimal residual fluid collection. She had the MARIE drain in place, but this was removed accidentally upon transfer to CT on 5/30.  - D/w IR who reviewed imaging, minimal residual fluid collection with minimal drainage from MARIE drain prior to admission suggests there is not likely benefit to replacing, d/w family  - No signs of ongoing infection in hematoma  - empiric Zosyn, will transition to augmentin for GI coverage     #Malnutrition  #FTT  Poor PO intake, weight loss, deconditioning for the past several weeks PTA. Suspect infection may be contributing, but likely also represents prolonged hospitalization and underlying CRC.  - Nutrition consulted  - Gentle IVF  - Discussed with pt and family, who are agreeable to trial of enteral feeding. NJ Placed on 5/31, tube feeds initiated     #Pleural Effusion   #Atelectasis   CT with L pleural effusion. Patient denies respiratory symptoms and is breathing on room air.   - CTM      #HFrEF  #HTN   BP soft on admission, now improved s/p 2L fluid resuscitation. Per discharge summary from 4/2024 patient was scheduled to follow up with cardiology on 7/24. Recent ECHO on 4/4/24 with EF 29% .   - Holding Carvedilol 12.5mg BID, resumed at 3.125 mg PO BID dose on 6/1  - BNP 1514, no comparisons available     #Chronic Normocytic Anemia   Baseline between 10-11, on admission Hgb 10.5. Likely 2/2 anemia of chronic disease and suspect 2/2 nutritional deficency.   - Trending CBC     #T2DM, diet controlled  - Hypoglycemic protocol      #Nausea   EKG with Qtc 462.   - PTA Zofran   - PTA Compazine   - Added Remeron for appetite stimulation on 5/31     #Hyponatremia   Suspect 2/2 malnourishment. Does not appear volume overloaded.   - Trend BMP    #Deconditioning   -PT/OT consults          Diet: Snacks/Supplements Adult: Ensure Clear; With Meals  Regular Diet Adult  Adult Formula Drip Feeding: Continuous  TwoCal HN; Other - Specify in Comment; Goal Rate: 35; mL/hr; Once access confirmed. Initiate @ 10 ml/hr and advance by 10 ml Q8H pending pt's tolerance.; Do not advance tube feeding rate unless K+ is = or > 3...    DVT Prophylaxis: Heparin SQ  Cameron Catheter: PRESENT, indication: Gross hematuria  Lines: None     Cardiac Monitoring: None  Code Status: Full Code      Clinically Significant Risk Factors        # Hypokalemia: Lowest K = 3.1 mmol/L in last 2 days, will replace as needed  # Hypernatremia: Highest Na = 147 mmol/L in last 2 days, will monitor as appropriate   # Hypercalcemia: corrected calcium is >10.1, will monitor as appropriate  # Hypomagnesemia: Lowest Mg = 1.6 mg/dL in last 2 days, will replace as needed   # Hypoalbuminemia: Lowest albumin = 2 g/dL at 5/30/2024  8:27 AM, will monitor as appropriate  # Coagulation Defect: INR = 1.23 (Ref range: 0.85 - 1.15) and/or PTT = N/A, will monitor for bleeding    # Hypertension: Noted on problem list         # Severe Malnutrition: based on nutrition assessment, PRESENT ON ADMISSION          Disposition Plan     Medically Ready for Discharge: Anticipated in 2-4 Days             Madyson Cortes MD  Hospitalist Service, GOLD TEAM 8  [facility]  Securely message with [employer] (more info)  Text page via [facility] Paging/Directory   See signed in provider for up to date coverage information  ______________________________________________________________________    Interval History   Doing OK this morning. Denied any pain in her belly. Tolerated the feeding tube placement. Tolerating tube feeds but is having diarrhea w/ incontinence, suspect related to Tfs and antibiotics. No fevers/chills overnight. Updated daughter on plan of care.    Physical Exam   Vital Signs: Temp: 98.8  F (37.1  C) Temp src: Oral BP: 118/56 Pulse: 73   Resp: 18 SpO2: 98 % O2 Device: None (Room air)    Weight: 0 lbs 0 oz    General Appearance: Thin frail  elderly woman, lying in bed, NAD, alert  Respiratory: Breathing comfortably on room air. Lungs grossly CTAB  Cardiovascular: RRR, no m/r/g.  GI: soft, non-distended, non-tender to palpation. LUQ MARIE drain site appears to be well healing  Skin: pallor present, no skin rash  Other: Trace LE edema bilaterally     Medical Decision Making       50 MINUTES SPENT BY ME on the date of service doing chart review, history, exam, documentation & further activities per the note.      Data     I have personally reviewed the following data over the past 24 hrs:    11.2 (H)  \   9.1 (L)   / 230     147 (H) 122 (H) 41.1 (H) /  153 (H)   3.1 (L) 12 (L) 2.30 (H) \       Imaging results reviewed over the past 24 hrs:   No results found for this or any previous visit (from the past 24 hour(s)).

## 2024-06-01 NOTE — PROVIDER NOTIFICATION
"Bakari jiménez MD \"Renard, [address] Pt labs are not within limits to increase the TF. what would you like me to do? TF still at 20mL/hr. thanks. luciano holman rn [phone]\"  "

## 2024-06-01 NOTE — PLAN OF CARE
Goal Outcome Evaluation:      Plan of Care Reviewed With: patient, child    Overall Patient Progress: improving         VSS on RA. Dennis speaking-  used. A&Ox3, disoriented to time. Bed alarm on. Pain in bottom- repositioned and up in chair. Cameron- AUOP. 2 loose incontinent Bms. Ambulated in room assist x2 with walker. Skin intact. R PIV-TKO with intm anbx. NG- TF @30mL/hr (will increase to goal of 35 at 1930). Mg and K replaced. C/o vaginal pain- swab sent

## 2024-06-02 NOTE — PROGRESS NOTES
Care Management Follow Up    Length of Stay (days): 4    Expected Discharge Date: 06/05/2024     Concerns to be Addressed:  TCU placement     Patient plan of care discussed at interdisciplinary rounds: Yes    Anticipated Discharge Disposition:  TCU     Anticipated Discharge Services:  TBD  Anticipated Discharge DME:  TBD    Patient/family educated on Medicare website which has current facility and service quality ratings:  Not yet  Education Provided on the Discharge Plan:  Yes  Patient/Family in Agreement with the Plan:  Yes    Referrals Placed by CM/SW:  None yet  Private pay costs discussed: Not applicable    Additional Information:  Pt has PT/OT consults in and will likely need rehab placement. Pt is Tigrinya speaking, her daughter, Michele, speaks english. Per Michele, she will be coming to the hospital today after 4pm and tomorrow around 10am. She anticipates her mom will need rehab as she has been failing at home. She lives alone but has been staying with Michele who also lives in [place]. Michele was in the middle of something so could not complete the full CMA. She inquired about staying the night and RNCC spoke with charge RN who stated she could stay tonight. Left message for Michele with this information. Weekday SW to check in tomorrow to complete full CMA and discuss discharge planning with both Michele and Myla. She will likely need TCU placement and the family preferred in the [place] area.    RANDELL Adrian   6/2/2024       Social Work and Care Management Department       SEARCHABLE in [employer] - search SOCIAL WORK       [place] ([phone]) Saturday and Sunday     Units: 4A Vocera, 4C Vocera, & 4E Vocera        Units: 5A [id number] Vocera, 5A 3882-3990 Vocera , BMT SW 1 BMT SW 2, BMT SW 3 & BMT SW 4  5C Off Service [phone] - [phone]  5C Off Service [id number]     Units: 6A Vocera & 6B Vocera      Units: 6C Vocera     Units: 7A Vocera & 7B Vocera      Units: 7C Med Surg 7401 thru  7418 and 7C Med Surg 7502 thru 7521      Unit: [place] ED Vocera & [place] Obs Vocera     [facility] ([phone]) Saturday and Sunday      Units: 5 Ortho Vocera, 5 Med Surg Vocera & WB ED Vocera     Units: 6 Med Surg Vocera, 8 Med Surg Vocera, & 10 ICU Vocera      After hours Vocera [facility] and After Hours Vocera [place]     Saturday & Sunday (1630 - 0000)    Mon-Fri (2268-4526)     [facility] Recognized Holidays  (2202-0424)    Units: ALL   - see above VOCERA links to units and after hours

## 2024-06-02 NOTE — PROVIDER NOTIFICATION
Dr Muhammad notified of patient episodes of nausea and vomiting @3:23 am. Requested IV zofran as client was refusing all oral meds and she takes it at home. Also notified that tube feeding was paused.     Provider ordered zofran and gave OK to hold tube feeding until am.     Tube flushed for patency and feeding held.

## 2024-06-02 NOTE — PROGRESS NOTES
06/02/24 1415   Appointment Info   Signing Clinician's Name / Credentials (PT) Shameka Felton, PT, DPT       Present yes  (via speakerphone)   Language Rosa Maria   Living Environment   People in Home child(smith), adult  (has been staying with her daughter for ~1.5 month, was living alone prior to recent prolonged hospitalization)   Current Living Arrangements apartment   Home Accessibility   (elevator access per pt)   Living Environment Comments Per chart review pt has been staying with her daughter since discharging home after recent hospitalization at [facility] 2/1/24-4/13/24. She was living alone prior to this. Current living environment with daughter is an apartment with elevator access.   Self-Care   Usual Activity Tolerance moderate   Current Activity Tolerance fair   Fall history within last six months   (pt denies falls)   Activity/Exercise/Self-Care Comment Pt reports her daughter has been helping her while she stays with her but unclear to what extent daughter has been helping pt. Pt does not give clear answer when asked if she is ever alone at the apartment. Pt reports she uses walker at home for mobility, reports she only has to walk short distances even to get to car from apartment if leaving apartment. Per chart review, pt's niece has reported that the patient had seemed more active and had been able to get out of bed right after returning home from the hospital, but she has no longer been doing so & reports that the patient tends to drink about a half a can of ensure per day but no solid meals.   General Information   Onset of Illness/Injury or Date of Surgery 05/29/24   Referring Physician Madyson Cortes MD   Patient/Family Therapy Goals Statement (PT) pt initially states she has no goals for therapy; when asked if she wants to work toward living on her own again eventually, pt states yes & when PT suggests working on strength and endurance to work toward this goal pt is  "agreeable   Pertinent History of Current Problem (include personal factors and/or comorbidities that impact the POC) Per chart, \"Myla Glynn is a 79 year old female admitted on 5/29/2024.  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 (requiring extensive admission 2/1 - 4/13; hospital stay complicated by sepsis, ALFREDA, pleural effusion, and lumbar artery bleed requiring IR embolization), known retroperitoneal hematoma (diagnosed on CT 3/9) s/p multiple unsuccessful drain upsizing/aspirations with MARIE drain in place, DM2 (without long-term current use of insulin), and HTN who presents to the ED via EMS for evaluation of worsening generalized weakness and fatigue found to have new ALFREDA and possibly UTI.\"   Existing Precautions/Restrictions fall   Cognition   Orientation Status (Cognition) oriented to;person;situation;place  (Pt states her name but reports she doesn't know her birthday; does not know the current day/month/year; knows she is in medical facility but unable to name; states she is here because she is \"sick\"/ vague answer)   Follows Commands (Cognition) follows one-step commands;over 90% accuracy   Pain Assessment   Patient Currently in Pain   (pt states \"it's OK\" (via ) when asked if she has pain)   Posture    Posture Forward head position;Protracted shoulders   Range of Motion (ROM)   ROM Comment LE ROM appears WFL with bed mobility and transfers   Strength (Manual Muscle Testing)   Strength Comments Pt demos some functional core & generalized weakness/deconditioning with bed mobility and transfers   Bed Mobility   Comment, (Bed Mobility) " Supine>sidelying>sit with HOB elevated and ModA from PT   Transfers   Comment, (Transfers) Sit>stand from EOB to walker with CGA from PT   Gait/Stairs (Locomotion)   Comment, (Gait/Stairs) Pt able to take a few steps to turn bed>recliner with walker and CGA at trunk +Simran to guide walker and for cues, demos tendency to step feet outside support of walker and needs cues to keep feet within walker support   Balance   Balance Comments Pt able to sit unsupported at EOB without imbalance; benefits from UE support and CGA for safety with upright/dynamic mobility   Sensory Examination   Sensory Perception Comments denies numbness/tingling   Clinical Impression   Criteria for Skilled Therapeutic Intervention Yes, treatment indicated   PT Diagnosis (PT) impaired functional mobility and independence   Influenced by the following impairments decreased strength, decreased activity tolerance/endurance, nutrition deficit, impaired balance   Functional limitations due to impairments difficulty/decreased independence with bed mobility, transfers, ambulation   Clinical Presentation (PT Evaluation Complexity) stable   Clinical Presentation Rationale clinical judgement, pt presentation   Clinical Decision Making (Complexity) low complexity   Planned Therapy Interventions (PT) balance training;bed mobility training;cryotherapy;gait training;home exercise program;neuromuscular re-education;manual therapy techniques;patient/family education;ROM (range of motion);stair training;strengthening;stretching;transfer training;progressive activity/exercise;thermotherapy;home program guidelines   Risk & Benefits of therapy have been explained evaluation/treatment results reviewed;care plan/treatment goals reviewed;participants included;patient   Clinical Impression Comments Patient appears well below true baseline mobility, likely near or slightly below recent mobility level since discharge to daughter's home on 4/13/24 after extended  hospitalization at [place]. She will benefit from continued skilled PT in hospital to progress functional strength, mobility and overall endurance.   PT Total Evaluation Time   PT Rehan, Low Complexity Minutes (39363) 13   Physical Therapy Goals   PT Frequency 5x/week   PT Predicted Duration/Target Date for Goal Attainment 07/01/24   PT Goals Bed Mobility;Transfers;Gait   PT: Bed Mobility Supervision/stand-by assist;Supine to/from sit;Rolling;Within precautions  (bed per home set up)   PT: Transfers Modified independent;Sit to/from stand;Bed to/from chair;Assistive device   PT: Gait Modified independent;Rolling walker;100 feet   Interventions   Interventions Quick Adds Gait Training;Therapeutic Activity   Therapeutic Activity   Therapeutic Activities: dynamic activities to improve functional performance Minutes (81363) 24   Symptoms Noted During/After Treatment Fatigue   Treatment Detail/Skilled Intervention See rehan for initial bed mobility and transfer. Pt needed MaxA for LEs with sit>supine later in session, pt clive to guide trunk to laying in bed without assist at trunk. Sit>stand from recliner chair performed 2x to walker with B UE pushoff from chair, CGA at GB to stand to walker. Stand>sit at recliner and EOB with walker and CGA. Pt needed guidance for moving walker with first transfer bed>recliner but with later transfer recliner>bed able to manage walker on her own and kept feet within walker support without cues. Pt able to stand from EOB with 1 hand on bedrail and SBA and take side steps toward HOB with cues to move toward HOB and SBA. Pt declined staying up in recliner despite encouragement. Edu pt on rec to return to recliner later for dinner meal with nursing assist, pt likely to need encouragement from nursing for this. Pt in bed with alarm on, needs in reach at PT departure.   Gait Training   Gait Training Minutes (18124) 8   Symptoms Noted During/After Treatment (Gait Training) fatigue   Treatment  Detail/Skilled Intervention Engaged pt in ambulation in room with FWW and CGA-close SBA. Pt ambulated ~25 ft in room with this assist. For first 90 degree turn around end of bed pt needed assist to turn walker but with later 180 degree turn and 90 degree turn pt able to turn with cues without any physical assist to turn walker. Pt fairly steady ambulating with walker for this distance. Pt declines any additional ambulation, reports fatigue.   PT Discharge Planning   PT Plan if family/daughter present clarify what assist she has been providing & what assist is available- consider TCU vs home with home PT pending this clarification; progress OOB mobility, ambulation distance progression with chair follow for rest breaks, sit<>stand reps, standing exercises   PT Discharge Recommendation (DC Rec) Transitional Care Facility   PT Rationale for DC Rec Patient currently is needing Ax1 for all mobility. Recommend TCU at this time. However, if family able to provide Ax1 for all mobility at home, then home with home PT would likely be feasible.   Total Session Time   Timed Code Treatment Minutes 32   Total Session Time (sum of timed and untimed services) 45

## 2024-06-02 NOTE — PROGRESS NOTES
Brief Medicine Cross Cover Note    Received request by bedside nurse to perform ordered vaginal swab.     Evaluated patient at bedside with nursing assistant Tony. Patient with alejandra catheter in place, liquid stool present.  While cleaning patient, she grimaced and was in obvious discomfort, however no obvious skin lesions or open areas. All attempts were made to remove stool from bottom and vaginal canal. Vaginal swab performed, still with some stool on swab.     -await swab results  -continue barrier cream and pericares  -consider gyn consult in AM for dedicated exam     Marielle Esqueda PA-C  Hospitalist Service  [facility]

## 2024-06-02 NOTE — PLAN OF CARE
Goal Outcome Evaluation:      Plan of Care Reviewed With: patient    Overall Patient Progress: improving    Status: generalized fatigue  Activity: not up overnight, repositioned x 2  Neuros: A&O x 4  Cardiac: WDL  Respiratory: Diminished breath sounds, no increased WOB  GI/: voiding adequately to alejandra, alejandra cares provided, yeast infection confirmed with vaginal swab, significant pain with wiping, fecal incontinence on chux-changed x1, 2 episode of emesis, gave antiemetic IV  Diet: Regular, tubes feed not increased due to nausea vomiting, paused after second episode of emesis, provider notified-Ok'd feed to resume in morning  Skin/Incisions: no skin breakdown on bottom, otherwise unremarkable  Lines/Drains: NG bridled, R-PIV running zosyn  Pain/Nausea: refusing all oral meds, nausea managed with IV zofran  New Changes: nausea and vomiting overnight  Plan:  uses Tigrin[place] or Vietnamese , daughter is also helpful and open to calls Otf [phone], has yeast infection according to vaginal culture, providers aware, follow up on interventions

## 2024-06-02 NOTE — PROGRESS NOTES
[facility]    Medicine Progress Note - Hospitalist Service, GOLD TEAM 8    Date of Admission:  5/29/2024    Assessment & Plan   Myla Glynn is a 79 year old female admitted on 5/29/2024.  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     Updates:  - Miconazole suppository for vaginal candidiasis  - Tfs on hold overnight due to diarrhea likely 2/2 antibiotics and TF administration, will check for C. Diff and then treat symptomatically  - Renal function improving, HyperNa worse (likely from fluid loss from diarrhea), add D5W infusion  - Continues on Zosyn     #UTI  # Persistent bladder leak s/p urinary diversion with bilateral nephrostomy tubes (now removed)  # Hx of Right renal segmental artery bleed with subsequent clot formation in the renal collecting ducts, ureter, and urinary bladder s/p IR embolization  # Recurrent colorectal cancer with local recurrence within the bladder s/p partial cystectomy with small bowel resection and anastomosis (01/09/2024)  Patient presenting with weakness. UA appears infectious with elevated WBC and + LE. Will start ceftriaxone  based on prior cultures while awaiting UC. Alejandra in place for bladder decompression. Urology performed CT cystogram with findings showing: anterior bladder wall defect with associated air and fluid collection in low anterior pelvis. Per urology, this collection appears walled off so they have no plans for decompression or drainage at this time.  -  Bcx with NGTD, urine culture with >100k urogenital jonathan  - Urology recommending IR drainage of fluid collection if signs of hemodynamic instability, but pt currently stable  - Continue Zosyn for empiric antibiosis   - Upon discharge, transition to Augmentin per ID  - ID following, appreciate input   - MR enterography could be considered to evaluate for persistent fistula, but unlikely to change current management   - D/w [facility] (Facundo) who feels this anterior bladder wall leak appears to be chronic in nature, not an acute change    - Pt and family would like to remain at [facility] rather than transfer to [place]    #ALFREDA   #Hypernatremia  Pt presented with acute kidney injury (Cr 4.5, baseline 1.2-1.5). Suspect pre-renal in the setting of poor PO intake, but obstruction likely contributed as well (alejandra placed for bladder decompression upon arrival to ED). Has had good urine output since admission. Cr now improving, but still not returned to baseline  - Continue alejandra catheter for bladder decompression  - Trend BMP daily  - Avoiding nephrotoxins and renally dosing medications  - Strict I/Os  - Initially on IVF, transitioned to enteral access; however, Na kareen to 151 on 6/2 in the setting of copious diarrhea for which D5w infusion was started    #Diarrhea  Suspected related to antibiotics and tube feeds. However, prolonged and extensive antibiotic exposures in the recent weeks including Augmentin and most recently Zosyn. Has had n/v and some abdominal tenderness present on exam today.  - Continue fiber in Tfs  - Continue probiotics  - will test for C. Diff, if negative,  add Imodium PRN    #Infected organized left retroperitoneal hematoma with Proteus hauseri/vulgaris s/p IR coil embolization and drain placement 3/9/2024 followed by multiple drain upsized/exchange and antibiotics until 4/15/2024   Developed during prolonged hospitalization at [facility]. IR placed MARIE drain with multiple attempts at upsizing to drain all of the residual fluid collections. Completed several courses of antibiotics for treatment of infected hematoma, last finished Augmentin on 4/15/24. Last seen by ID as outpatient on 5/10, with plans to monitor off abx therapy. Here, CT AP on admission showed minimal residual fluid collection. She had the MARIE drain in place, but this was removed accidentally upon transfer to CT on 5/30.  - D/w IR who reviewed imaging, minimal residual fluid collection with minimal drainage from MARIE drain prior to admission suggests there is not likely benefit to replacing, d/w family  - No signs of ongoing infection in hematoma  - empiric Zosyn, will transition to augmentin for GI coverage     #Malnutrition  #FTT  Poor PO intake, weight loss, deconditioning for the past several weeks PTA. Suspect infection may be contributing, but likely also represents prolonged hospitalization and underlying CRC.  - Nutrition consulted  - Gentle IVF  - Discussed with pt and family, who are agreeable to trial of enteral feeding. NJ Placed on 5/31, tube feeds initiated     #Pleural Effusion   #Atelectasis   CT with L pleural effusion. Patient denies respiratory symptoms and is breathing on room air.   - CTM      #HFrEF  #HTN   BP soft on admission, now improved s/p 2L fluid resuscitation. Per discharge summary from 4/2024 patient was scheduled to follow up with cardiology on 7/24. Recent ECHO on 4/4/24 with EF 29% .   - Holding Carvedilol 12.5mg BID, resumed at 3.125 mg PO BID dose on 6/1  - BNP 1514, no comparisons available     #Chronic Normocytic Anemia   Baseline between 10-11, on admission Hgb 10.5.  Likely 2/2 anemia of chronic disease and suspect 2/2 nutritional deficency.   - Trending CBC     #T2DM, diet controlled  - Hypoglycemic protocol      #Nausea   EKG with Qtc 462.   - PTA Zofran   - PTA Compazine   - Added Remeron for appetite stimulation on 5/31     #Hyponatremia   Suspect 2/2 malnourishment. Does not appear volume overloaded.   - Trend BMP    #Deconditioning   -PT/OT consults          Diet: Snacks/Supplements Adult: Ensure Clear; With Meals  Regular Diet Adult  Adult Formula Drip Feeding: Continuous TwoCal HN; Other - Specify in Comment; Goal Rate: 35; mL/hr; Once access confirmed. Initiate @ 10 ml/hr and advance by 10 ml Q8H pending pt's tolerance.; Do not advance tube feeding rate unless K+ is = or > 3...    DVT Prophylaxis: Pneumatic Compression Devices  Cameron Catheter: PRESENT, indication: Gross hematuria  Lines: None     Cardiac Monitoring: None  Code Status: Full Code      Clinically Significant Risk Factors        # Hypokalemia: Lowest K = 3.1 mmol/L in last 2 days, will replace as needed  # Hypernatremia: Highest Na = 151 mmol/L in last 2 days, will monitor as appropriate    # Hypomagnesemia: Lowest Mg = 1.5 mg/dL in last 2 days, will replace as needed   # Hypoalbuminemia: Lowest albumin = 2 g/dL at 5/30/2024  8:27 AM, will monitor as appropriate     # Hypertension: Noted on problem list         # Severe Malnutrition: based on nutrition assessment, PRESENT ON ADMISSION          Disposition Plan     Medically Ready for Discharge: Anticipated in 2-4 Days             Madyson Cortes MD  Hospitalist Service, GOLD TEAM 8  [facility]  Securely message with [employer] (more info)  Text page via [facility] Paging/Directory   See signed in provider for up to date coverage information  ______________________________________________________________________    Interval History   History obtained with [employer] .She reports having nausea and vomiting  "last night. She is still feeling sick to her stomach this morning. No vomiting since overnight. States abdomen feels sore at times. Also feels like all her joints are aching, \"like I have bad arthritis.\" Denies any new fevers/chills. Tolerating tube feeds and open to resuming after her nausea is under better control. No further concerns at this time.     Physical Exam   Vital Signs: Temp: 98  F (36.7  C) Temp src: Oral BP: 126/55 Pulse: 68   Resp: 20 SpO2: 100 % O2 Device: None (Room air)    Weight: 114 lbs 3.2 oz    General Appearance: Elderly woman, lying in bed, no acute distress  Respiratory: Breathing comfortably on room air, lungs CTAB  Cardiovascular: RRR, no m/r/g.  GI: soft, non-distended, mild tenderness to palpation, no pain over MARIE drain site.  Skin: No pallor or skin rash  Other: Cameron draining clear yellow urine.     Medical Decision Making       45 MINUTES SPENT BY ME on the date of service doing chart review, history, exam, documentation & further activities per the note.      Data     I have personally reviewed the following data over the past 24 hrs:    8.7  \   9.2 (L)   / 231     151 (H) 126 (H) 33.0 (H) /  90   3.8 13 (L) 1.95 (H) \       Imaging results reviewed over the past 24 hrs:   No results found for this or any previous visit (from the past 24 hour(s)).  "

## 2024-06-03 NOTE — PROGRESS NOTES
[facility]    Medicine Progress Note - Hospitalist Service, GOLD TEAM 8    Date of Admission:  5/29/2024    Assessment & Plan   Myla Glynn is a 79 year old female admitted on 5/29/2024.  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     Updates:  - Resumed Tfs overnight; C. Diff negative, add imodium to help with loose stools  - Continue PT/OT efforts, recommending discharge to TCU  - Stop D5W, continue enteral free water flushes for hypernatremia  - No other changes to medical management     #UTI  # Persistent bladder leak s/p urinary diversion with bilateral nephrostomy tubes (now removed)  # Hx of Right renal segmental artery bleed with subsequent clot formation in the renal collecting ducts, ureter, and urinary bladder s/p IR embolization  # Recurrent colorectal cancer with local recurrence within the bladder s/p partial cystectomy with small bowel resection and anastomosis (01/09/2024)  Patient presenting with weakness. UA appears infectious with elevated WBC and + LE. Will start ceftriaxone based on prior cultures while awaiting UC. Cameron in place for  bladder decompression. Urology performed CT cystogram with findings showing: anterior bladder wall defect with associated air and fluid collection in low anterior pelvis. Per urology, this collection appears walled off so they have no plans for decompression or drainage at this time.  -  Bcx with NGTD, urine culture with >100k urogenital jonathan  - Urology recommending IR drainage of fluid collection if signs of hemodynamic instability, but pt currently stable  - Continue Zosyn for empiric antibiosis for chronic anterior bladder leak, infected RP hematoma   - Upon discharge, transition to Augmentin per ID until follow-up with [facility] on 6/19  - ID following, appreciate input   - MR enterography could be considered to evaluate for persistent fistula, but unlikely to change current management   - D/w [facility] (Facundo) who feels this anterior bladder wall leak appears to be chronic in nature, not an acute change    - Pt and family would like to remain at [facility] rather than transfer to [place]    #ALFREDA   #Hypernatremia  Pt presented with acute kidney injury (Cr 4.5, baseline 1.2-1.5). Suspect pre-renal in the setting of poor PO intake, but obstruction likely contributed as well (alejandra placed for bladder decompression upon arrival to ED). Has had good urine output since admission. Cr now improving, but still not returned to baseline. HyperNa improved with D5w infusion  - Continue alejandra catheter for bladder decompression  - Trend BMP daily  - Avoiding nephrotoxins and renally dosing medications  - Strict I/Os  - D5W for hypernatremia discontinued on 6/3, continue adjusting FWF to maintain normonatremia     #Diarrhea  Suspected related to antibiotics and tube feeds. However, prolonged and extensive antibiotic exposures in the recent weeks including Augmentin and most recently Zosyn. Has had n/v and some abdominal tenderness present on exam today.  - Continue fiber in Tfs  - Continue probiotics  - C. Diff negative on  6/2, continue Imodium PRN    #Infected organized left retroperitoneal hematoma with Proteus hauseri/vulgaris s/p IR coil embolization and drain placement 3/9/2024 followed by multiple drain upsized/exchange and antibiotics until 4/15/2024   Developed during prolonged hospitalization at [facility]. IR placed MARIE drain with multiple attempts at upsizing to drain all of the residual fluid collections. Completed several courses of antibiotics for treatment of infected hematoma, last finished Augmentin on 4/15/24. Last seen by ID as outpatient on 5/10, with plans to monitor off abx therapy. Here, CT AP on admission showed minimal residual fluid collection. She had the MARIE drain in place, but this was removed accidentally upon transfer to CT on 5/30.  - D/w IR who reviewed imaging, minimal residual fluid collection with minimal drainage from MARIE drain prior to admission suggests there is not likely benefit to replacing, d/w family  - No signs of ongoing infection in hematoma  - empiric Zosyn, will transition to augmentin for GI coverage  as above, continue until Urology follow-up on 6/19    #Malnutrition  #FTT  Poor PO intake, weight loss, deconditioning for the past several weeks PTA. Suspect infection may be contributing, but likely also represents prolonged hospitalization and underlying CRC.  - Nutrition consulted  - Gentle IVF  - Discussed with pt and family, who are agreeable to trial of enteral feeding. NJ Placed on 5/31, tube feeds initiated   - Goals per family are to see if patient improves strength enough with tube feeds to feel better and eat on her own, discussed that we would readdress her ability to take in PO as she gets closer to discharge, or that NJ feeds could be continued upon discharge to TCU    #Pleural Effusion   #Atelectasis   CT with L pleural effusion. Patient denies respiratory symptoms and is breathing on room air.   - CTM      #HFrEF  #HTN   BP soft on admission, now improved s/p 2L fluid  resuscitation. Per discharge summary from 4/2024 patient was scheduled to follow up with cardiology on 7/24. Recent ECHO on 4/4/24 with EF 29% .   - Holding Carvedilol 12.5mg BID, resumed at 3.125 mg PO BID dose on 6/1  - BNP 1514, no comparisons available     #Chronic Normocytic Anemia   Baseline between 10-11, on admission Hgb 10.5. Likely 2/2 anemia of chronic disease and suspect 2/2 nutritional deficency.   - Trending CBC     #T2DM, diet controlled  - Hypoglycemic protocol      #Nausea   EKG with Qtc 462.   - PTA Zofran   - PTA Compazine   - Added Remeron for appetite stimulation on 5/31     #Hyponatremia   Suspect 2/2 malnourishment. Does not appear volume overloaded.   - Trend BMP    #Deconditioning   -PT/OT consults          Diet: Snacks/Supplements Adult: Ensure Clear; With Meals  Regular Diet Adult  Adult Formula Drip Feeding: Continuous TwoCal HN; Other - Specify in Comment; Goal Rate: 35; mL/hr; Once access confirmed. Initiate @ 10 ml/hr and advance by 10 ml Q8H pending pt's tolerance.; Do not advance tube feeding rate unless K+ is = or > 3...    DVT Prophylaxis: Heparin SQ  Cameron Catheter: PRESENT, indication: Gross hematuria  Lines: None     Cardiac Monitoring: None  Code Status: Full Code      Clinically Significant Risk Factors        # Hypokalemia: Lowest K = 3.1 mmol/L in last 2 days, will replace as needed  # Hypernatremia: Highest Na = 151 mmol/L in last 2 days, will monitor as appropriate    # Hypomagnesemia: Lowest Mg = 1.5 mg/dL in last 2 days, will replace as needed   # Hypoalbuminemia: Lowest albumin = 2 g/dL at 5/30/2024  8:27 AM, will monitor as appropriate     # Hypertension: Noted on problem list         # Severe Malnutrition: based on nutrition assessment           Disposition Plan     Medically Ready for Discharge: Anticipated in 2-4 Days             Madyson Cortes MD  Hospitalist Service, GOLD TEAM [address] [place] [address]  Securely message with  [employer] (more info)  Text page via [facility] Paging/Directory   See signed in provider for up to date coverage information  ______________________________________________________________________    Interval History   Doing OK this morning. Denies any abdominal pain. Having some nausea, improves with PRN Zofran. No emesis overnight. Stools are reducing in frequency. No shortness of breath or chest pain. Discussed need to work with therapy today, she is agreeable. Daughter and patient both in favor of discharge to TCU once medically stable.     Physical Exam   Vital Signs: Temp: 98  F (36.7  C) Temp src: Oral BP: 128/59 Pulse: 66   Resp: 18 SpO2: 100 % O2 Device: None (Room air)    Weight: 114 lbs 3.2 oz    General Appearance: Frail elderly woman, appears older than stated age, NAD  Respiratory: Breathing comfortably on room air, lungs grossly CTAB  Cardiovascular: RRR, no m/r/g.  GI: soft, non-distended, non-tender to palpation. Site of MARIE drain removal appears c/d/I.  Skin: pallor present, no skin rash  Other: Trace LE edema bilaterally. Cameron draining clear yellow urine.     Medical Decision Making       45 MINUTES SPENT BY ME on the date of service doing chart review, history, exam, documentation & further activities per the note.      Data     I have personally reviewed the following data over the past 24 hrs:    6.3  \   8.4 (L)   / 207     143 118 (H) 24.9 (H) /  163 (H)   3.4 14 (L) 1.76 (H) \       Imaging results reviewed over the past 24 hrs:   No results found for this or any previous visit (from the past 24 hour(s)).

## 2024-06-03 NOTE — CONSULTS
Care Management Initial Consult    General Information  Assessment completed with: Children, Patient, Michele (daughter)  Type of CM/SW Visit: Initial Assessment    Primary Care Provider verified and updated as needed: Yes (Jami Bundy PA-C at [facility] [address] [place] [address])   Readmission within the last 30 days: no previous admission in last 30 days, other (see comments) (Was at [place] but has transferred to )         Advance Care Planning: Advance Care Planning Reviewed: questions answered  Dtr requested English and English HCD. Pt is illiterate in any language     Communication Assessment  Patient's communication style: spoken language (non-English) . Patient is illiterate in any language.       Cognitive  Cognitive/Neuro/Behavioral: .WDL except (Tigrinya speaking)  Level of Consciousness: somnolent  Arousal Level: opens eyes spontaneously  Orientation: oriented x 4 (pt does not know own birthday per baseline)  Mood/Behavior: calm, cooperative  Best Language: 0 - No aphasia  Speech: clear, spontaneous    Living Environment:   People in home: alone (Currently at daughter's address: [address] [place] [address], , [place] [address])     Current living Arrangements: apartment      Able to return to prior arrangements: no  Living Arrangement Comments: Dtr stated she cannot afford to continue caring for mother unless she is hired as PCA. Dtr stated that she was approved for 10 hours a day but the service never started.    Family/Social Support:  Care provided by: child(smith)  Provides care for: no one, unable/limited ability to care for self  Marital Status:   Children          Description of Support System: Supportive, Involved    Support Assessment: Caregiver experiencing high stress    Current Resources:   Patient receiving home care services: No     Community Resources: [facility] ([facility] [phone] MA# [id number])  Equipment currently used at home: walker, standard,  shower chair  Supplies currently used at home:      Employment/Financial:  Employment Status: unemployed, other (see comments) (never worked)     Employment/ Comments: never employed.  Financial Concerns: none   Referral to Financial Worker: No     Does the patient's insurance plan have a 3 day qualifying hospital stay waiver?  No    Lifestyle & Psychosocial Needs:  Social Determinants of Health     Food Insecurity: No Food Insecurity (2/22/2024)    Received from [facility]    Hunger Vital Sign     Worried About Running Out of Food in the Last Year: Never true     Ran Out of Food in the Last Year: Never true   Depression: Not at risk (1/25/2023)    Received from [facility] [employer] [facility], [facility]    PHQ-2     PHQ-2 TOTAL SCORE: 1   Housing Stability: Low Risk  (2/22/2024)    Received from [facility]    Housing Stability     What is your living situation today?: I have a steady place to live   Tobacco Use: Low Risk  (5/10/2024)    Received from [facility]    Patient History     Smoking Tobacco Use: Never     Smokeless Tobacco Use: Never     Passive Exposure: Not on file   Financial Resource Strain: Low Risk  (6/20/2023)    [facility], [facility]    Financial Resource Strain     Difficulty of Paying Living Expenses: 3     Difficulty of Paying Living Expenses: Not on file   Alcohol Use: Not on file   Transportation Needs: No Transportation Needs (2/22/2024)    Received from [facility]    [facility] - Transportation     Lack of Transportation (Medical): No     Lack of Transportation (Non-Medical): No   Recent Concern: Transportation Needs - Unmet Transportation Needs (2/1/2024)    Received from [facility] - Transportation     Lack of Transportation (Medical): Yes     Lack of Transportation (Non-Medical): Yes   Physical  Activity: Inactive (2/1/2024)    Received from [facility], [facility]    Exercise Vital Sign     Days of Exercise per Week: 0 days     Minutes of Exercise per Session: 0 min   Interpersonal Safety: Not At Risk (2/22/2024)    Received from [facility], [facility]    Humiliation, Afraid, Rape, and Kick questionnaire     Fear of Current or Ex-Partner: No     Emotionally Abused: No     Physically Abused: No     Sexually Abused: No   Stress: Not on file   Social Connections: Socially Integrated (6/20/2023)    [facility]    Social Connections     Frequency of Communication with Friends and Family: 0   Health Literacy: Not on file       Functional Status:  Prior to admission patient needed assistance:   Dependent ADLs:: Ambulation-walker  Dependent IADLs:: Shopping, Transportation  Assesssment of Functional Status: Not at  functional baseline    Mental Health Status:  Mental Health Status: No Current Concerns       Chemical Dependency Status:  Chemical Dependency Status: No Current Concerns           Values/Beliefs:  Spiritual, Cultural Beliefs, Buddhism Practices, Values that affect care: no          Values/Beliefs Comment: Tenriism    Additional Information:  From H&P: Myla Glynn is a 79 year old female admitted on 5/29/2024.  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     RNCC completed CM assessment due to elevated risk score and need for discharge planning. RNCC met with pt at bedside and introduced RNCC role. Patient is somnolent and defers to daughter Michele who is in room along with a friend. Patient is from [facility] and has been in the [place] 7 [place]. Currently, patient is staying in her daughter, Michele's apartment, as patient is too weak to be alone. Patient's granddaughter (who is deaf) is also in the current  [place] apartment. Dtr stated she was approved to be PCA for 10 hours a week but the services never started.  Dtr stated she is at risk of losing her own apartment, as she has not been able to work her regular job. Dtr stated she could fill out [place] paperwork but that will not help with rent. Dtr, Michele, stated that patient has 3 daughters and one son. Dtr stated she was trying to see if patient could go to SNF (unless family can all come from the other countries to assist).    For SW: Michele asked if the hospital can writer a letter so patient's daughter, Dmeetrius Grimaldo, can come over from [place] to assist with caring for patient. Michele also asked if she can get letters for her own 2 adult daughters so they can come help with the patient. Granddaughters' names are: Cande Hardwick and Josh Hardwick (who are in [place]). Granddaughters have already been working on [place].   Michele stated that prior to this hospitalization, patient was cooking and cleaning, as her normal, despite \"all the tubes\".   Per dtr, patient has [facility] CM  [phone] MA# [id number], Dtr again stated 10 hours/day PCA hours were approved but not started. Patient gets food stamps and SSI. Patient has never worked and is illiterate.   RNCC " confirmed PCP who is Jocelyn Bundy PA-C at [facility]. Patient had been to [place] in the past but it is too far for family to take patient.   RNCC reviewed HCD. Dtr stated patient does not have one and dtr can assist with an English or Portuguese form. Writer does not have Portuguese but will provide and English form to Jeffy     [facility]    [phone] MA# [id number]  -was approved 10 hours/day PCA- call to confirm.     Temporary Address:   Michele Cuello  [address], [address]  [place] [address]      NEXT: RNCC will provide English HCD. Call [facility] to confirm services.   Ask SW if letter(s) can be written so family can come to [facility] to assist with patient's care. TCU rec-TBD. Could go home IF family can provide Ax1 for all mobility. Dtr cannot afford to continue caring for patient without income. Provide English HCD to dtr Michele.    Selene Hamlin RNCC float  Covering for 7B  Phone [phone]  Nurse Coordinator    Social Work and Care Management Department   SEARCHABLE in [facility] - search CARE COORDINATOR     [place] ([phone]) Saturday & Sunday; ([phone]) [facility] Recognized Holidays     Units: 5A Onc 5201 thru 5219 RNCC, 5A Onc 5220 thru 5240 RNCC, 5C OFFSERVICE [id number] RNCC & 5C OFF SERVICE [phone] RNCC Pager: [phone]    Units: 6B Vocera, 6C Card 6401 thru 6420 RNCC, 6C Card 6502 thru 6514 RNCC, & 6C Card 6515 thru 6519 RNCC  Pager: [phone]    Units: 7A SOT RNCC Vocera, 7B Med Surg Vocera, 7C Med Surg 7401 thru 7418 RNCC & 7C Med Surg 7502 thru 5273 RNCC Pager: [phone]    Units: 6A Vocera & 4A CVICU Vocera, 4C MICU Vocera, and 4E SICU Vocera   Pager: [phone]    Units: 5 Ortho Vocera & 5 Med Surg Vocera  Pager: [phone]    Units: 6 Med Surg Vocera & 8 Med Surg Vocera  Pager [phone]

## 2024-06-03 NOTE — PLAN OF CARE
"Goal Outcome Evaluation:      Plan of Care Reviewed With: patient    Overall Patient Progress: no changeOverall Patient Progress: no change     /57 (BP Location: Left arm)   Pulse 70   Temp 98.5  F (36.9  C) (Oral)   Resp 18   Ht 1.499 m (4' 11\")   Wt 51.8 kg (114 lb 3.2 oz)   SpO2 100%   BMI 23.07 kg/m      Patient is stable, denied pain, assisted with cares and ADLs including repositioning; transferred to bedside commode and in chair. Personal hygiene care provided.   "

## 2024-06-03 NOTE — PLAN OF CARE
Goal Outcome Evaluation:  Time: Day shift 6/2/2024  Status: Stable  NEURO: Knows her name but not her date of birth. Not clear on most questions. Interpretor used for communication when daughter not here.  RESPIRATORY: Lungs clear. Diminished in bases.  CARDIAC: Apical pulse regular. HR 60s  GI/: Abdomen soft. Large incontinent stool. Up to commode x2 for BMs. Stool for C.Diff sent. Adequate amount out from alejandra catheter.  DIET: Refusing meal today. Drank Breeze around supper time.  PAIN/NAUSEA: Dilaudid 2.5 given for generalized pain with improvement. Rated pain 5/10. Zofran given with relief.  IV ACCESS: R PIV. D5W running.  ACTIVITY: Up to commode x2. Sat in recliner for short time. Walked to door and back with PT (walker and gait belt, assist of one)  LAB: Phos 1.6. Replacement done.  PLAN: Moved to larger room because daughter wanted to stay the night. Daughter very helpful. Enteric isolation-waiting for lab results.

## 2024-06-03 NOTE — PLAN OF CARE
"Vital signs:  Temp: 97.9  F (36.6  C) Temp src: Oral BP: 126/53 Pulse: 59   Resp: 16 SpO2: 100 % O2 Device: None (Room air)   Height: 149.9 cm (4' 11\") Weight: 51.8 kg (114 lb 3.2 oz)    4759-3316:    Activity: Repositioned in bed with assist of 2. HOB up 30 degrees.  Neuros: WDL. Asymptomatic.   Cardiac: Occasionally bradycardic in 50s-60s. BP 120s/40s-50s. Asymptomatic.   Respiratory: LS diminished in bases. O2 sats high 90s on RA. Denies SOB. Unlabored.   GI/: BS+, had smear of stool. Cameron intact, had adequate yellow with small mucus output.   Diet: TF re-started at 30 mL/hr via NJ per provider Natalia. All oral meds via NJ.   Skin: Healed scar top of right foot. Healed abdominal midline incisional scar   Lines: PIV painful with flush, removed, New PIV placed left arm. D5 infusing at 75 mL/hr, Zosyn infused per orders.  Labs: Reviewed.  at bedtime. BG 88 at 0200. Recheck phosphorus level 2.9.   Pain/nausea: Denies pain. Denies nausea. PRN Zofran x1 prophylactic before restarting TFs.  New changes this shift: Re-started TFs at 30 mL/hr via NJ.  Plan: Continue POC.       "

## 2024-06-03 NOTE — PLAN OF CARE
Goal Outcome Evaluation:      Patient AVSS. Up w/ sba and walker to bedside commode and chair. Had large liquid bm this am. Leaking around alejandra. Alejandra pink tinged w/ clots. Good urine output. IV antibiotics continue. Family @ bedside, very supportive.

## 2024-06-04 NOTE — PROGRESS NOTES
[facility]    Medicine Progress Note - Hospitalist Service, GOLD TEAM 8    Date of Admission:  5/29/2024    Assessment & Plan   Myla Glynn is a 79 year old female admitted on 5/29/2024.  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     Updates:  - no new updates  - NJ TF at near goal  - Persistent nausea, vomiting    #UTI  # Persistent bladder leak s/p urinary diversion with bilateral nephrostomy tubes (now removed)  # Hx of Right renal segmental artery bleed with subsequent clot formation in the renal collecting ducts, ureter, and urinary bladder s/p IR embolization  # Recurrent colorectal cancer with local recurrence within the bladder s/p partial cystectomy with small bowel resection and anastomosis (01/09/2024)  Patient presenting with weakness. UA appears infectious with elevated WBC and + LE. Will start ceftriaxone based on prior cultures while awaiting UC. Cameron in place for bladder decompression. Urology performed CT cystogram with findings showing: anterior bladder wall defect with associated air and fluid collection in low anterior pelvis. Per urology,  this collection appears walled off so they have no plans for decompression or drainage at this time.  -  Bcx with NGTD, urine culture with >100k urogenital jonathan  - Urology recommending IR drainage of fluid collection if signs of hemodynamic instability, but pt currently stable  - Continue Zosyn for empiric antibiosis for chronic anterior bladder leak, infected RP hematoma   - Upon discharge, transition to Augmentin per ID until follow-up with [facility] on 6/19  - ID following, appreciate input   - MR enterography could be considered to evaluate for persistent fistula, but unlikely to change current management   - D/w [facility] (Facundo) who feels this anterior bladder wall leak appears to be chronic in nature, not an acute change    - Pt and family would like to remain at [facility] rather than transfer to [facility]    #ALFREDA   #Hypernatremia  Pt presented with acute kidney injury (Cr 4.5, baseline 1.2-1.5). Suspect pre-renal in the setting of poor PO intake, but obstruction likely contributed as well (alejandra placed for bladder decompression upon arrival to ED). Has had good urine output since admission. Cr now improving, but still not returned to baseline. HyperNa improved with D5w infusion  - Continue alejandra catheter for bladder decompression  - Trend BMP daily  - Avoiding nephrotoxins and renally dosing medications  - Strict I/Os  - D5W for hypernatremia discontinued on 6/3, continue adjusting FWF to maintain normonatremia     #Diarrhea  Non-anion gap metaboic acidosis   Suspected related to antibiotics and tube feeds. However, prolonged and extensive antibiotic exposures in the recent weeks including Augmentin and most recently Zosyn. Has had n/v and some abdominal tenderness present on exam today.  - Continue fiber in Tfs  - Continue probiotics  - C. Diff negative on 6/2, continue Imodium PRN  - stool output and low bicarb levels due to GI loss, improving    #Infected organized left retroperitoneal hematoma with  Proteus hauseri/vulgaris s/p IR coil embolization and drain placement 3/9/2024 followed by multiple drain upsized/exchange and antibiotics until 4/15/2024   Developed during prolonged hospitalization at [place]. IR placed MARIE drain with multiple attempts at upsizing to drain all of the residual fluid collections. Completed several courses of antibiotics for treatment of infected hematoma, last finished Augmentin on 4/15/24. Last seen by ID as outpatient on 5/10, with plans to monitor off abx therapy. Here, CT AP on admission showed minimal residual fluid collection. She had the MARIE drain in place, but this was removed accidentally upon transfer to CT on 5/30.  - D/w IR who reviewed imaging, minimal residual fluid collection with minimal drainage from MARIE drain prior to admission suggests there is not likely benefit to replacing, d/w family  - No signs of ongoing infection in hematoma  - empiric Zosyn, will transition to augmentin for GI coverage  as above, continue until Urology follow-up on 6/19    #Malnutrition  #FTT  Poor PO intake, weight loss, deconditioning for the past several weeks PTA. Suspect infection may be contributing, but likely also represents prolonged hospitalization and underlying CRC.  - Nutrition consulted  - Gentle IVF  - Discussed with pt and family, who are agreeable to trial of enteral feeding. NJ Placed on 5/31, tube feeds initiated   - Goals per family are to see if patient improves strength enough with tube feeds to feel better and eat on her own, discussed that we would readdress her ability to take in PO as she gets closer to discharge, or that NJ feeds could be continued upon discharge to TCU    #Pleural Effusion   #Atelectasis   CT with L pleural effusion. Patient denies respiratory symptoms and is breathing on room air.   - CTM      #HFrEF  #HTN   BP soft on admission, now improved s/p 2L fluid resuscitation. Per discharge summary from 4/2024 patient was scheduled to follow up with  cardiology on 7/24. Recent ECHO on 4/4/24 with EF 29% .   - Holding Carvedilol 12.5mg BID, resumed at 3.125 mg PO BID dose on 6/1  - BNP 1514, no comparisons available     #Chronic Normocytic Anemia   Baseline between 10-11, on admission Hgb 10.5. Likely 2/2 anemia of chronic disease and suspect 2/2 nutritional deficency.   - Trending CBC     #T2DM, diet controlled  - Hypoglycemic protocol      #Nausea   EKG with Qtc 462.   - PTA Zofran   - PTA Compazine   - Added Remeron for appetite stimulation on 5/31     #Hyponatremia   Suspect 2/2 malnourishment. Does not appear volume overloaded.   - Trend BMP    #Deconditioning   -PT/OT consults          Diet: Snacks/Supplements Adult: Ensure Clear; With Meals  Regular Diet Adult  Adult Formula Drip Feeding: Continuous TwoCal HN; Other - Specify in Comment; Goal Rate: 35; mL/hr; Once access confirmed. Initiate @ 10 ml/hr and advance by 10 ml Q8H pending pt's tolerance.; Do not advance tube feeding rate unless K+ is = or > 3...    DVT Prophylaxis: Heparin SQ  Cameron Catheter: PRESENT, indication: Gross hematuria  Lines: None     Cardiac Monitoring: None  Code Status: Full Code      Clinically Significant Risk Factors            # Hypomagnesemia: Lowest Mg = 1.6 mg/dL in last 2 days, will replace as needed   # Hypoalbuminemia: Lowest albumin = 2 g/dL at 5/30/2024  8:27 AM, will monitor as appropriate     # Hypertension: Noted on problem list         # Severe Malnutrition: based on nutrition assessment    # Financial/Environmental Concerns: none         Disposition Plan     Medically Ready for Discharge: Anticipated in 2-4 Days         Panda Marx MD  Hospitalist Service, GOLD TEAM 8  [facility]  Securely message with [employer] (more info)  Text page via [facility] Paging/Directory   See signed in provider for up to date coverage information  ______________________________________________________________________    Interval History    Doing OK this morning. Denies any abdominal pain. Having some nausea, improves with PRN Zofran. No emesis overnight. Stools are reducing in frequency. No shortness of breath or chest pain. Discussed need to work with therapy today, she is agreeable. Daughter and patient both in favor of discharge to TCU once medically stable.     Physical Exam   Vital Signs: Temp: 98.2  F (36.8  C) Temp src: Oral BP: 134/63 Pulse: 73   Resp: 16 SpO2: 99 % O2 Device: None (Room air)    Weight: 114 lbs 3.2 oz    General Appearance: Frail elderly woman, appears older than stated age, NAD  Respiratory: Breathing comfortably on room air, lungs grossly CTAB  Cardiovascular: RRR, no m/r/g.  GI: soft, non-distended, non-tender to palpation. Site of MARIE drain removal appears c/d/I.  Skin: pallor present, no skin rash  Other: Trace LE edema bilaterally. Cameron draining clear yellow urine.     Medical Decision Making       40 MINUTES SPENT BY ME on the date of service doing chart review, history, exam, documentation & further activities per the note.      Data     I have personally reviewed the following data over the past 24 hrs:    9.6  \   9.0 (L)   / 207     143 114 (H) 21.1 /  168 (H)   3.5 18 (L) 1.55 (H) \       Imaging results reviewed over the past 24 hrs:   No results found for this or any previous visit (from the past 24 hour(s)).

## 2024-06-04 NOTE — PROGRESS NOTES
Notified MD at 0352  regarding pt has an episode of emesis, no PRNs for N/V.      Paged: CLARIBEL Palencia MD    Orders were obtained, IV Zofran Q6hrs PRN

## 2024-06-04 NOTE — PLAN OF CARE
"Pt is alert and oriented is able to make needs known. Rested intermittently during the night between cares. Daughter at bedside and supportive also assisting staff and pt with interpretation. Remains on RA, /79 (BP Location: Left arm, Patient Position: Semi-Vasquez's, Cuff Size: Adult Regular)   Pulse 70   Temp 98.3  F (36.8  C) (Oral)   Resp 17   Ht 1.499 m (4' 11\")   Wt 51.8 kg (114 lb 3.2 oz)   SpO2 100%   BMI 23.07 kg/m  . Continues on IV abx, and continues tube feeding via ng tube. Pt denies pain and discomfort during the night. Is able to shift weight in bed does require encouragement, staff assisted in T/R PRN to maintain skin integrity. Cameron in place with adequate urine output. No acute changes during the night. Will discharge when medically cleared.       Intake/Output Summary (Last 24 hours) at 6/4/2024 0635  Last data filed at 6/4/2024 0616  Gross per 24 hour   Intake 2510 ml   Output 1725 ml   Net 785 ml       Problem: Adult Inpatient Plan of Care  Goal: Absence of Hospital-Acquired Illness or Injury  Intervention: Prevent Skin Injury   turned   refuses positioning, boosted in bed, pillows in place    Problem: Infection  Goal: Absence of Infection Signs and Symptoms  Intervention: Prevent or Manage Infection    Goal Outcome Evaluation: No Change      Plan of Care Reviewed With: patient    Overall Patient Progress: no change      "

## 2024-06-04 NOTE — PROGRESS NOTES
"CLINICAL NUTRITION SERVICES - BRIEF NOTE     Nutrition Prescription    RECOMMENDATIONS FOR MDs/PROVIDERS TO ORDER:  Pt's phosphorus and magnesium levels both low today and no replacement protocols order. Order replacement per provider discretion.        *See RD note on 5/31 for nutrition assessment note    EVALUATION OF THE PROGRESS TOWARD GOALS   Diet: Regular    Nutrition Support: TwoCal HN via ND tube@ 35 ml/hr. Provides 840 ml, 1680 kcals, 71 g protein, 184 g CHO, 4 g fiber, 588 ml free water     Intake: Nasoduodenal tube placd 5/31 (per AXR read: \"Feeding tube tip projecting in the distal duodenum/proximal jejunum. \") and TF started @ 10 mL/hr with recs to adv by 10 mL q8h as tolerated and pending lytes to goal 35 mL/hr (goal reached 6/3 AM per flowsheets)     NEW FINDINGS   Labs:   - K+ WNL  - Mg++ 1.6 (L)  - Phos 1.7 (L)  - Cr 1.55 (H), GFR 34 (L)    Meds: thiamine 100 mg/day (5/31-6/5)     INTERVENTIONS  Collaboration with other providers: minerva messaged provider re: low Mg++ and Phos, see above; Alerted bedside RN as well of low Mg++ and Phos and that RD messaged provider about it    Monitoring/Evaluation  Progress toward goals will be monitored and evaluated per protocol.      Doreen Muñoz RD, , LD  Weekday Units covered: 5A (non-Heme Onc pts) and 7B ([id number])  Available by 5A or 7B Clinical Dietitiricha Palma  Weekend Coverage: Weekend Clinical Dietitian Minerva    Clinical Dietitians no longer available via paging   "

## 2024-06-05 NOTE — PLAN OF CARE
Plan of Care Reviewed With: patient    Overall Patient Progress: improving    Outcome Evaluation: Patient up to chair this shift. TF stopped this evening for emesis of 200ml, antiemetic given with relief. Service notified. Water flushes decreased to 100ml q4.

## 2024-06-05 NOTE — PROGRESS NOTES
Patient turned repositioned every two hours. TF infusing. Up to bedside commode with assist of 2 with gaitbelt and walker.

## 2024-06-05 NOTE — PROGRESS NOTES
[facility]    Medicine Progress Note - Hospitalist Service, GOLD TEAM 8    Date of Admission:  5/29/2024    Assessment & Plan   Myla Glynn is a 79 year old female admitted on 5/29/2024.  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     Updates:  - increasing free water flushes  - Daughter interested in pursuing a PEG tube for nutrition    #UTI  # Persistent bladder leak s/p urinary diversion with bilateral nephrostomy tubes (now removed)  # Hx of Right renal segmental artery bleed with subsequent clot formation in the renal collecting ducts, ureter, and urinary bladder s/p IR embolization  # Recurrent colorectal cancer with local recurrence within the bladder s/p partial cystectomy with small bowel resection and anastomosis (01/09/2024)  Patient presenting with weakness. UA appears infectious with elevated WBC and + LE. Will start ceftriaxone based on prior cultures while awaiting UC. Cameron in place for bladder decompression. Urology performed CT cystogram with findings showing: anterior bladder wall defect with associated air and fluid collection in low  anterior pelvis. Per urology, this collection appears walled off so they have no plans for decompression or drainage at this time.  -  Bcx with NGTD, urine culture with >100k urogenital jonathan  - Urology recommending IR drainage of fluid collection if signs of hemodynamic instability, but pt currently stable  - Continue Zosyn for empiric antibiosis for chronic anterior bladder leak, infected RP hematoma   - Upon discharge, transition to Augmentin per ID until follow-up with [facility] on 6/19  - ID following, appreciate input   - MR enterography could be considered to evaluate for persistent fistula, but unlikely to change current management   - D/w [facility] (Facundo) who feels this anterior bladder wall leak appears to be chronic in nature, not an acute change    - Pt and family would like to remain at [facility] rather than transfer to [place]    #ALFREDA   #Hypernatremia  Pt presented with acute kidney injury (Cr 4.5, baseline 1.2-1.5). Suspect pre-renal in the setting of poor PO intake, but obstruction likely contributed as well (alejandra placed for bladder decompression upon arrival to ED). Has had good urine output since admission. Cr now improving, but still not returned to baseline. HyperNa improved with D5w infusion  - Continue alejandra catheter for bladder decompression  - Trend BMP daily  - Avoiding nephrotoxins and renally dosing medications  - Strict I/Os  - D5W for hypernatremia discontinued on 6/3, continue adjusting FWF to maintain normonatremia     #Diarrhea  Non-anion gap metaboic acidosis   Suspected related to antibiotics and tube feeds. However, prolonged and extensive antibiotic exposures in the recent weeks including Augmentin and most recently Zosyn. Has had n/v and some abdominal tenderness present on exam today.  - Continue fiber in Tfs  - Continue probiotics  - C. Diff negative on 6/2, continue Imodium PRN  - stool output and low bicarb levels due to GI loss, improving    #Infected organized left  retroperitoneal hematoma with Proteus hauseri/vulgaris s/p IR coil embolization and drain placement 3/9/2024 followed by multiple drain upsized/exchange and antibiotics until 4/15/2024   Developed during prolonged hospitalization at [place]. IR placed MARIE drain with multiple attempts at upsizing to drain all of the residual fluid collections. Completed several courses of antibiotics for treatment of infected hematoma, last finished Augmentin on 4/15/24. Last seen by ID as outpatient on 5/10, with plans to monitor off abx therapy. Here, CT AP on admission showed minimal residual fluid collection. She had the MARIE drain in place, but this was removed accidentally upon transfer to CT on 5/30.  - D/w IR who reviewed imaging, minimal residual fluid collection with minimal drainage from MARIE drain prior to admission suggests there is not likely benefit to replacing, d/w family  - No signs of ongoing infection in hematoma  - empiric Zosyn, will transition to augmentin for GI coverage  as above, continue until Urology follow-up on 6/19    #Malnutrition  #FTT  Poor PO intake, weight loss, deconditioning for the past several weeks PTA. Suspect infection may be contributing, but likely also represents prolonged hospitalization and underlying CRC.  - Nutrition consulted  - Gentle IVF  - Discussed with pt and family, who are agreeable to trial of enteral feeding. NJ Placed on 5/31, tube feeds initiated   - Goals per family are to see if patient improves strength enough with tube feeds to feel better and eat on her own, discussed that we would readdress her ability to take in PO as she gets closer to discharge, or that NJ feeds could be continued upon discharge to TCU    #Pleural Effusion   #Atelectasis   CT with L pleural effusion. Patient denies respiratory symptoms and is breathing on room air.   - CTM      #HFrEF  #HTN   BP soft on admission, now improved s/p 2L fluid resuscitation. Per discharge summary from 4/2024 patient was  scheduled to follow up with cardiology on 7/24. Recent ECHO on 4/4/24 with EF 29% .   - Holding Carvedilol 12.5mg BID, resumed at 3.125 mg PO BID dose on 6/1  - BNP 1514, no comparisons available     #Chronic Normocytic Anemia   Baseline between 10-11, on admission Hgb 10.5. Likely 2/2 anemia of chronic disease and suspect 2/2 nutritional deficency.   - Trending CBC     #T2DM, diet controlled  - Hypoglycemic protocol      #Nausea   EKG with Qtc 462.   - PTA Zofran   - PTA Compazine   - Added Remeron for appetite stimulation on 5/31     #Hyponatremia   Suspect 2/2 malnourishment. Does not appear volume overloaded.   - Trend BMP    #Deconditioning   -PT/OT consults          Diet: Snacks/Supplements Adult: Ensure Clear; With Meals  Regular Diet Adult  Adult Formula Drip Feeding: Continuous TwoCal HN; Other - Specify in Comment; Goal Rate: 35; mL/hr; Once access confirmed. Initiate @ 10 ml/hr and advance by 10 ml Q8H pending pt's tolerance.; Do not advance tube feeding rate unless K+ is = or > 3...    DVT Prophylaxis: Heparin SQ  Cameron Catheter: PRESENT, indication: Gross hematuria  Lines: None     Cardiac Monitoring: None  Code Status: Full Code      Clinically Significant Risk Factors         # Hypernatremia: Highest Na = 148 mmol/L in last 2 days, will monitor as appropriate    # Hypomagnesemia: Lowest Mg = 1.6 mg/dL in last 2 days, will replace as needed   # Hypoalbuminemia: Lowest albumin = 2 g/dL at 5/30/2024  8:27 AM, will monitor as appropriate     # Hypertension: Noted on problem list         # Severe Malnutrition: based on nutrition assessment    # Financial/Environmental Concerns: none         Disposition Plan     Medically Ready for Discharge: Anticipated in 2-4 Days         Panda Marx MD  Hospitalist Service, GOLD TEAM [address] [place] [address]  Securely message with [employer] (more info)  Text page via [employer] Paging/Directory   See signed in provider for up to date  coverage information  ______________________________________________________________________    Interval History     Daughter at bedside. Via , reported worsening lower quadrant abdominal pain, but daughter denies this situation. Requesting that I talk to Dr. Hall in regard to PEG placement    Physical Exam   Vital Signs: Temp: 97.2  F (36.2  C) Temp src: Axillary BP: (!) 148/69 Pulse: 81   Resp: 16 SpO2: 100 % O2 Device: None (Room air)    Weight: 114 lbs 3.2 oz    General Appearance: Frail elderly woman, appears older than stated age, NAD  Respiratory: Breathing comfortably on room air, lungs grossly CTAB  Cardiovascular: RRR, no m/r/g.  GI: soft, non-distended, non-tender to palpation. Site of MARIE drain removal appears c/d/I.  Skin: pallor present, no skin rash  Other: Trace LE edema bilaterally. Cameron draining clear yellow urine.     Medical Decision Making       50 MINUTES SPENT BY ME on the date of service doing chart review, history, exam, documentation & further activities per the note.      Data     I have personally reviewed the following data over the past 24 hrs:    6.7  \   8.5 (L)   / 199     148 (H) 116 (H) 20.4 /  130 (H)   3.9 20 (L) 1.40 (H) \       Imaging results reviewed over the past 24 hrs:   No results found for this or any previous visit (from the past 24 hour(s)).

## 2024-06-05 NOTE — PLAN OF CARE
"Pt is alert and oriented is able to make some needs known. Denies pain and discomfort. Reported no N/V during the night. Tube feeding infusing at goal rate via NG tube W/water flushes. Pt remains on RA, /54 (BP Location: Right arm, Patient Position: Semi-Vasquez's, Cuff Size: Adult Regular)   Pulse 80   Temp 98.1  F (36.7  C) (Oral)   Resp 17   Ht 1.499 m (4' 11\")   Wt 51.8 kg (114 lb 3.2 oz)   SpO2 99%   BMI 23.07 kg/m  . Up with A1-2 W/Walker and belt in room, T/R Q2hrs and PRN to maintain skin integrity. Continues on IV abx during the shift. Cameron in place with adequate urine output, x1 loose BM during the shift. No acute changes during the shift will discharge when medically cleared.     Intake/Output Summary (Last 24 hours) at 6/5/2024 0626  Last data filed at 6/5/2024 0619  Gross per 24 hour   Intake 2005 ml   Output 1225 ml   Net 780 ml       Goal Outcome Evaluation: Progressing       Plan of Care Reviewed With: patient    Overall Patient Progress: no change    Problem: Adult Inpatient Plan of Care  Goal: Absence of Hospital-Acquired Illness or Injury  Intervention: Prevent Skin Injury    Problem: Adult Inpatient Plan of Care  Goal: Absence of Hospital-Acquired Illness or Injury  Intervention: Prevent Infection          "

## 2024-06-05 NOTE — PROGRESS NOTES
Care Management Follow Up    Length of Stay (days): 7    Expected Discharge Date: 06/06/2024     Concerns to be Addressed: discharge planning     Patient plan of care discussed at interdisciplinary rounds: Yes    Anticipated Discharge Disposition: TCU     Anticipated Discharge Services: TCU  Anticipated Discharge DME: Other (see comment) (TBD)    Patient/family educated on Medicare website which has current facility and service quality ratings: yes   Education Provided on the Discharge Plan:  yes  Patient/Family in Agreement with the Plan: yes     Referrals Placed by CM/SW:  None this date  Private pay costs discussed: Not applicable    Additional Information:  Met with patient and daughter, Michele, at bedside to discuss discharge plans.  PT is recommending home with home care, but would need assist of 1 available.  Michele reports that she can not provide 24/7 assist for her Mom as she needs to go back to work.  She feels her Mom is deconditioned as prior to admission she was independent needing minimal assist.  She feels her Mom needs TCU, and the pt is agreeable to discharging to TCU. Provided Michele with the medicare.gov list of SNF's near [place].  Asked her to pick 5 facilities for us to make referral to.    Michele is working on getting her daughters from [place] and sister from [place] to the [place] to assist with providing care.  She asked that the provider write a letter to assist in expediting this. Asked Dr. Marx to provide these letters for the patient if appropriate.     Provided blank HCD form to the patient to complete.      Per Dr. Marx patient will be getting a permanent feeding tube placed prior to discharge.      CM team will continue to follow and assist with discharge planning.        [facility]    [phone] MA# [id number]  -was approved 10 hours/day PCA- call to confirm.     Temporary Address:   [facility]  [address], [address]  [place] [address]    Carolina Ken,  RNCC  Phone: [phone]  7B Med Surg Vocera  Nurse Coordinator      Social Work and Care Management Department       SEARCHABLE in [facility] - search CARE COORDINATOR       [place] ([phone]) Saturday & Sunday; ([phone]) [facility] Recognized Holidays     Units: 5A Onc Vocera & 5C Vocera    Units: 6B Vocera & 6C Vocera     Units: 7A SOT RNCC Vocera, 7B Med Surg Vocera, & 7C Med Surg Vocera    Units: 6A Vocera & 4A CVICU Vocera, 4C MICU Vocera, and 4E SICU Vocera       Units: 5 Ortho Vocera & 5 Med Surg Vocera      Units: 6 Med Surg Vocera & 8 Med Surg Vocera

## 2024-06-06 NOTE — PROGRESS NOTES
[facility]    Medicine Progress Note - Hospitalist Service, GOLD TEAM 8    Date of Admission:  5/29/2024    Assessment & Plan   Myla Glynn is a 79 year old female admitted on 5/29/2024.  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     Updates:  - working on getting PEG tube placement  - Increasing oxycodone    #UTI  # Persistent bladder leak s/p urinary diversion with bilateral nephrostomy tubes (now removed)  # Hx of Right renal segmental artery bleed with subsequent clot formation in the renal collecting ducts, ureter, and urinary bladder s/p IR embolization  # Recurrent colorectal cancer with local recurrence within the bladder s/p partial cystectomy with small bowel resection and anastomosis (01/09/2024)  Patient presenting with weakness. UA appears infectious with elevated WBC and + LE. Will start ceftriaxone based on prior cultures while awaiting UC. Cameron in place for bladder decompression. Urology performed CT cystogram with findings showing: anterior bladder wall defect with associated air and fluid collection in low anterior pelvis. Per urology, this  collection appears walled off so they have no plans for decompression or drainage at this time.  -  Bcx with NGTD, urine culture with >100k urogenital jonathan  - Urology recommending IR drainage of fluid collection if signs of hemodynamic instability, but pt currently stable  - Continue Zosyn for empiric antibiosis for chronic anterior bladder leak, infected RP hematoma   - Upon discharge, transition to Augmentin per ID until follow-up with [facility] on 6/19  - ID following, appreciate input   - MR enterography could be considered to evaluate for persistent fistula, but unlikely to change current management   - D/w [facility] (Facundo) who feels this anterior bladder wall leak appears to be chronic in nature, not an acute change    - Pt and family would like to remain at [facility] rather than transfer to [place]    #ALFREDA   #Hypernatremia  Pt presented with acute kidney injury (Cr 4.5, baseline 1.2-1.5). Suspect pre-renal in the setting of poor PO intake, but obstruction likely contributed as well (alejandra placed for bladder decompression upon arrival to ED). Has had good urine output since admission. Cr now improving, but still not returned to baseline. HyperNa improved with D5w infusion  - Continue alejandra catheter for bladder decompression  - Trend BMP daily  - Avoiding nephrotoxins and renally dosing medications  - Strict I/Os  - D5W for hypernatremia discontinued on 6/3, continue adjusting FWF to maintain normonatremia   - improving    #Diarrhea  Non-anion gap metaboic acidosis   Suspected related to antibiotics and tube feeds. However, prolonged and extensive antibiotic exposures in the recent weeks including Augmentin and most recently Zosyn. Has had n/v and some abdominal tenderness present on exam today.  - Continue fiber in Tfs  - Continue probiotics  - C. Diff negative on 6/2, continue Imodium PRN  - stool output and low bicarb levels due to GI loss, improving    #Infected organized left retroperitoneal hematoma  with Proteus hauseri/vulgaris s/p IR coil embolization and drain placement 3/9/2024 followed by multiple drain upsized/exchange and antibiotics until 4/15/2024   Developed during prolonged hospitalization at [place]. IR placed MARIE drain with multiple attempts at upsizing to drain all of the residual fluid collections. Completed several courses of antibiotics for treatment of infected hematoma, last finished Augmentin on 4/15/24. Last seen by ID as outpatient on 5/10, with plans to monitor off abx therapy. Here, CT AP on admission showed minimal residual fluid collection. She had the MARIE drain in place, but this was removed accidentally upon transfer to CT on 5/30.  - D/w IR who reviewed imaging, minimal residual fluid collection with minimal drainage from MARIE drain prior to admission suggests there is not likely benefit to replacing, d/w family  - No signs of ongoing infection in hematoma  - empiric Zosyn, will transition to augmentin for GI coverage  as above, continue until Urology follow-up on 6/19    #Malnutrition  #FTT  Poor PO intake, weight loss, deconditioning for the past several weeks PTA. Suspect infection may be contributing, but likely also represents prolonged hospitalization and underlying CRC.  - Nutrition consulted  - Gentle IVF  - Discussed with pt and family, who are agreeable to trial of enteral feeding. NJ Placed on 5/31, tube feeds initiated   - Goals per family are to see if patient improves strength enough with tube feeds to feel better and eat on her own, discussed that we would readdress her ability to take in PO as she gets closer to discharge, or that NJ feeds could be continued upon discharge to TCU    #Pleural Effusion   #Atelectasis   CT with L pleural effusion. Patient denies respiratory symptoms and is breathing on room air.   - CTM      #HFrEF  #HTN   BP soft on admission, now improved s/p 2L fluid resuscitation. Per discharge summary from 4/2024 patient was scheduled to follow up with  cardiology on 7/24. Recent ECHO on 4/4/24 with EF 29% .   - Holding Carvedilol 12.5mg BID, resumed at 3.125 mg PO BID dose on 6/1  - BNP 1514, no comparisons available     #Chronic Normocytic Anemia   Baseline between 10-11, on admission Hgb 10.5. Likely 2/2 anemia of chronic disease and suspect 2/2 nutritional deficency.   - Trending CBC     #T2DM, diet controlled  - Hypoglycemic protocol      #Nausea   EKG with Qtc 462.   - PTA Zofran   - PTA Compazine   - Added Remeron for appetite stimulation on 5/31     #Hyponatremia   Suspect 2/2 malnourishment. Does not appear volume overloaded.   - Trend BMP    #Deconditioning   -PT/OT consults          Diet: Snacks/Supplements Adult: Ensure Clear; With Meals  Regular Diet Adult  Adult Formula Drip Feeding: Continuous TwoCal HN; Other - Specify in Comment; Goal Rate: 35; mL/hr; Once access confirmed. Initiate @ 10 ml/hr and advance by 10 ml Q8H pending pt's tolerance.; Do not advance tube feeding rate unless K+ is = or > 3...    DVT Prophylaxis: Heparin SQ  Cameron Catheter: PRESENT, indication: Gross hematuria  Lines: None     Cardiac Monitoring: None  Code Status: Full Code      Clinically Significant Risk Factors         # Hypernatremia: Highest Na = 148 mmol/L in last 2 days, will monitor as appropriate    # Hypomagnesemia: Lowest Mg = 1.6 mg/dL in last 2 days, will replace as needed   # Hypoalbuminemia: Lowest albumin = 2 g/dL at 5/30/2024  8:27 AM, will monitor as appropriate     # Hypertension: Noted on problem list         # Severe Malnutrition: based on nutrition assessment    # Financial/Environmental Concerns: none         Disposition Plan     Medically Ready for Discharge: Anticipated in 2-4 Days         Panda Marx MD  Hospitalist Service, GOLD TEAM [address] [place] [address]  Securely message with [employer] (more info)  Text page via [employer] Paging/Directory   See signed in provider for up to date coverage  information  ______________________________________________________________________    Interval History     Via , continues to report abdominal pain  D/W Cousin - Bennyye at length and agrees to proceed with PEG placement    Physical Exam   Vital Signs: Temp: 98.3  F (36.8  C) Temp src: Oral BP: (!) 147/60 Pulse: 73   Resp: 16 SpO2: 100 % O2 Device: None (Room air)    Weight: 114 lbs 3.2 oz    General Appearance: Frail elderly woman, appears older than stated age, NAD  Respiratory: Breathing comfortably on room air, lungs grossly CTAB  Cardiovascular: RRR, no m/r/g.  GI: soft, non-distended, non-tender to palpation. Site of MARIE drain removal appears c/d/I.  Skin: pallor present, no skin rash  Other: Trace LE edema bilaterally. Cameron draining clear yellow urine.     Medical Decision Making       55 MINUTES SPENT BY ME on the date of service doing chart review, history, exam, documentation & further activities per the note.      Data     I have personally reviewed the following data over the past 24 hrs:    6.9  \   8.3 (L)   / 193     141 111 (H) 19.9 /  160 (H)   4.3 20 (L) 1.27 (H) \       Imaging results reviewed over the past 24 hrs:   No results found for this or any previous visit (from the past 24 hour(s)).

## 2024-06-06 NOTE — PLAN OF CARE
"Pt is alert and oriented is able to make some needs known. Pt denies pain and discomfort, no noted N/V during the shift, reports that feels nauseous when mostly when laying down flat(positional). Continues on Tube feeding via NG tube at goal rate with Q4hrs water flushes. Remains on RA, /83 (BP Location: Right arm, Patient Position: Semi-Vasquez's, Cuff Size: Adult Regular)   Pulse 79   Temp 98.2  F (36.8  C)   Resp 17   Ht 1.499 m (4' 11\")   Wt 51.8 kg (114 lb 3.2 oz)   SpO2 96%   BMI 23.07 kg/m  . T/R Q2-3hrs to promote and maintain ski integrity. Cameron in place with adequate urine output. X1 BM during the night, up to bedside commode with Ax1. No acute changes during the night. Will discharge when medically cleared. CM following care coordinations and discharge needs.       Intake/Output Summary (Last 24 hours) at 6/6/2024 0646  Last data filed at 6/6/2024 0600  Gross per 24 hour   Intake 1695 ml   Output 1750 ml   Net -55 ml         Goal Outcome Evaluation: Progressing       Plan of Care Reviewed With: patient    Overall Patient Progress: no change  "

## 2024-06-07 NOTE — PLAN OF CARE
Goal Outcome Evaluation:      Plan of Care Reviewed With: patient, child    Overall Patient Progress: no changeOverall Patient Progress: no change    Temp: 98.8  F (37.1  C) Temp src: Oral BP: 133/61 Pulse: 80   Resp: 17 SpO2: 100 % O2 Device: None (Room air)       A&Ox4. Daughter present and helpful, translating with some basic cares. VSS on RA. NJT w/ TF at 35ml/hr, 100ml flushes q4h. Chronic alejandra w/ AUOP. L PIV w/ IV zosyn. Denies pain. Zofran x1 for nausea, effective. Refused scheduled fiber tonight. No emesis. No BM. A1 w/ GB to commode. No acute major changes.

## 2024-06-07 NOTE — PROGRESS NOTES
[facility]    Medicine Progress Note - Hospitalist Service, GOLD TEAM 8    Date of Admission:  5/29/2024    Assessment & Plan   Myla Glynn is a 79 year old female admitted on 5/29/2024.  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     Updates:  - IR PEG placement on 6/13/24    #UTI  # Persistent bladder leak s/p urinary diversion with bilateral nephrostomy tubes (now removed)  # Hx of Right renal segmental artery bleed with subsequent clot formation in the renal collecting ducts, ureter, and urinary bladder s/p IR embolization  # Recurrent colorectal cancer with local recurrence within the bladder s/p partial cystectomy with small bowel resection and anastomosis (01/09/2024)  Patient presenting with weakness. UA appears infectious with elevated WBC and + LE. Will start ceftriaxone based on prior cultures while awaiting UC. Cameron in place for bladder decompression. Urology performed CT cystogram with findings showing: anterior bladder wall defect with associated air and fluid collection in low anterior pelvis. Per urology, this collection appears walled off so  they have no plans for decompression or drainage at this time.  -  Bcx with NGTD, urine culture with >100k urogenital jonathan  - Urology recommending IR drainage of fluid collection if signs of hemodynamic instability, but pt currently stable  - Continue Zosyn for empiric antibiosis for chronic anterior bladder leak, infected RP hematoma   - Upon discharge, transition to Augmentin per ID until follow-up with [facility] on 6/19  - ID following, appreciate input   - MR enterography could be considered to evaluate for persistent fistula, but unlikely to change current management   - D/w [facility] (Facundo) who feels this anterior bladder wall leak appears to be chronic in nature, not an acute change    - Pt and family would like to remain at [facility] rather than transfer to [place]    #ALFREDA   CKD stage 3B  #Hypernatremia  Malnutrition  Minimal PO intake  Pt presented with acute kidney injury (Cr 4.5, baseline 1.2-1.5). Suspect pre-renal in the setting of poor PO intake, but obstruction likely contributed as well (alejandra placed for bladder decompression upon arrival to ED). Has had good urine output since admission. Cr now improving, but still not returned to baseline. HyperNa improved with D5w infusion  - Continue alejandra catheter for bladder decompression  - Trend BMP daily  - Avoiding nephrotoxins and renally dosing medications  - Strict I/Os  - D5W for hypernatremia discontinued on 6/3, continue adjusting FWF to maintain normonatremia   - improving but still requiring enteral TF which has been maintaining her volume status and electrolyte balance. After discussion with family, Urology team from [facility], the only sustainable option would be to pursue PEG placement and enteral feeding. D/W IR and GI medicine. Considering benefit and low risk of IR PEG placement under conscious sedation, will pursue the same as opposed to GI PEG placement under GA. But earliest available is 6/13/24. Since she is essentially TF dependent,  she would be med ready for disposition only after PEG placement.     #Diarrhea  Non-anion gap metaboic acidosis   Suspected related to antibiotics and tube feeds. However, prolonged and extensive antibiotic exposures in the recent weeks including Augmentin and most recently Zosyn. Has had n/v and some abdominal tenderness present on exam today.  - Continue fiber in Tfs  - Continue probiotics  - C. Diff negative on 6/2, continue Imodium PRN  - stool output and low bicarb levels due to GI loss, improving    #Infected organized left retroperitoneal hematoma with Proteus hauseri/vulgaris s/p IR coil embolization and drain placement 3/9/2024 followed by multiple drain upsized/exchange and antibiotics until 4/15/2024   Developed during prolonged hospitalization at [place]. IR placed MARIE drain with multiple attempts at upsizing to drain all of the residual fluid collections. Completed several courses of antibiotics for treatment of infected hematoma, last finished Augmentin on 4/15/24. Last seen by ID as outpatient on 5/10, with plans to monitor off abx therapy. Here, CT AP on admission showed minimal residual fluid collection. She had the MARIE drain in place, but this was removed accidentally upon transfer to CT on 5/30.  - D/w IR who reviewed imaging, minimal residual fluid collection with minimal drainage from MARIE drain prior to admission suggests there is not likely benefit to replacing, d/w family  - No signs of ongoing infection in hematoma  - empiric Zosyn, will transition to augmentin for GI coverage  as above, continue until Urology follow-up on 6/19    #Malnutrition  #FTT  Poor PO intake, weight loss, deconditioning for the past several weeks PTA. Suspect infection may be contributing, but likely also represents prolonged hospitalization and underlying CRC.  - Nutrition consulted  - Gentle IVF  - Discussed with pt and family, who are agreeable to trial of enteral feeding. NJ Placed on 5/31, tube feeds initiated   -  Goals per family are to see if patient improves strength enough with tube feeds to feel better and eat on her own, discussed that we would readdress her ability to take in PO as she gets closer to discharge, or that NJ feeds could be continued upon discharge to TCU    #Pleural Effusion   #Atelectasis   CT with L pleural effusion. Patient denies respiratory symptoms and is breathing on room air.   - CTM      #HFrEF  #HTN   BP soft on admission, now improved s/p 2L fluid resuscitation. Per discharge summary from 4/2024 patient was scheduled to follow up with cardiology on 7/24. Recent ECHO on 4/4/24 with EF 29% .   - Holding Carvedilol 12.5mg BID, resumed at 3.125 mg PO BID dose on 6/1  - BNP 1514, no comparisons available     #Chronic Normocytic Anemia   Baseline between 10-11, on admission Hgb 10.5. Likely 2/2 anemia of chronic disease and suspect 2/2 nutritional deficency.   - Trending CBC     #T2DM, diet controlled  - Hypoglycemic protocol      #Nausea   EKG with Qtc 462.   - PTA Zofran   - PTA Compazine   - Added Remeron for appetite stimulation on 5/31     #Hyponatremia   Suspect 2/2 malnourishment. Does not appear volume overloaded.   - Trend BMP    #Deconditioning   -PT/OT consults          Diet: Snacks/Supplements Adult: Ensure Clear; With Meals  Regular Diet Adult  Adult Formula Drip Feeding: Continuous TwoCal HN; Other - Specify in Comment; Goal Rate: 35; mL/hr; Once access confirmed. Initiate @ 10 ml/hr and advance by 10 ml Q8H pending pt's tolerance.; Do not advance tube feeding rate unless K+ is = or > 3...    DVT Prophylaxis: Heparin SQ  Cameron Catheter: PRESENT, indication: Gross hematuria  Lines: None     Cardiac Monitoring: None  Code Status: Full Code      Clinically Significant Risk Factors            # Hypomagnesemia: Lowest Mg = 1.6 mg/dL in last 2 days, will replace as needed   # Hypoalbuminemia: Lowest albumin = 2 g/dL at 5/30/2024  8:27 AM, will monitor as appropriate     # Hypertension: Noted  on problem list         # Severe Malnutrition: based on nutrition assessment    # Financial/Environmental Concerns: none         Disposition Plan     Medically Ready for Discharge: Anticipated in 2-4 Days         Panda Marx MD  Hospitalist Service, GOLD TEAM 8  [facility]  Securely message with [employer] (more info)  Text page via [facility] Paging/Directory   See signed in provider for up to date coverage information  ______________________________________________________________________    Interval History     Via , continues to report abdominal pain  D/W Cousin - Nardostcecefaye at length and agrees to proceed with PEG placement    Physical Exam   Vital Signs: Temp: 97.9  F (36.6  C) Temp src: Oral BP: 137/63 Pulse: 77   Resp: 17 SpO2: 100 % O2 Device: None (Room air)    Weight: 121 lbs 11.2 oz    General Appearance: Frail elderly woman, appears older than stated age, NAD  Respiratory: Breathing comfortably on room air, lungs grossly CTAB  Cardiovascular: RRR, no m/r/g.  GI: soft, non-distended, non-tender to palpation. Site of MARIE drain removal appears c/d/I.  Skin: pallor present, no skin rash  Other: Trace LE edema bilaterally. Cameron draining clear yellow urine.     Medical Decision Making       45 MINUTES SPENT BY ME on the date of service doing chart review, history, exam, documentation & further activities per the note.      Data     I have personally reviewed the following data over the past 24 hrs:    7.5  \   8.4 (L)   / 211     139 107 21.0 /  145 (H)   4.5 22 1.24 (H) \       Imaging results reviewed over the past 24 hrs:   No results found for this or any previous visit (from the past 24 hour(s)).

## 2024-06-07 NOTE — CONSULTS
"            [facility] Consult Service Note  Interventional Radiology  06/07/24   12:33 PM    Team was concerned that g-tube placement with IR would delay discharge so they consulted GI, who also could not offer a g-tube placement at an earlier time. IR was reconsulted to place patient back on their schedule for Thursday of next week.    Recommendations/Plan:    -Patient is approved for a G-tube placement.    -Timing of procedure is TBD based on IR staffing/schedule and triage.  -Please contact the IR charge RN at [phone] for estimated time of procedure.   -Labs WNL for procedure: INR 1.21, Plts 211.  -Orders entered for procedure. Oral contrast needs to be given at 2000 night prior. NPO at midnight.  -Medications to be held include: 5,000 units subcutaneous heparin q 8 hr - hold 8 hr prior and and be resumed 6-8 hr post procedure.  -Informed consent will be completed prior to procedure.   -Case and imaging discussed with IR attending Dr. James Baum.  -Recommendations were reviewed with requesting provider, Dr. Panda Marx.      [facility] Consult Service Note  Interventional Radiology  06/07/24   10:22 AM    Consult Requested: \"malnutrition for PEG placement\"    Recommendations/Plan:    -Patient is approved for placement of a percutaneous gastrostomy tube on Thursday of next week. After speaking with team they decided to not go forward with IR g-tube placement and instead plan to consult GI. They stated that this is the only thing keeping patient prior to discharge and would like it done sooner and before patient is discharged. Unfortunately IR cannot accommodate an earlier date.    History of Present Illness:  Myla Glynn is a 79 year old Tigrinya speaking female admitted on 5/29/2024. 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 IR asked to place a g-tube for nutritional support (patient isn't eating leading to hyponatremia and ALFREDA).      Pertinent Imaging Reviewed:   CT Abdomen/Pelvis w/o contrast - 5/29/24      Expected date of discharge:  06/10/2024    Vitals:   /63 (BP Location: Right arm)   Pulse 77   Temp 97.9  F (36.6  C) (Oral)   Resp 17   Ht 1.499 m (4' 11\")   Wt 55.2 kg (121 lb 11.2 oz)   SpO2 100%   BMI 24.58 kg/m      Pertinent Labs Reviewed:  CBC:  Lab Results   Component Value Date    WBC 7.5 06/07/2024    WBC 6.9 06/06/2024    WBC 6.7 06/05/2024    WBC 5.9 06/07/2021    WBC 5.4 04/20/2021    WBC 7.1 02/15/2021     Lab Results   Component Value Date    HGB 8.4 06/07/2024    HGB 8.3 06/06/2024    HGB 8.5 06/05/2024    HGB 10.5 06/07/2021    HGB 8.6 05/09/2021    HGB 8.5 05/08/2021     Lab Results   Component Value Date     06/07/2024     06/06/2024     06/05/2024     06/07/2021     05/05/2021     04/20/2021    INR:  Lab Results   Component Value Date    INR 1.23 (H) 05/30/2024    INR 1.56 (H) 01/06/2021    PTT  05/30/2024      Comment:      Clotted. Disregard results.  This is a corrected result. Previous result was 20 Seconds on 5/30/2024 at  9:20 AM CDT          COVID Results:  COVID-19 Antibody Results, " Testing for Immunity           No data to display              COVID-19 PCR Results           No data to display             Potassium   Date Value Ref Range Status   06/07/2024 4.5 3.4 - 5.3 mmol/L Final   12/13/2021 4.2 3.4 - 5.3 mmol/L Final   06/07/2021 3.9 3.4 - 5.3 mmol/L Final          Chitra Cat PA-C  Interventional Radiology

## 2024-06-07 NOTE — PROGRESS NOTES
"CLINICAL NUTRITION SERVICES - REASSESSMENT NOTE     Nutrition Prescription    RECOMMENDATIONS FOR MDs/PROVIDERS TO ORDER:  FWF adjustments per team.    Malnutrition Status:    Severe malnutrition in the context of acute illness.    Recommendations already ordered by Registered Dietitian (RD):  Discontinued Ensure Clear  Discontinued Nutrisource Fiber  Ordered Banatrol TF TID. 3 pkt Banatrol TF provides 135 kcal, 6 g protein, 15 g fiber daily   Continue TF as ordered: TwoCal HN @ 35 ml/hr. Provides 840 ml, 1680 kcals, 71 g protein, 184 g CHO, 4 g fiber, 588 ml free water.     Future/Additional Recommendations:  Monitor ongoing TF tolerance, lytes, GI/stooling, weights, oral intake.  Monitor Ensure Clear tolerance. May consider reordering it if pt finishes her current stock.  May consider transitioning to cycled TF to prepare for home:  TwoCal HN (or equivalent) at 70 mL/hr x 12 hrs - 8 pm-8 am (840 mL) provides 1680 kcals, 71 g protein, 184 g CHO, 4 g fiber, 588 ml free water.      EVALUATION OF THE PROGRESS TOWARD GOALS   Diet: Regular  Ensure Clear TID  Intake: Pt consuming 0% of meals per flowsheet. Pt ordering 0 meals a day per [employer].    Nutrition Support: TF at goal via ND/JT since 6/3.  5/31-current: TwoCal HN @ 35 ml/hr. Provides 840 ml, 1680 kcals, 71 g protein, 184 g CHO, 4 g fiber, 588 ml free water.   Additives: Nutrisource Fiber BID. Pt has been receiving 2 pkts everyday except for last night, when she refused the 2nd pkt.  No FWF ordered currently, but there is a \"Tube feeding flush\" order for 100 mL q 4 hrs  Intake: Pt receiving a 7 day daily avg of 491 mL of TF, 982 kcals, 41 g protein. This met 67% of kcal needs and 59% of protein needs.     NEW FINDINGS   Met with pt. Used [employer]  on [employer]. Pt reports that she's not eating much, but she eats whatever is brought for her. She says she doesn't drink the Ensure Clear. There were at least 10 bottles on a shelf in her room.  Spoke with " pt's RN outside of pt's room. She says pt is still having nausea, but she's been getting ahead of it with zofran. She also holds TF after giving meds to help pt's stomach settle and does smaller FWF so pt tolerates them better.    General: plan for PEG    Weight:  Last wt (6/6): 55.2 kg  Wt increased compared to admit wt  21.1% wt loss in 1 year (down from 70 kg on 6/20/23)    Labs:  Cr 1.24 (H). GFR 45 (L).  Glucose 145 (H).    Medications:  Augmentin  Thera-vit-m  Florastor  Sodium bicarb tablet 650 mg TID  PRN tums, imodium, zofran, miralax, senna    GI:  Stooling 1-5x daily per I/O. LBM 6/7  Diarrhea per provider note    Skin: no findings. Skin beneath nasal bridle was inspected; appears appropriate, with no skin breakdown or tension on the bridle string.     Renal: ALFREDA    Respiratory: room air    Cardio: HFrEF    Endo: T2DM, diet controlled    MALNUTRITION  % Intake: < 75% for > 7 days (moderate)  % Weight Loss: > 20% in 1 year (severe)  Subcutaneous Fat Loss: Facial region:  mild and Upper arm:  mild   Muscle Loss: Temporal:  mild and Thoracic region (clavicle, acromium bone, deltoid, trapezius, pectoral):  moderate  Fluid Accumulation/Edema: trace-mild  Malnutrition Diagnosis: Severe malnutrition in the context of acute illness.    Previous Goals   Diet adv v nutrition support within 2-3 days.  Evaluation: Met    Previous Nutrition Diagnosis  Inadequate oral intake related to N/V/poor appetite as evidenced by family report.    Evaluation: Updated    CURRENT NUTRITION DIAGNOSIS  Inadequate oral intake related to N/V/poor appetite as evidenced by reliance on TF to meet 100% of nutrition needs.      INTERVENTIONS  Implementation  Enteral Nutrition - continue  Medical food supplement therapy - discontinue    Goals  Total avg nutritional intake to meet a minimum of 25 kcal/kg and 1.2 g PRO/kg daily (per dosing wt 58.2 kg).    Monitoring/Evaluation  Progress toward goals will be monitored and evaluated per  "protocol.    Larry Wood, RD, LD  Available on Vocera  Weekend/Holiday RD Vocera - \"Weekend Clinical Dietitian\"  No longer available by paging   "

## 2024-06-08 NOTE — PLAN OF CARE
"Goal Outcome Evaluation:      Plan of Care Reviewed With: patient, child    Overall Patient Progress: no change  /67 (BP Location: Right arm)   Pulse 89   Temp 98  F (36.7  C) (Oral)   Resp 18   Ht 1.499 m (4' 11\")   Wt 55.2 kg (121 lb 11.2 oz)   SpO2 97%   BMI 24.58 kg/m       Patient is A/O x3 disoriented to time. Tigrinya speaking,  utilized. On room air denies SOB. VSS denies cardiac chest pain. Denies generalized pain. Nausea continues managed with PRN IV Zofran. Up with assist x1 with walker and gait belt to hallway, up to chair this shift. BM x1 this shift alejandra in place with AUOP. NG tube continues at landmark with bridle, feeding at goal rate of 35ml/hr. Poor appetite on regular diet. Continue to manage nausea. Continue POC.     "

## 2024-06-08 NOTE — PROGRESS NOTES
[facility]    Medicine Progress Note - Hospitalist Service, GOLD TEAM 8    Date of Admission:  5/29/2024    Assessment & Plan   Myla Glynn is a 79 year old female admitted on 5/29/2024.  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     #UTI  # Persistent bladder leak s/p urinary diversion with bilateral nephrostomy tubes (now removed)  # Hx of Right renal segmental artery bleed with subsequent clot formation in the renal collecting ducts, ureter, and urinary bladder s/p IR embolization  # Recurrent colorectal cancer with local recurrence within the bladder s/p partial cystectomy with small bowel resection and anastomosis (01/09/2024)  Patient presenting with weakness. UA appears infectious with elevated WBC and + LE. Will start ceftriaxone based on prior cultures while awaiting UC. Cameron in place for bladder decompression. Urology performed CT cystogram with findings showing: anterior bladder wall defect with associated air and fluid collection in low anterior pelvis. Per urology, this collection appears walled off so they have no plans for decompression or  drainage at this time.  -  Bcx with NGTD, urine culture with >100k urogenital jonathan  - Urology recommending IR drainage of fluid collection if signs of hemodynamic instability, but pt currently stable  - Continue Zosyn for empiric antibiosis for chronic anterior bladder leak, infected RP hematoma and switched to Augmentin (6/7) and continue until outpatient follow-up with [facility] on 6/19  - ID following, appreciate input   - MR enterography could be considered to evaluate for persistent fistula, but unlikely to change current management   - D/w [facility] (Facundo) who feels this anterior bladder wall leak appears to be chronic in nature, not an acute change    - Pt and family would like to remain at [facility] rather than transfer to [place]    #ALFREDA   CKD stage 3B  #Hypernatremia  Malnutrition  Minimal PO intake  Pt presented with acute kidney injury (Cr 4.5, baseline 1.2-1.5). Suspect pre-renal in the setting of poor PO intake, but obstruction likely contributed as well (alejandra placed for bladder decompression upon arrival to ED). Has had good urine output since admission. Cr now improving, but still not returned to baseline. HyperNa improved with D5w infusion  - Continue alejandra catheter for bladder decompression  - Trend BMP daily  - Avoiding nephrotoxins and renally dosing medications  - Strict I/Os  - D5W for hypernatremia discontinued on 6/3, continue adjusting FWF to maintain normonatremia   - improving but still requiring enteral TF which has been maintaining her volume status and electrolyte balance. After discussion with family, Urology team from [facility], the only sustainable option would be to pursue PEG placement and enteral feeding. D/W IR and GI medicine. Considering benefit and low risk of IR PEG placement under conscious sedation, will pursue the same as opposed to GI PEG placement under GA. But earliest available is 6/13/24. Since she is essentially TF dependent, she would be med ready for  disposition only after PEG placement.     #Diarrhea  Non-anion gap metaboic acidosis   Suspected related to antibiotics and tube feeds. However, prolonged and extensive antibiotic exposures in the recent weeks including Augmentin and most recently Zosyn. Has had n/v and some abdominal tenderness present on exam today.  - Continue fiber in Tfs  - Continue probiotics  - C. Diff negative on 6/2, continue Imodium PRN  - stool output and low bicarb levels due to GI loss, improving    #Infected organized left retroperitoneal hematoma with Proteus hauseri/vulgaris s/p IR coil embolization and drain placement 3/9/2024 followed by multiple drain upsized/exchange and antibiotics until 4/15/2024   Developed during prolonged hospitalization at [place]. IR placed MARIE drain with multiple attempts at upsizing to drain all of the residual fluid collections. Completed several courses of antibiotics for treatment of infected hematoma, last finished Augmentin on 4/15/24. Last seen by ID as outpatient on 5/10, with plans to monitor off abx therapy. Here, CT AP on admission showed minimal residual fluid collection. She had the MARIE drain in place, but this was removed accidentally upon transfer to CT on 5/30.  - D/w IR who reviewed imaging, minimal residual fluid collection with minimal drainage from MARIE drain prior to admission suggests there is not likely benefit to replacing, d/w family  - No signs of ongoing infection in hematoma  - empiric Zosyn, will transition to augmentin for GI coverage  as above, continue until Urology follow-up on 6/19    #Malnutrition  #FTT  Poor PO intake, weight loss, deconditioning for the past several weeks PTA. Suspect infection may be contributing, but likely also represents prolonged hospitalization and underlying CRC.  - Nutrition consulted  - Gentle IVF  - Discussed with pt and family, who are agreeable to trial of enteral feeding. NJ Placed on 5/31, tube feeds initiated   - Goals per family are to see if  patient improves strength enough with tube feeds to feel better and eat on her own, discussed that we would readdress her ability to take in PO as she gets closer to discharge, or that NJ feeds could be continued upon discharge to TCU    #Pleural Effusion   #Atelectasis   CT with L pleural effusion. Patient denies respiratory symptoms and is breathing on room air.   - CTM      #HFrEF  #HTN   BP soft on admission, now improved s/p 2L fluid resuscitation. Per discharge summary from 4/2024 patient was scheduled to follow up with cardiology on 7/24. Recent ECHO on 4/4/24 with EF 29% .   - Holding Carvedilol 12.5mg BID, resumed at 3.125 mg PO BID dose on 6/1  - BNP 1514, no comparisons available     #Chronic Normocytic Anemia   Baseline between 10-11, on admission Hgb 10.5. Likely 2/2 anemia of chronic disease and suspect 2/2 nutritional deficency.   - Trending CBC     #T2DM, diet controlled  - Hypoglycemic protocol      #Nausea   EKG with Qtc 462.   - PTA Zofran   - PTA Compazine   - Added Remeron for appetite stimulation on 5/31     #Hyponatremia   Suspect 2/2 malnourishment. Does not appear volume overloaded.   - Trend BMP    #Deconditioning   -PT/OT consults          Diet: Regular Diet Adult  Adult Formula Drip Feeding: Continuous TwoCal HN; Other - Specify in Comment; Goal Rate: 35; mL/hr; Once access confirmed. Initiate @ 10 ml/hr and advance by 10 ml Q8H pending pt's tolerance.; Do not advance tube feeding rate unless K+ is = or > 3...  NPO per Anesthesia Guidelines for Procedure/Surgery Except for: Meds    DVT Prophylaxis: Heparin SQ  Cameron Catheter: PRESENT, indication: Gross hematuria  Lines: None     Cardiac Monitoring: None  Code Status: Full Code      Clinically Significant Risk Factors            # Hypomagnesemia: Lowest Mg = 1.6 mg/dL in last 2 days, will replace as needed   # Hypoalbuminemia: Lowest albumin = 2 g/dL at 5/30/2024  8:27 AM, will monitor as appropriate     # Hypertension: Noted on problem  list         # Severe Malnutrition: based on nutrition assessment    # Financial/Environmental Concerns: none         Disposition Plan     Medically Ready for Discharge: Anticipated in 2-4 Days         Panda Marx MD  Hospitalist Service, GOLD TEAM 8  [facility]  Securely message with [employer] (more info)  Text page via [facility] Paging/Directory   See signed in provider for up to date coverage information  ______________________________________________________________________    Interval History     No new complaints. Persistent abdominal pain    Physical Exam   Vital Signs: Temp: 98  F (36.7  C) Temp src: Oral BP: (!) 145/60 Pulse: 81   Resp: 16 SpO2: 98 % O2 Device: None (Room air)    Weight: 121 lbs 11.2 oz    General Appearance: Frail elderly woman, appears older than stated age, NAD  Respiratory: Breathing comfortably on room air, lungs grossly CTAB  Cardiovascular: RRR, no m/r/g.  GI: soft, non-distended, non-tender to palpation. Site of MARIE drain removal appears c/d/I.  Skin: pallor present, no skin rash  Other: Trace LE edema bilaterally. Cameron draining clear yellow urine.     Medical Decision Making       40 MINUTES SPENT BY ME on the date of service doing chart review, history, exam, documentation & further activities per the note.      Data     I have personally reviewed the following data over the past 24 hrs:    6.9  \   8.5 (L)   / 212     137 104 22.8 /  159 (H)   4.3 23 1.27 (H) \       Imaging results reviewed over the past 24 hrs:   No results found for this or any previous visit (from the past 24 hour(s)).

## 2024-06-08 NOTE — PLAN OF CARE
"Goal Outcome Evaluation:      Plan of Care Reviewed With: patient    Overall Patient Progress: no changeOverall Patient Progress: no change    Tigrinya speaking,  needed. VSS, RA. NG, feeding at goal rate of 35mL/hr, 100mL flushes q4h. Meds crushed and given through NG. Nausea managed with IV Zofran. Cameron, moderate yellow output. L PIV TKO. Ax1/GB/walker to bedside commode. Loose BM x1 this shift. No electrolyte replacement needed, AM rechecks. Plan for PEG placement on 6/13. Regular diet, poor appetite. Continue POC.       Vital signs:  BP (!) 147/65 (BP Location: Right arm)   Pulse 81   Temp 98.1  F (36.7  C) (Oral)   Resp 18   Ht 1.499 m (4' 11\")   Wt 55.2 kg (121 lb 11.2 oz)   SpO2 97%   BMI 24.58 kg/m     "

## 2024-06-09 NOTE — PLAN OF CARE
"Goal Outcome Evaluation:      Plan of Care Reviewed With: patient    Overall Patient Progress: improving  /71 (BP Location: Right arm)   Pulse 81   Temp 98  F (36.7  C) (Oral)   Resp 16   Ht 1.499 m (4' 11\")   Wt 53.5 kg (118 lb)   SpO2 98%   BMI 23.83 kg/m       Patient is A/O x3 disoriented to time. On room air denies SOB. VSS denies cardiac chest pain. Denies generalized pain. No nausea this shift. Up with assist x1 with walker and gait belt to hallway, up to chair this shift. Loose BM x2 this shift alejandra in place with AUOP. NG tube continues at landmark with lyudmila, feeding at goal rate of 35ml/hr. Poor appetite on regular diet. Possible peg tube placement. Continue POC.     "

## 2024-06-09 NOTE — PLAN OF CARE
"Goal Outcome Evaluation:      Plan of Care Reviewed With: patient    Overall Patient Progress: improvingOverall Patient Progress: improving    Tigrinya speaking,  needed. A&Ox4, RA. Denies cardiac chest pain and SOB. NG, feeding at goal rate of 35mL/hr, 100mL flushes q4h. No nausea this shift. Cameron, moderate output. L PIV TKO. Ax1/GB/walker to bedside commode. Loose BM x1 this shift. No electrolyte replacement needed, AM rechecks. Regular diet, poor appetite. Continue POC.     Vital signs:  BP (!) 141/59 (BP Location: Right arm, Patient Position: Semi-Vasquez's, Cuff Size: Adult Regular)   Pulse 81   Temp 98  F (36.7  C) (Oral)   Resp 16   Ht 1.499 m (4' 11\")   Wt 55.2 kg (121 lb 11.2 oz)   SpO2 98%   BMI 24.58 kg/m     "

## 2024-06-09 NOTE — PLAN OF CARE
"  Goal Outcome Evaluation:      Plan of Care Reviewed With: patient    Overall Patient Progress: improving  BP (!) 145/60 (BP Location: Right arm)   Pulse 81   Temp 98  F (36.7  C) (Oral)   Resp 16   Ht 1.499 m (4' 11\")   Wt 55.2 kg (121 lb 11.2 oz)   SpO2 98%   BMI 24.58 kg/m       Patient is A/O x3 disoriented to time. On room air denies SOB. VSS denies cardiac chest pain. Denies generalized pain. No nausea this shift. Up with assist x1 with walker and gait belt to hallway, up to chair this shift. Loose BM x3 this shift alejandra in place with AUOP. NG tube continues at landmark with lyudmila, feeding at goal rate of 35ml/hr. Poor appetite on regular diet. Continue POC.     "

## 2024-06-09 NOTE — PROGRESS NOTES
[facility]    Medicine Progress Note - Hospitalist Service, GOLD TEAM 8    Date of Admission:  5/29/2024    Assessment & Plan   Myla Glynn is a 79 year old female admitted on 5/29/2024.  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     #UTI  # Persistent bladder leak s/p urinary diversion with bilateral nephrostomy tubes (now removed)  # Hx of Right renal segmental artery bleed with subsequent clot formation in the renal collecting ducts, ureter, and urinary bladder s/p IR embolization  # Recurrent colorectal cancer with local recurrence within the bladder s/p partial cystectomy with small bowel resection and anastomosis (01/09/2024)  Patient presenting with weakness. UA appears infectious with elevated WBC and + LE. Will start ceftriaxone based on prior cultures while awaiting UC. Cameron in place for bladder decompression. Urology performed CT cystogram with findings showing: anterior bladder wall defect with associated air and fluid collection in low anterior pelvis. Per urology, this collection appears walled off so they have no plans for decompression or  drainage at this time.  -  Bcx with NGTD, urine culture with >100k urogenital jonathan  - Urology recommending IR drainage of fluid collection if signs of hemodynamic instability, but pt currently stable  - Continue Zosyn for empiric antibiosis for chronic anterior bladder leak, infected RP hematoma and switched to Augmentin (6/7) and continue until outpatient follow-up with [facility] on 6/19  - ID following, appreciate input   - MR enterography could be considered to evaluate for persistent fistula, but unlikely to change current management   - D/w [facility] (Facundo) who feels this anterior bladder wall leak appears to be chronic in nature, not an acute change    - Pt and family would like to remain at [facility] rather than transfer to [place]    #ALFREDA   CKD stage 3B  #Hypernatremia  Malnutrition  Minimal PO intake  Pt presented with acute kidney injury (Cr 4.5, baseline 1.2-1.5). Suspect pre-renal in the setting of poor PO intake, but obstruction likely contributed as well (alejandra placed for bladder decompression upon arrival to ED). Has had good urine output since admission. Cr now improving, but still not returned to baseline. HyperNa improved with D5w infusion  - Continue alejandra catheter for bladder decompression  - Trend BMP daily  - Avoiding nephrotoxins and renally dosing medications  - Strict I/Os  - D5W for hypernatremia discontinued on 6/3, continue adjusting FWF to maintain normonatremia   - improving but still requiring enteral TF which has been maintaining her volume status and electrolyte balance. After discussion with family, Urology team from [facility], the only sustainable option would be to pursue PEG placement and enteral feeding. D/W IR and GI medicine. Considering benefit and low risk of IR PEG placement under conscious sedation, will pursue the same as opposed to GI PEG placement under GA. But earliest available is 6/13/24. Since she is essentially TF dependent, she would be med ready for  disposition only after PEG placement.     #Diarrhea  Non-anion gap metaboic acidosis   Suspected related to antibiotics and tube feeds. However, prolonged and extensive antibiotic exposures in the recent weeks including Augmentin and most recently Zosyn. Has had n/v and some abdominal tenderness present on exam today.  - Continue fiber in Tfs  - Continue probiotics  - C. Diff negative on 6/2, continue Imodium PRN  - stool output and low bicarb levels due to GI loss, improving    #Infected organized left retroperitoneal hematoma with Proteus hauseri/vulgaris s/p IR coil embolization and drain placement 3/9/2024 followed by multiple drain upsized/exchange and antibiotics until 4/15/2024   Developed during prolonged hospitalization at [place]. IR placed MARIE drain with multiple attempts at upsizing to drain all of the residual fluid collections. Completed several courses of antibiotics for treatment of infected hematoma, last finished Augmentin on 4/15/24. Last seen by ID as outpatient on 5/10, with plans to monitor off abx therapy. Here, CT AP on admission showed minimal residual fluid collection. She had the MARIE drain in place, but this was removed accidentally upon transfer to CT on 5/30.  - D/w IR who reviewed imaging, minimal residual fluid collection with minimal drainage from MARIE drain prior to admission suggests there is not likely benefit to replacing, d/w family  - No signs of ongoing infection in hematoma  - empiric Zosyn, will transition to augmentin for GI coverage  as above, continue until Urology follow-up on 6/19    #Malnutrition  #FTT  Poor PO intake, weight loss, deconditioning for the past several weeks PTA. Suspect infection may be contributing, but likely also represents prolonged hospitalization and underlying CRC.  - Nutrition consulted  - Gentle IVF  - Discussed with pt and family, who are agreeable to trial of enteral feeding. NJ Placed on 5/31, tube feeds initiated   - Goals per family are to see if  patient improves strength enough with tube feeds to feel better and eat on her own, discussed that we would readdress her ability to take in PO as she gets closer to discharge, or that NJ feeds could be continued upon discharge to TCU    #Pleural Effusion   #Atelectasis   CT with L pleural effusion. Patient denies respiratory symptoms and is breathing on room air.   - CTM      #HFrEF  #HTN   BP soft on admission, now improved s/p 2L fluid resuscitation. Per discharge summary from 4/2024 patient was scheduled to follow up with cardiology on 7/24. Recent ECHO on 4/4/24 with EF 29% .   - Holding Carvedilol 12.5mg BID, resumed at 3.125 mg PO BID dose on 6/1  - BNP 1514, no comparisons available     #Chronic Normocytic Anemia   Baseline between 10-11, on admission Hgb 10.5. Likely 2/2 anemia of chronic disease and suspect 2/2 nutritional deficency.   - Trending CBC     #T2DM, diet controlled  - Hypoglycemic protocol      #Nausea   EKG with Qtc 462.   - PTA Zofran   - PTA Compazine   - Added Remeron for appetite stimulation on 5/31     #Hyponatremia   Suspect 2/2 malnourishment. Does not appear volume overloaded.   - Trend BMP    #Deconditioning   -PT/OT consults          Diet: Regular Diet Adult  Adult Formula Drip Feeding: Continuous TwoCal HN; Other - Specify in Comment; Goal Rate: 35; mL/hr; Once access confirmed. Initiate @ 10 ml/hr and advance by 10 ml Q8H pending pt's tolerance.; Do not advance tube feeding rate unless K+ is = or > 3...  NPO per Anesthesia Guidelines for Procedure/Surgery Except for: Meds    DVT Prophylaxis: Heparin SQ  Cameron Catheter: PRESENT, indication: Gross hematuria  Lines: None     Cardiac Monitoring: None  Code Status: Full Code      Clinically Significant Risk Factors            # Hypomagnesemia: Lowest Mg = 1.6 mg/dL in last 2 days, will replace as needed   # Hypoalbuminemia: Lowest albumin = 2 g/dL at 5/30/2024  8:27 AM, will monitor as appropriate     # Hypertension: Noted on problem  list         # Severe Malnutrition: based on nutrition assessment    # Financial/Environmental Concerns: none         Disposition Plan     Medically Ready for Discharge: Anticipated in 2-4 Days         Panda Marx MD  Hospitalist Service, GOLD TEAM 8  [facility]  Securely message with [employer] (more info)  Text page via [employer] Paging/Directory   See signed in provider for up to date coverage information  ______________________________________________________________________    Interval History     Via :  Modest improvement in abdominal pain. No improvement in PO intake    Physical Exam   Vital Signs: Temp: 98  F (36.7  C) Temp src: Oral BP: 132/71 Pulse: 81   Resp: 16 SpO2: 98 % O2 Device: None (Room air)    Weight: 118 lbs 0 oz    General Appearance: Frail elderly woman, appears older than stated age, NAD  Respiratory: Breathing comfortably on room air, lungs grossly CTAB  Cardiovascular: RRR, no m/r/g.  GI: soft, non-distended, non-tender to palpation. Site of MARIE drain removal appears c/d/I.  Skin: pallor present, no skin rash  Other: Trace LE edema bilaterally. Cameron draining clear yellow urine.     Medical Decision Making       35 MINUTES SPENT BY ME on the date of service doing chart review, history, exam, documentation & further activities per the note.      Data     I have personally reviewed the following data over the past 24 hrs:    6.7  \   8.5 (L)   / 234     136 102 24.3 (H) /  132 (H)   4.6 23 1.22 (H) \       Imaging results reviewed over the past 24 hrs:   No results found for this or any previous visit (from the past 24 hour(s)).

## 2024-06-10 NOTE — PLAN OF CARE
"Goal Outcome Evaluation:      Plan of Care Reviewed With: patient    Overall Patient Progress: improvingOverall Patient Progress: improving    Tigrinya speaking,  needed. Daughter at bedside overnight, involves in care. A&Ox3 disoriented to time, VSS, RA. Denies cardiac chest pain, SOB, N/V. NG landmark unchanged, feeding goal rate of 35mL/hr, 100mL flushes q4h. Cameron, clear yellow output. L PIV TKO. Ax1/GB/walker to bedside commode. BM 6/9 x2. No electrolyte replacement needed, AM rechecks. Regular diet, poor appetite. Continue POC.     BP (!) 140/66 (BP Location: Right arm)   Pulse 79   Temp 97.8  F (36.6  C) (Oral)   Resp 16   Ht 1.499 m (4' 11\")   Wt 53.5 kg (118 lb)   SpO2 98%   BMI 23.83 kg/m     "

## 2024-06-10 NOTE — PROGRESS NOTES
[facility]    Medicine Progress Note - Hospitalist Service, GOLD TEAM 8    Date of Admission:  5/29/2024    Assessment & Plan   Myla Glynn is a 79 year old female admitted on 5/29/2024.  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     #UTI  # Persistent bladder leak s/p urinary diversion with bilateral nephrostomy tubes (now removed)  # Hx of Right renal segmental artery bleed with subsequent clot formation in the renal collecting ducts, ureter, and urinary bladder s/p IR embolization  # Recurrent colorectal cancer with local recurrence within the bladder s/p partial cystectomy with small bowel resection and anastomosis (01/09/2024)  Patient presenting with weakness. UA appears infectious with elevated WBC and + LE. Will start ceftriaxone based on prior cultures while awaiting UC. Cameron in place for bladder decompression. Urology performed CT cystogram with findings showing: anterior bladder wall defect with associated air and fluid collection in low anterior pelvis. Per urology, this collection appears walled off so they have no plans for decompression or  drainage at this time.  -  Bcx with NGTD, urine culture with >100k urogenital jonathan  - Urology recommending IR drainage of fluid collection if signs of hemodynamic instability, but pt currently stable  - Continue Zosyn for empiric antibiosis for chronic anterior bladder leak, infected RP hematoma and switched to Augmentin (6/7) and continue until outpatient follow-up with [facility] on 6/19  - ID following, appreciate input   - MR enterography could be considered to evaluate for persistent fistula, but unlikely to change current management   - D/w [facility] (Facundo) who feels this anterior bladder wall leak appears to be chronic in nature, not an acute change    - Pt and family would like to remain at [facility] rather than transfer to [place]    #ALFREDA   CKD stage 3B  #Hypernatremia  Malnutrition  Minimal PO intake  Pt presented with acute kidney injury (Cr 4.5, baseline 1.2-1.5). Suspect pre-renal in the setting of poor PO intake, but obstruction likely contributed as well (alejandra placed for bladder decompression upon arrival to ED). Has had good urine output since admission. Cr now improving, but still not returned to baseline. HyperNa improved with D5w infusion  - Continue alejandra catheter for bladder decompression  - Trend BMP daily  - Avoiding nephrotoxins and renally dosing medications  - Strict I/Os  - D5W for hypernatremia discontinued on 6/3, continue adjusting FWF to maintain normonatremia   - improving but still requiring enteral TF which has been maintaining her volume status and electrolyte balance. After discussion with family, Urology team from [facility], the only sustainable option would be to pursue PEG placement and enteral feeding. D/W IR and GI medicine. Considering benefit and low risk of IR PEG placement under conscious sedation, will pursue the same as opposed to GI PEG placement under GA. But earliest available is 6/13/24. Since she is essentially TF dependent, she would be med ready for  disposition only after PEG placement.     #Diarrhea  Non-anion gap metaboic acidosis   Suspected related to antibiotics and tube feeds. However, prolonged and extensive antibiotic exposures in the recent weeks including Augmentin and most recently Zosyn. Has had n/v and some abdominal tenderness present on exam today.  - Continue fiber in Tfs  - Continue probiotics  - C. Diff negative on 6/2, continue Imodium PRN  - stool output and low bicarb levels due to GI loss, improving    #Infected organized left retroperitoneal hematoma with Proteus hauseri/vulgaris s/p IR coil embolization and drain placement 3/9/2024 followed by multiple drain upsized/exchange and antibiotics until 4/15/2024   Developed during prolonged hospitalization at [place]. IR placed MARIE drain with multiple attempts at upsizing to drain all of the residual fluid collections. Completed several courses of antibiotics for treatment of infected hematoma, last finished Augmentin on 4/15/24. Last seen by ID as outpatient on 5/10, with plans to monitor off abx therapy. Here, CT AP on admission showed minimal residual fluid collection. She had the MARIE drain in place, but this was removed accidentally upon transfer to CT on 5/30.  - D/w IR who reviewed imaging, minimal residual fluid collection with minimal drainage from MARIE drain prior to admission suggests there is not likely benefit to replacing, d/w family  - No signs of ongoing infection in hematoma  - empiric Zosyn, will transition to augmentin for GI coverage  as above, continue until Urology follow-up on 6/19    #Malnutrition  #FTT  Poor PO intake, weight loss, deconditioning for the past several weeks PTA. Suspect infection may be contributing, but likely also represents prolonged hospitalization and underlying CRC.  - Nutrition consulted  - Gentle IVF  - Discussed with pt and family, who are agreeable to trial of enteral feeding. NJ Placed on 5/31, tube feeds initiated   - Goals per family are to see if  patient improves strength enough with tube feeds to feel better and eat on her own, discussed that we would readdress her ability to take in PO as she gets closer to discharge, or that NJ feeds could be continued upon discharge to TCU    #Pleural Effusion   #Atelectasis   CT with L pleural effusion. Patient denies respiratory symptoms and is breathing on room air.   - CTM      #HFrEF  #HTN   BP soft on admission, now improved s/p 2L fluid resuscitation. Per discharge summary from 4/2024 patient was scheduled to follow up with cardiology on 7/24. Recent ECHO on 4/4/24 with EF 29% .   - Holding Carvedilol 12.5mg BID, resumed at 3.125 mg PO BID dose on 6/1  - BNP 1514, no comparisons available     #Chronic Normocytic Anemia   Baseline between 10-11, on admission Hgb 10.5. Likely 2/2 anemia of chronic disease and suspect 2/2 nutritional deficency.   - Trending CBC     #T2DM, diet controlled  - Hypoglycemic protocol      #Nausea   EKG with Qtc 462.   - PTA Zofran   - PTA Compazine   - Added Remeron for appetite stimulation on 5/31     #Hyponatremia   Suspect 2/2 malnourishment. Does not appear volume overloaded.   - Trend BMP    #Deconditioning   -PT/OT consults          Diet: Regular Diet Adult  Adult Formula Drip Feeding: Continuous TwoCal HN; Other - Specify in Comment; Goal Rate: 35; mL/hr; Once access confirmed. Initiate @ 10 ml/hr and advance by 10 ml Q8H pending pt's tolerance.; Do not advance tube feeding rate unless K+ is = or > 3...  NPO per Anesthesia Guidelines for Procedure/Surgery Except for: Meds    DVT Prophylaxis: Heparin SQ  Cameron Catheter: PRESENT, indication: Gross hematuria  Lines: None     Cardiac Monitoring: None  Code Status: Full Code      Clinically Significant Risk Factors            # Hypomagnesemia: Lowest Mg = 1.6 mg/dL in last 2 days, will replace as needed   # Hypoalbuminemia: Lowest albumin = 2 g/dL at 5/30/2024  8:27 AM, will monitor as appropriate     # Hypertension: Noted on problem  list         # Severe Malnutrition: based on nutrition assessment    # Financial/Environmental Concerns: none         Disposition Plan     Medically Ready for Discharge: Anticipated in 2-4 Days         Panda Marx MD  Hospitalist Service, GOLD TEAM 8  M [facility]  Securely message with [employer] (more info)  Text page via [employer] Paging/Directory   See signed in provider for up to date coverage information  ______________________________________________________________________    Interval History     Via :  No new complaints. Did do well with PT    Physical Exam   Vital Signs: Temp: 97.9  F (36.6  C) Temp src: Oral BP: 137/74 Pulse: 94   Resp: 18 SpO2: 96 % O2 Device: None (Room air)    Weight: 118 lbs 0 oz    General Appearance: Frail elderly woman, appears older than stated age, NAD  Respiratory: Breathing comfortably on room air, lungs grossly CTAB  Cardiovascular: RRR, no m/r/g.  GI: soft, non-distended, non-tender to palpation. Site of MARIE drain removal appears c/d/I.  Skin: pallor present, no skin rash  Other: Trace LE edema bilaterally. Cameron draining clear yellow urine.     Medical Decision Making       30 MINUTES SPENT BY ME on the date of service doing chart review, history, exam, documentation & further activities per the note.      Data     I have personally reviewed the following data over the past 24 hrs:    N/A  \   N/A   / 236     N/A N/A N/A /  139 (H)   N/A N/A N/A \       Imaging results reviewed over the past 24 hrs:   No results found for this or any previous visit (from the past 24 hour(s)).

## 2024-06-10 NOTE — PLAN OF CARE
"/63 (BP Location: Right arm)   Pulse 86   Temp 98.2  F (36.8  C) (Oral)   Resp 16   Ht 1.499 m (4' 11\")   Wt 53.5 kg (118 lb)   SpO2 95%   BMI 23.83 kg/m      Neuro: A&Ox3-4. Tigrinya speaking.  needed.   Respiratory: WDL denies SOB  Cardiac: WDL denies cardiac chest pain   GI/: Adequate urine output from alejandra. One BM this shift.   Diet: Poor appetite. Pt declining intake. NG tube infusing TF @ 35mL/hr w/ q4hr 100mL water flush.  Pain: Endorsed pain on left hip. Removed brief to let hip breathe.   Incisions/Drains: Alejandra  IV Access:  L PIV   Labs: reviewed   Activity: Up assist x1, gaitbelt, walker        New changes this shift: No acute changes   Plan:  Continue POC     "

## 2024-06-11 NOTE — PLAN OF CARE
Vital signs:  Temp: 98.1  F (36.7  C) Temp src: Oral BP: 130/56 Pulse: 80   Resp: 18 SpO2: 98 % O2 Device: None (Room air)     Weight: 53.5 kg (118 lb)    [id number]:    Activity: Up to commode with assist of 1, walker, gait belt. Repositioned in bed with assist of 2. HOB up 30 degrees.  Neuros: A & O x4. Neuro intact.  Cardiac: WDL. Asymptomatic.  Respiratory: LS diminished in bases. O2 sats high 90s on RA. Denies SOB. Unlabored.   GI/: BS+, had medium and two small loose BMs in commode. Cameron intact, had adequate yellow output.   Diet: TF running at goal rate 35 mL/hr via NJ. All oral meds via NJ.   Skin: Healed scar top of right foot. Healed abdominal midline incisional scar   Lines: PIV saline locked.  Labs: Reviewed.  at 0200.   Pain/nausea: Rates sore upper abdominal pain 5/10, declined pain meds. C/o mild nausea, declined antiemetics.   New changes this shift: None.  Plan: Continue POC.    74 yo male , pmh- htn,hld, bph c/o right groin pain secondary to right inguinal hernia - scheduled for right inguinal hernia repair   denies recent travels in the past 30 days. No fever, SOB, cough, flu like symptoms or body rash- covid screen  covid vaccine completed   74 yo male , pmh- htn,hld, bph, prostate cancer in 2021- radiation therapy  c/o right groin pain secondary to right inguinal hernia - scheduled for right inguinal hernia repair   denies recent travels in the past 30 days. No fever, SOB, cough, flu like symptoms or body rash- covid screen  covid vaccine completed

## 2024-06-11 NOTE — PROGRESS NOTES
[facility]    Medicine Progress Note - Hospitalist Service, GOLD TEAM 8    Date of Admission:  5/29/2024    Assessment & Plan   Myla Glynn is a 79 year old female admitted on 5/29/2024.  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    Changes for today  -Plan for percutaneous gastrostomy this Thursday 6/13     #UTI  # Persistent bladder leak s/p urinary diversion with bilateral nephrostomy tubes (now removed)  # Hx of Right renal segmental artery bleed with subsequent clot formation in the renal collecting ducts, ureter, and urinary bladder s/p IR embolization  # Recurrent colorectal cancer with local recurrence within the bladder s/p partial cystectomy with small bowel resection and anastomosis (01/09/2024)  Patient presenting with weakness. UA appears infectious with elevated WBC and + LE. Will start ceftriaxone based on prior cultures while awaiting UC. Cameron in place for bladder decompression. Urology performed CT cystogram with findings showing: anterior bladder wall defect with associated air and fluid collection in low anterior pelvis. Per urology, this  collection appears walled off so they have no plans for decompression or drainage at this time.  -  Bcx with NGTD, urine culture with >100k urogenital jonathan  - Urology recommending IR drainage of fluid collection if signs of hemodynamic instability, but pt currently stable  - Continue Zosyn for empiric antibiosis for chronic anterior bladder leak, infected RP hematoma and switched to Augmentin (6/7) and continue until outpatient follow-up with [facility] on 6/19  - ID following, appreciate input   - MR enterography could be considered to evaluate for persistent fistula, but unlikely to change current management   - D/w [facility] (Facundo) who feels this anterior bladder wall leak appears to be chronic in nature, not an acute change    - Pt and family would like to remain at [facility] rather than transfer to [place]    #ALFREDA   CKD stage 3B  #Hypernatremia  Malnutrition  Minimal PO intake  Pt presented with acute kidney injury (Cr 4.5, baseline 1.2-1.5). Suspect pre-renal in the setting of poor PO intake, but obstruction likely contributed as well (alejandra placed for bladder decompression upon arrival to ED). Has had good urine output since admission. Cr now improving, but still not returned to baseline. HyperNa improved with D5w infusion  - Continue alejandra catheter for bladder decompression  - Trend BMP daily  - Avoiding nephrotoxins and renally dosing medications  - Strict I/Os  - D5W for hypernatremia discontinued on 6/3, continue adjusting FWF to maintain normonatremia   - improving but still requiring enteral TF which has been maintaining her volume status and electrolyte balance. After discussion with family, Urology team from [facility], the only sustainable option would be to pursue PEG placement and enteral feeding. D/W IR and GI medicine. Considering benefit and low risk of IR PEG placement under conscious sedation, will pursue the same as opposed to GI PEG placement under GA. But earliest available is 6/13/24.  Since she is essentially TF dependent, she would be med ready for disposition only after PEG placement.     #Diarrhea  Non-anion gap metaboic acidosis   Suspected related to antibiotics and tube feeds. However, prolonged and extensive antibiotic exposures in the recent weeks including Augmentin and most recently Zosyn. Has had n/v and some abdominal tenderness present on exam today.  - Continue fiber in Tfs  - Continue probiotics  - C. Diff negative on 6/2, continue Imodium PRN  - stool output and low bicarb levels due to GI loss, improving    #Infected organized left retroperitoneal hematoma with Proteus hauseri/vulgaris s/p IR coil embolization and drain placement 3/9/2024 followed by multiple drain upsized/exchange and antibiotics until 4/15/2024   Developed during prolonged hospitalization at [place]. IR placed MARIE drain with multiple attempts at upsizing to drain all of the residual fluid collections. Completed several courses of antibiotics for treatment of infected hematoma, last finished Augmentin on 4/15/24. Last seen by ID as outpatient on 5/10, with plans to monitor off abx therapy. Here, CT AP on admission showed minimal residual fluid collection. She had the MARIE drain in place, but this was removed accidentally upon transfer to CT on 5/30.  - D/w IR who reviewed imaging, minimal residual fluid collection with minimal drainage from MARIE drain prior to admission suggests there is not likely benefit to replacing, d/w family  - No signs of ongoing infection in hematoma  - empiric Zosyn, will transition to augmentin for GI coverage  as above, continue until Urology follow-up on 6/19    #Malnutrition  #FTT  Poor PO intake, weight loss, deconditioning for the past several weeks PTA. Suspect infection may be contributing, but likely also represents prolonged hospitalization and underlying CRC.  - Nutrition consulted  - Gentle IVF  - Discussed with pt and family, who are agreeable to trial of enteral feeding. NJ Placed  on 5/31, tube feeds initiated   - Goals per family are to see if patient improves strength enough with tube feeds to feel better and eat on her own, discussed that we would readdress her ability to take in PO as she gets closer to discharge, or that NJ feeds could be continued upon discharge to TCU    #Pleural Effusion   #Atelectasis   CT with L pleural effusion. Patient denies respiratory symptoms and is breathing on room air.   - CTM      #HFrEF  #HTN   BP soft on admission, now improved s/p 2L fluid resuscitation. Per discharge summary from 4/2024 patient was scheduled to follow up with cardiology on 7/24. Recent ECHO on 4/4/24 with EF 29% .   - Holding Carvedilol 12.5mg BID, resumed at 3.125 mg PO BID dose on 6/1  - BNP 1514, no comparisons available     #Chronic Normocytic Anemia   Baseline between 10-11, on admission Hgb 10.5. Likely 2/2 anemia of chronic disease and suspect 2/2 nutritional deficency.   - Trending CBC     #T2DM, diet controlled  - Hypoglycemic protocol      #Nausea   EKG with Qtc 462.   - PTA Zofran   - PTA Compazine   - Added Remeron for appetite stimulation on 5/31     #Hyponatremia   Suspect 2/2 malnourishment. Does not appear volume overloaded.   - Trend BMP    #Deconditioning   -PT/OT consults          Diet: Regular Diet Adult  Adult Formula Drip Feeding: Continuous TwoCal HN; Other - Specify in Comment; Goal Rate: 35; mL/hr; Once access confirmed. Initiate @ 10 ml/hr and advance by 10 ml Q8H pending pt's tolerance.; Do not advance tube feeding rate unless K+ is = or > 3...  NPO per Anesthesia Guidelines for Procedure/Surgery Except for: Meds    DVT Prophylaxis: Heparin SQ  Cameron Catheter: PRESENT, indication: Gross hematuria  Lines: None     Cardiac Monitoring: None  Code Status: Full Code      Clinically Significant Risk Factors        # Hyperkalemia: Highest K = 5.9 mmol/L in last 2 days, will monitor as appropriate       # Hypoalbuminemia: Lowest albumin = 2 g/dL at 5/30/2024  8:27  AM, will monitor as appropriate     # Hypertension: Noted on problem list         # Severe Malnutrition: based on nutrition assessment    # Financial/Environmental Concerns: none         Disposition Plan     Medically Ready for Discharge: Anticipated in 2-4 Days         Victorina Vizcarra MD  Hospitalist Service, GOLD TEAM 8  [facility]  Securely message with [employer] (more info)  Text page via [employer] Paging/Directory   See signed in provider for up to date coverage information  ______________________________________________________________________    Interval History     Via :  No new symptoms.    Physical Exam   Vital Signs: Temp: 98.3  F (36.8  C) Temp src: Oral BP: 124/61 Pulse: 84   Resp: 18 SpO2: 99 % O2 Device: None (Room air)    Weight: 118 lbs 0 oz    General Appearance: Frail elderly woman, appears older than stated age, NAD  Respiratory: Breathing comfortably on room air, lungs grossly CTAB  Cardiovascular: RRR, no m/r/g.  GI: soft, non-distended, non-tender to palpation. Site of MARIE drain removal appears c/d/I.  Skin: pallor present, no skin rash  Other: Trace LE edema bilaterally. Cameron draining clear yellow urine.     Medical Decision Making       30 MINUTES SPENT BY ME on the date of service doing chart review, history, exam, documentation & further activities per the note.      Data     I have personally reviewed the following data over the past 24 hrs:    6.4  \   9.2 (L)   / 248     134 (L) 99 29.0 (H) /  139 (H)   4.7 24 1.18 (H) \       Imaging results reviewed over the past 24 hrs:   No results found for this or any previous visit (from the past 24 hour(s)).

## 2024-06-11 NOTE — PROGRESS NOTES
Care Management Follow Up    Length of Stay (days): 13    Expected Discharge Date: 06/11/2024     Concerns to be Addressed: discharge planning     Patient plan of care discussed at interdisciplinary rounds: Yes    Anticipated Discharge Disposition: Transitional Care, Skilled Nursing Facility, Home Care     Anticipated Discharge Services: PCA  Anticipated Discharge DME: Other (see comment) (TBD)    Patient/family educated on Medicare website which has current facility and service quality ratings:  yes  Education Provided on the Discharge Plan:  yes  Patient/Family in Agreement with the Plan:  yes    Referrals Placed by CM/SW:    Private pay costs discussed: Not applicable    Additional Information:    Pt preferences for TCU are as follows:    [facility]  [address] [place] [address]  Ph: [phone]  F: [phone]     2. [place]  [address] [address] [place] [address]  [phone]   Admissions: [phone] (Ladonna)  [email or web address] (Ladonna in admissions email)     3. [facility]  [address] [address]  Ph: [phone]  F: [phone]     4. [facility] ([facility])  [address] [place]  [address]  P: [phone]  F: [phone]    5. [facility]  [address] [place] [address]  Ph: [phone]  F: [phone]     SW to make referrals when pt is closer to being medically ready.    ANGELA Gallardo BSSIXTO  [place]   Covering 7B: [phone]

## 2024-06-12 NOTE — PLAN OF CARE
"Goal Outcome Evaluation:    Plan of Care Reviewed With: patient  Overall Patient Progress: no changeOverall Patient Progress: no change  /60 (BP Location: Right arm)   Pulse 88   Temp 98  F (36.7  C) (Oral)   Resp 16   Ht 1.499 m (4' 11\")   Wt 53.4 kg (117 lb 11.6 oz)   SpO2 99%   BMI 23.78 kg/m     AVSS.  No c/o pain. Reported nausea in rounds.  Has been declining PRN Zofran.   New orders for scheduled Zofran ODT BID and Protonix q am.  OOB to recliner x1 and ambulated x1 with walker and SBA.  Still not willing to try any oral intake. Team aware.  Plan for GT placement tomorrow.   No new issues. Continue with current POC.      "

## 2024-06-12 NOTE — PROGRESS NOTES
[facility]    Medicine Progress Note - Hospitalist Service, GOLD TEAM 8    Date of Admission:  5/29/2024    Assessment & Plan   Myla Glynn is a 79 year old female admitted on 5/29/2024.  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    Changes for today  -Nausea continues to be problematic, started PPI daily with pantoprazole and scheduled low-dose ondansetron twice daily at 4 mg ODT  -Plan for percutaneous gastrostomy by interventional radiology on 6/13     #UTI  # Persistent bladder leak s/p urinary diversion with bilateral nephrostomy tubes (now removed)  # Hx of Right renal segmental artery bleed with subsequent clot formation in the renal collecting ducts, ureter, and urinary bladder s/p IR embolization  # Recurrent colorectal cancer with local recurrence within the bladder s/p partial cystectomy with small bowel resection and anastomosis (01/09/2024)  Patient presenting with weakness. UA appears infectious with elevated WBC and + LE. Will start ceftriaxone based on prior cultures while awaiting UC. Cameron in place for bladder decompression. Urology performed  CT cystogram with findings showing: anterior bladder wall defect with associated air and fluid collection in low anterior pelvis. Per urology, this collection appears walled off so they have no plans for decompression or drainage at this time.  -  Bcx with NGTD, urine culture with >100k urogenital jonathan  - Urology recommending IR drainage of fluid collection if signs of hemodynamic instability, but pt currently stable  - Continue Zosyn for empiric antibiosis for chronic anterior bladder leak, infected RP hematoma and switched to Augmentin (6/7) and continue until outpatient follow-up with [facility] on 6/19  - ID following, appreciate input   - MR enterography could be considered to evaluate for persistent fistula, but unlikely to change current management   - D/w [facility] (Facundo) who feels this anterior bladder wall leak appears to be chronic in nature, not an acute change    - Pt and family would like to remain at [facility] rather than transfer to [place]    #ALFREDA   CKD stage 3B  #Hypernatremia  Malnutrition  Minimal PO intake  Pt presented with acute kidney injury (Cr 4.5, baseline 1.2-1.5). Suspect pre-renal in the setting of poor PO intake, but obstruction likely contributed as well (alejandra placed for bladder decompression upon arrival to ED). Has had good urine output since admission. Cr now improving, but still not returned to baseline. HyperNa improved with D5w infusion  - Continue alejandra catheter for bladder decompression  - Trend BMP daily  - Avoiding nephrotoxins and renally dosing medications  - Strict I/Os  - D5W for hypernatremia discontinued on 6/3, continue adjusting FWF to maintain normonatremia   - improving but still requiring enteral TF which has been maintaining her volume status and electrolyte balance. After discussion with family, Urology team from [facility], the only sustainable option would be to pursue PEG placement and enteral feeding. D/W IR and GI medicine. Considering benefit and low  risk of IR PEG placement under conscious sedation, will pursue the same as opposed to GI PEG placement under GA. But earliest available is 6/13/24. Since she is essentially TF dependent, she would be med ready for disposition only after PEG placement.     #Diarrhea  Non-anion gap metaboic acidosis   Suspected related to antibiotics and tube feeds. However, prolonged and extensive antibiotic exposures in the recent weeks including Augmentin and most recently Zosyn. Has had n/v and some abdominal tenderness present on exam today.  - Continue fiber in Tfs  - Continue probiotics  - C. Diff negative on 6/2, continue Imodium PRN  - stool output and low bicarb levels due to GI loss, improving    #Infected organized left retroperitoneal hematoma with Proteus hauseri/vulgaris s/p IR coil embolization and drain placement 3/9/2024 followed by multiple drain upsized/exchange and antibiotics until 4/15/2024   Developed during prolonged hospitalization at [place]. IR placed MARIE drain with multiple attempts at upsizing to drain all of the residual fluid collections. Completed several courses of antibiotics for treatment of infected hematoma, last finished Augmentin on 4/15/24. Last seen by ID as outpatient on 5/10, with plans to monitor off abx therapy. Here, CT AP on admission showed minimal residual fluid collection. She had the MARIE drain in place, but this was removed accidentally upon transfer to CT on 5/30.  - D/w IR who reviewed imaging, minimal residual fluid collection with minimal drainage from MARIE drain prior to admission suggests there is not likely benefit to replacing, d/w family  - No signs of ongoing infection in hematoma  - empiric Zosyn, will transition to augmentin for GI coverage  as above, continue until Urology follow-up on 6/19    #Malnutrition  #FTT  Poor PO intake, weight loss, deconditioning for the past several weeks PTA. Suspect infection may be contributing, but likely also represents prolonged  hospitalization and underlying CRC.  - Nutrition consulted  - Gentle IVF  - Discussed with pt and family, who are agreeable to trial of enteral feeding. NJ Placed on 5/31, tube feeds initiated   - Goals per family are to see if patient improves strength enough with tube feeds to feel better and eat on her own, discussed that we would readdress her ability to take in PO as she gets closer to discharge, or that NJ feeds could be continued upon discharge to TCU    #Pleural Effusion   #Atelectasis   CT with L pleural effusion. Patient denies respiratory symptoms and is breathing on room air.   - CTM      #HFrEF  #HTN   BP soft on admission, now improved s/p 2L fluid resuscitation. Per discharge summary from 4/2024 patient was scheduled to follow up with cardiology on 7/24. Recent ECHO on 4/4/24 with EF 29% .   - Holding Carvedilol 12.5mg BID, resumed at 3.125 mg PO BID dose on 6/1  - BNP 1514, no comparisons available     #Chronic Normocytic Anemia   Baseline between 10-11, on admission Hgb 10.5. Likely 2/2 anemia of chronic disease and suspect 2/2 nutritional deficency.   - Trending CBC     #T2DM, diet controlled  - Hypoglycemic protocol      #Nausea   EKG with Qtc 462.   - PTA Zofran   - PTA Compazine   - Added Remeron for appetite stimulation on 5/31     #Hyponatremia   Suspect 2/2 malnourishment. Does not appear volume overloaded.   - Trend BMP    #Deconditioning   -PT/OT consults          Diet: Regular Diet Adult  Adult Formula Drip Feeding: Continuous TwoCal HN; Other - Specify in Comment; Goal Rate: 35; mL/hr; Once access confirmed. Initiate @ 10 ml/hr and advance by 10 ml Q8H pending pt's tolerance.; Do not advance tube feeding rate unless K+ is = or > 3...  NPO per Anesthesia Guidelines for Procedure/Surgery Except for: Meds    DVT Prophylaxis: Heparin SQ  Cameron Catheter: PRESENT, indication: Gross hematuria  Lines: None     Cardiac Monitoring: None  Code Status: Full Code      Clinically Significant Risk  Factors        # Hyperkalemia: Highest K = 5.9 mmol/L in last 2 days, will monitor as appropriate       # Hypoalbuminemia: Lowest albumin = 2 g/dL at 5/30/2024  8:27 AM, will monitor as appropriate     # Hypertension: Noted on problem list         # Severe Malnutrition: based on nutrition assessment    # Financial/Environmental Concerns: none         Disposition Plan     Medically Ready for Discharge: Anticipated in 2-4 Days         Victorina Vizcarra MD  Hospitalist Service, GOLD TEAM 8  M [facility]  Securely message with [facility] (more info)  Text page via [employer] Paging/Directory   See signed in provider for up to date coverage information  ______________________________________________________________________    Interval History     Via :  The patient reported nausea.  No vomiting reported.  The patient reports being stressed about her nausea.    Physical Exam   Vital Signs: Temp: 98  F (36.7  C) Temp src: Oral BP: 120/60 Pulse: 88   Resp: 16 SpO2: 99 % O2 Device: None (Room air)    Weight: 117 lbs 0 oz    General Appearance: Frail elderly woman, appears older than stated age, NAD  Respiratory: Breathing comfortably on room air, lungs grossly CTAB  Cardiovascular: RRR, no m/r/g.  GI: soft, non-distended, non-tender to palpation. Site of MARIE drain removal appears c/d/I.  Skin: pallor present, no skin rash  Other: Trace LE edema bilaterally. Cameron draining clear yellow urine.     Medical Decision Making       30 MINUTES SPENT BY ME on the date of service doing chart review, history, exam, documentation & further activities per the note.      Data     I have personally reviewed the following data over the past 24 hrs:    7.1  \   9.1 (L)   / 272     134 (L) 99 31.8 (H) /  138 (H)   4.7 23 1.20 (H) \       Imaging results reviewed over the past 24 hrs:   No results found for this or any previous visit (from the past 24 hour(s)).

## 2024-06-12 NOTE — PLAN OF CARE
Vital signs:  Temp: 97.7  F (36.5  C) Temp src: Oral BP: 130/62 Pulse: 81   Resp: 18 SpO2: 99 % O2 Device: None (Room air)     Weight: 53.1 kg (117 lb)    9160-1536:    Activity: Up to commode with assist of 1, walker, gait belt. Repositioned in bed with assist of 2. HOB up 30 degrees.  Neuros: A & O x4. Neuro intact.  Cardiac: WDL. Asymptomatic.  Respiratory: LS diminished in bases. O2 sats high 90s on RA. Denies SOB. Unlabored. Encouraged deep breathing and coughing.  GI/: BS+, had medium and small loose/soft BM in commode. Cameron intact, had adequate rafia/pink urine output.   Diet: TF running at goal rate 35 mL/hr via NJ with 100cc flushes every four hours. All oral meds via NJ.   Skin: Healed scar top of right foot. Healed abdominal midline incisional scar.  Lines: PIV saline locked.  Labs: Reviewed.  at bedtime.  at 0200.   Pain/nausea: Denies pain. C/o continuous nausea, declined antiemetics.   New changes this shift: None.  Plan: Continue POC.

## 2024-06-13 NOTE — PROGRESS NOTES
[facility]    Medicine Progress Note - Hospitalist Service, GOLD TEAM 8    Date of Admission:  5/29/2024    Assessment & Plan   Myla Glynn is a 79 year old female admitted on 5/29/2024.  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    Changes for today  -Percutaneous gastrostomy was placed without complications  -Started scheduled acetaminophen and lidocaine patch for pain postprocedure  -Ordered Dilaudid at low-dose 0.5 mg for moderate pain and 1 mg for severe pain every 4 hours enteral and 0.3 every 4 hours IV if not able to tolerate enteral Dilaudid  -Worsening nausea is noted, no abdominal tenderness, will obtain plain film to rule out increased stool burden  -Scheduled MiraLAX     #UTI  # Persistent bladder leak s/p urinary diversion with bilateral nephrostomy tubes (now removed)  # Hx of Right renal segmental artery bleed with subsequent clot formation in the renal collecting ducts, ureter, and urinary bladder s/p IR embolization  # Recurrent colorectal cancer with local recurrence within the bladder s/p partial cystectomy with small bowel resection and  anastomosis (01/09/2024)  Patient presenting with weakness. UA appears infectious with elevated WBC and + LE. Will start ceftriaxone based on prior cultures while awaiting UC. Alejandra in place for bladder decompression. Urology performed CT cystogram with findings showing: anterior bladder wall defect with associated air and fluid collection in low anterior pelvis. Per urology, this collection appears walled off so they have no plans for decompression or drainage at this time.  -  Bcx with NGTD, urine culture with >100k urogenital jonathan  - Urology recommending IR drainage of fluid collection if signs of hemodynamic instability, but pt currently stable  - Continue Zosyn for empiric antibiosis for chronic anterior bladder leak, infected RP hematoma and switched to Augmentin (6/7) and continue until outpatient follow-up with [facility] on 6/19  - ID following, appreciate input   - MR enterography could be considered to evaluate for persistent fistula, but unlikely to change current management   - D/w [facility] (Facundo) who feels this anterior bladder wall leak appears to be chronic in nature, not an acute change    - Pt and family would like to remain at [facility] rather than transfer to [facility]    #ALFREDA   CKD stage 3B  #Hypernatremia  Malnutrition  Minimal PO intake  Pt presented with acute kidney injury (Cr 4.5, baseline 1.2-1.5). Suspect pre-renal in the setting of poor PO intake, but obstruction likely contributed as well (alejandra placed for bladder decompression upon arrival to ED). Has had good urine output since admission. Cr now improving, but still not returned to baseline. HyperNa improved with D5w infusion  - Continue alejandra catheter for bladder decompression  - Trend BMP daily  - Avoiding nephrotoxins and renally dosing medications  - Strict I/Os  - D5W for hypernatremia discontinued on 6/3, continue adjusting FWF to maintain normonatremia   - improving but still requiring enteral TF which has been maintaining  her volume status and electrolyte balance. After discussion with family, Urology team from [facility], the only sustainable option would be to pursue PEG placement and enteral feeding. D/W IR and GI medicine. Considering benefit and low risk of IR PEG placement under conscious sedation, will pursue the same as opposed to GI PEG placement under GA. But earliest available is 6/13/24. Since she is essentially TF dependent, she would be med ready for disposition only after PEG placement.     #Diarrhea  Non-anion gap metaboic acidosis   Suspected related to antibiotics and tube feeds. However, prolonged and extensive antibiotic exposures in the recent weeks including Augmentin and most recently Zosyn. Has had n/v and some abdominal tenderness present on exam today.  - Continue fiber in Tfs  - Continue probiotics  - C. Diff negative on 6/2, continue Imodium PRN  - stool output and low bicarb levels due to GI loss, improving    #Infected organized left retroperitoneal hematoma with Proteus hauseri/vulgaris s/p IR coil embolization and drain placement 3/9/2024 followed by multiple drain upsized/exchange and antibiotics until 4/15/2024   Developed during prolonged hospitalization at [facility]. IR placed MARIE drain with multiple attempts at upsizing to drain all of the residual fluid collections. Completed several courses of antibiotics for treatment of infected hematoma, last finished Augmentin on 4/15/24. Last seen by ID as outpatient on 5/10, with plans to monitor off abx therapy. Here, CT AP on admission showed minimal residual fluid collection. She had the MARIE drain in place, but this was removed accidentally upon transfer to CT on 5/30.  - D/w IR who reviewed imaging, minimal residual fluid collection with minimal drainage from MARIE drain prior to admission suggests there is not likely benefit to replacing, d/w family  - No signs of ongoing infection in hematoma  - empiric Zosyn, will transition to augmentin for GI coverage  as  above, continue until Urology follow-up on 6/19    #Malnutrition  #FTT  Poor PO intake, weight loss, deconditioning for the past several weeks PTA. Suspect infection may be contributing, but likely also represents prolonged hospitalization and underlying CRC.  - Nutrition consulted  - Gentle IVF  - Discussed with pt and family, who are agreeable to trial of enteral feeding. NJ Placed on 5/31, tube feeds initiated   - Goals per family are to see if patient improves strength enough with tube feeds to feel better and eat on her own, discussed that we would readdress her ability to take in PO as she gets closer to discharge, or that NJ feeds could be continued upon discharge to TCU    #Pleural Effusion   #Atelectasis   CT with L pleural effusion. Patient denies respiratory symptoms and is breathing on room air.   - CTM      #HFrEF  #HTN   BP soft on admission, now improved s/p 2L fluid resuscitation. Per discharge summary from 4/2024 patient was scheduled to follow up with cardiology on 7/24. Recent ECHO on 4/4/24 with EF 29% .   - Holding Carvedilol 12.5mg BID, resumed at 3.125 mg PO BID dose on 6/1  - BNP 1514, no comparisons available     #Chronic Normocytic Anemia   Baseline between 10-11, on admission Hgb 10.5. Likely 2/2 anemia of chronic disease and suspect 2/2 nutritional deficency.   - Trending CBC     #T2DM, diet controlled  - Hypoglycemic protocol      #Nausea   EKG with Qtc 462.   - PTA Zofran   - PTA Compazine   - Added Remeron for appetite stimulation on 5/31     #Hyponatremia   Suspect 2/2 malnourishment. Does not appear volume overloaded.   - Trend BMP    #Deconditioning   -PT/OT consults          Diet: Adult Formula Drip Feeding: Continuous TwoCal HN; Other - Specify in Comment; Goal Rate: 35; mL/hr; Once access confirmed. Initiate @ 10 ml/hr and advance by 10 ml Q8H pending pt's tolerance.; Do not advance tube feeding rate unless K+ is = or > 3...  NPO per Anesthesia Guidelines for Procedure/Surgery  Except for: Meds    DVT Prophylaxis: Heparin SQ  Cameron Catheter: PRESENT, indication: Gross hematuria  Lines: None     Cardiac Monitoring: None  Code Status: Full Code      Clinically Significant Risk Factors              # Hypoalbuminemia: Lowest albumin = 2 g/dL at 5/30/2024  8:27 AM, will monitor as appropriate     # Hypertension: Noted on problem list         # Severe Malnutrition: based on nutrition assessment    # Financial/Environmental Concerns: none         Disposition Plan     Medically Ready for Discharge: Anticipated in 2-4 Days         Victorina Vizcarra MD  Hospitalist Service, GOLD TEAM 8  [facility]  Securely message with [employer] (more info)  Text page via [facility] Paging/Directory   See signed in provider for up to date coverage information  ______________________________________________________________________    Interval History     Via :  The patient reported continued nausea.  No vomiting.  Overnight, the patient reported worsening nausea and spite of scheduled Zofran so my colleague overnight scheduled IV Zofran.    Physical Exam   Vital Signs: Temp: 97.7  F (36.5  C) Temp src: Oral BP: (!) 156/72 Pulse: 88   Resp: 18 SpO2: 99 % O2 Device: None (Room air)    Weight: 117 lbs 11.61 oz    General Appearance: Frail elderly woman, appears older than stated age, NAD  Respiratory: Breathing comfortably on room air, lungs grossly CTAB  Cardiovascular: RRR, no m/r/g.  GI: soft, non-distended, non-tender to palpation. Site of MARIE drain removal appears c/d/I.  Skin: pallor present, no skin rash  Other: Trace LE edema bilaterally. Cameron draining clear yellow urine.     Medical Decision Making       30 MINUTES SPENT BY ME on the date of service doing chart review, history, exam, documentation & further activities per the note.      Data     I have personally reviewed the following data over the past 24 hrs:    5.2  \   9.6 (L)   / 262     134 (L) 98 32.4  (H) /  111 (H)   4.6 25 1.38 (H) \       Imaging results reviewed over the past 24 hrs:   No results found for this or any previous visit (from the past 24 hour(s)).

## 2024-06-13 NOTE — PROGRESS NOTES
Care Management Follow Up    Length of Stay (days): 15    Expected Discharge Date: 06/14/2024     Concerns to be Addressed: discharge planning       Patient plan of care discussed at interdisciplinary rounds: Yes    Anticipated Discharge Disposition: Transitional Care vs Home     Anticipated Discharge Services: PCA    Anticipated Discharge DME: Other (see comment) (TBD)    Patient/family educated on Medicare website which has current facility and service quality ratings: yes    Education Provided on the Discharge Plan: Yes    Patient/Family in Agreement with the Plan: yes    Referrals Placed by CM/SW:      [facility]ending:  [facility], [address] [place] [address]  P: [phone]    Private pay costs discussed: Not applicable at this time    Additional Information: MAURO met with pt and dtr at bedside to discuss if referral to [facility] could be made, as [facility] is anticipated to have some beds available over the weekend. Dtr agreed for referral to be made. SW sent referral.    Dtr reported she still needs letters from the provider so that pt's family members can travel from outside the [place] to be [facility]'s caregivers. SW team heard from Dr. Vizcarra that these letters will be provided to pt/family but likely not today.    MAURO heard from Colleen at [facility] that pt has a cancer diagnosis and she wanted to know if there is a plan for any chemo or radiation in the near future for pt. MAURO messaged Dr. iVzcarra but did not get a response today.    SAMANTHA Miner   [address] MAURO Phone: [phone]

## 2024-06-13 NOTE — PLAN OF CARE
Goal Outcome Evaluation:      Plan of Care Reviewed With: patient, child    Overall Patient Progress: no changeOverall Patient Progress: no change    Temp: 98  F (36.7  C) Temp src: Oral BP: 122/59 Pulse: 79   Resp: 16 SpO2: 100 % O2 Device: None (Room air)       Time: 1500 - 2300  Reason for admission: new ALFREDA (Cr 4.5, baseline closer to 1.6). Hx: colon adenocarcinoma c/b bladder invasion s/p resections and most recently with prolonged hospitalization for enterovesicular fistula, DMII, GERD and HTN    Vitals:  Stable on RA  Diet:  Regular & tube feed - NPO starting 0000 on 6/13/24  Activity/Mobility:  SBA Ambulated in halls x1 this shift.  Neuro:  A&Ox4  Pain/Nausea:  Pain & Nausea controlled with meds.  Respiratory: WDL on RA  Cardiac: WDL  GI:  Passing gas. No BM this shift.  :  Voiding via Cameron (chronic)  Lines & Drains:  L PIV SL, NJ infusing contrast; to be clamped at 0000  Labs:  Monitored.  Incisions/Skin:  monitored    NEW CHANGES:  No acute changes this shift. Stopped tube feed at 2000 and initiated instillation of contrast. NPO at 0000, IR planned for tomorrow.    Continue to implement Care Plan.    Avtar Muhammad RN

## 2024-06-13 NOTE — PROGRESS NOTES
Patient Name: Myla JEFFERSON [facility]  Medical Record Number: [id number]  Today's Date: 6/13/2024    Procedure: Image guided percutaneous Gastrostomy tube placement  Proceduralist: Dr. Wagner  Pathology present: NA    Procedure Start: 1344  Procedure end: 1400  Sedation medications administered: Versed 1mg, fentanyl 50mcg     Report given to: Sunshine KAUFFMAN  : Via Ipad    Other Notes: Pt arrived to IR room 4 from . Consent reviewed. Pt denies any questions or concerns regarding procedure. Pt positioned supine and monitored per protocol. Pt tolerated procedure without any noted complications. Pt transferred back to .

## 2024-06-13 NOTE — PROCEDURES
[facility]    Procedure: Gastrostomy tube placement    Date/Time: 6/13/2024 2:55 PM    Performed by: Behzadi, Heshmatzadeh, MD  Authorized by: Lidia Wagner MD  IR Fellow Physician: Ashkan Behzadi      UNIVERSAL PROTOCOL   Site Marked: NA  Prior Images Obtained and Reviewed:  Yes  Required items: Required blood products, implants, devices and special equipment available    Patient identity confirmed:  Verbally with patient, arm band, provided demographic data and hospital-assigned identification number  Patient was reevaluated immediately before administering moderate or deep sedation or anesthesia  Confirmation Checklist:  Patient's identity using two indicators, relevant allergies, procedure was appropriate and matched the consent or emergent situation and correct equipment/implants were available  Time out: Immediately prior to the procedure a time out was called    Universal Protocol: the Joint Commission Universal Protocol was followed    Preparation: Patient was prepped and draped in usual sterile fashion       ANESTHESIA    Anesthesia:  Local infiltration  Local Anesthetic:  Lidocaine 1% without epinephrine      SEDATION    Patient Sedated: No    See dictated procedure note for full details.  Findings: -    Specimens: none    Complications: None    Condition: Stable    Plan: Gastrostomy tube should be attached to gravity drainage bag for 4 hours.    If patient is asymptomatic tube feeding can be started after 4 hours.            PROCEDURE  Describe Procedure: Successful Image guided gastrostomy tube placement.   Length of time physician/provider present for 1:1 monitoring during sedation: 30

## 2024-06-13 NOTE — PRE-PROCEDURE
GENERAL PRE-PROCEDURE:   Procedure:  Image guided percutaneous gastrostomy tube placement  Date/Time:  6/13/2024 1:09 PM    Verbal consent obtained?: Yes    Written consent obtained?: Yes    Risks and benefits: Risks, benefits and alternatives were discussed    Consent given by:  Patient  Patient states understanding of procedure being performed: Yes    Patient's understanding of procedure matches consent: Yes    Procedure consent matches procedure scheduled: Yes    Expected level of sedation:  Moderate  Appropriately NPO:  Yes  ASA Class:  2  Mallampati  :  Grade 2- soft palate, base of uvula, tonsillar pillars, and portion of posterior pharyngeal wall visible  Lungs:  Lungs clear with good breath sounds bilaterally  Heart:  Normal heart sounds and rate  History & Physical reviewed:  History and physical reviewed and no updates needed  Statement of review:  I have reviewed the lab findings, diagnostic data, medications, and the plan for sedation

## 2024-06-13 NOTE — PROGRESS NOTES
"/57 (BP Location: Right arm)   Pulse 77   Temp 97.7  F (36.5  C) (Oral)   Resp 16   Ht 1.499 m (4' 11\")   Wt 53.4 kg (117 lb 11.6 oz)   SpO2 100%   BMI 23.78 kg/m      Activity: Assist of 1 with walker and gait belt. Ambulated in halls.   Neuros:  A&O x4.   Cardiac: WDL. Heparin held for procedure.   Respiratory: 100% on room air. Denies SOB.   GI/: BS+. Denies nausea. Passing flatus. No BM. Cameron catheter with adequate output.   Diet: NPO for IR procedure, tube feeds held. Meds through NG tube.   Skin/Incisions/Drains: Cameron, NG tube.   Lines: Dextrose 10% running at 35 ml/hr  Pain: Denies pain.     Plan: Down to IR at 12:30 pm for G tube placement.           "

## 2024-06-13 NOTE — PLAN OF CARE
Vital signs:  Temp: 98  F (36.7  C) Temp src: Oral BP: 122/59 Pulse: 79   Resp: 16 SpO2: 100 % O2 Device: None (Room air)     Weight: 53.4 kg (117 lb 11.6 oz)    1018-4018:    Activity: Up to commode with assist of 1, walker, gait belt. Repositioned in bed with assist of 2. HOB up 30 degrees.  Neuros: A & O x4. Neuro intact.  Cardiac: WDL. Asymptomatic. Subcutaneous heparin held for procedure.  Respiratory: LS diminished in bases. O2 sats high 90s on RA. Denies SOB. Unlabored. Encouraged deep breathing and coughing.  GI/: BS+, passing flatus, no BM. Cameron intact, had adequate rafia urine output.   Diet: NPO at midnight. TF stopped. D10 started at 35 mL/hr per orders. Oral contrast completed at midnight.  Skin: Healed scar top of right foot. Healed abdominal midline incisional scar.  Lines: D10 started at 35 mL/hr per orders via left PIV.  Labs: Reviewed. BG 87 at midight.  at 0300.  Pain/nausea: Denies pain. Dr. Finnegan notified regarding patient had 30cc emesis with taking PO Zofran at 1930, changed scheduled PO Zofran to IV Zofran every 6 hours. Denies nausea, declined midnight and 0600 Zofran.   New changes this shift: NPO at midnight. TF held. Contrast completed. D10 started.   Plan: G-tube placement today.

## 2024-06-14 NOTE — PROGRESS NOTES
CLINICAL NUTRITION SERVICES - REASSESSMENT NOTE   Nutrition Prescription    RECOMMENDATIONS FOR MDs/PROVIDERS TO ORDER:  ADAT to Regular to allow pt to take PO if she desires.    Malnutrition Status:    Moderate malnutrition in the context of acute on chronic illness    Recommendations already ordered by Registered Dietitian (RD):  Ordered patency FWF 60 mL q4h since more concentrated formula.   -->Na+ 131 (L, trended down past week) so keeping minimal added free water for now. Noted to receive D10W gtt yesterday while TF held for PEG tube placement.    Updated enteral access route for EN support order to Gastrostomy given PEG tube now in place and being used per nursing note this AM    Continue EN support regimen as ordered:   TwoCal HN @ 35 ml/hr. Provides 840 ml, 1680 kcals (32 kcal/kg), 71 g protein (1.4 g pro/kg), 184 g CHO, 4 g fiber, 588 ml free water.     Future/Additional Recommendations:  Monitor phos trends and need for renal TF formula if continues to trend upward     EVALUATION OF THE PROGRESS TOWARD GOALS   Diet: NPO currently - previously Regular diet prior to PEG-tube placement    Oral Intake: Poor - 0% meal intake over past week per RN flowsheets. Nausea noted.     Enteral Access: Gastrostomy placed yesterday, 6/13    FWF: None currently ordered (previous FWF order discontinued 6/5)    Nutrition Support: EN  5/31-Current: TwoCal HN @ 35 ml/hr. Provides 840 ml, 1680 kcals, 71 g protein, 184 g CHO, 4 g fiber, 588 ml free water.     Enteral Intake: Tolerating TF at goal rate during writer's visit.   -->Highly suspect incomplete enteral intake data in I/Os given TF mentioned to be running at goal rate per RN chart notes and no signficant procedures or interruptions noted on 6/7-6/9 but only one hour to no volume charted in I/Os on these dates. Not calculating average based off this data. Suspect pt meeting goal kcal majority of days over past week.     Met with pt at bedside using [employer]   over phone - she reports TF is going ok. She feels ok. Denies hunger.     NEW FINDINGS   -Wt trends: Remains above admit wt  Date/Time Weight Weight Method   06/12/24 1241 53.4 kg (117 lb 11.6 oz) Bed scale   06/11/24 2058 53.1 kg (117 lb) Standing scale   06/09/24 1248 53.5 kg (118 lb) --   06/06/24 1838 55.2 kg (121 lb 11.2 oz) Standing scale   06/01/24 1250 51.8 kg (114 lb 3.2 oz) Standing scale      -Labs: Reviewed, notable for:   Na+ 131 (L, trended down past week)  BUN 29.4 (H, fluctuating)  Cr 1.6 (H, fluctuating)  Phos 5.8 (H, up over past couple days, previously low earlier in admit) - last phos replacement 6/6    -GI: 1-4 BMs daily over past week per I/Os. On Banatrol 1 pkt TID. Florastor probiotic BID.     -Renal: ALFREDA on CKD stage 3, + UOP (1625 mL yesterday 6/13)    -Skin: WOCN not following. Skin is dry, but intact on arms and legs when writer completed nutrition focused physical exam.      -Meds & Vitamin/Mineral Supplementation: Reviewed, notable for:   Thera-vit-M  Zofran q6h  Sodium bicarb TID    UPDATED ASSESSED NUTRITION NEEDS:   Dosing Weight: 52 kg (actual, based on lowest/driest wt this admit of 51.8 kg on 6/1)   Estimated Energy Needs: 6386-4707 kcals/day (30-35 kcal/kg)   Justification: Maintenance   Estimated Protein Needs: 62-78 grams protein/day (1.2 - 1.5 grams of pro/kg)   Justification: Increased needs   Estimated Fluid Needs: (1 mL/kcal)   Justification: Maintenance     MALNUTRITION  % Intake: Decreased intake does not meet criteria  % Weight Loss: > 20% in 1 year - PTA, but wt now stable this admit, given lack of other severe criteria, counting toward moderate  Subcutaneous Fat Loss: Facial region:  Mild, Upper arm:  Mild, and Lower arm:  Mild  Muscle Loss: Temporal:  Mild, Thoracic region (clavicle, acromium bone, deltoid, trapezius, pectoral):  Moderate, and Upper arm (bicep, tricep):  Mild  Fluid Accumulation/Edema: Trace-Mild per RN flowsheets  Malnutrition Diagnosis: Moderate  malnutrition in the context of acute on chronic illness    Previous Goals   Total avg nutritional intake to meet a minimum of 25 kcal/kg and 1.2 g PRO/kg daily (per dosing wt 58.2 kg).   Evaluation: Suspect met most days over past week outside of interruption to feeds for PEG tube placement    Previous Nutrition Diagnosis  Inadequate oral intake related to N/V/poor appetite as evidenced by reliance on TF to meet 100% of nutrition needs.   Evaluation: No change    CURRENT NUTRITION DIAGNOSIS  Inadequate oral intake related to N/V/poor appetite as evidenced by reliance on TF to meet 100% of nutrition needs and placement of long-term enteral access.     INTERVENTIONS  Implementation  -Enteral Nutrition - Continue as ordered  -Nutrition education regarding EN support and RD role, role of NFPE (using [employer] )    Goals  Total avg nutritional intake to meet a minimum of 30 kcal/kg and 1.2 g PRO/kg daily (per NEW dosing wt 52 kg).     Monitoring/Evaluation  Progress toward goals will be monitored and evaluated per protocol.     Rox Rosario RD, LD  Available on [employer] - can search by name or unit Dietitian  **Clinical Nutrition is no longer available via pager

## 2024-06-14 NOTE — PLAN OF CARE
Goal Outcome Evaluation:      Plan of Care Reviewed With: patient, child    Overall Patient Progress: improvingOverall Patient Progress: improving    Outcome Evaluation: Dispo recs are TCU. Anticipate TCU placement once med ready. Care management team will continue to follow.  __________________________     RUY Whatley, RANDELL  [facility]  Coverage  - [address]  Desk Phone: [phone]   Securely message with [employer] (Search Name or 7B Med Surg SW)  Text page via [facility] Paging/Directory   See signed in provider for up to date coverage information  Social Work & Care Management Department

## 2024-06-14 NOTE — PLAN OF CARE
"Goal Outcome Evaluation:    Overall Patient Progress: improvingOverall Patient Progress: improving    Tigrinya speaking. Daughter at bedside overnight, involves in care. A&Ox4, VSS, RA. Denies cardiac chest pain and SOB. Pain managed with scheduled Tylenol and PRN Dilaudid and nausea managed with scheduled Zofran. NG clamped. PEG TF advanced to 20mL/hr starting 0500, 100mL flushes q4h. Cameron, yellow output. L PIV infusing D5 @ 25ml/hr and R PIV SL. Ax1/GB/walker to bedside commode. BM 6/12. Electrolytes AM rechecks. NPO except meds. Continue POC.     /63 (BP Location: Right arm)   Pulse 77   Temp 98.1  F (36.7  C) (Oral)   Resp 18   Ht 1.499 m (4' 11\")   Wt 53.4 kg (117 lb 11.6 oz)   SpO2 95%   BMI 23.78 kg/m     "

## 2024-06-14 NOTE — CONSULTS
SPIRITUAL HEALTH SERVICES Consult Note  [facility] 7B    bernard Gloria and I visited with Myla using  services via the phone. She denied any acute spiritual or emotional needs. She is Christianity but does not have any practices that she needs support in nor does she need us to reach out to her community. [facility] was oriented on how to access [facility] as needs arise.    Plan:  services remain available upon request. No plans to follow up unless request.    Rev. MARIANO Banks.  Chaplain Resident  Pager [phone]    * [facility] remains available 24/7 for emergent requests/referrals, either by having the switchboard page the on-call  or by entering an ASAP/STAT consult in Epic (this will also page the on-call ). Routine Epic consults receive an initial response within 24 hours.*

## 2024-06-14 NOTE — PROGRESS NOTES
[facility]    Medicine Progress Note - Hospitalist Service, GOLD TEAM 8    Date of Admission:  5/29/2024    Assessment & Plan   Myla Glynn is a 79 year old female admitted on 5/29/2024.  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    Changes for today  -Tube feeding through percutaneous gastrostomy is approaching target, the patient will be medically ready by tomorrow for discharge     #UTI  # Persistent bladder leak s/p urinary diversion with bilateral nephrostomy tubes (now removed)  # Hx of Right renal segmental artery bleed with subsequent clot formation in the renal collecting ducts, ureter, and urinary bladder s/p IR embolization  # Recurrent colorectal cancer with local recurrence within the bladder s/p partial cystectomy with small bowel resection and anastomosis (01/09/2024)  Patient presenting with weakness. UA appears infectious with elevated WBC and + LE. Will start ceftriaxone based on prior cultures while awaiting UC. Cameron in place for bladder decompression. Urology performed CT cystogram with findings showing: anterior bladder wall defect with  associated air and fluid collection in low anterior pelvis. Per urology, this collection appears walled off so they have no plans for decompression or drainage at this time.  -  Bcx with NGTD, urine culture with >100k urogenital jonathan  - Urology recommending IR drainage of fluid collection if signs of hemodynamic instability, but pt currently stable  - Continue Zosyn for empiric antibiosis for chronic anterior bladder leak, infected RP hematoma and switched to Augmentin (6/7) and continue until outpatient follow-up with [facility] on 6/19  - ID following, appreciate input   - MR enterography could be considered to evaluate for persistent fistula, but unlikely to change current management   - D/w [facility] (Fac[facility]) who feels this anterior bladder wall leak appears to be chronic in nature, not an acute change    - Pt and family would like to remain at [facility] rather than transfer to [facility]    #ALFREDA   CKD stage 3B  #Hypernatremia  Malnutrition  Minimal PO intake  Pt presented with acute kidney injury (Cr 4.5, baseline 1.2-1.5). Suspect pre-renal in the setting of poor PO intake, but obstruction likely contributed as well (alejandra placed for bladder decompression upon arrival to ED). Has had good urine output since admission. Cr now improving, but still not returned to baseline. HyperNa improved with D5w infusion  - Continue alejandra catheter for bladder decompression  - Trend BMP daily  - Avoiding nephrotoxins and renally dosing medications  - Strict I/Os  - D5W for hypernatremia discontinued on 6/3, continue adjusting FWF to maintain normonatremia   - improving but still requiring enteral TF which has been maintaining her volume status and electrolyte balance. After discussion with family, Urology team from [facility], the only sustainable option would be to pursue PEG placement and enteral feeding. D/W IR and GI medicine. Considering benefit and low risk of IR PEG placement under conscious sedation, will pursue the  same as opposed to GI PEG placement under GA. But earliest available is 6/13/24. Since she is essentially TF dependent, she would be med ready for disposition only after PEG placement.     #Diarrhea  Non-anion gap metaboic acidosis   Suspected related to antibiotics and tube feeds. However, prolonged and extensive antibiotic exposures in the recent weeks including Augmentin and most recently Zosyn. Has had n/v and some abdominal tenderness present on exam today.  - Continue fiber in Tfs  - Continue probiotics  - C. Diff negative on 6/2, continue Imodium PRN  - stool output and low bicarb levels due to GI loss, improving    #Infected organized left retroperitoneal hematoma with Proteus hauseri/vulgaris s/p IR coil embolization and drain placement 3/9/2024 followed by multiple drain upsized/exchange and antibiotics until 4/15/2024   Developed during prolonged hospitalization at [facility]. IR placed MARIE drain with multiple attempts at upsizing to drain all of the residual fluid collections. Completed several courses of antibiotics for treatment of infected hematoma, last finished Augmentin on 4/15/24. Last seen by ID as outpatient on 5/10, with plans to monitor off abx therapy. Here, CT AP on admission showed minimal residual fluid collection. She had the MARIE drain in place, but this was removed accidentally upon transfer to CT on 5/30.  - D/w IR who reviewed imaging, minimal residual fluid collection with minimal drainage from MARIE drain prior to admission suggests there is not likely benefit to replacing, d/w family  - No signs of ongoing infection in hematoma  - empiric Zosyn, will transition to augmentin for GI coverage  as above, continue until Urology follow-up on 6/19    #Malnutrition  #FTT  Poor PO intake, weight loss, deconditioning for the past several weeks PTA. Suspect infection may be contributing, but likely also represents prolonged hospitalization and underlying CRC.  - Nutrition consulted  - Gentle IVF  -  Discussed with pt and family, who are agreeable to trial of enteral feeding. NJ Placed on 5/31, tube feeds initiated   - Goals per family are to see if patient improves strength enough with tube feeds to feel better and eat on her own, discussed that we would readdress her ability to take in PO as she gets closer to discharge, or that NJ feeds could be continued upon discharge to TCU    #Pleural Effusion   #Atelectasis   CT with L pleural effusion. Patient denies respiratory symptoms and is breathing on room air.   - CTM      #HFrEF  #HTN   BP soft on admission, now improved s/p 2L fluid resuscitation. Per discharge summary from 4/2024 patient was scheduled to follow up with cardiology on 7/24. Recent ECHO on 4/4/24 with EF 29% .   - Holding Carvedilol 12.5mg BID, resumed at 3.125 mg PO BID dose on 6/1  - BNP 1514, no comparisons available     #Chronic Normocytic Anemia   Baseline between 10-11, on admission Hgb 10.5. Likely 2/2 anemia of chronic disease and suspect 2/2 nutritional deficency.   - Trending CBC     #T2DM, diet controlled  - Hypoglycemic protocol      #Nausea   EKG with Qtc 462.   - PTA Zofran   - PTA Compazine   - Added Remeron for appetite stimulation on 5/31     #Hyponatremia   Suspect 2/2 malnourishment. Does not appear volume overloaded.   - Trend BMP    #Deconditioning   -PT/OT consults          Diet: NPO per Anesthesia Guidelines for Procedure/Surgery Except for: Meds  Adult Formula Drip Feeding: Continuous TwoCal HN; Gastrostomy; Goal Rate: 35 mL/hr (goal) - ok to advance to goal rate now if tolerating 20 mL/hr.; mL/hr    DVT Prophylaxis: Heparin SQ  Cameron Catheter: PRESENT, indication: Gross hematuria  Lines: None     Cardiac Monitoring: None  Code Status: Full Code      Clinically Significant Risk Factors              # Hypoalbuminemia: Lowest albumin = 2 g/dL at 5/30/2024  8:27 AM, will monitor as appropriate     # Hypertension: Noted on problem list         # Severe Malnutrition: based on  nutrition assessment    # Financial/Environmental Concerns: none         Disposition Plan     Medically Ready for Discharge: Anticipated Tomorrow         Victorina Vizcarra MD  Hospitalist Service, GOLD TEAM 8  [facility]  Securely message with [employer] (more info)  Text page via [employer] Paging/Directory   See signed in provider for up to date coverage information  ______________________________________________________________________    Interval History     Via :  The patient denies any nausea at the time of my evaluation.  The patient reports that she is just fatigued.    Physical Exam   Vital Signs: Temp: 98.1  F (36.7  C) Temp src: Oral BP: 121/59 Pulse: 79   Resp: 16 SpO2: 99 % O2 Device: None (Room air)    Weight: 117 lbs 11.61 oz    General Appearance: Frail elderly woman, appears older than stated age, NAD  Respiratory: Breathing comfortably on room air, lungs grossly CTAB  Cardiovascular: RRR, no m/r/g.  GI: soft, non-distended, non-tender to palpation. Site of MARIE drain removal appears c/d/I.  Skin: pallor present, no skin rash  Other: Trace LE edema bilaterally. Cameron draining clear yellow urine.     Medical Decision Making       30 MINUTES SPENT BY ME on the date of service doing chart review, history, exam, documentation & further activities per the note.      Data     I have personally reviewed the following data over the past 24 hrs:    5.2  \   9.4 (L)   / 287     131 (L) 95 (L) 29.4 (H) /  129 (H)   4.2 21 (L) 1.60 (H) \       Imaging results reviewed over the past 24 hrs:   No results found for this or any previous visit (from the past 24 hour(s)).

## 2024-06-14 NOTE — PLAN OF CARE
Goal Outcome Evaluation:      Plan of Care Reviewed With: patient, child    Overall Patient Progress: improvingOverall Patient Progress: improving    Temp: 97.7  F (36.5  C) Temp src: Oral BP: (!) 156/72 Pulse: 88   Resp: 18 SpO2: 99 % O2 Device: None (Room air)       Time: 1500 - 2300  Reason for admission: new ALFREDA (Cr 4.5, baseline closer to 1.6). Hx: colon adenocarcinoma c/b bladder invasion s/p resections and most recently with prolonged hospitalization for enterovesicular fistula, DMII, GERD and HTN    Vitals:  Stable on RA  Diet:  NPO & tube feed - started 10 mL/hr at 2100 through new PEG  Activity/Mobility:  SBA   Neuro:  A&Ox4  Pain/Nausea:  Pain & Nausea controlled with meds.  Respiratory: WDL on RA  Cardiac: WDL  GI:  Passing gas. No BM this shift.  :  Voiding via Cameron  Lines & Drains:  L PIV SL & L PIV cont D10, NG clamped, PEG cont feed  Labs:  Monitored.  Incisions/Skin:  monitored    NEW CHANGES:  No acute changes this shift.    Continue to implement Care Plan.    Avtar Muhammad RN

## 2024-06-14 NOTE — PROGRESS NOTES
Care Management Follow Up    Length of Stay (days): 16  Expected Discharge Date: 06/17/2024  Concerns to be Addressed: discharge planning     Patient plan of care discussed at interdisciplinary rounds: Yes  Anticipated Discharge Disposition: Transitional Care   Anticipated Discharge Services: None  Anticipated Discharge DME: None  Patient/family educated on Medicare website which has current facility and service quality ratings: yes  Education Provided on the Discharge Plan: Yes  Patient/Family in Agreement with the Plan: yes  Referrals Placed by CM/SW: External Care Coordination, Senior Linkage Line, Post Acute Facilities, Communication hand-offs to next level of Care Providers  Private pay costs discussed: Not applicable    Active TCU Referrals:    [facility] in [place] (P: [phone])  6/13: Referral made.    [facility] (Ph: [phone])  6/14: Referral made in [facility].    Inactive TCU Referrals:    [facility] - Family requested referral; facility out of insurance network; no referral sent.  [facility] - Acuity too high.  [facility] - Bed not available.  [facility] - Bed not available.    Additional Information: Per Gold 8 Provider, feeding tube successfully placed yesterday and anticipate pt could be med ready for discharge Sunday versus Monday. PT/ OT recs TCU. This writer placed additional TCU referrals based on family's choice, as listed above. See updates listed above.    Care management team will follow for safe discharge planning. Pt placed on the wknd help list for social work assistance.  __________________________     RUY Whatley, LICSW  Unit 7[address] Coverage Clinical   [facility]  Desk Phone: [phone]   Securely message with [employer] (Search Name)  Text page via [facility] Paging/Directory   See signed in provider for up to date coverage  information  Social Work & Care Management Department

## 2024-06-14 NOTE — PROGRESS NOTES
"/63 (BP Location: Right arm)   Pulse 74   Temp 98.4  F (36.9  C) (Oral)   Resp 16   Ht 1.499 m (4' 11\")   Wt 53.4 kg (117 lb 11.6 oz)   SpO2 95%   BMI 23.78 kg/m        Activity: SBA, with walker and gaitbelt   Neuros:  A&Ox4, dtr at bedside for assistance with translation.  when needed.   Cardiac: WDL.   Respiratory: 95% on room air.   GI/: Alejandra catheter with adequate output. Alejandra contributing to vulvar skin breakdown. Team notified. 3 soft BMs this morning in bedside commode.   Diet: NPO, tube feeds through G tube. ,   Skin/Incisions/Drains: Tube feed through G tube. NG tube clamped at beginning of shift and was dislodged when pt got up to use the commode., team notified. L PIV x2 S/L.   Lines: G tube. NG tube clamped. L PIV x2 S/L. BG check this morning was 164, Dextrose stopped at 10 am.  at recheck.   Pain: Reporting no to minimal pain in AM, 5/10 pain in the afternoon, declined rx.   Labs: K, Ph, Mg replacement protocol, no replacements needed, redraw tomorrow AM.      D10% stopped. NG tube dislodged, not replacing it per provider. WOC to evaluate vulvar skin breakdown from alejandra.   Dtr will return to bedside the morning of 6/15.           "

## 2024-06-15 PROBLEM — N17.0 ACUTE KIDNEY FAILURE WITH TUBULAR NECROSIS (H): Status: ACTIVE | Noted: 2024-01-01

## 2024-06-15 NOTE — PLAN OF CARE
"Goal Outcome Evaluation:      Plan of Care Reviewed With: patient    Overall Patient Progress: improvingOverall Patient Progress: improving       Blood pressure 130/64, pulse 81, temperature 98.3  F (36.8  C), temperature source Oral, resp. rate 18, height 1.499 m (4' 11\"), weight 53.4 kg (117 lb 11.6 oz), SpO2 100%, not currently breastfeeding.    Status: bladder mass and weakness  Activity: SBA  Neuros: A&O x 4  Cardiac: slight HTN, cap refill < 3 secs, S1 S2 present  Respiratory: diminished BS in lower lungs  GI/: no BM on night, vaginal wounds from yeast infection, AUO  Diet: NPO  Skin/Incisions: perineal wounds  Lines/Drains: L PIV x 2 SL, G tube. Cameron  Pain/Nausea: no requests, accepted tylenol for pain, refused zofran for nausea  New Changes: none  Plan:  continue to support and manage pain    "

## 2024-06-15 NOTE — PROGRESS NOTES
[facility]    Medicine Progress Note - Hospitalist Service, GOLD TEAM 8    Date of Admission:  5/29/2024    Assessment & Plan   79 year old female admitted on 5/29/2024.  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    # Acute kidney injury AKIN Stage I on top of CKD stage IIIb, unclear if present on admission  This is the main problem being managed for 6/15.  This is likely prerenal in etiology given diarrhea.  -Given entero-vesica fistula s/p urinary diversion, ordered CT abdomen and pelvis without contrast to rule out obstructive physiology [ultrasound might not be sensitive enough]  -Urine analysis with reflex to microscopy, no culture, for evaluation of etiology for urinary tract infection  -Fractional excretion of sodium  -Ordered VBG and basic metabolic panel for a.m.  -LR at 75 cc/h for 24 hours  -Continue sodium bicarbonate 650 3 times daily  -Stop laxatives  -If diarrhea persists in 48 hours, will test for C. Difficile    # Possible fungal vulval infection, with prior candidal bacteremia, not clear if present on admission  -Ordered 1 dose of fluconazole 150 mg     # Recurrent colorectal cancer with local  recurrence within the bladder s/p partial cystectomy with small bowel resection and anastomosis (01/09/2024)  The patient has a history of persistent bladder leak s/p urinary diversion with bilateral nephrostomy tubes, there is now removed.the patient also has a history of right renal segmental artery bleed with subsequent clot formation in the renal collecting ducts, ureter, and urinary bladder s/p IR embolization. Cameron in place for bladder decompression since 531. Urology performed CT cystogram with findings showing: anterior bladder wall defect with walled off air and fluid collection in low anterior pelvis. Per urology, no plans for decompression or drainage unless the patient clinically deteriorates.  The patient main symptomatology is nausea.  -Continue scheduled ondansetron for palliation of symptoms  -Continue pantoprazole daily  -Consider MRI enterography to evaluate for enterovesical fistula if the patient clinically deteriorates  -Continue Augmentin  -Pain control with scheduled acetaminophen, and Dilaudid on as-needed basis [the patient rarely utilizes Dilaudid]    # Diarrhea likely secondary to tube feeding, not present on admission  -Discontinued all laxatives  -If diarrhea persists in the spite of discontinuing laxatives with 3 bowel movements within 24 hours and abdominal discomfort, plan to obtain C. difficile  -Discontinued Imodium until we rule out C. Difficile  -Started IV fluids as detailed above  -Continue fibers    # Decreased appetite likely secondary to malignancy leading to moderate malnutrition in the context of acute on chronic illness, POA  The patient had a period of time on nasogastric tube nutrition.  Percutaneous gastrostomy performed on 6/13 without any immediate complications.  -Continue tube feeding through percutaneous gastrostomy  -Ordered speech and swallow evaluation and based on the recommendations we will start the patient on a diet    # Heart failure with reduced ejection  fraction LVEF 25%, POA  The patient is currently in hypovolemic.  -Continue carvedilol 3.125 mg twice daily  -Consider valsartan at 20 mg daily to be started once acute kidney injury resolves and can be switched to Entresto if hemodynamically stable on valsartan  -Please refer to cardiology on discharge    # Resolved medical problems  #Infected organized left retroperitoneal hematoma with Proteus hauseri/vulgaris s/p IR coil embolization and drain placement 3/9/2024 followed by multiple drain upsized/exchange and antibiotics until 4/15/2024, POA  Developed during prolonged hospitalization at [place]. IR placed MARIE drain with multiple attempts at upsizing to drain all of the residual fluid collections. Completed several courses of antibiotics for treatment of infected hematoma, last finished Augmentin on 4/15/24. Last seen by ID as outpatient on 5/10, with plans to monitor off abx therapy. Here, CT AP on admission showed minimal residual fluid collection. She had the MARIE drain in place, but this was removed accidentally upon transfer to CT on 5/30.  - D/w IR who reviewed imaging, minimal residual fluid collection with minimal drainage from MARIE drain prior to admission suggests there is not likely benefit to replacing, d/w family  - No signs of ongoing infection in hematoma  - empiric Zosyn, will transition to augmentin for GI coverage  as above, continue until Urology follow-up on 6/19    #Pleural Effusion     # Hyper and hyponatremia at different instances    # Chronic medical problems  #Chronic Normocytic Anemia likely secondary to anemia of chronic disease, POA  - Trending CBC     #T2DM, diet controlled, POA  - Hypoglycemic protocol     #Deconditioning   -PT/OT consults          Diet: NPO per Anesthesia Guidelines for Procedure/Surgery Except for: Meds  Adult Formula Drip Feeding: Continuous TwoCal HN; Gastrostomy; Goal Rate: 35 mL/hr (goal) - ok to advance to goal rate now if tolerating 20 mL/hr.; mL/hr    DVT  Prophylaxis: Heparin SQ  Cameron Catheter: PRESENT, indication: Acute retention or obstruction  Lines: None     Cardiac Monitoring: None  Code Status: Full Code      Clinically Significant Risk Factors              # Hypoalbuminemia: Lowest albumin = 2 g/dL at 5/30/2024  8:27 AM, will monitor as appropriate     # Hypertension: Noted on problem list         # Moderate Malnutrition: based on nutrition assessment    # Financial/Environmental Concerns: none         Disposition Plan     Medically Ready for Discharge: Anticipated in 2-4 Days         Victorina Vizcarra MD  Hospitalist Service, GOLD TEAM 8  [facility]  Securely message with [employer] (more info)  Text page via [facility] Paging/Directory   See signed in provider for up to date coverage information  ______________________________________________________________________    Interval History     Via :  The patient reported diarrhea.  No abdominal pain reported.  Physical Exam   Vital Signs: Temp: 98  F (36.7  C) Temp src: Oral BP: 112/52 Pulse: 75   Resp: 16 SpO2: 100 % O2 Device: None (Room air)    Weight: 117 lbs 11.61 oz    General Appearance: Frail elderly woman, appears older than stated age, NAD  Respiratory: Breathing comfortably on room air, lungs grossly CTAB  Cardiovascular: RRR, no m/r/g.  GI: soft, non-distended, non-tender to palpation. Site of MARIE drain removal appears c/d/I.  Skin: pallor present, no skin rash  Other: Trace LE edema bilaterally. Cameron draining clear yellow urine.     Medical Decision Making       30 MINUTES SPENT BY ME on the date of service doing chart review, history, exam, documentation & further activities per the note.      Data     I have personally reviewed the following data over the past 24 hrs:    4.8  \   9.7 (L)   / 298     135 97 (L) 32.4 (H) /  135 (H)   4.4 23 1.83 (H) \       Imaging results reviewed over the past 24 hrs:   No results found for this or any previous  visit (from the past 24 hour(s)).

## 2024-06-15 NOTE — CONSULTS
[facility] Nurse Inpatient Assessment     Consulted for: labia wounds  Visit with Dennis interpretor via phone    Patient History (according to provider note(s):      79 year old female admitted on 5/29/2024.  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     Assessment:      Areas visualized during today's visit: Focused: and labia and perineum    Wound location: left labia and perineum    6/14 left labia    6/14 Left perineum    Wound due to:  appearance most consistent with trauma or friction  Wound history/plan of care: has had frequent episodes of incontinent diarrhea.  Urethral catheter placed 5/30/24. Receiving TF  Wound base: 100 % dermis,      Palpation of the wound bed: normal      Drainage: scant     Description of drainage: serous     Measurements (length x width x depth, in cm): all 3 wounds are horizontal and approximately 0.3 cm x 0.7 cm x 0.2 cm     Periwound skin: Intact, Edematous, and Erythema- blanchable         Temperature: normal   Odor: none  Pain: facial expression of distress,   Pain interventions prior to dressing change: N/A  Treatment  goal: Heal  and Protection  STATUS: initial assessment  Supplies ordered: supplies stored on unit      Treatment Plan:     Left labia and perineal wounds:  twice daily and after incontinent episodes:  -cleanse with perineal lotion cleanser  -apply criticaid paste     Orders: Written    RECOMMEND PRIMARY TEAM ORDER: None, at this time  Education provided: plan of care  Discussed plan of care with: Patient  [facility] nurse follow-up plan: weekly  Notify WOC if wound(s) deteriorate.  Nursing to notify the Provider(s) and re-consult the WO Nurse if new skin concern.    DATA:     Current support surface: Standard  Standard Isoflex gel  Containment of urine/stool: Indwelling catheter  BMI: Body mass index is 23.78 kg/m .   Active diet order: Orders Placed This Encounter      NPO per Anesthesia Guidelines for Procedure/Surgery Except for: Meds     Output: I/O last 3 completed shifts:  In: 1098.33 [I.V.:523.33; NG/GT:360]  Out: 1425 [Urine:1325; Emesis/NG output:100]     Labs:   Recent Labs   Lab 06/14/24  0641   HGB 9.4*   WBC 5.2     Pressure injury risk assessment:   Sensory Perception: 4-->no impairment  Moisture: 4-->rarely moist  Activity: 3-->walks occasionally  Mobility: 3-->slightly limited  Nutrition: 2-->probably inadequate  Friction and Shear: 1-->problem  Mateusz Score: 17    Pager no longer is use, please contact through [employer] group: [facility] Nurse [place]  Dept. Office Number: [phone]

## 2024-06-15 NOTE — PLAN OF CARE
"Assumed care [id number]  NEURO: A&O x4  CARDIAC: VSS, afebrile   RESPIRATORY: Satting >92% on RA, no SOB or DUVALL  GI/: Adequate urine output via alejandra, urine is yellow with red blood clots and sediment . BM x2 today, will hold laxatives   DIET/APPETITE: Tolerating TF running at goal rate, scheduled zofran for nausea given   ACTIVITY: Assist of x1 with walker and GB, ambulated outside of room today   PAIN: At an acceptable level on current regimen  SKIN: No new deficits noted  Lines / Drains / Airway: L PIV x2. LR infusing at 75ml/hr    PLAN: Continue with plan of care, notify primary care team with changes.    Problem: Adult Inpatient Plan of Care  Goal: Plan of Care Review  Description: The Plan of Care Review/Shift note should be completed every shift.  The Outcome Evaluation is a brief statement about your assessment that the patient is improving, declining, or no change.  This information will be displayed automatically on your shift  note.  Outcome: Progressing  Goal: Patient-Specific Goal (Individualized)  Description: You can add care plan individualizations to a care plan. Examples of Individualization might be:  \"Parent requests to be called daily at 9am for status\", \"I have a hard time hearing out of my right ear\", or \"Do not touch me to wake me up as it startles  me\".  Outcome: Progressing  Goal: Absence of Hospital-Acquired Illness or Injury  Outcome: Progressing  Intervention: Identify and Manage Fall Risk  Recent Flowsheet Documentation  Taken 6/15/2024 1200 by Sue Rios, RN  Safety Promotion/Fall Prevention:   activity supervised   nonskid shoes/slippers when out of bed   patient and family education   room near nurse's station   safety round/check completed   supervised activity   clutter free environment maintained   increase visualization of patient   lighting adjusted  Taken 6/15/2024 0900 by Sue Rios, RN  Safety Promotion/Fall Prevention:   activity supervised   nonskid " shoes/slippers when out of bed   patient and family education   room near nurse's station   safety round/check completed   supervised activity   clutter free environment maintained   increase visualization of patient   lighting adjusted  Intervention: Prevent Skin Injury  Recent Flowsheet Documentation  Taken 6/15/2024 1200 by Sue Rios RN  Body Position: position changed independently  Device Skin Pressure Protection: absorbent pad utilized/changed  Taken 6/15/2024 0900 by Sue Rios RN  Body Position: position changed independently  Device Skin Pressure Protection: absorbent pad utilized/changed  Intervention: Prevent and Manage VTE (Venous Thromboembolism) Risk  Recent Flowsheet Documentation  Taken 6/15/2024 1200 by Sue Rios RN  VTE Prevention/Management: SCDs (sequential compression devices) off  Taken 6/15/2024 0900 by Sue Rios RN  VTE Prevention/Management: SCDs (sequential compression devices) off  Intervention: Prevent Infection  Recent Flowsheet Documentation  Taken 6/15/2024 1200 by Sue Rios RN  Infection Prevention:   single patient room provided   personal protective equipment utilized   environmental surveillance performed  Taken 6/15/2024 0900 by Sue Rios RN  Infection Prevention:   single patient room provided   personal protective equipment utilized   environmental surveillance performed  Goal: Optimal Comfort and Wellbeing  Outcome: Progressing  Goal: Readiness for Transition of Care  Outcome: Progressing     Problem: Risk for Delirium  Goal: Optimal Coping  Outcome: Progressing  Goal: Improved Behavioral Control  Outcome: Progressing  Intervention: Prevent and Manage Agitation  Recent Flowsheet Documentation  Taken 6/15/2024 1200 by Sue Rios RN  Environment Familiarity/Consistency: (family at bedside) other (see comments)  Taken 6/15/2024 0900 by Sue Rios RN  Environment Familiarity/Consistency: (family at  bedside) other (see comments)  Intervention: Minimize Safety Risk  Recent Flowsheet Documentation  Taken 6/15/2024 1200 by Sue Rios RN  Enhanced Safety Measures: room near unit station  Taken 6/15/2024 0900 by Sue Rios RN  Enhanced Safety Measures: room near unit station  Goal: Improved Attention and Thought Clarity  Outcome: Progressing  Intervention: Maximize Cognitive Function  Recent Flowsheet Documentation  Taken 6/15/2024 1200 by Sue Rios RN  Reorientation Measures:   calendar in view   clock in view  Taken 6/15/2024 0900 by Sue Rios RN  Reorientation Measures:   calendar in view   clock in view  Goal: Improved Sleep  Outcome: Progressing     Problem: Infection  Goal: Absence of Infection Signs and Symptoms  Outcome: Progressing  Intervention: Prevent or Manage Infection  Recent Flowsheet Documentation  Taken 6/15/2024 1200 by Sue Rios RN  Infection Management: aseptic technique maintained  Taken 6/15/2024 0900 by Sue Rios RN  Infection Management: aseptic technique maintained   Goal Outcome Evaluation:

## 2024-06-15 NOTE — PROGRESS NOTES
"    Neuro: A&Ox3 she is Tigrinya speaking only    Cardiac: HR 70's,   /59 (BP Location: Right arm)   Pulse 79   Temp 98.1  F (36.7  C) (Oral)   Resp 16   Ht 1.499 m (4' 11\")   Wt 53.4 kg (117 lb 11.6 oz)   SpO2 99%   BMI 23.78 kg/m       Respiratory: Sating greater than 95% on RA.    GI/: Adequate urine output. Cameron intact-Catheter cares noted. Last BM was today 6/14/24    Diet/appetite: Tolerating NPO diet.TF at goal 35 ml/hr via G-tube,     Activity:  Assist of 2 up to chair with GB and walker. She has been repositioned.     Pain: At acceptable level on current regimen.     Skin: No new deficits noted.    LDA's: 2 left arm PIV-SL     Plan: Continue with POC. Notify primary team Gold 8 with changes.   "

## 2024-06-16 NOTE — PLAN OF CARE
No acute events this shift. Patient sitting on recliner chair at the start of the shift, appeared calm and comfortable with no signs of pain or discomfort and breathing comfortably in room air. Able to communicate with staff thru  services. With ongoing TF at a goal rate of 35 ml/hr via G tube and tolerating it well. Cameron catheter in place, patent and with good urine output. Was seen and rounded by primary team and evaluated by speech therapy. Ambulated in hallway with SBA using WW, steady gait and good balance. Resting in bed as of this time LR infusing at 75 ml/hr.  P: Possible discharge to TCU in 1-2 days when medically stable.

## 2024-06-16 NOTE — PROGRESS NOTES
06/16/24 1000   Appointment Info   Signing Clinician's Name / Credentials (SLP) Inez Jaimes MS CCC SLP   General Information   Onset of Illness/Injury or Date of Surgery 05/29/24   Referring Physician Victorina Vizcarra MD   Pertinent History of Current Problem 79 year old female admitted on 5/29/2024.  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 Clinical swallow evaluation completed per MD orders.   Type of Evaluation   Type of Evaluation Swallow Evaluation   Oral Motor   Oral Musculature generally intact   Structural Abnormalities none present   Mucosal Quality adequate   Dentition (Oral Motor)   Dentition (Oral Motor) significant number of missing teeth   Facial Symmetry (Oral Motor)   Facial Symmetry (Oral Motor) WNL   Lip Function (Oral Motor)   Lip Range of Motion (Oral Motor) WNL   Tongue Function (Oral Motor)   Tongue ROM (Oral Motor) WNL   Cough/Swallow/Gag Reflex (Oral Motor)   Volitional Throat Clear/Cough (Oral Motor) WNL   Volitional Swallow (Oral Motor) WNL   Vocal Quality/Secretion Management (Oral Motor)   Vocal Quality (Oral Motor) WFL   Secretion Management (Oral Motor) WNL   General Swallowing Observations   Past History of Dysphagia Per  "EMR review, on MD note from March 2024  \"The nausea / dysphagia associated with medication intake was improving as well (normal swallow eval).\" Unable to locate SLP notes for further details. Pts daughter present reports no concerns with pts swallow function in the past, main concern with swallowing has been limited intake and nausea.   Current Diet/Method of Nutritional Intake (General Swallowing Observations, NIS) NPO;gastrostomy tube (PEG)   Swallowing Evaluation Clinical swallow evaluation   Clinical Swallow Evaluation   Feeding Assistance no assistance needed   Clinical Swallow Evaluation Textures Trialed thin liquids;solid foods   Clinical Swallow Eval: Thin Liquid Texture Trial   Mode of Presentation, Thin Liquids straw;fed by clinician   Volume of Liquid or Food Presented x3 sips   Oral Phase of Swallow WFL   Pharyngeal Phase of Swallow intact   Diagnostic Statement Adequate oral phase and no overt s/s aspiration   Clinical Swallow Evaluation: Solid Food Texture Trial   Mode of Presentation self-fed   Volume Presented 1 bite of cracker   Oral Phase WFL;other (see comments)  (slow but functional)   Pharyngeal Phase intact   Diagnostic Statement Adequate oral phase and no overt s/s aspiration   Esophageal Phase of Swallow   Patient reports or presents with symptoms of esophageal dysphagia No   Swallowing Recommendations   Diet Consistency Recommendations regular diet;thin liquids (level 0)   Supervision Level for Intake patient independent   Mode of Delivery Recommendations bolus size, small;slow rate of intake   Postural Recommendations none   Swallowing Maneuver Recommendations alternate food and liquid intake   Monitoring/Assistance Required (Eating/Swallowing) stop eating activities when fatigue is present   Recommended Feeding/Eating Techniques (Swallow Eval) maintain upright sitting position for eating   Medication Administration Recommendations, Swallowing (SLP) per pt preference, one at at time "   Instrumental Assessment Recommendations instrumental evaluation not recommended at this time   General Therapy Interventions   Planned Therapy Interventions Dysphagia Treatment   Dysphagia treatment Instruction of safe swallow strategies   Clinical Impression   Criteria for Skilled Therapeutic Interventions Met (SLP Eval) Yes, treatment indicated   SLP Diagnosis Functional oropharyngeal swallowing mechanism   Risks & Benefits of therapy have been explained evaluation/treatment results reviewed;care plan/treatment goals reviewed;risks/benefits reviewed;current/potential barriers reviewed;participants voiced agreement with care plan;participants included;patient   Clinical Impression Comments Bedside swallow evaluation completed per MD orders. Oral mechanism was unremarkable other then pts significant number of missing teeth. Pt reluctant to participate how agreeable with encouragement from family. She presents with functional oropharyngeal swallowing mechanism and regular/thin liquid diet is recommended. Pt was assessed with limited amounts of regular solids and thin liquids. Pt demonstrated functional and complete mastication, adequate bolus control, no oral residuals, was able to clear bolus with single swallow, no report of sticking sensation in throat, no change in vocal quality following PO trials, and no overt s/s aspiration noted. Recommend a regular solids/thin liquid diet. Medication okay per pt preference, however one at a time. Pt should be fully alert and upright with all PO. No ongoing speech therapy is recommended. SLP will complete orders. Please re-consult should pt have a change in status or if concerns arise.   SLP Total Evaluation Time   Eval: oral/pharyngeal swallow function, clinical swallow Minutes (93005) 20   SLP Goals   Therapy Frequency (SLP Eval) one time eval and treatment only   SLP Goals Swallow   SLP: Safely tolerate diet without signs/symptoms of aspiration Regular diet;Thin liquids    Interventions   Interventions Quick Adds Swallowing Dysfunction   Swallowing Dysfunction &/or Oral Function for Feeding   Treatment of Swallowing Dysfunction &/or Oral Function for Feeding Minutes (99240) 5   Symptoms Noted During/After Treatment None   Treatment Detail/Skilled Intervention Bedside swallow evaluation complete. Functional oropharyngeal swallowing mechanism. Following evaluation SLP provided training on safe swallow strategies and aspiration signs/symptoms. Pts family verbalized agreement and understanding with training provided. No ongoing speech therapy warranted.   SLP Discharge Planning   SLP Plan sign off   SLP Rationale for DC Rec Functional oropharyngeal swallowing mechanism, no ongoing speech therapy warranted   SLP Brief overview of current status  Recommend a regular solids/thin liquid diet. Medication okay per pt preference, however please present one at a time. Pt should be fully alert and upright with all PO. No ongoing speech therapy is recommended. SLP will complete orders. Please re-consult should pt have a change in status or if concerns arise.   Total Session Time   Total Session Time (sum of timed and untimed services) 25   Psychosocial Support   Trust Relationship/Rapport care explained;choices provided

## 2024-06-16 NOTE — PLAN OF CARE
"Goal Outcome Evaluation:      Plan of Care Reviewed With: patient    Overall Patient Progress: no changeOverall Patient Progress: no change     Vitals: Blood pressure 120/62, pulse 73, temperature 97.9  F (36.6  C), temperature source Oral, resp. rate 18, height 1.499 m (4' 11\"), weight 53.4 kg (117 lb 11.6 oz), SpO2 100%, not currently breastfeeding.  Time: 1768-3220  Neuro: A/O x 4, calls appropriately. Pt is Tigrinya speaking and requires an .   Activity: Up with assist x1, walker and gait belt.   Pain and N/V: Denies Pain and n/v at this time .  GI/: loose stool x2 this shift, Cameron in place with AUOP.   Cardiac: Denies cardiac chest pain.   Diet: NPO, TF running at @ goal (35ml/hr)   Respiratory: Denies SOB, on RA.  Lines/Drains: PEG-tube, and PIV.   Incisions/Skin: WDL, No new deficit.   Lab: Reviewed.  New changes this shift: No significant changes this shift, Continue POC.        "

## 2024-06-16 NOTE — PROGRESS NOTES
[facility]    Medicine Progress Note - Hospitalist Service, GOLD TEAM 8    Date of Admission:  5/29/2024    Assessment & Plan   79 year old female admitted on 5/29/2024.  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    Changes today:  - cont Na bicarb, although Cr improving  - started const carb diet on 6/16/2024   - consider alejandra removal on 6/17- ask family prior    # Acute kidney injury AKIN Stage I on top of CKD stage IIIb, unclear if present on admission  This is the main problem being managed for 6/15.  This is likely prerenal in etiology given diarrhea.  -Given entero-vesica fistula s/p urinary diversion, ordered CT abdomen and pelvis without contrast to rule out obstructive physiology [ultrasound might not be sensitive enough]- nothing noted  -VBG and basic metabolic panel for a.m.  -Continue sodium bicarbonate 650 3 times daily    # Possible fungal vulval infection, with prior candidal bacteremia, not clear if present on admission  -Ordered 1 dose of fluconazole 150 mg     # Recurrent colorectal cancer with local recurrence within the bladder s/p partial cystectomy with small bowel resection and anastomosis  (01/09/2024)  The patient has a history of persistent bladder leak s/p urinary diversion with bilateral nephrostomy tubes, there is now removed.the patient also has a history of right renal segmental artery bleed with subsequent clot formation in the renal collecting ducts, ureter, and urinary bladder s/p IR embolization. Cameron in place for bladder decompression since 531. Urology performed CT cystogram with findings showing: anterior bladder wall defect with walled off air and fluid collection in low anterior pelvis. Per urology, no plans for decompression or drainage unless the patient clinically deteriorates.  The patient main symptomatology is nausea.  -Continue scheduled ondansetron for palliation of symptoms  -Continue pantoprazole daily  -Consider MRI enterography to evaluate for enterovesical fistula if the patient clinically deteriorates  -Continue Augmentin  -Pain control with scheduled acetaminophen, and Dilaudid on as-needed basis [the patient rarely utilizes Dilaudid]    # Diarrhea likely secondary to tube feeding, not present on admission, now has stopped 6/16/2024   -Discontinued all laxatives  -Started IV fluids as detailed above  -Continue fibers    # Decreased appetite likely secondary to malignancy leading to moderate malnutrition in the context of acute on chronic illness, POA  The patient had a period of time on nasogastric tube nutrition.  Percutaneous gastrostomy performed on 6/13 without any immediate complications.  -Continue tube feeding through percutaneous gastrostomy  -Ordered speech and swallow evaluation--> started const carb diet on 6/16/2024     # Heart failure with reduced ejection fraction LVEF 25%, POA  The patient is currently in hypovolemic.  -Continue carvedilol 3.125 mg twice daily  -Consider valsartan at 20 mg daily to be started once acute kidney injury resolves and can be switched to Entresto if hemodynamically stable on valsartan  -Please refer to cardiology on  discharge    # Resolved medical problems  #Infected organized left retroperitoneal hematoma with Proteus hauseri/vulgaris s/p IR coil embolization and drain placement 3/9/2024 followed by multiple drain upsized/exchange and antibiotics until 4/15/2024, POA  Developed during prolonged hospitalization at [facility]. IR placed MARIE drain with multiple attempts at upsizing to drain all of the residual fluid collections. Completed several courses of antibiotics for treatment of infected hematoma, last finished Augmentin on 4/15/24. Last seen by ID as outpatient on 5/10, with plans to monitor off abx therapy. Here, CT AP on admission showed minimal residual fluid collection. She had the MARIE drain in place, but this was removed accidentally upon transfer to CT on 5/30.  - D/w IR who reviewed imaging, minimal residual fluid collection with minimal drainage from MARIE drain prior to admission suggests there is not likely benefit to replacing, d/w family  - No signs of ongoing infection in hematoma  - empiric Zosyn, will transition to augmentin for GI coverage  as above, continue until Urology follow-up on 6/19    #Pleural Effusion     # Hyper and hyponatremia at different instances    # Chronic medical problems  #Chronic Normocytic Anemia likely secondary to anemia of chronic disease, POA  - Trending CBC     #T2DM, diet controlled, POA  - Hypoglycemic protocol     #Deconditioning   -PT/OT consults          Diet: Adult Formula Drip Feeding: Continuous TwoCal HN; Gastrostomy; Goal Rate: 35 mL/hr (goal) - ok to advance to goal rate now if tolerating 20 mL/hr.; mL/hr  Moderate Consistent Carb (60 g CHO per Meal) Diet    DVT Prophylaxis: Heparin SQ  Cameron Catheter: PRESENT, indication: Acute retention or obstruction  Lines: None     Cardiac Monitoring: None  Code Status: Full Code      Clinically Significant Risk Factors           # Hypercalcemia: corrected calcium is >10.1, will monitor as appropriate    # Hypoalbuminemia: Lowest albumin =  2 g/dL at 5/30/2024  8:27 AM, will monitor as appropriate     # Hypertension: Noted on problem list         # Moderate Malnutrition: based on nutrition assessment    # Financial/Environmental Concerns: none         Disposition Plan     Medically Ready for Discharge: Anticipated in 2-4 Days         Gena Rivera MD  Hospitalist Service, GOLD TEAM 8  [facility]  Securely message with [employer] (more info)  Text page via [facility] Paging/Directory   See signed in provider for up to date coverage information  ______________________________________________________________________    Interval History     Via :  No complaints overnight. She would like to follow up with cardiology for heart failure at [facility] (cancelled her appt to establish care at [facility]). Diarrhea has stopped    Physical Exam   Vital Signs: Temp: 97.8  F (36.6  C) Temp src: Oral BP: 131/57 Pulse: 71   Resp: 16 SpO2: 99 % O2 Device: None (Room air)    Weight: 117 lbs 11.61 oz    General Appearance: Frail,elderly, NAD  Respiratory: Breathing comfortably on room air, lungs CTAB  Cardiovascular: RRR, no m/r/g.  GI: soft, non-distended, non-tender to palpation. Site of MARIE drain removal appears c/d/I.  Skin: pallor present, no skin rash  Other: Trace LE edema bilaterally. Cameron draining clear yellow urine.     Medical Decision Making       30 MINUTES SPENT BY ME on the date of service doing chart review, history, exam, documentation & further activities per the note.      Data     I have personally reviewed the following data over the past 24 hrs:    4.9  \   9.7 (L)   / 329     137 100 34.4 (H) /  118 (H)   4.6 24 1.58 (H) \       Imaging results reviewed over the past 24 hrs:   Recent Results (from the past 24 hour(s))   CT Abdomen Pelvis w/o Contrast    Narrative    EXAM: CT ABDOMEN PELVIS W/O CONTRAST 6/15/2024 7:05 PM    HISTORY: 79 years Female acute kidney injury, Persistent bladder leak  s/p  urinary diversion with bilateral nephrostomy tubes    COMPARISON: CT 5/30/2024    TECHNIQUE: Axial CT images from the lung bases through the symphysis  pubis were obtained without intravenous or oral contrast. Coronal and  sagittal reformats provided.    FINDINGS:   image(s) are noncontributory.    Limited evaluation given noncontrast technique.  Abdomen:  Extensive postsurgical changes of the abdomen status post small bowel  resection and partial cystectomy with anastomosis.    HEPATIC: No suspicious focal hepatic masses or lesions.    BILIARY: No calcified gallstones. No intra or extrahepatic biliary  dilatation.    SPLEEN: Normal size. No focal lesions.    PANCREAS: Fatty atrophic changes of the pancreas. No peripancreatic  infiltrative change of pancreatic mass. No pancreatic ductal dilation.    ADRENALS: Unremarkable.    RENAL: Embolization coils seen within the right renal hilum. Punctate  nonobstructing renal calculus seen within the inferior pole the right  kidney. Bilateral perinephric stranding, nonspecific. No  hydronephrosis. Mild degree of air is seen within the left collecting  system and left ureter.    URINARY BLADDER: Cameron catheter is seen within the remnants of the  bladder. Multiple foci of air seen within the bladder lumen, may be  secondary to instrumentation.    GASTROINTESTINAL: Percutaneous gastrostomy balloon seen within the  stomach lumen. Numerous anastomotic sutures seen throughout loops of  bowel within the pelvis. The small and large bowel are normal in  caliber without abnormal wall thickening. The distal esophagus,  stomach, and duodenum appear unremarkable. Small and large bowel are  normal in caliber and without abnormal wall thickening. No portal  venous gas or pneumatosis. The appendix is surgically absent.    Reproductive: Postsurgical changes of hysterectomy.    MESENTERY/PERITONEUM: Trace amount of fluid seen adjacent to the left  pararenal space, in similar position with  with prior drain placement.  Additional embolization coils seen posterior to the left pararenal  space.    LYMPH NODES: Prominent bilateral inguinal lymph nodes, increased in  size compared to prior. No significant intra-abdominal lymph nodes.    VASCULAR: Mild to moderate gastric calcifications involving the  abdominal aorta..    LUNG BASES: Small left pleural effusion with left basilar compressive  atelectasis..    MUSCULOSKELETAL: Multilevel degenerative changes of the spine. No  acute or suspicious osseous abnormality. Soft tissue stranding seen  overlying the ventral abdomen. Increased size of multifocal soft  tissue nodules overlying this anterior subcutaneous soft tissues, for  example over the left hemiabdomen (series 5 image 142).      Impression    IMPRESSION:   1.  Extensive postoperative changes of the abdomen with partial  cystectomy. There is an indwelling Cameron catheter in place within the  remnants of the bladder with air in the bladder lumen, nonspecific,  may be secondary to Cameron catheter placement. No definite free fluid  or localized intra-abdominal fluid collection within the abdomen to  suggest bladder leak.  2.  Increased size of multifocal soft tissue nodules overlying the  anterior subcutaneous soft tissues of the lower abdomen presumed to  represent prominent lymph nodes with additional prominent bilateral  inguinal lymph nodes, nonspecific, but may be secondary to reactive  changes.  3.  Small left pleural effusion with left basilar compressive  atelectasis.    I have personally reviewed the examination and initial interpretation  and I agree with the findings.    JOSH RAMIREZ MD         SYSTEM ID:  [id number]

## 2024-06-17 NOTE — CARE PLAN
"Goal Outcome Evaluation:     Plan of Care Reviewed With: patient     Overall Patient Progress: no changeOverall Patient Progress: no change     Vitals: Blood pressure 123/62, pulse 70, temperature 98.2  F (36.8  C), temperature source Oral, resp. rate 15, height 1.499 m (4' 11\"), weight 53.4 kg (117 lb 11.6 oz), SpO2 99%, not currently breastfeeding.  Time: 2377-0306  Neuro: A/O x 4, calls appropriately. Pt is Tigrinya speaking and requires an .   Activity: Up with assist x1, walker and gait belt.   Pain and N/V: Denies Pain and n/v at this time .  GI/: loose stool x2 this shift, Cameron in place with AUOP.   Cardiac: Denies cardiac chest pain.   Diet: NPO, TF running at @ goal (35ml/hr)   Respiratory: Denies SOB, on RA.  Lines/Drains: PEG-tube, and PIV.   Incisions/Skin: WDL, No new deficit.   Lab: Reviewed.  New changes this shift: No significant changes this shift, Continue PC.  "

## 2024-06-17 NOTE — PROGRESS NOTES
Care Management Follow Up    Length of Stay (days): 19    Expected Discharge Date: 06/18/2024     Concerns to be Addressed: discharge planning     Patient plan of care discussed at interdisciplinary rounds: Yes    Anticipated Discharge Disposition: Transitional Care     Anticipated Discharge Services: None  Anticipated Discharge DME: None    Patient/family educated on Medicare website which has current facility and service quality ratings: yes  Education Provided on the Discharge Plan: Yes  Patient/Family in Agreement with the Plan: yes    Referrals Placed by CM/SW:   Active TCU Referrals:     [facility] in [place] (P: [phone])  6/13: Referral made.  6.17: question on chemo and radiation at discharge - provider looking into this and will notify later on an answer     [facility] (Ph: [phone])  6/14: Referral made in Epic.  6.17: LVM     Inactive TCU Referrals:     [facility] - Family requested referral; facility out of insurance network; no referral sent.  [facility] - Acuity too high.  [facility] - Bed not available.  [facility] - Bed not available.  Private pay costs discussed: Not applicable    Additional Information:    Provider looking into chemo and radiation plan. Followed up with pending referrals. Provider anticipating pt will be med ready tomorrow.    ANGELA Gallardo  [place]   [facility]  Covering 7B: [phone]

## 2024-06-17 NOTE — PROGRESS NOTES
[facility]    Medicine Progress Note - Hospitalist Service, GOLD TEAM 8    Date of Admission:  5/29/2024    Assessment & Plan   79 year old female admitted on 5/29/2024.  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    Changes today:  - discussed alejandra removal with urology, given anterior bladder wall defect would keep in place until outpatient follow up at [place] with her usual team   - medically stable for discharge to TCU, discussed with SW   - will need to clarify plan for further chemo/radiation, appears this would be through [place]?     # Acute kidney injury AKIN Stage I on top of CKD stage IIIb, unclear if present on admission  This is likely prerenal in etiology given diarrhea. Given entero-vesica fistula s/p urinary diversion, ordered CT abdomen and pelvis without contrast to rule out obstructive physiology [ultrasound might not be sensitive enough]- nothing noted  -Continue sodium bicarbonate 650 3 times daily  - Cr improving slowly     # Possible fungal vulval infection, with prior candidal bacteremia, not clear if present on admission  -s/p 1 dose of fluconazole 150 mg      # Recurrent colorectal cancer with local  recurrence within the bladder s/p partial cystectomy with small bowel resection and anastomosis (01/09/2024)  The patient has a history of persistent bladder leak s/p urinary diversion with bilateral nephrostomy tubes, there is now removed.the patient also has a history of right renal segmental artery bleed with subsequent clot formation in the renal collecting ducts, ureter, and urinary bladder s/p IR embolization. Alejandra in place for bladder decompression since 531. Urology performed CT cystogram with findings showing: anterior bladder wall defect with walled off air and fluid collection in low anterior pelvis. Per urology, no plans for decompression or drainage unless the patient clinically deteriorates.  The patient main symptomatology is nausea.  -Continue scheduled ondansetron for palliation of symptoms  -Continue pantoprazole daily  -Consider MRI enterography to evaluate for enterovesical fistula if the patient clinically deteriorates  -Continue Augmentin indefinitely until follow up   -Pain control with scheduled acetaminophen, and Dilaudid on as-needed basis [the patient rarely utilizes Dilaudid]  - continue alejandra until outpatient follow up     # Diarrhea likely secondary to tube feeding, not present on admission, now has stopped 6/16/2024   -Discontinued all laxatives  -Continue fibers    # Decreased appetite likely secondary to malignancy leading to moderate malnutrition in the context of acute on chronic illness, POA  The patient had a period of time on nasogastric tube nutrition.  Percutaneous gastrostomy performed on 6/13 without any immediate complications.  -Continue tube feeding through percutaneous gastrostomy  -Ordered speech and swallow evaluation--> started const carb diet on 6/16/2024     # Heart failure with reduced ejection fraction LVEF 25%, POA  The patient is currently in hypovolemic.  -Continue carvedilol 3.125 mg twice daily  -Consider valsartan at 20 mg daily to be started once acute  kidney injury resolves and can be switched to Entresto if hemodynamically stable on valsartan  -Please refer to cardiology on discharge    # Resolved medical problems  #Infected organized left retroperitoneal hematoma with Proteus hauseri/vulgaris s/p IR coil embolization and drain placement 3/9/2024 followed by multiple drain upsized/exchange and antibiotics until 4/15/2024, POA  Developed during prolonged hospitalization at [place]. IR placed MARIE drain with multiple attempts at upsizing to drain all of the residual fluid collections. Completed several courses of antibiotics for treatment of infected hematoma, last finished Augmentin on 4/15/24. Last seen by ID as outpatient on 5/10, with plans to monitor off abx therapy. Here, CT AP on admission showed minimal residual fluid collection. She had the MARIE drain in place, but this was removed accidentally upon transfer to CT on 5/30.  - D/w IR who reviewed imaging, minimal residual fluid collection with minimal drainage from MARIE drain prior to admission suggests there is not likely benefit to replacing, d/w family  - No signs of ongoing infection in hematoma  - s/p empiric Zosyn, will transition to augmentin for GI coverage  as above, continue until Urology follow-up      #Pleural Effusion     # Hyper and hyponatremia at different instances    # Chronic medical problems  #Chronic Normocytic Anemia likely secondary to anemia of chronic disease, POA  - Trending CBC     #T2DM, diet controlled, POA  - Hypoglycemic protocol     #Deconditioning   -PT/OT consults          Diet: Adult Formula Drip Feeding: Continuous TwoCal HN; Gastrostomy; Goal Rate: 35 mL/hr (goal) - ok to advance to goal rate now if tolerating 20 mL/hr.; mL/hr  Moderate Consistent Carb (60 g CHO per Meal) Diet    DVT Prophylaxis: Heparin SQ  Cameron Catheter: PRESENT, indication: Acute retention or obstruction  Lines: None     Cardiac Monitoring: None  Code Status: Full Code      Clinically Significant Risk  Factors              # Hypoalbuminemia: Lowest albumin = 2 g/dL at 5/30/2024  8:27 AM, will monitor as appropriate     # Hypertension: Noted on problem list         # Moderate Malnutrition: based on nutrition assessment    # Financial/Environmental Concerns: none         Disposition Plan     Medically Ready for Discharge: Anticipated in 2-4 Days         Lois Yee MD  Hospitalist Service, GOLD TEAM 8  [facility]  Securely message with Cycle (more info)  Text page via [employer] Paging/Directory   See signed in provider for up to date coverage information  ______________________________________________________________________    Interval History    No acute events overnight, nursing notes reviewed.     Feeling overall well, daughter at bedside requesting to serve as . Patient tolerating tube feeds, denies pain, no appetite. Would be interested in removing alejandra if possible.     5-pt ROS otherwise negative       Physical Exam   Vital Signs: Temp: 98.1  F (36.7  C) Temp src: Oral BP: 133/65 Pulse: 73   Resp: 16 SpO2: 100 % O2 Device: None (Room air)    Weight: 117 lbs 11.61 oz    General Appearance: Frail,elderly, NAD  Respiratory: Breathing comfortably on room air, lungs CTAB  Cardiovascular: RRR, no m/r/g.  GI: soft, non-distended, non-tender to palpation.    Skin: pallor present, no skin rash    Other: Trace LE edema bilaterally. Alejandra draining clear yellow urine.     Medical Decision Making       45 MINUTES SPENT BY ME on the date of service doing chart review, history, exam, documentation & further activities per the note.          Data     I have personally reviewed the following data over the past 24 hrs:    4.1  \   9.3 (L)   / 299     137 102 32.9 (H) /  150 (H)   4.7 25 1.45 (H) \       Imaging results reviewed over the past 24 hrs:   No results found for this or any previous visit (from the past 24 hour(s)).

## 2024-06-18 PROBLEM — C18.9 ADENOCARCINOMA, COLON (H): Status: ACTIVE | Noted: 2024-01-01

## 2024-06-18 PROBLEM — R53.81 PHYSICAL DECONDITIONING: Status: ACTIVE | Noted: 2024-01-01

## 2024-06-18 NOTE — H&P
[facility]itional [facility]    History and Physical - Hospitalist Service       Date of Admission:  6/18/2024    Assessment & Plan     Myla Glynn is a Tigrinya-speaking 79F w/ PMH sigmoid colon adenocarcinoma (dx 2020) c/b advancement into the bladder s/p partial cystectomy and small bowel resection and anastomosis (requiring admission 1/9-1/20/2024), enterovesical fistula s/p bilateral PCNs- now removed, DM2 (without long-term current use of insulin), and HTN admitted on 6/18/2024 to TCU following admission to [place] 5/29/24 for generalized fatigue, now at TCU for ongoing rehabilitation of physical deconditioning.     # Physical deconditioning  # Fatigue  Occurs in the setting of complex medical hx as detailed below  - PT and OT consults    # Recurrent colorectal cancer with local recurrence within the bladder s/p partial cystectomy with small bowel resection and anastomosis (01/09/2024)  # Alejandra catheter status d/t bladder wall weakness and defect  # Hx right renal segmental artery bleed with subsequent clot formation in the renal collecting ducts, ureter, and urinary bladder s/p IR embolization   Hx persistent bladder leak s/p urinary diversion with bilateral nephrostomy tubes- now removed. Alejandra in place for bladder decompression since 5/31. Urology performed CT cystogram showing anterior bladder wall defect.  The patient main symptomatology is nausea.  -Continue scheduled ondansetron for palliation of symptoms  -Continue pantoprazole daily  -Consider MRI enterography to evaluate for enterovesical fistula if the patient clinically deteriorates  -Continue Augmentin indefinitely until follow up   -Pain control with scheduled acetaminophen, and Dilaudid on as-needed basis [the patient rarely utilizes Dilaudid]  - continue alejandra until outpatient follow up with Urology (scheduled 6/19 at 3pm)     # Tube feed dependence  # Status post gastrostomy placement (IR 6/13/24)  # Moderate Malnutrition in  the context of chronic illness  # Sarcopenia  # Decreased appetite likely secondary to malignancy leading to moderate malnutrition in the context of acute on chronic illness, POA  The patient had a period of time on nasogastric tube nutrition.  Percutaneous gastrostomy performed on 6/13 without any immediate complications. Passed swallow eval 6/16.  - RD consult  - FWF 60ml q4h  -Continue tube feeding through percutaneous gastrostomy   -Ordered speech and swallow evaluation--> started const carb diet on 6/16/2024      # HFrEF, LVEF 29%, POA  Noted inpatient to be hypovolemic. TTE at [place] 4/4/24 EF 29%, regional WMA,  enlarged LA and mildly enlarged RV and LV mod TR, IVC normally collapsing.   - monitor volume status, weight, I&O  -Continue carvedilol 3.125 mg twice daily  -Consider valsartan at 20 mg daily to be started once acute kidney injury resolves and can be switched to Entresto if hemodynamically stable on valsartan  -needs to follow with [place] cardiology     # ALFREDA on CKD stage IIIb, unclear if present on admission  This is likely prerenal in etiology given diarrhea. Given entero-vesica fistula s/p urinary diversion, ordered CT abdomen and pelvis without contrast to rule out obstructive physiology [ultrasound might not be sensitive enough]- nothing noted  -Continue sodium bicarbonate 650 3 times daily  - Cr improving slowly     # Diarrhea likely 2/2 tube feeding, not present on admission, now has stopped 6/16/2024   No infectious symptoms and temporaly associated w/ TF  -Discontinued all laxatives  -Continue fiber     # Possible fungal vulval infection, with prior candidal bacteremia, not clear if present on admission  -s/p 1 dose of fluconazole 150 mg (6/15)  - monitor     #Chronic Normocytic Anemia likely secondary to anemia of chronic disease, POA  - Trending CBC    #Pleural Effusion, small  Noted per CXR inpatient.  Lungs CTAB, sating well on RA  - monitor      #T2DM, diet controlled  PTA no medications  and no insulins required inpatient  - Hypoglycemic protocol       Resolved medical problems:  # Infected organized left retroperitoneal hematoma with Proteus hauseri/vulgaris s/p IR coil embolization and drain placement 3/9/2024 followed by multiple drain upsized/exchange and antibiotics until 4/15/2024- see notes from recent hospital admission for further details    #Hx bilateral hydronephrosis with pyelonephritis and associated sepsis  # Hx protracted hospital admissions   Requiring extensive admission 2/1 - 4/13; hospital stay complicated by sepsis, ALFREDA, pleural effusion, and lumbar artery bleed requiring IR embolization, known retroperitoneal hematoma (diagnosed on CT 3/9) s/p multiple unsuccessful drain upsizing/aspirations with MARIE drain in place- since removed   - alejandra cares   - irrigated alejandra at bedside 6/18 and flushes easily, few stringy red clots expelled  # Hyper and hyponatremia at different instances   Na recently normal   - monitor volume status and adjust FWF and fluid encouragement as indicated per sodiums    Diet and/or tube feeding: Two Carlos TF, moderate CHO regular diet  Lines/ tubes/ drains: indwelling alejandra, gastrostomy, PIV  DVT prophylaxis: SCDs  GI prophylaxis: as needed  Code status discussed on Admission: full  Pneumococcal vaccination status: fully vaccinated  COVID-19 vaccination status: none of record     Verification of psychotropic medications: n/a          Clinically Significant Risk Factors Present on Admission                  # Hypertension: Noted on problem list    # Anemia: based on hgb <11               # Financial/Environmental Concerns:           Disposition Plan     Medically Ready for Discharge: Anticipated in 5+ Days         The patient's care was discussed with the Attending Physician, Dr. Wylie, Bedside Nurse, Patient, and [place]  over the phone .    Kamila Dove PA-C  Hospitalist Service  [facility]  Securely message with  [employer] (more info)  Text page via [facility] Paging/Directory     ______________________________________________________________________    Chief Complaint   Physical deconditioning following hospitalization for fatigue in the setting of poor nutrition and colon cancer    History is obtained from the patient    History of Present Illness   Myla Glynn is a Tigrinya-speaking 79F w/ [facility] sigmoid colon adenocarcinoma (dx 2020) c/b advancement into the bladder s/p partial cystectomy and small bowel resection and anastomosis (requiring admission 1/9-1/20/2024), enterovesical fistula s/p bilateral PCNs- now removed, DM2 (without long-term current use of insulin), and HTN admitted on 6/18/2024 to TCU following admission to [place] 5/29/24 for generalized fatigue, now at [facility] for ongoing rehabilitation of physical deconditioning.     Patient is seen with a [employer]  over the phone for our entire encounter.  She notes her only current bother is that her alejandra is leaking and has caused the bed to be wet underneath her.     Denies N/V, F/C, chest pain, sob, palpitations, dizziness, headaches, vision changes, loss of appetite, abdominal pain, rashes, lumps, lesions, changes to bowels (including diarrhea, constipation), other bleeding (including epistaxis, bleeding gums, melena, hematochezia) or other complaints.         Past Medical History    Past Medical History:   Diagnosis Date    Anemia     Bladder mass     Controlled type 2 diabetes mellitus without complication, without long-term current use of insulin (H)     Gastroesophageal reflux disease     HTN (hypertension)     Ileostomy status (H)     Malignant neoplasm of sigmoid colon (H)     Renal insufficiency        Past Surgical History   Past Surgical History:   Procedure Laterality Date    COLONOSCOPY      CYSTECTOMY BLADDER RADICAL, ILEAL DIVERSION, COMBINED N/A 1/5/2021    Procedure: Partial Cystectomy, Cystourethroscopy,;  Surgeon: Angel Newsome  MD Fernandez;  [facility]    HYSTERECTOMY TOTAL ABDOMINAL  2021    Procedure: Hysterectomy total abdominal;  Surgeon: Jiym Jackson MD;  Location: [facility] OR    LAPAROSCOPY OPERATIVE ADULT  2021    Procedure: Laparoscopy Operative Adult, takedown of splenic flexure, appendectomy;  Surgeon: Remi Moscoso MD;  [facility] OR    SALPINGO-OOPHORECTOMY BILATERAL  2021    Procedure: Salpingo-oophorectomy bilateral;  Surgeon: Jimy Jackson MD;  [facility]    SIGMOIDOSCOPY FLEXIBLE N/A 2021    Procedure: SIGMOIDOSCOPY, FLEXIBLE;  Surgeon: Remi Moscoso MD;  [facility]    TAKEDOWN ILEOSTOMY N/A 2021    Procedure: ILEOSTOMY CLOSURE, LYSIS OF ADHESION;  Surgeon: Remi Moscoso MD;  Location: [facility] OR       Prior to Admission Medications   Prior to Admission Medications   Prescriptions Last Dose Informant Patient Reported? Taking?   HYDROmorphone (DILAUDID) 0.2 MG/ML SOLN injection'   No No   Sig: Inject 1 mL (0.2 mg) into the vein every 4 hours as needed for other (not able to tolerate enteral dilaudid)   HYDROmorphone, STANDARD CONC, (DILAUDID) 1 MG/ML oral solution   No No   Si.5 mLs (0.5 mg) by Oral or Feeding Tube route every 4 hours as needed for moderate pain   HYDROmorphone, STANDARD CONC, (DILAUDID) 1 MG/ML oral solution   No No   Si mL (1 mg) by Oral or Feeding Tube route every 4 hours as needed for severe pain   acetaminophen (TYLENOL) 325 MG tablet   No No   Sig: Take 2 tablets (650 mg) by mouth every 4 hours as needed for mild pain or other (and adjunct with moderate or severe pain or per patient request)   acetaminophen (TYLENOL) 325 MG/10.15ML liquid   No No   Si.3 mLs (650 mg) by Oral or Feeding Tube route every 8 hours   acetaminophen (TYLENOL) 650 MG suppository   No No   Sig: Place 1 suppository (650 mg) rectally every 4 hours as needed for mild pain or other (and adjunct with moderate or severe pain or per  patient request)   amoxicillin-clavulanate (AUGMENTIN) 500-125 MG tablet   No No   Sig: Take 1 tablet by mouth every 12 hours   calcium carbonate (TUMS) 500 MG chewable tablet   No No   Sig: Take 2 tablets (1,000 mg) by mouth 4 times daily as needed for heartburn   carvedilol (COREG) 3.125 MG tablet   No No   Sig: Take 1 tablet (3.125 mg) by mouth 2 times daily (with meals)   multivitamin w/minerals (THERA-VIT-M) tablet   No No   Sig: Take 1 tablet by mouth daily   ondansetron (ZOFRAN) 4 MG tablet   No No   Si tablet (4 mg) by Oral or Feeding Tube route every 6 hours   oxyCODONE (ROXICODONE) 5 MG tablet   No No   Sig: Take 1 tablet (5 mg) by mouth every 4 hours as needed for moderate pain or severe pain (IF pain not managed with non-pharmacological and non-opioid interventions)   pantoprazole (PROTONIX) 2 mg/mL SUSP suspension   No No   Sig: Take 20 mLs (40 mg) by mouth every morning (before breakfast)   rosuvastatin (CRESTOR) 5 MG tablet  Daughter Yes No   Sig: Take 5 mg by mouth at bedtime   saccharomyces boulardii (FLORASTOR) 250 MG capsule   No No   Sig: Take 1 capsule (250 mg) by mouth 2 times daily   sodium bicarbonate 650 MG tablet   No No   Sig: Take 1 tablet (650 mg) by mouth 3 times daily      Facility-Administered Medications: None        Review of Systems    The 10 point Review of Systems is negative other than noted in the HPI or here.     Allergies   No Known Allergies     Physical Exam   Vital Signs:                    Weight: 0 lbs 0 oz  GENERAL: Alert and oriented at least to person and place. NAD. Able to sit upright with assistance. Cooperative.   HEENT: Anicteric sclera. Mucous membranes moist. NC. AT. EOMI. PERRLA  CV: RRR. S1, S2. No murmurs appreciated.   RESPIRATORY: Effort normal on RA. Lungs CTAB with no wheezing, rales, rhonchi.   GI: + PEG site is CDI. Old appearing midline abdominal incision scar. Abdomen soft, NABS. No tenderness, rebound, guarding. No lesions.   NEUROLOGICAL: No  focal deficits. Moves all extremities.  CN 2-12 grossly intact.  EXTREMITIES: No pitting peripheral edema. Intact bilateral pedal pulses.   SKIN: No jaundice. No rashes on exposed skin.  BACK: no CVA tenderness, no spinous tenderness  : alejandra intact to bedside draining cranberry-colored translucent UOP, irrigates rafia w/ few stringy thin red clots, no pain or resistance w/ irrigation       Medical Decision Making       70 MINUTES SPENT BY ME on the date of service doing chart review, history, exam, documentation & further activities per the note.      Data     I have personally reviewed the following data over the past 24 hrs:    5.5  \   9.4 (L)   / 333     137 102 32.6 (H) /  142 (H)   4.8 25 1.47 (H) \

## 2024-06-18 NOTE — DISCHARGE SUMMARY
Pt. discharged at 1:00 PM to [facility], was accompanied by [facility]                 transport, and left with personal belongings. Pt. received complete discharge paperwork and  medications as filled by discharge pharmacy. Pt. was given times of last dose for all discharge medications in writing on discharge medication sheets. Discharge teaching included  medication, pain management, activity restrictions and signs and symptoms of infection. Pt. had no further questions at the time of discharge and no unmet needs were identified.

## 2024-06-18 NOTE — PROGRESS NOTES
Care Management Discharge Note    Discharge Date: 06/18/2024       Discharge Disposition: Transitional Care  [facility]  [address] [place]  [address]  P: [phone]  F: [phone]     Discharge Services: None    Discharge DME: None    Discharge Transportation: [employer] ([phone])  Wheelchair transportation  Service time 12:35-1:20 on 6/18/24    Private pay costs discussed: Not applicable    Does the patient's insurance plan have a 3 day qualifying hospital stay waiver?  No    PAS Confirmation Code:  [id number]   Patient/family educated on Medicare website which has current facility and service quality ratings: yes    Education Provided on the Discharge Plan: Yes  Persons Notified of Discharge Plans: Patient's daughter, [facility], [facility], Bedside RN and Charge Nurse  Patient/Family in Agreement with the Plan: yes    Handoff Referral Completed: Yes    Additional Information:  [facility] accepting patient and requested a ride to be arranged for 1 pm. Messaged provider via [employer] and requested discharge and summary be completed and signed by noon today.      Social work arranged wheelchair transportation from [facility] to [facility] via [employer]. [employer] reported the service time is between 12:35-1:20 on 6/18/24.     Bedside nurse notified of discharge time.    Provider indicated via [employer] they did not think they would be able to complete the discharge and summary by noon today. Provider requested the admission to [facility] be pushed back to a later time today. Social work contacted [facility] per provider's request and was told [facility] cannot make this accomodation. Social work updated the provider via [employer] that [facility] is not able to make their requested accommodation of patient discharging later today.    Social work updated patient's daughter via phone on her mom's discharge plan. Daughter requested she be able to ride in the  transportation vehicle as she provides interpretation services on behalf of her mom. She indicated she will be at the hospital when her mom discharges and will drive herself if she is unable to ride with her mom during transportation. Social work updated transportation on the request of patient's daughter.     PAS filed and this confirmation code ([id number]) [facility].     EHO complete.     Bedside nurse updated via Vocera of transportation service time.      RUY Og, [facility]  [facility]   Phone: [phone]

## 2024-06-18 NOTE — PROGRESS NOTES
Pt is a transfer from , see notes.   Shift was uneventful.  Daughter at bedside.   Vitals are stable     Started tube feed at 35ml/hr

## 2024-06-18 NOTE — PLAN OF CARE
"Patient is a 79 year old female  admitted to room 428 via wheelchair.  Patient is alert and oriented X 4. See Epic for VS and assessment.  Patient is able to transfer  using walker. Patient was settled into their room, shown call light, tv, mealtimes etc. Oriented to unit. Will continue monitoring pain level and VS. Notifying MD with any concerns.  Follow MD orders for cares and medications.    Flu Vaccine:   - Declined flu vaccine at this time, education on risks and benefits provided      COVID Vaccine:   -  Declined COVID vaccine at this time, education on risks and benefits provided        Pneumococcal Vaccine:   - Declined pneumococcal vaccine at this time, education on risks and benefits provided      Ethnicity:  and -American  Race: Black or   Dentures: No  Hearing Aid: No  Smoker:  No  Glasses: No  Falls 0-1 mo: 0 2-6 mo: 0  Prior device use: Other none    Advanced Care Directive Referral to Social Work?No  Goal Outcome Evaluation:         6/25/24 Addendum by Manju Abraham  Flu: declined at this time, see above.   COVID: declined at this time, see above.   Pneumococcal: 13 10/24/16 and 23 2/7/18. Per CDC, \"Based on shared clinical decision-making, decide whether to administer one dose of PCV20 at least 5 years after the last pneumococcal vaccine dose. Regardless of whether PCV20 is administered, their pneumococcal vaccinations are complete.\"    "

## 2024-06-18 NOTE — PLAN OF CARE
"Goal Outcome Evaluation:      Plan of Care Reviewed With: patient    Overall Patient Progress: improvingOverall Patient Progress: improving    Tigrinya speaking,  needed. A&Ox4, VSS, RA. Denies cardiac chest pain, SOB, N/V. Pain managed with scheduled Tylenol. PEG TF 35mL/hr, 60mL flushes q4h. Cameron, irrigated per order. PIV x2; L upper arm TKO and L lower forearm SL. Ax1 to bedside commode. BM 6/18, loose. No electrolyte replacement needed, AM rechecks. COH 60g/meal. Continue POC.     /68 (BP Location: Right arm)   Pulse 79   Temp 98  F (36.7  C) (Oral)   Resp 17   Ht 1.499 m (4' 11\")   Wt 53.4 kg (117 lb 11.6 oz)   SpO2 100%   BMI 23.78 kg/m     "

## 2024-06-18 NOTE — TELEPHONE ENCOUNTER
[facility] Call Center    Phone Message    May a detailed message be left on voicemail: yes     Reason for Call: Appointment Intake    Referring Provider Name:     Josefina Lacy MD     Diagnosis and/or Symptoms: Heart failure with reduced ejection fraction, NYHA class IV     Please call pt to schedule.       Action Taken: Message routed to:  [facility] cardio    Travel Screening: Not Applicable     Date of Service:

## 2024-06-18 NOTE — PROGRESS NOTES
Care Management Follow Up    Length of Stay (days): 20    Expected Discharge Date: 06/18/2024     Concerns to be Addressed: discharge planning     Patient plan of care discussed at interdisciplinary rounds: Yes    Anticipated Discharge Disposition: Transitional Care     Anticipated Discharge Services: None  Anticipated Discharge DME: None    Patient/family educated on Medicare website which has current facility and service quality ratings: yes  Education Provided on the Discharge Plan: Yes  Patient/Family in Agreement with the Plan: yes    Referrals Placed by CM/SW: E  Active TCU Referrals:     [facility] in [place] (P: [phone])  6/13: Referral made.  6.17: question on chemo and radiation at discharge - provider looking into this and will notify later on an answer  6.18: notified pt med ready, no upcoming chemo/radiation noted by provider     [facility] (Ph: [phone])  6/14: Referral made in Epic.  6.17: LVM  6.18: resent referral per request     Inactive TCU Referrals:     [facility] - Family requested referral; facility out of insurance network; no referral sent.  [facility] - Acuity too high.  [facility] - Bed not available.  [facility] - Bed not available.  Private pay costs discussed: Not applicable    Additional Information:    Follow up on referrals    ANGELA Gallardo   [facility]  Covering 7B: [phone]

## 2024-06-18 NOTE — PLAN OF CARE
"Goal Outcome Evaluation:       /60 (BP Location: Right arm)   Pulse 72   Temp 98.4  F (36.9  C) (Oral)   Resp 16   Ht 1.499 m (4' 11\")   Wt 53.4 kg (117 lb 11.6 oz)   SpO2 99%   BMI 23.78 kg/m         Neuro: A/O x 4, calls appropriately. Pt is Tigrinya speaking and requires an .   Activity: Up with assist x1, walker and gait belt.   Pain and N/V: Denies Pain and n/v at this time .  GI/: No BM this shift, Cameron in place with AUOP.   Cardiac: Denies cardiac chest pain.   Diet: NPO, TF running at @ goal (35ml/hr)   Respiratory: Denies SOB, on RA.  Lines/Drains: PEG-tube, and PIV.   Incisions/Skin: WDL, No new deficit.   Lab: Reviewed.  New changes this shift: No new change this shift  Plan: Continue with current POC                   "

## 2024-06-19 NOTE — PHARMACY-RX INSURANCE COVERAGE
Evaluated current med list for coverage concerns under outpatient pharmacy benefits:    [facility] NVT ([employer])  BIN: [id number]  PCN: [id number]  RXGroup: [facility]  ID#: [id number]    The following current medications are covered by [facility] NVT:  APAP 650mg supps - $0  APAP 325mg tabs - $0  Amox-Clav 500-125mg tabs - $0  Banatrol packets/liquid - $0  Bisacodyl 10mg supps - $0  Chloraseptic 6-10mg lozenges - $0  Calcium Carbonate 1000mg chew tabs - $0  Carvedilol 3.125mg tabs - $0  Dextrose 10% IV soln - $0  Dextrose 50% IV soln - $0  Diclofenac 1% gel - $0  Eucerin/Minerin cream - $0  Glucagon kit injection - $0  Glucose gel - $0  Hydromorphone 1mg/ml soln - $0 (plan limits to 7 days for 1st outpt fill)  Multivitamin tabs - $0  Naloxone injection - $0  Narcan nasal spray - $0  Ondansetron 4mg ODT - $0  Ondansetron 4mg tabs - $0  Ondansetron injection - $0  Pantoprazole cmpd susp - $0  Rosuvastatin 5mg tabs - $0  Saccharomyces boulardii 250mg caps - $0  Sodium Bicarbonate 650mg tabs - $0    The following are not covered and are not eligible for prior authorization:  Tuberculin injection - $17.80     Note: Coverage can also be NDC/ dependent. Typically applies to OTC drugs and drugs distributed by  in institutional packaging.      Dawna Hawthorne [facility]  Discharge Pharmacy Liaison  [facility]  Pronouns: She/Her/Hers    Securely message with [employer], Epic Secure Chat, or [employer]  Phone: [phone]  Fax: [phone]  [email or web address]

## 2024-06-19 NOTE — PROGRESS NOTES
06/19/24 0736   Appointment Info   Signing Clinician's Name / Credentials (PT) Imani Mooney, PT, DPT       Present yes   Language Dennis desai   Living Environment   People in Home child(smith), adult   Current Living Arrangements apartment   Home Accessibility no concerns   Transportation Anticipated family or friend will provide   Living Environment Comments Pt had lived alone previously and had been I with her mobility (per daughter report)  until January 2024,  but hospitalized at [facility] 2/1/24 to 4/13/24 , then to daughter's home, then to [facility] 5/29/24. Pt 's daughter's apartment has an elevator. Pt's dtr works outside the home, and is not available for 24/7 supervision.   Self-Care   Usual Activity Tolerance moderate   Current Activity Tolerance fair   Regular Exercise Other (see comments)   Equipment Currently Used at Home walker, standard   Fall history within last six months no   Activity/Exercise/Self-Care Comment Pt had lived alone months ago, but hospitalized at [facility] 2/1/24 to 4/13/24 , then to daughter's home, then to [facility] 5/29/24.   Pt 's daughter's apartment has an elevator.  Per OT report pt 's daughter will be able to provide 24/7 support at home.   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   Cognition   Cognitive Status Comments Pt states she does not know the month or where she is.  Did say it was Wednesday.   Pain Assessment   Patient Currently in Pain No   Integumentary/Edema   Integumentary/Edema Comments G tube,   Posture    Posture Forward head position;Protracted shoulders   Range of Motion (ROM)   ROM Comment WFL for B LEs.   Strength (Manual Muscle Testing)   Strength (Manual Muscle Testing) Deficits observed during functional mobility   Strength Comments PT: hip flex B grossly 3/5, hip ext , difficult to lift off bed with bridge, knee ext and flex , ankle DF/PF B grossly 3+/5,   Balance   Balance other (describe)   Balance Comments Pt able to sit EOB safely no LOB.  standing briefly without UE support, but pt stating she cannot due to fatigue after ambulation.   Pt using a fww with ambulation.   Sensory Examination   Sensory Perception Comments intact to light touch, denies numbness, tingling.   Coordination   Coordination no deficits were identified   Muscle Tone   Muscle Tone no deficits were identified   Clinical Impression   Criteria for Skilled Therapeutic Intervention Yes, treatment indicated   PT Diagnosis (PT) PT: Impaired mobility, deconditioning   Influenced by the following impairments PT: Pt with decreased activity tolerance, LE and core weakness, decreased standing balance and possible  cognitive impairmnet,   Functional limitations due to impairments Pt with difficulty with bed mobility, trnasfers, gait, stairs.   Pt is inable to ambulate community distances at this time.   Clinical Presentation (PT Evaluation Complexity) stable   Clinical Presentation Rationale Clinical judgment, see above   Clinical Decision Making (Complexity) low complexity   Planned Therapy Interventions (PT) balance training;bed mobility training;cryotherapy;gait training;home exercise program;neuromuscular  re-education;manual therapy techniques;patient/family education;ROM (range of motion);stair training;strengthening;stretching;transfer training;progressive activity/exercise;thermotherapy;home program guidelines;risk factor education   Risk & Benefits of therapy have been explained evaluation/treatment results reviewed;care plan/treatment goals reviewed;participants included;patient;current/potential barriers reviewed;participants voiced agreement with care plan   Clinical Impression Comments PT: Pt presents with deconditioning after a long hospital stay, and is now functioning below her previous  level of function. ELOS about 6-7 days, and will go home with her daughter.   PT Total Evaluation Time   PT Eval, Low Complexity Minutes (25540) 20   Therapy Certification   Start of care date 06/19/24   Certification date from 06/19/24   Certification date to 07/18/24   Medical Diagnosis recurrence of colorectal cancer, local recurrence within the bladder , deconditioning, adenocarcinoma of the colon   Physical Therapy Goals   PT Frequency 5x/week   PT Predicted Duration/Target Date for Goal Attainment 06/26/24   PT: Bed Mobility Independent;Supine to/from sit;Rolling;Bridging;Within precautions   PT: Transfers Modified independent;Assistive device;Sit to/from stand   PT: Gait Modified independent;Rolling walker;150 feet   PT: Goal 1 Pt able to perform car tx with fww and sba   PT: Goal 2 Pt and caregiver able to state 3 fall prevention strategies after participating in fall prevention training for increased safety at home.   PT: Goal 3 Pt I with wrdontrelle HEP for LE strengthening for increased function at home.   Therapeutic Procedure/Exercise   Ther. Procedure: strength, endurance, ROM, flexibillity Minutes (19054) 5   Treatment Detail/Skilled Intervention PT: Pt performed seated aps, laq x 5-10 prior to ambulation for warm up with cues, demo from PT   Therapeutic Activity   Therapeutic Activities: dynamic activities to  improve functional performance Minutes (45364) 11   Treatment Detail/Skilled Intervention see gg   Gait Training   Gait Training Minutes (05258) 10   Treatment Detail/Skilled Intervention see GG  for ambulation, curb   PT Discharge Planning   PT Plan PT: GG items as able, stairs, TUG, [place] , LE strengthening, sit <-> supine   PT Discharge Recommendation (DC Rec) home;home with home care physical therapy   PT Rationale for DC Rec Pt needing A with trnasfers, safety with G tube pole, possible cognitive impairment.   PT Brief overview of current status modA sit to supine.   PT Equipment Needed at Discharge   (has fww)   Total Session Time   Timed Code Treatment Minutes 26   Total Session Time (sum of timed and untimed services) 46   Post Acute Settings Only   What unit is patient on? TCU   PT - Transitional Care Unit Time   Individual Time (minutes) - PT 46   Group Time (minutes) - PT 0   Concurrent Time (minutes) - PT 0   Co-Treatment Time (minutes) - PT 0   TCU Total Session Time (minutes) - PT 46   TCU Daily Total Session Time   PT TCU Daily Total Session Time 46   Rehab TCU Daily Total Session Time 46   Bed Mobility: Sit to lying   Describe Performance modA for BLEs.  cues for positioning in bed, pt did not seem to understand to scoot up towards pillow more, so needing A of 2 to scoot up in bed.   Bed Mobility: Lying to sitting on the side of bed   Describe Performance PT: cga, rail used. hob about 45 degrees, has tube feed.   Transfers: Sit to Stand   Equipment Used Rolling walker   Describe Performance from bed, cga when fatigued, fww, uses UEs from bed, chair.   Walk 10 Feet - Ability to walk once standing   Describe Performance close sba fww , A with  pole   Walk 10 Feet on uneven surfaces - Ability to walk on various surfaces.   Describe Performance cga, threshold/carpeting, A with pole .   Walk 50 Feet with Two Turns - Ability to walk at least 50 feet.   Describe Performance PT: Pt amb to gym with fww and  close sba, A with Pole, feeding tube, cues for safe turning with lines. cues for more upright posture, slow gait, small step l ength, forward shoulder posture. Pt needing seated rest breaks, reports quite tired after second time ambulating.   Walk 150 Feet - Ability to walk 150 feet   Describe Performance PT: Pt amb to gym with fww and close sba, A with Pole, feeding tube, cues for safe turning with lines.   cues for more upright posture, slow gait, small step l ength, forward shoulder posture.   Pt needing seated rest breaks, reports quite tired after second time ambulating.   Wheel 50 Feet - Ability to move wheelchair/scooter   Reason Not Done Not attempted due to environmental limitations (e.g., lack of equipment,weather constraints)   Wheel 150 Feet - Ability to move wheelchair/scooter   Reason Not Done Not attempted due to environmental limitations (e.g., lack of equipment,weather constraints)   1 Step (curb) - Ability to go up/down 1 step/curb.   Describe Performance PT: 5 inch curb with fww, Carmelo   4 Steps - Ability to go up/down steps.   Reason Not Done Not attempted due to environmental limitations (e.g., lack of equipment,weather constraints)   12 Steps - Ability to go up/down steps.   Reason Not Done Not attempted due to environmental limitations (e.g., lack of equipment,weather constraints)   Car Transfer - Ability to transfer in/out of car or van.   Describe Performance increased time, Carmelo to get LEs into car, and A for B LEs out of car, cga to stand with fww.   Picking up Object - Ability to bend/stoop while standing.   Reason Not Done Safety concerns   Psychosocial Support   Trust Relationship/Rapport care explained;choices provided

## 2024-06-19 NOTE — PLAN OF CARE
Physical Therapy Discharge Summary    Reason for therapy discharge:    Discharged to transitional care facility.    Progress towards therapy goal(s). See goals on Care Plan in [facility] electronic health record for goal details.  Goals partially met.  Barriers to achieving goals:   discharge from facility.    Therapy recommendation(s):    Continued therapy is recommended.  Rationale/Recommendations:  to improve functional IND and medical management.

## 2024-06-19 NOTE — PROGRESS NOTES
"Social Work: Initial Assessment with Discharge Plan    Patient Name: Myla Glynn  : 1945  Age: 79 year old  MRN: [id number]  Completed assessment with: chart review and interview with pt  Admitted to TCU: 2024    Presenting Information   Date of SW assessment: 2024  Health Care Directive: Health Care Directive Agent (if patient not able to make decisions)  Primary Health Care Agent: Self/Patient  Secondary Health Care Agent: Pt adult children. Pt daughter (Michele Cuello) lives locally.  Living Situation: Pt living at pt daughter's home ([address], , [place] [address]). Planning to discharge to pt home (listed on facesheet). Elevator in apartment building. Subsidized housing.   Previous Functional Status: Pt using walker for ambulation prior to hospitalization. Per [facility], pt was independent with all cares prior to 2024. Since then pt needs total assistance with \"everything\" prior to most recent hospitalization.   DME available: See therapy eval for more information  Patient and family understanding of hospitalization: Appropraite  Cultural/Language/Spiritual Considerations: 79 year old female, , , Tigrinya speaking of Muslim jose g  Abuse concerns: None reported  -------------------------------------------------------------------------------------------------------------  TRANSPORTATION:    Has lack of transportation kept you from medical appointments, meetings, work, or from getting things needed for daily living?  A. Yes, it has kept me from medical appointments or from getting my medications  B. Yes, it has kept me from non-medical meetings, appointments, work or from getting things that I need  C. No  X. Patient Unable to respond  Y. Patient declines to respond  -------------------------------------------------------------------------------------------------------------  Health Literacy:   How Often do you need to have someone help " you when you read instructions, pamphlets, or other written material from your doctor or pharmacy?  0.       Never  1.       Rarely  2.       Sometimes  3.       Often  4.       Always  5.       Patient declines to respond  6.       Patient unable to respond  ------------------------------------------------------------------------------------------------------------   BIMS:  See flow sheet   -----------------------------------------------------------------------------------------------------------  CAM:  See flowsheet  -------------------------------------------------------------------------------------------------------------  PHQ-9:   Over the last 2 weeks, have you been bothered by any of the following problems?     If symptom is present, then ask the patient:  About how often have you been bothered by this?   Symptom Presence                                              Symptom Frequency  0. No                                                                     0. Never or 1 day  1. Yes                                                                   1. 2-6 days (several days)  9. No Response                                                    2. 7-11 days (half or more of the days)                                                                                3. 12-14 days (nearly every day)       Present Frequency   A.       Little interest of pleasure doing things  0  0   B.       Feeling down, depressed, or hopeless  0  0   C.       Trouble falling or staying asleep, or sleeping too much  1  2   D.       Feeling tired or having little energy  0  0   E.       Poor appetite or overeating  1  1   F.        Feeling bad about yourself - or that you are a failure, or have let yourself or your family down  0  0   G.      Trouble concentrating on things, such as reading the newspaper or watching television  0  0   H.      Moving or speaking so slowly that other people could have noticed. Or the opposite - being so  "fidgety or restless that you have been moving around a lot more than usual  0  0   I.         Thoughts that you would be better off dead, or of hurting yourself in some way  0  0     -------------------------------------------------------------------------------------------------------------  SOCIAL ISOLATION  How often do you feel lonely or isolated from those around you?  0.       Never  1.       Rarely  2.       Sometimes  3.       Often  4.       Always  5.       Patient declines to respond  6.       Patient unable to respond  -------------------------------------------------------------------------------------------------------------    BIMS: Pt scored 10 on BIMS indicating moderately impaired cognition  PHQ-9: Pt scored 3 on PHQ-9 indicating minimal depressive symptoms  PAS: confirmation number-  [id number]   Has there been a level II screen?  No  Were there any recommendations in the screen? N/A  If yes, will the recommendations we incorporated into the Plan of Care?  N/A  Physical Health  Reason for admission: Per H&P, \"  Myla Glynn is a Tigrinya-speaking 79F w/ 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 \"    Provider Information   Primary Care Physician:Jami Bundy     [phone] [address] [place] [id number]   *Per Michele, she would like to switch pt's providers to [facility].   : Graciela Walters CM  Ph: [phone]    Mental Health:   Diagnosis: None reported  Current Support/Services: None reported  Previous Services: None reported  Services " Needed/Recommended:  and Health Psych services available to pt while in TCU.    Substance Use:  Diagnosis: None reported  Current Support/Services: None reported  Previous Services: None reported  Services Needed/Recommended: N/A    Support System  Marital Status:   Family support: Pt has 3 daughters and 1 son. Daughter trying to see if pt's other children or family can come to assist provide care to pt. Michele is pt's primary support. Michele reports that her daughter and occasionally neighbors will also assist as needed.   Other support available: None reported  Gaps in support system: N/A    Personalized Care and Trauma  What other information would help us give you more personalized care or how can we help you with any past trauma?No    MyChart:  Do you have access to [employer] to view my Baseline Care Plan? No - Baseline Care Plan and instructions on how to set up My Chart to be provided to pt and Michele per Michele request.   If not, then SW will print out and provide Baseline Care Plan.     Community Resources  Current in home services: [facility] waiver. 10 hours/day PCA hours were approved but not started. MAURO to assist with [facility] C[facility]ana Bautista reports that CM is [employer] through [employer]. She has found a PCA agency, completed a background check and everything requested but the home care agency has not received a service agreement for pt and therefore have not been able to pay her.   Previous services: None reported    Financial/Employment/Education  Employment Status: Unemployed  Income Source: Social Security and [employer] [place]  Education: Not discussed  Financial Concerns:  None reported  Insurance: ARE/[facility] PMAP     Discharge Plan   Patient and family discharge goal: Home with daughter  Provided Education on discharge plan: YES  Patient agreeable to discharge plan:  YES  A list of Medicare Certified Facilities was provided to the patient and/or family to encourage patient  choice. Based on location and rating, patient would like referrals made to: N/A  General information regarding anticipated insurance coverage and possible out of pocket cost was discussed. Patient and patient's family are aware patient may incur the cost of transportation to the facility, pending insurance payment: NO  Barriers to discharge: TBD    Discharge Recommendations   Disposition: Home with daughter and home care  Transportation Needs: Daughter to provide  Name of Transportation Company and Phone: N/A    Additional comments   SW met with pt at bedside to complete SW Assessment with  on the phone. Pt also ok'ed SW to speak with daughter (Michele). SW spoke with Genete to obtain additional information. SW completed [id number] with pt and Jovanae.    Paradise Musa, SAMANTHA  Float   Covering TCU [facility], Transitional Care Unit   Social Work   [facility] [place] [address], 4th [place] [address]  () [phone]

## 2024-06-19 NOTE — PHARMACY-MEDICATION REGIMEN REVIEW
Pharmacy Medication Regimen Review  Myla Glynn is a 79 year old female who is currently in the Transitional Care Unit.    Assessment: All medications have an appropriate indications, durations and no unnecessary use was found    Plan:   Continue current medications as ordered.     Attending provider will be sent this note for review.  If there are any emergent issues noted above, pharmacist will contact provider directly by phone.      Pharmacy will periodically review the resident's medication regimen for any PRN medications not administered in > 72 hours and discontinue them. The pharmacist will discuss gradual dose reductions of psychopharmacologic medications with interdisciplinary team on a regular basis.    Please contact pharmacy if the above does not answer specific medication questions/concerns.    Background:  A pharmacist has reviewed all medications and pertinent medical history today.  Medications were reviewed for appropriate use and any irregularities found are listed with recommendations.      Current Facility-Administered Medications:     [START ON 6/21/2024] - Skin Test Reading -, , Does not apply, Q21 Days, Kamila Dove PA-C    acetaminophen (TYLENOL) Suppository 650 mg, 650 mg, Rectal, Q4H PRN, Kamila Dove PA-WOLFGANG    acetaminophen (TYLENOL) tablet 650 mg, 650 mg, Oral, Q4H PRN, Kamila Dove PA-C    amoxicillin-clavulanate (AUGMENTIN) 500-125 MG per tablet 1 tablet, 1 tablet, Oral, Q12H, Kamila Dove PA-C, 1 tablet at 06/19/24 0915    benzocaine-menthol (CHLORASEPTIC) 6-10 MG lozenge 1 lozenge, 1 lozenge, Buccal, Q2H PRN, Kamila Dove PA-C    bisacodyl (DULCOLAX) suppository 10 mg, 10 mg, Rectal, BID PRN, Kamila Dove PA-WOLFGANG    calcium carbonate (TUMS) chewable tablet 1,000 mg, 1,000 mg, Oral, 4x Daily PRN, Kamila Dove PA-C    carvedilol (COREG) tablet 3.125 mg, 3.125 mg, Oral, BID w/meals, Kamila Dove PA-WOLFGANG, 3.125 mg at 06/19/24 0913    dextrose 10%  infusion, , Intravenous, Continuous PRN, Kamila Dove PA-C    glucose gel 15-30 g, 15-30 g, Oral, Q15 Min PRN **OR** dextrose 50 % injection 25-50 mL, 25-50 mL, Intravenous, Q15 Min PRN **OR** glucagon injection 1 mg, 1 mg, Subcutaneous, Q15 Min PRN, Kamila Dove PA-C    diclofenac (VOLTAREN) 1 % topical gel 2 g, 2 g, Topical, 4x Daily PRN, Kamila Dove PA-C    fiber modular (BANATROL TF) packet 1 packet, 1 packet, Per Feeding Tube, TID, Fran Wylie MD, 1 packet at 06/19/24 0932    HYDROmorphone (STANDARD CONC) (DILAUDID) oral solution 0.5 mg, 0.5 mg, Oral or Feeding Tube, Q4H PRN **OR** HYDROmorphone (STANDARD CONC) (DILAUDID) oral solution 1 mg, 1 mg, Oral or Feeding Tube, Q4H PRN, Kamila Dove PA-C    mineral oil-white petrolatum (EUCERIN/MINERIN) cream, , Topical, Q1H PRN, Kamila Dove PA-C    multivitamin w/minerals (THERA-VIT-M) tablet 1 tablet, 1 tablet, Oral, Daily, Kamila Dove PA-C, 1 tablet at 06/19/24 0914    naloxone (NARCAN) injection 0.2 mg, 0.2 mg, Intravenous, Q2 Min PRN **OR** naloxone (NARCAN) injection 0.4 mg, 0.4 mg, Intravenous, Q2 Min PRN **OR** naloxone (NARCAN) injection 0.2 mg, 0.2 mg, Intramuscular, Q2 Min PRN **OR** naloxone (NARCAN) injection 0.4 mg, 0.4 mg, Intramuscular, Q2 Min PRN, Fran Wylie MD    Nurse may request from Pharmacy a change of form of medication (e.g. Liquid to tablet)., , Does not apply, Continuous PRN, Kamila Dove PA-C    ondansetron (ZOFRAN ODT) ODT tab 4 mg, 4 mg, Oral, Q6H PRN **OR** ondansetron (ZOFRAN) injection 4 mg, 4 mg, Intravenous, Q6H PRN, Kamila Dove PA-C    ondansetron (ZOFRAN) tablet 4 mg, 4 mg, Oral or Feeding Tube, Q6H, Kamila Dove PA-C, 4 mg at 06/19/24 0915    pantoprazole (PROTONIX) 2 mg/mL suspension 40 mg, 40 mg, Per Feeding Tube, [facility] AC, Fran Wylie MD, 40 mg at 06/19/24 0617    rosuvastatin (CRESTOR) tablet 5 mg, 5 mg, Oral, At Bedtime, Kamila Dove PA-C, 5 mg at 06/18/24 2124     saccharomyces boulardii (FLORASTOR) capsule 250 mg, 250 mg, Oral, BID, Gessler, Kamila M, PA-C, 250 mg at 06/19/24 0913    sodium bicarbonate tablet 650 mg, 650 mg, Oral, TID, Gessler, Kamila M, PA-C, 650 mg at 06/19/24 0913    tuberculin injection 5 Units, 5 Units, Intradermal, Q21 Days, Gessler, Kamila M, PA-C, 5 Units at 06/19/24 1010  No current outpatient prescriptions on file.   PMH: sigmoid colon adenocarcinoma, s/p partial cystectomy with small bowel resection and anastomosis (requiring admission 1/9-1/20/2024), enterovesical fistula s/p bilateral nephrostomy tube placement (requiring extensive admission 2/1 - 4/13; hospital stay complicated by sepsis, ALFREDA, pleural effusion, and lumbar artery bleed requiring IR embolization), known retroperitoneal hematoma (diagnosed on CT 3/9) with MARIE drain in place, DM2, and HTN     Xavier Jaramillo, CandiD

## 2024-06-19 NOTE — PROGRESS NOTES
CLINICAL NUTRITION SERVICES - ASSESSMENT NOTE     Nutrition Prescription    RECOMMENDATIONS FOR MDs/PROVIDERS TO ORDER:  None    Malnutrition Status:    Severe malnutrition in the context of acute on chronic illness    Recommendations already ordered by Registered Dietitian (RD):  Reordered previously ordered banatrol, updated TF orders    Future/Additional Recommendations:  Monitor labs, intakes, and weight trends.  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     FINDINGS  RD met with pt at bedside with  over the phone. Pt reports tolerating TF, no nausea/vomiting. Pt notes diarrhea has resolved. Pt states she does not have any interest in food/eating due to having no appetite. Pt states she does not drink juice but could drink sips of water. Pt reports this lack of appetite is since hospitalization and before she ate 2 meals a day.  Pt with no questions or concerns at this time.     CURRENT NUTRITION ORDERS  Diet: Moderate Consistent Carbohydrate  Nutrition Support: TwoCal HN @ 35 ml/hr + 1 pkt banatrol TF TID provides 840 mL, 1815 kcal (34 kcal/kg), 76 g protein (1.4 g/kg), 211 g CHO, 20 g fiber, and 588 mL free water per DW of 54 kg.    Intake/Tolerance: Pt tolerating TF at goal rate.    Eating 0% of documented meals.     LABS   06/14/24 06:41 06/15/24 07:08  "06/15/24 21:32 06/16/24 07:50 06/17/24 07:13 06/18/24 06:22   Sodium 131 (L) 135 138 137 137 137   Potassium 4.2 4.4  4.6 4.7 4.8   Urea Nitrogen 29.4 (H) 32.4 (H)  34.4 (H) 32.9 (H) 32.6 (H)   Creatinine 1.60 (H) 1.83 (H) 1.71 (H) 1.58 (H) 1.45 (H) 1.47 (H)   Magnesium 1.9 1.9  1.8 1.7 1.7   Phosphorus 5.8 (H) 4.8 (H)  3.8 3.7 4.0   Glucose 164 (H) 151 (H)  133 (H) 138 (H) 120 (H)     MEDICATIONS  Medications reviewed: Banatrol TID, multivitamin with minerals, zofran, protonix, Florastor, sodium bicarb  IV Fluids over last 7 days: Y (< 500 ml/day from 6/13-6/16)    ANTHROPOMETRICS  Height: 149.9 cm (4' 11.016\")  Most Recent Weight: 53.7 kg (118 lb 6.2 oz)    IBW: 45.5 kg (116% IBW)  BMI: Normal BMI  Weight History:  Pt with limited weight history prior to admission,with 16 lb (12%) weight loss over 3 months, 31 lb (21%) weight loss in 6 months.     Wt Readings from Last Encounters:   06/19/24 53.7 kg (118 lb 6.2 oz)   06/12/24 53.4 kg (117 lb 11.6 oz)   06/09/24 53.5 kg (118 lb)    06/01/24 51.8 kg (114 lb 3.2 oz)   04/11/24 58.2 kg (128 lb 4.9 oz) - Care everywhere   04/05/24 58.5 kg (128 lb 15.5 oz) - Care everywhere   03/17/24 60.9 kg (134 lb 4.2 oz)    01/09/24 66.7 kg (147 lb 0.8 oz)   12/15/23 67.9 kg (149 lb 12.8 oz)      Dosing Weight: 54 kg - most recent weight    ASSESSED NUTRITION NEEDS  Estimated Energy Needs: 1873-6977+ kcals/day 25 - 30 kcal/kg  Justification: Maintenance   Estimated Protein Needs: 65-81 grams protein/day (1.2 - 1.5 grams of pro/kg)  Justification: Increased needs  Estimated Fluid Needs: 1 ml/kcal or per provider pending fluid status    PHYSICAL FINDINGS  See malnutrition section below.   Dentition:  Poor dentition, manu missing teeth however pt denies pain/difficulty chewing/swallowing    MALNUTRITION  % Intake: Decreased intake does not meet criteria - EN  % Weight Loss: >7.5% in 3 months (severe) , >10% in 6 months (severe)   Subcutaneous Fat Loss: Facial/Jaw Region " moderate  Muscle Loss: Global moderate-severe  Fluid Accumulation/Edema: Trace-mild  Malnutrition Diagnosis: Severe malnutrition in the context of acute on chronic illness    NUTRITION DIAGNOSIS  Inadequate oral intake related to poor appetite as evidenced by patient report, documented intakes and pt requiring TF to meet 100% of needs at this time.       INTERVENTIONS  Implementation  Re-ordered banatrol  Updated TF orders to match previous    Goals  Total avg nutritional intake to meet a minimum of 25 kcal/kg and 1.2 g PRO/kg daily (per dosing wt 54 kg).     Monitoring/Evaluation  Progress toward goals will be monitored and evaluated per protocol.    Inez Hernandez MS, RDN, LD  TCU/OB/Ortho Clinical Dietitian  Available via phone and Vocera  Phone: [phone]  Vocera: TCU Clinical Dietitian  Weekend/Holiday Vocera: Weekend Holiday Clinical Dietitian [Multi Site Groups]

## 2024-06-19 NOTE — PROGRESS NOTES
"9700-3243    /62 (BP Location: Right arm, Patient Position: Semi-Vasquez's, Cuff Size: Adult Regular)   Pulse 69   Temp 98  F (36.7  C) (Oral)   Resp 16   Ht 1.499 m (4' 11.02\")   Wt 53.3 kg (117 lb 6.4 oz)   SpO2 97%   BMI 23.70 kg/m       Orientation:Alert & oriented, able to make needs known. Pt speaks tigrinya. Daughter helps with communication  Bowel:Last BM 6/18/24  Bladder:Cameron patent-pinkish uo  Pain:None  Ambulation/Transfers:Assist of 1/ walker & gaitbelt  Diet/ Liquids: Moderate Consistent Carb diet + continuous TF  Tubes/ Lines/ Drains: Left PIV X2/ PEG tube/Cameron  Tube Feeding:Twocal HN @ 35ml per, 60 ml water flushing Q4  Oxygen:On room air, denies chest pain or shortness of breath  Skin: see LDA  Bed alarm on for safety, call light within reach. Continue with POC.  "

## 2024-06-19 NOTE — PLAN OF CARE
Goal Outcome Evaluation:    Plan of Care Reviewed With: patient    Overall Patient Progress: no change    Alertness: A/O x4  Assist: x1 w gb and walker  Affect: flat affect  Mood: calm, cooperative  Skin: dry/flaky skin on coccyx, small scar on L lower back, G tube dressing changed this shift  Bowel/bladder: alejandra catheter in place - patent; BM this shift - continent for bowel  Breakfast and lunch: declines breakfast and lunch, continuous tube feeding @ rate of 35 mL/hr , new TF hanged at 1800.   Vital signs/pain: vitals stable and WDL  Medications: medications crushed and administered through PEG tube per pt request; tube flushed before and after administration    Pt ambulated in unit with assist of one person with walker and gait belt. Eleazar Collins used interpretor for Communication.

## 2024-06-19 NOTE — TELEPHONE ENCOUNTER
6/19/24 - Received referral from inpatient service. Patient has EF 29%.  Currently admitted to TCU.  Will follow chart notes and schedule patient for follow up with heart failure MD when she is closer to discharge.      6/24/24 - Remains in TCU.  /7/1/24 - Pt was discharged home from TCU.  Called and spoke with daughter who is care giver.  Offered appt with Dr. Chacon on 7/23 with labs prior.

## 2024-06-19 NOTE — PROGRESS NOTES
06/19/24 1113   Appointment Info   Signing Clinician's Name / Credentials (OT) Olena Goldman OTR/L CBIS   Rehab Comments (OT) chart review on 6/18/24, eval completed 6/19,treatment initiated       Present yes  (vocera)   Language Tigrinya   Living Environment   People in Home child(smith), adult  (daughter Michele)   Current Living Arrangements apartment   Home Accessibility no concerns  (elevator)   Transportation Anticipated family or friend will provide   Living Environment Comments OT: pt unreliable  even w/ , per pt's daughter Michele pt was living alone until January 2024, then daughter  living w/ pt but unclear if it was at duaghter's home or pt's home, according to aranza at d/c from [facility] daughter plans to live w/ pt at pt's apt to be avail for assist 24/7, reports pt's apt has elevator/accessible w/ no stairs to manage, bathroom has commode overlay over toilet, shower w/ shower bench and grabbar, pt was no using AD but owns FWW . daughter reports she will need training for TF prior to d/c,   Self-Care   Usual Activity Tolerance moderate   Current Activity Tolerance fair   Fall history within last six months no   Instrumental Activities of Daily Living (IADL)   IADL Comments family does all iadls   General Information   Onset of Illness/Injury or Date of Surgery 05/29/24   Referring Physician Farn Wylie M, MD   Patient/Family Therapy Goal Statement (OT) none stated, aranza stated she is  willing to provide as much help as her mom needs but mom is weaker so would like mom to get stronger w/ goal to return to previous level of activity daisha /indep   Additional Occupational Profile Info/Pertinent History of Current Problem per chart review:Myla Glynn is a 79 year old female admitted on 5/29/2024.  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   Existing Precautions/Restrictions fall   Limitations/Impairments safety/cognitive   General Observations and Info no documented restrictions/precautions   Cognitive Status Examination   Cognitive Status Comments oT: based on performance and use of  for communication pt appears to have impaired memory , address/assess PRN for d/c recommendaitons re: supervision/assist   Visual Perception   Visual Acuity per daughtr glasses for reading   Pain Assessment   Patient Currently in Pain   (no c/o pain during OT)   Posture   Posture forward head position   Range of Motion Comprehensive   Comment, General Range of Motion annamaria UE AROM <full sh flex but WFL   Strength Comprehensive (MMT)   Comment, General Manual Muscle Testing (MMT) Assessment generalized weakness, deconditioned   Bed Mobility   Comment (Bed Mobility) see gg codes   Transfers   Transfer Comments see gg codes   Activities of Daily Living   Additional Documentation   (see gg codes)   Clinical Impression   Criteria for Skilled Therapeutic Interventions Met (OT) Yes, treatment indicated   OT Diagnosis decreased indep w/adls/mob   OT Problem List-Impairments impacting ADL activity tolerance impaired;problems related to;balance;cognition;strength;pain   Assessment of  Occupational Performance 3-5 Performance Deficits   Identified Performance Deficits drg, toileting, bed mob, transfers, bathing   Planned Therapy Interventions (OT) ADL retraining;balance training;bed mobility training;cognition;strengthening;stretching;transfer training;progressive activity/exercise   Clinical Decision Making Complexity (OT) problem focused assessment/low complexity   Risk & Benefits of therapy have been explained evaluation/treatment results reviewed;care plan/treatment goals reviewed;risks/benefits reviewed;current/potential barriers reviewed;participants voiced agreement with care plan;participants included;patient;daughter   Clinical Impression Comments OT: pt presents w/ generalized weakness, decondtioned, per chart review, interview w/ pt and daughter , pt was living alone prior to jan 2024 but has decline in function since then, pt lives w/ daughter and daughter has been providing assist w/ all adls in recent months but daughter indicates she will cont to assist her mom as needed but reports mom currently functioning below baseline for mob and adls,   OT Total Evaluation Time   OT Eval, Low Complexity Minutes (26474) 15   OT Goals   Therapy Frequency (OT) 5 times/week   OT Predicted Duration/Target Date for Goal Attainment 06/24/24   OT Goals Hygiene/Grooming;Upper Body Dressing;Lower Body Dressing;Upper Body Bathing;Lower Body Bathing;Bed Mobility;Transfers;Toilet Transfer/Toileting   OT: Hygiene/Grooming modified independent   OT: Upper Body Dressing Modified independent   OT: Lower Body Dressing Minimal assist   OT: Upper Body Bathing Supervision/stand-by assist   OT: Lower Body Bathing Minimal assist;using adaptive equipment   OT: Bed Mobility Modified independent   OT: Transfer Modified independent;with assistive device   OT: Toilet Transfer/Toileting Supervision/stand-by assist;using adaptive equipment   Self-Care/Home Management   Self-Care/Home Mgmt/ADL, Compensatory, Meal Prep  Minutes (90898) 40   Treatment Detail/Skilled Intervention OT:educ pt on approach to adls/mob w/ TF and current level of function, pt requires encouragment to do as much for self as able, requests assist at higher level than needed, educ on AE options , discussion w/ daughter/pt regarding prior level of function and AE used PTA, see gg codes for details on function   OT Discharge Planning   OT Plan oT: preautions: falls, cog, cath and IV, needs : Dennis ,Rx: full body dressing  , toileting, bed mob , dynamic standing bal, shower/gg codes 6/20 (take w/c to shower, use bench )   OT Discharge Recommendation (DC Rec) home with assist   OT Brief overview of current status see clinical impressions   Total Session Time   Timed Code Treatment Minutes 40   Total Session Time (sum of timed and untimed services) 55   Post Acute Settings Only   What unit is patient on? TCU   OT- Transitional Care Unit Time   Individual Time (minutes) - OT 55   TCU Total Session Time (minutes) - OT 55   TCU Daily Total Session Time   OT TCU Daily Total Session Time 55   Rehab TCU Daily Total Session Time 101   Bed Mobility: Turning side to side/Roll Left and Right   Describe Performance OT: sba, bed rail   Bed Mobility: Sit to lying   Describe Performance OT: min A to manage LEs   Bed Mobility: Lying to sitting on the side of bed   Describe Performance OT cga , bed rail, cues for initiate   Transfers: Sit to Stand   Reason Not Done Resident refused to perform   Transfers: Chair/Bed transfers   Reason Not Done Resident refused to perform   Picking up Object - Ability to bend/stoop while standing.   Reason Not Done Safety concerns   Upper Body Dressing - Ability to dress/undress above the waist, including fasteners   Describe Performance OT: min A,   Lower Body Dressing - Ability to dress/undress below the waist, includes fasteners   Reason Not Done Resident refused to perform   Lower Body Dressing (Putting On/Taking-Off Footwear)    Reason Not Done Resident refused to perform   Describe Performance oT: pt refused to attempt, depend to joseph/doff socks   Eating - Ability to use utensils to bring food/liquid to mouth.   Reason Not Done Resident refused to perform   Describe Performance OT: pt requested via  that OT remove breakfast tray and stated she did not want to eat it or keep anything from tray   Toileting Hygiene - Ability to maintain perineal hygiene and adjust clothes   Reason Not Done Resident refused to perform   Toilet transfer - Ability to get on and off a toilet or commode   Reason Not Done Resident refused to perform   Personal Hygiene - Ability to maintain personal hygiene (combing hair, shaving, apply makeup wash/dry face/hands.   Describe Performance oT: washed face w/ setup, decliend all other grooming   Oral Hygiene - Ability to use suitable items to clean teeth.   Describe Performance oT: setup

## 2024-06-20 NOTE — PLAN OF CARE
Goal Outcome Evaluation:      Plan of Care Reviewed With: patient    Overall Patient Progress: no change    Pt is alert and oriented, pt is Tigrinya  speaking ,  can answer yes or no questions in English ,  services available  . Medication given via PEG tube, tube feeding running at 35 ml/hr with 60 ml water flush Q 4 hr . No acute issue in the night, pt up to bed side commode assist of 1 with walker and gait belt. Cameron catheter intact, draining yellow clear urine. Pt appears comfortable in bed during safety checks. Call light within reach. Continue with plan of care.         Patient's most recent vital signs are:     Vital signs:  BP: 122/64  Temp: 98.2  HR: 78  RR: 18  SpO2: 100 %     Patient does not have new respiratory symptoms.  Patient does not have new sore throat.  Patient does not have a fever greater than 99.5.

## 2024-06-20 NOTE — PROGRESS NOTES
"Brief Medicine Note    Contacted by nursing regarding several request including patient has a loose suture on the left side of the abdomen as well as patient has to PIVs that are not in use and also states that patient expressed that she had left thigh pain.  Patient has also been noted per nursing to not take any p.o. intake.      Patient is seen with [facility]  available over our encounter.  She states that she does not feel hungry and is interested in an appetite stimulant.  She otherwise denies having any nausea or vomiting.  She denies having any pain including any abdominal or thigh pain at this time.  She denies having any new chest symptoms or infectious symptoms at this time and does not endorse having any other concerns.    Today's vital signs, medications, and nursing notes were reviewed. Labs reviewed.     /68   Pulse 78   Temp 98.2  F (36.8  C) (Oral)   Resp 18   Ht 1.499 m (4' 11.02\")   Wt 53.7 kg (118 lb 6.2 oz)   SpO2 100%   BMI 23.90 kg/m    General: A&O. NAD. Pleasant. Cooperative  HEENT: NC, AT, pupils equal and round, sclera anicteric  CV: RRR, no m/r/g  PULM:  non-labored breathing on RA  GI: (images below) PEG site is CDI, 1 suture is protruding out of the left side and a punctate incision that is CDI and well-approximated, no tenderness, fluctuance, crepitus.  NABS, soft, NT, ND  Extremities: no edema, + PT and radial pulses  MSK: moves all extremities, no gross deformities  SKIN: CDI, noncyanotic, anicteric  Psych: Mood and affect appropriate      A/P:    (Please see my H&P on 6/18 for further details)     TF dependent  Poor appetite  - add marinol BID (start 2.5mg)  - ensure regular BM and use bowel regiment  - continue TF, appreciate RD assistance  -Okay to remove PIV's at this time    Loose stitch on left side abdomen  (See image in media) Per d/w IR provider, is not an IR-related suture but looks removable  - ok to cut loose portion of stitch    Kamila Dove, " DARREL JEFFERSON [facility] Transitional [facility]  Contact information available via [facility] Paging/Directory

## 2024-06-20 NOTE — PROGRESS NOTES
[facility] long [facility].     ([facility]) In reference to admission date 06/18/2024..    Please contact Intake with any questions, [phone] or In Basket pool, [facility] ([phone]).

## 2024-06-20 NOTE — PLAN OF CARE
Goal Outcome Evaluation:    Plan of Care Reviewed With: patient    Overall Patient Progress: no change    Alertness: A/O x4  Assist: x1 w/ gb/w  Affect: flat affect  Mood: calm, cooperative  Communication: Dennis sanderson -  utilized   Skin: dry/flaky skin on coccyx, small scar on L lower back  Bowel/bladder: alejandra catheter - patent; continent for bowel, soft BM this afternoon  Breakfast and lunch: declined breakfast and lunch; continuous tube feeding @ 35 mL/hr w/ 60 mL free water flush Q 4h   Vital signs/pain: vitally stable, endorses pain at PEG tube site, rates pain 5/10  Medications: medications crushed and administered via tube feeding, tube flushed before and after administration   PRN's this shift: denies PRN medication    PEG tube dressing changed today.  Sodium bicarbonate discontinued and orders for blood gas venous and BMP ordered for tomorrow morning. Both peripheral IVs removed from left arm this shift.

## 2024-06-21 NOTE — PLAN OF CARE
"Patient is alert and oriented x 4. Speaks Tigrinya. Able to communicate needs by nodding her head or \"yes\" or \"no\".  Changed G- tube dressing. TF (TwoCal HN ) continuous 35 mL/hr . Flush free water 60 mL q 4 hrs. On combination diet moderate consistent car(60 g CHO ). Pt refused dinner. Cameron patent and catheter cares done . No other acute issues in this shift. Call light with in reach. Continue with POC.    Patient's most recent vital signs are:     Vital signs:  BP: 132/62  Temp: 97.9  HR: 95  RR: 17  SpO2: 97 %     Patient does not have new respiratory symptoms.  Patient does not have new sore throat.  Patient does not have a fever greater than 99.5.         "

## 2024-06-21 NOTE — PLAN OF CARE
"VS: /59 (BP Location: Right arm)   Pulse 87   Temp 98  F (36.7  C) (Oral)   Resp 18   Ht 1.499 m (4' 11.02\")   Wt 53.7 kg (118 lb 6.2 oz)   SpO2 100%   BMI 23.90 kg/m      O2: 100% on RA   Output: Alejandra catheter, continent bowel   Last BM: 6/21/24   Activity: A1 w/w and GB   Skin: G tube, old surgical incision CEDRIC on abdomen   Pain: Denies   CMS:  Neuro: Alert and oriented x4, able to make needs known.    Dressing: None   Diet: Moderately thickened liquids, consistent carb diet, meds crushed through G tube, TF running at 35 mL/hr continuous, 60 mL free water flush q4hr.    LDA: G tube, alejandra catheter   Equipment: Tube feeding pump and pole, call light   Plan: Con't POC.    Additional Info: Patient was calm and cooperative with all cares, denies SOB and CP. No complaints of pain this shift. Tube feeding bags switched. No acute issues this shift, continue POC.    Goal Outcome Evaluation:      Plan of Care Reviewed With: patient    Overall Patient Progress: no changeOverall Patient Progress: no change         Patient's most recent vital signs are:     Vital signs:  BP: 108/59  Temp: 98  HR: 87  RR: 18  SpO2: 100 %     Patient does not have new respiratory symptoms.  Patient does not have new sore throat.  Patient does not have a fever greater than 99.5.  "

## 2024-06-21 NOTE — PLAN OF CARE
Removed left side of ABD stitch. Applied dressing for protection , no noted bleeding . Pt tolerated well while removing stitch. Removed left upper arm and fore arm PIV's.     Patient's most recent vital signs are:     Vital signs:  BP: 97/54  Temp: 99.3  HR: 92  RR: 18  SpO2: 100 %     Patient does not have new respiratory symptoms.  Patient does not have new sore throat.  Patient does not have a fever greater than 99.5.

## 2024-06-21 NOTE — PROGRESS NOTES
Prior Authorization **INITIATED**    Medication: HYDROMORPHONE HCL 1 MG/ML PO LIQD  Insurance Company: [employer] - Phone [phone] Fax [phone]  Pharmacy Filling the Rx: n/a - Patient has not yet been discharged, and there are no outpatient orders for this medication yet  Start Date: 6/21/2024  Reference #: CoverMyMeds Key: [id number] - [place] Case ID: [id number]  Comments:  Proactive Prior Authorization. Plan limits to 7 day supply for 1st outpt opioid fill. May need more due to cancer pain      Dawna Hawthorne CPhT  Discharge Pharmacy Liaison  [facility]  Pronouns: She/Her/Hers    Securely message with Vocera, Epic Secure Chat, or [employer]  Phone: [phone]  Fax: [phone]  [email or web address]

## 2024-06-21 NOTE — PROGRESS NOTES
End of shift Summary: See flowsheet for VS and detail assessments.     Changes this Shift:      Pulmonary: Pt denies SOB & chest pain. Pt is on RA.     Diet: Mod. Consistent carb diet, medication crushed through g-tube, TF running 35 ml continuous, 60 ml water flush q4hr..    Output: Pt is voiding adequately via alejandra, up to bedside commode, LBM 06/20.     Activity: Pt is assist of 1 walker and gait belt.     Skin: No new skin issues.     Pain: Pt denies pain.     Neuro/CMS: Pt is alert and oriented x 4. Denies numbness and tingling.     Dressings/Drains: None.     IV: None.     Additional info: No significant changes on shift.     Plan:  Plan of care ongoing.

## 2024-06-21 NOTE — PROGRESS NOTES
Timed Up and Go (TUG): TUG is a test of basic mobility skills. It is used to screen individuals prone to falls.  Gait assistive device used: FWW, and assist with feeding tube line/pole      Patient score 32 seconds  ?13.5 seconds indicate at risk for falls in older adults according to Suresh et al 2000.  ?30 seconds - indicates dependency in most ADL and mobility skills according to Posravindra & Ben 1991  >11.5 seconds indicate at risk for falls in adults with Parkinson's Disease    Minimal Detectable Change for patients with Alzheimer s = 4.09 sec   Minimal Detectable Change for patients with Parkinson s Disease = 3.5 sec   according to Yamilka & Niecy Hsieh 2011    Normative Data from Gunnar GA, Rene D, Josue M, Ute E, Ana M. 2017  Age                 Average Time (in seconds)  20-39                     5.9-7.4         40-59                     6.3-7.8   60-69                     7.1-9.0  70-79                     8.2-10.2  80-99                    10.0-12.7            Assessment (rationale for performing, application to patient s function & care plan): Pt with deconditioning, now with Gtube with PMH sigmoid colon adenocarcinoma (dx 2020) c/b advancement into the bladder s/p partial cystectomy and small bowel resection. Pt moves slowly, and is at increase for falls.   Pt tires easily, but no LOB with ambulation when using fww.    (Minutes billed as physical performance test) 5

## 2024-06-21 NOTE — PLAN OF CARE
"Care Coordination:     Writer attempted to meet w/ patient. Unable to meet w/ patient as she was sleeping. Placed call to Ximena to discuss TF, HC, HI etc. Unable to reach her. Left VM requesting return call.     Writer sent [facility] referral for tube feeding- benefits are as follows:     \"Pt has full coverage through their [facility] plan for enteral as long as via tube.\"     Home Care referrals sent to [facility] for PT/OT/RN. [facility] declined and they have began outsourcing.     Ashely Farrell   Patient Care Management Coordinator  Acute Rehabilitation Unit/ Transitional Care Unit.   Ph: [phone]    "

## 2024-06-22 NOTE — PLAN OF CARE
"VS: /58 (BP Location: Right arm)   Pulse 82   Temp 98.2  F (36.8  C) (Oral)   Resp 16   Ht 1.499 m (4' 11.02\")   Wt 53.7 kg (118 lb 6.2 oz)   SpO2 99%   BMI 23.90 kg/m      O2: 99% on RA   Output: Continent bowel; alejandra catheter   Last BM: 6/21/24   Activity: A1 w/w and GB   Skin: G tube, old surgical incision CEDRIC on abdomen   Pain: Denies   CMS:  Neuro: Alert and oriented x4, able to make needs known.   Dressing: Dressing to G tube changed   Diet: Modified consistent carb diet, meds crushed through G tube, TF running at 35 mL/hr continuous, 60 mL free water flush q4hr.    LDA: G tube, alejandra catheter   Equipment: Tube feeding pump and pole, call light   Plan: Con't POC.    Additional Info: Patient was calm and cooperative with all cares, denies SOB and CP. C/o some stomach discomfort, given Zofran around 1255. No acute issues this shift, continue POC.    Goal Outcome Evaluation:      Plan of Care Reviewed With: patient    Overall Patient Progress: no changeOverall Patient Progress: no change       Patient's most recent vital signs are:     Vital signs:  BP: 134/58  Temp: 98.2  HR: 82  RR: 16  SpO2: 99 %     Patient does not have new respiratory symptoms.  Patient does not have new sore throat.  Patient does not have a fever greater than 99.5.  "

## 2024-06-22 NOTE — PLAN OF CARE
MDS Pain Assessment    The following is the pain interview as conducted by the TCU RN caring for the patient on June 22, 2024. This assessment is required by the [facility] for all patients in [place] SNF (Skilled Nursing Facilities).     . Pain Presence  Have you had pain or hurting at any time in the last 5 days?   0. No    . Pain Frequency  How much of the time have you experienced pain or hurting over the last 5 days?   1. Rarely or not at all    . Pain Effect on Sleep  Over the past 5 days, how much of the time has pain made it hard for you to sleep at night?   1. Rarely or not at all    . Pain Interference with Therapy Activities  Over the past 5 days, how often have you limited your participation in rehabilitation therapy sessions due to pain?   1. Rarely or not at all    . Pain Interference with Day-to-Day Activities  Over the past 5 days, have you limited your day-to-day activities (excluding rehabilitation therapy sessions) because of pain?  1. Rarely or not at all    . Pain intensity   Numeric Rating Scale (00-10): Please rate your worst pain over the last 5 days on a zero to ten scale, with zero being no pain and ten as the worst pain you can imagine. 02

## 2024-06-22 NOTE — PLAN OF CARE
Goal Outcome Evaluation:      Plan of Care Reviewed With: patient    Overall Patient Progress: no changeOverall Patient Progress: no change         Pt is A/O x 4, but does not speak English, rather she can understand when you speak pointing at what you are saying. [facility] , does not like to eat, but takes appetite stimulant. Pt is on J tube feeding continuous. Pt's last BM was 6/20. Pt is continent of bowel but has a alejandra for urine. Pt has no line, skin j tube dressing is intact. Pt denies of pain, SOB, and chest pain. Pt was able to make needs known, call light was within reach. Continue pt's plan of care.

## 2024-06-22 NOTE — PLAN OF CARE
Goal Outcome Evaluation:  Pt.alert - DERIK orientation d/t language barrier / speaks Tigrinya. GT w/continuous TF @ 35mls/hr - H2O 60mls q4hrs. On moderate consistent carb diet. Cameron intact/patent - not yet emptied. Appears to be sleeping well.        Patient's most recent vital signs are:     Vital signs:  BP: 135/56  Temp: 97.9  HR: 82  RR: 17  SpO2: 97 %     Patient does not have new respiratory symptoms.  Patient does not have new sore throat.  Patient does not have a fever greater than 99.5.

## 2024-06-23 NOTE — PLAN OF CARE
"VS: /61 (BP Location: Left arm)   Pulse 84   Temp 97.6  F (36.4  C) (Oral)   Resp 16   Ht 1.499 m (4' 11.02\")   Wt 53.7 kg (118 lb 6.2 oz)   SpO2 98%   BMI 23.90 kg/m      O2: 98% on RA   Output: Continent bowel; alejandra catheter   Last BM: 6/23/24   Activity: SBA w/w and GB   Skin: G tube, old surgical incision on abdomen CEDRIC   Pain: Denies   CMS:  Neuro: Alert and oriented x4, able to make needs known.    Dressing: Dressing to G tube changed   Diet: Modified consistent carb diet, meds crushed through G tube, TF running at 35 mL/hr continuous, 60 mL free water flush q4hr   LDA: G tube, alejnadra catheter   Equipment: Tube feeding pump and pole, call light   Plan: Con't POC.    Additional Info: Patient was calm and cooperative with all cares, denies SOB and CP. Catheter cares done. No acute issues this shift, continue POC.    Goal Outcome Evaluation:      Plan of Care Reviewed With: patient    Overall Patient Progress: no changeOverall Patient Progress: no change         Patient's most recent vital signs are:     Vital signs:  BP: 139/61  Temp: 97.6  HR: 84  RR: 16  SpO2: 98 %     Patient does not have new respiratory symptoms.  Patient does not have new sore throat.  Patient does not have a fever greater than 99.5.  "

## 2024-06-23 NOTE — PLAN OF CARE
"Goal Outcome Evaluation:         VS: Blood pressure 127/61, pulse 82, temperature 98.1  F (36.7  C), temperature source Oral, resp. rate 18, height 1.499 m (4' 11.02\"), weight 53.7 kg (118 lb 6.2 oz), SpO2 99%, not currently breastfeeding.    O2: RA   Output: Continent of bowel, Cameron   Last BM: 6/23/24   Activity: SBA w/ gait + walker   Skin: G-tube, abd incision(open to air)   Pain: Tylenol   CMS: A&O   Dressing:    Diet: Mod consistent Carb diet, thin, meds through g-tube; TF @35ml/hr   LDA:    Equipment: Call light, personal belongings   Plan: Cont'd POC   Additional Info: Pt slept through the night, no acute changes ON.                     "

## 2024-06-23 NOTE — PLAN OF CARE
Goal Outcome Evaluation:      Plan of Care Reviewed With: patient    Overall Patient Progress: no changeOverall Patient Progress: no change    A&Ox4. VSS on RA. Lupeya speaking,  utilized. Cameron catheter in place, patent, draining yellow urine. Continent of bowel. Stand-by assist with walker and gaitbelt. Pt refused shower this evening. On moderate consistent carb diet. All medications given through G tube. Continuous tube feeding running via G tube @ 35 mL/hr with 60 mL free water flush every 4 hours. Bags and tubing changed @ 1725. Denies chest pain, SOB, N/V. Call light within reach. Continue with plan of care.      Patient's most recent vital signs are:     Vital signs:  BP: 113/53  Temp: 99  HR: 85  RR: 16  SpO2: 100 %     Patient does not have new respiratory symptoms.  Patient does not have new sore throat.  Patient does not have a fever greater than 99.5.

## 2024-06-24 NOTE — PROGRESS NOTES
Prior Authorization **APPROVED**    Authorization Effective Date: 6/22/2024  Authorization Expiration Date: 6/22/2025  Medication: HYDROMORPHONE HCL 1 MG/ML PO LIQD  Approved Dose/Quantity: -  Reference #: CoverMyMeds Key: [id number] - PA Case ID: [id number]   Insurance Company: [employer] - Phone [phone] Fax [phone]  Expected CoPay: $ 0    CoPay Card Available: No    Foundation Assistance Needed: -  Which Pharmacy is filling the prescription (Not needed for infusion/clinic administered): n/a - Patient has not yet been discharged, and there are no outpatient orders for this medication yet  Comments:  Proactive Prior Authorization. Plan limits to 7 day supply for 1st outpt opioid fill. May need more due to cancer pain          Dawna Hawthorne CP  Discharge Pharmacy Liaison  [facility]  Pronouns: She/Her/Hers    Securely message with navabi, Epic Secure Chat, or MovieSet  Phone: [phone]  Fax: [phone]  [email or web address]

## 2024-06-24 NOTE — PROGRESS NOTES
"Patient and her daughter participated in peg tube and alejandra education.  The following documents were reviewed and given to them to keep:  \"Learning About Indwelling Urinary Catheter Care to Prevent Infection\"  \"Caring for Your Tube at Home\"    Her daughter stated she has managed a alejandra for her mom at home previously and does not have further questions about it.   She did practice peg tube cares. Dressing was changed and water flushes given. Discussed how to administer medications.      She asked appropriate questions and was engaged in education.    "

## 2024-06-24 NOTE — PLAN OF CARE
"  VS: /61 (BP Location: Left arm)   Pulse 82   Temp 97.7  F (36.5  C) (Oral)   Resp 16   Ht 1.499 m (4' 11.02\")   Wt 53.7 kg (118 lb 6.2 oz)   SpO2 100%   BMI 23.90 kg/m      O2: 98% on RA   Output: Continent bowel; alejandra catheter   Last BM: 6/24   Activity: SBA w/w and GB   Skin: G tube, old surgical incision on abdomen CEDRIC   Pain: Denies   CMS:  Neuro: Alert and oriented x4, able to make needs known.    Diet: Modified consistent carb diet, meds crushed through G tube, TF running at 35 mL/hr continuous, 60 mL free water flush q4hr   LDA: G tube, alejandra catheter   Equipment: Tube feeding pump and pole, call light   Plan: Con't POC.    Additional Info: Patient was calm and cooperative with all cares, denies SOB and CP. No acute issues this shift, continue POC.     "

## 2024-06-24 NOTE — PLAN OF CARE
"Goal Outcome Evaluation:  No acute issues overnight. Appears sleeping well and has no c/o's or requests as of yet. Cameron intact/patent. LBM yesterday using BSC. Continuous TF / meds via GT. On mod.consistent carb diet. Language barrier - speaks Tigrinya.    0510 - Pt.used call light appropriately for \"bathroom\". Assist of 1 to BSC and continent med.loose>soft green/brown BM. Staff provided pericares.        Patient's most recent vital signs are:     Vital signs:  BP: 113/53  Temp: 99  HR: 85  RR: 16  SpO2: 100 %     Patient does not have new respiratory symptoms.  Patient does not have new sore throat.  Patient does not have a fever greater than 99.5.                               "

## 2024-06-24 NOTE — PLAN OF CARE
Problem: Adult Inpatient Plan of Care  Goal: Optimal Comfort and Wellbeing  Outcome: Progressing     Problem: Adult Inpatient Plan of Care  Goal: Readiness for Transition of Care  Outcome: Progressing     Problem: Adult Inpatient Plan of Care  Goal: Absence of Hospital-Acquired Illness or Injury  Intervention: Prevent and Manage VTE (Venous Thromboembolism) Risk  Recent Flowsheet Documentation  Taken 6/24/2024 0900 by Hanna Zheng RN  VTE Prevention/Management: SCDs (sequential compression devices) off     Problem: Adult Inpatient Plan of Care  Goal: Absence of Hospital-Acquired Illness or Injury  Outcome: Adequate for Care Transition  Intervention: Prevent and Manage VTE (Venous Thromboembolism) Risk  Recent Flowsheet Documentation  Taken 6/24/2024 0900 by Hanna Zheng, RN  VTE Prevention/Management: SCDs (sequential compression devices) off   Goal Outcome Evaluation:

## 2024-06-24 NOTE — PROGRESS NOTES
CLINICAL NUTRITION SERVICES - BRIEF NOTE   (See RD note on 6/19 for full assessment)     Reason for RD note:   TF questions/follow with family    New Findings/Chart Review:  Per family, pt still not interested in food and would like all nutrition from TF. However, would appreciate not having TF continuously and wanting to switch to cycled regimen.      Interventions:  Changing TF: TwoCal HN via G-tube @ 45 mL/hr x 16 hrs (4 pm-8 am) + 1 pkt banatrol TID provides 720 mL, 1575 kcal (29 kcal/kg), 71 g protein (1.3 g/kg), 185 g CHO, 15 g fiber, and 504 mL free water per DW of 54 kg.  Water Flushes: 60 mL q 4 hrs (TF + Flushes = 864 mL water; meeting 64% hydration needs).     6/24: continue current rate of 35 ml/hr until 8 am 6/25 then stop TF at 8 am and restart TF at 4 pm on 6/25 @ 45 ml/hr.      Future/Additional Recommendations:  Monitor TF tolerance, oral intakes, POC     Nutrition will continue to follow per protocol.    Inez Hernandez MS, RDN, LD  TCU/OB/Ortho Clinical Dietitian  Available via phone and Vocera  Phone: [phone]  Vocera: TCU Clinical Dietitian  Weekend/Holiday Vocera: Weekend Holiday Clinical Dietitian [Multi Site Groups]

## 2024-06-25 NOTE — PLAN OF CARE
Occupational Therapy Discharge Summary    Reason for therapy discharge:    All goals and outcomes met, no further needs identified.    Progress towards therapy goal(s). See goals on Care Plan in Epic electronic health record for goal details.  Goals partially met.  Barriers to achieving goals:   pt demo limited engagement in therapy.    Therapy recommendation(s):    No further therapy is recommended.pt's family provided assist w/ adls PTA and report plan to continue to provide supervision /assist 24/7 w/ adls and mobility, pt demo minimal active partic in OT sessions during TCU stay. No additional AE needs identified, daughter stated she /family plan to provide needed assist w/ adls/mob at home . Pt currently assist of 1 for adls. Discharge pt from OT but pt remains on TCU unit for additional training from PT and nsg.

## 2024-06-25 NOTE — H&P
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      # Physical deconditioning  # Fatigue  Occurs in the setting of complex medical hx as detailed below  - PT and OT consults     # Recurrent colorectal cancer with local recurrence within the bladder s/p partial cystectomy with small bowel resection and anastomosis (01/09/2024)  # Alejandra catheter status d/t bladder wall weakness and defect  # Hx right renal segmental artery bleed with subsequent clot formation in the renal collecting ducts, ureter, and urinary bladder s/p IR embolization   Hx persistent bladder leak s/p urinary diversion with bilateral nephrostomy tubes- now removed. Alejandra in place for bladder decompression since 5/31. Urology performed CT cystogram showing anterior bladder wall defect.  The patient main symptomatology is nausea.  -Continue scheduled ondansetron for palliation of symptoms  -Continue pantoprazole daily  -Consider MRI enterography to evaluate for enterovesical fistula if the patient clinically deteriorates  -Continue Augmentin indefinitely until follow up   -Pain control with scheduled acetaminophen, and Dilaudid on as-needed basis [the patient rarely utilizes Dilaudid]  - continue alejandra until outpatient follow up with Urology (scheduled 6/19 at 3pm)      # Tube feed dependence  # Status post gastrostomy placement (IR 6/13/24)  # Moderate Malnutrition  in the context of chronic illness  # Sarcopenia  # Decreased appetite likely secondary to malignancy leading to moderate malnutrition in the context of acute on chronic illness, POA  The patient had a period of time on nasogastric tube nutrition.  Percutaneous gastrostomy performed on 6/13 without any immediate complications. Passed swallow eval 6/16.  - RD consult  - FWF 60ml q4h  -Continue tube feeding through percutaneous gastrostomy   -Ordered speech and swallow evaluation--> started const carb diet on 6/16/2024      # HFrEF, LVEF 29%, POA  Noted inpatient to be hypovolemic. TTE at [place] 4/4/24 EF 29%, regional WMA,  enlarged LA and mildly enlarged RV and LV mod TR, IVC normally collapsing.   - monitor volume status, weight, I&O  -Continue carvedilol 3.125 mg twice daily  -Consider valsartan at 20 mg daily to be started once acute kidney injury resolves and can be switched to Entresto if hemodynamically stable on valsartan  -needs to follow with [place] cardiology     # ALFREDA on CKD stage IIIb, unclear if present on admission  This is likely prerenal in etiology given diarrhea. Given entero-vesica fistula s/p urinary diversion, ordered CT abdomen and pelvis without contrast to rule out obstructive physiology [ultrasound might not be sensitive enough]- nothing noted  -Continue sodium bicarbonate 650 3 times daily  - Cr improving slowly      # Diarrhea likely 2/2 tube feeding, not present on admission, now has stopped 6/16/2024   No infectious symptoms and temporaly associated w/ TF  -Discontinued all laxatives  -Continue fiber     # Possible fungal vulval infection, with prior candidal bacteremia, not clear if present on admission  -s/p 1 dose of fluconazole 150 mg (6/15)  - monitor      #Chronic Normocytic Anemia likely secondary to anemia of chronic disease, POA  - Trending CBC     #Pleural Effusion, small  Noted per CXR inpatient.  Lungs CTAB, sating well on RA  - monitor      #T2DM, diet controlled  PTA no  medications and no insulins required inpatient  - Hypoglycemic protocol         Resolved medical problems:  # Infected organized left retroperitoneal hematoma with Proteus hauseri/vulgaris s/p IR coil embolization and drain placement 3/9/2024 followed by multiple drain upsized/exchange and antibiotics until 4/15/2024- see notes from recent hospital admission for further details    #Hx bilateral hydronephrosis with pyelonephritis and associated sepsis  # Hx protracted hospital admissions   Requiring extensive admission 2/1 - 4/13; hospital stay complicated by sepsis, ALFREDA, pleural effusion, and lumbar artery bleed requiring IR embolization, known retroperitoneal hematoma (diagnosed on CT 3/9) s/p multiple unsuccessful drain upsizing/aspirations with MARIE drain in place- since removed   - alejandra cares   - irrigated alejandra at bedside 6/18 and flushes easily, few stringy red clots expelled  # Hyper and hyponatremia at different instances   Na recently normal   - monitor volume status and adjust FWF and fluid encouragement as indicated per sodiums        Diet: Combination Diet Moderate Consistent Carb (60 g CHO per Meal) Diet  Adult Formula Drip Feeding: Continuous TwoCal HN; Gastrostomy; Goal Rate: 45 ml/hr; mL/hr; From: 4:00 PM; To: 8:00 AM    DVT Prophylaxis: Pneumatic Compression Devices  Alejandra Catheter: PRESENT, indication: Acute retention or obstruction  Lines: None     Cardiac Monitoring: None  Code Status: Full Code      Clinically Significant Risk Factors              # Hypoalbuminemia: Lowest albumin = 3.3 g/dL at 6/24/2024  6:55 AM, will monitor as appropriate     # Hypertension: Noted on problem list               # Severe Malnutrition: based on nutrition assessment    # Financial/Environmental Concerns:           Disposition Plan     Medically Ready for Discharge: Anticipated in 5+ Days           Fran Wylie MD  Hospitalist Service  [facility]  Securely message with Vocera (more  info)  Text page via [facility] Paging/Directory     ______________________________________________________________________    Chief Complaint   Generalized weakness    History is obtained from the patient    History of Present Illness   Myla Glynn is a Funamb[place]-speaking 79F w/ PMH sigmoid colon adenocarcinoma (dx 2020) c/b advancement into the bladder s/p partial cystectomy and small bowel resection and anastomosis (requiring admission 1/9-1/20/2024), 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      On my encounter and patient is resting comfortably.  I used the [employer]  via phone to help translate.  States she is fine to all my questions.  She denies any headache lightheadedness dizziness.  Denies any nausea or vomiting constipation or diarrhea.    Past Medical History    Past Medical History:   Diagnosis Date    Anemia     Bladder mass     Controlled type 2 diabetes mellitus without complication, without long-term current use of insulin (H)     Gastroesophageal reflux disease     HTN (hypertension)     Ileostomy status (H)     Malignant neoplasm of sigmoid colon (H)     Renal insufficiency        Past Surgical History   Past Surgical History:   Procedure Laterality Date    COLONOSCOPY      CYSTECTOMY BLADDER RADICAL, ILEAL DIVERSION, COMBINED N/A 1/5/2021    Procedure: Partial Cystectomy, Cystourethroscopy,;  Surgeon: Angel Newsome MD;  Location: [facility] OR    HYSTERECTOMY TOTAL ABDOMINAL  1/5/2021    Procedure: Hysterectomy total abdominal;  Surgeon: Jimy Jackson MD;  Location: [facility] OR    IR GASTROSTOMY TUBE PERCUTANEOUS PLCMNT  6/13/2024    LAPAROSCOPY OPERATIVE ADULT  1/5/2021    Procedure: Laparoscopy Operative Adult, takedown of splenic flexure,  appendectomy;  Surgeon: Remi Moscoso MD;  Location: [facility] OR    SALPINGO-OOPHORECTOMY BILATERAL  2021    Procedure: Salpingo-oophorectomy bilateral;  Surgeon: Jimy Jackson MD;  Location: [facility] OR    SIGMOIDOSCOPY FLEXIBLE N/A 2021    Procedure: SIGMOIDOSCOPY, FLEXIBLE;  Surgeon: Remi Moscoso MD;  Location: [facility] OR    TAKEDOWN ILEOSTOMY N/A 2021    Procedure: ILEOSTOMY CLOSURE, LYSIS OF ADHESION;  Surgeon: Remi Moscoso MD;  Location: [facility] OR       Prior to Admission Medications   Prior to Admission Medications   Prescriptions Last Dose Informant Patient Reported? Taking?   HYDROmorphone (DILAUDID) 0.2 MG/ML SOLN injection'   No No   Sig: Inject 1 mL (0.2 mg) into the vein every 4 hours as needed for other (not able to tolerate enteral dilaudid)   HYDROmorphone, STANDARD CONC, (DILAUDID) 1 MG/ML oral solution   No No   Si.5 mLs (0.5 mg) by Oral or Feeding Tube route every 4 hours as needed for moderate pain   HYDROmorphone, STANDARD CONC, (DILAUDID) 1 MG/ML oral solution   No No   Si mL (1 mg) by Oral or Feeding Tube route every 4 hours as needed for severe pain   acetaminophen (TYLENOL) 325 MG tablet   No No   Sig: Take 2 tablets (650 mg) by mouth every 4 hours as needed for mild pain or other (and adjunct with moderate or severe pain or per patient request)   acetaminophen (TYLENOL) 325 MG/10.15ML liquid   No No   Si.3 mLs (650 mg) by Oral or Feeding Tube route every 8 hours   acetaminophen (TYLENOL) 650 MG suppository   No No   Sig: Place 1 suppository (650 mg) rectally every 4 hours as needed for mild pain or other (and adjunct with moderate or severe pain or per patient request)   amoxicillin-clavulanate (AUGMENTIN) 500-125 MG tablet   No No   Sig: Take 1 tablet by mouth every 12 hours   calcium carbonate (TUMS) 500 MG chewable tablet   No No   Sig: Take 2 tablets (1,000 mg) by mouth 4 times daily as needed for heartburn   carvedilol (COREG) 3.125  MG tablet   No No   Sig: Take 1 tablet (3.125 mg) by mouth 2 times daily (with meals)   multivitamin w/minerals (THERA-VIT-M) tablet   No No   Sig: Take 1 tablet by mouth daily   ondansetron (ZOFRAN) 4 MG tablet   No No   Si tablet (4 mg) by Oral or Feeding Tube route every 6 hours   oxyCODONE (ROXICODONE) 5 MG tablet   No No   Sig: Take 1 tablet (5 mg) by mouth every 4 hours as needed for moderate pain or severe pain (IF pain not managed with non-pharmacological and non-opioid interventions)   pantoprazole (PROTONIX) 2 mg/mL SUSP suspension   No No   Sig: Take 20 mLs (40 mg) by mouth every morning (before breakfast)   rosuvastatin (CRESTOR) 5 MG tablet  Daughter Yes No   Sig: Take 5 mg by mouth at bedtime   saccharomyces boulardii (FLORASTOR) 250 MG capsule   No No   Sig: Take 1 capsule (250 mg) by mouth 2 times daily   sodium bicarbonate 650 MG tablet   No No   Sig: Take 1 tablet (650 mg) by mouth 3 times daily      Facility-Administered Medications: None        Review of Systems    The 10 point Review of Systems is negative other than noted in the HPI or here.      Physical Exam   Vital Signs: Temp: 97.6  F (36.4  C) Temp src: Oral BP: (!) 140/67 Pulse: 86   Resp: 16 SpO2: 100 % O2 Device: None (Room air)    Weight: 121 lbs 3.2 oz    General Appearance: Appears comfortable.  Respiratory: Without wheezes rhonchi or rales.  CTA  Cardiovascular: RRR, without murmurs  GI: Soft, nontender, plus BS  Skin: Without obvious bleeding, bruising or excoriations      Medical Decision Making       76 MINUTES SPENT BY ME on the date of service doing chart review, history, exam, documentation & further activities per the note.      Data   ------------------------- PAST 24 HR DATA REVIEWED -----------------------------------------------

## 2024-06-25 NOTE — PROGRESS NOTES
MAURO met with pt and pt cousin at bedside to provide MyChart instructions and baseline care plan per discussion with pt daughter (Michele) last week. SW explained paperwork to pt cousin who was able to interpret for pt. Pt cousin reported that she would update Jovanae that this information was at bedside. Pt and pt cousin had no questions for SW.     MAURO left VM for pt's waiver  (Graciela Clement, ph: [phone]) stating that SW calling on pt daughter's behalf and explained confusion on pt's PCA hours not starting. MAURO requested call back with contact information.     ADDENDUM 1548: SW received VM back from pt's , Graciela reporting that he was investigating what happened with PCA hours. Graciela reported that he had communicated this with pt daughter and that if she has more questions she can follow up with him. Graciela left call back number with any other questions (ph: [phone]).     SAMANTHA Kearns  [facility]   [facility]   [facility]  TCU MAURO Ph: [phone]

## 2024-06-25 NOTE — PLAN OF CARE
Goal Outcome Evaluation:      Plan of Care Reviewed With: patient    Overall Patient Progress: no change    VSS, denies SOB, no complaint of pain. Poor appetite, refused to eat supper, TF ongoing, current rate at 35ml/hr, with order to stop at 0800H tomorrow (6/25) and restart at 1600H at increased rate of 45ml/hr ( see active order).Patient refusing to swallow meds, all meds given through PEG tube per request.  IFC intact, patent, output recorded , had 1 loose and 1 soft BM  today .  No acute issues this shift, comfortable at this time, will continue POC.         Patient's most recent vital signs are:     Vital signs:  BP: 127/64  Temp: 97.6  HR: 90  RR: 16  SpO2: 98 %     Patient does not have new respiratory symptoms.  Patient does not have new sore throat.  Patient does not have a fever greater than 99.5.

## 2024-06-25 NOTE — PROGRESS NOTES
06/25/24 0900   Name of Certified Therapeutic Rec Specialist   Name of Certified Therapeutic Rec Specialist CT Garcia   Appointment Type   Type of Therapeutic Rec Session Therapeutic Rec Assessment   General Information   Patient Profile Review See Profile for full history and prior level of function   Daily Contact with Relatives or Friends Phone call;Visit   Community Involvement   Spiritual Practice   (Mu-ism)   Music   Music Preferences Ethnic   Impression   Open to Socializing with Others Other (comments)  (Per chart, pt has some confusion, pt does need  for communication)   Treatment Plan   Assessment Assessment completed, is very limited due to confusion and no family available. Information obtained from extensive chart review. Will attempt to connect with daughter when she is here.

## 2024-06-25 NOTE — PLAN OF CARE
VSS, c/o pain on left thigh PRN diclofenac , and effective. No nausea in this shift.Denied  SOB, chest pain. New TF orders  4 PM to 8 AM 45 mL/hr . TF started at 1612 hr. Flushed free water as per orders. G- tube dressing CDI, alejandra patent and draining rafia color urine. Noted small blood clots(less than pea size) in urine. Updated provider.  Poor appetite. Decline to eat supper. Takes med's via PEG tube. A/1 with walker and gait belt. Comfortable in bed at this time. Call light with in reach. Continue with POC.  Patient's most recent vital signs are:     Vital signs:  BP: 140/67  Temp: 97.6  HR: 86  RR: 16  SpO2: 100 %     Patient does not have new respiratory symptoms.  Patient does not have new sore throat.  Patient does not have a fever greater than 99.5.

## 2024-06-25 NOTE — PLAN OF CARE
Goal Outcome Evaluation:      Plan of Care Reviewed With: patient    Overall Patient Progress: no change    2300 H - 0700 H  Pt is alert and oriented, pt is Tigrinya speaking , can answer yes or no questions in English ,  services available . Pt is call light appropriate Medication given via PEG tube, tube feeding running at 35 ml/hr with 60 ml water flush Q 4 hr . To change today 6/25/24 as per the order to 45 ml/hr from 4 pm -8 am.  No acute issue in the night, pt up to bed side commode assist of 1 with walker and gait belt. Cameron catheter intact, draining yellow clear urine. Pt appears comfortable in bed during safety checks. Call light within reach. Continue with plan of care.    0700 H -1100 H    Tube feeding stopped at 8 am, pt morning medication given via GTube, pt felt nausea and vomited x 1 , prn Zofran given, interpretor service utilized     Patient's most recent vital signs are:     Vital signs:  BP: 127/64  Temp: 97.6  HR: 90  RR: 16  SpO2: 98 %     Patient does not have new respiratory symptoms.  Patient does not have new sore throat.  Patient does not have a fever greater than 99.5.

## 2024-06-25 NOTE — PROGRESS NOTES
Care Coordination:     Writer met with patient and daughter Ilan on 6/24/24. Discussed need for tube feeding at discharge. Explained that referral was sent to [facility]- patient has 100 percent coverage through her insurance plan. Writer has reached out to [facility] Liasion Kimberli Burks to request further education for TF. Writer has not received an update on scheduled time as of today.     Arianne Bishop was able to review PEG education (minus pump education), and alejandra education. Daughter is familiar with how to care for alejandra- patient has had alejandra in the past.     Home Care referrals were sent to [facility]- Patient was declined from 12 home care agencies. Patient will need update on home care.     Ashely Farrell   Patient Care Management Coordinator  Acute Rehabilitation Unit/ Transitional Care Unit.   Ph: [phone]

## 2024-06-26 NOTE — DISCHARGE SUMMARY
[facility]  Internal Medicine Discharge Summary    Date of Admission: 6/18/2024  Date of Discharge: 6/27/2024  Discharging Provider: Viola William PA-C    Follow-ups Needed After Discharge    - Follow-up with primary care provider within 2 weeks   - Repeat BMP, CBC at PCP visit   - Referral placed for [facility]   - Scheduled to follow-up with [facility] on 8/2/24    Discharge Diagnoses   Physical deconditioning  Moderate malnutrition s/p PEG placement (6/13/24)  Recurrent sigmoid adenocarcinoma with bladder involvement s/p partial cystectomy and small bowel resection (1/9/24)  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 She was admitted to [facility] 5/29 - 6/18/24 with fatigue, failure to thrive, ALFREDA. Transferred to  TCU on 6/18/24 for physical rehabilitation.     Physical deconditioning: Progressed well with PT and OT. Today is discharging home with family support. Unfortunately patient was declined by numerous home care agencies for PT, OT, RN services.     Moderate malnutrition s/p PEG placement (6/13/24): Presented to OSH with fatigue, failure to thrive, decreased appetite in setting of complex cancer history and deconditioning. PEG placed by IR on 6/13 and tolerating TF. Passed swallow eval on 6/16 but not meeting caloric goals with PO intake alone.    - Continue cycled G-tube feedings: TwoCal HN at 45 mL/hr run 1600 to 0800   - Free water flushes 60 mL q6h    - Banatrol not covered by insurance. May need to switch to high fiber formulation per OP RD. Prescribed Psyllium on discharge.   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 Cameron in place for bladder decompression since 5/31. Urology performed CT cystogram with findings showing: anterior bladder wall defect with walled off air and fluid collection in low anterior pelvis. Per urology, no plans for decompression or drainage unless the patient clinically deteriorates.    - Continue Augmentin BID at least through time of Urology follow-up. Total course duration TBD by Urology.   - Cameron catheter to remain in place until outpatient follow-up with Urology   - Scheduled to follow-up with [facility] on 8/2/24    Infected left retroperitoneal hematoma s/p IR embolization and drain placement (3/9/24): Developed during prolonged hospitalization at [facility]. IR placed MARIE drain with multiple attempts at upsizing to drain all of the residual fluid collections. Completed several courses of antibiotics for treatment of infected hematoma, last finished Augmentin on 4/15/24. Last seen by ID as outpatient on 5/10, with plans to monitor off abx therapy. Here, CT AP on admission showed minimal residual fluid collection. She had the MARIE drain in place, but this was removed accidentally upon transfer to CT on 5/30.  [facility] IR team felt no utility in replacing drain.   - Continue Augmentin until time of Urology follow-up as above    HFrEF: Follows with [facility]. TTE 4/4 with EF 29%, regional WMA, moderate TR. Currently appears euvolemic.   - Continue PTA carvedilol 3.125 mg daily   - Consider Entresto vs valsartan as outpatient. Blood pressures currently do not allow for additional medication up-titration.    - Was scheduled with [facility] on 7/3 but patient prefers to transition Cardiology cares here. Referral placed.     CKD III: Creatinine stable within baseline range of ~1.4 -  "1.7. Continue sodium bicarbonate 650 mg TID.     Chronic normocytic anemia: Hgb stable in baseline range of ~8.5 - 10.     Diarrhea, resolved: Occurred after initiation of tube feedings. Resolved with initiation of Florastor and fiber supplementation.     Consultations This Stay   PHYSICAL THERAPY ADULT IP CONSULT  OCCUPATIONAL THERAPY ADULT IP CONSULT  PHARMACY IP CONSULT  NUTRITION SERVICES ADULT IP CONSULT    Physical Exam   Blood pressure 115/56, pulse 85, temperature 98.2  F (36.8  C), temperature source Oral, resp. rate 18, height 1.499 m (4' 11.02\"), weight 55 kg (121 lb 3.2 oz), SpO2 97%, not currently breastfeeding.    Constitutional: Awake and alert, in no apparent distress.   Eyes: Sclera clear, anicteric   Respiratory: Breathing non-labored. CTAB. Good air entry  Cardiovascular:  RRR, normal S1/S2. No rubs or murmurs. No peripheral edema.   GI: Soft, non-tender, non-distended. Normoactive bowel sounds. G-tube site intact, dressing c/d/i  Skin:  Good color. No jaundice. No visible rashes, lesions, or bruising of concern.   Neurologic: Alert and oriented.     Significant Results and Procedures   ROUTINE IP LABS (Last four results)  Recent Labs   Lab 06/24/24  1742 06/24/24  0803 06/24/24  0655 06/22/24  0848 06/21/24  0627 06/20/24  0641   NA  --   --  137  --  135 138   POTASSIUM  --   --  4.8  --  4.7 4.8   CHLORIDE  --   --  98  --  97* 99   CO2  --   --  26  --  26 27   ANIONGAP  --   --  13  --  12 12   * 153* 158* 132* 153* 160*   BUN  --   --  51.0*  --  43.6* 38.7*   CR  --   --  1.66*  --  1.58* 1.52*   DICKSON  --   --  9.4  --  9.3 9.2   MAG  --   --  2.1  --   --  1.7   PHOS  --   --  4.4  --   --  4.4   PROTTOTAL  --   --  7.4  --   --   --    ALBUMIN  --   --  3.3*  --   --   --    BILITOTAL  --   --  0.2  --   --   --    ALKPHOS  --   --  82  --   --   --    AST  --   --  27  --   --   --    ALT  --   --  17  --   --   --      Recent Nandi Proteins   Lab 06/24/24  0655 06/20/24  0641   WBC 7.6 5.4 "   RBC 3.62* 3.27*   HGB 11.0* 9.7*   HCT 33.9* 30.6*   MCV 94 94   MCH 30.4 29.7   MCHC 32.4 31.7   RDW 16.5* 17.2*    329     No lab results found in last 7 days.         Pending Results   None      Primary Care Physician   Jami Bundy    Discharge Disposition   Discharged to home  Condition at discharge: Stable    Code Status   Full Code    Discharge Procedure Orders   Home Infusion Referral   Referral Priority: Routine: Next available opening Referral Type: Consultation   Number of Visits Requested: 1     Adult Cardiology Eval  Referral   Standing Status: Future   Referral Priority: Routine: Next available opening Referral Type: CV Cardio consult   Requested Specialty: Cardiovascular Disease   Number of Visits Requested: 1     Tubes and drains   Order Comments: You are going home with the following tubes or drains: feeding tube G-Tube.   Tube cares per hospital or home care instructions     Reason for your hospital stay   Order Comments: Dear Myla Glynn,    You were admitted to the [facility] Trans[facility] Unit to work on your strength and mobility after your hospital stay. Today you are ready to be discharged home. You will be continuing tube feedings. Your alejandra catheter will remain in place until you see Urology in early August. You should should continue to improve but if you develop fever, shortness of breath, light headedness, chest pain or otherwise worsen please seek medical attention.    Take care!    Viola William PA-C   Hospitalist Service     Activity   Order Comments: Your activity upon discharge: activity as tolerated     Order Specific Question Answer Comments   Is discharge order? Yes      Tubes and drains   Order Comments: You are going home with the following tubes or drains: alejandra catheter.  Tube cares per hospital or home care instructions     Adult [facility] Follow-up and recommended labs and tests   Order Comments: Follow up with primary  care provider, Jami Bundy, within 2 weeks for hospital follow-up.    A referral was placed for you to establish with the [facility] team. A  should be contacting you in 1-2 business days to arrange your first appointment.    Follow-up with [facility] as scheduled in August.       Appointments on [place] and/or [facility] (with [facility] or [facility] provider or service). Call [phone] if you haven't heard regarding these appointments within 7 days of discharge.     Diet   Order Comments: Continue cycled G-tube feedings: TwoCal HN at 45 mL/hr run 1600 to 0800 with free water flushes 60 mL q4h.     Moderate consistent carbohydrate diet (60 gram CHO per meal).     Order Specific Question Answer Comments   Is discharge order? Yes         Discharge Medications   Current Discharge Medication List        START taking these medications    Details   diclofenac (VOLTAREN) 1 % topical gel Apply 2 g topically 4 times daily as needed for moderate pain  Qty: 150 g, Refills: 0    Associated Diagnoses: Adenocarcinoma, colon (H)      loperamide (IMODIUM A-D) 2 MG tablet Take 1 tablet (2 mg) by mouth 4 times daily as needed for diarrhea  Qty: 30 tablet, Refills: 0    Associated Diagnoses: Diarrhea, unspecified type      multivitamins w/minerals liquid 15 mLs by Oral or Feeding Tube route daily  Qty: 474 mL, Refills: 0    Associated Diagnoses: Moderate protein-calorie malnutrition (H24)      oxyBUTYnin (DITROPAN) 5 MG/5ML solution 2.5 mLs (2.5 mg) by Per Feeding Tube route 2 times daily  Qty: 473 mL, Refills: 0    Associated Diagnoses: Bladder mass      psyllium (METAMUCIL/KONSYL) 58.6 % powder Take 6 g (1 teaspoonful) by mouth daily  Qty: 283 g, Refills: 0    Associated Diagnoses: Diarrhea, unspecified type           CONTINUE these medications which have CHANGED    Details   acetaminophen (TYLENOL) 325 MG tablet 2 tablets (650 mg) by Oral or Feeding Tube route every 4 hours as needed for  mild pain or fever (greater than 101 degrees)  Qty: 30 tablet, Refills: 0    Associated Diagnoses: Adenocarcinoma, colon (H)      amoxicillin-clavulanate (AUGMENTIN) 500-125 MG tablet 1 tablet by Oral or Feeding Tube route every 12 hours for 39 days  Qty: 78 tablet, Refills: 0    Associated Diagnoses: Bladder mass      carvedilol (COREG) 3.125 MG tablet 1 tablet (3.125 mg) by Oral or Feeding Tube route 2 times daily (with meals)  Qty: 60 tablet, Refills: 0    Associated Diagnoses: Secondary hypertension      ondansetron (ZOFRAN) 4 MG tablet 1 tablet (4 mg) by Oral or Feeding Tube route every 8 hours  Qty: 90 tablet, Refills: 0    Associated Diagnoses: Malignant neoplasm of sigmoid colon (H)      pantoprazole (PROTONIX) 2 mg/mL SUSP suspension 20 mLs (40 mg) by Per Feeding Tube route every morning (before breakfast)  Qty: 600 mL, Refills: 0    Associated Diagnoses: Adenocarcinoma, colon (H)      rosuvastatin (CRESTOR) 5 MG tablet 1 tablet (5 mg) by Oral or Feeding Tube route at bedtime  Qty: 30 tablet, Refills: 0    Associated Diagnoses: Secondary hypertension      saccharomyces boulardii (FLORASTOR) 250 MG capsule 1 capsule (250 mg) by Oral or Feeding Tube route 2 times daily  Qty: 60 capsule, Refills: 0    Associated Diagnoses: Moderate protein-calorie malnutrition (H24)           STOP taking these medications       acetaminophen (TYLENOL) 325 MG/10.15ML liquid Comments:   Reason for Stopping:         acetaminophen (TYLENOL) 650 MG suppository Comments:   Reason for Stopping:         calcium carbonate (TUMS) 500 MG chewable tablet Comments:   Reason for Stopping:         HYDROmorphone (DILAUDID) 0.2 MG/ML SOLN injection' Comments:   Reason for Stopping:         HYDROmorphone, STANDARD CONC, (DILAUDID) 1 MG/ML oral solution Comments:   Reason for Stopping:         HYDROmorphone, STANDARD CONC, (DILAUDID) 1 MG/ML oral solution Comments:   Reason for Stopping:         multivitamin w/minerals (THERA-VIT-M) tablet  Comments:   Reason for Stopping:         oxyCODONE (ROXICODONE) 5 MG tablet Comments:   Reason for Stopping:         sodium bicarbonate 650 MG tablet Comments:   Reason for Stopping:               Patient seen and examined on 6/26/2024 in anticipation of discharge on 6/27/24.    Viola AGUDELO PA-C, personally saw the patient today and spent greater than 30 minutes discharging this patient.    Viola William PA-C  Hospitalist Service

## 2024-06-26 NOTE — PLAN OF CARE
Physical Therapy Discharge Summary    Reason for therapy discharge:    Pt going home, and her daughter, Ilan, will be assisting her.  Pt has a feeding tube and a alejandra catheter , and pt's daughter has been trained in assisting her.   Pt owns a fww for use at home, and family was going to purchase a wedge pillow and possibly a portable bed rail for use at home.   The Sw team here had tried to set up home care, but could not find an agency that would take this pt.  This writer spoke to pt's daughter about OP PT , and that our team could set that up, but pt's daughter stated she did not want OP PT at this time.  Pt's daughter was educated that she could always ask pt's primary care doctor for OP PT orders if they changed their minds and wanted OP PT, and Ilan verbalized that she understood this.      Progress towards therapy goal(s). [facility] for goal details.  Goals partially met.  Barriers to achieving goals:   Pt with LE weakness, needs A for LEs onto bed, otherwise she is supervision with a fww.  Pt needs A to place cathter on walker and  for managing feeding tube line if that is attached.  .    Therapy recommendation(s):    Continued therapy is recommended.  Rationale/Recommendations:  This writer had recommended PT, but home care is not available , and the pt and her daughter did not want to pursue outpatient PT at this time.  Pt owns a fww. .

## 2024-06-26 NOTE — PROGRESS NOTES
MAURO met with pt at bedside to complete discharge IRF questions.  present over the phone. Please see flowsheet for pt answers. Pt had no further questions for SW at this time.     Paradise Musa, MAURO  [facility]   [facility]   Covering TCU SW  TCU MAURO Ph: [phone]

## 2024-06-26 NOTE — PROGRESS NOTES
Home Infusion  Received referral from Ashely MORGAN for Enteral feedings.  Benefits verified.  Patient has [employer] and is covered 100%.  Met with pt and her daughter As af at bedside to review home infusion services, review benefits and offer choice of providers.  Patient would like to use [facility] for home infusion.  Confirmed discharge address, phone, and emergency contact information.  Pt is expected to discharge as soon as today and will be going home on enteral feeds via pump.  Pt and family has never done home enteral therapy before however she and her family have had some teaching by Enteral Nurse Liaison.    Provided daughter with information about [facility] services.  Explained about administration method via  Informed them about supplies and delivery of supplies, storage of formula, plan for SNV and 24/7 availability of [facility] staff while on enteral therapy.    Daughter verbalized understanding of all information given.They are willing and able to learn and manage home enteral therapy.  Patient expressed a desire to have the tube feedings are cycled..   Questions answered.  Will continue to follow and revisit pt once discharge plans are confirmed.    Thank you for the referral    .    Kimberli Burks LPN  Enteral Nurse Liaison  [facility]  [phone]  Main number [phone]    Discharge date: 06/27/24  Discharge therapies to be provided by [facility]: Enteral  Formula: Honey HangIt Peptide  Rate/Dose: 40m;/hr x 12 hours  Provider to manage enteral at home: Dr. Cee Bundy  Estimated length of need: 90 days or more  Education completed: Teaching done by bedside half way throgh the teaching daughter requested a nirse to the house taday to watcher set up, Informed our staffing,  Pt's Delivery/supplies: Delivery to home today, Supplies: 2x2 gauze, backpack, formula, y connector, flexi trak, 60ml syringe, medication syringes and med cups  Agency: NA  SNV: Family take care of pt  Notes: Met with pt and her daugter and went  over TF. This writer was half way through the teaching asked for a [facility] nurse to come out to the home to be there when they setting pt up for any questions. She stated due to language and daughter speak engish. Daughter was demo that she could the pump up and the ffeeding bag and pump.

## 2024-06-26 NOTE — PROGRESS NOTES
"CLINICAL NUTRITION SERVICES - REASSESSMENT NOTE     Nutrition Prescription    RECOMMENDATIONS FOR MDs/PROVIDERS TO ORDER:  None    Malnutrition Status:    Moderate malnutrition in the context of acute on chronic illness    Recommendations already ordered by Registered Dietitian (RD):  Continue current TF through discharge  Explained options for fiber supplementation at home    Future/Additional Recommendations:  Monitor labs, intakes, and weight trends.     EVALUATION OF THE PROGRESS TOWARD GOALS   Diet: Moderate Consistent Carbohydrate    Nutrition Support: TwoCal HN via G-tube @ 45 mL/hr x 16 hrs (4 pm-8 am) + 1 pkt banatrol TID provides 720 mL, 1575 kcal (29 kcal/kg), 71 g protein (1.3 g/kg), 185 g CHO, 15 g fiber, and 504 mL free water per DW of 54 kg.  Water Flushes: 60 mL q 4 hrs (TF + Flushes = 864 mL water; meeting 64% hydration needs).     Tolerance: Tolerating TF at goal rate per RN documentation and pt  Intake: 0% of documented meals.       NEW FINDINGS/REVIEW OF SYSTEMS    General: Pt to discharge tomorrow with TF, pt with coverage for TF and following with [facility].     Nutrition/GI: RD met with pt and daughter at bedside with pt's niece on the phone. Pt states she feels okay, sometimes has nausea/discomfort. Pt denies pain/difficulty chewing/swallowing. Discussed that [facility] does not provide banatrol. Discussed options for pt/family to order out of pocket such as Benefiber and Banatrol. Pt's daughter frustrated that nothing is covered at home and stated \"well she can just stay here\". Provider entered room during visit and offered to order fiber for pt as medication. Pt's daughter and niece appreciative of this. Discussed family and pt will get training on TF either prior to discharge or once home. Encouraged pt's family to call [facility] dietitian with questions or concerns.     Weights: Pt with limited weight history prior to admission, with 8 lb (6%) weight loss in < 3 months. Pt with 3.5 lb (3%) weight gain in 2 " weeks since admission to TCU.  06/24/24 55 kg (121 lb 3.2 oz)    06/19/24 53.7 kg (118 lb 6.2 oz)   06/12/24 53.4 kg (117 lb 11.6 oz)   06/09/24 53.5 kg (118 lb)    06/01/24 51.8 kg (114 lb 3.2 oz)   04/11/24 58.2 kg (128 lb 4.9 oz) - Care everywhere   04/05/24 58.5 kg (128 lb 15.5 oz) - Care everywhere   03/17/24 60.9 kg (134 lb 4.2 oz)    01/09/24 66.7 kg (147 lb 0.8 oz)   12/15/23 67.9 kg (149 lb 12.8 oz)       Labs reviewed   06/18/24 06:22 06/20/24 06:41 06/21/24 06:27 06/24/24 06:55   Sodium 137 138 135 137   Potassium 4.8 4.8 4.7 4.8   Urea Nitrogen 32.6 (H) 38.7 (H) 43.6 (H) 51.0 (H)   Creatinine 1.47 (H) 1.52 (H) 1.58 (H) 1.66 (H)   Magnesium 1.7 1.7  2.1   Phosphorus 4.0 4.4  4.4   Glucose 120 (H) 160 (H) 153 (H) 158 (H)     Medications reviewed: Banatrol TID, multivitamin with minerals, zofran, protonix, Florastor  IV Fluids over last 7 days:  No    MALNUTRITION  % Intake: Decreased intake does not meet criteria - EN  % Weight Loss: Weight loss does not meet criteria - difficult to assess due to lack of weight history  Subcutaneous Fat Loss: Facial/Jaw Region mild  Muscle Loss: Global moderate  Fluid Accumulation/Edema: Trace-mild  Malnutrition Diagnosis: Moderate malnutrition in the context of acute on chronic illness    Previous Goals   Total avg nutritional intake to meet a minimum of 25 kcal/kg and 1.2 g PRO/kg daily (per dosing wt 54 kg).  Evaluation: Met via TF    Previous Nutrition Diagnosis  Inadequate oral intake related to poor appetite as evidenced by patient report, documented intakes and pt requiring TF to meet 100% of needs at this time.     Evaluation: No change    CURRENT NUTRITION DIAGNOSIS  Inadequate oral intake related to poor appetite as evidenced by patient report, documented intakes and pt requiring TF to meet 100% of needs at this time.       INTERVENTIONS  Implementation   Enteral Nutrition - Continue as ordered through discharge   Nutrition education: available interventions at  discharge for fiber supplementation.   Collaboration with other providers: PA    Goals  Total avg nutritional intake to meet a minimum of 25 kcal/kg and 1.2 g PRO/kg daily (per dosing wt 54 kg).    Monitoring/Evaluation  Progress toward goals will be monitored and evaluated per protocol.    Inez Hernandez MS, RDN, LD  TCU/OB/Ortho Clinical Dietitian  Available via phone and Vocera  Phone: [phone]  Vocera: TCU Clinical Dietitian   Weekend/Holiday Vocera: Weekend Holiday Clinical Dietitian [Multi Site Groups]

## 2024-06-26 NOTE — PLAN OF CARE
Goal Outcome Evaluation:      Plan of Care Reviewed With: patient    Overall Patient Progress: no change    Pt alert, during HS medication administration daughter available on the phone for translation. Medication given via G tube, pt has nausea intermittently, on scheduled Zofran. Pt Bladder irrigated with 60 ml normal saline, urine red with small size clots, Cameron catheter positional, intact and draining well. Tube feeding running till 8 am , pt tolerated the new rate 45 ml /hr well. Pt was up to bed side commode SBA x1 . No acute issue call light within reach.continue with plan of care.        Patient's most recent vital signs are:     Vital signs:  BP: 124/54  Temp: 97.5  HR: 88  RR: 16  SpO2: 99 %     Patient does not have new respiratory symptoms.  Patient does not have new sore throat.  Patient does not have a fever greater than 99.5.

## 2024-06-26 NOTE — PLAN OF CARE
Goal Outcome Evaluation:  Plan of Care Reviewed With: patient  Overall Patient Progress: improving    Outcome Evaluation: No acute changes this shift. Pt planned for discharge tomorrow.    RN Shift: 0700 - 1930  Tigrinya speaking - Understands very little english    Vital Signs: Temp: 98.4  F (36.9  C) Temp src: Oral BP: 106/49 Pulse: 84   Resp: 18 SpO2: 100 % O2 Device: None (Room air)     CMS/Neuro: A&O x4      Cardio/Resp: No SOB or chest pain     Output: Continent of B/B - LBM: 6/26  Cameron catheter     Activity: SBA w/ walker and gait belt.      Skin: G tube  Old surgical incision on abdomen - CEDRIC     Pain: 3/10 to L thigh - Controlled w/ PRN Voltaren gel     Dressing: G tube - Changed today, Currently CDI     LDA: G tube - LLQ  Cameron catheter - Draining well, No irrigation needed this shift     Diet: Modified consistent carb diet, Poor appetite - Refusing all oral intake, Meds crushed through G tube.   TF running at 45 mL/hr w/ 60 mL free flush q4hr  >>> Started at 1700     Additional Info: Call light w/in reach and able to make needs known  Push medication and flushes slowly. Going fast causes N/V     Plan: Continue POC - Discharge planned for tomorrow       Leila Medina RN, BSN, PHN

## 2024-06-26 NOTE — DISCHARGE INSTRUCTIONS
Primary Care Provider  You are scheduled to see Dr. Nicki Duque on 7/2/2024 at 10:00am.    Address [address] [place] [address]    Phone   [phone]  Fax  [phone]    [facility];   You are scheduled to see Dr. Loredo on 8/2/24 at 2 pm.   Please Follow-up with [facility] for questions related to this appointment.    [facility]   Ph: [phone]  You are receiving tube feeding formula and supplies through [facility] infusion.

## 2024-06-27 NOTE — PLAN OF CARE
Reviewed discharge orders and After Visit Summary (AVS) with the patient. Medication orders were reviewed and instructions given as to the frequency to take each med, along with when last medication was given. Patient belongings sent with patient. Patient instructed to follow up with primary physican with any further questions they may have. Patient states they understand discharge orders as they are written and has no questions. Patient was discharged at  1230.

## 2024-06-27 NOTE — PROGRESS NOTES
Discharge Plan:     Date: 6/27/24    Location: Home w/ support from daughter     Caregiver Support After discharge: Jackie Talavera    Communicated discharge plans with: Daughter Ganete     Home Services or OP services (list services):   [facility]   Ph: [phone]  They will provide tube feeding supplies and formula. Per [facility]- RN will meet patient and daughter there for first teach tonight.     No Home Care- all options were exhausted. Updated on 6/26 by RNCC coverage.     Supplies and DME:   TF supplies and formula.     Education:   Cameron Cares, and TF education completed w/ [facility] and Floor Educator Arianne Bishop.    Medications:   Med rec and fill per provider    Care Coordination Hand off:   N/a    Follow ups:  PCP, Urology scheduled, Card referral made by provider- patient has follow-up scheduled at [place].     Ashely Farrell   Patient Care Management Coordinator  Acute Rehabilitation Unit/ Transitional Care Unit.   Ph: [phone]

## 2024-06-27 NOTE — PLAN OF CARE
Pt is A/O x4, take her pills through her G-tube. Cont of B/B and a alejandra. G- tube dressing changed, feeding runs from 4 pm-8 am. SBA with gait belt and walker. LBM 6/26, possible discharge today.

## 2024-07-11 NOTE — PROGRESS NOTES
06/19/24 1113   Appointment Info   Signing Clinician's Name / Credentials (OT) Olena Goldman OTR/L CBIS   Rehab Comments (OT) chart review on 6/18/24, eval completed 6/19,treatment initiated       Present yes  (vocera)   Language Tigrinya   Living Environment   People in Home child(smith), adult  (daughter Michele)   Current Living Arrangements apartment   Home Accessibility no concerns  (elevator)   Transportation Anticipated family or friend will provide   Living Environment Comments OT: pt unreliable  even w/ , per pt's daughter Michele pt was living alone until January 2024, then daughter  living w/ pt but unclear if it was at duaghter's home or pt's home, according to arazna at d/c from U daughter plans to live w/ pt at pt's apt to be avail for assist 24/7, reports pt's apt has elevator/accessible w/ no stairs to manage, bathroom has commode overlay over toilet, shower w/ shower bench and grabbar, pt was no using AD but owns FWW . daughter reports she will need training for TF prior to d/c,   Self-Care   Usual Activity Tolerance moderate   Current Activity Tolerance fair   Fall history within last six months no   Instrumental Activities of Daily Living (IADL)   IADL Comments family does all iadls   General Information   Onset of Illness/Injury or Date of Surgery 05/29/24   Referring Physician Fran Wylie M, MD   Patient/Family Therapy Goal Statement (OT) none stated, aranza stated she is  willing to provide as much help as her mom needs but mom is weaker so would like mom to get stronger w/ goal to return to previous level of activity daisha /indep   Additional Occupational Profile Info/Pertinent History of Current Problem per chart review:Myla Glynn is a 79 year old female admitted on 5/29/2024.  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   Existing Precautions/Restrictions fall   Limitations/Impairments safety/cognitive   General Observations and Info no documented restrictions/precautions   Cognitive Status Examination   Cognitive Status Comments oT: based on performance and use of  for communication pt appears to have impaired memory , address/assess PRN for d/c recommendaitons re: supervision/assist   Visual Perception   Visual Acuity per daughtr glasses for reading   Pain Assessment   Patient Currently in Pain   (no c/o pain during OT)   Posture   Posture forward head position   Range of Motion Comprehensive   Comment, General Range of Motion annamaria UE AROM <full sh flex but WFL   Strength Comprehensive (MMT)   Comment, General Manual Muscle Testing (MMT) Assessment generalized weakness, deconditioned   Bed Mobility   Comment (Bed Mobility) see gg codes   Transfers   Transfer Comments see gg codes   Activities of Daily Living   Additional Documentation   (see gg codes)   Clinical Impression   Criteria for Skilled Therapeutic Interventions Met (OT) Yes, treatment indicated   OT Diagnosis decreased indep w/adls/mob   OT Problem List-Impairments impacting ADL activity tolerance impaired;problems related to;balance;cognition;strength;pain   Assessment of  Occupational Performance 3-5 Performance Deficits   Identified Performance Deficits drg, toileting, bed mob, transfers, bathing   Planned Therapy Interventions (OT) ADL retraining;balance training;bed mobility training;cognition;strengthening;stretching;transfer training;progressive activity/exercise   Clinical Decision Making Complexity (OT) problem focused assessment/low complexity   Risk & Benefits of therapy have been explained evaluation/treatment results reviewed;care plan/treatment goals reviewed;risks/benefits reviewed;current/potential barriers reviewed;participants voiced agreement with care plan;participants included;patient;daughter   Clinical Impression Comments OT: pt presents w/ generalized weakness, decondtioned, per chart review, interview w/ pt and daughter , pt was living alone prior to jan 2024 but has decline in function since then, pt lives w/ daughter and daughter has been providing assist w/ all adls in recent months but daughter indicates she will cont to assist her mom as needed but reports mom currently functioning below baseline for mob and adls,   OT Total Evaluation Time   OT Eval, Low Complexity Minutes (99282) 15   Therapy Certification   Start of Care Date 06/19/24   Certification date from 06/19/24   Certification date to 07/31/24   OT Goals   Therapy Frequency (OT) 5 times/week   OT Predicted Duration/Target Date for Goal Attainment 06/24/24   OT Goals Hygiene/Grooming;Upper Body Dressing;Lower Body Dressing;Upper Body Bathing;Lower Body Bathing;Bed Mobility;Transfers;Toilet Transfer/Toileting   OT: Hygiene/Grooming modified independent   OT: Upper Body Dressing Modified independent   OT: Lower Body Dressing Minimal assist   OT: Upper Body Bathing Supervision/stand-by assist   OT: Lower Body Bathing Minimal assist;using adaptive equipment   OT: Bed Mobility Modified independent   OT: Transfer Modified independent;with assistive device   OT: Toilet Transfer/Toileting  Supervision/stand-by assist;using adaptive equipment   Self-Care/Home Management   Self-Care/Home Mgmt/ADL, Compensatory, Meal Prep Minutes (46279) 40   Treatment Detail/Skilled Intervention OT:educ pt on approach to adls/mob w/ TF and current level of function, pt requires encouragment to do as much for self as able, requests assist at higher level than needed, educ on AE options , discussion w/ daughter/pt regarding prior level of function and AE used PTA, see gg codes for details on function   OT Discharge Planning   OT Plan oT: preautions: falls, cog, cath and IV, needs : Dennis ,Rx: full body dressing  , toileting, bed mob , dynamic standing bal, shower/gg codes 6/20 (take w/c to shower, use bench )   OT Discharge Recommendation (DC Rec) home with assist   OT Brief overview of current status see clinical impressions   Total Session Time   Timed Code Treatment Minutes 40   Total Session Time (sum of timed and untimed services) 55   Post Acute Settings Only   What unit is patient on? TCU   OT- Transitional Care Unit Time   Individual Time (minutes) - OT 55   TCU Total Session Time (minutes) - OT 55   TCU Daily Total Session Time   OT TCU Daily Total Session Time 55   Rehab TCU Daily Total Session Time 101   Bed Mobility: Turning side to side/Roll Left and Right   Describe Performance OT: sba, bed rail   Bed Mobility: Sit to lying   Describe Performance OT: min A to manage LEs   Bed Mobility: Lying to sitting on the side of bed   Describe Performance OT cga , bed rail, cues for initiate   Transfers: Sit to Stand   Reason Not Done Resident refused to perform   Transfers: Chair/Bed transfers   Reason Not Done Resident refused to perform   Picking up Object - Ability to bend/stoop while standing.   Reason Not Done Safety concerns   Upper Body Dressing - Ability to dress/undress above the waist, including fasteners   Describe Performance OT: min A,   Lower Body Dressing - Ability to dress/undress below  the waist, includes fasteners   Reason Not Done Resident refused to perform   Lower Body Dressing (Putting On/Taking-Off Footwear)   Reason Not Done Resident refused to perform   Describe Performance oT: pt refused to attempt, depend to joseph/doff socks   Eating - Ability to use utensils to bring food/liquid to mouth.   Reason Not Done Resident refused to perform   Describe Performance OT: pt requested via  that OT remove breakfast tray and stated she did not want to eat it or keep anything from tray   Toileting Hygiene - Ability to maintain perineal hygiene and adjust clothes   Reason Not Done Resident refused to perform   Toilet transfer - Ability to get on and off a toilet or commode   Reason Not Done Resident refused to perform   Personal Hygiene - Ability to maintain personal hygiene (combing hair, shaving, apply makeup wash/dry face/hands.   Describe Performance oT: washed face w/ setup, decliend all other grooming   Oral Hygiene - Ability to use suitable items to clean teeth.   Describe Performance oT: setup

## 2024-07-23 NOTE — PATIENT INSTRUCTIONS
Cardiology Providers you saw during your visit:  Dr. Chacon     Medication changes:  1- No changes         Follow up:  1- Cardiac MRI with stress and contrast  2- Follow up with [facility] when available (approximately 1 month or later)   3- Follow up with Dr. Chacon after MRI   4- Re-establish with your primary care doctor   5- Follow up labs at end of this week if possible      Pre-procedure instructions - Cardiac MRI with Contrast, Stress  Patient Education    Your arrival time is TBD.  Location is [address]    How do I prepare for my exam? (Food and drink instructions)  Stop all caffeine 12 hours before the test. This includes coffee, tea, soda, chocolate and certain medicines (such as Anacin, Excedrin and NoDoz). Also avoid decaf coffee and tea, as these contain small amounts of caffeine.  You may drink water and take your morning medicines.  (Other Instructions)  You may need to stop some medicines before the test. Follow your doctor's orders.  You will need to arrive 1 hour early if you will be taking an anxiety medication for the test.     *2 days before:  Stop taking dipyridamole (Aggrenox, Persantine, Permole)  Stop taking Sildenafil (Viagra), Tadalafil (Cialis) and Vardenafil (Levitra).  If you are taking the medication for Pulmonary Hypertesnion DO NOT hold.    What Should I wear: The MRI machine uses a strong magnet. Due to increased risk of metallic materials/threading in clothing, for your safety, you will be requested to change into a hospital gown. Please remove any body piercings and hair or magnetic eyelash extensions before you arrive. You will also remove watches, jewelry, hairpins, wallets, dentures, partial dental plates and hearing aids. You may wear contact lenses, and you may be able to wear your rings. We have a safe place to keep your personal items, but it is safer to leave them at  home.     How long does the Exam Take: Most tests take up to 90 minutes.       What Should I Bring: If you have any implants please bring a card or surgical report with the implant information.  We may be unable to scan you if we do not have this information. Bring the results of similar scans you may have had previously. If you are a minor (under age 18) you will need to bring a parent or legal.     Do I need a : No     What Should I do after the Exam:  No restrictions, you may resume normal activities.     What is this test:  MRI (magnetic resonance imaging) uses a strong magnet and radio waves to look inside the body. An MRA (magnetic resonance angiogram) does the same thing, but it lets us look at your blood vessels. A computer turns the radio waves into pictures showing cross sections of the body, much like slices of bread. This helps us see any problems more clearly. You may receive fluid (called  contrast ) before or during your scan. The fluid helps us see the pictures better. We give the fluid through an IV (small needle in your arm).     Please call if you have:  1. Weight gain of more than 2 pounds in a day or 5 pounds in a week  2. Increased shortness of breath, swelling or bloating  3. Dizziness, lightheadedness   4. Any questions or concerns.        Follow the American Heart Association Diet and Lifestyle recommendations:  Limit saturated fat, trans fat, sodium, red meat, sweets and sugar-sweetened beverages. If you choose to eat red meat, compare labels and select the leanest cuts available.  Aim for at least 150 minutes of moderate physical activity or 75 minutes of vigorous physical activity - or an equal combination of both - each week.     During business hours: [phone], press option # 1 to schedule an appointment or send a message to your care team     After hours, weekends or holidays: On Call Cardiologist- [phone]   option #4 and ask to speak to the on-call Cardiologist. Inform  them you are a CORE/heart failure patient at the [facility].     Selma Light, RN BSN CHFN  Cardiology Nurse Care Coordinator (Heart Failure / C.O.R.E.)

## 2024-07-23 NOTE — NURSING NOTE
Chief Complaint   Patient presents with    New Patient     79 year old with systolic heart failure here for initial visit with labs prior       Vitals were taken, medications reconciled.    Paige Thurner, Facilitator   1:40 PM

## 2024-07-23 NOTE — PROGRESS NOTES
[facility]  Advanced Heart Failure Clinic    Patient Name: Myla Glynn   : 1945   Date of Visit: 2024    Primary Care Physician: Jami Bundy PA-C     Referring Physician: Jami Bundy PA-C     Reason for Visit: Cardiomyopathy    Dear Jami Bundy PA-C     I had the pleasure of seeing Myla Glynn today in the [facility] Advanced Heart Failure [facility]. As you know Myla Glynn is a pleasant 79 year old year old female, who presents today for further evaluation of heart failure.      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     During a medically complicated hospitalization in 2024 at [facility], she was found to have new onset HFrEF, EF 29% with significant mitral and tricuspid regurgitation.  Due to the medical complexity, no further evaluation was pursued but she was diuresed with noted improvement in mitral and tricuspid regurgitation.  She was started on carvedilol while other heart failure therapies were deferred in the setting of her hypotension, ALFREDA and urinary complications.  She was advised to undergo a cardiac MRI once more stable and follow-up with cardiology outpatient in [facility].  However prior to this being done, she was admitted to [facility]  - 24 with fatigue, failure to thrive, ALFREDA.  She has since canceled her cardiology follow-up at [facility] and has transferred her care to us.    Today she is here to establish heart failure care.  She is here with a family friend who provides interpretation.  She lives with her daughter who is her primary caregiver but was unable to come to this appointment.  The patient is unable to provide much details of her medical history and the information was pieced together through review of her  chart.    Today she reports feeling tired and is not able to do much physically.  Her primary complaint is that she has constipation and abdominal pain.  She denies chest pain, shortness of breath, PND/orthopnea, palpitations, lightheadedness, syncope, leg swelling.  Compliant with medications and notes no problems taking them.      Home BPs -not checking  Home HRs -not checking  Home weights -not checking    ROS:  A complete 10-point ROS was negative except as above.    PAST MEDICAL HISTORY:  Past Medical History:   Diagnosis Date    Anemia     Bladder mass     Controlled type 2 diabetes mellitus without complication, without long-term current use of insulin (H)     Gastroesophageal reflux disease     HTN (hypertension)     Ileostomy status (H)     Malignant neoplasm of sigmoid colon (H)     Renal insufficiency        PAST SURGICAL HISTORY:  Past Surgical History:   Procedure Laterality Date    COLONOSCOPY      CYSTECTOMY BLADDER RADICAL, ILEAL DIVERSION, COMBINED N/A 1/5/2021    Procedure: Partial Cystectomy, Cystourethroscopy,;  Surgeon: Angel Newsome MD;  Location: [facility] OR    HYSTERECTOMY TOTAL ABDOMINAL  1/5/2021    Procedure: Hysterectomy total abdominal;  Surgeon: Jimy Jackson MD;  Location: [facility] OR    IR GASTROSTOMY TUBE PERCUTANEOUS PLCMNT  6/13/2024    LAPAROSCOPY OPERATIVE ADULT  1/5/2021    Procedure: Laparoscopy Operative Adult, takedown of splenic flexure, appendectomy;  Surgeon: Remi Moscoso MD;  Location: [facility] OR    SALPINGO-OOPHORECTOMY BILATERAL  1/5/2021    Procedure: Salpingo-oophorectomy bilateral;  Surgeon: Jimy Jackson MD;  Location: [facility] OR    SIGMOIDOSCOPY FLEXIBLE N/A 1/5/2021    Procedure: SIGMOIDOSCOPY, FLEXIBLE;  Surgeon: Remi Moscoso MD;  Location: [facility] OR    TAKEDOWN ILEOSTOMY N/A 5/4/2021    Procedure: ILEOSTOMY CLOSURE, LYSIS OF ADHESION;  Surgeon: Remi Moscoso MD;  Location: [facility] OR       FAMILY HISTORY:  Family  History   Problem Relation Age of Onset    No Known Problems Mother     No Known Problems Father     No Known Problems Sister     No Known Problems Brother        SOCIAL HISTORY:  Social History     Socioeconomic History    Marital status:      Spouse name: None    Number of children: None    Years of education: None    Highest education level: None   Tobacco Use    Smoking status: Never    Smokeless tobacco: Never   Substance and Sexual Activity    Alcohol use: Not Currently    Drug use: Not Currently     Social Determinants of Health     Financial Resource Strain: Low Risk  (6/20/2023)    [facility]    Financial Resource Strain     Difficulty of Paying Living Expenses: 3   Food Insecurity: No Food Insecurity (2/22/2024)    Received from [facility]    Hunger Vital Sign     Worried About Running Out of Food in the Last Year: Never true     Ran Out of Food in the Last Year: Never true   Transportation Needs: No Transportation Needs (2/22/2024)    Received from [facility]    PRAPARE - Transportation     Lack of Transportation (Medical): No     Lack of Transportation (Non-Medical): No   Recent Concern: Transportation Needs - Unmet Transportation Needs (2/1/2024)    [facility]     Lack of Transportation (Medical): Yes     Lack of Transportation (Non-Medical): Yes   Physical Activity: Inactive (2/1/2024)    Received from [facility]    Exercise Vital Sign     Days of Exercise per Week: 0 days     Minutes of Exercise per Session: 0 min    Received from [facility]    Social Connections   Interpersonal Safety: Not At Risk (2/22/2024)    Received from [facility]    Humiliation, Afraid, Rape, and Kick questionnaire     Fear of Current or Ex-Partner: No     Emotionally Abused: No     Physically  Abused: No     Sexually Abused: No   Housing Stability: Low Risk  (2/22/2024)    [facility]    Housing Stability     What is your living situation today?: I have a steady place to live       MEDICATIONS:  Current Outpatient Medications   Medication Sig Dispense Refill    acetaminophen (TYLENOL) 325 MG tablet 2 tablets (650 mg) by Oral or Feeding Tube route every 4 hours as needed for mild pain or fever (greater than 101 degrees) 30 tablet 0    amoxicillin-clavulanate (AUGMENTIN) 500-125 MG tablet 1 tablet by Oral or Feeding Tube route every 12 hours for 39 days 78 tablet 0    carvedilol (COREG) 3.125 MG tablet 1 tablet (3.125 mg) by Oral or Feeding Tube route 2 times daily (with meals) 60 tablet 0    diclofenac (VOLTAREN) 1 % topical gel Apply 2 g topically 4 times daily as needed for moderate pain 150 g 0    loperamide (IMODIUM A-D) 2 MG tablet Take 1 tablet (2 mg) by mouth 4 times daily as needed for diarrhea 30 tablet 0    multivitamins w/minerals liquid 15 mLs by Oral or Feeding Tube route daily 474 mL 0    ondansetron (ZOFRAN) 4 MG tablet 1 tablet (4 mg) by Oral or Feeding Tube route every 8 hours 90 tablet 0    oxyBUTYnin (DITROPAN) 5 MG/5ML solution 2.5 mLs (2.5 mg) by Per Feeding Tube route 2 times daily 473 mL 0    pantoprazole (PROTONIX) 2 mg/mL SUSP suspension 20 mLs (40 mg) by Per Feeding Tube route every morning (before breakfast) 600 mL 0    psyllium (METAMUCIL/KONSYL) 58.6 % powder Take 6 g (1 teaspoonful) by mouth daily 283 g 0    rosuvastatin (CRESTOR) 5 MG tablet 1 tablet (5 mg) by Oral or Feeding Tube route at bedtime 30 tablet 0    saccharomyces boulardii (FLORASTOR) 250 MG capsule 1 capsule (250 mg) by Oral or Feeding Tube route 2 times daily 60 capsule 0     No current facility-administered medications for this visit.        ALLERGIES:   No Known Allergies    PHYSICAL EXAM:  /68 (BP Location: Right arm, Patient Position: Chair, Cuff Size: Adult Regular)    Pulse 78   Wt 53.1 kg (117 lb)   SpO2 100%   BMI 23.62 kg/m    Gen: alert, interactive, NAD  Neck: supple, no JVD  CV: RRR, MR/TR murmur  Chest: CTAB, no wheezes or crackles  Ext: no LE edema    LABS:    CMP  Last Comprehensive Metabolic Panel:  Sodium   Date Value Ref Range Status   07/23/2024 134 (L) 135 - 145 mmol/L Final   06/07/2021 141 133 - 144 mmol/L Final     Potassium   Date Value Ref Range Status   07/23/2024 5.0 3.4 - 5.3 mmol/L Final   12/13/2021 4.2 3.4 - 5.3 mmol/L Final   06/07/2021 3.9 3.4 - 5.3 mmol/L Final     Chloride   Date Value Ref Range Status   07/23/2024 98 98 - 107 mmol/L Final   12/13/2021 109 94 - 109 mmol/L Final   06/07/2021 110 (H) 94 - 109 mmol/L Final     Carbon Dioxide   Date Value Ref Range Status   06/07/2021 22 20 - 32 mmol/L Final     Carbon Dioxide (CO2)   Date Value Ref Range Status   07/23/2024 22 22 - 29 mmol/L Final   12/13/2021 25 20 - 32 mmol/L Final     Anion Gap   Date Value Ref Range Status   07/23/2024 14 7 - 15 mmol/L Final   12/13/2021 8 3 - 14 mmol/L Final   06/07/2021 8 3 - 14 mmol/L Final     Glucose   Date Value Ref Range Status   07/23/2024 146 (H) 70 - 99 mg/dL Final   12/13/2021 98 70 - 99 mg/dL Final   06/07/2021 90 70 - 99 mg/dL Final     GLUCOSE BY METER POCT   Date Value Ref Range Status   06/27/2024 202 (H) 70 - 99 mg/dL Final     Urea Nitrogen   Date Value Ref Range Status   07/23/2024 73.5 (H) 8.0 - 23.0 mg/dL Final   12/13/2021 27 7 - 30 mg/dL Final   06/07/2021 25 7 - 30 mg/dL Final     Creatinine   Date Value Ref Range Status   07/23/2024 2.04 (H) 0.51 - 0.95 mg/dL Final   06/07/2021 1.30 (H) 0.52 - 1.04 mg/dL Final     GFR Estimate   Date Value Ref Range Status   07/23/2024 25 (L) >60 mL/min/1.73m2 Final     Comment:     eGFR calculated using 2021 CKD-EPI equation.   06/07/2021 40 (L) >60 mL/min/[1.73_m2] Final     Comment:     Non  GFR Calc  Starting 12/18/2018, serum creatinine based estimated GFR (eGFR) will be   calculated  using the Chronic Kidney Disease Epidemiology Collaboration   (CKD-EPI) equation.       GFR, ESTIMATED POCT   Date Value Ref Range Status   11/17/2021 37 (L) >60 mL/min/1.73m2 Final     Calcium   Date Value Ref Range Status   07/23/2024 10.0 8.8 - 10.4 mg/dL Final     Comment:     Reference intervals for this test were updated on 7/16/2024 to reflect our healthy population more accurately. There may be differences in the flagging of prior results with similar values performed with this method. Those prior results can be interpreted in the context of the updated reference intervals.   06/07/2021 9.1 8.5 - 10.1 mg/dL Final     Bilirubin Total   Date Value Ref Range Status   06/24/2024 0.2 <=1.2 mg/dL Final   06/07/2021 0.3 0.2 - 1.3 mg/dL Final     Alkaline Phosphatase   Date Value Ref Range Status   06/24/2024 82 40 - 150 U/L Final   06/07/2021 94 40 - 150 U/L Final     ALT   Date Value Ref Range Status   06/24/2024 17 0 - 50 U/L Final     Comment:     Reference intervals for this test were updated on 6/12/2023 to more accurately reflect our healthy population. There may be differences in the flagging of prior results with similar values performed with this method. Interpretation of those prior results can be made in the context of the updated reference intervals.     06/07/2021 26 0 - 50 U/L Final     AST   Date Value Ref Range Status   06/24/2024 27 0 - 45 U/L Final     Comment:     Reference intervals for this test were updated on 6/12/2023 to more accurately reflect our healthy population. There may be differences in the flagging of prior results with similar values performed with this method. Interpretation of those prior results can be made in the context of the updated reference intervals.   06/07/2021 20 0 - 45 U/L Final       NT-proBNP       TROP  Lab Results   Component Value Date    TROPI 0.051 (H) 01/06/2021    TROPI 0.019 01/05/2021       CBC  CBC RESULTS:   Recent Labs   Lab Test 06/24/24  0655   WBC  "7.6   RBC 3.62*   HGB 11.0*   HCT 33.9*   MCV 94   MCH 30.4   MCHC 32.4   RDW 16.5*          LIPIDS  No results for input(s): \"CHOL\", \"HDL\", \"LDL\", \"TRIG\", \"CHOLHDLRATIO\" in the last 18738 hours.    TSH  TSH   Date Value Ref Range Status   02/23/2021 0.54 0.40 - 4.00 mU/L Final       HBA1C  No results found for: \"A1C\"      CARDIAC DATA:    EKG:  May 2024: HR 67 bpm, sinus rhythm, Nonspecific ST abnormality Abnormal ECG    Echo:  March 2024, [facility]  Final Impressions   1. Mildly enlarged left ventricular chamber size, regional wall motion abnormalities were present (see wall motion graphics), calculated 2-D biplane volumetric ejection fraction of 28%.   2. Moderate mitral valve regurgitation. Eccentric jet, posteriorly directed. PISA calculation could be underestimated.   3. Mild-moderately enlarged right ventricular chamber size, moderately reduced systolic function, estimated right ventricular systolic pressure 53 mmHg (right atrial pressure of 20 mmHg).   4. Severe tricuspid valve regurgitation, ERO (PISA) 0.61 cm2, regurgitant volume (PISA) 44 ml.   5. Incomplete tricuspid valve coaptation. visualized in view 104.   6. Enlarged inferior vena cava size with no inspiratory collapse.   7. No  pericardial effusion.   8. There are no previous [facility] echocardiograms available for comparison.     April 2024, [facility]  Final Impressions   1. Mildly enlarged left ventricular chamber size, regional wall motion abnormalities were present (see wall motion graphics), calculated 2-D linear ejection fraction 29%.   2. Mildly enlarged right ventricular chamber size, mildly reduced systolic function, estimated right ventricular systolic pressure 44 mmHg (right atrial pressure of 10 mmHg).   3. Grade 1a/3 left ventricular diastolic dysfunction, consistent with mildly elevated left ventricular filling pressure.   4. Mild mitral valve regurgitation.   5. Mild-moderate tricuspid valve regurgitation.   6. No  " pericardial effusion.   7. Compared to the report of 03/29/2024 the following changes have occurred: the mitral and tricuspid regurgitation have lessened, and the right ventricular size and function have slightly improved.  Side by side comparison of images performed.       Stress Test:  None available    Cardiac MRI:  None available, ordered today    Cardiac Catheterization:  None available      ASSESSMENT/PLAN:  In summary, Myla Glynn is here today with the following -      1.  Cardiomyopathy, ACC/AHA stage C, NYHA class cannot be ascertained, LVEF 29%, unknown etiology  2. Coronary artery calcification, mild in the mid LAD territory (per notes from [facility])  3. Hypertension  4.  Hyperlipidemia, on Crestor 5 mg once a day  5.  CKD stage III   6. 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  Malnutrition status post PEG placement  8. Physical deconditioning    Plans:    New onset cardiomyopathy, unclear etiology. Would plan for cardiac MRI with stress and contrast      Goal Directed Medical Therapies for Heart Failure:     These are indicated irrespective of the etiology of heart failure.  We discussed the primary medications, their side effects, the care plan including frequent follow-ups  for optimization of therapies with monitoring of blood pressures, weights and lab results       Beta blocker: On carvedilol 3.125 mg twice a day, continue for now  ACE/ARB/ARNI: Not on this due to CKD  MRA: Not on this due to CKD  SGLT2 inhibitor: Not on this due to CKD    Diuretics: hypovolemic, not on diuretics now  Cardiac Rehab: She would benefit from this but is currently not eager to go to this because of limited options for transportation and her struggles with her abdominal issues  ICD/ CRT-D: Can be considered if EF remains low despite maximal medical therapies and based on  her overall prognosis from her comorbidities  Labs: Check BMP later this week and again at follow-up  Follow up: [facility] clinic and see me after CMR  CV Genetic testing: Can discuss this in the future based on results of MRI    Advised on daily home BP and weight monitoring  Advised on observing caution with salt intake    ALFREDA on CKD: likely dehydrated. Discussed options including going to ER for hydration and follow up but she wants to try to increase hydration at home. Will plan to repeat labs later this week. Will also refer to nephrology    Abdominal pain and constipation: she will follow up with oncology and PCP    I spent 74 minutes in care of the patient today including obtaining recent medical history, personally reviewing recent cardiac testing and/or lab results, today's examination, discussion of testing results and care recommendations with patient.       Thank you for the opportunity to participate in this patient's care. Please feel free to reach out with any questions or concerns.      Kenji Chacon MD, FACC  Associate Professor of Medicine  Advanced Heart Failure, LVAD & Cardiac Transplant  Director, Cardio-Obstetrics  Cardio-Oncology  [facility]

## 2024-07-23 NOTE — NURSING NOTE
Diet: Patient instructed regarding a heart healthy diet, including discussion of reduced fat and sodium intake. Patient demonstrated understanding of this information and agreed to call with further questions or concerns.  Labs: Patient was given results of the laboratory testing obtained today. Patient was instructed to return for the next laboratory testing in a couple of days. Patient demonstrated understanding of this information and agreed to call with further questions or concerns.   Return Appointment: Patient given instructions regarding scheduling next clinic visit. Patient demonstrated understanding of this information and agreed to call with further questions or concerns.  CORE when available (likely 4-6 weeks)  Cardiac MRI with stress and contrast. Follow up with Dr. Chacon after MRI    Patient stated she understood all health information given and agreed to call with further questions or concerns.

## 2024-07-23 NOTE — LETTER
2024      RE: Myla Glynn  [address]  [place] [address]       Dear Colleague,    Thank you for the opportunity to participate in the care of your patient, Myla Glynn, at the [facility] HEART [facility]. Please see a copy of my visit note below.    [facility]  Advanced Heart Failure Clinic    Patient Name: Myla Glynn   : 1945   Date of Visit: 2024    Primary Care Physician: Jami Bundy PA-C     Referring Physician: Jami Bundy PA-C     Reason for Visit: Cardiomyopathy    Dear Jami Bundy PA-C     I had the pleasure of seeing Myla Glnyn today in the [facility] Advanced [facility] Failure [facility]. As you know Myla Glynn is a pleasant 79 year old year old female, who presents today for further evaluation of heart failure.      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     During a medically complicated hospitalization in 2024 at [facility], she was found to have new onset HFrEF, EF 29% with significant mitral and tricuspid regurgitation.  Due to the medical complexity, no further evaluation was pursued but she was diuresed with noted improvement in mitral and tricuspid regurgitation.  She was started on carvedilol while other heart failure therapies were deferred in the setting of her hypotension, ALFREDA and urinary complications.  She was advised to undergo a cardiac MRI once more stable and follow-up with cardiology outpatient in [facility].  However prior to this being done, she was admitted to [facility]  - 24 with fatigue, failure to thrive, ALFREDA.  She has since canceled her cardiology follow-up at [facility] and has transferred her care  to us.    Today she is here to establish heart failure care.  She is here with a family friend who provides interpretation.  She lives with her daughter who is her primary caregiver but was unable to come to this appointment.  The patient is unable to provide much details of her medical history and the information was pieced together through review of her chart.    Today she reports feeling tired and is not able to do much physically.  Her primary complaint is that she has constipation and abdominal pain.  She denies chest pain, shortness of breath, PND/orthopnea, palpitations, lightheadedness, syncope, leg swelling.  Compliant with medications and notes no problems taking them.      Home BPs -not checking  Home HRs -not checking  Home weights -not checking    ROS:  A complete 10-point ROS was negative except as above.    PAST MEDICAL HISTORY:  Past Medical History:   Diagnosis Date     Anemia      Bladder mass      Controlled type 2 diabetes mellitus without complication, without long-term current use of insulin (H)      Gastroesophageal reflux disease      HTN (hypertension)      Ileostomy status (H)      Malignant neoplasm of sigmoid colon (H)      Renal insufficiency        PAST SURGICAL HISTORY:  Past Surgical History:   Procedure Laterality Date     COLONOSCOPY       CYSTECTOMY BLADDER RADICAL, ILEAL DIVERSION, COMBINED N/A 1/5/2021    Procedure: Partial Cystectomy, Cystourethroscopy,;  Surgeon: Angel Newsome MD;  Location: [facility] OR     HYSTERECTOMY TOTAL ABDOMINAL  1/5/2021    Procedure: Hysterectomy total abdominal;  Surgeon: Jimy Jackson MD;  Location: [facility] OR     IR GASTROSTOMY TUBE PERCUTANEOUS PLCMNT  6/13/2024     LAPAROSCOPY OPERATIVE ADULT  1/5/2021    Procedure: Laparoscopy Operative Adult, takedown of splenic flexure, appendectomy;  Surgeon: Remi Moscoso MD;  Location: [facility] OR     SALPINGO-OOPHORECTOMY BILATERAL  1/5/2021    Procedure: Salpingo-oophorectomy  bilateral;  Surgeon: Jimy Jackson MD;  Location: [facility] OR     SIGMOIDOSCOPY FLEXIBLE N/A 1/5/2021    Procedure: SIGMOIDOSCOPY, FLEXIBLE;  Surgeon: Remi Moscoso MD;  Location: [facility] OR     TAKEDOWN ILEOSTOMY N/A 5/4/2021    Procedure: ILEOSTOMY CLOSURE, LYSIS OF ADHESION;  Surgeon: Remi Moscoso MD;  Location: [facility] OR       FAMILY HISTORY:  Family History   Problem Relation Age of Onset     No Known Problems Mother      No Known Problems Father      No Known Problems Sister      No Known Problems Brother        SOCIAL HISTORY:  Social History     Socioeconomic History     Marital status:      Spouse name: None     Number of children: None     Years of education: None     Highest education level: None   Tobacco Use     Smoking status: Never     Smokeless tobacco: Never   Substance and Sexual Activity     Alcohol use: Not Currently     Drug use: Not Currently     Social Determinants of Health     Financial Resource Strain: Low Risk  (6/20/2023)    Received from [facility], [facility] [facility]    Financial Resource Strain      Difficulty of Paying Living Expenses: 3   Food Insecurity: No Food Insecurity (2/22/2024)    Received from [facility]    Hunger Vital Sign      Worried About Running Out of Food in the Last Year: Never true      Ran Out of Food in the Last Year: Never true   Transportation Needs: No Transportation Needs (2/22/2024)    Received from [facility]    [facility] - Transportation      Lack of Transportation (Medical): No      Lack of Transportation (Non-Medical): No   Recent Concern: Transportation Needs - Unmet Transportation Needs (2/1/2024)    Received from [facility] - Transportation      Lack of Transportation (Medical): Yes      Lack of Transportation (Non-Medical): Yes   Physical Activity: Inactive (2/1/2024)    Received from [facility]    Exercise  Vital Sign      Days of Exercise per Week: 0 days      Minutes of Exercise per Session: 0 min    Received from [facility] [employer] [facility]    Social Connections   Interpersonal Safety: Not At Risk (2/22/2024)    Received from [facility]    Humiliation, Afraid, Rape, and Kick questionnaire      Fear of Current or Ex-Partner: No      Emotionally Abused: No      Physically Abused: No      Sexually Abused: No   Housing Stability: Low Risk  (2/22/2024)    Received from [facility]    Housing Stability      What is your living situation today?: I have a steady place to live       MEDICATIONS:  Current Outpatient Medications   Medication Sig Dispense Refill     acetaminophen (TYLENOL) 325 MG tablet 2 tablets (650 mg) by Oral or Feeding Tube route every 4 hours as needed for mild pain or fever (greater than 101 degrees) 30 tablet 0     amoxicillin-clavulanate (AUGMENTIN) 500-125 MG tablet 1 tablet by Oral or Feeding Tube route every 12 hours for 39 days 78 tablet 0     carvedilol (COREG) 3.125 MG tablet 1 tablet (3.125 mg) by Oral or Feeding Tube route 2 times daily (with meals) 60 tablet 0     diclofenac (VOLTAREN) 1 % topical gel Apply 2 g topically 4 times daily as needed for moderate pain 150 g 0     loperamide (IMODIUM A-D) 2 MG tablet Take 1 tablet (2 mg) by mouth 4 times daily as needed for diarrhea 30 tablet 0     multivitamins w/minerals liquid 15 mLs by Oral or Feeding Tube route daily 474 mL 0     ondansetron (ZOFRAN) 4 MG tablet 1 tablet (4 mg) by Oral or Feeding Tube route every 8 hours 90 tablet 0     oxyBUTYnin (DITROPAN) 5 MG/5ML solution 2.5 mLs (2.5 mg) by Per Feeding Tube route 2 times daily 473 mL 0     pantoprazole (PROTONIX) 2 mg/mL SUSP suspension 20 mLs (40 mg) by Per Feeding Tube route every morning (before breakfast) 600 mL 0     psyllium (METAMUCIL/KONSYL) 58.6 % powder Take 6 g (1 teaspoonful) by mouth daily 283 g 0     rosuvastatin (CRESTOR) 5 MG  tablet 1 tablet (5 mg) by Oral or Feeding Tube route at bedtime 30 tablet 0     saccharomyces boulardii (FLORASTOR) 250 MG capsule 1 capsule (250 mg) by Oral or Feeding Tube route 2 times daily 60 capsule 0     No current facility-administered medications for this visit.        ALLERGIES:   No Known Allergies    PHYSICAL EXAM:  /68 (BP Location: Right arm, Patient Position: Chair, Cuff Size: Adult Regular)   Pulse 78   Wt 53.1 kg (117 lb)   SpO2 100%   BMI 23.62 kg/m    Gen: alert, interactive, NAD  Neck: supple, no JVD  CV: RRR, MR/TR murmur  Chest: CTAB, no wheezes or crackles  Ext: no LE edema    LABS:    CMP  Last Comprehensive Metabolic Panel:  Sodium   Date Value Ref Range Status   07/23/2024 134 (L) 135 - 145 mmol/L Final   06/07/2021 141 133 - 144 mmol/L Final     Potassium   Date Value Ref Range Status   07/23/2024 5.0 3.4 - 5.3 mmol/L Final   12/13/2021 4.2 3.4 - 5.3 mmol/L Final   06/07/2021 3.9 3.4 - 5.3 mmol/L Final     Chloride   Date Value Ref Range Status   07/23/2024 98 98 - 107 mmol/L Final   12/13/2021 109 94 - 109 mmol/L Final   06/07/2021 110 (H) 94 - 109 mmol/L Final     Carbon Dioxide   Date Value Ref Range Status   06/07/2021 22 20 - 32 mmol/L Final     Carbon Dioxide (CO2)   Date Value Ref Range Status   07/23/2024 22 22 - 29 mmol/L Final   12/13/2021 25 20 - 32 mmol/L Final     Anion Gap   Date Value Ref Range Status   07/23/2024 14 7 - 15 mmol/L Final   12/13/2021 8 3 - 14 mmol/L Final   06/07/2021 8 3 - 14 mmol/L Final     Glucose   Date Value Ref Range Status   07/23/2024 146 (H) 70 - 99 mg/dL Final   12/13/2021 98 70 - 99 mg/dL Final   06/07/2021 90 70 - 99 mg/dL Final     GLUCOSE BY METER POCT   Date Value Ref Range Status   06/27/2024 202 (H) 70 - 99 mg/dL Final     Urea Nitrogen   Date Value Ref Range Status   07/23/2024 73.5 (H) 8.0 - 23.0 mg/dL Final   12/13/2021 27 7 - 30 mg/dL Final   06/07/2021 25 7 - 30 mg/dL Final     Creatinine   Date Value Ref Range Status    07/23/2024 2.04 (H) 0.51 - 0.95 mg/dL Final   06/07/2021 1.30 (H) 0.52 - 1.04 mg/dL Final     GFR Estimate   Date Value Ref Range Status   07/23/2024 25 (L) >60 mL/min/1.73m2 Final     Comment:     eGFR calculated using 2021 CKD-EPI equation.   06/07/2021 40 (L) >60 mL/min/[1.73_m2] Final     Comment:     Non  GFR Calc  Starting 12/18/2018, serum creatinine based estimated GFR (eGFR) will be   calculated using the Chronic Kidney Disease Epidemiology Collaboration   (CKD-EPI) equation.       GFR, ESTIMATED POCT   Date Value Ref Range Status   11/17/2021 37 (L) >60 mL/min/1.73m2 Final     Calcium   Date Value Ref Range Status   07/23/2024 10.0 8.8 - 10.4 mg/dL Final     Comment:     Reference intervals for this test were updated on 7/16/2024 to reflect our healthy population more accurately. There may be differences in the flagging of prior results with similar values performed with this method. Those prior results can be interpreted in the context of the updated reference intervals.   06/07/2021 9.1 8.5 - 10.1 mg/dL Final     Bilirubin Total   Date Value Ref Range Status   06/24/2024 0.2 <=1.2 mg/dL Final   06/07/2021 0.3 0.2 - 1.3 mg/dL Final     Alkaline Phosphatase   Date Value Ref Range Status   06/24/2024 82 40 - 150 U/L Final   06/07/2021 94 40 - 150 U/L Final     ALT   Date Value Ref Range Status   06/24/2024 17 0 - 50 U/L Final     Comment:     Reference intervals for this test were updated on 6/12/2023 to more accurately reflect our healthy population. There may be differences in the flagging of prior results with similar values performed with this method. Interpretation of those prior results can be made in the context of the updated reference intervals.     06/07/2021 26 0 - 50 U/L Final     AST   Date Value Ref Range Status   06/24/2024 27 0 - 45 U/L Final     Comment:     Reference intervals for this test were updated on 6/12/2023 to more accurately reflect our healthy population. There  "may be differences in the flagging of prior results with similar values performed with this method. Interpretation of those prior results can be made in the context of the updated reference intervals.   06/07/2021 20 0 - 45 U/L Final       NT-proBNP       TROP  Lab Results   Component Value Date    TROPI 0.051 (H) 01/06/2021    TROPI 0.019 01/05/2021       CBC  CBC RESULTS:   Recent Labs   Lab Test 06/24/24  0655   WBC 7.6   RBC 3.62*   HGB 11.0*   HCT 33.9*   MCV 94   MCH 30.4   MCHC 32.4   RDW 16.5*          LIPIDS  No results for input(s): \"CHOL\", \"HDL\", \"LDL\", \"TRIG\", \"CHOLHDLRATIO\" in the last 21104 hours.    TSH  TSH   Date Value Ref Range Status   02/23/2021 0.54 0.40 - 4.00 mU/L Final       HBA1C  No results found for: \"A1C\"      CARDIAC DATA:    EKG:  May 2024: HR 67 bpm, sinus rhythm, Nonspecific ST abnormality Abnormal ECG    Echo:  March 2024, [facility]  Final Impressions   1. Mildly enlarged left ventricular chamber size, regional wall motion abnormalities were present (see wall motion graphics), calculated 2-D biplane volumetric ejection fraction of 28%.   2. Moderate mitral valve regurgitation. Eccentric jet, posteriorly directed. PISA calculation could be underestimated.   3. Mild-moderately enlarged right ventricular chamber size, moderately reduced systolic function, estimated right ventricular systolic pressure 53 mmHg (right atrial pressure of 20 mmHg).   4. Severe tricuspid valve regurgitation, ERO (PISA) 0.61 cm2, regurgitant volume (PISA) 44 ml.   5. Incomplete tricuspid valve coaptation. visualized in view 104.   6. Enlarged inferior vena cava size with no inspiratory collapse.   7. No  pericardial effusion.   8. There are no previous [facility] echocardiograms available for comparison.     April 2024, [facility]  Final Impressions   1. Mildly enlarged left ventricular chamber size, regional wall motion abnormalities were present (see wall motion graphics), calculated 2-D linear " ejection fraction 29%.   2. Mildly enlarged right ventricular chamber size, mildly reduced systolic function, estimated right ventricular systolic pressure 44 mmHg (right atrial pressure of 10 mmHg).   3. Grade 1a/3 left ventricular diastolic dysfunction, consistent with mildly elevated left ventricular filling pressure.   4. Mild mitral valve regurgitation.   5. Mild-moderate tricuspid valve regurgitation.   6. No  pericardial effusion.   7. Compared to the report of 03/29/2024 the following changes have occurred: the mitral and tricuspid regurgitation have lessened, and the right ventricular size and function have slightly improved.  Side by side comparison of images performed.       Stress Test:  None available    Cardiac MRI:  None available, ordered today    Cardiac Catheterization:  None available      ASSESSMENT/PLAN:  In summary, Myla Glynn is here today with the following -      1.  Cardiomyopathy, ACC/AHA stage C, NYHA class cannot be ascertained, LVEF 29%, unknown etiology  2. Coronary artery calcification, mild in the mid LAD territory (per notes from [facility])  3. Hypertension  4.  Hyperlipidemia, on Crestor 5 mg once a day  5.  CKD stage III   6. 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  Malnutrition status post PEG placement  8. Physical deconditioning    Plans:    New onset cardiomyopathy, unclear etiology. Would plan for cardiac MRI with stress and contrast      Goal Directed Medical Therapies for Heart Failure:     These are indicated irrespective of the etiology of heart failure.  We discussed the primary medications, their side effects, the care plan including frequent follow-ups  for optimization of therapies with monitoring of blood pressures, weights and lab results       Beta blocker: On carvedilol 3.125 mg twice a day, continue for  now  ACE/ARB/ARNI: Not on this due to CKD  MRA: Not on this due to CKD  SGLT2 inhibitor: Not on this due to CKD    Diuretics: hypovolemic, not on diuretics now  Cardiac Rehab: She would benefit from this but is currently not eager to go to this because of limited options for transportation and her struggles with her abdominal issues  ICD/ CRT-D: Can be considered if EF remains low despite maximal medical therapies and based on her overall prognosis from her comorbidities  Labs: Check BMP later this week and again at follow-up  Follow up: [facility] clinic and see me after CMR  CV Genetic testing: Can discuss this in the future based on results of MRI    Advised on daily home BP and weight monitoring  Advised on observing caution with salt intake    ALFREDA on CKD: likely dehydrated. Discussed options including going to ER for hydration and follow up but she wants to try to increase hydration at home. Will plan to repeat labs later this week. Will also refer to nephrology    Abdominal pain and constipation: she will follow up with oncology and PCP    I spent 74 minutes in care of the patient today including obtaining recent medical history, personally reviewing recent cardiac testing and/or lab results, today's examination, discussion of testing results and care recommendations with patient.       Thank you for the opportunity to participate in this patient's care. Please feel free to reach out with any questions or concerns.      Kenji Chacon MD, [facility]  Associate Professor of Medicine  Advanced Heart Failure, LVAD & Cardiac Transplant  Director, Cardio-Obstetrics  Cardio-Oncology  [facility]      Please do not hesitate to contact me if you have any questions/concerns.     Sincerely,     Kenji Chacon MD

## 2024-08-10 PROBLEM — R11.2 NAUSEA AND VOMITING, UNSPECIFIED VOMITING TYPE: Status: ACTIVE | Noted: 2024-01-01

## 2024-08-10 PROBLEM — C18.7 ADENOCARCINOMA OF SIGMOID COLON (H): Status: ACTIVE | Noted: 2024-01-01

## 2024-08-10 NOTE — ED NOTES
Pt bladder scan for 50 ml only. Pt expressing no need to void at this time. Will continue to monitor for urine to collect sample.

## 2024-08-10 NOTE — ED TRIAGE NOTES
Triage Assessment (Adult)       Row Name 08/10/24 1120          Triage Assessment    Airway WDL WDL        Respiratory WDL    Respiratory WDL WDL        Skin Circulation/Temperature WDL    Skin Circulation/Temperature WDL WDL        Cardiac WDL    Cardiac WDL WDL        Cognitive/Neuro/Behavioral WDL    Cognitive/Neuro/Behavioral WDL WDL

## 2024-08-10 NOTE — H&P
"[facility]    History and Physical - Hospitalist Service, GOLD TEAM        Date of Admission:  8/10/2024    Assessment & Plan      Myla Glynn is a 79 year old female admitted on 8/10/2024. 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   Patient  was having nausea that stared about  1 week ago also vomiting about 2 x 3 a day , also some difficulty urinating and urinating less but still urinating. ( No longer has alejandra) Last BM was 4-5 days ago that were  hard.  She is passing gas even while  in ED. No abdominal pain but has some \" burning \" sensation in abdomen shows in lower abdomen however this  is not new and always has this . She has PEG in and gets over night feedings, however she has had vomiting, sometime sin the middle of night sometime sin morning, emesis is color of her tube feeding. There ha snot been any changes in tube feeding. Gets 45ml/hr form 8 PM till 8 AM. Has some nausea and dry heaving during the day but emesis is during the day.  No fever or chills. She gets her medications via PEG tube most in AM, and family has been giving her her medications, she vomits before medications . She denies any shortness of breath  no chest pain      Nausea vomiting   Decreased po intake  -had CT chest abdominal pelvis at [place] 8/8 , she was already having above symptoms .  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 \" She was to follow up  with oncology    - had cystogram 7/24 and alejandra was removed  , no longer takes  augmentin   - RN bladder scanned in ED for 50 ml and patient  did proceed to urinate 50 ml     - will need goals of cares currently full code, they plan to follow up  with [place] oncology then transition to [facility], will [facility] oncology to weigh in and have discusison with patient  and family especially as it seem tumor has recurred/grown     Constipation  - no BM in past 4-5 days and last BM was hard  - start bowel regime     Leukocytosis  - UA with > 182 WBC, large blood and WBC esterase but would expect this if has enterovesical fistula but not confirmed  - last UC 10-50K candida krusei   - start rocephin till culture back      HFrEF  Significant Mitral Valve regurgitation   Hypertension    -  I have not see follow up  visit, was anni at [place]  - TTE 4/4 with EF 29%  moderate TR   - was to have cardiac MRI once got stronger   - PTA on carvediol , continue with " "holding parameters  not on ACEi/ARB  SGLT2 inhibitor due to CKD       History left sided pleural effusion  - on CT 8/8 left pleural effusion size has decreased ( I cannot look at images)   - CT did show ne enhancing pleural thickening      Acute on chronic CKD stage III  Mild AGMA  -  baseline creatinine  1.4-1.7   -creatinine bit up form baseline suspect due to nausea vomiting and decreased po intake   - avoid nephrotoxic agents     Moderate malnutrition  - has PEG  that was placed 6/2024   - nutritionist to see   -  per notes \"Continue cycled G-tube feedings: TwoCal HN at 45 mL/hr run 1600 to 0800 with free water flushes 60 mL q4h. \" Will restart TF at lower rate over night for now     T2DM  - not on medications, check accu-checks insulin sliding scale     Mild cognitive impairment  - at risk for delirium     Depression  - not on medications      Anemia  - Hg up, could be form hemoconcentration        History of infected left retroperitoneal hematoma  status post  IR embolization and drainage 3/2024   - no current issues       Goals of cares  - full code at this  point       CT chest abdomen  plevis at [place] 8/8  1. Marked interval worsening of the large locally advanced infiltrative recurrent mass invading the the bladder and segments of small bowel with associated tethering of the vaginal cuff and sigmoid colon. There is a high-risk for bowel obstruction.    However, no bowel obstruction currently.    2. 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  No gas within the bladder.  Narrative    EXAM:  CT ABDOMEN PELVIS WITH IV CONTRAST    COMPARISON:  CT abdomen pelvis with IV contrast 5/29/2024, CT cystogram 5/30/2024    FINDINGS:    Postoperative changes of a sigmoid colorectal anastomosis and small bowel resections , partial cystectomy, hysterectomy.  Appendectomy.    Worsening irregular invasive mass invading the bladder and small bowel and tethering the sigmoid colon, and the " vaginal cuff in the pelvis.  The mass measures proximally 3.0 x 5.8 x 2.7 cm (series 1 image 123; series 5, image 35).  Asymmetric irregular  mural of the superior bladder measuring 1.5 cm in anterior inferior bladder consistent with tumor (series 1, image 108).  Probable defect of the anterior bladder wall/enterovesical fistula  (series 1 image 123).  The mass abuts the pubic symphysis and  pubic bodies with probable invasion of the anterior pelvic musculature.  Mildly gas distended segments of small bowel in the pelvis.  No high-grade bowel obstruction.  Tethering of the right distal ureter.  No hydronephrosis.    The enteroenterostomy in the right lower quadrant appears patent .However, the the proximal segment of the small bowel before the anastomosis is filled with tumor.  The enteroenterostomy in the right lower quadrant is patent.    Slight urothelial cell thickening enhancement of the left renal collecting system may be related to prior instrumentation.    No suspicious liver lesion.  Portal vein hepatic veins are patent.  Distended gallbladder.  Slight fatty atrophy of the pancreas.  Tiny esophageal hiatal hernia.    The adrenal glands unremarkable.  Embolization coils right renal hilum.  Tiny low-attenuation lesion in the inferior left kidney may represent a cyst, but technically too small to characterize.  No lymphadenopathy in the abdomen or pelvis.    Scattered vascular calcifications.    Slightly decreased small left pleural effusion with associated pleural thickening.  Subsegmental atelectasis left lower lobe.    Scattered musculoskeletal degenerative changes.  No suspicious osseous lesion.    This examination was performed in conjunction with a CT of the chest, which will be reported separately.  Procedure Note    CT chest at [place] 8/8   OMPARISON: CT chest with IV contrast enhancement 3/2/2024.  FINDINGS:  Exam degraded by respiratory motion.    Since 3/2/2024, heart size has decreased and is at  upper limits of normal. Large left pleural effusion has decreased, now small. New smooth enhancing circumferential pleural thickening around the remaining effusion. Improved atelectasis in the lingula  and left lower lobe. Moderate subsegmental atelectasis remains in the left lung base. Left PICC has been removed.    Remainder unchanged. Mild nodularity along the minor fissure, unchanged dating back to outside CT chest 11/17/2021 consistent with intrapulmonary lymph nodes. Tiny stable 2 mm nodule in the right upper lobe anteriorly (141).    Mild atelectasis in the right lower lung. Trace pericardial effusion. Coronary artery calcifications.    This examination was performed in conjunction with a CT of the abdomen which will be reported separately.  Procedure Note    Иван Rudolph M.D. - 08/09/2024  Formatting of this note might be different from the original.  EXAM: CT CHEST WITH IV CONTRAST    COMPARISON: CT chest with IV contrast enhancement 3/2/2024.    FINDINGS:  Exam degraded by respiratory motion.    Since 3/2/2024, heart size has decreased and is at upper limits of normal. Large left pleural effusion has decreased, now small. New smooth enhancing circumferential pleural thickening around the remaining effusion. Improved atelectasis in the lingula  and left lower lobe. Moderate subsegmental atelectasis remains in the left lung base. Left PICC has been removed.    Remainder unchanged. Mild nodularity along the minor fissure, unchanged dating back to outside CT chest 11/17/2021 consistent with intrapulmonary lymph nodes. Tiny stable 2 mm nodule in the right upper lobe anteriorly (141).    Mild atelectasis in the right lower lung. Trace pericardial effusion. Coronary artery calcifications.    This examination was performed in conjunction with a CT of the abdomen which will be reported separately.    IMPRESSION:  1. Heart size has decreased and is now at upper limits of normal.  2. Left pleural effusion has  "decreased. New enhancing pleural thickening around the left effusion consistent with an exudative effusion.  3. Stable tiny pulmonary nodules.          Diet:  PEG tube feeding   DVT Prophylaxis: Pneumatic Compression Devices  Alejandra Catheter: Not present  Lines: None     Cardiac Monitoring: None  Code Status:  full     Clinically Significant Risk Factors Present on Admission                 # Acute Kidney Injury, unspecified: based on a >150% or 0.3 mg/dL increase in last creatinine compared to past 90 day average, will monitor renal function  # Hypertension: Noted on problem list             # Financial/Environmental Concerns:               Naz Portillo MD  Hospitalist Service, [facility]  Securely message with [employer] (more info)  Text page via [facility] Paging/Directory   See signed in provider for up to date coverage information    ______________________________________________________________________    Chief Complaint   Nausea vomiting     History is obtained from the patient and her family via help of family friend who is RN     History of Present Illness     Myla Glynn is a 79 year old female admitted on 8/10/2024. 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   Patient  was having nausea that stared about  1 week ago also vomiting about 2 x 3 a day , also some difficulty urinating and urinating less but still urinating. ( No longer has alejandra) Last BM was 4-5 days ago that were  hard.  She is passing gas even while  in ED. No abdominal pain but has some \" burning \" sensation in abdomen shows " in lower abdomen however this  is not new and always has this . She has PEG in and gets over night feedings, however she has had vomiting, sometime sin the middle of night sometime sin morning, emesis is color of her tube feeding. There ha snot been any changes in tube feeding. Gets 45ml/hr form 8 PM till 8 AM. Has some nausea and dry heaving during the day but emesis is during the day.  No fever or chills. She gets her medications via PEG tube most in AM, and family has been giving her her medications, she vomits before medications . She denies any shortness of breath  no chest pain      Past Medical History    Past Medical History:   Diagnosis Date    Anemia     Bladder mass     Controlled type 2 diabetes mellitus without complication, without long-term current use of insulin (H)     Gastroesophageal reflux disease     HTN (hypertension)     Ileostomy status (H)     Malignant neoplasm of sigmoid colon (H)     Renal insufficiency        Past Surgical History   Past Surgical History:   Procedure Laterality Date    COLONOSCOPY      CYSTECTOMY BLADDER RADICAL, ILEAL DIVERSION, COMBINED N/A 1/5/2021    Procedure: Partial Cystectomy, Cystourethroscopy,;  Surgeon: Angel Newsome MD;  Location: [facility]    HYSTERECTOMY TOTAL ABDOMINAL  1/5/2021    Procedure: Hysterectomy total abdominal;  Surgeon: Jimy Jackson MD;  Location: [facility] OR    IR GASTROSTOMY TUBE PERCUTANEOUS PLCMNT  6/13/2024    LAPAROSCOPY OPERATIVE ADULT  1/5/2021    Procedure: Laparoscopy Operative Adult, takedown of splenic flexure, appendectomy;  Surgeon: Remi Moscoso MD;  Location: [facility] OR    SALPINGO-OOPHORECTOMY BILATERAL  1/5/2021    Procedure: Salpingo-oophorectomy bilateral;  Surgeon: Jimy Jackson MD;  Location: [facility]    SIGMOIDOSCOPY FLEXIBLE N/A 1/5/2021    Procedure: SIGMOIDOSCOPY, FLEXIBLE;  Surgeon: Remi Moscoso MD;  Location: [facility]    TAKEDOWN ILEOSTOMY N/A 5/4/2021    Procedure:  ILEOSTOMY CLOSURE, LYSIS OF ADHESION;  Surgeon: Remi Moscoso MD;  Location: [facility] OR       Prior to Admission Medications   Prior to Admission Medications   Prescriptions Last Dose Informant Patient Reported? Taking?   acetaminophen (TYLENOL) 325 MG tablet 2024  No Yes   Si tablets (650 mg) by Oral or Feeding Tube route every 4 hours as needed for mild pain or fever (greater than 101 degrees)   carvedilol (COREG) 3.125 MG tablet 2024  No Yes   Si tablet (3.125 mg) by Oral or Feeding Tube route 2 times daily (with meals)   diclofenac (VOLTAREN) 1 % topical gel 2024  No Yes   Sig: Apply 2 g topically 4 times daily as needed for moderate pain   loperamide (IMODIUM A-D) 2 MG tablet 2024  No Yes   Sig: Take 1 tablet (2 mg) by mouth 4 times daily as needed for diarrhea   multivitamins w/minerals liquid 2024  No Yes   Sig: 15 mLs by Oral or Feeding Tube route daily   ondansetron (ZOFRAN) 4 MG tablet 2024  No Yes   Si tablet (4 mg) by Oral or Feeding Tube route every 8 hours   oxyBUTYnin (DITROPAN) 5 MG/5ML solution 2024  No Yes   Si.5 mLs (2.5 mg) by Per Feeding Tube route 2 times daily   pantoprazole (PROTONIX) 2 mg/mL SUSP suspension 2024  No Yes   Si mLs (40 mg) by Per Feeding Tube route every morning (before breakfast)   psyllium (METAMUCIL/KONSYL) 58.6 % powder 2024  No Yes   Sig: Take 6 g (1 teaspoonful) by mouth daily   rosuvastatin (CRESTOR) 5 MG tablet 2024  No Yes   Si tablet (5 mg) by Oral or Feeding Tube route at bedtime   saccharomyces boulardii (FLORASTOR) 250 MG capsule 2024  No Yes   Si capsule (250 mg) by Oral or Feeding Tube route 2 times daily      Facility-Administered Medications: None        Review of Systems    The 10 point Review of Systems is negative other than noted in the HPI or here.     Social History   I have reviewed this patient's social history and updated it with pertinent information if needed.  Social  History     Tobacco Use    Smoking status: Never    Smokeless tobacco: Never   Substance Use Topics    Alcohol use: Not Currently    Drug use: Not Currently         Family History   I have reviewed this patient's family history and updated it with pertinent information if needed.  Family History   Problem Relation Age of Onset    No Known Problems Mother     No Known Problems Father     No Known Problems Sister     No Known Problems Brother          Allergies   No Known Allergies     Physical Exam   Vital Signs: Temp: 97.8  F (36.6  C) Temp src: Oral BP: 124/63 Pulse: 95   Resp: 16 SpO2: 100 % O2 Device: None (Room air)    Weight: 0 lbs 0 oz  General appearance: awake alert in  no apparent distress     HEENT: EOMI and PEARLA, sclera nonicteric, moist mucus membranes, head atraumatic  NECK: supple  RESPIRATORY: lungs are clear to auscultation no wheezing, no crackles,   CARDIOVASCULAR: normal S1S2 regular rate and rhythm, no rubs gallops or murmurs appreciated,  GASTROINTESTINAL: abdomen is  soft,  non-distended, non-tender , has hypoactive but present blood sugar, PEG site looks good     MUSCULOSKELETAL: normal muscle bulk and tone  SKIN: warm and dry, no rashes, no mottling noted   NEUROLOGIC: awake alert and oriented,   EXTREMITIES: no clubbing cyanosis or edema noted  moves all extremities        Data     I have personally reviewed the following data over the past 24 hrs:    18.2 (H)  \   13.1   / 310     137 96 (L) 98.6 (H) /  168 (H)   4.8 23 2.00 (H) \     ALT: 18 AST: 24 AP: 94 TBILI: 0.2   ALB: 3.8 TOT PROTEIN: 9.0 (H) LIPASE: 35     TSH: 0.64 T4: N/A A1C: N/A     Procal: N/A CRP: N/A Lactic Acid: 1.5         Imaging results reviewed over the past 24 hrs:   Recent Results (from the past 24 hour(s))   XR Abdomen 2 Views    Narrative    EXAM: XR ABDOMEN 2 VIEWS  LOCATION: [facility]  DATE: 8/10/2024    INDICATION: Evaluate for small bowel obstruction.   COMPARISON:  CT abdomen pelvis 6/15/2024.       Impression    IMPRESSION:     Nonobstructive bowel gas pattern. No pneumatosis or free intraperitoneal air.    Gastrostomy tube projects over the stomach. Scattered surgical sutures and clips within the pelvis. Embolization coils within the upper abdomen.    Visualized lung bases are clear.

## 2024-08-10 NOTE — LETTER
Transition Communication Hand-off for Care Transitions to Next Level of Care Provider    Name: Myla Glynn  : 1945  MRN #: [id number]  Primary Care Provider: Jami Bundy     Primary Clinic: [facility] [place] [id number]     Reason for Hospitalization:  Adenocarcinoma of sigmoid colon (H) [C18.7]  Nausea and vomiting, unspecified vomiting type [R11.2]  Admit Date/Time: 8/10/2024 11:09 AM  Discharge Date: 24  Payor Source: Payor: UCARE / Plan: UCARE PMAP / Product Type: HMO /          Reason for Communication Hand-off Referral: Other Continuity of care    Discharge Plan: will discharge to today 24 to [facility]. Plan to admit to [facility] hospice on 24.    Concern for non-adherence with plan of care:   No  Discharge Needs Assessment:  Needs      Flowsheet Row Most Recent Value   Anticipated Changes Related to Illness none   Equipment Currently Used at Home walker, rolling   # of Referrals Placed by CM External Care Coordination            Already enrolled in Tele-monitoring program and name of program:  N/A  Follow-up specialty is recommended: Yes    Follow-up plan:    Future Appointments   Date Time Provider Department Center   2024  8:15 AM [address]   11/15/2024 11:45 AM  [facility]   11/15/2024 12:15 PM Kenji Chacon MD [facility]       Any outstanding tests or procedures:        Referrals       Future Labs/Procedures    Adult Oncology/Hematology  Referral     Process Instructions:    Consider Adult E-Consult to Hematology for the following conditions: anticoagulation established condition, isolated anemia, MGUS or elevated light chains, thrombocytopenia.   E-Consult Cost: 0-$125.    Comments:    Please be aware that coverage of these services is subject to the terms and limitations of your health insurance plan.  Call member services at your health plan with any benefit or coverage questions.  [facility] will call  you to coordinate your care as prescribed by the provider.  If you don t hear from a representative within 2 business days, please call [phone]      Adult Oncology/Hematology  Referral     Process Instructions:    Consider Adult E-Consult to Hematology for the following conditions: anticoagulation established condition, isolated anemia, MGUS or elevated light chains, thrombocytopenia.   E-Consult Cost: 0-$125.    Comments:    Please be aware that coverage of these services is subject to the terms and limitations of your health insurance plan.  Call member services at your health plan with any benefit or coverage questions.   [employer] will call you to coordinate your care as prescribed by the provider.  If you don t hear from a representative within 2 business days, please call [phone]      Hospice Referral     Process Instructions:    **Order classes of: [facility], [facility] and [facility] will route to the Home Care and Hospice Referral Pool.  Home Care or Hospice will then contact the patient to schedule their appointment.**      Comments:    Hospice eligibility overview: prognosis of 6 months or less, end stage disease, patient goals are comfort only, patient is not seeking curative treatment.    If you do not hear from Hospice, or you would like to call to schedule, please call the referring place of service that your provider has referred you to.  ______________________________________________________________________    PLEASE Schedule Hospice consult for 24 - 48 hours.  Please call if there is need for a variance to this timeline.    No current outpatient medications on file.    Patient Active Problem List:     Controlled type 2 diabetes mellitus without complication, without long-term current use of insulin (H)     HTN (hypertension)     Malignant neoplasm of sigmoid colon (H)     Bladder mass     Anemia     S/P ileostomy (H)     Acute kidney injury (H24)     Acute cystitis  without hematuria     Acute kidney failure with tubular necrosis (H)     Physical deconditioning     Adenocarcinoma, colon (H)     Adenocarcinoma of sigmoid colon (H)     Nausea and vomiting, unspecified vomiting type    This patient is under my care, and I have initiated the establishment of the plan of care. This patient will be followed by a physician who will periodically review the plan of care.    Physician/Provider to provide follow up care: Jami Bundy    Please be aware that coverage of these services is subject to the terms and limitations of your health insurance plan.  Call member services at your health plan with any benefit or coverage questions.  [facility] [phone]    Please call to schedule your appointment                Key Recommendations:      Sarah Elias, RNCC  [phone]    AVS/Discharge Summary is the source of truth; this is a helpful guide for improved communication of patient story

## 2024-08-10 NOTE — ED PROVIDER NOTES
[facility] [place])    8/10/24      ED PROVIDER NOTE   ED 7 11:32 AM   History     Chief Complaint   Patient presents with    Vomiting     Pt has G tube. Pt has not been tolerating tube feedings for 1 week. Throwing up tube feed for 2 days.      The history is provided by the patient, medical records and a relative (Aamir (son), daughter). History limited by: family members do not give consistent history and do not know the full details of her medical history.     Myla Glynn is a 79 year old Tigrinya speaking female with a very, very complex past medical history including congestive heart failure (HFrEF, EF 29% with significant mitral and tricuspid regurgitation, April 2024 at [facility]), sigmoid adenocarcinoma with bladder metastasis and colovesical fistula (status post resection including sigmoidectomy with en bloc partial cystectomy and total abdominal hysterectomy with left salpingo-oophorectomy, right salpingo-oophorectomy, reimplantation of left ureter, partial omentectomy, appendectomy, and diverting loop ileostomy on 01/05/2021) which was further complicated by enterovesical fistula (status post partial cystectomy, extensive bowel mobilization, small-bowel resection, small-bowel anastomosis, lysis of adhesions 1/9/24), lumbar artery bleed and infected left retroperitoneal hematoma (s/p IR embolization and drain placement 3/9/24 at [facility]), failure to thrive status post G tube placement 6/13/2024 (TwoCal HN at 45 mL/hr run 1600 to 0800).     Patient is accompanied by her son, daughter and family friend, patient's son and daughter provided bulk of history.  History is difficult as patient's family members do not provide consistent history.  Son states he was living in [place] previously, moved to [place] in January 2024 to help take care of patient. Son doesn't know anything about her oncological history. He does know that she was seen at [facility],  had a G tube placed for nutrition. Son states that she had been vomiting her tube feeds for the past 2 days, but her daughter states that she has been vomiting approximately 1 cup of emesis every morning for the past week, and other times has dry heaves.  Son was concerned given the degree and frequency of her nausea and vomiting which prompted him to bring her in for evaluation today.  Family states that she is able to eat and drink things by mouth if desired.  She did drink some water today. Son states that they do give her hydration through her G tube 2-3 times a day. Daughter states that she will sometimes try a spoonful of food, but has not attempted any food in 4 days. Yesterday they were starting her tube feeds when she developed shaking rigors and told her family that she was going to die.  Family stopped the tube feed pump, she vomited and then afterwards they were able to resume the entirety of her tube feeds.  Afterwards she went to bed.  Son has never seen her have shaking rigors like this before and so brought her in for evaluation today. Son concerned if she keeps vomiting she is going to die.  She feels more fatigued from vomiting.  She used to have a Cameron catheter, this was removed and she is not having problems with voiding. She did void yesterday but hasn't had a bowel movement in 4 days. She wears incontinence briefs but can and will use the toilet, did walk to bathroom. She just feels she doesn't need to go. Son states sometimes stools are liquid. She denies pain but feels nauseated.  No fevers.  She does not get antiemetics daily but has been given antiemetics in the past through her G-tube (per epic records, she was given a 90 tablet prescription of Zofran on 6/26/2024). Blood sugar was 160 in triage here. Patient's last weight was 117 lbs (53.1 kg) 2 weeks ago.     Patient is not on chemo or radiation currently. Son states she went to [facility] for routine imaging and labs 2 days ago but  didn't mention the nausea and vomiting at that time.  She has follow-up appointment on 8/13/24 to discuss results and transfer of cares to [facility].     [facility] records reviewed, pertinent findings pasted below:   [facility] CT ABDOMEN PELVIS WITH IV CONTRAST 08/08/24   1. Marked interval worsening of the large locally advanced infiltrative recurrent mass invading the the bladder and segments of small bowel with associated tethering of the vaginal cuff and sigmoid colon. There is a high-risk for bowel obstruction.    However, no bowel obstruction currently.     2. 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  No gas within the bladder.     [facility] CT CHEST W CONTRAST 08/08/24  1. Heart size has decreased and is now at upper limits of normal.  2. Left pleural effusion has decreased. New enhancing pleural thickening around the left effusion consistent with an exudative effusion.  3. Stable tiny pulmonary nodules.    [facility] UROLOGY CONSULT NOTE 7/12/24  It was my pleasure to meet with Ms. Glynn in clinic today for her bladder cancer recheck.  I personally reviewed her most recent laboratory work and testing.  I reviewed the situation with Dr. Reynolds.  We are reassured by her cystogram findings today.  We would not recommend any further urologic follow-up and would defer to Medical Oncology for continued surveillance.     We would be very reluctant to pursue any other urologic intervention in the future even with tumor recurrence.  We could consider TURBT if need be, however would recommend simply following her with scans in the future.  No current recommendation for cystoscopy long-term.    Physical Exam   BP: 115/77  Pulse: 99  Temp: 97.8  F (36.6  C)  Resp: 16  SpO2: 98 %      Physical Exam  Vitals and nursing note reviewed.   Constitutional:       Comments: Cachectic appearing.  Can sit up with assistance.    HENT:      Head:  Normocephalic and atraumatic.      Right Ear: External ear normal.      Left Ear: External ear normal.      Nose: Nose normal.      Mouth/Throat:      Mouth: Mucous membranes are moist.   Eyes:      General: No scleral icterus.        Right eye: No discharge.         Left eye: No discharge.      Extraocular Movements: Extraocular movements intact.   Cardiovascular:      Rate and Rhythm: Normal rate and regular rhythm.      Heart sounds: Normal heart sounds.   Pulmonary:      Effort: Pulmonary effort is normal.      Breath sounds: Normal breath sounds.   Abdominal:      General: Abdomen is flat. Bowel sounds are decreased.      Palpations: Abdomen is soft.      Tenderness: There is no abdominal tenderness.      Hernia: No hernia is present.      Comments: G tube intact/normal appearing skin around the tube site.    Musculoskeletal:         General: Normal range of motion.      Cervical back: Normal range of motion and neck supple.   Skin:     General: Skin is warm and dry.      Coloration: Skin is not jaundiced.   Neurological:      General: No focal deficit present.      Mental Status: She is alert.           ED Course, Procedures, & Data      Procedures        Results for orders placed or performed during the hospital encounter of 08/10/24   XR Abdomen 2 Views     Status: None    Narrative    EXAM: XR ABDOMEN 2 VIEWS  LOCATION: [facility]  DATE: 8/10/2024    INDICATION: Evaluate for small bowel obstruction.   COMPARISON: CT abdomen pelvis 6/15/2024.       Impression    IMPRESSION:     Nonobstructive bowel gas pattern. No pneumatosis or free intraperitoneal air.    Gastrostomy tube projects over the stomach. Scattered surgical sutures and clips within the pelvis. Embolization coils within the upper abdomen.    Visualized lung bases are clear.   Glucose by meter     Status: Abnormal   Result Value Ref Range    GLUCOSE BY METER POCT 160 (H) 70 - 99 mg/dL   Comprehensive  metabolic panel     Status: Abnormal   Result Value Ref Range    Sodium 137 135 - 145 mmol/L    Potassium 4.8 3.4 - 5.3 mmol/L    Carbon Dioxide (CO2) 23 22 - 29 mmol/L    Anion Gap 18 (H) 7 - 15 mmol/L    Urea Nitrogen 98.6 (H) 8.0 - 23.0 mg/dL    Creatinine 2.00 (H) 0.51 - 0.95 mg/dL    GFR Estimate 25 (L) >60 mL/min/1.73m2    Calcium 10.1 8.8 - 10.4 mg/dL    Chloride 96 (L) 98 - 107 mmol/L    Glucose 168 (H) 70 - 99 mg/dL    Alkaline Phosphatase 94 40 - 150 U/L    AST 24 0 - 45 U/L    ALT 18 0 - 50 U/L    Protein Total 9.0 (H) 6.4 - 8.3 g/dL    Albumin 3.8 3.5 - 5.2 g/dL    Bilirubin Total 0.2 <=1.2 mg/dL   Lipase     Status: Normal   Result Value Ref Range    Lipase 35 13 - 60 U/L   UA with Microscopic reflex to Culture     Status: Abnormal    Specimen: Urine, Midstream   Result Value Ref Range    Color Urine Orange (A) Colorless, Straw, Light Yellow, Yellow    Appearance Urine Cloudy (A) Clear    Glucose Urine Negative Negative mg/dL    Bilirubin Urine Negative Negative    Ketones Urine Negative Negative mg/dL    Specific Gravity Urine 1.018 1.003 - 1.035    Blood Urine Large (A) Negative    pH Urine 6.0 5.0 - 7.0    Protein Albumin Urine 100 (A) Negative mg/dL    Urobilinogen Urine Normal Normal, 2.0 mg/dL    Nitrite Urine Negative Negative    Leukocyte Esterase Urine Large (A) Negative    Bacteria Urine Many (A) None Seen /HPF    WBC Clumps Urine Present (A) None Seen /HPF    Mucus Urine Present (A) None Seen /LPF    RBC Urine >182 (H) <=2 /HPF    WBC Urine >182 (H) <=5 /HPF    Squamous Epithelials Urine 3 (H) <=1 /HPF    Transitional Epithelials Urine 4 (H) <=1 /HPF    Narrative    Urine Culture ordered based on laboratory criteria   Magnesium     Status: Abnormal   Result Value Ref Range    Magnesium 3.1 (H) 1.7 - 2.3 mg/dL   Phosphorus     Status: Normal   Result Value Ref Range    Phosphorus 4.2 2.5 - 4.5 mg/dL   TSH with free T4 reflex     Status: Normal   Result Value Ref Range    TSH 0.64 0.30 - 4.20  uIU/mL   CBC with platelets and differential     Status: Abnormal   Result Value Ref Range    WBC Count 18.2 (H) 4.0 - 11.0 10e3/uL    RBC Count 4.29 3.80 - 5.20 10e6/uL    Hemoglobin 13.1 11.7 - 15.7 g/dL    Hematocrit 39.2 35.0 - 47.0 %    MCV 91 78 - 100 fL    MCH 30.5 26.5 - 33.0 pg    MCHC 33.4 31.5 - 36.5 g/dL    RDW 14.6 10.0 - 15.0 %    Platelet Count 310 150 - 450 10e3/uL    % Neutrophils 87 %    % Lymphocytes 6 %    % Monocytes 6 %    % Eosinophils 0 %    % Basophils 0 %    % Immature Granulocytes 1 %    NRBCs per 100 WBC 0 <1 /100    Absolute Neutrophils 16.0 (H) 1.6 - 8.3 10e3/uL    Absolute Lymphocytes 1.0 0.8 - 5.3 10e3/uL    Absolute Monocytes 1.1 0.0 - 1.3 10e3/uL    Absolute Eosinophils 0.0 0.0 - 0.7 10e3/uL    Absolute Basophils 0.0 0.0 - 0.2 10e3/uL    Absolute Immature Granulocytes 0.1 <=0.4 10e3/uL    Absolute NRBCs 0.0 10e3/uL   Extra Tube     Status: None    Narrative    The following orders were created for panel order Extra Tube.  Procedure                               Abnormality         Status                     ---------                               -----------         ------                     Extra Blue Top Tube[[id number]]                              Final result               Extra Red Top Tube[[id number]]                               Final result               Extra Green Top (Lithium...[[id number]]                      Final result                 Please view results for these tests on the individual orders.   Extra Blue Top Tube     Status: None   Result Value Ref Range    Hold Specimen JIC    Extra Red Top Tube     Status: None   Result Value Ref Range    Hold Specimen JIC    Extra Green Top (Lithium Heparin) Tube     Status: None   Result Value Ref Range    Hold Specimen JIC    Lactic acid whole blood     Status: Normal   Result Value Ref Range    Lactic Acid 1.5 0.7 - 2.0 mmol/L   CBC with platelets differential     Status: Abnormal    Narrative    The following orders were  created for panel order CBC with platelets differential.  Procedure                               Abnormality         Status                     ---------                               -----------         ------                     CBC with platelets and d...[[id number]]  Abnormal            Final result                 Please view results for these tests on the individual orders.     Medications   cefTRIAXone (ROCEPHIN) 1 g vial to attach to  mL bag for ADULTS or NS 50 mL bag for PEDS (has no administration in time range)   ondansetron (ZOFRAN) injection 4 mg (4 mg Intravenous $Given 8/10/24 1203)     Labs Ordered and Resulted from Time of ED Arrival to Time of ED Departure   GLUCOSE BY METER - Abnormal       Result Value    GLUCOSE BY METER POCT 160 (*)    COMPREHENSIVE METABOLIC PANEL - Abnormal    Sodium 137      Potassium 4.8      Carbon Dioxide (CO2) 23      Anion Gap 18 (*)     Urea Nitrogen 98.6 (*)     Creatinine 2.00 (*)     GFR Estimate 25 (*)     Calcium 10.1      Chloride 96 (*)     Glucose 168 (*)     Alkaline Phosphatase 94      AST 24      ALT 18      Protein Total 9.0 (*)     Albumin 3.8      Bilirubin Total 0.2     ROUTINE UA WITH MICROSCOPIC REFLEX TO CULTURE - Abnormal    Color Urine Orange (*)     Appearance Urine Cloudy (*)     Glucose Urine Negative      Bilirubin Urine Negative      Ketones Urine Negative      Specific Gravity Urine 1.018      Blood Urine Large (*)     pH Urine 6.0      Protein Albumin Urine 100 (*)     Urobilinogen Urine Normal      Nitrite Urine Negative      Leukocyte Esterase Urine Large (*)     Bacteria Urine Many (*)     WBC Clumps Urine Present (*)     Mucus Urine Present (*)     RBC Urine >182 (*)     WBC Urine >182 (*)     Squamous Epithelials Urine 3 (*)     Transitional Epithelials Urine 4 (*)    MAGNESIUM - Abnormal    Magnesium 3.1 (*)    CBC WITH PLATELETS AND DIFFERENTIAL - Abnormal    WBC Count 18.2 (*)     RBC Count 4.29      Hemoglobin 13.1       Hematocrit 39.2      MCV 91      MCH 30.5      MCHC 33.4      RDW 14.6      Platelet Count 310      % Neutrophils 87      % Lymphocytes 6      % Monocytes 6      % Eosinophils 0      % Basophils 0      % Immature Granulocytes 1      NRBCs per 100 WBC 0      Absolute Neutrophils 16.0 (*)     Absolute Lymphocytes 1.0      Absolute Monocytes 1.1      Absolute Eosinophils 0.0      Absolute Basophils 0.0      Absolute Immature Granulocytes 0.1      Absolute NRBCs 0.0     LIPASE - Normal    Lipase 35     PHOSPHORUS - Normal    Phosphorus 4.2     TSH WITH FREE T4 REFLEX - Normal    TSH 0.64     LACTIC ACID WHOLE BLOOD - Normal    Lactic Acid 1.5     BLOOD CULTURE   URINE CULTURE     XR Abdomen 2 Views   Final Result   IMPRESSION:       Nonobstructive bowel gas pattern. No pneumatosis or free intraperitoneal air.      Gastrostomy tube projects over the stomach. Scattered surgical sutures and clips within the pelvis. Embolization coils within the upper abdomen.      Visualized lung bases are clear.             Critical care was not performed.     Medical Decision Making  The patient's presentation was of moderate complexity (an undiagnosed new problem with uncertain prognosis).    The patient's evaluation involved:  an assessment requiring an independent historian (daughter and son)  review of external note(s) from 3+ sources (see above)  review of 3+ test result(s) ordered prior to this encounter (see separate area of note for details)  ordering and/or review of 3+ test(s) in this encounter (see separate area of note for details)  discussion of management or test interpretation with another health professional (medicine attending for admission)    The patient's management necessitated high risk (a decision regarding hospitalization).    Assessment & Plan    Myla Glynn is a 79 year old Tigrinya speaking female with a very, very complex past medical history including congestive heart failure (HFrEF, EF 29% with  "significant mitral and tricuspid regurgitation, April 2024 at [facility]), sigmoid adenocarcinoma with bladder metastasis and colovesical fistula (status post resection including sigmoidectomy with en bloc partial cystectomy and total abdominal hysterectomy with left salpingo-oophorectomy, right salpingo-oophorectomy, reimplantation of left ureter, partial omentectomy, appendectomy, and diverting loop ileostomy on 01/05/2021) which was further complicated by enterovesical fistula (status post partial cystectomy, extensive bowel mobilization, small-bowel resection, small-bowel anastomosis, lysis of adhesions 1/9/24), lumbar artery bleed and infected left retroperitoneal hematoma (s/p IR embolization and drain placement 3/9/24 at [facility]), failure to thrive status post G tube placement 6/13/2024 (TwoCal HN at 45 mL/hr run 1600 to 0800).       She presents with her son and daughter due to vomiting for the past week and yesterday was shaking (not a seizure) when they started to give her tube feedings.  She hasn't been taking much by po recently.  She is able to sip on water here.  Family was worried that she would be dehydrated and worried about this change this past week.  She had imaging yesterday and has been followed by [place] but plans to switch to [facility] care.  Per imaging yesterday she is high risk for bowel obstruction due to \"1. Marked interval worsening of the large locally advanced infiltrative recurrent mass invading the the bladder and segments of small bowel with associated tethering of the vaginal cuff and sigmoid colon. There is a high-risk for bowel obstruction.    However, no bowel obstruction currently.\"  Today her WBC is elevated and UA suggests uti but she also has colovesical fistula which appears was treated.  Given rocephin 1 gm IV in the ED.  UC sent.  Blood culture also sent.  Lactic acid is normal.  Per epic she has an EF of 29% so held off on IV fluids given moist mucous membranes.  " Abdominal xray does not suggest bowel obstruction  Discussed her case with medicine attending who agrees to admit for possible uti, vomiting concerns and to assess tube feedings while inpatient to see if obstruction or vomiting concerns.       I have reviewed the nursing notes. I have reviewed the findings, diagnosis, plan and need for follow up with the patient.    New Prescriptions    No medications on file       Final diagnoses:   Nausea and vomiting, unspecified vomiting type   Adenocarcinoma of sigmoid colon (H) - with bladder involvement, recurrent     I, Stormy Crook, am serving as a trained medical scribe to document services personally performed by Catalina Peters MD based on the provider's statements to me on August 10, 2024.  This document has been checked and approved by the attending provider.    I, Catalina Peters MD, was physically present and have reviewed and verified the accuracy of this note documented by Stormy Crook, medical scribe.      Catalina Peters MD   [facility] EMERGENCY DEPARTMENT  8/10/2024        Catalina Peters MD  08/11/24 [id number]

## 2024-08-10 NOTE — ED NOTES
Pt was able to tolerate lil amount of water. She tried the apple juice but stated that it was just too sweet!

## 2024-08-11 NOTE — PROGRESS NOTES
"[facility]    Medicine Progress Note - Hospitalist Service, GOLD TEAM 17    Date of Admission:  8/10/2024    Assessment & Plan      Myla Glynn is a 79 year old female admitted on 8/10/2024. 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       Patient  was having nausea that stared about  1 week ago also vomiting about 2 x 3 a day , also some difficulty urinating and urinating less but still urinating. ( No longer has alejandra)     Last BM was 4-5 days ago that were hard PTA.  She is passing gas . No abdominal pain but has some \" burning \" sensation in abdomen shows in lower abdomen however this  is not new and always has this .     She has PEG in and gets over night feedings, however she has had vomiting, sometime sin the middle of night sometime sin morning, emesis is color of her tube feeding. There has not been any changes in tube feeding.     Gets 45ml/hr form 8 PM till 8 AM. Has some nausea and dry heaving during the day but emesis is during the day.      No fever or chills. She gets her medications via PEG tube most in AM, and family has been giving her her medications, she vomits before medications .     She denies any shortness of breath  no chest pain      8/11 RD consult   IVF for 10 hours   Bowel regimen   Ceftriaxone 5 days  Bc and UC negative   Discuss with RN   Add heparin subcutaneous for PPX  SW consult   Oncology input     Nausea and vomiting   Decreased po intake  -had CT chest abdominal pelvis at [place] 8/8 , she was already having above symptoms .  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 \" She was to follow up  with oncology    - had cystogram 7/24 and alejandra was removed  , no longer takes  augmentin   - RN bladder scanned in ED for 50 ml and patient  did proceed to urinate 50 ml      Oncology to weigh in and have discusison with patient  and family especially as it seem tumor has recurred/grown     Constipation  - no BM in past 4-5 days and last BM was hard  - started bowel regime     Leukocytosis  - UA with > 182 WBC, large blood and WBC esterase but would expect this if has enterovesical fistula but not confirmed  - last UC 10-50K candida krusei   - start rocephin till culture back      HFrEF  Significant Mitral Valve regurgitation   Hypertension    -  follow at [place]  - TTE 4/4 with EF 29%  moderate TR   - was to have cardiac MRI once got stronger   - PTA on carvediol , continue with holding parameters  not on ACEi/ARB  SGLT2 " "inhibitor due to CKD       History left sided pleural effusion  - on CT 8/8 left pleural effusion size has decreased per report   - CT did show ne enhancing pleural thickening      Acute on chronic CKD stage III  Mild AGMA  -  baseline creatinine  1.4-1.7   -creatinine bit up form baseline suspect due to nausea vomiting and decreased po intake   - avoid nephrotoxic agents     Moderate malnutrition  - has PEG  that was placed 6/2024   - nutritionist to see   -  per notes \"Continue cycled G-tube feedings: TwoCal HN at 45 mL/hr run 1600 to 0800 with free water flushes 60 mL q4h. \" Will restart TF at lower rate over night for now     T2DM  - not on medications, check accu-checks insulin sliding scale     Mild cognitive impairment  - at risk for delirium     Depression  - not on medications      Anemia  - Hg up, could be form hemoconcentration        History of infected left retroperitoneal hematoma  status post  IR embolization and drainage 3/2024   - no current issues       Goals of cares  - full code at this  point       CT chest abdomen  plevis at [place] 8/8  1. Marked interval worsening of the large locally advanced infiltrative recurrent mass invading the the bladder and segments of small bowel with associated tethering of the vaginal cuff and sigmoid colon. There is a high-risk for bowel obstruction.    However, no bowel obstruction currently.    2. 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  No gas within the bladder.  Narrative    EXAM:  CT ABDOMEN PELVIS WITH IV CONTRAST    COMPARISON:  CT abdomen pelvis with IV contrast 5/29/2024, CT cystogram 5/30/2024    FINDINGS:    Postoperative changes of a sigmoid colorectal anastomosis and small bowel resections , partial cystectomy, hysterectomy.  Appendectomy.    Worsening irregular invasive mass invading the bladder and small bowel and tethering the sigmoid colon, and the vaginal cuff in the pelvis.  The mass measures proximally 3.0 " x 5.8 x 2.7 cm (series 1 image 123; series 5, image 35).  Asymmetric irregular  mural of the superior bladder measuring 1.5 cm in anterior inferior bladder consistent with tumor (series 1, image 108).  Probable defect of the anterior bladder wall/enterovesical fistula  (series 1 image 123).  The mass abuts the pubic symphysis and  pubic bodies with probable invasion of the anterior pelvic musculature.  Mildly gas distended segments of small bowel in the pelvis.  No high-grade bowel obstruction.  Tethering of the right distal ureter.  No hydronephrosis.    The enteroenterostomy in the right lower quadrant appears patent .However, the the proximal segment of the small bowel before the anastomosis is filled with tumor.  The enteroenterostomy in the right lower quadrant is patent.    Slight urothelial cell thickening enhancement of the left renal collecting system may be related to prior instrumentation.    No suspicious liver lesion.  Portal vein hepatic veins are patent.  Distended gallbladder.  Slight fatty atrophy of the pancreas.  Tiny esophageal hiatal hernia.    The adrenal glands unremarkable.  Embolization coils right renal hilum.  Tiny low-attenuation lesion in the inferior left kidney may represent a cyst, but technically too small to characterize.  No lymphadenopathy in the abdomen or pelvis.    Scattered vascular calcifications.    Slightly decreased small left pleural effusion with associated pleural thickening.  Subsegmental atelectasis left lower lobe.    Scattered musculoskeletal degenerative changes.  No suspicious osseous lesion.    This examination was performed in conjunction with a CT of the chest, which will be reported separately.  Procedure Note    CT chest at [place] 8/8   OMPARISON: CT chest with IV contrast enhancement 3/2/2024.  FINDINGS:  Exam degraded by respiratory motion.    Since 3/2/2024, heart size has decreased and is at upper limits of normal. Large left pleural effusion has decreased,  now small. New smooth enhancing circumferential pleural thickening around the remaining effusion. Improved atelectasis in the lingula  and left lower lobe. Moderate subsegmental atelectasis remains in the left lung base. Left PICC has been removed.    Remainder unchanged. Mild nodularity along the minor fissure, unchanged dating back to outside CT chest 11/17/2021 consistent with intrapulmonary lymph nodes. Tiny stable 2 mm nodule in the right upper lobe anteriorly (141).    Mild atelectasis in the right lower lung. Trace pericardial effusion. Coronary artery calcifications.    This examination was performed in conjunction with a CT of the abdomen which will be reported separately.  Procedure Note    Иван Rudolph M.D. - 08/09/2024  Formatting of this note might be different from the original.  EXAM: CT CHEST WITH IV CONTRAST    COMPARISON: CT chest with IV contrast enhancement 3/2/2024.    FINDINGS:  Exam degraded by respiratory motion.    Since 3/2/2024, heart size has decreased and is at upper limits of normal. Large left pleural effusion has decreased, now small. New smooth enhancing circumferential pleural thickening around the remaining effusion. Improved atelectasis in the lingula  and left lower lobe. Moderate subsegmental atelectasis remains in the left lung base. Left PICC has been removed.    Remainder unchanged. Mild nodularity along the minor fissure, unchanged dating back to outside CT chest 11/17/2021 consistent with intrapulmonary lymph nodes. Tiny stable 2 mm nodule in the right upper lobe anteriorly (141).    Mild atelectasis in the right lower lung. Trace pericardial effusion. Coronary artery calcifications.    This examination was performed in conjunction with a CT of the abdomen which will be reported separately.    IMPRESSION:  1. Heart size has decreased and is now at upper limits of normal.  2. Left pleural effusion has decreased. New enhancing pleural thickening around the left effusion  consistent with an exudative effusion.  3. Stable tiny pulmonary nodules.          Diet: Adult Formula Drip Feeding: Continuous TwoCal HN; Gastrostomy; Goal Rate: 20; mL/hr  Clear Liquid Diet    DVT Prophylaxis: heparin   Cameron Catheter: Not present  Lines: None     Cardiac Monitoring: None  Code Status: Full Code      Clinically Significant Risk Factors Present on Admission                 # Acute Kidney Injury, unspecified: based on a >150% or 0.3 mg/dL increase in last creatinine compared to past 90 day average, will monitor renal function  # Hypertension: Noted on problem list             # Financial/Environmental Concerns:                 Disposition Plan     Medically Ready for Discharge: Anticipated in 2-4 Days             Alis Muller MD  Hospitalist Service, GOLD TEAM 17  [facility]  Securely message with [employer] (more info)  Text page via [employer] Paging/Directory   See signed in provider for up to date coverage information  ______________________________________________________________________    Interval History   Admitted over night   Seen with interpretor   She feels better   No emesis since yesterday   Lower abdominal pain   Not worse  No fever   Also no BM     Physical Exam   Vital Signs: Temp: 98.8  F (37.1  C) Temp src: Oral BP: 112/54 Pulse: 89   Resp: 16 SpO2: 97 % O2 Device: None (Room air)    Weight: 110 lbs 3.68 oz         Alert and oriented . In no acute distress .  Chest ; Breath sounds are equal BL. No respiratory distress noted .  Cardiovascular Exam ; S1 & S2 .  GI ; Abdomen is soft and non tender. BS positive .FT in place . Mass ? Lower abd  Skin ; no jaundice noted.  Psych ; Ree mood & affect.    Medical Decision Making       55 MINUTES SPENT BY ME on the date of service doing chart review, history, exam, documentation & further activities per the note.      Data     I have personally reviewed the following data over the past 24  hrs:    18.7 (H)  \   11.7   / 286     138 101 91.0 (H) /  112 (H)   4.8 20 (L) 2.05 (H) \     ALT: 18 AST: 24 AP: 94 TBILI: 0.2   ALB: 3.8 TOT PROTEIN: 9.0 (H) LIPASE: 35     TSH: 0.64 T4: N/A A1C: 6.0 (H)     Procal: N/A CRP: N/A Lactic Acid: 1.5

## 2024-08-11 NOTE — PLAN OF CARE
Reviewed Dr Xiao's note at 1301 pm .  Appreciate input .  At this point recommendation is out patient follow up preferably [place].  If family would like N , that will be done as out patient as well .    Also will stop IVF as RD able to start TF at 20 ml an hour and see how that is tolerated     RN updated

## 2024-08-11 NOTE — CONSULTS
MEDICAL ONCOLOGY ATTENDING PHYSICIAN CONSULTATION NOTE - Solid Tumor Oncology (Team 2)  Patient: Myla Glynn   MRN: [id number]   Admission Date: 8/10/2024  Cancer Diagnosis: metastatic colorectal cancer  Primary Outpatient Oncologist: [facility]  Current Treatment Plan: none known    Mrs. Glynn is a 79 yr old woman from [place], under the care of the [facility] Service for nausea, decreased food intake, leukocytosis and overall failure to thrive. Her oncology history is pertinent for a reported diagnosis of metastatic colorectal cancer with primary team reported to be at the [facility]. Her past medical history as listed in the ealth notes is quite extensive, and most notable for: heart failure, hypertension, type 2 diabetes, mild cognitive impairment, and acute on chronic renal failure with a history of chronic kidney disease stage III, among others.    The history of her oncologic diagnosis and treatment is not entirely clear, but it is noted that the patient reportedly was evaluated by her primary team at the [facility] just several days before the admission at [facility], with a CT c/a/p reporting advancing/progressive cancer as follows [copied from Care Everywhere]:    CT abd/pelvis 8/8/24  Impression    Marked interval worsening of the large locally advanced infiltrative recurrent mass invading the the bladder and segments of small bowel with associated tethering of the vaginal cuff and sigmoid colon. There is a high-risk for bowel obstruction.    However, no bowel obstruction currently.    2. 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  No gas within the bladder.      From my review of outside records at [facility] via Care Everywhere, it appears that her primary oncologist at that site is:      Department Care Team (Latest Contact Info) Description    12/15/2023 Clinical Communication Department of Oncology in [place], [place]    [address] [place] [address]   [phone]  Jaylen Colon M.D.   [address] [place] [address]   [phone] (Work)   [phone] (Fax)          My overall impression is that Mrs. Glynn has progressive metastatic stage IV colorectal cancer with genitourinary tract involvement and complications, with an overall clinical picture complicated by a series of non-cancer related co-morbid conditions. Her primary oncology team is at the [facility]. Considering the acute presentation and the extent and nature of current complications, my recommendation is that the focus remain on managing the acute issues that brought her into the hospital, and deferring larger picture diagnostic and prognostic discussions until there is evidence of meaningful recovery and improvement in performance status that would allow best assessment for realistic treatment options, if any. If the patient and her family may opt to return to her primary oncology team at the [facility]; if they wish for an additional oncology opinion with the [facility]  team, we can help arrange outpatient consultation that can take place as an outpatient.  I connected with the Bedside Nurse at [phone] at approximately 10:45 am 8/11/24 and reviewed the case; she affirmed that no family or other caregivers were present at the patient's bedside at that time, and that patient requires an  for her native language (Tigriny[place], from [place]). I sent a text message to attending MD for the Gold Team Dr. Muller at 11:03 am but did not hear back. We would be happy to assist further as and if needed throughout the patient's hospitalization; please page the Team 2 Oncology fellow 8/12 or afterward as needed or for additional questions or concerns.    Grant Xiao MD, PhD  Professor of Medicine, Hematology, Oncology and Transplantation  I evaluated this patient's case and the note above reflects my assessment and plan. I performed  the entirely/substantive portion of today's documented medical decision making.  I have personally reviewed lab results, and vital and radiology results. >50% of time was spent in assessment and coordination of care; >=70 minutes.

## 2024-08-11 NOTE — PROGRESS NOTES
ADMISSION [place] [facility]:    Myla Glynn was admitted from [place] ED for nausea and vomiting.  2 RN skin assessment: completed by Sandi RICHARDS RN   Result of skin assessment and interventions/actions: chronic PEG, no other skin concerns   Height, weight: completed? Yes   Patient belongings & admission documents: see Flowsheets, completed? Yes   MDRO education added to care plan: yes

## 2024-08-11 NOTE — PHARMACY-ADMISSION MEDICATION HISTORY
Admission Medication History Completed by Pharmacy    See [facility] Admission Navigator for allergy information, preferred outpatient pharmacy and prior to admission medications.     Medication History Sources:   Prescription fill history via Epic Surescripts report  6/27/2024 [facility] Discharge summary   Reena (family friend) via phone: [phone]    Pertinent Information or Changes Made to PTA Med List:  Several family members present in ED during interview. Writer spoke with family friend, Reena ([phone]), via speaker phone to review PTA medications. Reena has a medical background and speaks fluent English.  Reena explained the patient's daughter, Michele, used to live with patient and manage her medications but has recently become ill, so is no longer able to do so.  Family have been managing her medications as best as possible.     All medications are crushed and given via G-tube.  Last doses were yesterday, 8/9/24.  Family denies any medication changes since TCU discharge.     Allergies reviewed with family friend (Reena): yes, confirmed NKDA     Prior to Admission medications    Medication Sig Last Dose  Auth Provider     acetaminophen (TYLENOL) 325 MG tablet 2 tablets (650 mg) by Oral or Feeding Tube route every 4 hours as needed for mild pain or fever (greater than 101 degrees)     As needed   Viola William PA-C     carvedilol (COREG) 3.125 MG tablet 1 tablet (3.125 mg) by Oral or Feeding Tube route 2 times daily (with meals)     8/9/2024  Viola William PA-C         multivitamins w/minerals liquid 15 mLs by Oral or Feeding Tube route daily     8/9/2024  Viola William PA-C     ondansetron (ZOFRAN) 4 MG tablet 1 tablet (4 mg) by Oral or Feeding Tube route every 8 hours     8/9/2024  Viola William PA-C     oxyBUTYnin (DITROPAN) 5 MG/5ML solution 2.5 mLs (2.5 mg) by Per Feeding Tube route 2 times daily     8/9/2024  Viola William PA-C      pantoprazole (PROTONIX) 2 mg/mL SUSP suspension 20 mLs (40 mg) by Per Feeding Tube route every morning (before breakfast)     8/9/2024  Viola William PA-C     psyllium (METAMUCIL/KONSYL) 58.6 % powder Take 6 g (1 teaspoonful) by mouth daily     8/9/2024  Viola William PA-C     rosuvastatin (CRESTOR) 5 MG tablet 1 tablet (5 mg) by Oral or Feeding Tube route at bedtime     8/9/2024  Viola William PA-C     saccharomyces boulardii (FLORASTOR) 250 MG capsule 1 capsule (250 mg) by Oral or Feeding Tube route 2 times daily     8/9/2024  Viola William PA-C       Date completed: 08/10/24    Medication history completed by:   Karyn Valentino, Pharm.D., [facility]  Behavioral Health Inpatient Pharmacist  [facility] ([facility])  Contact via [employer] or Epic Messaging  '

## 2024-08-11 NOTE — PLAN OF CARE
A&O x4, makes needs known. Stable on RA. No BM, Miralax and Senna given. Good UOP. 1400 feeds started at 20 ml/hr x 8 hr, if no emesis, advance to goal. R PIV SL.

## 2024-08-11 NOTE — PROVIDER NOTIFICATION
Writer notified provider this morning that pt is still refusing all PO intake. No IV fluid orders. Awaiting next action.

## 2024-08-11 NOTE — PHARMACY-CONSULT NOTE
Pharmacy Tube Feeding Consult    Medication reviewed for administration by feeding tube and for potential food/drug interactions.    Recommendation: No changes are needed at this time.     Pharmacy will continue to follow as new medications are ordered.    Kim Roberts, CandiD, BCPS

## 2024-08-11 NOTE — PLAN OF CARE
Goal Outcome Evaluation:      Plan of Care Reviewed With: patient    Pt is alert and oriented x 4. Able to make needs known.   Pt needs  (speaks [place]).  Patient rated pain 6/10 in lower left knee, and abd, managed pain with PRN pain med (check MAR).  Stable on room air.  Still no BM.   Pt was in a great mood, and more talkative when family visited this afternoon.   Pt reported feeling nauseas, managed with PRN antiemetics . POC continues.       Feeds still at 20 ml/hr, due to pt feeling nauseas. Notified  MD . MD suggested will keep it at that rate, and increase in the morning.

## 2024-08-11 NOTE — PROGRESS NOTES
CLINICAL NUTRITION SERVICES - ASSESSMENT NOTE     Nutrition Prescription    RECOMMENDATIONS FOR MDs/PROVIDERS TO ORDER:  - Based on 50 kg bed scale wt and no oral fluid intake, would recommend free water flushes of 30 ml every hour (720 ml) if you want to discontinue IVF once fully hydrated.  Currently will receive 360 ml q 24 hr.  Recommend hourly flushes for now until n/v improve if pt having slow gastric emptying.    - To avoid refeeding syndrome, please remove D5 from IVF.     Malnutrition Status:    Defer full assessment to weekday RD.    Recommendations already ordered by Registered Dietitian (RD):  --add phosphorus to am lab draw.  --GOAL: TwoCal HN @ 30 mL/hr (720 mL/day) to provide 1440 kcals (29 kcal/kg/day), 60 g PRO (1.2 g/kg/day), 504 mL H2O, 158 g CHO and 4 g Fiber daily based on current wt of 50 kg.   --Start TF @ 20 ml/hr and advance by 10 ml q 8 hrs until goal rate if no emesis and meets electrolyte parameters.  --Do not start or advance TF rate unless K+ >3.0, Mg++ > 1.5,  and Phos > 1.9.  --Minimum 60 ml q 4 hrs water flushes for tube patency.  Further IVF per provider.   --gastric enteral access: HOB > 30 degrees  --liquid MVI/minerals supplement if not already ordered.  --Collaborate with provider re: above.    --no banatrol or supplemental fiber with risk for SBO.    Future/Additional Recommendations:  - Follow GI function, stooling, electrolytes, blood sugars and tolerance of TF at continuous goal.  - Follow I/Os for potential need for FWF recommendations/adjustment.       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  PEG placed 6/2024    NUTRITION HISTORY  As of 6/26/24 per IP RD note, pt was on a carb controlled diet and receiving the following TF: Nutrition Support: TwoCal HN via G-tube @ 45 mL/hr x 16 hrs (4 pm-8 am) + 1 pkt banatrol TID provides 720 mL, 1575 kcal (29 kcal/kg), 71 g protein (1.3 g/kg), 185 g CHO, 15 g fiber, and 504 mL free water per DW of 54 kg.  Water Flushes: 60 mL q 4 hrs (TF + Flushes = 864 mL water; meeting 64% hydration needs).   Pt was discharged home the next day with family    Noted that pt had loose stools w/ need for banatrol fiber TID. Noted small bowel resection for SBO along with urology procedures at [facility] 1/2024 that may have included resection of ileocecal valve.  Both sigmoid resection and lack of ICV would lead to looser stools.    Per H&P, started having nausea over past week along with vomiting about \"2 x 3 a day\".  Sometimes vomits formula.  Last BM was 4-5 days (prior to 8/10) and hard with decreased UO.      CURRENT NUTRITION ORDERS  Diet: clear liquids.  Last night (8/10) there was an order for Two CalHN to run @ 20 ml/hr entered after kitchen closed.  This morning the order is not found w/ discontinued orders. There is evidence of it with autofilling of TF order in the I/O section of flowsheet  Intake/Tolerance:   Day shift RN doesn't believe TF was ever received/started.  IVF--D5 1/2 NS @ 50 ml/hr started this AM as pt refusing po intake and oral Rx.  RN hasn't attempted to give Miralax or senna-docusate via FT yet but will do so after our discussion.     GI   Per 8/8 CT @ [facility]--Marked interval worsening of the large locally advanced infiltrative recurrent mass invading the the bladder and segments of small bowel with associated tethering of " "the vaginal cuff and sigmoid colon. There is a high-risk for bowel obstruction.  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     Per Xray 8/10, nonobstructive gas pattern, no pneumatosis or free air but is constipated.     LABS  Labs reviewed     Latest Reference Range & Units 07/23/24 13:11 07/29/24 12:03 08/10/24 12:00 08/11/24 08:13   Sodium 135 - 145 mmol/L 134 (L) 135 137 138   Potassium 3.4 - 5.3 mmol/L 5.0 4.9 4.8 4.8   Urea Nitrogen 8.0 - 23.0 mg/dL 73.5 (H) 76.3 (H) 98.6 (H) 91.0 (H)   Creatinine 0.51 - 0.95 mg/dL 2.04 (H) 1.89 (H) 2.00 (H) 2.05 (H)   Magnesium 1.7 - 2.3 mg/dL 2.6 (H)  3.1 (H) 3.1 (H)   Phosphorus 2.5 - 4.5 mg/dL   4.2    (L): Data is abnormally low  (H): Data is abnormally high     Latest Reference Range & Units 08/10/24 11:26 08/10/24 22:39 08/11/24 02:20 08/11/24 05:41 08/11/24 08:23   GLUCOSE BY METER POCT 70 - 99 mg/dL 160 (H) 105 (H) 94 99 112 (H)   (H): Data is abnormally high    MEDICATIONS  Medications reviewed and includes:  Pt on Mg and K+ standard replacement orders.  500 ml Bolus LR 8/11  D5 NS @ 50 ml/hr ordred @ 1030 8/11  Senna-docusate 1-2 tablets 2x/d  PRN includes miralax    ANTHROPOMETRICS  Height: 0 cm (Data Unavailable)  Most Recent Weight: 50 kg (110 lb 3.7 oz)      INTERVENTIONS  Implementation  Collaboration with other providers  Enteral Nutrition - Initiate  Feeding tube flush     Goals  Meet estimated needs as able via TF and po intake of tolerated.     Monitoring/Evaluation  Progress toward goals will be monitored and evaluated per protocol.  Silke Khan RD, LD   6 & 8 Med/Surg RD  Mon-Fri Vocera contact: By name, or \"6 [OR] 8 Med Surg Clinical Dietitian\"  Weekend RD Vocera: \"Weekend Holiday Clinical Dietitian\"        "

## 2024-08-11 NOTE — PROGRESS NOTES
VS: VVS, afebrile    O2: Above 95% on RA    Output: Incontinent, wears brief    Last BM: Unknown    Activity: Ax1 with gait belt and walker    Skin: No significant skin concerns; peg site CDI with new dressing    Pain: Pt c/o 2-3/10 pain in lower L abd intermittently    CMS: A&Ox4    Dressing: Gauze for PEG site    Diet: Continuous TF from 8pm-8am    LDA: R PIV patent        Plan: Continue POC, oncology and nutrition consults for this morning    Additional Info: Pt needs

## 2024-08-12 NOTE — PROGRESS NOTES
CLINICAL NUTRITION SERVICES - ASSESSMENT NOTE     Nutrition Prescription    RECOMMENDATIONS FOR MDs/PROVIDERS TO ORDER:  Plan of care discussed with MD at bedside    Malnutrition Status:    Severe malnutrition in the context of acute on chronic illness     Recommendations already ordered by Registered Dietitian (RD):  --add phosphorus to am lab draw.  --GOAL: TwoCal HN @ 30 mL/hr (720 mL/day) to provide 1440 kcals (29 kcal/kg/day), 60 g PRO (1.2 g/kg/day), 504 mL H2O, 158 g CHO and 4 g Fiber daily based on current wt of 50 kg.   --Advance TF to goal rate, monitor for tolerance, decrease rate to 20 mL/hr if not tolerating  --Do not start or advance TF rate unless K+ >3.0, Mg++ > 1.5,  and Phos > 1.9.  --Minimum 30 ml q 4 hrs water flushes for tube patency.  Further IVF per provider.   --gastric enteral access: HOB > 30 degrees  --no banatrol or supplemental fiber with risk for SBO.     Future/Additional Recommendations:  - Follow GI function, stooling, electrolytes, blood sugars and tolerance of TF at continuous goal.  - Follow I/Os for potential need for FWF recommendations/adjustment.  - if pt continues to have tolerance issues, will modify to less concentrated formula to assess for better tolerance     REASON FOR ASSESSMENT  Myla Glynn is a 79 year old female assessed by the dietitian for Provider Order - Registered Dietitian to Assess and Order TF per Medical Nutrition Therapy Protocol    Reason for admission: Patient was having nausea that stared about 1 week ago also vomiting about 2 x 3 a day , also some difficulty urinating and urinating less but still urinating.   PMH: metastatic sigmoid colon adenocarcinoma to bladder  ,  status post  sigmoid resection in 2021 also had  partial cystectomy and ureteral reimplantation ,Bladder  mass , status post  resection  9/2023  and pathology showed invasive adenocarcinoma then had partial cystectomy pelvic lymph node dissection small bowel resection 1/2024 ,  "HFrEF , diabetes and CKD.     Respiratory: Pt is on room air      NUTRITION HISTORY  Spoke with pt with assistance of [employer] . Pt unable to provide details of home TF regimen. Pt does not take PO nutrition. Pt states \"right now no one takes care of me and I want to be taken to a nursing home.\"  Pt reports no GI discomfort/nausea at this time.     She has PEG in and gets over night feedings, however she has had vomiting, sometime sin the middle of night sometime sin morning, emesis is color of her tube feeding. There has not been any changes in tube feeding. Gets 45 ml/hr form 8 PM till 8 AM. Has some nausea and dry heaving during the day but emesis is during the day.   As of 6/26/24 per IP RD note, pt was on a carb controlled diet and receiving the following TF: Nutrition Support: TwoCal HN via G-tube @ 45 mL/hr x 16 hrs (4 pm-8 am) + 1 pkt banatrol TID provides 720 mL, 1575 kcal (29 kcal/kg), 71 g protein (1.3 g/kg), 185 g CHO, 15 g fiber, and 504 mL free water per DW of 54 kg.  Water Flushes: 60 mL q 4 hrs (TF + Flushes = 864 mL water; meeting 64% hydration needs).   Pt was discharged home the next day with family    CURRENT NUTRITION ORDERS  Diet: Regular  Nutrition support: currently at 20 mL/hr with plans to increase to goal rate. If pt does not tolerate will decrease back to 20 mL/hr. Modifying FWF, discussed with MD at bedside. FWF had not been entered in TF machine.     LABS  Hyperphosphatemia  Hypermagnesemia  Current replacement of electrolyte- RN managed Standard protocol replaces Magnesium level when </=  1.5 mg/dL, potassium level when </= 3.4 mmol/L   Electrolytes  Potassium (mmol/L)   Date Value   08/11/2024 4.8   08/10/2024 4.8   07/29/2024 4.9   12/13/2021 4.2   09/13/2021 3.9   06/07/2021 3.9   05/09/2021 3.5   05/06/2021 3.8     Phosphorus (mg/dL)   Date Value   08/11/2024 4.9 (H)   08/10/2024 4.2   06/24/2024 4.4   06/20/2024 4.4   06/18/2024 4.0   05/06/2021 3.8   05/05/2021 5.1 (H) "   05/04/2021 5.0 (H)   01/12/2021 4.2   01/10/2021 2.5    Blood Glucose  Glucose (mg/dL)   Date Value   12/13/2021 98   09/13/2021 105 (H)   06/07/2021 90   05/09/2021 101 (H)   05/06/2021 93   04/20/2021 83   02/18/2021 72     GLUCOSE BY METER POCT (mg/dL)   Date Value   08/12/2024 194 (H)   08/12/2024 185 (H)   08/12/2024 176 (H)   08/11/2024 157 (H)   08/11/2024 123 (H)     Hemoglobin A1C (%)   Date Value   08/10/2024 6.0 (H)    Inflammatory Markers  WBC (10e9/L)   Date Value   06/07/2021 5.9   04/20/2021 5.4   02/15/2021 7.1     WBC Count (10e3/uL)   Date Value   08/11/2024 18.7 (H)   08/10/2024 18.2 (H)   06/24/2024 7.6     Albumin (g/dL)   Date Value   08/10/2024 3.8   06/24/2024 3.3 (L)   06/15/2024 3.1 (L)   12/13/2021 3.5   09/13/2021 3.4   06/07/2021 3.6   04/20/2021 3.7   02/15/2021 3.5      Magnesium (mg/dL)   Date Value   08/11/2024 3.1 (H)   08/10/2024 3.1 (H)   07/23/2024 2.6 (H)   05/09/2021 2.5 (H)   05/07/2021 2.1   05/06/2021 2.0     Sodium (mmol/L)   Date Value   08/11/2024 138   08/10/2024 137   07/29/2024 135   06/07/2021 141   05/09/2021 140   05/06/2021 139    Renal  Urea Nitrogen (mg/dL)   Date Value   08/11/2024 91.0 (H)   08/10/2024 98.6 (H)   07/29/2024 76.3 (H)   12/13/2021 27   09/13/2021 30   06/07/2021 25   05/09/2021 24   05/06/2021 33 (H)     Creatinine (mg/dL)   Date Value   08/11/2024 2.05 (H)   08/10/2024 2.00 (H)   07/29/2024 1.89 (H)   06/07/2021 1.30 (H)   05/09/2021 1.39 (H)   05/06/2021 1.62 (H)     Additional  Ketones Urine (mg/dL)   Date Value   08/10/2024 Negative   02/18/2021 Negative     Platelet Count   Date Value   08/11/2024 286 10e3/uL   06/07/2021 216 10e9/L     aPTT (no units)   Date Value   05/30/2024      Comment:     Clotted. Disregard results.  This is a corrected result. Previous result was 20 Seconds on 5/30/2024 at  9:20 AM CDT     INR (no units)   Date Value   05/30/2024 1.23 (H)   01/06/2021 1.56 (H)          RENAL  ALFREDA on CKD  III    GASTROINTESTINAL  Last BM: 8/12  Bowel regimen: Scheduled  GI concerns reported Nausea and Abdominal discomfort  per flowsheet. Pt did not report to writer  Enteral access: GTube  Dentition:  Missing teeth    SKIN  Mateusz:15, Nutrition 2  No significant skin impairments noted    MEDICATIONS  Medications reviewed  Novolog q 4 hrs low scale, MVI- M liquid, Senna-Docusate BID  PRN:  Zofran, Miralax given 8/12,   Current Facility-Administered Medications   Medication Dose Route Frequency Provider Last Rate Last Admin    acetaminophen (TYLENOL) tablet 650 mg  650 mg Oral or Feeding Tube Q4H PRN Naz Portillo MD   650 mg at 08/11/24 2213    Or    acetaminophen (TYLENOL) Suppository 650 mg  650 mg Rectal Q4H PRN Naz Portillo MD        cefTRIAXone (ROCEPHIN) 1 g vial to attach to  mL bag for ADULTS or NS 50 mL bag for PEDS  1 g Intravenous Q24H Alis Muller MD   1 g at 08/11/24 1944    dextrose 10% infusion   Intravenous Continuous PRN Naz Portillo MD        glucose gel 15-30 g  15-30 g Oral Q15 Min PRN Naz Portillo MD        Or    dextrose 50 % injection 25-50 mL  25-50 mL Intravenous Q15 Min PRNaz Arthur MD        Or    glucagon injection 1 mg  1 mg Subcutaneous Q15 Min PRNaz Arthur MD        heparin ANTICOAGULANT injection 5,000 Units  5,000 Units Subcutaneous Q12H Alis Muller MD   5,000 Units at 08/11/24 1939    insulin aspart (NovoLOG) injection (RAPID ACTING)  1-4 Units Subcutaneous Q4H Naz Portillo MD   1 Units at 08/12/24 0619    lidocaine (LMX4) cream   Topical Q1H PRN Naz Portillo MD        lidocaine 1 % 0.1-1 mL  0.1-1 mL Other Q1H PRN Naz Portillo MD        multivitamins w/minerals liquid 15 mL  15 mL Per Feeding Tube Daily Alis Muller MD   15 mL at 08/11/24 1336    ondansetron (ZOFRAN ODT) ODT tab 4 mg  4 mg Oral Q6H PRN Naz Portillo MD        Or    ondansetron (ZOFRAN) injection 4 mg  4 mg Intravenous Q6H PRN Naz Portillo MD   4 mg  "at 08/11/24 2245    polyethylene glycol (MIRALAX) Packet 17 g  17 g Oral or Feeding Tube BID Naz Garrido MD   17 g at 08/11/24 1336    senna-docusate (SENOKOT-S/PERICOLACE) 8.6-50 MG per tablet 1 tablet  1 tablet Oral or Feeding Tube BID Naz Garrido MD        Or    senna-docusate (SENOKOT-S/PERICOLACE) 8.6-50 MG per tablet 2 tablet  2 tablet Oral or Feeding Tube BID Naz Garrido MD   2 tablet at 08/11/24 1441    senna-docusate (SENOKOT-S/PERICOLACE) 8.6-50 MG per tablet 2 tablet  2 tablet Oral or Feeding Tube BID Naz Portillo MD        Or    senna-docusate (SENOKOT-S/PERICOLACE) 8.6-50 MG per tablet 2 tablet  2 tablet Oral or Feeding Tube BID Naz Portillo MD   2 tablet at 08/11/24 1939    sodium chloride (PF) 0.9% PF flush 3 mL  3 mL Intracatheter Q8H Naz Portillo MD   3 mL at 08/12/24 0215    sodium chloride (PF) 0.9% PF flush 3 mL  3 mL Intracatheter q1 min Naz Garrido MD           ANTHROPOMETRICS  Height:   Ht Readings from Last 1 Encounters:   06/18/24 1.499 m (4' 11.02\")     Most Recent Weight: 50 kg (110 lb 3.7 oz)    IBW: 44.5 kg  Body mass index is 22.25 kg/m .  BMI: Normal BMI  Desired BMI for individuals over age 65: 23-27  Weight History:  3.1 kg (5.8%) weight loss over 1 month.  Wt Readings from Last 5 Encounters:   08/10/24 50 kg (110 lb 3.7 oz)   07/23/24 53.1 kg (117 lb)   06/24/24 55 kg (121 lb 3.2 oz)   06/12/24 53.4 kg (117 lb 11.6 oz)   12/13/21 59.2 kg (130 lb 8 oz)   Per Care Everywhere:  04/11/24  58.2 kg (128 lb 4.9 oz)   09/29/23  67.6 kg (149 lb)    Dosing Weight: Actual Body Weight 50 kg    ASSESSED NUTRITION NEEDS  Estimated Energy Needs: 1250- 1750 kcals/day (25 - 35 kcals/kg)  Justification: Maintenance and Repletion  Estimated Protein Needs: 55 - 77 grams protein/day (1.1 - 1.4 grams of pro/kg)  Justification: Maintenance  Estimated Fluid Needs: 1250 - 1500 mL/day (25 - 30 mL/kg)   Justification: Maintenance    PHYSICAL FINDINGS  See " malnutrition section below.  Poor dentition       MALNUTRITION  % Intake: Chronic TF  % Weight Loss: > 5% in 1 month (severe)  Subcutaneous Fat Loss: Facial region:  Mild  Muscle Loss: Thoracic region (clavicle, acromium bone, deltoid, trapezius, pectoral):  Moderate  Fluid Accumulation/Edema: None noted  Malnutrition Diagnosis: Severe malnutrition in the context of acute on chronic illness    NUTRITION DIAGNOSIS  suspected inadequate enteral nutrition infusion related to suspected pt not administering recommended TF volumes as evidenced by 5.8% wt loss x 1 month, muscle and fat wasting      INTERVENTIONS  Implementation    Collaboration with other providers  Enteral Nutrition - Modify rate  Feeding tube flush     Goals  Total avg nutritional intake to meet a minimum of 25 kcal/kg and 1.1 g PRO/kg daily (per dosing wt 50 kg).     Monitoring/Evaluation  Progress toward goals will be monitored and evaluated per protocol.  Becky Stock RDN [facility] 5B/10A ICU   Available by Andi Palma, desk [phone]  Weekend/Holiday [place]: Weekend Holiday Clinical Dietitian [Multi Site Groups]

## 2024-08-12 NOTE — PROGRESS NOTES
"[facility]    Medicine Progress Note - Hospitalist Service, GOLD TEAM 17    Date of Admission:  8/10/2024    Assessment & Plan   Myla Glynn is a 79 year old female admitted on 8/10/2024. 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       Patient  was having nausea that stared about  1 week ago also vomiting about 2 x 3 a day , also some difficulty urinating and urinating less but still urinating. ( No longer has alejandra)     Last BM was 4-5 days ago that were hard PTA.  She is passing gas . No abdominal pain but has some \" burning \" sensation in abdomen shows in lower abdomen however this  is not new and always has this .     She has PEG in and gets over night feedings, however she has had vomiting, sometime sin the middle of night sometime sin morning, emesis is color of her tube feeding. There has not been any changes in tube feeding.     Gets 45ml/hr form 8 PM till 8 AM. Has some nausea and dry heaving during the day but emesis is during the day.      No fever or chills. She gets her medications via PEG tube most in AM, and family has been giving her her medications, she vomits before medications .     She denies any shortness of breath  no chest pain      8/12  Tolerating 20 ml TF . Gets nausea if increased more . Keep 20 ml for today   Discuss with RD in room and RN in room and discussed in acer rounds to schedule for [facility] Wednesday   Bowel regimen . Had 2 BM was constipated for few days PTA  Ceftriaxone 5 days ( urine culture is pending )  Am labs pending at 1125 am   SW consult         Nausea and " "vomiting   Decreased po intake  -had CT chest abdominal pelvis at [place] 8/8 , she was already having above symptoms .  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 \" She was to follow up  with oncology    - had cystogram 7/24 and alejandra was removed  , no longer takes  augmentin   - RN bladder scanned in ED for 50 ml and patient  did proceed to urinate 50 ml   My overall impression is that Mrs. Glynn has progressive metastatic stage IV colorectal cancer with genitourinary tract involvement and complications, with an overall clinical picture complicated by a series of non-cancer related co-morbid conditions. Her primary oncology team is at the [facility]. Considering the acute presentation and the extent and nature of current complications, my recommendation is that the focus remain on managing the acute issues that brought her into the hospital, and " "deferring larger picture diagnostic and prognostic discussions until there is evidence of meaningful recovery and improvement in performance status that would allow best assessment for realistic treatment options, if any. If the patient and her family may opt to return to her primary oncology team at the [facility]; if they wish for an additional oncology opinion with the [facility] team, we can help arrange outpatient consultation that can take place as an outpatient.         Constipation  - BM yesterday 8/11  - started bowel regime     Leukocytosis  - UA with > 182 WBC, large blood and WBC esterase but would expect this if has enterovesical fistula but not confirmed      HFrEF  Significant Mitral Valve regurgitation   Hypertension    -  follow at [place]  - TTE 4/4 with EF 29%  moderate TR   - was to have cardiac MRI once got stronger   - PTA on carvediol , continue with holding parameters  not on ACEi/ARB  SGLT2 inhibitor due to CKD       History left sided pleural effusion  - on CT 8/8 left pleural effusion size has decreased per report   - CT did show ne enhancing pleural thickening      Acute on chronic CKD stage III  Mild AGMA  -  baseline creatinine  1.4-1.7   -creatinine bit up form baseline suspect due to nausea vomiting and decreased po intake   - avoid nephrotoxic agents     Moderate malnutrition  - has PEG  that was placed 6/2024   - nutritionist to see   -  per notes \"Continue cycled G-tube feedings: TwoCal HN at 45 mL/hr run 1600 to 0800 with free water flushes 60 mL q4h. \" Will restart TF at lower rate over night for now     T2DM  - not on medications, check accu-checks insulin sliding scale     Mild cognitive impairment  - at risk for delirium     Depression  - not on medications      Anemia  - Hg up, could be form hemoconcentration        History of infected left retroperitoneal hematoma  status post  IR embolization and drainage 3/2024   - no current issues       Goals of cares  - full code at this  " point       CT chest abdomen  [facility]vis at [facility] 8/8  1. Marked interval worsening of the large locally advanced infiltrative recurrent mass invading the the bladder and segments of small bowel with associated tethering of the vaginal cuff and sigmoid colon. There is a high-risk for bowel obstruction.    However, no bowel obstruction currently.    2. 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  No gas within the bladder.  Narrative    EXAM:  CT ABDOMEN PELVIS WITH IV CONTRAST    COMPARISON:  CT abdomen pelvis with IV contrast 5/29/2024, CT cystogram 5/30/2024    FINDINGS:    Postoperative changes of a sigmoid colorectal anastomosis and small bowel resections , partial cystectomy, hysterectomy.  Appendectomy.    Worsening irregular invasive mass invading the bladder and small bowel and tethering the sigmoid colon, and the vaginal cuff in the pelvis.  The mass measures proximally 3.0 x 5.8 x 2.7 cm (series 1 image 123; series 5, image 35).  Asymmetric irregular  mural of the superior bladder measuring 1.5 cm in anterior inferior bladder consistent with tumor (series 1, image 108).  Probable defect of the anterior bladder wall/enterovesical fistula  (series 1 image 123).  The mass abuts the pubic symphysis and  pubic bodies with probable invasion of the anterior pelvic musculature.  Mildly gas distended segments of small bowel in the pelvis.  No high-grade bowel obstruction.  Tethering of the right distal ureter.  No hydronephrosis.    The enteroenterostomy in the right lower quadrant appears patent .However, the the proximal segment of the small bowel before the anastomosis is filled with tumor.  The enteroenterostomy in the right lower quadrant is patent.    Slight urothelial cell thickening enhancement of the left renal collecting system may be related to prior instrumentation.    No suspicious liver lesion.  Portal vein hepatic veins are patent.  Distended gallbladder.  Slight fatty  atrophy of the pancreas.  Tiny esophageal hiatal hernia.    The adrenal glands unremarkable.  Embolization coils right renal hilum.  Tiny low-attenuation lesion in the inferior left kidney may represent a cyst, but technically too small to characterize.  No lymphadenopathy in the abdomen or pelvis.    Scattered vascular calcifications.    Slightly decreased small left pleural effusion with associated pleural thickening.  Subsegmental atelectasis left lower lobe.    Scattered musculoskeletal degenerative changes.  No suspicious osseous lesion.    This examination was performed in conjunction with a CT of the chest, which will be reported separately.  Procedure Note    CT chest at [place] 8/8   OMPARISON: CT chest with IV contrast enhancement 3/2/2024.  FINDINGS:  Exam degraded by respiratory motion.    Since 3/2/2024, heart size has decreased and is at upper limits of normal. Large left pleural effusion has decreased, now small. New smooth enhancing circumferential pleural thickening around the remaining effusion. Improved atelectasis in the lingula  and left lower lobe. Moderate subsegmental atelectasis remains in the left lung base. Left PICC has been removed.    Remainder unchanged. Mild nodularity along the minor fissure, unchanged dating back to outside CT chest 11/17/2021 consistent with intrapulmonary lymph nodes. Tiny stable 2 mm nodule in the right upper lobe anteriorly (141).    Mild atelectasis in the right lower lung. Trace pericardial effusion. Coronary artery calcifications.    This examination was performed in conjunction with a CT of the abdomen which will be reported separately.  Procedure Note    Иван Rudolph M.D. - 08/09/2024  Formatting of this note might be different from the original.  EXAM: CT CHEST WITH IV CONTRAST    COMPARISON: CT chest with IV contrast enhancement 3/2/2024.    FINDINGS:  Exam degraded by respiratory motion.    Since 3/2/2024, heart size has decreased and is at upper  limits of normal. Large left pleural effusion has decreased, now small. New smooth enhancing circumferential pleural thickening around the remaining effusion. Improved atelectasis in the lingula  and left lower lobe. Moderate subsegmental atelectasis remains in the left lung base. Left PICC has been removed.    Remainder unchanged. Mild nodularity along the minor fissure, unchanged dating back to outside CT chest 11/17/2021 consistent with intrapulmonary lymph nodes. Tiny stable 2 mm nodule in the right upper lobe anteriorly (141).    Mild atelectasis in the right lower lung. Trace pericardial effusion. Coronary artery calcifications.    This examination was performed in conjunction with a CT of the abdomen which will be reported separately.    IMPRESSION:  1. Heart size has decreased and is now at upper limits of normal.  2. Left pleural effusion has decreased. New enhancing pleural thickening around the left effusion consistent with an exudative effusion.  3. Stable tiny pulmonary nodules.          Diet: Clear Liquid Diet  Adult Formula Drip Feeding: Continuous TwoCal HN; Gastrostomy; Goal Rate: 30 ml/hr continuous; start @ 20 ml/hr x 8 hr, if no emesis and meets lyte parameters, advance to goal.; mL/hr; If using cartons--follow 8 hr hang time for open system.; Do n...    DVT Prophylaxis: Heparin SQ  Cameron Catheter: Not present  Lines: None     Cardiac Monitoring: None  Code Status: Full Code      Clinically Significant Risk Factors                 # Acute Kidney Injury, unspecified: based on a >150% or 0.3 mg/dL increase in last creatinine compared to past 90 day average, will monitor renal function  # Hypertension: Noted on problem list               # Financial/Environmental Concerns:                 Disposition Plan     Medically Ready for Discharge: Anticipated in 2-4 Days             Alis Muller MD  Hospitalist Service, GOLD TEAM [address] [place] [address]  Securely  message with [employer] (more info)  Text page via [employer] Paging/Directory   See signed in provider for up to date coverage information  ______________________________________________________________________    Interval History   No emesis   Pain controlled   Spoke with interpretor on phone   No cp   No sob     Physical Exam   Vital Signs: Temp: 98.7  F (37.1  C) Temp src: Oral BP: 116/59 Pulse: 80   Resp: 16 SpO2: 100 % O2 Device: None (Room air)    Weight: 110 lbs 3.68 oz    Alert and looks comfortable   Neck ; supple  Chest ; clear   GI ; soft , lower abd fullness . No rebound or guarding   Medical Decision Making       55 MINUTES SPENT BY ME on the date of service doing chart review, history, exam, documentation & further activities per the note.      Data

## 2024-08-12 NOTE — PROGRESS NOTES
Xcover:   Bedside Rn reporting that patient experiencing nausea w/ tube feeds - discussed that we would just keep it at 20ml/hr overnight

## 2024-08-12 NOTE — PLAN OF CARE
Goal Outcome Evaluation:  L hip and knee pain managed with Tylenol. Pain rating 6/10.  Nausea managed with IV Zofran.  Incontinent B&B. 1 episode of loose stool.  Tube feeding increased to 30 mL/hr per MD verbal order.  FWF, 30ml q 4hr.  Tolerating sips of water.  On potassium & Mag managed replacements, no replacements needed today.  Not OOB, q 2hr repo.  PCDs on.  Family present at bedside.

## 2024-08-12 NOTE — PLAN OF CARE
Problem: Nausea and Vomiting  Goal: Nausea and Vomiting Relief  Outcome: Progressing   Goal Outcome Evaluation:  Improved                      [facility]  utilized. Patient is A/O x 3, disoriented to time. Denied pain, abdominal pain or discomfort. On room air maintaining sats >92%. Incontinent of bowel and bladder.  BM x 1 this shift. On TwoCal HN tube feeding via gastrostomy at 20 ml/hr. To reassess in the morning, if tolerating  to increase per order to goal of  30 ml/hr. No reports of nausea and no vomiting noted. PIV x 1.     -Plan: Increase tube feed to goal if tolerated. Free water flush of 30 ml every hr x 24 hr via pump or 60 ml q 2 hr if tolerating goal volume. Awaiting for  mag, phos and potassium results prior to advancing TF and without nausea.   - Labs still to be drawn.     For complete vital signs and assessments, please refer to flowsheets.

## 2024-08-12 NOTE — PLAN OF CARE
Called and spoke with cousin . Dolores . who is listed  in epic as contact   [phone]     We spoke about oncology recommendation for out patient consultation .    We also spoke about imaging results and metastatic disease   She mentioned that Myla stars with her daughter who is unable to take her back home and wanted to discuss with SW about placement options .    SW to help facilitate FCC

## 2024-08-12 NOTE — PROGRESS NOTES
[id number]  Ongoing ABD discomfort.  2 episode of emesis. IV zofran given, not effective. New order received for IV compazine, oncoming RN notified to give as soon as it is verified by pharmacist.   TF continuous at 30ml/hr + FWF.   1 episode of loose stool.

## 2024-08-13 NOTE — PROGRESS NOTES
"[facility]    Medicine Progress Note - Hospitalist Service, GOLD TEAM 17    Date of Admission:  8/10/2024    Assessment & Plan   Myla Glynn is a 79 year old female admitted on 8/10/2024. 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       Patient  was having nausea that stared about  1 week ago also vomiting about 2 x 3 a day , also some difficulty urinating and urinating less but still urinating. ( No longer has alejandra)     Last BM was 4-5 days ago that were hard PTA.  She is passing gas . No abdominal pain but has some \" burning \" sensation in abdomen shows in lower abdomen however this  is not new and always has this .     She has PEG in and gets over night feedings, however she has had vomiting, sometime sin the middle of night sometime sin morning, emesis is color of her tube feeding. There has not been any changes in tube feeding.     Gets 45ml/hr form 8 PM till 8 AM. Has some nausea and dry heaving during the day but emesis is during the day.      No fever or chills. She gets her medications via PEG tube most in AM, and family has been giving her her medications, she vomits before medications .     She denies any shortness of breath  no chest pain      8/13  Was unable to tolerate TF, otherwise no overnight event, DW Nutrition, changed formula and starting rate of 30 mL/h. At noon she complained of cramps, a dose of regal was given. She later vomited once.  Discussed with RN and care rounds.   Family care meeting scheduled for tomorrow at 2 pm.      Nausea and vomiting   Decreased po " "intake  -had CT chest abdominal pelvis at [place] 8/8 , she was already having above symptoms .  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 \" She was to follow up  with oncology    - had cystogram 7/24 and alejandra was removed  , no longer takes  augmentin   - RN bladder scanned in ED for 50 ml and patient  did proceed to urinate 50 ml   My overall impression is that Mrs. Glynn has progressive metastatic stage IV colorectal cancer with genitourinary tract involvement and complications, with an overall clinical picture complicated by a series of non-cancer related co-morbid conditions. Her primary oncology team is at the [facility]. Considering the acute presentation and the extent and nature of current complications, my recommendation is that the focus remain on managing the acute issues that brought her into the hospital, and deferring larger picture " "diagnostic and prognostic discussions until there is evidence of meaningful recovery and improvement in performance status that would allow best assessment for realistic treatment options, if any. If the patient and her family may opt to return to her primary oncology team at the [facility]; if they wish for an additional oncology opinion with the [facility] team, we can help arrange outpatient consultation that can take place as an outpatient.         Constipation  - BM yesterday 8/11  - started bowel regime     Leukocytosis  - UA with > 182 WBC, large blood and WBC esterase but would expect this if has enterovesical fistula but not confirmed      HFrEF  Significant Mitral Valve regurgitation   Hypertension    -  follow at [place]  - TTE 4/4 with EF 29%  moderate TR   - was to have cardiac MRI once got stronger   - PTA on carvediol , continue with holding parameters  not on ACEi/ARB  SGLT2 inhibitor due to CKD       History left sided pleural effusion  - on CT 8/8 left pleural effusion size has decreased per report   - CT did show ne enhancing pleural thickening      Acute on chronic CKD stage III  Mild AGMA  -  baseline creatinine  1.4-1.7   -creatinine bit up form baseline suspect due to nausea vomiting and decreased po intake   - avoid nephrotoxic agents     Moderate malnutrition  - has PEG  that was placed 6/2024   - nutritionist to see   -  per notes \"Continue cycled G-tube feedings: TwoCal HN at 45 mL/hr run 1600 to 0800 with free water flushes 60 mL q4h. \" Will restart TF at lower rate over night for now     T2DM  - not on medications, check accu-checks insulin sliding scale     Mild cognitive impairment  - at risk for delirium     Depression  - not on medications      Anemia  - Hg up, could be form hemoconcentration        History of infected left retroperitoneal hematoma  status post  IR embolization and drainage 3/2024   - no current issues       Goals of cares  - full code at this  point       CT chest " abdomen  plevis at [place] 8/8  1. Marked interval worsening of the large locally advanced infiltrative recurrent mass invading the the bladder and segments of small bowel with associated tethering of the vaginal cuff and sigmoid colon. There is a high-risk for bowel obstruction.    However, no bowel obstruction currently.    2. 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  No gas within the bladder.  Narrative    EXAM:  CT ABDOMEN PELVIS WITH IV CONTRAST    COMPARISON:  CT abdomen pelvis with IV contrast 5/29/2024, CT cystogram 5/30/2024    FINDINGS:    Postoperative changes of a sigmoid colorectal anastomosis and small bowel resections , partial cystectomy, hysterectomy.  Appendectomy.    Worsening irregular invasive mass invading the bladder and small bowel and tethering the sigmoid colon, and the vaginal cuff in the pelvis.  The mass measures proximally 3.0 x 5.8 x 2.7 cm (series 1 image 123; series 5, image 35).  Asymmetric irregular  mural of the superior bladder measuring 1.5 cm in anterior inferior bladder consistent with tumor (series 1, image 108).  Probable defect of the anterior bladder wall/enterovesical fistula  (series 1 image 123).  The mass abuts the pubic symphysis and  pubic bodies with probable invasion of the anterior pelvic musculature.  Mildly gas distended segments of small bowel in the pelvis.  No high-grade bowel obstruction.  Tethering of the right distal ureter.  No hydronephrosis.    The enteroenterostomy in the right lower quadrant appears patent .However, the the proximal segment of the small bowel before the anastomosis is filled with tumor.  The enteroenterostomy in the right lower quadrant is patent.    Slight urothelial cell thickening enhancement of the left renal collecting system may be related to prior instrumentation.    No suspicious liver lesion.  Portal vein hepatic veins are patent.  Distended gallbladder.  Slight fatty atrophy of the  pancreas.  Tiny esophageal hiatal hernia.    The adrenal glands unremarkable.  Embolization coils right renal hilum.  Tiny low-attenuation lesion in the inferior left kidney may represent a cyst, but technically too small to characterize.  No lymphadenopathy in the abdomen or pelvis.    Scattered vascular calcifications.    Slightly decreased small left pleural effusion with associated pleural thickening.  Subsegmental atelectasis left lower lobe.    Scattered musculoskeletal degenerative changes.  No suspicious osseous lesion.    This examination was performed in conjunction with a CT of the chest, which will be reported separately.  Procedure Note    CT chest at [place] 8/8   OMPARISON: CT chest with IV contrast enhancement 3/2/2024.  FINDINGS:  Exam degraded by respiratory motion.    Since 3/2/2024, heart size has decreased and is at upper limits of normal. Large left pleural effusion has decreased, now small. New smooth enhancing circumferential pleural thickening around the remaining effusion. Improved atelectasis in the lingula  and left lower lobe. Moderate subsegmental atelectasis remains in the left lung base. Left PICC has been removed.    Remainder unchanged. Mild nodularity along the minor fissure, unchanged dating back to outside CT chest 11/17/2021 consistent with intrapulmonary lymph nodes. Tiny stable 2 mm nodule in the right upper lobe anteriorly (141).    Mild atelectasis in the right lower lung. Trace pericardial effusion. Coronary artery calcifications.    This examination was performed in conjunction with a CT of the abdomen which will be reported separately.  Procedure Note    Иван Rudolph M.D. - 08/09/2024  Formatting of this note might be different from the original.  EXAM: CT CHEST WITH IV CONTRAST    COMPARISON: CT chest with IV contrast enhancement 3/2/2024.    FINDINGS:  Exam degraded by respiratory motion.    Since 3/2/2024, heart size has decreased and is at upper limits of normal.  Large left pleural effusion has decreased, now small. New smooth enhancing circumferential pleural thickening around the remaining effusion. Improved atelectasis in the lingula  and left lower lobe. Moderate subsegmental atelectasis remains in the left lung base. Left PICC has been removed.    Remainder unchanged. Mild nodularity along the minor fissure, unchanged dating back to outside CT chest 11/17/2021 consistent with intrapulmonary lymph nodes. Tiny stable 2 mm nodule in the right upper lobe anteriorly (141).    Mild atelectasis in the right lower lung. Trace pericardial effusion. Coronary artery calcifications.    This examination was performed in conjunction with a CT of the abdomen which will be reported separately.    IMPRESSION:  1. Heart size has decreased and is now at upper limits of normal.  2. Left pleural effusion has decreased. New enhancing pleural thickening around the left effusion consistent with an exudative effusion.  3. Stable tiny pulmonary nodules.          Diet: Clear Liquid Diet  Adult Formula Drip Feeding: Continuous [employer] Peptide 1.5; Gastrostomy; Goal Rate: Begin at 20 mL and advance 10 mL every 6 hours to goal 40; mL/hr; If using cartons--follow 8 hr hang time for open system.; Do not advance tube feeding rate unl...    DVT Prophylaxis: Heparin SQ  Cameron Catheter: Not present  Lines: None     Cardiac Monitoring: None  Code Status: Full Code      Clinically Significant Risk Factors                  # Hypertension: Noted on problem list            # Severe Malnutrition: based on nutrition assessment, PRESENT ON ADMISSION   # Financial/Environmental Concerns: none               Disposition Plan     Medically Ready for Discharge: Anticipated in 2-4 Days             Baldo Quevedo MD  Hospitalist Service, GOLD TEAM [address] [place] [address]  Securely message with [employer] (more info)  Text page via [facility] Paging/Directory   See signed in provider for  up to date coverage information  ______________________________________________________________________    Interval History   Was unable to tolerate TF, otherwise no overnight event, DW Nutrition, changed formula and starting rate of 30 mL/h. At noon she complained of cramps, a dose of regal was given. She later vomited once.    Physical Exam   Vital Signs: Temp: 98.2  F (36.8  C) Temp src: Oral BP: 104/65 Pulse: 110   Resp: 14 SpO2: 100 % O2 Device: None (Room air)    Weight: 110 lbs 3.68 oz    Alert and looks comfortable   Neck ; supple  Chest ; clear   GI ; soft , lower abd fullness . No rebound or guarding   Medical Decision Making       55 MINUTES SPENT BY ME on the date of service doing chart review, history, exam, documentation & further activities per the note.      Data     I have personally reviewed the following data over the past 24 hrs:    13.4 (H)  \   12.4   / 345     142 103 84.1 (H) /  132 (H)   4.4 22 1.84 (H) \

## 2024-08-13 NOTE — CONSULTS
Care Management Initial Consult    General Information  Assessment completed with: Children   Type of CM/SW Visit: Initial Assessment  Primary Care Provider verified and updated as needed: Yes   Readmission within the last 30 days: no previous admission in last 30 days  Reason for Consult: discharge planning  Advance Care Planning: other (see comments) (Did not discuss at this time)        Communication Assessment  Patient's communication style: spoken language (non-English) (Daughter speaks English but having a  is best to ensure no language barriers)    Hearing Difficulty or Deaf: no   Wear Glasses or Blind: no    Cognitive  Cognitive/Neuro/Behavioral: WDL          Orientation: disoriented to, time             Living Environment  People in home: alone     Current living Arrangements: apartment      Able to return to prior arrangements: no     Family/Social Support  Care provided by: child(smith)  Provides care for: no one, unable/limited ability to care for self  Marital Status:   Support System: Children          Description of Support System: Supportive, Involved    Support Assessment: Adequate family and caregiver support, Adequate social supports    Current Resources  Patient receiving home care services: No  Community Resources: [facility] Worker, Other (see comment) ([facility], phone # [phone]. MA # [phone].)  Equipment currently used at home: walker, rolling  Supplies currently used at home: None    Employment/Financial  Employment Status: other (see comments) (Previous documentation suggests patient has never worked outside the home)     Financial Concerns: none   Referral to Financial Worker: No  Does the patient's insurance plan have a 3 day qualifying hospital stay waiver?  No    Lifestyle & Psychosocial Needs  Social Determinants of Health     Food Insecurity: No Food Insecurity (2/22/2024)    [facility]    Hunger Vital Sign     Worried About Running  Out of Food in the Last Year: Never true     Ran Out of Food in the Last Year: Never true   Depression: Not at risk (1/25/2023)    [facility], [facility] [employer] [facility]    PHQ-2     PHQ-2 TOTAL SCORE: 1   Housing Stability: Low Risk  (2/22/2024)    Received from [facility]    Housing Stability     What is your living situation today?: I have a steady place to live   Tobacco Use: Low Risk  (7/23/2024)    Patient History     Smoking Tobacco Use: Never     Smokeless Tobacco Use: Never     Passive Exposure: Not on file   Financial Resource Strain: Low Risk  (6/20/2023)    [facility]    Financial Resource Strain     Difficulty of Paying Living Expenses: 3     Difficulty of Paying Living Expenses: Not on file   Alcohol Use: Not on file   Transportation Needs: No Transportation Needs (2/22/2024)    Received from [facility] - Transportation     Lack of Transportation (Medical): No     Lack of Transportation (Non-Medical): No   Recent Concern: Transportation Needs - Unmet Transportation Needs (2/1/2024)    [facility] - [facility]     Lack of Transportation (Medical): Yes     Lack of Transportation (Non-Medical): Yes   Physical Activity: Inactive (2/1/2024)    Received from [facility]    Exercise Vital Sign     Days of Exercise per Week: 0 days     Minutes of Exercise per Session: 0 min   Interpersonal Safety: Not At Risk (2/22/2024)    Received from [facility]    Humiliation, Afraid, Rape, and Kick questionnaire     Fear of Current or Ex-Partner: No     Emotionally Abused: No     Physically Abused: No     Sexually Abused: No   Stress: Not on file   Social Connections: Unknown (6/20/2024)    [facility]    Social Connections      Frequency of Communication with Friends and Family: Not on file   Health Literacy: Not on file     Functional Status  Prior to admission patient needed assistance: No  Dependent ADLs: Ambulation-walker  Dependent IADLs: Shopping, Transportation  Assesssment of Functional Status: Not at baseline with ADL Functioning, Not at baseline with mobility    Mental Health Status  Mental Health Status: No Current Concerns       Chemical Dependency Status  Chemical Dependency Status: No Current Concerns           Values/Beliefs  Spiritual, Cultural Beliefs, [employer] Practices, Values that affect care: no             Additional Information  Myla Glynn is a 79 year old female admitted on 8/10/2024. She has complex past medical history.    Social work reached out to daughter Khushboo to complete initial assessment. [employer]  used during the conversation. Per daughter, patient was living in an apartment independently prior to admitting to the hospital. When living independently, patient was receiving assistance from daughter with IADLs such as grocery shopping. Daughter feels that her mom will need more support and that discharging to a nursing home would be best at discharge. Ideally, she would like her mom to be placed in [facility] or [place]. Daughter shares having health issues of her own and states moving forward, she cannot continue to care for her mom.     SW asked daughter if her and family would be open to having a care conference tomorrow ensure everyone is on the same page. Daughter in agreement. We decided on having the care conference at 2pm tomorrow 8/14. Updated hospitalist. Care management continues to follow.     [facility] (Name unknown?)  Phone: [phone]   MA #: [id number].   Approved for 10hrs PCA.    Zandra Lindo, RUY, LGSW  5 Med Surg   [facility]  Phone: [phone]

## 2024-08-13 NOTE — PROGRESS NOTES
TF started at approximately 1045 @ 20mL/hr, free water flush 30mL q4h. TF to be advanced by 10mL q6h (1645) w/ goal of 40mL/hr. Will monitor pt to verify TF is being tolerated. PRN compazine given prior to administration.

## 2024-08-13 NOTE — PROGRESS NOTES
Ongoing nausea and abdominal discomfort. Pt administered IV Compazine. Pt requested tube feeding stopped for the night. MD notified. Okay to hold the tube feedings.

## 2024-08-13 NOTE — PLAN OF CARE
Pt requested RN to bedside at 1800. Pt took two small sips of water and subsequently began vomiting. Emesis contained clear/mucous output that totaled approximately 100mL. Pt was given PRN compazine for nausea. Unable to restart TF this shift d/t continuous nausea and vomiting. This was the second episode of emesis. Per MD & RD, RN not to restart TF until nausea/vomiting has resolved tonight. Pt denies pain but appears to writer to be uncomfortable. Will continue to monitor.

## 2024-08-13 NOTE — PLAN OF CARE
Goal Outcome Evaluation: Daughter feels patient will need LTC placement. This will be discussed in the care conference we are having tomorrow at 2pm on 8/14.

## 2024-08-13 NOTE — PLAN OF CARE
Problem: Nausea and Vomiting  Goal: Nausea and Vomiting Relief  Outcome: Progressing  Intervention: Prevent and Manage Nausea and Vomiting  Recent Flowsheet Documentation  Taken 8/13/2024 0117 by Dee Boyle RN  Oral Care: swabbed with sterile water  Taken 8/12/2024 2020 by Dee Boyle, RN  Nausea/Vomiting Interventions:   antiemetic   slow deep breathing encouraged   stimuli minimized    Problem: Diabetes  Goal: Blood Glucose Level Within Target Range  Outcome: Progressing      Goal Outcome Evaluation:   PT A&O,Dennis speaking with  service. PRN IV compazine given, effective. Continuous tube feeing on hold all night. Pt requested Tube feeding started at day shift. Reported abdominal pain. BG monitored with Insulin sliding scale. Pt assist 1 with repositioning. L PIV SL. Plan of care continues.

## 2024-08-13 NOTE — PROGRESS NOTES
CLINICAL NUTRITION SERVICES - BRIEF NOTE     Nutrition Prescription    RECOMMENDATIONS FOR MDs/PROVIDERS TO ORDER:  Plan of care discussed with provider    Malnutrition Status:    Severe malnutrition in the context of acute on chronic illness     Recommendations already ordered by Registered Dietitian (RD):  [place] Peptide 1.5 (or equivalent) @ 40 mL/hr (960 mL/day) to provide 1476 kcal (30 kcal/kg), 71 g protein (1.4 g/kg), 132 g CHO, 14 g fiber, 74 g fat (40% from MCTs), and 672 mL free water daily     Free Water Flushes: 30 mL q 4 hours to maintain tube patency. Additional free water per provider order.     Future/Additional Recommendations:  Monitor labs, weights, BM/GI, TF tolerance       Findings  Pt continues to not tolerate TF and TF has been held overnight  Spoke with pt with assistance of Latinda . Pt agrees to new TF regimen that was explained to her.    LABS  Hypermagnesemia noted  Electrolytes  Potassium (mmol/L)   Date Value   08/12/2024 4.1   08/11/2024 4.8   08/10/2024 4.8   12/13/2021 4.2   09/13/2021 3.9   06/07/2021 3.9   05/09/2021 3.5   05/06/2021 3.8     Phosphorus (mg/dL)   Date Value   08/12/2024 4.1   08/11/2024 4.9 (H)   08/10/2024 4.2   06/24/2024 4.4   06/20/2024 4.4   05/06/2021 3.8   05/05/2021 5.1 (H)   05/04/2021 5.0 (H)   01/12/2021 4.2   01/10/2021 2.5    Blood Glucose  Glucose (mg/dL)   Date Value   08/12/2024 212 (H)   12/13/2021 98   09/13/2021 105 (H)   06/07/2021 90   05/09/2021 101 (H)   05/06/2021 93   04/20/2021 83   02/18/2021 72     GLUCOSE BY METER POCT (mg/dL)   Date Value   08/13/2024 104 (H)   08/13/2024 108 (H)   08/12/2024 142 (H)   08/12/2024 161 (H)   08/12/2024 182 (H)     Hemoglobin A1C (%)   Date Value   08/10/2024 6.0 (H)    Inflammatory Markers  WBC (10e9/L)   Date Value   06/07/2021 5.9   04/20/2021 5.4   02/15/2021 7.1     WBC Count (10e3/uL)   Date Value   08/12/2024 13.8 (H)   08/11/2024 18.7 (H)   08/10/2024 18.2 (H)     Albumin (g/dL)    Date Value   08/10/2024 3.8   06/24/2024 3.3 (L)   06/15/2024 3.1 (L)   12/13/2021 3.5   09/13/2021 3.4   06/07/2021 3.6   04/20/2021 3.7   02/15/2021 3.5      Magnesium (mg/dL)   Date Value   08/12/2024 2.9 (H)   08/11/2024 3.1 (H)   08/10/2024 3.1 (H)   05/09/2021 2.5 (H)   05/07/2021 2.1   05/06/2021 2.0     Sodium (mmol/L)   Date Value   08/12/2024 139   08/11/2024 138   08/10/2024 137   06/07/2021 141   05/09/2021 140   05/06/2021 139    Renal  Urea Nitrogen (mg/dL)   Date Value   08/12/2024 82.5 (H)   08/11/2024 91.0 (H)   08/10/2024 98.6 (H)   12/13/2021 27   09/13/2021 30   06/07/2021 25   05/09/2021 24   05/06/2021 33 (H)     Creatinine (mg/dL)   Date Value   08/12/2024 1.79 (H)   08/11/2024 2.05 (H)   08/10/2024 2.00 (H)   06/07/2021 1.30 (H)   05/09/2021 1.39 (H)   05/06/2021 1.62 (H)     Additional  Ketones Urine (mg/dL)   Date Value   08/10/2024 Negative   02/18/2021 Negative     Platelet Count   Date Value   08/12/2024 308 10e3/uL   06/07/2021 216 10e9/L     aPTT (no units)   Date Value   05/30/2024      Comment:     Clotted. Disregard results.  This is a corrected result. Previous result was 20 Seconds on 5/30/2024 at  9:20 AM CDT     INR (no units)   Date Value   05/30/2024 1.23 (H)   01/06/2021 1.56 (H)        0 cm  Body mass index is 22.25 kg/m .    Wt Readings from Last 10 Encounters:   08/10/24 50 kg (110 lb 3.7 oz)   07/23/24 53.1 kg (117 lb)   06/24/24 55 kg (121 lb 3.2 oz)   06/12/24 53.4 kg (117 lb 11.6 oz)   12/13/21 59.2 kg (130 lb 8 oz)   09/13/21 56.1 kg (123 lb 11.2 oz)   06/14/21 50.6 kg (111 lb 9.6 oz)   06/07/21 50.1 kg (110 lb 8 oz)   05/20/21 50.4 kg (111 lb 3.2 oz)   05/07/21 52.6 kg (115 lb 14.4 oz)     INTERVENTIONS  Implementation    Collaboration with other providers  Enteral Nutrition - Modify composition, concentration, rate, and volume  Feeding tube flush        Follow up/Monitoring  Progress toward goals will be monitored and evaluated per protocol.    Becky Stock RDN  MICHAEL  Clinical Dietitian  [facility] 5B/10A ICU Vocera, Teams, or desk [phone]  Weekend/Holiday Vocera: Weekend Holiday Clinical Dietitian [Multi Site Groups]

## 2024-08-13 NOTE — PROGRESS NOTES
Pt c/o abdominal cramping. Provider notified. Awaiting response on whether to pause TF or give possible pharmacological intervention.     One time dose of reglan given via G-tube.    1500: Pt vomited at approximately 1430 about 4oz. Emesis  appeared to be TF formula. Provider and RD notified, TF paused and IV Zofran given.

## 2024-08-14 NOTE — PROGRESS NOTES
Care Management Follow Up    Length of Stay (days): 4    Expected Discharge Date: TBD     Concerns to be Addressed: Discharge planning     Patient plan of care discussed at interdisciplinary rounds: Yes    Anticipated Discharge Disposition: Long Term Care     Anticipated Discharge Services: None  Anticipated Discharge DME: None    Patient/family educated on Medicare website which has current facility and service quality ratings: Yes  Education Provided on the Discharge Plan: Yes  Patient/Family in Agreement with the Plan: Yes    Referrals Placed by CM/SW: External Care Coordination  Private pay costs discussed: Not applicable    Additional Information:  Social work attended a care conference with patient, family and Dr. Quevedo. Dr. Quevedo gave a brief medical update that patients cancer has progressed. He also discussed tube feeds in detail and how patient is not currently tolerating the TF due to nausea. Hospitalist has added a scheduled nausea medication in addition to PRN meds. Dr. Quevedo passed along that oncology will not be giving an opinion on prognosis due to patient being acutely ill and it not being reasonable to give one at this time. Family was instructed to follow up OP. The plan is to keep patient on the scheduled nausea medication for 1-2 more days and if nausea persists, GI will be asked to see patient. In the meantime, the team will work on gradually increasing tube feeds. If this is not successful, TPN will be considered.     Another issue that was brought up during the care conference was related to placement at discharge. Patient was previously living with her daughter at home but daughter is no longer Asafa is no longer able to care for her due to her own medical complications. MAURO spoke with Khushboo yesterday and she asked for assistance finding long term care placement in the community. She would prefer her mom placed in [facility] or [place] if possible. At the care conference, MAURO provided family  with a hard copy of the Medicare Care Compare list for [facility]. Another copy will be emailed to shasha Valles via email so she can look over it. Reena email is [email or web address]. Shasha Valles assists with patients care as well. Once medical issues are stable, SW will begin sending referrals to LTC facilities and assist with placement. EVE is at least 4-5 days per hospitalist but TBD. Care management will continue to follow.     RUY Morris, LGSW  5 Med Surg   [facility]  Phone: [phone]

## 2024-08-14 NOTE — PROGRESS NOTES
CLINICAL NUTRITION SERVICES - BRIEF NOTE     Nutrition Prescription    RECOMMENDATIONS FOR MDs/PROVIDERS TO ORDER:  Plan of care discussed with provider     Malnutrition Status:    Severe malnutrition in the context of acute on chronic illness      Recommendations already ordered by Registered Dietitian (RD):  [place] Peptide 1.5 (or equivalent) @ 10 mL/hr (240 mL/day) to provide 306 kcal (6 kcal/kg), 18 g protein (0.4 g/kg), 33 g CHO, 4 g fiber, 18 g fat (40% from MCTs), and 168 mL free water daily     Assess tolerance x 1 day and will evaluate ability to advance rate 8/15    Free Water Flushes: 30 mL q 4 hours to maintain tube patency. Additional free water per provider order.      Future/Additional Recommendations:  Monitor labs, weights, BM/GI, TF tolerance       Findings  Pt not able to tolerate TF,  d/t nausea and emesis. Pt vomited with sips of water. Spoke with MD who will schedule antiemetics and will provide Reglan. Will resume TF at 10 mL and leave at that rate. Will assess 8/15 ability to advance TF rate. Per MD no PO intakes.     LABS    Electrolytes  Potassium (mmol/L)   Date Value   08/13/2024 4.4   08/12/2024 4.1   08/11/2024 4.8   12/13/2021 4.2   09/13/2021 3.9   06/07/2021 3.9   05/09/2021 3.5   05/06/2021 3.8     Phosphorus (mg/dL)   Date Value   08/13/2024 4.3   08/12/2024 4.1   08/11/2024 4.9 (H)   08/10/2024 4.2   06/24/2024 4.4   05/06/2021 3.8   05/05/2021 5.1 (H)   05/04/2021 5.0 (H)   01/12/2021 4.2   01/10/2021 2.5    Blood Glucose  Glucose (mg/dL)   Date Value   12/13/2021 98   09/13/2021 105 (H)   06/07/2021 90   05/09/2021 101 (H)   05/06/2021 93   04/20/2021 83   02/18/2021 72     GLUCOSE BY METER POCT (mg/dL)   Date Value   08/14/2024 98   08/14/2024 112 (H)   08/13/2024 119 (H)   08/13/2024 111 (H)   08/13/2024 132 (H)     Hemoglobin A1C (%)   Date Value   08/10/2024 6.0 (H)    Inflammatory Markers  WBC (10e9/L)   Date Value   06/07/2021 5.9   04/20/2021 5.4   02/15/2021 7.1      WBC Count (10e3/uL)   Date Value   08/13/2024 13.4 (H)   08/12/2024 13.8 (H)   08/11/2024 18.7 (H)     Albumin (g/dL)   Date Value   08/10/2024 3.8   06/24/2024 3.3 (L)   06/15/2024 3.1 (L)   12/13/2021 3.5   09/13/2021 3.4   06/07/2021 3.6   04/20/2021 3.7   02/15/2021 3.5      Magnesium (mg/dL)   Date Value   08/13/2024 3.1 (H)   08/12/2024 2.9 (H)   08/11/2024 3.1 (H)   05/09/2021 2.5 (H)   05/07/2021 2.1   05/06/2021 2.0     Sodium (mmol/L)   Date Value   08/13/2024 142   08/12/2024 139   08/11/2024 138   06/07/2021 141   05/09/2021 140   05/06/2021 139    Renal  Urea Nitrogen (mg/dL)   Date Value   08/13/2024 84.1 (H)   08/12/2024 82.5 (H)   08/11/2024 91.0 (H)   12/13/2021 27   09/13/2021 30   06/07/2021 25   05/09/2021 24   05/06/2021 33 (H)     Creatinine (mg/dL)   Date Value   08/13/2024 1.84 (H)   08/12/2024 1.79 (H)   08/11/2024 2.05 (H)   06/07/2021 1.30 (H)   05/09/2021 1.39 (H)   05/06/2021 1.62 (H)     Additional  Ketones Urine (mg/dL)   Date Value   08/10/2024 Negative   02/18/2021 Negative     Platelet Count   Date Value   08/13/2024 345 10e3/uL   06/07/2021 216 10e9/L     aPTT (no units)   Date Value   05/30/2024      Comment:     Clotted. Disregard results.  This is a corrected result. Previous result was 20 Seconds on 5/30/2024 at  9:20 AM CDT     INR (no units)   Date Value   05/30/2024 1.23 (H)   01/06/2021 1.56 (H)          Body mass index is 22.25 kg/m .    Wt Readings from Last 10 Encounters:   08/10/24 50 kg (110 lb 3.7 oz)   07/23/24 53.1 kg (117 lb)   06/24/24 55 kg (121 lb 3.2 oz)   06/12/24 53.4 kg (117 lb 11.6 oz)   12/13/21 59.2 kg (130 lb 8 oz)   09/13/21 56.1 kg (123 lb 11.2 oz)   06/14/21 50.6 kg (111 lb 9.6 oz)   06/07/21 50.1 kg (110 lb 8 oz)   05/20/21 50.4 kg (111 lb 3.2 oz)   05/07/21 52.6 kg (115 lb 14.4 oz)       INTERVENTIONS  Implementation    Collaboration with other providers  Enteral Nutrition - Modify rate  Feeding tube flush        Follow up/Monitoring  Progress  toward goals will be monitored and evaluated per protocol.    Becky RODRIGUEZ  Clinical Dietitian  [facility] 5B/10A ICU Vocera, Teams, or desk [phone]  Weekend/Holiday Vocera: Weekend Holiday Clinical Dietitian [Multi Site Groups]

## 2024-08-14 NOTE — PROGRESS NOTES
"[facility]    Medicine Progress Note - Hospitalist Service, GOLD TEAM 17    Date of Admission:  8/10/2024    Assessment & Plan   Myla Glynn is a 79 year old female admitted on 8/10/2024. 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       Patient  was having nausea that stared about  1 week ago also vomiting about 2 x 3 a day , also some difficulty urinating and urinating less but still urinating. ( No longer has alejandra)     Last BM was 4-5 days ago that were hard PTA.  She is passing gas . No abdominal pain but has some \" burning \" sensation in abdomen shows in lower abdomen however this  is not new and always has this .     She has PEG in and gets over night feedings, however she has had vomiting, sometime sin the middle of night sometime sin morning, emesis is color of her tube feeding. There has not been any changes in tube feeding.     Gets 45ml/hr form 8 PM till 8 AM. Has some nausea and dry heaving during the day but emesis is during the day.      No fever or chills. She gets her medications via PEG tube most in AM, and family has been giving her her medications, she vomits before medications .     She denies any shortness of breath  no chest pain      8/14  Unable to tolerate TF at rate of 20 mL /h,   DW Nutrition, decreased the rate to 10 mL/h.   Scheduled Zofran x 6 doses, Omeprazole 20 bid and Pepcid 20 mg bid  Care conference with patient, family and SW today, , Requested LTC placement.   -Consider GI input if no improving with above, may need to be evaluated for the need for " "TPN  -Discussed with RN and care rounds.       Nausea and vomiting   Decreased po intake  -had CT chest abdominal pelvis at [place] 8/8 , she was already having above symptoms .  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 \" She was to follow up  with oncology    - had cystogram 7/24 and alejandra was removed  , no longer takes  augmentin   - RN bladder scanned in ED for 50 ml and patient  did proceed to urinate 50 ml   My overall impression is that Mrs. Glynn has progressive metastatic stage IV colorectal cancer with genitourinary tract involvement and complications, with an overall clinical picture complicated by a series of non-cancer related co-morbid conditions. Her primary oncology team is at the [facility]. Considering the acute presentation and the extent and nature of current complications, my recommendation is that the focus remain " "on managing the acute issues that brought her into the hospital, and deferring larger picture diagnostic and prognostic discussions until there is evidence of meaningful recovery and improvement in performance status that would allow best assessment for realistic treatment options, if any. If the patient and her family may opt to return to her primary oncology team at the [facility]; if they wish for an additional oncology opinion with the [facility] team, we can help arrange outpatient consultation that can take place as an outpatient.       Constipation  - BM yesterday 8/13  - started bowel regime     Leukocytosis  - UA with > 182 WBC, large blood and WBC esterase but would expect this if has enterovesical fistula but not confirmed      HFrEF  Significant Mitral Valve regurgitation   Hypertension    -  follow at [place]  - TTE 4/4 with EF 29%  moderate TR   - was to have cardiac MRI once got stronger   - PTA on carvediol , continue with holding parameters  not on ACEi/ARB  SGLT2 inhibitor due to CKD       History left sided pleural effusion  - on CT 8/8 left pleural effusion size has decreased per report   - CT did show ne enhancing pleural thickening      Acute on chronic CKD stage III  Mild AGMA  -  baseline creatinine  1.4-1.7   -creatinine bit up form baseline suspect due to nausea vomiting and decreased po intake   - avoid nephrotoxic agents     Moderate malnutrition  - has PEG  that was placed 6/2024   - nutritionist to see   -  per notes \"Continue cycled G-tube feedings: TwoCal HN at 45 mL/hr run 1600 to 0800 with free water flushes 60 mL q4h. \" Will restart TF at lower rate over night for now     T2DM  - not on medications, check accu-checks insulin sliding scale     Mild cognitive impairment  - at risk for delirium     Depression  - not on medications      Anemia  - Hg up, could be form hemoconcentration      History of infected left retroperitoneal hematoma  status post  IR embolization and drainage 3/2024 "   - no current issues     Goals of cares  - full code at this  point       CT chest abdomen  plevis at [place] 8/8  1. Marked interval worsening of the large locally advanced infiltrative recurrent mass invading the the bladder and segments of small bowel with associated tethering of the vaginal cuff and sigmoid colon. There is a high-risk for bowel obstruction.    However, no bowel obstruction currently.    2. 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  No gas within the bladder.  Narrative    EXAM:  CT ABDOMEN PELVIS WITH IV CONTRAST    COMPARISON:  CT abdomen pelvis with IV contrast 5/29/2024, CT cystogram 5/30/2024    FINDINGS:    Postoperative changes of a sigmoid colorectal anastomosis and small bowel resections , partial cystectomy, hysterectomy.  Appendectomy.    Worsening irregular invasive mass invading the bladder and small bowel and tethering the sigmoid colon, and the vaginal cuff in the pelvis.  The mass measures proximally 3.0 x 5.8 x 2.7 cm (series 1 image 123; series 5, image 35).  Asymmetric irregular  mural of the superior bladder measuring 1.5 cm in anterior inferior bladder consistent with tumor (series 1, image 108).  Probable defect of the anterior bladder wall/enterovesical fistula  (series 1 image 123).  The mass abuts the pubic symphysis and  pubic bodies with probable invasion of the anterior pelvic musculature.  Mildly gas distended segments of small bowel in the pelvis.  No high-grade bowel obstruction.  Tethering of the right distal ureter.  No hydronephrosis.    The enteroenterostomy in the right lower quadrant appears patent .However, the the proximal segment of the small bowel before the anastomosis is filled with tumor.  The enteroenterostomy in the right lower quadrant is patent.    Slight urothelial cell thickening enhancement of the left renal collecting system may be related to prior instrumentation.    No suspicious liver lesion.  Portal vein  hepatic veins are patent.  Distended gallbladder.  Slight fatty atrophy of the pancreas.  Tiny esophageal hiatal hernia.    The adrenal glands unremarkable.  Embolization coils right renal hilum.  Tiny low-attenuation lesion in the inferior left kidney may represent a cyst, but technically too small to characterize.  No lymphadenopathy in the abdomen or pelvis.    Scattered vascular calcifications.    Slightly decreased small left pleural effusion with associated pleural thickening.  Subsegmental atelectasis left lower lobe.    Scattered musculoskeletal degenerative changes.  No suspicious osseous lesion.    This examination was performed in conjunction with a CT of the chest, which will be reported separately.  Procedure Note    CT chest at [place] 8/8   OMPARISON: CT chest with IV contrast enhancement 3/2/2024.  FINDINGS:  Exam degraded by respiratory motion.    Since 3/2/2024, heart size has decreased and is at upper limits of normal. Large left pleural effusion has decreased, now small. New smooth enhancing circumferential pleural thickening around the remaining effusion. Improved atelectasis in the lingula  and left lower lobe. Moderate subsegmental atelectasis remains in the left lung base. Left PICC has been removed.    Remainder unchanged. Mild nodularity along the minor fissure, unchanged dating back to outside CT chest 11/17/2021 consistent with intrapulmonary lymph nodes. Tiny stable 2 mm nodule in the right upper lobe anteriorly (141).    Mild atelectasis in the right lower lung. Trace pericardial effusion. Coronary artery calcifications.    This examination was performed in conjunction with a CT of the abdomen which will be reported separately.  Procedure Note    Иван Rudolph M.D. - 08/09/2024  Formatting of this note might be different from the original.  EXAM: CT CHEST WITH IV CONTRAST    COMPARISON: CT chest with IV contrast enhancement 3/2/2024.    FINDINGS:  Exam degraded by respiratory  motion.    Since 3/2/2024, heart size has decreased and is at upper limits of normal. Large left pleural effusion has decreased, now small. New smooth enhancing circumferential pleural thickening around the remaining effusion. Improved atelectasis in the lingula  and left lower lobe. Moderate subsegmental atelectasis remains in the left lung base. Left PICC has been removed.    Remainder unchanged. Mild nodularity along the minor fissure, unchanged dating back to outside CT chest 11/17/2021 consistent with intrapulmonary lymph nodes. Tiny stable 2 mm nodule in the right upper lobe anteriorly (141).    Mild atelectasis in the right lower lung. Trace pericardial effusion. Coronary artery calcifications.    This examination was performed in conjunction with a CT of the abdomen which will be reported separately.    IMPRESSION:  1. Heart size has decreased and is now at upper limits of normal.  2. Left pleural effusion has decreased. New enhancing pleural thickening around the left effusion consistent with an exudative effusion.  3. Stable tiny pulmonary nodules.          Diet: Clear Liquid Diet  Adult Formula Drip Feeding: Continuous dbTwang Peptide 1.5; Gastrostomy; Goal Rate: 10 mL, do not advance; mL/hr; If using cartons--follow 8 hr hang time for open system.    DVT Prophylaxis: Heparin SQ  Cameron Catheter: Not present  Lines: None     Cardiac Monitoring: None  Code Status: Full Code      Clinically Significant Risk Factors                  # Hypertension: Noted on problem list            # Severe Malnutrition: based on nutrition assessment, PRESENT ON ADMISSION   # Financial/Environmental Concerns: none               Disposition Plan     Medically Ready for Discharge: Anticipated in 2-4 Days             Baldo Quevedo MD  Hospitalist Service, GOLD TEAM [address] [place] [address]  Securely message with [employer] (more info)  Text page via [employer] Paging/Directory   See signed in  provider for up to date coverage information  ______________________________________________________________________    Interval History   Was unable to tolerate TF at rate of 20 mL /h, otherwise no overnight event, No fever, chills, cp or sob. DW Nutrition, decreased the rate to 30 mL/h. Care conference with patient, family and SW today, plan of care explained to the family, requested LTC placement.     Physical Exam   Vital Signs: Temp: 98  F (36.7  C) Temp src: Oral BP: 130/70 Pulse: 92   Resp: 15 SpO2: 98 % O2 Device: None (Room air)    Weight: 110 lbs 3.68 oz    Alert and looks comfortable   Neck ; supple  Chest ; clear   GI ; soft , lower abd fullness . No rebound or guarding   Medical Decision Making       55 MINUTES SPENT BY ME on the date of service doing chart review, history, exam, documentation & further activities per the note.      Data

## 2024-08-14 NOTE — PLAN OF CARE
Goal Outcome Evaluation:      Plan of Care Reviewed With: patient    Overall Patient Progress: no changeOverall Patient Progress: no change   Pt on RA, denied Pain, pt refused starting Tube feeding overnight. Took sips of water and began vomiting, emesis 100ml. Prn zofran and compazine given for nausea and vomiting. BG monitored. Assist of 1, clear liquid diet. Watery bm this shift. LPIV infusing NS 75ml/hr. Plan of care continues.

## 2024-08-14 NOTE — CARE PLAN
Shift: 0700 - 1530    /70 (BP Location: Left arm)   Pulse 92   Temp 98  F (36.7  C) (Oral)   Resp 15   Wt 50 kg (110 lb 3.7 oz)   SpO2 98%   BMI 22.25 kg/m      Patient is alert and oriented to self situation, disoriented to time and place.Pt speaks Tigrinya,  services used for assessment. Patient and family asked for formula to be change due to nausea and abdominal discomfort. Zofran given x 1 this morning with little relieved. Denies shortness of breath,chest pain, numbness/tingling, nausea without vomiting present,  no fever or chills. Pt reports pain 5-6/10 declined interventions. Pt is able to make needs know, call light is in reach, continue with POC.

## 2024-08-15 NOTE — PLAN OF CARE
Goal Outcome Evaluation:       Vitally stable, /50 (BP Location: Left arm, Patient Position: Semi-Vasquez's, Cuff Size: Adult Regular)   Pulse 74   Temp 98.1  F (36.7  C) (Oral)   Resp 16   Wt 50 kg (110 lb 3.7 oz)   SpO2 100%   BMI 22.25 kg/m      Alert and oriented.     Tigrinya, required interprter.    Assist of one.     Peg tube for tube feeding running at 10 ml/hr    Left PIV running at 75 ml/hr.    Continue plan of care

## 2024-08-15 NOTE — PROGRESS NOTES
"[facility]    Medicine Progress Note - Hospitalist Service, GOLD TEAM 17    Date of Admission:  8/10/2024    Assessment & Plan     Myla Glynn is a 79 year old female admitted on 8/10/2024. 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         Patient  was having nausea that stared about  1 week ago also vomiting about 2 x 3 a day , also some difficulty urinating and urinating less but still urinating. ( No longer has alejandra)      Last BM was 4-5 days ago that were hard PTA.  She is passing gas . No abdominal pain but has some \" burning \" sensation in abdomen shows in lower abdomen however this  is not new and always has this .      She has PEG in and gets over night feedings, however she has had vomiting, sometime sin the middle of night sometime sin morning, emesis is color of her tube feeding. There has not been any changes in tube feeding.      Gets 45ml/hr form 8 PM till 8 AM. Has some nausea and dry heaving during the day but emesis is during the day.       No fever or chills. She gets her medications via PEG tube most in AM, and family has been giving her her medications, she vomits before medications .      She denies any shortness of breath  no chest pain       8/15  - still has abdominal pain, did have BM   - last emesis was yesterday per patient    -Consider GI input if no improving with above, may need to be evaluated for the need for TPN       Nausea and vomiting   Decreased po intake  -had CT chest abdominal pelvis at [place] 8/8 , she was already having above symptoms .  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 \" She was to follow up  with oncology    - had cystogram 7/24 and alejandra was removed  , no longer takes  augmentin   - RN bladder scanned in ED for 50 ml and patient  did proceed to urinate 50 ml   My overall impression is that Mrs. Glynn has progressive metastatic stage IV colorectal cancer with genitourinary tract involvement and complications, with an overall clinical picture complicated by a series of non-cancer related co-morbid conditions. Her primary oncology team is at the [facility]. Considering the acute presentation and the extent and nature of current complications, my recommendation is that the focus remain on managing the acute issues that brought her into the hospital, and deferring larger picture diagnostic and prognostic discussions until there is evidence of meaningful recovery and improvement " "in performance status that would allow best assessment for realistic treatment options, if any. If the patient and her family may opt to return to her primary oncology team at the [facility]; if they wish for an additional oncology opinion with the [facility] team, we can help arrange outpatient consultation that can take place as an outpatient.         Constipation  - did have BM   8/13, 8/15   - continue  bowel regime      Leukocytosis  - UA with > 182 WBC, large blood and WBC esterase but would expect this if has enterovesical fistula but not confirmed        HFrEF  Significant Mitral Valve regurgitation   Hypertension    -  follow at [place]  - TTE 4/4 with EF 29%  moderate TR   - was to have cardiac MRI once got stronger   - PTA on carvediol , continue with holding parameters  not on ACEi/ARB  SGLT2 inhibitor due to CKD         History left sided pleural effusion  - on CT 8/8 left pleural effusion size has decreased per report   - CT did show ne enhancing pleural thickening        Acute on chronic CKD stage III  Mild AGMA  -  baseline creatinine  1.4-1.7   -creatinine bit up form baseline suspect due to nausea vomiting and decreased po intake   - avoid nephrotoxic agents      Moderate malnutrition  - has PEG  that was placed 6/2024   - nutritionist to see   -  per notes \"Continue cycled G-tube feedings: TwoCal HN at 45 mL/hr run 1600 to 0800 with free water flushes 60 mL q4h. \" Started  TF at lower rate over night for now      T2DM  - not on medications, check accu-checks insulin sliding scale      Mild cognitive impairment  - at risk for delirium      Depression  - not on medications       Anemia  - Hg up, could be form hemoconcentration        History of infected left retroperitoneal hematoma  status post  IR embolization and drainage 3/2024   - no current issues      Goals of cares  - full code at this  point         CT chest abdomen  plevis at [place] 8/8  1. Marked interval worsening of the large locally " advanced infiltrative recurrent mass invading the the bladder and segments of small bowel with associated tethering of the vaginal cuff and sigmoid colon. There is a high-risk for bowel obstruction.    However, no bowel obstruction currently.    2. 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  No gas within the bladder.  Narrative     EXAM:  CT ABDOMEN PELVIS WITH IV CONTRAST    COMPARISON:  CT abdomen pelvis with IV contrast 5/29/2024, CT cystogram 5/30/2024    FINDINGS:    Postoperative changes of a sigmoid colorectal anastomosis and small bowel resections , partial cystectomy, hysterectomy.  Appendectomy.    Worsening irregular invasive mass invading the bladder and small bowel and tethering the sigmoid colon, and the vaginal cuff in the pelvis.  The mass measures proximally 3.0 x 5.8 x 2.7 cm (series 1 image 123; series 5, image 35).  Asymmetric irregular  mural of the superior bladder measuring 1.5 cm in anterior inferior bladder consistent with tumor (series 1, image 108).  Probable defect of the anterior bladder wall/enterovesical fistula  (series 1 image 123).  The mass abuts the pubic symphysis and  pubic bodies with probable invasion of the anterior pelvic musculature.  Mildly gas distended segments of small bowel in the pelvis.  No high-grade bowel obstruction.  Tethering of the right distal ureter.  No hydronephrosis.    The enteroenterostomy in the right lower quadrant appears patent .However, the the proximal segment of the small bowel before the anastomosis is filled with tumor.  The enteroenterostomy in the right lower quadrant is patent.    Slight urothelial cell thickening enhancement of the left renal collecting system may be related to prior instrumentation.    No suspicious liver lesion.  Portal vein hepatic veins are patent.  Distended gallbladder.  Slight fatty atrophy of the pancreas.  Tiny esophageal hiatal hernia.    The adrenal glands unremarkable.   Embolization coils right renal hilum.  Tiny low-attenuation lesion in the inferior left kidney may represent a cyst, but technically too small to characterize.  No lymphadenopathy in the abdomen or pelvis.    Scattered vascular calcifications.    Slightly decreased small left pleural effusion with associated pleural thickening.  Subsegmental atelectasis left lower lobe.    Scattered musculoskeletal degenerative changes.  No suspicious osseous lesion.    This examination was performed in conjunction with a CT of the chest, which will be reported separately.  Procedure Note     CT chest at [place] 8/8   OMPARISON: CT chest with IV contrast enhancement 3/2/2024.  FINDINGS:  Exam degraded by respiratory motion.    Since 3/2/2024, heart size has decreased and is at upper limits of normal. Large left pleural effusion has decreased, now small. New smooth enhancing circumferential pleural thickening around the remaining effusion. Improved atelectasis in the lingula  and left lower lobe. Moderate subsegmental atelectasis remains in the left lung base. Left PICC has been removed.    Remainder unchanged. Mild nodularity along the minor fissure, unchanged dating back to outside CT chest 11/17/2021 consistent with intrapulmonary lymph nodes. Tiny stable 2 mm nodule in the right upper lobe anteriorly (141).    Mild atelectasis in the right lower lung. Trace pericardial effusion. Coronary artery calcifications.    This examination was performed in conjunction with a CT of the abdomen which will be reported separately.  Procedure Note     Иван Rudolph M.D. - 08/09/2024  Formatting of this note might be different from the original.  EXAM: CT CHEST WITH IV CONTRAST    COMPARISON: CT chest with IV contrast enhancement 3/2/2024.    FINDINGS:  Exam degraded by respiratory motion.    Since 3/2/2024, heart size has decreased and is at upper limits of normal. Large left pleural effusion has decreased, now small. New smooth enhancing  circumferential pleural thickening around the remaining effusion. Improved atelectasis in the lingula  and left lower lobe. Moderate subsegmental atelectasis remains in the left lung base. Left PICC has been removed.    Remainder unchanged. Mild nodularity along the minor fissure, unchanged dating back to outside CT chest 11/17/2021 consistent with intrapulmonary lymph nodes. Tiny stable 2 mm nodule in the right upper lobe anteriorly (141).    Mild atelectasis in the right lower lung. Trace pericardial effusion. Coronary artery calcifications.    This examination was performed in conjunction with a CT of the abdomen which will be reported separately.    IMPRESSION:  1. Heart size has decreased and is now at upper limits of normal.  2. Left pleural effusion has decreased. New enhancing pleural thickening around the left effusion consistent with an exudative effusion.  3. Stable tiny pulmonary nodules.              Diet: NPO for Medical/Clinical Reasons Except for: Ice Chips, NPO but receiving Tube Feeding  Adult Formula Drip Feeding: Continuous [employer] Peptide 1.5; Gastrostomy; Goal Rate: Advance 10 mL now and then every 24 hours to goal rate 40 mL/hr; mL/hr; If using cartons--follow 8 hr hang time for open system.; Do not advance tube feeding ra...    DVT Prophylaxis: Heparin SQ  Cameron Catheter: Not present  Lines: None     Cardiac Monitoring: None  Code Status: Full Code      Clinically Significant Risk Factors                  # Hypertension: Noted on problem list            # Severe Malnutrition: based on nutrition assessment    # Financial/Environmental Concerns: none                      Naz Portillo MD  Hospitalist Service, GOLD TEAM [address] [place] [address]  Securely message with IMVU (more info)  Text page via [employer] Paging/Directory   See signed in provider for up to date coverage  information  ______________________________________________________________________    Interval History   In bed, still have some abdominal pain, emesis yesterday , having BM now   Interviewed vie help of phone     Physical Exam   Vital Signs: Temp: 98.4  F (36.9  C) Temp src: Oral BP: 119/58 Pulse: 80   Resp: 15 SpO2: 100 % O2 Device: None (Room air)    Weight: 110 lbs 3.68 oz  General appearence: awake alert  in  no apparent distress  RESPIRATORY: lungs clear    CARDIOVASCULAR:S1 S2 regular rate and rhythm,  GASTROINTESTINAL:soft, non-distended , has generalized tenderness , does have decreased but + bowel sounds    SKIN: warm and dry, no mottling noted   NEUROLOGIC; awake alert and oriented      Data     I have personally reviewed the following data over the past 24 hrs:    N/A  \   N/A   / N/A     N/A N/A N/A /  91   4.1 N/A N/A \       Imaging results reviewed over the past 24 hrs:   No results found for this or any previous visit (from the past 24 hour(s)).

## 2024-08-15 NOTE — CONSULTS
"Consult received for Vascular access care.  See LDA for details. For additional needs place \"Nursing to Consult for Vascular Access\" [id number] order in EPIC.  "

## 2024-08-15 NOTE — PROGRESS NOTES
CLINICAL NUTRITION SERVICES - BRIEF NOTE     Nutrition Prescription    RECOMMENDATIONS FOR MDs/PROVIDERS TO ORDER:  None at this time    Malnutrition Status:    Severe malnutrition in the context of acute on chronic illness      Recommendations already ordered by Registered Dietitian (RD):  [place] Peptide 1.5 (or equivalent) @ 40 mL/hr (960 mL/day) to provide 1476 kcal (30 kcal/kg), 71 g protein (1.4 g/kg), 132 g CHO, 14 g fiber, 74 g fat (40% from MCTs), and 672 mL free water daily      Advance 10 mL every 24 hours to goal rate     Free Water Flushes: 30 mL q 4 hours to maintain tube patency. Additional free water per provider order.      Future/Additional Recommendations:  Monitor labs, weights, BM/GI, TF tolerance     Findings  Spoke with pt with assistance of Style on Screen . TF has been tolerated at 10 mL/hr. Pt reports no nausea/emesis from TF. Pt had sip of water during visit and then reported nausea from the sip of water. Advised pt to not have sips of water. Discussed plans to increase TF rate slowly. Pt agrees to plan.     LABS  Hypermagnesemia  Electrolytes  Potassium (mmol/L)   Date Value   08/15/2024 4.1   08/13/2024 4.4   08/12/2024 4.1   12/13/2021 4.2   09/13/2021 3.9   06/07/2021 3.9   05/09/2021 3.5   05/06/2021 3.8     Phosphorus (mg/dL)   Date Value   08/13/2024 4.3   08/12/2024 4.1   08/11/2024 4.9 (H)   08/10/2024 4.2   06/24/2024 4.4   05/06/2021 3.8   05/05/2021 5.1 (H)   05/04/2021 5.0 (H)   01/12/2021 4.2   01/10/2021 2.5    Blood Glucose  Glucose (mg/dL)   Date Value   12/13/2021 98   09/13/2021 105 (H)   06/07/2021 90   05/09/2021 101 (H)   05/06/2021 93   04/20/2021 83   02/18/2021 72     GLUCOSE BY METER POCT (mg/dL)   Date Value   08/15/2024 91   08/15/2024 90   08/14/2024 80   08/14/2024 102 (H)   08/14/2024 79     Hemoglobin A1C (%)   Date Value   08/10/2024 6.0 (H)    Inflammatory Markers  WBC (10e9/L)   Date Value   06/07/2021 5.9   04/20/2021 5.4   02/15/2021 7.1     WBC  Count (10e3/uL)   Date Value   08/13/2024 13.4 (H)   08/12/2024 13.8 (H)   08/11/2024 18.7 (H)     Albumin (g/dL)   Date Value   08/10/2024 3.8   06/24/2024 3.3 (L)   06/15/2024 3.1 (L)   12/13/2021 3.5   09/13/2021 3.4   06/07/2021 3.6   04/20/2021 3.7   02/15/2021 3.5      Magnesium (mg/dL)   Date Value   08/15/2024 2.8 (H)   08/13/2024 3.1 (H)   08/12/2024 2.9 (H)   05/09/2021 2.5 (H)   05/07/2021 2.1   05/06/2021 2.0     Sodium (mmol/L)   Date Value   08/13/2024 142   08/12/2024 139   08/11/2024 138   06/07/2021 141   05/09/2021 140   05/06/2021 139    Renal  Urea Nitrogen (mg/dL)   Date Value   08/13/2024 84.1 (H)   08/12/2024 82.5 (H)   08/11/2024 91.0 (H)   12/13/2021 27   09/13/2021 30   06/07/2021 25   05/09/2021 24   05/06/2021 33 (H)     Creatinine (mg/dL)   Date Value   08/13/2024 1.84 (H)   08/12/2024 1.79 (H)   08/11/2024 2.05 (H)   06/07/2021 1.30 (H)   05/09/2021 1.39 (H)   05/06/2021 1.62 (H)     Additional  Ketones Urine (mg/dL)   Date Value   08/10/2024 Negative   02/18/2021 Negative     Platelet Count   Date Value   08/13/2024 345 10e3/uL   06/07/2021 216 10e9/L     aPTT (no units)   Date Value   05/30/2024      Comment:     Clotted. Disregard results.  This is a corrected result. Previous result was 20 Seconds on 5/30/2024 at  9:20 AM CDT     INR (no units)   Date Value   05/30/2024 1.23 (H)   01/06/2021 1.56 (H)        Body mass index is 22.25 kg/m .    Wt Readings from Last 10 Encounters:   08/10/24 50 kg (110 lb 3.7 oz)   07/23/24 53.1 kg (117 lb)   06/24/24 55 kg (121 lb 3.2 oz)   06/12/24 53.4 kg (117 lb 11.6 oz)   12/13/21 59.2 kg (130 lb 8 oz)   09/13/21 56.1 kg (123 lb 11.2 oz)   06/14/21 50.6 kg (111 lb 9.6 oz)   06/07/21 50.1 kg (110 lb 8 oz)   05/20/21 50.4 kg (111 lb 3.2 oz)   05/07/21 52.6 kg (115 lb 14.4 oz)     INTERVENTIONS  Implementation    Collaboration with other providers  Enteral Nutrition - Modify rate  Feeding tube flush        Follow up/Monitoring  Progress toward goals  will be monitored and evaluated per protocol.    Becky RODRIGUEZ  Clinical Dietitian  [facility] 5B/10A ICU Vocera, Teams, or desk [phone]  Weekend/Holiday Vocera: Weekend Holiday Clinical Dietitian [Multi Site Groups]

## 2024-08-15 NOTE — PLAN OF CARE
Problem: Adult Inpatient Plan of Care  Goal: Optimal Comfort and Wellbeing  Outcome: Progressing  Intervention: Monitor Pain and Promote Comfort  Recent Flowsheet Documentation  Taken 8/14/2024 1624 by Ashlyn Kyle RN  Pain Management Interventions: medication (see MAR)     Problem: Risk for Delirium  Goal: Optimal Coping  Outcome: Progressing  Intervention: Optimize Psychosocial Adjustment to Delirium  Recent Flowsheet Documentation  Taken 8/14/2024 1624 by Ashlyn Kyle RN  Supportive Measures: active listening utilized   Goal Outcome Evaluation:                  Pt alert and oriented to person and place disoriented to time, reports pain and nausea improved with tylenol and zofran. TF at 10 ml/hr no emesis this shift, IV fluids infusing, incontinent of bowel and bladder 2 liquid BMs tonight, BG checks q 4 hr, skin is CDI.  used via phone. Continue POC

## 2024-08-15 NOTE — PLAN OF CARE
Goal Outcome Evaluation:      Patient alert & oriented x4   utilized  Family at bedside  VSS  Room air  NPO with the exception of ice chips  Assist of 1 with walker  Tube feed running at 20 ml an hour. To be increased 10 ml q24 hours if patient is tolerating well. Goal 40 ml/hour.NS running at 75 ml/hour in left PIV  Incontinent of bowel and bladder  Frequent rounding and door open. Call light within reach, able to make needs known.       Patient was nauseated and vomited at 1850. Zofran given.

## 2024-08-15 NOTE — PROGRESS NOTES
08/15/24 1000   Appointment Info   Signing Clinician's Name / Credentials (PT) Luz Costello DPT       Present yes   Language Tigrinya via vocera   Living Environment   People in Home alone   Current Living Arrangements apartment  (public housing through government)   Home Accessibility no concerns   Transportation Anticipated agency;health plan transportation   Living Environment Comments PCA help 10 hrs/day - pt reports just helps w/ cleaning; had dtr helping too but she is sick now and cannot help at home   Self-Care   Usual Activity Tolerance moderate  (use a walker in AM then go w/out rest of day)   Current Activity Tolerance poor  (has not been OOB per pt)   Equipment Currently Used at Home walker, rolling   Fall history within last six months no   Activity/Exercise/Self-Care Comment every morning use walker - then states doesn't need it after unless outside of apt   General Information   Onset of Illness/Injury or Date of Surgery 08/10/24   Referring Physician Baldo Quevedo MD   Patient/Family Therapy Goals Statement (PT) not sure how moving yet has not started to move   Pertinent History of Current Problem (include personal factors and/or comorbidities that impact the POC) Myla Glynn is a 79 year old female admitted on 8/10/2024. 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   Existing Precautions/Restrictions fall   Weight-Bearing Status - LUE full weight-bearing   Weight-Bearing Status - RUE full weight-bearing   Weight-Bearing Status - LLE full weight-bearing   Weight-Bearing Status - RLE full  weight-bearing   General Observations Pt supine in bed resting, agreeable to participate   Cognition   Affect/Mental Status (Cognition) WFL   Cognitive Status Comments answers q's appropriately via use of    Pain Assessment   Patient Currently in Pain No   Integumentary/Edema   Integumentary/Edema no deficits were identifed   Posture    Posture Forward head position;Protracted shoulders   Range of Motion (ROM)   Range of Motion ROM is WFL   Strength (Manual Muscle Testing)   Strength (Manual Muscle Testing) Deficits observed during functional mobility   Bed Mobility   Comment, (Bed Mobility) Carmelo bed mob   Transfers   Comment, (Transfers) CGA w/ FWW   Gait/Stairs (Locomotion)   Comment, (Gait/Stairs) SBA w/ FWW   Balance   Balance no deficits were identified   Sensory Examination   Sensory Perception patient reports no sensory changes   Clinical Impression   Criteria for Skilled Therapeutic Intervention Yes, treatment indicated   PT Diagnosis (PT) impaired functional mobility   Influenced by the following impairments weakness, deconditioning   Functional limitations due to impairments impaired bed mob, transfers, amb   Clinical Presentation (PT Evaluation Complexity) stable   Clinical Presentation Rationale per clinical judgment   Clinical Decision Making (Complexity) low complexity   Planned Therapy Interventions (PT) bed mobility training;gait training;home exercise program;patient/family education;strengthening;transfer training;progressive activity/exercise;risk factor education;home program guidelines   Risk & Benefits of therapy have been explained evaluation/treatment results reviewed;care plan/treatment goals reviewed;risks/benefits reviewed;current/potential barriers reviewed;participants voiced agreement with care plan;participants included;patient   Clinical Impression Comments Pt needing Ax1 w/ bed mobility and SBAx1 w/ transfers and amb w/ use of FWW. At this time do not anticipate need for  LTC, w/ clarification of baseline, PCA services/availability, and home services available would anticipate pt would be more appropriate for TCU v home w/ home care.   PT Total Evaluation Time   PT Rehan, Low Complexity Minutes (89588) 5   Physical Therapy Goals   PT Frequency 5x/week   PT Predicted Duration/Target Date for Goal Attainment 09/19/24   PT Goals Bed Mobility;Transfers;Gait   PT: Bed Mobility Independent;Supine to/from sit;Rolling   PT: Transfers Modified independent;Sit to/from stand;Bed to/from chair;Assistive device   PT: Gait Modified independent;Assistive device;Rolling walker;150 feet   Interventions   Interventions Quick Adds Therapeutic Activity   Therapeutic Activity   Therapeutic Activities: dynamic activities to improve functional performance Minutes (64908) 28   Symptoms Noted During/After Treatment Fatigue   Treatment Detail/Skilled Intervention Pt supine in bed on PT arrival, she states she has not been OOB since admission but agreeable to try. PT obtains equipment (GB and FWW) as none present in room. Pt completes SLR for removal of pillows. Initiate sup>sit ind but ultimately requires Carmelo via HHA and use of bedrail. Sititng EOB ind balance. Pt motions to dressing gown, ind dons this while seated. GB donned and FWW in place pt completes STS w/ CGA-SBA. Stable static standing, progressed to amb w/ FWW for total of 60ft to fatigue w/ good stability and gait pattern noted so progressed CGA-SBA. Once back in room completes SPT and agreeable to sit upright in recliner. Made comfortable in chair w/ needs met. Teach back completed on call light. Pt then states that she would prefer home care.   PT Discharge Planning   PT Plan ind w/ bed mob; gait progression w/ FWW   PT Discharge Recommendation (DC Rec) Transitional Care Facility;home with assist;home with home care physical therapy   PT Rationale for DC Rec Pt needing Ax1 w/ bed mobility and SBAx1 w/ transfers and amb w/ use of FWW. At this time  do not anticipate need for LTC, w/ clarification of baseline, PCA services/availability, and home services available would anticipate pt would be more appropriate for TCU v home w/ home care.   PT Brief overview of current status Ax1 bed mob, Ax1 w/ FWW for transfers and amb; nursing to get pt up and amb outside of therapies   Total Session Time   Timed Code Treatment Minutes 28   Total Session Time (sum of timed and untimed services) 33

## 2024-08-15 NOTE — PLAN OF CARE
"Goal Outcome Evaluation:       79 year old female patient admitted for N/V and decreased urine output. Vitals are stable, on room air, alert x4. Last BM was 8/15 \"patient is incontinent of bowel and bladder, she has a pull-up on\". Patient is NPO except for ice chips. Patient has a peg tube that was running at 10 ml/hr at the beginning of my shift dietary place an order to increase to 20 ml/hr with Q4 water flushes \"continuous\". Peg tube has two ports one is used for the tube feeding and the other is for medication. \"All medication is either liquid or needs to be rushed and added to water\". Patient speaks [employer]  required. Full code, no known allergies. Patient has a history of: Sigmoid and bladder cancer. Patient is a Q4 glucose check due to the tube feeding. RN managed for K+ and Mag no correction needed during my shift orders placed for AM draw.    Patients PIV became non-patent and the RN was unable to flush the line. The PIV was removed from the left hand and a dressing was applied. Critical care flyer was consulted to new PIV placement. Critical care was unable to get a PIV placed on the patient so they placed a peds vascular consult. Patient fluids have been held due to having no PIV.                  "

## 2024-08-16 NOTE — PLAN OF CARE
Goal Outcome Evaluation:      Plan of Care Reviewed With: patient    Overall Patient Progress: no changeOverall Patient Progress: no change    Pt on RA, denied Pain, pt refused Tube feeding at night d/t nausea episode. Zofran given.pt stated she felt masea yesterday, now resolved. Request T feeding restarted at day shift. NPO except Ice chips.  BG monitored q 4 hrs 70 corrected with apple juice, pt declined starting T-Feeding. Assist of 1, Incontinent of B&B, Skin CDI.  LPIV infusing NS 75ml/hr. Plan of care continues.

## 2024-08-16 NOTE — PROGRESS NOTES
"[facility]    Medicine Progress Note - Hospitalist Service, GOLD TEAM 17    Date of Admission:  8/10/2024    Assessment & Plan     Myla Glynn is a 79 year old female admitted on 8/10/2024. 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         Patient  was having nausea that stared about  1 week ago also vomiting about 2 x 3 a day , also some difficulty urinating and urinating less but still urinating. ( No longer has alejandra)      Last BM was 4-5 days ago that were hard PTA.  She is passing gas . No abdominal pain but has some \" burning \" sensation in abdomen shows in lower abdomen however this  is not new and always has this .      She has PEG in and gets over night feedings, however she has had vomiting, sometime sin the middle of night sometime sin morning, emesis is color of her tube feeding. There has not been any changes in tube feeding.      Gets 45ml/hr form 8 PM till 8 AM. Has some nausea and dry heaving during the day but emesis is during the day.       No fever or chills. She gets her medications via PEG tube most in AM, and family has been giving her her medications, she vomits before medications .      She denies any shortness of breath  no chest pain       8/16  - some nausea in  AM but better  - no emesis this  AM  - tolerating TF at 10 ml /hr   - having looser stools but could be overflow   - GI weighed in , checking abd XR  for obstruction and stool burden'- step up bowel regime        Nausea and vomiting   Decreased po intake  -had CT chest abdominal pelvis at [place] 8/8 , she was " "already having above symptoms .  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 \" She was to follow up  with oncology    - had cystogram 7/24 and alejandra was removed  , no longer takes  augmentin   - RN bladder scanned in ED for 50 ml and patient  did proceed to urinate 50 ml   My overall impression is that Mrs. Glynn has progressive metastatic stage IV colorectal cancer with genitourinary tract involvement and complications, with an overall clinical picture complicated by a series of non-cancer related co-morbid conditions. Her primary oncology team is at the [facility]. Considering the acute presentation and the extent and nature of current complications, my recommendation is that the focus remain on managing the acute issues that brought her into the hospital, and deferring larger picture " "diagnostic and prognostic discussions until there is evidence of meaningful recovery and improvement in performance status that would allow best assessment for realistic treatment options, if any. If the patient and her family may opt to return to her primary oncology team at the [facility]; if they wish for an [facility], we can help arrange outpatient consultation that can take place as an outpatient.         Constipation  - did have BM   8/13, 8/15   - ? Overflow diarrhea   - continue  bowel regime      Leukocytosis  - UA with > 182 WBC, large blood and WBC esterase but would expect this if has enterovesical fistula but not confirmed        HFrEF  Significant Mitral Valve regurgitation   Hypertension    -  follow at [facility]  - TTE 4/4 with EF 29%  moderate TR   - was to have cardiac MRI once got stronger   - PTA on carvediol , continue with holding parameters  not on ACEi/ARB  SGLT2 inhibitor due to CKD         History left sided pleural effusion  - on CT 8/8 left pleural effusion size has decreased per report   - CT did show ne enhancing pleural thickening        Acute on chronic CKD stage III  Mild AGMA  -  baseline creatinine  1.4-1.7   -creatinine bit up form baseline suspect due to nausea vomiting and decreased po intake   - avoid nephrotoxic agents      Moderate malnutrition  - has PEG  that was placed 6/2024   - nutritionist to see   -  per notes \"Continue cycled G-tube feedings: TwoCal HN at 45 mL/hr run 1600 to 0800 with free water flushes 60 mL q4h. \" Started  TF at lower rate over night for now      T2DM  - not on medications, check accu-checks insulin sliding scale      Mild cognitive impairment  - at risk for delirium      Depression  - not on medications       Anemia  - Hg up, could be form hemoconcentration        History of infected left retroperitoneal hematoma  status post  IR embolization and drainage 3/2024   - no current issues      Goals of cares  - " full code at this  point         CT chest abdomen  plevis at [place] 8/8  1. Marked interval worsening of the large locally advanced infiltrative recurrent mass invading the the bladder and segments of small bowel with associated tethering of the vaginal cuff and sigmoid colon. There is a high-risk for bowel obstruction.    However, no bowel obstruction currently.    2. 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  No gas within the bladder.  Narrative     EXAM:  CT ABDOMEN PELVIS WITH IV CONTRAST    COMPARISON:  CT abdomen pelvis with IV contrast 5/29/2024, CT cystogram 5/30/2024    FINDINGS:    Postoperative changes of a sigmoid colorectal anastomosis and small bowel resections , partial cystectomy, hysterectomy.  Appendectomy.    Worsening irregular invasive mass invading the bladder and small bowel and tethering the sigmoid colon, and the vaginal cuff in the pelvis.  The mass measures proximally 3.0 x 5.8 x 2.7 cm (series 1 image 123; series 5, image 35).  Asymmetric irregular  mural of the superior bladder measuring 1.5 cm in anterior inferior bladder consistent with tumor (series 1, image 108).  Probable defect of the anterior bladder wall/enterovesical fistula  (series 1 image 123).  The mass abuts the pubic symphysis and  pubic bodies with probable invasion of the anterior pelvic musculature.  Mildly gas distended segments of small bowel in the pelvis.  No high-grade bowel obstruction.  Tethering of the right distal ureter.  No hydronephrosis.    The enteroenterostomy in the right lower quadrant appears patent .However, the the proximal segment of the small bowel before the anastomosis is filled with tumor.  The enteroenterostomy in the right lower quadrant is patent.    Slight urothelial cell thickening enhancement of the left renal collecting system may be related to prior instrumentation.    No suspicious liver lesion.  Portal vein hepatic veins are patent.  Distended  gallbladder.  Slight fatty atrophy of the pancreas.  Tiny esophageal hiatal hernia.    The adrenal glands unremarkable.  Embolization coils right renal hilum.  Tiny low-attenuation lesion in the inferior left kidney may represent a cyst, but technically too small to characterize.  No lymphadenopathy in the abdomen or pelvis.    Scattered vascular calcifications.    Slightly decreased small left pleural effusion with associated pleural thickening.  Subsegmental atelectasis left lower lobe.    Scattered musculoskeletal degenerative changes.  No suspicious osseous lesion.    This examination was performed in conjunction with a CT of the chest, which will be reported separately.  Procedure Note     CT chest at [place] 8/8   OMPARISON: CT chest with IV contrast enhancement 3/2/2024.  FINDINGS:  Exam degraded by respiratory motion.    Since 3/2/2024, heart size has decreased and is at upper limits of normal. Large left pleural effusion has decreased, now small. New smooth enhancing circumferential pleural thickening around the remaining effusion. Improved atelectasis in the lingula  and left lower lobe. Moderate subsegmental atelectasis remains in the left lung base. Left PICC has been removed.    Remainder unchanged. Mild nodularity along the minor fissure, unchanged dating back to outside CT chest 11/17/2021 consistent with intrapulmonary lymph nodes. Tiny stable 2 mm nodule in the right upper lobe anteriorly (141).    Mild atelectasis in the right lower lung. Trace pericardial effusion. Coronary artery calcifications.    This examination was performed in conjunction with a CT of the abdomen which will be reported separately.  Procedure Note     Иван Rudolph M.D. - 08/09/2024  Formatting of this note might be different from the original.  EXAM: CT CHEST WITH IV CONTRAST    COMPARISON: CT chest with IV contrast enhancement 3/2/2024.    FINDINGS:  Exam degraded by respiratory motion.    Since 3/2/2024, heart size has  decreased and is at upper limits of normal. Large left pleural effusion has decreased, now small. New smooth enhancing circumferential pleural thickening around the remaining effusion. Improved atelectasis in the lingula  and left lower lobe. Moderate subsegmental atelectasis remains in the left lung base. Left PICC has been removed.    Remainder unchanged. Mild nodularity along the minor fissure, unchanged dating back to outside CT chest 11/17/2021 consistent with intrapulmonary lymph nodes. Tiny stable 2 mm nodule in the right upper lobe anteriorly (141).    Mild atelectasis in the right lower lung. Trace pericardial effusion. Coronary artery calcifications.    This examination was performed in conjunction with a CT of the abdomen which will be reported separately.    IMPRESSION:  1. Heart size has decreased and is now at upper limits of normal.  2. Left pleural effusion has decreased. New enhancing pleural thickening around the left effusion consistent with an exudative effusion.  3. Stable tiny pulmonary nodules.              Diet: NPO for Medical/Clinical Reasons Except for: Ice Chips, NPO but receiving Tube Feeding  Adult Formula Drip Feeding: Continuous CanaryHop Peptide 1.5; Gastrostomy; Goal Rate: Advance 10 mL now and then every 24 hours to goal rate 40 mL/hr; mL/hr; If using cartons--follow 8 hr hang time for open system.; Do not advance tube feeding ra...    DVT Prophylaxis: Heparin SQ  Cameron Catheter: Not present  Lines: None     Cardiac Monitoring: None  Code Status: Full Code      Clinically Significant Risk Factors                  # Hypertension: Noted on problem list            # Severe Malnutrition: based on nutrition assessment    # Financial/Environmental Concerns: none                      Naz Portillo MD  Hospitalist Service, GOLD TEAM [address] [place] [address]  Securely message with [employer] (more info)  Text page via [facility] Paging/Directory   See signed  in provider for up to date coverage information  ______________________________________________________________________    Interval History   Had some nausea thsi AM but no emesis, no significant abdominal pain, seems  to be tolerating lower tf   Interviewed with help of [place]      Physical Exam   Vital Signs: Temp: 98  F (36.7  C) Temp src: Oral BP: (!) 142/55 Pulse: 69   Resp: 16 SpO2: 98 % O2 Device: None (Room air)    Weight: 110 lbs 3.68 oz  General appearence: awake alert  in  no apparent distress  RESPIRATORY: lungs clear    CARDIOVASCULAR:S1 S2 regular rate and rhythm,  GASTROINTESTINAL:soft, non-distended ,  tenderness improved , + bowel sounds    SKIN: warm and dry, no mottling noted   NEUROLOGIC; awake alert and oriented      Data     I have personally reviewed the following data over the past 24 hrs:    N/A  \   N/A   / 270     N/A N/A N/A /  70   3.9 N/A N/A \       Imaging results reviewed over the past 24 hrs:   No results found for this or any previous visit (from the past 24 hour(s)).

## 2024-08-16 NOTE — CONSULTS
"  Brief Gastroenterology Consultation      Date of Admission:  8/10/2024  Reason for Admission: Nausea, vomiting  Date of Consult  8/16/2024   Requesting Physician:  Naz Portillo MD    Unable to complete virtual visit given need for  and inability to achieve communication via both video and phone.           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 status post resection including sigmoidectomy with en bloc partial cystectomy and total abdominal hysterectomy with left salpingo-oophorectomy, right salpingo-oophorectomy, reimplantation of left ureter, partial omentectomy, appendectomy, and diverting loop ileostomy on 01/05/2021) which was further complicated by enterovesical fistula (status post partial cystectomy, extensive bowel mobilization, small-bowel resection, small-bowel anastomosis, lysis of adhesions 1/9/24), lumbar artery bleed and infected left retroperitoneal hematoma (s/p IR embolization and drain placement 3/9/24 at [facility]), failure to thrive status post G tube placement 6/13/2024 who presented to [facility] on 8/10/2024 with nausea, vomiting, abdominal pain and overall failure to thrive. GI consulted for \"ongoing abd pain, not tolerating tube feeds\".      # Abdominal pain  # Nausea, vomiting   # Constipation   # Sigmoid colon adenocarcinoma with bladder metastasis and colovesical fistula status post resection including sigmoidectomy with en bloc partial cystectomy and total abdominal hysterectomy with left salpingo-oophorectomy, right salpingo-oophorectomy, reimplantation of left ureter, partial omentectomy, appendectomy, and diverting loop ileostomy on 01/05/2021) which was further complicated by enterovesical fistula (status post partial cystectomy, extensive bowel mobilization, small-bowel " resection, small-bowel anastomosis, lysis of adhesions 1/9/24)  Patient presents with nausea, vomiting, abdominal pain. PTA had not had bowel movement for 4-5 days. With history of sigmoid adenocarcinoma with bladder mets and extensive surgical hx, she is at risk for obstruction. AXR on 8/10 with no evidence of obstruction, pneumatosis or free air. Recent CT A/P at [facility] on 8/8 showed worsening of locally advanced mass involving bladder with no evidence of bowel obstruction, and possible enterovesical fistula.   Intermittent nausea with varying reports as to whether it is secondary to TF or not. Per RN notes, has nausea with TF but patient denied when asked by RD. Currently planning to increase TF slowly to goal. Has been requiring 1-4 doses of antiemetics daily. Noted some loose stools per RN notes, ?overflow diarrhea given prior constipation. Not on bowel regimen PTA.     - AXR to rule out obstruction and assess for stool burden   - Bowel regimen to include Miralax BID scheduled (can titrate up as needed to achieve 1-2 soft bowel movements daily), Senna BID scheduled  - PRN dulcolax suppository   - PRN enema if no bowel movement with above regimen  - Obtain CT A/P with any worsening abdominal pain or if patient does not improve in the next few days.   - Could consider GGE which would be both diagnostic and therapeutic       # Severe malnutrition   # S/p PEG placement (6/13/24)  Failure to thrive status post G tube placement 6/13/2024 (TwoCal HN at 45 mL/hr run 1600 to 0800). Currently advancing TF slowly 10 mL every 24 hours to goal rate with free water flushes 30mL q4 hours.   - RD following, appreciate management of TF       Thank you for involving us in this patient's care. Please do not hesitate to contact the GI service with any questions or concerns.     Pt seen and care plan discussed with Dr. Griffiths, GI staff physician, and Dr. Portillo, primary team.    Patient was not formally seen today and  "therefore this is a non-billable note.       Hui Rojo PA-C  GI Service  [facility]  Text Page (Monday-Friday, 8am-4pm)    To page the On-Call [facility] GI provider 24 hours a day, please click [facility] and select GASTROENTEROLOGY MEDICINE ADULT / [facility] in the drop-down menu.   -------------------------------------------------------------------------------------------------------------------       Reason for Consultation:   We were asked by Naz Portillo MD of medicine to evaluate this patient with  \"ongoing abd pain, not tolerating tube feeds\".      History is obtained from the medical record and discussion with Dr. Portillo, patient's hospitalist.            History of Present Illness:     Myla Glynn is a 79 year old female with a PMH significant for DM type 2, mild cognitive impairment, hypertension, CKD stage III, and metastatic sigmoid colon adenocarcinoma with colovesical fistula status post resection including sigmoidectomy with en bloc partial cystectomy and total abdominal hysterectomy with left salpingo-oophorectomy, right salpingo-oophorectomy, reimplantation of left ureter, partial omentectomy, appendectomy, and diverting loop ileostomy on 01/05/2021) which was further complicated by enterovesical fistula (status post partial cystectomy, extensive bowel mobilization, small-bowel resection, small-bowel anastomosis, lysis of adhesions 1/9/24), lumbar artery bleed and infected left retroperitoneal hematoma (s/p IR embolization and drain placement 3/9/24 at [facility]), failure to thrive status post G tube placement 6/13/2024 who presented to [facility] on 8/10/2024 with nausea, vomiting, abdominal pain and overall failure to thrive.     Per chart review and discussion with medicine team.   Prior to admission, patient had not had a bowel movement for 4-5 days. Started on Senna 2 tablets PRN which has been given roughly once daily. Reportedly has been having " loose stools as of the last day or so. Intermittently tolerating tube feeds. Denied nausea and vomiting with TF to RD per their note yesterday. But reportedly complained of nausea and abdominal pain yesterday evening and requested tube feeds be turned off. She then refused to have tube feeds the rest of the night.     Per report today, abdominal pain is somewhat improved. Ongoing nausea managed with zofran.               Past Medical History:   Reviewed and edited as appropriate  Past Medical History:   Diagnosis Date    Anemia     Bladder mass     Controlled type 2 diabetes mellitus without complication, without long-term current use of insulin (H)     Gastroesophageal reflux disease     HTN (hypertension)     Ileostomy status (H)     Malignant neoplasm of sigmoid colon (H)     Renal insufficiency             Past Surgical History:   Reviewed and edited as appropriate   Past Surgical History:   Procedure Laterality Date    COLONOSCOPY      CYSTECTOMY BLADDER RADICAL, ILEAL DIVERSION, COMBINED N/A 1/5/2021    Procedure: Partial Cystectomy, Cystourethroscopy,;  Surgeon: Angel Newsome MD;  Location: [facility] OR    HYSTERECTOMY TOTAL ABDOMINAL  1/5/2021    Procedure: Hysterectomy total abdominal;  Surgeon: Jimy Jackson MD;  Location: [facility] OR    IR GASTROSTOMY TUBE PERCUTANEOUS PLCMNT  6/13/2024    LAPAROSCOPY OPERATIVE ADULT  1/5/2021    Procedure: Laparoscopy Operative Adult, takedown of splenic flexure, appendectomy;  Surgeon: Remi Moscoso MD;  Location: [facility] OR    SALPINGO-OOPHORECTOMY BILATERAL  1/5/2021    Procedure: Salpingo-oophorectomy bilateral;  Surgeon: Jimy Jackson MD;  Location: [facility] OR    SIGMOIDOSCOPY FLEXIBLE N/A 1/5/2021    Procedure: SIGMOIDOSCOPY, FLEXIBLE;  Surgeon: Remi Moscoso MD;  Location: [facility] OR    TAKEDOWN ILEOSTOMY N/A 5/4/2021    Procedure: ILEOSTOMY CLOSURE, LYSIS OF ADHESION;  Surgeon: Remi Moscoso MD;  Location: [facility] OR               Social History:   Reviewed and edited as appropriate  Social History     Socioeconomic History    Marital status:      Spouse name: Not on file    Number of children: Not on file    Years of education: Not on file    Highest education level: Not on file   Occupational History    Not on file   Tobacco Use    Smoking status: Never    Smokeless tobacco: Never   Substance and Sexual Activity    Alcohol use: Not Currently    Drug use: Not Currently    Sexual activity: Not on file   Other Topics Concern    Not on file   Social History Narrative    Not on file     Social Determinants of Health     Financial Resource Strain: Low Risk  (6/20/2023)    Received from [facility], [facility]    Financial Resource Strain     Difficulty of Paying Living Expenses: 3     Difficulty of Paying Living Expenses: Not on file   Food Insecurity: No Food Insecurity (2/22/2024)    Received from [facility]    Hunger Vital Sign     Worried About Running Out of Food in the Last Year: Never true     Ran Out of Food in the Last Year: Never true   Transportation Needs: No Transportation Needs (2/22/2024)    Received from [facility]    PRAPARE - Transportation     Lack of Transportation (Medical): No     Lack of Transportation (Non-Medical): No   Recent Concern: Transportation Needs - Unmet Transportation Needs (2/1/2024)    Received from [facility] - Transportation     Lack of Transportation (Medical): Yes     Lack of Transportation (Non-Medical): Yes   Physical Activity: Inactive (2/1/2024)    Received from [facility]    Exercise Vital Sign     Days of Exercise per Week: 0 days     Minutes of Exercise per Session: 0 min   Stress: Not on file   Social Connections: Unknown (6/20/2024)    Received from [facility]    Social Connections     Frequency of Communication with Friends  and Family: Not on file   Interpersonal Safety: Not At Risk (2/22/2024)    Received from [facility]    Humiliation, Afraid, Rape, and Kick questionnaire     Fear of Current or Ex-Partner: No     Emotionally Abused: No     Physically Abused: No     Sexually Abused: No   Housing Stability: Low Risk  (2/22/2024)    Received from [facility], [facility]    Housing Stability     What is your living situation today?: I have a steady place to live              Family History:   Patient's family history is reviewed today and is non-contributory  Family History   Problem Relation Age of Onset    No Known Problems Mother     No Known Problems Father     No Known Problems Sister     No Known Problems Brother             Allergies:   Reviewed and edited as appropriate   No Known Allergies         Medications:     Medications Prior to Admission   Medication Sig Dispense Refill Last Dose    acetaminophen (TYLENOL) 325 MG tablet 2 tablets (650 mg) by Oral or Feeding Tube route every 4 hours as needed for mild pain or fever (greater than 101 degrees) 30 tablet 0 8/9/2024    carvedilol (COREG) 3.125 MG tablet 1 tablet (3.125 mg) by Oral or Feeding Tube route 2 times daily (with meals) 60 tablet 0 8/9/2024    multivitamins w/minerals liquid 15 mLs by Oral or Feeding Tube route daily 474 mL 0 8/9/2024    ondansetron (ZOFRAN) 4 MG tablet 1 tablet (4 mg) by Oral or Feeding Tube route every 8 hours 90 tablet 0 8/9/2024    oxyBUTYnin (DITROPAN) 5 MG/5ML solution 2.5 mLs (2.5 mg) by Per Feeding Tube route 2 times daily 473 mL 0 8/9/2024    pantoprazole (PROTONIX) 2 mg/mL SUSP suspension 20 mLs (40 mg) by Per Feeding Tube route every morning (before breakfast) 600 mL 0 8/9/2024    psyllium (METAMUCIL/KONSYL) 58.6 % powder Take 6 g (1 teaspoonful) by mouth daily 283 g 0 8/9/2024    rosuvastatin (CRESTOR) 5 MG tablet 1 tablet (5 mg) by Oral or Feeding Tube route at bedtime 30 tablet 0 8/9/2024    saccharomyces boulardii  (FLORASTOR) 250 MG capsule 1 capsule (250 mg) by Oral or Feeding Tube route 2 times daily 60 capsule 0 8/9/2024             Data:   Labs and imaging below were independently reviewed and interpreted    LAB WORK:    BMP  Recent Labs   Lab 08/16/24  1330 08/16/24  0905 08/16/24  0646 08/16/24  0515 08/16/24  0143 08/15/24  0207 08/15/24  0022 08/13/24  1008 08/13/24  0737 08/12/24  1405 08/12/24  1103 08/11/24  0823 08/11/24  0813 08/10/24  2239 08/10/24  1200   NA  --   --   --   --   --   --   --   --  142  --  139  --  138  --  137   POTASSIUM  --   --   --  3.9  --   --  4.1  --  4.4  --  4.1  --  4.8  --  4.8   CHLORIDE  --   --   --   --   --   --   --   --  103  --  102  --  101  --  96*   DICKSON  --   --   --   --   --   --   --   --  9.7  --  8.9  --  9.5  --  10.1   CO2  --   --   --   --   --   --   --   --  22  --  22  --  20*  --  23   BUN  --   --   --   --   --   --   --   --  84.1*  --  82.5*  --  91.0*  --  98.6*   CR  --   --   --   --   --   --   --   --  1.84*  --  1.79*  --  2.05*  --  2.00*   GLC 80 83 70  --  81   < >  --    < > 118*   < > 212*   < > 112*   < > 168*    < > = values in this interval not displayed.     CBC  Recent Labs   Lab 08/16/24  0515 08/13/24  0737 08/12/24  1103 08/11/24  0813 08/10/24  1200   WBC  --  13.4* 13.8* 18.7* 18.2*   RBC  --  4.08 3.73* 3.78* 4.29   HGB  --  12.4 11.3* 11.7 13.1   HCT  --  37.5 34.4* 35.2 39.2   MCV  --  92 92 93 91   MCH  --  30.4 30.3 31.0 30.5   MCHC  --  33.1 32.8 33.2 33.4   RDW  --  14.6 14.7 14.9 14.6    345 308 286 310     INRNo lab results found in last 7 days.  LFTs  Recent Labs   Lab 08/10/24  1200   ALKPHOS 94   AST 24   ALT 18   BILITOTAL 0.2   PROTTOTAL 9.0*   ALBUMIN 3.8      PANC  Recent Labs   Lab 08/10/24  1200   LIPASE 35     CULTURES:   7-Day Micro Results       Collected Updated Procedure Result Status      08/10/2024 1452 08/11/2024 1150 Urine Culture [[id number]]    Urine, Midstream    Final result Component Value    Culture >100,000 CFU/mL Mixture of urogenital jonathan               08/10/2024 1315 08/15/2024 1546 Blood Culture Line, venous [[id number]]   Blood from Line, venous    Final result Component Value   Culture No Growth                     IMAGING:  Reviewed       =======================================================================

## 2024-08-16 NOTE — PLAN OF CARE
Goal Outcome Evaluation:      Plan of Care Reviewed With: patient    Overall Patient Progress: no changeOverall Patient Progress: no change    Outcome Evaluation: [id number] Pt c/o abdominal pain and requested tube feed be turned off, stopped it at 2015,gave heat packs and Tylenol with some relief, pt NPO ex ice chips, 4 incontinent stools, had bed bath tonight, q 4 hr BG checks, BGs in 90's. Skin cdi, phone  used. Continue POC- TPN vs TF tomorrow?

## 2024-08-17 NOTE — PLAN OF CARE
Goal Outcome Evaluation:      Plan of Care Reviewed With: patient    Overall Patient Progress: no changeOverall Patient Progress: no change      Messaged provider, CARTER Portillo, about restarting TF as it was stopped overnight. TF restarted at 10mls at 10AM. Advance by 10mls tomorrow at 10AM per orders. C/o nausea - IV zofran given and pt reported nausea relief.     BG checks Q4.     NPO except for meds.      used for communication.     Q2-3 repositioning.     Ax1 w/ W/GB. Up to wheelchair for x-ray this afternoon.     Daily weight - completed. RN managed mag and pot in range and ordered for 8/17.

## 2024-08-17 NOTE — DISCHARGE SUMMARY
[facility] Transitional Care  Discharge Summary - Medicine & Pediatrics       Date of Admission:  [id number]  Date of Discharge:  6/18/2024  Discharging Provider: Josefina Lacy MD  Discharge Service: Gold 8    Discharge Diagnoses   # Recurrent colorectal cancer with local recurrence within the bladder s/p partial cystectomy with small bowel resection and anastomosis (01/09/2024)  # Cameron catheter status d/t bladder wall weakness and defect  # Hx right renal segmental artery bleed with subsequent clot formation in the renal collecting ducts, ureter, and urinary bladder s/p IR embolization   # Tube feed dependence  # Status post gastrostomy placement (IR 6/13/24)  # Moderate Malnutrition in the context of chronic illness  # Sarcopenia  # Decreased appetite likely secondary to malignancy leading to moderate malnutrition in the context of acute on chronic illness, POA  # HFrEF, LVEF 29%, POA   # ALFREDA on CKD stage IIIb, unclear if present on admission   # Diarrhea likely 2/2 tube feeding, not present on admission, now has stopped 6/16/2024   # Possible fungal vulval infection, with prior candidal bacteremia, not clear if present on admission   # Chronic Normocytic Anemia likely secondary to anemia of chronic disease, POA  # Pleural Effusion, small   # T2DM, diet controlled   # Infected organized left retroperitoneal hematoma with Proteus hauseri/vulgaris s/p IR coil embolization and drain placement 3/9/2024 followed by multiple drain upsized/exchange and antibiotics until 4/15/2024  # Hx bilateral hydronephrosis with pyelonephritis and associated sepsis  # Hx protracted hospital admissions   # Hyper and hyponatremia at different instances     Follow-ups Needed After Discharge   Follow-up Appointments     Follow Up and recommended labs and tests      Follow up with Nursing home physician.  No follow up labs or test are   needed.  Follow up with urology in the outpatient setting to discuss whether to   discontinue  your Augmentin and whether to continue using your alejandra   catheter.          Future Appointments 8/17/2024 - 2/13/2025        Date Visit Type Length Department Provider     8/17/2024 10:45 AM IP PT TREATMENT 30 min UR PT Luz Costello, PT    Location Instructions:     The [facility] is located in the [facility]. lt is easily accessible from virtually any point in the [facility] area, via [address]              8/17/2024  7:00 PM IP OT EVALUATION 15 min UR OT Dickson Caraballo, OTR    Location Instructions:     This is an inpatient department located at [facility].&nbsp; The [facility] is located in the [facility]. lt is easily accessible from virtually any point in the [facility] area, via [employer] [address]              8/23/2024  2:30 PM NEW ADULT GENERAL NUTRITION 60 min  NUTRITION SERVICES Alvina Blackwell, RD, LD    Location Instructions:     [facility] is located at [address] [place] [address].  Please go to the [facility] Welcome desk on the first floor of the hospital, which is located on the west side of the hospital. Inform the staff that you are here for a nutrition appointment. They will register you, contact the outpatient dietitian who will meet you there and bring you to their office.  This appointment is in a hospital-based location.&nbsp; Before your visit, you may want to check with your insurance company for coverage and referral options, including cost differences between services provided in different clinic settings.&nbsp; For more information visit this link on the [email or web address] [place] Website:&nbsp; [email or web address]              8/28/2024  4:00 PM LAB 15 min [facility] LAB    Location Instructions:     [facility] ([facility] [place] [place]) is in a dense urban area with  multiple transportation and parking options. You may wish to review options for  service and self-parking in more detail on the [facility] s website at [email or web address].&nbsp;               8/28/2024  4:30 PM ENROLLMENT CORE 60 min  CARDIOVASCULAR CTR Crystal Shannon, WILL CNP    Location Instructions:     [facility] ([facility]) is in a dense urban area with multiple transportation and parking options. You may wish to review options for  service and self-parking in more detail on the [facility] s website at [email or web address].   This appointment is in a hospital-based location.&nbsp; Before your visit, you may want to check with your insurance company for coverage and referral options, including cost differences between services provided in different clinic settings.&nbsp; For more information visit this link on the [employer] Website:&nbsp; [email or web address]              11/14/2024  8:15 AM MR CARDIAC W AND STRESS 30 min UU MRI UU CV MR NURSE     11/14/2024  8:45 AM MR CARDIAC W AND STRESS 75 min UU MRI [id number]    Location Instructions:     [address] [place] [address]  Parking  parking is available on a limited basis during COVID. The  station is located at the main hospital entrance off [address]. Both  and self-parking are the same rates. Current parking options for our patients/visitors are at the Patient & Visitor Parking Ramp, located on [facility] and it is connected to the hospital via a tunnel. Reduced rates for parking in the ramps are in effect during Covid. Ticket validation is no longer needed to receive the reduced rate. Metered street parking is not available.  Entrance and check-in location Enter through the main hospital entrance off [address] or through the tunnel from the patient visitor ramp to the hospital level.&nbsp; You will be entering  on Lobby Level which is 2nd floor.&nbsp; A security and information desk is located inside the entrance to provide you with a visitor pass and can direct you to your appointment location.  Check-in with the registration staff in the following imaging center areas:   GOLD WAITING AREA: Cardiac MRI, CT, Interventional radiology, Nuclear Medicine, Ultrasound and X-Ray. The Gold waiting room is located on the lobby entrance level (2nd floor) past the elevators.   PET/MRI WAITING AREA: PET scans, MRI scans that are non-cardiac. Located on the 1st floor. Take the elevator to the first floor, follow the signs to PET/MRI  This appointment is in a hospital-based location.&nbsp; Before your visit, you may want to check with your insurance company for coverage and referral options, including cost differences between services provided in different clinic settings.&nbsp; For more information visit this link on the [employer] Website:&nbsp; [email or web address]              11/15/2024 11:45 AM LAB 15 min [facility]ORATORY  LAB    Location Instructions:     The [facility] [place] area with multiple transportation and parking options. You may wish to review options for  service and self-parking in more detail on the [facility] website at [email or web address].&nbsp;               11/15/2024 12:15 PM RETURN HEART FAILURE 30 min  CARDIOVASCULAR CTR Kenji Chacon MD    Location Instructions:     The [facility] [place] a dense urban area with multiple transportation and parking options. You may wish to review options for  service and self-parking in more detail on the [facility] s website at [email or web address].   This appointment is in a hospital-based location.&nbsp; Before your visit, you may want to check with your insurance company for coverage and referral options, including cost differences between services provided in different clinic  settings.&nbsp; For more information visit this link on the [employer] Website:&nbsp; [email or web address]                     Discharge Disposition   Transferred to transitional care facility  Condition at discharge: Stable    Hospital Course   # Acute kidney injury AKIN Stage I on top of CKD stage IIIb, unclear if present on admission  This is likely prerenal in etiology given diarrhea. Given entero-vesica fistula s/p urinary diversion, ordered CT abdomen and pelvis without contrast to rule out obstructive physiology [ultrasound might not be sensitive enough]- nothing noted.  He was continued on sodium bicarbonate 650 mg 3 times daily, with improvement in his creatinine.    # Possible fungal vulval infection, with prior candidal bacteremia, not clear if present on admission -s/p 1 dose of fluconazole 150 mg      # Recurrent colorectal cancer with local recurrence within the bladder s/p partial cystectomy with small bowel resection and anastomosis (01/09/2024)  The patient has a history of persistent bladder leak s/p urinary diversion with bilateral nephrostomy tubes, there is now removed. the patient also has a history of right renal segmental artery bleed with subsequent clot formation in the renal collecting ducts, ureter, and urinary bladder s/p IR embolization. Cameron in place for bladder decompression since 5/31. Urology performed CT cystogram with findings showing: anterior bladder wall defect with walled off air and fluid collection in low anterior pelvis. Per urology, no plans for decompression or drainage unless the patient clinically deteriorates.  The patient main symptomatology is nausea.  He was continued on scheduled Zofran and his PPI was continued daily.  His Augmentin was also continued, to be given indefinitely until follow-up.  Scheduled acetaminophen and as needed Dilaudid for pain.  Plan to continue Cameron until outpatient follow-up.     # Diarrhea likely secondary to tube feeding, not  present on admission, now has stopped 6/16/2024   -Discontinued all laxatives  -Continued fibers     # Decreased appetite likely secondary to malignancy leading to moderate malnutrition in the context of acute on chronic illness, POA  The patient had a period of time on nasogastric tube nutrition.  Percutaneous gastrostomy performed on 6/13 without any immediate complications.  -Continued tube feeding through percutaneous gastrostomy  -Ordered speech and swallow evaluation--> started const carb diet on 6/16/2024      # Heart failure with reduced ejection fraction LVEF 25%, POA  He was continued on his carvedilol 3.125 mg twice daily, losartan 20 mg daily to be started upon improvement of ALFREDA and possible plan to switch to Entresto if hemodynamically stable on valsartan.  Cardiology consult on discharge.     # Resolved medical problems  #Infected organized left retroperitoneal hematoma with Proteus hauseri/vulgaris s/p IR coil embolization and drain placement 3/9/2024 followed by multiple drain upsized/exchange and antibiotics until 4/15/2024, POA  Developed during prolonged hospitalization at [place]. IR placed MARIE drain with multiple attempts at upsizing to drain all of the residual fluid collections. Completed several courses of antibiotics for treatment of infected hematoma, last finished Augmentin on 4/15/24. Last seen by ID as outpatient on 5/10, with plans to monitor off abx therapy. Here, CT AP on admission showed minimal residual fluid collection. She had the MARIE drain in place, but this was removed accidentally upon transfer to CT on 5/30. D/w IR who reviewed imaging, minimal residual fluid collection with minimal drainage from MARIE drain prior to admission suggests there is not likely benefit to replacing.  Placed on antibiotics (see notes on Augmentin above) with plan for urology follow-up.     # Pleural Effusion -noted on chest x-ray.  Satting well on room air.  # Hyper and hyponatremia at different instances -  monitored with labs during admission.      # Chronic medical problems  #Chronic Normocytic Anemia likely secondary to anemia of chronic disease, POA  - Trending CBC     #T2DM, diet controlled, POA  - Hypoglycemic protocol      #Deconditioning   -PT/OT consults             Consultations This Hospital Stay   NURSING TO CONSULT FOR VASCULAR ACCESS CARE IP CONSULT  UROLOGY IP CONSULT  PALLIATIVE CARE ADULT IP CONSULT  INFECTIOUS DISEASE GENERAL ADULT IP CONSULT  PHARMACY TO DOSE VANCO  INTERVENTIONAL RADIOLOGY ADULT/PEDS IP CONSULT  NUTRITION SERVICES ADULT IP CONSULT  NURSING TO CONSULT FOR VASCULAR ACCESS CARE IP CONSULT  NUTRITION SERVICES ADULT IP CONSULT  NURSING TO CONSULT FOR VASCULAR ACCESS CARE IP CONSULT  PHARMACY IP CONSULT  PHYSICAL THERAPY ADULT IP CONSULT  NURSING TO CONSULT FOR VASCULAR ACCESS CARE IP CONSULT  CARE MANAGEMENT / SOCIAL WORK IP CONSULT  NURSING TO CONSULT FOR VASCULAR ACCESS CARE IP CONSULT  INTERVENTIONAL RADIOLOGY ADULT/PEDS IP CONSULT  NURSING TO CONSULT FOR VASCULAR ACCESS CARE IP CONSULT  NUTRITION SERVICES ADULT IP CONSULT  SPIRITUAL HEALTH SERVICES IP CONSULT  WOUND OSTOMY CONTINENCE NURSE  IP CONSULT  SPEECH LANGUAGE PATH ADULT IP CONSULT    Code Status   Full Code       Josefina Lacy MD on 6/18/2024 at 6:36 PM    ______________________________________________________________________    Physical Exam          Primary Care Physician   Jami Bundy    Discharge Orders      Adult Cardiology Eval  Referral      General info for SNF    Length of Stay Estimate: Short Term Care: Estimated # of Days <30  Condition at Discharge: Stable  Level of care:skilled   Rehabilitation Potential: Good  Admission H&P remains valid and up-to-date: Yes  Recent Chemotherapy: Per chart review, no chemotherapy completed                 Use Nursing Home Standing Orders: Yes     Follow Up and recommended labs and tests    Follow up with Nursing home physician.  No follow up labs or test are  needed.  Follow up with urology in the outpatient setting to discuss whether to discontinue your Augmentin and whether to continue using your alejandra catheter.     Reason for your hospital stay    You were hospitalized for generalized fatigue. You were found to have an injury to your kidney, which was monitored and treated with medication/fluids. You were found to have a fungal vulvar infection and were given a dose of fluconazole to treat this. You were treated with pain medication as needed. You will continue your Augmentin indefinitely for now. You will also continue with your alejandra catheter until you see urology.     Alejandra catheter    To straight gravity drainage. Change catheter every 2 weeks and PRN for leaking or decreased urine output with signs of bladder distention. DO NOT change catheter without a specific Provider order IF diagnosis of benign prostatic hypertrophy (BPH), neurogenic bladder, or other urological conditions     Activity - Up with nursing assistance     Diet    Follow this diet upon discharge:     Diet:   Moderate Consistent Carb (60 g CHO per Meal) Diet AND    Adult Formula - TwoCal HN  Route: Gastrostomy  Goal Rate 35 mL/hr (goal)  Medication - Feeding Tube Flush Frequency At least 15-30 mL water before and after medication administration and with tube clogging       Significant Results and Procedures   Results for orders placed or performed during the hospital encounter of 05/29/24   XR Chest 2 Views    Narrative    EXAM: XR CHEST 2 VIEWS  LOCATION: [facility]  DATE: 5/29/2024    INDICATION: fatigue; weakness  COMPARISON: CT of the chest 11/17/2021      Impression    IMPRESSION:     Cardiac silhouette is normal in size. Mediastinal borders are well-defined. Hilar contours and lung vascularity are normal.    The lungs are mildly underinflated. Questionable subsegmental opacity in the left lower lobe projecting behind the heart which does not  obscure lung vessels, most likely atelectasis, difficult to localize on the lateral view as artifact from fabric   overlies the posterior lungs. There are no right lung opacities. Diaphragmatic curvature is normal. No pleural effusion.    There are embolic coils in the right upper quadrant and a percutaneous drain projects in the left upper quadrant.   CT Abdomen Pelvis w/o Contrast    Narrative    EXAM: CT ABDOMEN PELVIS W/O CONTRAST  LOCATION: [facility]  DATE: 5/29/2024    INDICATION: acute kidney injury; recent abd surgery cystectomy  COMPARISON: 6/30/2023.  TECHNIQUE: CT scan of the abdomen and pelvis was performed without IV contrast. Multiplanar reformats were obtained. Dose reduction techniques were used.  CONTRAST: None.    FINDINGS:   LOWER CHEST: Small collection of left pleural fluid which has a thin peripheral hyperdense rim is new from the available prior study of 6/30/2023. Left basilar atelectasis.    HEPATOBILIARY: Normal.    PANCREAS: Normal.    SPLEEN: Normal.    ADRENAL GLANDS: Normal.    KIDNEYS/BLADDER: Percutaneous drainage catheter terminates in the left posterior pararenal fat. Mild focal soft tissue thickening and small amount of gas at this site. No significant residual drainable fluid collection. Extensive postoperative changes of   the pelvis including of the urinary bladder which has an abnormal lobulated morphology and slightly irregular wall. The bladder mass reported on 6/30/2023 is not well seen separately. A small crescent of fluid in the anterior extra peritoneal space of   the pelvis anterior to the bladder may be contiguous with the bladder lumen (images 174-183 of axial series 5). Correlation with patient's surgical history recommended. Embolization material is present in the right renal hilum. Tiny lower pole cortical   calcification of the right kidney. No hydronephrosis on either side.    BOWEL: Multiple bowel anastomoses in the  pelvis. No bowel obstruction or focal inflammation. Post appendectomy.    LYMPH NODES: Normal.    VASCULATURE: Mild to moderate aortoiliac atherosclerosis. No aneurysm.    PELVIC ORGANS: Post hysterectomy.    MUSCULOSKELETAL: Degenerative changes of the spine.      Impression    IMPRESSION:   1.  Percutaneous drainage catheter terminates in the left posterior pararenal fat. Mild focal soft tissue thickening and small amount of gas at this site. No significant residual drainable fluid collection.   2.  Extensive postoperative changes of the pelvis including urinary bladder which has an abnormal lobulated morphology and slightly irregular wall. The bladder mass reported on 6/30/2023 is not well seen separately.   3.  Small crescent of fluid in the anterior extra peritoneal space of the pelvis along the anterior aspect of the bladder may be contiguous with the bladder lumen. Correlation with patient's surgical history recommended.   4.  Embolization material is present in the right renal hilum.   5.  Multiple bowel anastomoses in the pelvis. No bowel obstruction or focal inflammation.  6.  Small collection of hypodense left pleural fluid with thin hyperdense rim is new from the available prior study of 6/30/2023. This may be a chronic hemothorax. Empyema not excluded. Clinical correlation and comparison with any other available prior   studies recommended.     CT Cystogram wo & w Contrast    Narrative    EXAM: CT CYSTOGRAM WO & W CONTRAST 5/30/2024 12:09 PM    HISTORY: 79 years Female Eval for bladder leak.    COMPARISON: CT 5/29/2024, 11/17/2021.    TECHNIQUE: Axial CT images of the pelvis were obtained without  contrast and following the administration of IV contrast. Post void  images were also obtained. Coronal and sagittal reformats provided.  Contrast dose: 25cc of isovue 370.    FINDINGS:    PELVIC ORGANS/URINARY BLADDER: Postoperative changes of sigmoid  resection, partial cystectomy, and left ureteral  reimplantation with  bladder wall thickening and irregularity. On noncontrast images, there  is a crescentic collection containing fluid and air measuring 2.0 x  10.2 x 1.8 cm anterior to the bladder (series 3 image 191), with faint  contrast opacification on bladder filling images best appreciated on  series 7 images 184 through 191 indicating it is continuous with the  bladder lumen, extending through a defect in the bladder wall  anteriorly measuring at least 1.8 cm (series 7 image 193). There is  otherwise no extraluminal contrast. Vesicoureteral reflux into the  reimplanted left ureter and renal calyces with bladder filling. Faint  residual contrast within this area on postcontrast images. The air and  fluid containing collection anterior to the bladder extends right  superolaterally to a small bowel anastomotic staple line in the right  lower quadrant/anterior right pelvis (series 7 image 161). No clear  fistulization with the small bowel lumen at this location, though  evaluation is limited by lack of intravenous contrast and incomplete  contrast filling of the collection. Postoperative changes of  hysterectomy.    ADDITIONAL FINDINGS: Unremarkable partially visualized lower pole of  the left kidney. Multiple bowel anastomoses in the pelvis. No  lymphadenopathy. Aortobiiliac calcifications. Trace free fluid in the  pelvis. Left retroperitoneal drain without significant perinephric  fluid. Small fat-containing ventral hernia. No acute osseous  abnormality.      Impression    IMPRESSION:     1. Anterior bladder wall defect measuring 1.8 cm with an associated  air and fluid containing collection measuring 2.0 x 10.2 x 1.8 cm in  the low anterior pelvis. The collection extends right superolaterally  to a small bowel anastomotic staple line in the right lower  quadrant/right anterior pelvis without clear fistulization with the  small bowel lumen, though evaluation is limited by lack of intravenous  contrast and  incomplete contrast filling of the collection.  2. Circumferential bladder wall thickening and irregularity, likely  reflecting cystitis, though bladder malignancy is not excluded.    I have personally reviewed the examination and initial interpretation  and I agree with the findings.    JASON MCCORMICK DO         SYSTEM ID:  [id number]   XR Abdomen Port 1 View    Narrative    EXAM: XR ABDOMEN PORT 1 VIEW  5/31/2024 1:43 PM     HISTORY:  Verify small bowel feeding tube bedside placement       COMPARISON:  CT 5/30/2024    FINDINGS:   Portable frontal view of the abdomen. Embolization coils in the upper  quadrants. Enteric feeding tube with tip projecting in the third  portion of the duodenum/proximal jejunum.    Nonobstructive bowel gas pattern. Degenerative changes of the spine.  The lung bases are clear.      Impression    IMPRESSION: Feeding tube tip projecting in the distal  duodenum/proximal jejunum.     I have personally reviewed the examination and initial interpretation  and I agree with the findings.    ARJUN KENNEY MD         SYSTEM ID:  [id number]   IR Gastrostomy Tube Percutaneous Plcmnt    Narrative    PROCEDURES 6/13/2024:  1. Gastrostomy tube placement under fluoroscopic guidance    Clinical History: Patient is 79-year-old female present for  gastrostomy tube placement for nutritional support.     Comparisons: CT abdomen dated 5/29/2024.    Staff: Lidia Wagner MD    Fellow/Resident: Ashkan Behzadi, MD.     Monitoring: Patient was placed on continuous monitoring with  intravenous conscious sedation administered by the IR nursing staff  and supervised by the IR attending. Patient remained stable throughout  the procedure.     Medications:  1. Versed IV: 1 mg  2. Fentanyl IV: 50 mcg    Sedation time, face-to-face: 16 minutes    Fluoroscopy time: 2.8 minutes    Complication: None    PROCEDURE: The patient understood the limitations, alternatives, and  risks of the procedure and requested the  procedure be performed. Both  written and oral consent were obtained.    Pre-procedural ultrasound performed to delineate the liver margins.    Nasogastric tube placed under fluoroscopic guidance.    The left upper quadrant was prepped and draped in the usual sterile  fashion. Ten to one volume mixture of 1% lidocaine without epinephrine  buffered with 8.4% bicarbonate solution was used for local anesthesia.      Glucagon administered. Stomach inflated with air through the  nasogastric tube. Under fluoroscopic guidance, gastropexy was made  with 2 Halyard Saf-T-Pexy T-fasteners.    Needle gastrostomy made under fluoroscopic guidance. Needle removed  over 0.035 Amplatz guidewire. Track dilated to 22 [place] over the  guidewire. 22 [place] peel-away sheath advanced into the stomach over  the guidewire. 18 [place] Halyard LOVE gastrostomy tube advanced over  the guidewire through the peel-away sheath into the stomach under  fluoroscopic guidance. Peel-away sheath and guidewire removed.  Gastrostomy tube balloon inflated and tube secured. Adequate placement  in the stomach documented with contrast. Tube flushed with saline.  T-fasteners secured. Sterile dressing applied. Gastrostomy tube placed  to gravity drainage. Nasogastric tube removed. No immediate  complication.      Impression    IMPRESSION:   1. 18 [place] Halyard LOVE gastrostomy tube placed under fluoroscopic  guidance. Catheter to gravity drainage.   XR Abdomen Port 1 View    Narrative    XR ABDOMEN PORT 1 VIEW  6/13/2024 3:52 PM     HISTORY:  nausea, need to evaluate stool burden     COMPARISON:  5/29/2024 CT abdomen/pelvis.    TECHNIQUE: Single supine view of the abdomen and pelvis.    FINDINGS:   Feeding tube tip projects over the stomach. Percutaneous gastrostomy  tube projects over the stomach.    Nonobstructive bowel gas pattern. There are no abnormal calcifications  or evidence of organomegaly. Embolization coils in the right and left  upper quadrants.  Contrast noted within the colon. No significant stool  burden in the colon. The visualized lung bases are clear.        Impression    IMPRESSION:   No significant stool burden in the colon.      I have personally reviewed the examination and initial interpretation  and I agree with the findings.    ALLA UREÑA MD         SYSTEM ID:  [id number]   CT Abdomen Pelvis w/o Contrast    Narrative    EXAM: CT ABDOMEN PELVIS W/O CONTRAST 6/15/2024 7:05 PM    HISTORY: 79 years Female acute kidney injury, Persistent bladder leak  s/p urinary diversion with bilateral nephrostomy tubes    COMPARISON: CT 5/30/2024    TECHNIQUE: Axial CT images from the lung bases through the symphysis  pubis were obtained without intravenous or oral contrast. Coronal and  sagittal reformats provided.    FINDINGS:   image(s) are noncontributory.    Limited evaluation given noncontrast technique.  Abdomen:  Extensive postsurgical changes of the abdomen status post small bowel  resection and partial cystectomy with anastomosis.    HEPATIC: No suspicious focal hepatic masses or lesions.    BILIARY: No calcified gallstones. No intra or extrahepatic biliary  dilatation.    SPLEEN: Normal size. No focal lesions.    PANCREAS: Fatty atrophic changes of the pancreas. No peripancreatic  infiltrative change of pancreatic mass. No pancreatic ductal dilation.    ADRENALS: Unremarkable.    RENAL: Embolization coils seen within the right renal hilum. Punctate  nonobstructing renal calculus seen within the inferior pole the right  kidney. Bilateral perinephric stranding, nonspecific. No  hydronephrosis. Mild degree of air is seen within the left collecting  system and left ureter.    URINARY BLADDER: Cameron catheter is seen within the remnants of the  bladder. Multiple foci of air seen within the bladder lumen, may be  secondary to instrumentation.    GASTROINTESTINAL: Percutaneous gastrostomy balloon seen within the  stomach lumen. Numerous anastomotic  sutures seen throughout loops of  bowel within the pelvis. The small and large bowel are normal in  caliber without abnormal wall thickening. The distal esophagus,  stomach, and duodenum appear unremarkable. Small and large bowel are  normal in caliber and without abnormal wall thickening. No portal  venous gas or pneumatosis. The appendix is surgically absent.    Reproductive: Postsurgical changes of hysterectomy.    MESENTERY/PERITONEUM: Trace amount of fluid seen adjacent to the left  pararenal space, in similar position with with prior drain placement.  Additional embolization coils seen posterior to the left pararenal  space.    LYMPH NODES: Prominent bilateral inguinal lymph nodes, increased in  size compared to prior. No significant intra-abdominal lymph nodes.    VASCULAR: Mild to moderate gastric calcifications involving the  abdominal aorta..    LUNG BASES: Small left pleural effusion with left basilar compressive  atelectasis..    MUSCULOSKELETAL: Multilevel degenerative changes of the spine. No  acute or suspicious osseous abnormality. Soft tissue stranding seen  overlying the ventral abdomen. Increased size of multifocal soft  tissue nodules overlying this anterior subcutaneous soft tissues, for  example over the left hemiabdomen (series 5 image 142).      Impression    IMPRESSION:   1.  Extensive postoperative changes of the abdomen with partial  cystectomy. There is an indwelling Cameron catheter in place within the  remnants of the bladder with air in the bladder lumen, nonspecific,  may be secondary to Cameron catheter placement. No definite free fluid  or localized intra-abdominal fluid collection within the abdomen to  suggest bladder leak.  2.  Increased size of multifocal soft tissue nodules overlying the  anterior subcutaneous soft tissues of the lower abdomen presumed to  represent prominent lymph nodes with additional prominent bilateral  inguinal lymph nodes, nonspecific, but may be secondary to  reactive  changes.  3.  Small left pleural effusion with left basilar compressive  atelectasis.    I have personally reviewed the examination and initial interpretation  and I agree with the findings.    JOSH RAMIREZ MD         SYSTEM ID:  [id number]        Discharge Medications   Discharge Medication List as of 6/18/2024 11:10 AM        START taking these medications    Details   acetaminophen (TYLENOL) 325 MG/10.15ML liquid 20.3 mLs (650 mg) by Oral or Feeding Tube route every 8 hours, Transitional      acetaminophen (TYLENOL) 650 MG suppository Place 1 suppository (650 mg) rectally every 4 hours as needed for mild pain or other (and adjunct with moderate or severe pain or per patient request), Transitional      amoxicillin-clavulanate (AUGMENTIN) 500-125 MG tablet Take 1 tablet by mouth every 12 hours, Transitional      calcium carbonate (TUMS) 500 MG chewable tablet Take 2 tablets (1,000 mg) by mouth 4 times daily as needed for heartburn, Transitional      HYDROmorphone (DILAUDID) 0.2 MG/ML SOLN injection' Inject 1 mL (0.2 mg) into the vein every 4 hours as needed for other (not able to tolerate enteral dilaudid), Transitional      !! HYDROmorphone, STANDARD CONC, (DILAUDID) 1 MG/ML oral solution 0.5 mLs (0.5 mg) by Oral or Feeding Tube route every 4 hours as needed for moderate pain, Transitional      !! HYDROmorphone, STANDARD CONC, (DILAUDID) 1 MG/ML oral solution 1 mL (1 mg) by Oral or Feeding Tube route every 4 hours as needed for severe pain, Transitional      multivitamin w/minerals (THERA-VIT-M) tablet Take 1 tablet by mouth daily, Transitional      ondansetron (ZOFRAN) 4 MG tablet 1 tablet (4 mg) by Oral or Feeding Tube route every 6 hours, Transitional      oxyCODONE (ROXICODONE) 5 MG tablet Take 1 tablet (5 mg) by mouth every 4 hours as needed for moderate pain or severe pain (IF pain not managed with non-pharmacological and non-opioid interventions), Transitional      pantoprazole (PROTONIX) 2 mg/mL  SUSP suspension Take 20 mLs (40 mg) by mouth every morning (before breakfast), Transitional      saccharomyces boulardii (FLORASTOR) 250 MG capsule Take 1 capsule (250 mg) by mouth 2 times daily, Transitional      sodium bicarbonate 650 MG tablet Take 1 tablet (650 mg) by mouth 3 times daily, Transitional       !! - Potential duplicate medications found. Please discuss with provider.        CONTINUE these medications which have CHANGED    Details   acetaminophen (TYLENOL) 325 MG tablet Take 2 tablets (650 mg) by mouth every 4 hours as needed for mild pain or other (and adjunct with moderate or severe pain or per patient request), Transitional      carvedilol (COREG) 3.125 MG tablet Take 1 tablet (3.125 mg) by mouth 2 times daily (with meals), Transitional           CONTINUE these medications which have NOT CHANGED    Details   rosuvastatin (CRESTOR) 5 MG tablet Take 5 mg by mouth at bedtime, Historical           STOP taking these medications       OLANZapine (ZYPREXA) 5 MG tablet Comments:   Reason for Stopping:         ondansetron (ZOFRAN ODT) 4 MG ODT tab Comments:   Reason for Stopping:         prochlorperazine (COMPAZINE) 5 MG tablet Comments:   Reason for Stopping:             Allergies   No Known Allergies

## 2024-08-17 NOTE — PLAN OF CARE
Problem: Adult Inpatient Plan of Care  Goal: Optimal Comfort and Wellbeing  Outcome: Progressing  Goal: Readiness for Transition of Care  Outcome: Progressing  Flowsheets (Taken 8/17/2024 0739)  Anticipated Changes Related to Illness: none  Transportation Anticipated:   agency   health plan transportation  Concerns to be Addressed: discharge planning  Intervention: Mutually Develop Transition Plan  Recent Flowsheet Documentation  Taken 8/17/2024 0739 by Vito Coles RN  Anticipated Changes Related to Illness: none  Transportation Anticipated:   agency   health plan transportation  Concerns to be Addressed: discharge planning     Problem: Risk for Delirium  Goal: Optimal Coping  Outcome: Progressing  Goal: Improved Behavioral Control  Outcome: Progressing  Goal: Improved Attention and Thought Clarity  Outcome: Progressing  Goal: Improved Sleep  Outcome: Progressing     Problem: Pain Acute  Goal: Optimal Pain Control and Function  Outcome: Progressing     Problem: Nausea and Vomiting  Goal: Nausea and Vomiting Relief  Outcome: Progressing     Problem: Diabetes  Goal: Optimal Coping  Outcome: Progressing  Goal: Optimal Functional Ability  Outcome: Progressing  Goal: Blood Glucose Level Within Target Range  Outcome: Progressing  Goal: Minimize Risk of Hypoglycemia  Outcome: Progressing   Goal Outcome Evaluation:        Pt. A&O x4 unable to full communicate needs/ needs . Pt. Denied through out the night. Pt.incontinent of bowel and bladder. Was changed 3 times during the night. Had on loose stool this shift. During changes very small streak of blood  was noted in pt. brief no opening or wound in groin area. Pt. BG checks where on the low end  78,79.  Apple juice given. Last check 122. Tube feeding running at 20 ml/hr tubing changed this shift. Continue POC. .

## 2024-08-17 NOTE — PROGRESS NOTES
"[facility]    Medicine Progress Note - Hospitalist Service, GOLD TEAM 17    Date of Admission:  8/10/2024    Assessment & Plan     Myla Glynn is a 79 year old female admitted on 8/10/2024. 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         Patient  was having nausea that stared about  1 week ago also vomiting about 2 x 3 a day , also some difficulty urinating and urinating less but still urinating. ( No longer has alejandra)      Last BM was 4-5 days ago that were hard PTA.  She is passing gas . No abdominal pain but has some \" burning \" sensation in abdomen shows in lower abdomen however this  is not new and always has this .      She has PEG in and gets over night feedings, however she has had vomiting, sometime sin the middle of night sometime sin morning, emesis is color of her tube feeding. There has not been any changes in tube feeding.      Gets 45ml/hr form 8 PM till 8 AM. Has some nausea and dry heaving during the day but emesis is during the day.       No fever or chills. She gets her medications via PEG tube most in AM, and family has been giving her her medications, she vomits before medications .      She denies any shortness of breath  no chest pain       8/17  - did have small emesis this AM,  her tube feeding swere incresaed to 30 ml/hr over night  - will keep tf at 10 ml/hr for now   - GI weighed in , checked  8/16 abd XR  for obstruction and stool burden and stepped up bowel regime, nonspecific gas pattern and mils tool burden          Nausea and vomiting   Decreased po intake  -had " "CT chest abdominal pelvis at [facility] 8/8 , she was already having above symptoms .  showed worsening of the locally advanced mass involving bladder ,  no evidence of bowel obstruction but at high risk , possible enterovesical fistula    -need to be careful with IV fluids  with heart failure  HFrEF  - abdominal XR 8/10 showed nonobstructive gas pattern, no pneumatosis or free air   - lactic acid 1.5  - antiemetic,   she is also constipated so started on BM  - UA + but question if has enterovesical fistula,   - Urine culture no growth to date, on Rocephin since 8/11  - asked GI to weigh in and appreciate input   - repeat abdominal XR 8/16 with nonspecific gas pattern, mild colonic stool burden          History of 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 \" She was to follow up  with oncology    - had cystogram 7/24 and alejandra was removed  , no longer takes  augmentin   - RN bladder scanned in ED for 50 ml and patient  did proceed to urinate 50 ml   My overall impression is that Mrs. Glynn has progressive metastatic stage IV colorectal cancer with genitourinary tract involvement and complications, with an overall clinical picture complicated by a series of non-cancer related co-morbid conditions. Her primary oncology team is at the [facility]. Considering the acute presentation and the extent and nature of current complications, my " "recommendation is that the focus remain on managing the acute issues that brought her into the hospital, and deferring larger picture diagnostic and prognostic discussions until there is evidence of meaningful recovery and improvement in performance status that would allow best assessment for realistic treatment options, if any. If the patient and her family may opt to return to her primary oncology team at the [facility]; if they wish for an additional oncology opinion with the [facility]  team, we can help arrange outpatient consultation that can take place as an outpatient.         Constipation  - did have BM   8/13, 8/15   - ? Overflow diarrhea   - continue  bowel regime      Leukocytosis  - UA with > 182 WBC, large blood and WBC esterase but would expect this if has enterovesical fistula but not confirmed        HFrEF  Significant Mitral Valve regurgitation   Hypertension    -  follow at [facility]  - TTE 4/4 with EF 29%  moderate TR   - was to have cardiac MRI once got stronger   - PTA on carvediol , continue with holding parameters  not on ACEi/ARB  SGLT2 inhibitor due to CKD         History left sided pleural effusion  - on CT 8/8 left pleural effusion size has decreased per report   - CT did show ne enhancing pleural thickening        Acute on chronic CKD stage III  Mild AGMA  -  baseline creatinine  1.4-1.7   -creatinine bit up form baseline suspect due to nausea vomiting and decreased po intake   - avoid nephrotoxic agents      Moderate malnutrition  - has PEG  that was placed 6/2024   - nutritionist to see   -  per notes \"Continue cycled G-tube feedings: TwoCal HN at 45 mL/hr run 1600 to 0800 with free water flushes 60 mL q4h. \" Started  TF at lower rate over night for now      T2DM  - not on medications, check accu-checks insulin sliding scale      Mild cognitive impairment  - at risk for delirium      Depression  - not on medications       Anemia  - Hg up, could be form hemoconcentration        History of " infected left retroperitoneal hematoma  status post  IR embolization and drainage 3/2024   - no current issues      Goals of cares  - full code at this  point         CT chest abdomen  plevis at [facility] 8/8  1. Marked interval worsening of the large locally advanced infiltrative recurrent mass invading the the bladder and segments of small bowel with associated tethering of the vaginal cuff and sigmoid colon. There is a high-risk for bowel obstruction.    However, no bowel obstruction currently.    2. 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  No gas within the bladder.  Narrative     EXAM:  CT ABDOMEN PELVIS WITH IV CONTRAST    COMPARISON:  CT abdomen pelvis with IV contrast 5/29/2024, CT cystogram 5/30/2024    FINDINGS:    Postoperative changes of a sigmoid colorectal anastomosis and small bowel resections , partial cystectomy, hysterectomy.  Appendectomy.    Worsening irregular invasive mass invading the bladder and small bowel and tethering the sigmoid colon, and the vaginal cuff in the pelvis.  The mass measures proximally 3.0 x 5.8 x 2.7 cm (series 1 image 123; series 5, image 35).  Asymmetric irregular  mural of the superior bladder measuring 1.5 cm in anterior inferior bladder consistent with tumor (series 1, image 108).  Probable defect of the anterior bladder wall/enterovesical fistula  (series 1 image 123).  The mass abuts the pubic symphysis and  pubic bodies with probable invasion of the anterior pelvic musculature.  Mildly gas distended segments of small bowel in the pelvis.  No high-grade bowel obstruction.  Tethering of the right distal ureter.  No hydronephrosis.    The enteroenterostomy in the right lower quadrant appears patent .However, the the proximal segment of the small bowel before the anastomosis is filled with tumor.  The enteroenterostomy in the right lower quadrant is patent.    Slight urothelial cell thickening enhancement of the left renal collecting  system may be related to prior instrumentation.    No suspicious liver lesion.  Portal vein hepatic veins are patent.  Distended gallbladder.  Slight fatty atrophy of the pancreas.  Tiny esophageal hiatal hernia.    The adrenal glands unremarkable.  Embolization coils right renal hilum.  Tiny low-attenuation lesion in the inferior left kidney may represent a cyst, but technically too small to characterize.  No lymphadenopathy in the abdomen or pelvis.    Scattered vascular calcifications.    Slightly decreased small left pleural effusion with associated pleural thickening.  Subsegmental atelectasis left lower lobe.    Scattered musculoskeletal degenerative changes.  No suspicious osseous lesion.    This examination was performed in conjunction with a CT of the chest, which will be reported separately.  Procedure Note     CT chest at [facility] 8/8   OMPARISON: CT chest with IV contrast enhancement 3/2/2024.  FINDINGS:  Exam degraded by respiratory motion.    Since 3/2/2024, heart size has decreased and is at upper limits of normal. Large left pleural effusion has decreased, now small. New smooth enhancing circumferential pleural thickening around the remaining effusion. Improved atelectasis in the lingula  and left lower lobe. Moderate subsegmental atelectasis remains in the left lung base. Left PICC has been removed.    Remainder unchanged. Mild nodularity along the minor fissure, unchanged dating back to outside CT chest 11/17/2021 consistent with intrapulmonary lymph nodes. Tiny stable 2 mm nodule in the right upper lobe anteriorly (141).    Mild atelectasis in the right lower lung. Trace pericardial effusion. Coronary artery calcifications.    This examination was performed in conjunction with a CT of the abdomen which will be reported separately.  Procedure Note     Иван Rudolph M.D. - 08/09/2024  Formatting of this note might be different from the original.  EXAM: CT CHEST WITH IV CONTRAST    COMPARISON: CT  chest with IV contrast enhancement 3/2/2024.    FINDINGS:  Exam degraded by respiratory motion.    Since 3/2/2024, heart size has decreased and is at upper limits of normal. Large left pleural effusion has decreased, now small. New smooth enhancing circumferential pleural thickening around the remaining effusion. Improved atelectasis in the lingula  and left lower lobe. Moderate subsegmental atelectasis remains in the left lung base. Left PICC has been removed.    Remainder unchanged. Mild nodularity along the minor fissure, unchanged dating back to outside CT chest 11/17/2021 consistent with intrapulmonary lymph nodes. Tiny stable 2 mm nodule in the right upper lobe anteriorly (141).    Mild atelectasis in the right lower lung. Trace pericardial effusion. Coronary artery calcifications.    This examination was performed in conjunction with a CT of the abdomen which will be reported separately.    IMPRESSION:  1. Heart size has decreased and is now at upper limits of normal.  2. Left pleural effusion has decreased. New enhancing pleural thickening around the left effusion consistent with an exudative effusion.  3. Stable tiny pulmonary nodules.              Diet: NPO for Medical/Clinical Reasons Except for: Ice Chips, NPO but receiving Tube Feeding  Adult Formula Drip Feeding: Continuous Virtual Event Bags Peptide 1.5; Gastrostomy; Goal Rate: Advance 10 mL now and then every 24 hours to goal rate 40 mL/hr; mL/hr; If using cartons--follow 8 hr hang time for open system.; Do not advance tube feeding ra...    DVT Prophylaxis: Heparin SQ  Cameron Catheter: Not present  Lines: None     Cardiac Monitoring: None  Code Status: Full Code      Clinically Significant Risk Factors                  # Hypertension: Noted on problem list            # Severe Malnutrition: based on nutrition assessment    # Financial/Environmental Concerns: none                      Naz Portillo MD  Hospitalist Service, GOLD TEAM [address] [place] [address]  [facility]  Securely message with [employer] (more info)  Text page via [employer] Paging/Directory   See signed in provider for up to date coverage information  ______________________________________________________________________    Interval History   Had small emesis this AM, had tf at 30 ml/hr ,emesis was mainly her medications that were just given, having loose stools with medications , she looks ok, nontoxic      Physical Exam   Vital Signs: Temp: 98.7  F (37.1  C) Temp src: Oral BP: 124/68 Pulse: 72   Resp: 16 SpO2: 99 % O2 Device: None (Room air)    Weight: 143 lbs 15.37 oz  General appearence: awake alert  in  no apparent distress  RESPIRATORY: lungs clear    CARDIOVASCULAR:S1 S2 regular rate and rhythm,  GASTROINTESTINAL:soft, non-distended ,  tenderness improved , + bowel sounds    SKIN: warm and dry, no mottling noted   NEUROLOGIC; awake alert and oriented      Data     I have personally reviewed the following data over the past 24 hrs:    N/A  \   N/A   / N/A     N/A N/A N/A /  122 (H)   3.9 N/A N/A \       Imaging results reviewed over the past 24 hrs:   Recent Results (from the past 24 hour(s))   XR Abdomen 1 View    Narrative    EXAMINATION:  XR ABDOMEN 1 VIEW 8/16/2024 4:27 PM     COMPARISON: Abdominal radiograph 8/10/2024.    HISTORY: looking for obstruction and  stool burden    TECHNIQUE: Frontal view of the abdomen.    FINDINGS: Scattered surgical sutures and clips overlying the pelvis.  Embolization coils bilaterally with no upper abdomen. Percutaneous  enterostomy tube balloon just to left of midline of the mid abdomen.  Multiple diffuse air-filled distended bowel loops. No pneumatosis or  portal venous gas.      Impression    IMPRESSION: Nonspecific bowel gas pattern with multiple air-filled  distended but not significantly dilated bowel loops, developing ileus  not entirely excluded. Mild colonic stool burden.    I have personally reviewed the examination and initial  interpretation  and I agree with the findings.    AVERY LILLY MD         SYSTEM ID:  [id number]

## 2024-08-18 NOTE — PROGRESS NOTES
"[facility]    Medicine Progress Note - Hospitalist Service, GOLD TEAM 17    Date of Admission:  8/10/2024    Assessment & Plan     Myla Glynn is a 79 year old female admitted on 8/10/2024. 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         Patient  was having nausea that stared about  1 week ago also vomiting about 2 x 3 a day , also some difficulty urinating and urinating less but still urinating. ( No longer has alejandra)      Last BM was 4-5 days ago that were hard PTA.  She is passing gas . No abdominal pain but has some \" burning \" sensation in abdomen shows in lower abdomen however this  is not new and always has this .      She has PEG in and gets over night feedings, however she has had vomiting, sometime sin the middle of night sometime sin morning, emesis is color of her tube feeding. There has not been any changes in tube feeding.      Gets 45ml/hr form 8 PM till 8 AM. Has some nausea and dry heaving during the day but emesis is during the day.       No fever or chills. She gets her medications via PEG tube most in AM, and family has been giving her her medications, she vomits before medications .      She denies any shortness of breath  no chest pain       8/18  - no emesis, seems to have tolerated tf at 10 ml/hr, increase to 15          Nausea and vomiting   Decreased po intake  -had CT chest abdominal pelvis at [place] 8/8 , she was already having above symptoms .  showed worsening of the locally advanced mass involving bladder ,  no evidence of bowel obstruction but at high risk , possible " "enterovesical fistula    -need to be careful with IV fluids  with heart failure  HFrEF  - abdominal XR 8/10 showed nonobstructive gas pattern, no pneumatosis or free air   - lactic acid 1.5  - antiemetic,   she is also constipated so started on BM  - UA + but question if has enterovesical fistula,   - Urine culture no growth to date, received  Rocephin  now off   - asked GI to weigh in and appreciate input   - repeat abdominal XR 8/16 with nonspecific gas pattern, mild colonic stool burden          History of 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 \" She was to follow up  with oncology    - had cystogram 7/24 and alejandra was removed  , no longer takes  augmentin   - RN bladder scanned in ED for 50 ml and patient  did proceed to urinate 50 ml   My overall impression is that Mrs. Glynn has progressive metastatic stage IV colorectal cancer with genitourinary tract involvement and complications, with an overall clinical picture complicated by a series of non-cancer related co-morbid conditions. Her primary oncology team is at the [facility]. Considering the acute presentation and the extent and nature of current complications, my recommendation is that the focus remain on managing the acute issues that brought her into the hospital, and deferring larger picture diagnostic and prognostic discussions until there is evidence of " "meaningful recovery and improvement in performance status that would allow best assessment for realistic treatment options, if any. If the patient and her family may opt to return to her primary oncology team at the [facility]; if they wish for an additional oncology opinion with the [facility] team, we can help arrange outpatient consultation that can take place as an outpatient.         Constipation  - did have BM   8/13, 8/15   - ? Overflow diarrhea   - did step up bowel regime and continue       Leukocytosis  - UA with > 182 WBC, large blood and WBC esterase but would expect this if has enterovesical fistula but not confirmed        HFrEF  Significant Mitral Valve regurgitation   Hypertension    -  follow at [place]  - TTE 4/4 with EF 29%  moderate TR   - was to have cardiac MRI once got stronger   - PTA on carvediol , continue with holding parameters  not on ACEi/ARB  SGLT2 inhibitor due to CKD         History left sided pleural effusion  - on CT 8/8 left pleural effusion size has decreased per report   - CT did show ne enhancing pleural thickening        Acute on chronic CKD stage III  Mild AGMA  -  baseline creatinine  1.4-1.7   -creatinine bit up form baseline suspect due to nausea vomiting and decreased po intake   - avoid nephrotoxic agents      Moderate malnutrition  - has PEG  that was placed 6/2024   - nutritionist to see   -  per notes \"Continue cycled G-tube feedings: TwoCal HN at 45 mL/hr run 1600 to 0800 with free water flushes 60 mL q4h. \" Started  TF at lower rate over night for now   - she العلي snot seem to tolerate higher rates, did ok with 10 ml/hr fo r24 hourse, increased to 15 ml.hr 8/18 and continue to monitor      T2DM  - not on medications blood sugar < 100 stopped accu-checks      Mild cognitive impairment  - at risk for delirium      Depression  - not on medications       Anemia  - Hg up, could be form hemoconcentration        History of infected left retroperitoneal hematoma  status post  " IR embolization and drainage 3/2024   - no current issues      Goals of cares  - full code at this  point         CT chest abdomen  plevis at [place] 8/8  1. Marked interval worsening of the large locally advanced infiltrative recurrent mass invading the the bladder and segments of small bowel with associated tethering of the vaginal cuff and sigmoid colon. There is a high-risk for bowel obstruction.    However, no bowel obstruction currently.    2. 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  No gas within the bladder.  Narrative     EXAM:  CT ABDOMEN PELVIS WITH IV CONTRAST    COMPARISON:  CT abdomen pelvis with IV contrast 5/29/2024, CT cystogram 5/30/2024    FINDINGS:    Postoperative changes of a sigmoid colorectal anastomosis and small bowel resections , partial cystectomy, hysterectomy.  Appendectomy.    Worsening irregular invasive mass invading the bladder and small bowel and tethering the sigmoid colon, and the vaginal cuff in the pelvis.  The mass measures proximally 3.0 x 5.8 x 2.7 cm (series 1 image 123; series 5, image 35).  Asymmetric irregular  mural of the superior bladder measuring 1.5 cm in anterior inferior bladder consistent with tumor (series 1, image 108).  Probable defect of the anterior bladder wall/enterovesical fistula  (series 1 image 123).  The mass abuts the pubic symphysis and  pubic bodies with probable invasion of the anterior pelvic musculature.  Mildly gas distended segments of small bowel in the pelvis.  No high-grade bowel obstruction.  Tethering of the right distal ureter.  No hydronephrosis.    The enteroenterostomy in the right lower quadrant appears patent .However, the the proximal segment of the small bowel before the anastomosis is filled with tumor.  The enteroenterostomy in the right lower quadrant is patent.    Slight urothelial cell thickening enhancement of the left renal collecting system may be related to prior instrumentation.    No  suspicious liver lesion.  Portal vein hepatic veins are patent.  Distended gallbladder.  Slight fatty atrophy of the pancreas.  Tiny esophageal hiatal hernia.    The adrenal glands unremarkable.  Embolization coils right renal hilum.  Tiny low-attenuation lesion in the inferior left kidney may represent a cyst, but technically too small to characterize.  No lymphadenopathy in the abdomen or pelvis.    Scattered vascular calcifications.    Slightly decreased small left pleural effusion with associated pleural thickening.  Subsegmental atelectasis left lower lobe.    Scattered musculoskeletal degenerative changes.  No suspicious osseous lesion.    This examination was performed in conjunction with a CT of the chest, which will be reported separately.  Procedure Note     CT chest at [place] 8/8   OMPARISON: CT chest with IV contrast enhancement 3/2/2024.  FINDINGS:  Exam degraded by respiratory motion.    Since 3/2/2024, heart size has decreased and is at upper limits of normal. Large left pleural effusion has decreased, now small. New smooth enhancing circumferential pleural thickening around the remaining effusion. Improved atelectasis in the lingula  and left lower lobe. Moderate subsegmental atelectasis remains in the left lung base. Left PICC has been removed.    Remainder unchanged. Mild nodularity along the minor fissure, unchanged dating back to outside CT chest 11/17/2021 consistent with intrapulmonary lymph nodes. Tiny stable 2 mm nodule in the right upper lobe anteriorly (141).    Mild atelectasis in the right lower lung. Trace pericardial effusion. Coronary artery calcifications.    This examination was performed in conjunction with a CT of the abdomen which will be reported separately.  Procedure Note     Иван Rudolph M.D. - 08/09/2024  Formatting of this note might be different from the original.  EXAM: CT CHEST WITH IV CONTRAST    COMPARISON: CT chest with IV contrast enhancement  3/2/2024.    FINDINGS:  Exam degraded by respiratory motion.    Since 3/2/2024, heart size has decreased and is at upper limits of normal. Large left pleural effusion has decreased, now small. New smooth enhancing circumferential pleural thickening around the remaining effusion. Improved atelectasis in the lingula  and left lower lobe. Moderate subsegmental atelectasis remains in the left lung base. Left PICC has been removed.    Remainder unchanged. Mild nodularity along the minor fissure, unchanged dating back to outside CT chest 11/17/2021 consistent with intrapulmonary lymph nodes. Tiny stable 2 mm nodule in the right upper lobe anteriorly (141).    Mild atelectasis in the right lower lung. Trace pericardial effusion. Coronary artery calcifications.    This examination was performed in conjunction with a CT of the abdomen which will be reported separately.    IMPRESSION:  1. Heart size has decreased and is now at upper limits of normal.  2. Left pleural effusion has decreased. New enhancing pleural thickening around the left effusion consistent with an exudative effusion.  3. Stable tiny pulmonary nodules.              Diet: NPO for Medical/Clinical Reasons Except for: Ice Chips, NPO but receiving Tube Feeding  Adult Formula Drip Feeding: Continuous [employer] Peptide 1.5; Gastrostomy; Goal Rate: keep at 10 ml/hour for now; mL/hr; If using cartons--follow 8 hr hang time for open system.; Do not advance tube feeding rate unless K+ is = or > 3.0, Mg++ is =...    DVT Prophylaxis: Heparin SQ  Cameron Catheter: Not present  Lines: None     Cardiac Monitoring: None  Code Status: Full Code      Clinically Significant Risk Factors                  # Hypertension: Noted on problem list            # Severe Malnutrition: based on nutrition assessment    # Financial/Environmental Concerns: none                      Naz Portillo MD  Hospitalist Service, GOLD TEAM [address] [place] [address]  Center  Securely message with [employer] (more info)  Text page via [employer] Paging/Directory   See signed in provider for up to date coverage information  ______________________________________________________________________    Interval History   Doing ok, no emesis , some nasuea and feeling of fullness but otherwise good, 1 daughterhere       Physical Exam   Vital Signs: Temp: 98.5  F (36.9  C) Temp src: Oral BP: 123/73 Pulse: 73   Resp: 17 SpO2: 100 % O2 Device: None (Room air)    Weight: 130 lbs 11.72 oz  General appearence: awake alert  in  no apparent distress  RESPIRATORY: lungs clear    CARDIOVASCULAR:S1 S2 regular rate and rhythm,  GASTROINTESTINAL:soft, non-distended ,  tenderness improved , decreased but present  bowel sounds    SKIN: warm and dry, no mottling noted   NEUROLOGIC; awake alert and oriented      Data     I have personally reviewed the following data over the past 24 hrs:    N/A  \   N/A   / N/A     N/A N/A N/A /  93   3.9 N/A N/A \       Imaging results reviewed over the past 24 hrs:   No results found for this or any previous visit (from the past 24 hour(s)).

## 2024-08-18 NOTE — PLAN OF CARE
Spoke w/ provider, Varadi, and TF to remain at 10mls. Emesis episode in morning when giving AM meds. No further complaints of nausea or emesis episodes.     Loose/soft stools. Bowel meds held - GI consulted.     BG checks Q4.     NPO except for ice chips.      used for communication.     Q2-3 repositioning.     Ax1 w/ W/GB. Up in chair x1.     RN managed mag and pot in range and ordered for 8/18.

## 2024-08-18 NOTE — PLAN OF CARE
Goal Outcome Evaluation:      Plan of Care Reviewed With: patient    Overall Patient Progress: no changeOverall Patient Progress: no change    Outcome Evaluation: No acute changes this shift. Tigrinya speaking. Resting quietly, eyes closed, respirations even and unlabored this shift. Weight shifting throughout shift. No emesis of nausea noted. TF at 10 ml/hr. Tolerating. Q4hrs glucose check. NPO except for ice chips. PEG tube patent. Incontinent of of bowel and bladder. Denies pain. Rest and sleep promoted. Care clustered. Frequent rounding on patient for safety. Call light within reach    Continue to monitor POC.

## 2024-08-19 NOTE — PROGRESS NOTES
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 GI consulted for \"ongoing abd pain, not tolerating tube feeds\".       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 PTA had not had bowel movement for 4-5 days. With history of sigmoid adenocarcinoma with bladder mets and extensive surgical hx, she is at risk for obstruction. AXR on 8/10 with " no evidence of obstruction, pneumatosis or free air. Recent CT A/P at [facility] on 8/8 showed worsening of locally advanced mass involving bladder with no evidence of bowel obstruction, and possible enterovesical fistula.   Intermittent nausea with varying reports as to whether it is secondary to TF or not. Per RN notes, has nausea with TF but patient denied when asked by RD. Currently planning to increase TF slowly to goal. Has been requiring 1-4 doses of antiemetics daily. Noted some loose stools per RN notes, ?overflow diarrhea given prior constipation, although KUB is not supportive of this. Not on bowel regimen PTA. It is likely that her sxs are r/t the progression of her cancer.      # Severe malnutrition   # S/p PEG placement (6/13/24)  Failure to thrive status post G tube placement 6/13/2024 (TwoCal HN at 45 mL/hr run 1600 to 0800). Currently advancing TF slowly 10 mL every 24 hours to goal rate with free water flushes 30mL q4 hours.   - RD following, appreciate management of TF    RECOMMENDATIONS:  - Titrate Miralax with the goal of having 1 formed BM/day  - Agree w/ palliative care involvement      Thank you for involving us in this patient's care. Please do not hesitate to contact the GI service with any questions or concerns.      Pt care plan discussed with Dr. Griffiths, GI staff physician.    Eloisa Duarte   Gastroenterology Fellow  _______________________________________________________________    Subjective:  Last emesis was yesterday; denied pain; is having loose BMs    Objective:  Blood pressure (!) 142/73, pulse 81, temperature 98.8  F (37.1  C), temperature source Oral, resp. rate 18, weight 59.3 kg (130 lb 11.7 oz), SpO2 100%, not currently breastfeeding.    Gen: A&Ox3, NAD  HEENT: eomi, sclera anicteric  Lungs: breathing comfortably on RA   Abd:  soft, nd/nt, PEG  Skin: no jaundice  Neuro: non focal       LABS:  BMP  Recent Labs   Lab 08/18/24  1553 08/18/24  1111 08/18/24  0647 08/18/24  0521  08/18/24  0446 08/17/24  1014 08/17/24  0730 08/16/24  0646 08/16/24  0515 08/15/24  0207 08/15/24  0022 08/13/24  1008 08/13/24  0737   NA  --   --   --   --   --   --   --   --   --   --   --   --  142   POTASSIUM  --   --   --  3.9  --   --  3.9  --  3.9  --  4.1  --  4.4   CHLORIDE  --   --   --   --   --   --   --   --   --   --   --   --  103   DICKSON  --   --   --   --   --   --   --   --   --   --   --   --  9.7   CO2  --   --   --   --   --   --   --   --   --   --   --   --  22   BUN  --   --   --   --   --   --   --   --   --   --   --   --  84.1*   CR  --   --   --   --   --   --   --   --   --   --   --   --  1.84*   GLC 80 80 93  --  82   < >  --    < >  --    < >  --    < > 118*    < > = values in this interval not displayed.     CBC  Recent Labs   Lab 08/19/24  0702 08/16/24  0515 08/13/24  0737   WBC  --   --  13.4*   RBC  --   --  4.08   HGB  --   --  12.4   HCT  --   --  37.5   MCV  --   --  92   MCH  --   --  30.4   MCHC  --   --  33.1   RDW  --   --  14.6    270 345     INRNo lab results found in last 7 days.  LFTsNo lab results found in last 7 days.   PANCNo lab results found in last 7 days.    IMAGING:  No new imaging

## 2024-08-19 NOTE — CONSULTS
Palliative Care Consultation Note  [facility]      Patient: Myla Glynn  Date of Admission:  8/10/2024    Requesting Clinician / Team: Naz Portillo MD   Reason for consult: Goals of care  Patient and family support       Recommendations & Counseling     GOALS OF CARE:   Currently full code, restorative goals  Per Dominic (cousin) she understands that family is aware that urology was not able to provide any more surgical options, and family does not want to pursue surgery at this time.  Would pursue discussions with Myla and family together to discuss her current condition and discuss goals of care  Would greatly appreciate RNCC assistance in arranging a care meeting with family to discuss goals of care and have pertinent specialist including GI and oncology. The family does want to hear directly from the oncologists their input and very much want to be seen now at [facility], her current ECOG is a 3.    ADVANCE CARE PLANNING:  No health care directive on file. Per  informed consent policy, next of kin should be involved if patient becomes unable.  There is no POLST form on file, defer to patient and/or next of kin for decisions   Code status: Full Code    MEDICAL MANAGEMENT:   #Nausea,obstruction and medications  -Pharmacologic management   Agree with GI recommendations, consider CT A/P and GGE  Would limit use of ondansetron as it has a well known side effect of constipation  Consider use of haldol 1 mg q6h PRN for nausea (a little less constipating), better at treating obstruction related nausea, could try a dose prior to medications  Would also consider spacing out medications during the day to avoid large volumes being given at one time.  Lastly, if all is not effective and has partial obstructions, would consider venting/decompression.  -Nonpharmacologic management  Small, frequent intake  Assess and avoid aggravating factors  Accupressure  (C-band)  Aromatherapy, alcohol swabs known to be effective       #Constipation,Medication adverse effect, metastatic colon cancer  Agree with miralax and senna  Agree with GI recommendations, consider CT A/P and GGE    PSYCHOSOCIAL/SPIRITUAL SUPPORT:  She is well supported by her family, her cousin is a nurse and helps a lot with interpreting medical information for the family.     Palliative Care will continue to follow. Thank you for the consult and allowing us to aid in the care of Myla Glynn.    These recommendations have been discussed with primary team.    WILL Vanegas CNS  [facility], Palliative Care  Securely message with the [employer] Web Console (learn more here) or  Text page via 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 Recently had imaging 8/8 which demonstrated locally advanced mass invovling bladder, possible enterovesical fistula, high risk for bowel obstruction, who presented on 8/10 with nausea, vomiting which started a week prior to admission with emesis 2-3 times a day.  Her hospital course is complicated by difficulty tolerating tube feedings.     Today, the patient was seen for:  Metastatic colon cancer  HFrEF  CKD stage IIIb  Concern for enterovesical fistula  Nausea  Emesis      History of Present Illness   Met with patient and Dominic- cousin.   Introduced the role of palliative care as an interdisciplinary team that cares for patients with serious illness to help support symptom management,  communication, coping for patients and their families as well as support with medical decision making.    Prognosis, Goals, & Planning:   Functional Status just prior to this current hospitalization:  ECOG3 (Capable of only limited self-care; needs help with ADLs; in bed/chair >50% of waking hours)    Prognosis, Goals, and/or Advance Care Planning:  We discussed general treatment options (full/restorative, selective/conservatives, and comfort only/hospice). We then discussed how these specifically apply to Nechi.  Based on this discussion, cousin has decided to pursue further discussions with the rest of her family. We discussed that the family and her are already understanding that a surgery is not an option and I understand from Dominic that the family does not want to put her through that.     Code Status was addressed today:   Yes, We discussed potential risks and rationale of attempting cardiac resuscitation, intubation, and mechanical ventilation.  We also discussed probability of survival as well as quality of life implications.  Based on this discussion, patient or surrogate response/decision: continue discussions with family, in the past family wish for her to be full code. It was agreed this would be an ongoing discussion with family.      Patient's decision making preferences: shared with support from loved ones        Patient has decision-making capacity today for complex decisions: Intact          Coping, Meaning, & Spirituality:   Mood, coping, and/or meaning in the context of serious illness were addressed today: Yes family and patient seem to be coping appropriately.    Social:   Dominic- cousin who just had a baby, 1 mo old  Genete- daughter who she lives with, she just got out of the hospital and is still recovering some  Also lives with granddaughter who helps take care of her  In total has 3 daughters and 1 son    Medications:  Reviewed this patient's medication profile and medications from this  hospitalization. Notable medications:  Miralax BID  Senna BID  Ondansetron PRN -x3    [place] Board of Pharmacy Data Base Reviewed: Yes:   reviewed - controlled substances prescribed by other outside provider(s).    ROS:  Comprehensive ROS is reviewed and is negative except as here & per HPI:     Physical Exam   Vital Signs with Ranges  Temp:  [98.2  F (36.8  C)-98.9  F (37.2  C)] 98.9  F (37.2  C)  Pulse:  [84-88] 84  Resp:  [18-20] 19  BP: (133-143)/(69-79) 133/73  SpO2:  [98 %-99 %] 99 %  Wt Readings from Last 10 Encounters:   08/18/24 59.3 kg (130 lb 11.7 oz)   07/23/24 53.1 kg (117 lb)   06/24/24 55 kg (121 lb 3.2 oz)   06/12/24 53.4 kg (117 lb 11.6 oz)   12/13/21 59.2 kg (130 lb 8 oz)   09/13/21 56.1 kg (123 lb 11.2 oz)   06/14/21 50.6 kg (111 lb 9.6 oz)   06/07/21 50.1 kg (110 lb 8 oz)   05/20/21 50.4 kg (111 lb 3.2 oz)   05/07/21 52.6 kg (115 lb 14.4 oz)     130 lbs 11.72 oz    PHYSICAL EXAM:  Constitutional: Awake, alert, cooperative, cachectic, chronically ill appearing, no apparent distress  Lungs: No increased work of breathing, good air exchange  Neurologic: Awake, alert, oriented to name, place and time.    Neuropsychiatric: Normal affect, mood, orientation, memory and insight.  Skin: No rashes, erythema      Data reviewed:  Results for orders placed or performed during the hospital encounter of 08/10/24 (from the past 24 hour(s))   Glucose by meter   Result Value Ref Range    GLUCOSE BY METER POCT 80 70 - 99 mg/dL   Platelet count   Result Value Ref Range    Platelet Count 268 150 - 450 10e3/uL     Recent Labs   Lab 08/19/24  0702 08/18/24  1553 08/18/24  1111 08/18/24  0647 08/18/24  0521 08/17/24  1014 08/17/24  0730 08/16/24  0646 08/16/24  0515 08/13/24  1008 08/13/24  0737   WBC  --   --   --   --   --   --   --   --   --   --  13.4*   HGB  --   --   --   --   --   --   --   --   --   --  12.4   MCV  --   --   --   --   --   --   --   --   --   --  92     --   --   --   --   --   --    --  270  --  345   NA  --   --   --   --   --   --   --   --   --   --  142   POTASSIUM  --   --   --   --  3.9  --  3.9  --  3.9   < > 4.4   CHLORIDE  --   --   --   --   --   --   --   --   --   --  103   CO2  --   --   --   --   --   --   --   --   --   --  22   BUN  --   --   --   --   --   --   --   --   --   --  84.1*   CR  --   --   --   --   --   --   --   --   --   --  1.84*   ANIONGAP  --   --   --   --   --   --   --   --   --   --  17*   DICKSON  --   --   --   --   --   --   --   --   --   --  9.7   GLC  --  80 80 93  --    < >  --    < >  --    < > 118*    < > = values in this interval not displayed.       No results found for this or any previous visit (from the past 24 hour(s)).    Medical Decision Making       120 MINUTES SPENT BY ME on the date of service doing chart review, history, exam, documentation & further activities per the note.

## 2024-08-19 NOTE — PROGRESS NOTES
"[facility]    Medicine Progress Note - Hospitalist Service, GOLD TEAM 17    Date of Admission:  8/10/2024    Assessment & Plan     Myla Glynn is a 79 year old female admitted on 8/10/2024. 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         Patient  was having nausea that stared about  1 week ago also vomiting about 2 x 3 a day , also some difficulty urinating and urinating less but still urinating. ( No longer has alejandra)      Last BM was 4-5 days ago that were hard PTA.  She is passing gas . No abdominal pain but has some \" burning \" sensation in abdomen shows in lower abdomen however this  is not new and always has this .      She has PEG in and gets over night feedings, however she has had vomiting, sometime sin the middle of night sometime sin morning, emesis is color of her tube feeding. There has not been any changes in tube feeding.      Gets 45ml/hr form 8 PM till 8 AM. Has some nausea and dry heaving during the day but emesis is during the day.       No fever or chills. She gets her medications via PEG tube most in AM, and family has been giving her her medications, she vomits before medications .      She denies any shortness of breath  no chest pain       8/19  - no emesis, seems to have tolerated tf at 10 ml/hr, increase to 15 ml hr and had emesis, she feels distended  - would benifit form  having care conference but I thing having oncology there would be beneficial, patient  has  been  going to [place] [facility] but they  wanted to be see at the [facility]. Oncology recommended out patient  " "follow up  but at this rate patient  cannot discharge  .  - palliative care had great idea if we could vent the PEG tube but it does not seem like it , we can have GI weigh in          Nausea and vomiting   Decreased po intake  -had CT chest abdominal pelvis at [place] 8/8 , she was already having above symptoms .  showed worsening of the locally advanced mass involving bladder ,  no evidence of bowel obstruction but at high risk , possible enterovesical fistula    -need to be careful with IV fluids  with heart failure  HFrEF  - abdominal XR 8/10 showed nonobstructive gas pattern, no pneumatosis or free air   - lactic acid 1.5  - antiemetic,   she is also constipated so started on BM  - UA + but question if has enterovesical fistula,   - Urine culture no growth to date, received  Rocephin  now off   - asked GI to weigh in and appreciate input   - repeat abdominal XR 8/16 with nonspecific gas pattern, mild colonic stool burden          History of metastatic sigmoid colon adenocarcinoma to bladder  ,  status post  sigmoid resection in 2021 also had  partial cystectomy and ureteral reimplantation   Bladder  mass , status post  resection  9/2023  and pathology showed invasive adenocarcinoma then had partial cystectomy pelvic lymph node dissection small bowel resection 1/2024   Suspected  recurrent/metastatic adenocarcinoma  - followed at [place] and per urology notes form 8/9 \"We would not recommend anymore surgical intervention for her given the extreme complexity of her surgical situation previously and her extensive hospital stay after her surgeries that we had performed. \" She was to follow up  with oncology    - had cystogram 7/24 and alejandra was removed  , no longer takes  augmentin   - RN bladder scanned in ED for 50 ml and patient  did proceed to urinate 50 ml   My overall impression is that Mrs. Glynn has progressive metastatic stage IV colorectal cancer with genitourinary tract involvement and complications, " "with an overall clinical picture complicated by a series of non-cancer related co-morbid conditions. Her primary oncology team is at the [facility]. Considering the acute presentation and the extent and nature of current complications, my recommendation is that the focus remain on managing the acute issues that brought her into the hospital, and deferring larger picture diagnostic and prognostic discussions until there is evidence of meaningful recovery and improvement in performance status that would allow best assessment for realistic treatment options, if any. If the patient and her family may opt to return to her primary oncology team at the [facility]; if they wish for an additional oncology opinion with the [facility] team, we can help arrange outpatient consultation that can take place as an outpatient.         Constipation  - did have BM   8/13, 8/15   - ? Overflow diarrhea   - did step up bowel regime and continue       Leukocytosis  - UA with > 182 WBC, large blood and WBC esterase but would expect this if has enterovesical fistula but not confirmed        HFrEF  Significant Mitral Valve regurgitation   Hypertension    -  follow at [place]  - TTE 4/4 with EF 29%  moderate TR   - was to have cardiac MRI once got stronger   - PTA on carvediol , continue with holding parameters  not on ACEi/ARB  SGLT2 inhibitor due to CKD         History left sided pleural effusion  - on CT 8/8 left pleural effusion size has decreased per report   - CT did show ne enhancing pleural thickening        Acute on chronic CKD stage III  Mild AGMA  -  baseline creatinine  1.4-1.7   -creatinine bit up form baseline suspect due to nausea vomiting and decreased po intake   - avoid nephrotoxic agents      Moderate malnutrition  - has PEG  that was placed 6/2024   - nutritionist to see   -  per notes \"Continue cycled G-tube feedings: TwoCal HN at 45 mL/hr run 1600 to 0800 with free water flushes 60 mL q4h. \" Started  TF at lower rate over " night for now   - she العلي snot seem to tolerate higher rates, did ok with 10 ml/hr fo r24 hourse, increased to 15 ml.hr 8/18 and continue to monitor      T2DM  - not on medications blood sugar < 100 stopped accu-checks      Mild cognitive impairment  - at risk for delirium      Depression  - not on medications       Anemia  - Hg up, could be form hemoconcentration        History of infected left retroperitoneal hematoma  status post  IR embolization and drainage 3/2024   - no current issues      Goals of cares  - full code at this  point         CT chest abdomen  plevis at [place] 8/8  1. Marked interval worsening of the large locally advanced infiltrative recurrent mass invading the the bladder and segments of small bowel with associated tethering of the vaginal cuff and sigmoid colon. There is a high-risk for bowel obstruction.    However, no bowel obstruction currently.    2. 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  No gas within the bladder.  Narrative     EXAM:  CT ABDOMEN PELVIS WITH IV CONTRAST    COMPARISON:  CT abdomen pelvis with IV contrast 5/29/2024, CT cystogram 5/30/2024    FINDINGS:    Postoperative changes of a sigmoid colorectal anastomosis and small bowel resections , partial cystectomy, hysterectomy.  Appendectomy.    Worsening irregular invasive mass invading the bladder and small bowel and tethering the sigmoid colon, and the vaginal cuff in the pelvis.  The mass measures proximally 3.0 x 5.8 x 2.7 cm (series 1 image 123; series 5, image 35).  Asymmetric irregular  mural of the superior bladder measuring 1.5 cm in anterior inferior bladder consistent with tumor (series 1, image 108).  Probable defect of the anterior bladder wall/enterovesical fistula  (series 1 image 123).  The mass abuts the pubic symphysis and  pubic bodies with probable invasion of the anterior pelvic musculature.  Mildly gas distended segments of small bowel in the pelvis.  No high-grade  bowel obstruction.  Tethering of the right distal ureter.  No hydronephrosis.    The enteroenterostomy in the right lower quadrant appears patent .However, the the proximal segment of the small bowel before the anastomosis is filled with tumor.  The enteroenterostomy in the right lower quadrant is patent.    Slight urothelial cell thickening enhancement of the left renal collecting system may be related to prior instrumentation.    No suspicious liver lesion.  Portal vein hepatic veins are patent.  Distended gallbladder.  Slight fatty atrophy of the pancreas.  Tiny esophageal hiatal hernia.    The adrenal glands unremarkable.  Embolization coils right renal hilum.  Tiny low-attenuation lesion in the inferior left kidney may represent a cyst, but technically too small to characterize.  No lymphadenopathy in the abdomen or pelvis.    Scattered vascular calcifications.    Slightly decreased small left pleural effusion with associated pleural thickening.  Subsegmental atelectasis left lower lobe.    Scattered musculoskeletal degenerative changes.  No suspicious osseous lesion.    This examination was performed in conjunction with a CT of the chest, which will be reported separately.  Procedure Note     CT chest at [place] 8/8   OMPARISON: CT chest with IV contrast enhancement 3/2/2024.  FINDINGS:  Exam degraded by respiratory motion.    Since 3/2/2024, heart size has decreased and is at upper limits of normal. Large left pleural effusion has decreased, now small. New smooth enhancing circumferential pleural thickening around the remaining effusion. Improved atelectasis in the lingula  and left lower lobe. Moderate subsegmental atelectasis remains in the left lung base. Left PICC has been removed.    Remainder unchanged. Mild nodularity along the minor fissure, unchanged dating back to outside CT chest 11/17/2021 consistent with intrapulmonary lymph nodes. Tiny stable 2 mm nodule in the right upper lobe anteriorly  (141).    Mild atelectasis in the right lower lung. Trace pericardial effusion. Coronary artery calcifications.    This examination was performed in conjunction with a CT of the abdomen which will be reported separately.  Procedure Note     Иван Rudolph M.D. - 08/09/2024  Formatting of this note might be different from the original.  EXAM: CT CHEST WITH IV CONTRAST    COMPARISON: CT chest with IV contrast enhancement 3/2/2024.    FINDINGS:  Exam degraded by respiratory motion.    Since 3/2/2024, heart size has decreased and is at upper limits of normal. Large left pleural effusion has decreased, now small. New smooth enhancing circumferential pleural thickening around the remaining effusion. Improved atelectasis in the lingula  and left lower lobe. Moderate subsegmental atelectasis remains in the left lung base. Left PICC has been removed.    Remainder unchanged. Mild nodularity along the minor fissure, unchanged dating back to outside CT chest 11/17/2021 consistent with intrapulmonary lymph nodes. Tiny stable 2 mm nodule in the right upper lobe anteriorly (141).    Mild atelectasis in the right lower lung. Trace pericardial effusion. Coronary artery calcifications.    This examination was performed in conjunction with a CT of the abdomen which will be reported separately.    IMPRESSION:  1. Heart size has decreased and is now at upper limits of normal.  2. Left pleural effusion has decreased. New enhancing pleural thickening around the left effusion consistent with an exudative effusion.  3. Stable tiny pulmonary nodules.              Diet: NPO for Medical/Clinical Reasons Except for: Ice Chips, NPO but receiving Tube Feeding  Adult Formula Drip Feeding: Continuous [employer] Peptide 1.5; Gastrostomy; Goal Rate: keep at 15 ml/hour for now; mL/hr; If using cartons--follow 8 hr hang time for open system.; Do not advance tube feeding rate unless K+ is = or > 3.0, Mg++ is =...    DVT Prophylaxis: Heparin SQ  C[place]on  Catheter: Not present  Lines: None     Cardiac Monitoring: None  Code Status: Full Code      Clinically Significant Risk Factors                  # Hypertension: Noted on problem list            # Severe Malnutrition: based on nutrition assessment    # Financial/Environmental Concerns: none                      Naz Portillo MD  Hospitalist Service, GOLD TEAM 17  [facility]  Securely message with [employer] (more info)  Text page via [employer] Paging/Directory   See signed in provider for up to date coverage information  ______________________________________________________________________    Interval History   Again had emesis , she feels like  she is bloated , no abdominal pain,m h]n passing stool        Physical Exam   Vital Signs: Temp: 98.9  F (37.2  C) Temp src: Oral BP: 133/73 Pulse: 84   Resp: 19 SpO2: 99 % O2 Device: None (Room air)    Weight: 130 lbs 11.72 oz  General appearence: awake alert  in  no apparent distress  RESPIRATORY: lungs clear    CARDIOVASCULAR:S1 S2 regular rate and rhythm,  GASTROINTESTINAL:feels bit mor edistended but soft, PEG sti elook good, decreased but present bowel sounds  SKIN: warm and dry, no mottling noted   NEUROLOGIC; awake alert and oriented      Data     I have personally reviewed the following data over the past 24 hrs:    N/A  \   N/A   / 268     N/A N/A N/A /  80   N/A N/A N/A \       Imaging results reviewed over the past 24 hrs:   No results found for this or any previous visit (from the past 24 hour(s)).

## 2024-08-19 NOTE — PLAN OF CARE
Goal Outcome Evaluation:  Pt remains alert and oriented to self with a language barrier, remains on PEG tube feeding Continuously, and pt is tolerating it, pt had a loose stool x 2 this shift, held senna and Miralax, pt is incontinent with bowel and bladder, no signs and symptoms of pain is noted this shift, assist of 2 with Liko lift, will continue plan of care

## 2024-08-19 NOTE — PROGRESS NOTES
CLINICAL NUTRITION SERVICES - REASSESSMENT NOTE     Nutrition Prescription    RECOMMENDATIONS FOR MDs/PROVIDERS TO ORDER:  Consider TPN support if within goals of care    Malnutrition Status:    Severe malnutrition in the context of acute on chronic illness     Recommendations already ordered by Registered Dietitian (RD):  [place] Peptide 1.5 (or equivalent) @ 40 mL/hr (960 mL/day) to provide 1476 kcal (30 kcal/kg), 71 g protein (1.4 g/kg), 132 g CHO, 14 g fiber, 74 g fat (40% from MCTs), and 672 mL free water daily      Advance 10 mL every 24 hours to goal rate     Free Water Flushes: 30 mL q 4 hours to maintain tube patency. Additional free water per provider order.      Future/Additional Recommendations:  Monitor labs, weights, BM/GI, TF tolerance  Monitor for goals of care/nutrition plan of care     EVALUATION OF THE PROGRESS TOWARD GOALS   Diet: NPO  Nutrition Support: Not tolerating advancement  TF has not reached goal and estimated needs have not been met.   Daily TF volumes poorly documented in EMR    NEW FINDINGS   TF currently at 15 mL/hr. Pt has not been able to tolerate any rate greater.     Respiratory: Pt is on room air    Weights during current admission:  Difficult to assess current weight status with daily weight readings that are very inconsistent  Date/Time Weight Weight Method   08/18/24 0440 59.3 kg (130 lb 11.7 oz) Bed scale   08/17/24 0500 65.3 kg (143 lb 15.4 oz) --   08/16/24 1300 57.5 kg (126 lb 12.2 oz) Bed scale   08/10/24 2130 50 kg (110 lb 3.7 oz) Bed scale     Wt Readings from Last 5 Encounters:   08/18/24 59.3 kg (130 lb 11.7 oz)   07/23/24 53.1 kg (117 lb)   06/24/24 55 kg (121 lb 3.2 oz)   06/12/24 53.4 kg (117 lb 11.6 oz)   12/13/21 59.2 kg (130 lb 8 oz)   Per Care Everywhere:  04/11/24  58.2 kg (128 lb 4.9 oz)          09/29/23  67.6 kg (149 lb)    MEDICATIONS REVIEWED  Pepcid, MVI-liquid, Prilosec, Miralax BID, Senna-Docusate BID  PRN:   Dulcolax, Calcium Carbonate (TUMS),  Zofran, Compazine  Current Facility-Administered Medications   Medication Dose Route Frequency Provider Last Rate Last Admin    acetaminophen (TYLENOL) tablet 650 mg  650 mg Oral or Feeding Tube Q4H PRN Naz Portillo MD   650 mg at 08/16/24 0154    Or    acetaminophen (TYLENOL) Suppository 650 mg  650 mg Rectal Q4H PRN Naz Portillo MD        bisacodyl (DULCOLAX) suppository 10 mg  10 mg Rectal Daily PRN Naz Portillo MD        famotidine (PEPCID) suspension 20 mg  20 mg Oral or Feeding Tube Q48H Naz Portillo MD   20 mg at 08/18/24 1646    heparin ANTICOAGULANT injection 5,000 Units  5,000 Units Subcutaneous Q12H Alis Muller MD   5,000 Units at 08/18/24 2145    lidocaine (LMX4) cream   Topical Q1H PRN Naz Portillo MD        lidocaine 1 % 0.1-1 mL  0.1-1 mL Other Q1H PRN Naz Portillo MD        multivitamins w/minerals liquid 15 mL  15 mL Per Feeding Tube Daily Alis Muller MD   15 mL at 08/18/24 0906    omeprazole (PriLOSEC) suspension 20 mg  20 mg Per G Tube BID AC Baldo Quevedo MD   20 mg at 08/18/24 1646    ondansetron (ZOFRAN ODT) ODT tab 4 mg  4 mg Oral Q6H PRN Naz Portillo MD        Or    ondansetron (ZOFRAN) injection 4 mg  4 mg Intravenous Q6H PRN Naz Portillo MD   4 mg at 08/19/24 0630    polyethylene glycol (MIRALAX) Packet 17 g  17 g Oral or Feeding Tube BID Naz Portillo MD   17 g at 08/18/24 1134    prochlorperazine (COMPAZINE) injection 5 mg  5 mg Intravenous Q6H PRN Naz Portillo MD   5 mg at 08/14/24 0623    Or    prochlorperazine (COMPAZINE) tablet 5 mg  5 mg Oral Q6H PRN Naz Portillo MD        Or    prochlorperazine (COMPAZINE) suppository 12.5 mg  12.5 mg Rectal Q12H PRN Eusebio Portilloistina, MD        senna-docusate (SENOKOT-S/PERICOLACE) 8.6-50 MG per tablet 1 tablet  1 tablet Oral or Feeding Tube BID PRNaz Arthur MD   1 tablet at 08/16/24 2159    Or    senna-docusate (SENOKOT-S/PERICOLACE) 8.6-50 MG per tablet 2 tablet  2 tablet Oral  or Feeding Tube BID Naz Garrido MD   2 tablet at 08/11/24 1441    senna-docusate (SENOKOT-S/PERICOLACE) 8.6-50 MG per tablet 2 tablet  2 tablet Oral or Feeding Tube BID Naz Portillo MD        Or    senna-docusate (SENOKOT-S/PERICOLACE) 8.6-50 MG per tablet 2 tablet  2 tablet Oral or Feeding Tube BID Naz Portillo MD   2 tablet at 08/18/24 1134    sodium chloride (PF) 0.9% PF flush 3 mL  3 mL Intracatheter Q8H Naz Portillo MD   3 mL at 08/19/24 0630    sodium chloride (PF) 0.9% PF flush 3 mL  3 mL Intracatheter q1 min Naz Garrido MD   3 mL at 08/13/24 1806       Labs Reviewed  Current replacement of electrolyte- RN managed None     Electrolytes  Potassium (mmol/L)   Date Value   08/18/2024 3.9   08/17/2024 3.9   08/16/2024 3.9   12/13/2021 4.2   09/13/2021 3.9   06/07/2021 3.9   05/09/2021 3.5   05/06/2021 3.8     Phosphorus (mg/dL)   Date Value   08/16/2024 2.6   08/13/2024 4.3   08/12/2024 4.1   08/11/2024 4.9 (H)   08/10/2024 4.2   05/06/2021 3.8   05/05/2021 5.1 (H)   05/04/2021 5.0 (H)   01/12/2021 4.2   01/10/2021 2.5    Blood Glucose  Glucose (mg/dL)   Date Value   12/13/2021 98   09/13/2021 105 (H)   06/07/2021 90   05/09/2021 101 (H)   05/06/2021 93   04/20/2021 83   02/18/2021 72     GLUCOSE BY METER POCT (mg/dL)   Date Value   08/18/2024 80   08/18/2024 80   08/18/2024 93   08/18/2024 82   08/18/2024 88     Hemoglobin A1C (%)   Date Value   08/10/2024 6.0 (H)    Inflammatory Markers  WBC (10e9/L)   Date Value   06/07/2021 5.9   04/20/2021 5.4   02/15/2021 7.1     WBC Count (10e3/uL)   Date Value   08/13/2024 13.4 (H)   08/12/2024 13.8 (H)   08/11/2024 18.7 (H)     Albumin (g/dL)   Date Value   08/10/2024 3.8   06/24/2024 3.3 (L)   06/15/2024 3.1 (L)   12/13/2021 3.5   09/13/2021 3.4   06/07/2021 3.6   04/20/2021 3.7   02/15/2021 3.5      Magnesium (mg/dL)   Date Value   08/18/2024 1.7   08/17/2024 2.3   08/16/2024 2.3   05/09/2021 2.5 (H)   05/07/2021 2.1   05/06/2021 2.0      Sodium (mmol/L)   Date Value   08/13/2024 142   08/12/2024 139   08/11/2024 138   06/07/2021 141   05/09/2021 140   05/06/2021 139    Renal  Urea Nitrogen (mg/dL)   Date Value   08/13/2024 84.1 (H)   08/12/2024 82.5 (H)   08/11/2024 91.0 (H)   12/13/2021 27   09/13/2021 30   06/07/2021 25   05/09/2021 24   05/06/2021 33 (H)     Creatinine (mg/dL)   Date Value   08/13/2024 1.84 (H)   08/12/2024 1.79 (H)   08/11/2024 2.05 (H)   06/07/2021 1.30 (H)   05/09/2021 1.39 (H)   05/06/2021 1.62 (H)     Additional  Ketones Urine (mg/dL)   Date Value   08/10/2024 Negative   02/18/2021 Negative     Platelet Count   Date Value   08/19/2024 268 10e3/uL   06/07/2021 216 10e9/L     aPTT (no units)   Date Value   05/30/2024      Comment:     Clotted. Disregard results.  This is a corrected result. Previous result was 20 Seconds on 5/30/2024 at  9:20 AM CDT     INR (no units)   Date Value   05/30/2024 1.23 (H)   01/06/2021 1.56 (H)        RENAL  ALFREDA on CKD III    GASTROINTESTINAL  Last BM: 8/18  Bowel regimen: Scheduled  GI concerns reported: Nausea  Enteral Access: GTube  Dentition: Missing teeth    PHYSICAL FINDINGS  See malnutrition section below.  Poor dentition   Mateusz: 15, Nutrition 2  No significant skin impairments noted    MALNUTRITION  % Intake: </= 50% for >/= 5 days (severe)  % Weight Loss: Unable to assess  Subcutaneous Fat Loss: Facial region:  Moderate  Muscle Loss: Thoracic region (clavicle, acromium bone, deltoid, trapezius, pectoral):  Moderate  Fluid Accumulation/Edema: None noted  Malnutrition Diagnosis: Severe malnutrition in the context of acute on chronic illness    Previous Goals   Total avg nutritional intake to meet a minimum of 25 kcal/kg and 1.1 g PRO/kg daily (per dosing wt 50 kg).     Evaluation: Not met    Previous Nutrition Diagnosis  suspected inadequate enteral nutrition infusion related to suspected pt not administering recommended TF volumes as evidenced by 5.8% wt loss x 1 month, muscle and  fat wasting    Evaluation: No change    CURRENT NUTRITION DIAGNOSIS  Inadequate enteral nutrition infusion related to nausea as evidenced by pt report of nausea and inability to tolerate TF advancement to goal rate      INTERVENTIONS  Implementation  Collaboration with other providers  Enteral Nutrition - continue to increase rate as tolerated  Feeding tube flush    Goals  Total avg nutritional intake to meet a minimum of 25 kcal/kg and 1.1 g PRO/kg daily (per dosing wt 50 kg).    Monitoring/Evaluation  Progress toward goals will be monitored and evaluated per protocol.    Becky RODRIGUEZ  Clinical Dietitian  [facility]   Available by Andi Palma, desk [phone]  Weekend/Holiday [place]: Weekend Holiday Clinical Dietitian [Multi Site Groups]

## 2024-08-19 NOTE — PLAN OF CARE
Goal Outcome Evaluation:      Plan of Care Reviewed With: patient    Overall Patient Progress: no changeOverall Patient Progress: no change    TF increased from 10mls to 15mls. Pt tolerated well. 1 emesis episode after medications were administered into PEG. Emesis episodes consistent with PEG medication administration. Zofran administered.     Loose/soft stools. Bowel meds still given per provider.     BG checks Q4 discontinued.     NPO except for ice chips.      used for communication.     Q2-3 repositioning.     Ax1 w/ W/GB. Up in chair x1.     RN managed mag and pot in range and ordered for 8/19.

## 2024-08-19 NOTE — PLAN OF CARE
"Goal Outcome Evaluation:      Problem: Adult Inpatient Plan of Care  Goal: Plan of Care Review  Description: The Plan of Care Review/Shift note should be completed every shift.  The Outcome Evaluation is a brief statement about your assessment that the patient is improving, declining, or no change.  This information will be displayed automatically on your shift  note.  Outcome: Met  Flowsheets (Taken 8/19/2024 0554)  Overall Patient Progress: no change  Goal: Patient-Specific Goal (Individualized)  Description: You can add care plan individualizations to a care plan. Examples of Individualization might be:  \"Parent requests to be called daily at 9am for status\", \"I have a hard time hearing out of my right ear\", or \"Do not touch me to wake me up as it startles  me\".  Outcome: Met  Goal: Absence of Hospital-Acquired Illness or Injury  Outcome: Met  Intervention: Identify and Manage Fall Risk  Recent Flowsheet Documentation  Taken 8/19/2024 0126 by Sandi Echeverria RN  Safety Promotion/Fall Prevention:   activity supervised   increased rounding and observation   increase visualization of patient   patient and family education   nonskid shoes/slippers when out of bed   room door open   room near nurse's station   supervised activity   safety round/check completed  Taken 8/18/2024 2142 by Sandi Echeverria RN  Safety Promotion/Fall Prevention:   activity supervised   increased rounding and observation   increase visualization of patient   patient and family education   nonskid shoes/slippers when out of bed   room door open   room near nurse's station   supervised activity   safety round/check completed  Intervention: Prevent Skin Injury  Recent Flowsheet Documentation  Taken 8/19/2024 0126 by Sandi Echeverria RN  Skin Protection: incontinence pads utilized  Device Skin Pressure Protection: absorbent pad utilized/changed  Taken 8/18/2024 2142 by Sandi Echeverria RN  Skin Protection: incontinence pads " utilized  Device Skin Pressure Protection: absorbent pad utilized/changed  Intervention: Prevent and Manage VTE (Venous Thromboembolism) Risk  Recent Flowsheet Documentation  Taken 8/19/2024 0126 by Sandi Echeverria RN  VTE Prevention/Management: (heparin)   SCDs off (sequential compression devices)   other (see comments)  Taken 8/18/2024 2142 by Sandi Echeverria RN  VTE Prevention/Management: (heparin)   SCDs off (sequential compression devices)   other (see comments)  Intervention: Prevent Infection  Recent Flowsheet Documentation  Taken 8/19/2024 0126 by Sandi Echeverria RN  Infection Prevention:   rest/sleep promoted   hand hygiene promoted   single patient room provided  Taken 8/18/2024 2142 by Sandi Echeverria RN  Infection Prevention:   rest/sleep promoted   hand hygiene promoted   single patient room provided  Goal: Optimal Comfort and Wellbeing  Outcome: Met  Goal: Readiness for Transition of Care  Outcome: Progressing       Overall Patient Progress: no changeOverall Patient Progress: no change    Stable VS on room air during the night. Loose stools. Turns and repositions q4, skin intact. Feeding continuous and changed over night. Pt. Experienced nausea. Antinausea medication administered see MAR. Alert and oriented. Non english speaking but able to communicate her needs. Calls appropriately. Continue POC.

## 2024-08-20 NOTE — PROGRESS NOTES
"[facility]    Medicine Progress Note - Hospitalist Service, GOLD TEAM 17    Date of Admission:  8/10/2024    Assessment & Plan   Myla Glynn is a 79 year old female admitted on 8/10/2024. 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  ,  status post  sigmoid resection in 2021 also had  partial cystectomy and ureteral reimplantation   #Bladder mass, status post resection  9/2023  and pathology showed invasive adenocarcinoma then had partial cystectomy pelvic lymph node dissection small bowel resection 1/2024   #Suspected recurrent/metastatic adenocarcinoma  Followed at [facility] and per urology notes form 8/9 \"We would not recommend anymore surgical intervention for her given the extreme complexity of her surgical situation previously and her extensive hospital stay after her surgeries that we had performed.\" She was to follow up  with oncology but never had an opportunity to get there.   - oncology was consulted and saw pt on 8/11 and noted, \"My overall impression is that Mrs. Glynn has progressive metastatic stage IV colorectal cancer with genitourinary tract involvement and complications, with an overall clinical picture complicated by a series of non-cancer related co-morbid conditions. Her primary oncology team is at the [facility]. Considering the acute presentation and the extent and nature of current complications, my recommendation is that the focus remain on managing the acute issues that brought her into the hospital, and deferring larger picture diagnostic and prognostic " "discussions until there is evidence of meaningful recovery and improvement in performance status that would allow best assessment for realistic treatment options, if any. If the patient and her family may opt to return to her primary oncology team at the [facility]; if they wish for an additional oncology opinion with the [facility] team, we can help arrange outpatient consultation that can take place as an outpatient.\"  - d/w pt's cousin via phone on 8/20 noted that pt has not been made aware of these findings on CT and does not know she has worsening metastatic disease at this time. Palliative team had evaluated yesterday and recommended care conference which I relayed to family. Will plan for care conference on 8/22 with daughters and cousin (via phone) as an informational discussion and gauge pt's desire to take part in decision making for care of her cancer. Will likely need an additional care conference after regarding prognosis and possible treatment options following that discussion.    #Nausea and vomiting   #Decreased po intake  Had CT chest abdominal pelvis at [facility] 8/8, she was already having above symptoms. Showed worsening of the locally advanced mass involving bladder, no evidence of bowel obstruction but at high risk , possible enterovesical fistula. Need to be careful with IV fluids with heart failure HFrEF. Abdominal XR 8/10 showed nonobstructive gas pattern, no pneumatosis or free air. Lactate negative.  she is also constipated so started on BM. Suspect related to above. GI in agreement.   - UA + but question if has enterovesical fistula,   - Urine culture no growth to date, received  Rocephin  now off   - repeat abdominal XR 8/16 with nonspecific gas pattern, mild colonic stool burden    - GI consulted, now signed off, recommended scopalamine patch and miralax titration for goal 1 BM per day    Diarrhea  Had some constipation earlier in course. 5x B<M on 8/19 with possible c/f overflow diarrhea. " "  - hold bowel reg for >1 BM per day     Leukocytosis  - UA with > 182 WBC, large blood and WBC esterase but would expect this if has enterovesical fistula but not confirmed     HFrEF  Significant Mitral Valve regurgitation   Hypertension    Follows at [facility]. TTE 4/4 with EF 29%  moderate TR. Was to have cardiac MRI once she improved.   - PTA on carvediol , continue with holding parameters  not on ACEi/ARB  SGLT2 inhibitor due to CKD      History left sided pleural effusion  - on CT 8/8 left pleural effusion size has decreased per report   - CT did show ne enhancing pleural thickening     Chronic CKD stage III  Mild AGMA  -Baseline creatinine 1.4-1.7. Cr 1.0 today.   - avoid nephrotoxic agents      Moderate malnutrition  - has PEG  that was placed 6/2024   - nutritionist to see   -  per notes \"Continue cycled G-tube feedings: TwoCal HN at 45 mL/hr run 1600 to 0800 with free water flushes 60 mL q4h. \" Started  TF at lower rate over night for now   - she العلي snot seem to tolerate higher rates, did ok with 10 ml/hr fo r24 hourse, increased to 15 ml.hr 8/18 and continue to monitor   - [facility] discussion/care conference planned for 8/22     T2DM  - not on medications blood sugar <100 stopped accu-checks      Mild cognitive impairment  - at risk for delirium      Depression  - not on medications       Anemia  - Hg up, could be form hemoconcentration     History of infected left retroperitoneal hematoma  status post  IR embolization and drainage 3/2024   - no current issues      Goals of cares  - full code at this point         Diet: NPO for Medical/Clinical Reasons Except for: Ice Chips, NPO but receiving Tube Feeding  Adult Formula Drip Feeding: Continuous Let Peptide 1.5; Gastrostomy; Goal Rate: keep at 15 ml/hour for now; mL/hr; If using cartons--follow 8 hr hang time for open system.; Do not advance tube feeding rate unless K+ is = or > 3.0, Mg++ is =...    DVT Prophylaxis: Heparin SQ  Cameron Catheter: Not " present  Lines: None     Cardiac Monitoring: None  Code Status: Full Code      Clinically Significant Risk Factors            # Hypomagnesemia: Lowest Mg = 1.6 mg/dL in last 2 days, will replace as needed       # Hypertension: Noted on problem list            # Severe Malnutrition: based on nutrition assessment    # Financial/Environmental Concerns: none               Disposition Plan     Medically Ready for Discharge: Anticipated in 5+ Days             Raul Vicente MD  Hospitalist Service, GOLD TEAM 17  [facility]  Securely message with Order Mapper (more info)  Text page via [employer] Paging/Directory   See signed in provider for up to date coverage information  ______________________________________________________________________    Interval History   Nausea overnight. No    Physical Exam   Vital Signs: Temp: 98.6  F (37  C) Temp src: Oral BP: (!) 140/66 Pulse: 76   Resp: 17 SpO2: 100 % O2 Device: None (Room air)    Weight: 130 lbs 11.72 oz        Medical Decision Making             Data

## 2024-08-20 NOTE — CONSULTS
PSYCHIATRIC CONSULTATION    I was asked to see this patient and evaluate her current status in light of persisting nausea and vomiting with resultant malnutrition and the patient was metastatic sigmoid colon identity carcinoma.  I had reviewed the patient's chart to she had no previous psychiatric involvement although there was some mention in the old depression here and there.  The patient does not seem to have a psychiatrist and has not been hospitalized for mental health.  When they came to see the patient she was alert but was unable to communicate due to her total lack of English commands.  I discussed her case with her daughter, Otf who provided some history the majority of that was taken from her medical records.    HISTORY OF PRESENT ILLNESS  Myla Glynn is a 79-year-old  mother of 3 from [facility] with complicated medical history including sigmoid: Adenocarcinoma metastasized to bladder, chronic constipation, hypertension, acute and chronic kidney disease, malnutrition, mitral valve regurgitation and anemia who was brought to the hospital for evaluation of a weeklong nausea, frequent vomiting, difficulties urinating and constipation.  Although depression have been mentioned in her past medical records her daughter did not give me much descriptions of that other than saying that the patient was rather sad about her medical problems.  Currently the patient takes no psychotropic medications.  She has not been under the care of a psychiatrist.    CHEMICAL USE HISTORY  The patient had no history of alcohol abuse,illicit drug abuse or prescription medication abuse.    FAMILY HISTORY  According to her daughter there were no incidents of mental illness or chemical dependency in her blood relatives.    PAST MEDICAL HISTORY  Anemia  Bladder mass  Diabetes mellitus type 2  GERD  Hypertension  Ileostomy status  Malignant neoplasm of sigmoid colon  Renal insufficiency    PAST SURGICAL HISTORY  Radical  "bladder cystectomy  Hysterectomy  Salpingo-oophorectomy, bilateral.    BRIEF SOCIAL HISTORY  The patient was born in [facility] and came to the [place] about 8 years ago she was  but her  \" a long time ago\".  She had 5 children from this marriage.  Until recently she has been living alone in her own apartment in [place] but most recently, according to her daughter she had a difficult time maintaining her ADLs and would need a close observation.    MENTAL STATUS EXAMINATION  The patient was but I was unable to check her orientation.  She spoke no English.  She appeared to be markedly undernourished but displayed no acute distress.  She appeared to be slightly depressed.    DIAGNOSTIC IMPRESSION  Depression NOS  Malnutrition  Metastatic Sigmoid Colon Adenocarcinoma     Plan: I will give the patient a trial with Remeron Soltab to help with her depression and lack of appetite. I agree to use a small dose of Haldol oral concentrate to address nausea and vomiting, 0.5 mL/1mg Q6H PRN    I thank you very much for letting me participate in the care of this patient,    Tian Rider MD  [phone] pager     "

## 2024-08-20 NOTE — CONSULTS
"Social Work Progress Note    Consult placed for the following: \"Care conference to discuss goals of care with primary hospitalist, GI, and oncology please\". Hospitalist has changed since it is Tuesday. Asked provider if he wanted to have a care conference, he plans to discuss the case with family first and will get back to social work. Care management continues to follow.     RUY Morris, LGSW  5 Med Surg   [facility]  Phone: [phone]        "

## 2024-08-20 NOTE — PROGRESS NOTES
VS: VSS AXO4   O2: RA   Output: Incontinent x2    Last BM: 8/20   Activity: AX1 Walker gaitbelt   Skin: Refer to flowsheet   Pain: denies   CMS: Gen weakness   Dressing: PEG CDI   Diet: NPO-ice chips   Continuous tube feeding refer to order   LDA: PEG tube  L PIV SL   Additional Info: Pt denies chest pain, pain and SOB. No acute changes during this shift, call light within reach, continue with plan of care.

## 2024-08-20 NOTE — PLAN OF CARE
Goal Outcome Evaluation:    Problem: Adult Inpatient Plan of Care  Goal: Readiness for Transition of Care  Outcome: Progressing    End of Shift Summary: See flowsheets for VS and assessment details.    [id number]    A&Ox4, calm and cooperative, with language barrier pt required use of , able to make needs known with call light in reach.  Pt not oob this shift, rolled well in bed with repositioning.  VSS, sats WNL on room air, incontinent, LBM 8/20; loose, held bowel meds.  Denied pain and nausea this shift.  LPIV patent and saline locked, G tube ran TF continuously per orders.  No observed skin concerns.  Pt received a bed bath in the evening.  PCD's applied.  HOB >30 per orders.  Sleep and hydration promoted.  Continue POC.

## 2024-08-20 NOTE — CONSULTS
PSYCHIATRIC CONSULTATION    Requesting Physician: Naz Portillo MD    Admission Date: 08/10/2024  Date of Service: 08/19/2024    The patient was seen, her chart reviewed. The patient speaks no English and her case was discussed at length with her daughter, Otf. Report to follow.    Dx: Depression NOS        Malnutrition        Metastatic Sigmoid Colon Adenocarcinoma    Plan: I will give the patient a trial with Remeron Soltab to help with her depression and lack of appetite. I agree to use a small dose of Haldol oral concentrate to address nausea and vomiting, 0.5 mL/1mg Q6H PRN    Thanks,    Tian Ramirez

## 2024-08-20 NOTE — PROGRESS NOTES
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 GI consulted for \"ongoing abd pain, not tolerating tube feeds\".       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 PTA had not had bowel movement for 4-5 days. With history of sigmoid adenocarcinoma with bladder mets and extensive surgical hx, she is at risk for obstruction. AXR on 8/10 with " no evidence of obstruction, pneumatosis or free air. Recent CT A/P at [facility] on 8/8 showed worsening of locally advanced mass involving bladder with no evidence of bowel obstruction, and possible enterovesical fistula.   Intermittent nausea with varying reports as to whether it is secondary to TF or not. Per RN notes, has nausea with TF but patient denied when asked by RD. Currently planning to increase TF slowly to goal. Has been requiring 1-4 doses of antiemetics daily. Noted some loose stools per RN notes, ?overflow diarrhea given prior constipation, although KUB is not supportive of thi and also patient is having large BMs. It is likely that her sxs are r/t the progression of her cancer.      # Severe malnutrition   # S/p PEG placement (6/13/24)  Failure to thrive status post G tube placement 6/13/2024 (TwoCal HN at 45 mL/hr run 1600 to 0800). Currently advancing TF slowly 10 mL every 24 hours to goal rate with free water flushes 30mL q4 hours.   - RD following, appreciate management of TF    RECOMMENDATIONS:  - Consider adding a scopolamine patch and/or scheduling Zofran TID  - Agree w/ palliative care involvement      GI will sign off, please page with questions     Thank you for involving us in this patient's care. Please do not hesitate to contact the GI service with any questions or concerns.      Pt care plan discussed with Dr. Griffiths, GI staff physician.    Eloisa Duarte   Gastroenterology Fellow  _______________________________________________________________    Subjective:  No emesis but asked for tube feeds to be stopped for a bit x1 today for a short period of time 2/2 nausea; no emesis; no abd pain; is having significant BMs (3 loose BMs so far today)    Objective:  Blood pressure (!) 140/66, pulse (!) 121, temperature 98.6  F (37  C), temperature source Oral, resp. rate 15, weight 59.3 kg (130 lb 11.7 oz), SpO2 100%, not currently breastfeeding.    Gen: A&Ox3, NAD  Lungs: breathing comfortably  on RA   Abd:  soft, nd/nt, PEG  Skin: no jaundice  Neuro: non focal       LABS:  BMP  Recent Labs   Lab 08/20/24  0209 08/19/24  2100 08/19/24  1956 08/18/24  1553 08/18/24  0647 08/18/24  0521 08/17/24  1014 08/17/24  0730 08/16/24  0646 08/16/24  0515   NA  --  143  --   --   --   --   --   --   --   --    POTASSIUM  --  4.0  --   --   --  3.9  --  3.9  --  3.9   CHLORIDE  --  108*  --   --   --   --   --   --   --   --    DICKSON  --  8.7*  --   --   --   --   --   --   --   --    CO2  --  23  --   --   --   --   --   --   --   --    BUN  --  24.6*  --   --   --   --   --   --   --   --    CR  --  1.08*  --   --   --   --   --   --   --   --    * 88 78 80   < >  --    < >  --    < >  --     < > = values in this interval not displayed.     CBC  Recent Labs   Lab 08/19/24  0702 08/16/24  0515    270     INRNo lab results found in last 7 days.  LFTsNo lab results found in last 7 days.   PANCNo lab results found in last 7 days.    IMAGING:  No new imaging

## 2024-08-20 NOTE — PROGRESS NOTES
"[facility]    Medicine Progress Note - Hospitalist Service, GOLD TEAM 17    Date of Admission:  8/10/2024    Assessment & Plan   Myla Glynn is a 79 year old female admitted on 8/10/2024. 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  ,  status post  sigmoid resection in 2021 also had  partial cystectomy and ureteral reimplantation   #Bladder mass, status post resection  9/2023  and pathology showed invasive adenocarcinoma then had partial cystectomy pelvic lymph node dissection small bowel resection 1/2024   #Suspected recurrent/metastatic adenocarcinoma  Followed at [place] and per urology notes form 8/9 \"We would not recommend anymore surgical intervention for her given the extreme complexity of her surgical situation previously and her extensive hospital stay after her surgeries that we had performed.\" She was to follow up  with oncology but never had an opportunity to get there.   - oncology was consulted and saw pt on 8/11 and noted, \"My overall impression is that Mrs. Glynn has progressive metastatic stage IV colorectal cancer with genitourinary tract involvement and complications, with an overall clinical picture complicated by a series of non-cancer related co-morbid conditions. Her primary oncology team is at the [facility]. Considering the acute presentation and the extent and nature of current complications, my recommendation is that the focus remain on managing the acute issues that brought her into the hospital, and deferring larger picture diagnostic and prognostic " "discussions until there is evidence of meaningful recovery and improvement in performance status that would allow best assessment for realistic treatment options, if any. If the patient and her family may opt to return to her primary oncology team at the [facility]; if they wish for an additional oncology opinion with the [facility] team, we can help arrange outpatient consultation that can take place as an outpatient.\"  - d/w pt's cousin via phone on 8/20 noted that pt has not been made aware of these findings on CT and does not know she has worsening metastatic disease at this time. Palliative team had evaluated yesterday and recommended care conference which I relayed to family. Will plan for care conference on 8/22 with daughters and cousin (via phone) as an informational discussion and gauge pt's desire to take part in decision making for care of her cancer. Will likely need an additional care conference after regarding prognosis and possible treatment options following that discussion.    #Nausea and vomiting   #Decreased po intake  Had CT chest abdominal pelvis at [place] 8/8, she was already having above symptoms. Showed worsening of the locally advanced mass involving bladder, no evidence of bowel obstruction but at high risk , possible enterovesical fistula. Need to be careful with IV fluids with heart failure HFrEF. Abdominal XR 8/10 showed nonobstructive gas pattern, no pneumatosis or free air. Lactate negative.  she is also constipated so started on BM. Suspect related to above. GI in agreement.   - UA + but question if has enterovesical fistula,   - Urine culture no growth to date, received  Rocephin  now off   - repeat abdominal XR 8/16 with nonspecific gas pattern, mild colonic stool burden    - GI consulted, now signed off, recommended scopalamine patch and miralax titration for goal 1 BM per day    Diarrhea  Had some constipation earlier in course. 5x B<M on 8/19 with possible c/f overflow diarrhea. " "  - hold bowel reg for >1 BM per day     Leukocytosis  - UA with > 182 WBC, large blood and WBC esterase but would expect this if has enterovesical fistula but not confirmed     HFrEF  Significant Mitral Valve regurgitation   Hypertension    Follows at [facility]. TTE 4/4 with EF 29%  moderate TR. Was to have cardiac MRI once she improved.   - PTA on carvediol , continue with holding parameters  not on ACEi/ARB  SGLT2 inhibitor due to CKD      History left sided pleural effusion  - on CT 8/8 left pleural effusion size has decreased per report   - CT did show ne enhancing pleural thickening     Chronic CKD stage III  Mild AGMA  -Baseline creatinine 1.4-1.7. Cr 1.0 today.   - avoid nephrotoxic agents      Moderate malnutrition  - has PEG  that was placed 6/2024   - nutritionist to see   -  per notes \"Continue cycled G-tube feedings: TwoCal HN at 45 mL/hr run 1600 to 0800 with free water flushes 60 mL q4h. \" Started  TF at lower rate over night for now   - she العلي snot seem to tolerate higher rates, did ok with 10 ml/hr fo r24 hourse, increased to 15 ml.hr 8/18 and continue to monitor   - GO discussion/care conference planned for 8/22     T2DM  - not on medications blood sugar <100 stopped accu-checks      Mild cognitive impairment  - at risk for delirium      Depression  - not on medications       Anemia  - Hg up, could be form hemoconcentration     History of infected left retroperitoneal hematoma  status post  IR embolization and drainage 3/2024   - no current issues      Goals of cares  - full code at this point              Diet: NPO for Medical/Clinical Reasons Except for: Ice Chips, NPO but receiving Tube Feeding  Adult Formula Drip Feeding: Continuous [employer] Peptide 1.5; Gastrostomy; Goal Rate: keep at 15 ml/hour for now; mL/hr; If using cartons--follow 8 hr hang time for open system.; Do not advance tube feeding rate unless K+ is = or > 3.0, Mg++ is =...    DVT Prophylaxis: Heparin SQ  Cameron Catheter: " Not present  Lines: None     Cardiac Monitoring: None  Code Status: Full Code      Clinically Significant Risk Factors            # Hypomagnesemia: Lowest Mg = 1.6 mg/dL in last 2 days, will replace as needed       # Hypertension: Noted on problem list            # Severe Malnutrition: based on nutrition assessment    # Financial/Environmental Concerns: none               Disposition Plan     Medically Ready for Discharge: Anticipated in 5+ Days             Raul Vicente MD  Hospitalist Service, GOLD TEAM 17  [facility]  Securely message with [employer] (more info)  Text page via [employer] Paging/Directory   See signed in provider for up to date coverage information  ______________________________________________________________________    Interval History   Nausea overnight  YOVANI otherwise    Physical Exam   Vital Signs: Temp: 98.2  F (36.8  C) Temp src: Oral BP: 112/68 Pulse: 103   Resp: 18 SpO2: 100 % O2 Device: None (Room air)    Weight: 130 lbs 11.72 oz    General: mild distress d/t nausea, laying in bed  Resp: CTAB, no w/r/r, no increased WOB  CV: RRR, no m/r/g, pulses intact and equal, no BLE edema  GI: soft, non-tender, non-distended, PEG in place c/d/i  Skin: no rash, no lesions      Medical Decision Making       60 MINUTES SPENT BY ME on the date of service doing chart review, history, exam, documentation & further activities per the note.            Data

## 2024-08-21 NOTE — PLAN OF CARE
Goal Outcome Evaluation:       Shift:     VS: /55 (BP Location: Left arm)   Pulse 98   Temp 98.3  F (36.8  C) (Oral)   Resp 16   Wt 59.3 kg (130 lb 11.7 oz)   SpO2 97%   BMI 26.39 kg/m       Pain:  denies pain  Neuro: WDL  Resp: WDL  Diet: Continuous tube feeding. Tubes and lines changed this shift.  Skin: WDL  LDA: Left PIV saline locked  Activity: Turned Q2 hrs for perineal care. Pt can turn on her own.  Output: 2 BM's, notified provider about low urine output  Additional Info/shift updates: Provider notified about low urine output. Care coordinator meeting occurring tomorrow (8/22).  utilized for assessment and medication administration.   Call light within reach     Plan of care ongoing

## 2024-08-21 NOTE — PROVIDER NOTIFICATION
Ongoing nausea  No emesis       Spoke with RN in person   Please use antiemetics ordered to see if relief obtained   CTM

## 2024-08-21 NOTE — PLAN OF CARE
Goal Outcome Evaluation:      A&O x4, on RA.  Reported Chest burn, and N/V at start of shift, provider notified and antiemetic given.   Had a BM overnight.    Able to make needs known.   Not OOB during shift.   Promoted sleep overnight.   POC continues.

## 2024-08-21 NOTE — PROGRESS NOTES
"[facility]    Medicine Progress Note - Hospitalist Service, GOLD TEAM 17    Date of Admission:  8/10/2024    Assessment & Plan   Myla Glynn is a 79 year old female admitted on 8/10/2024. 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, s/p sigmoid resection in 2021 also had partial cystectomy and ureteral reimplantation   #Bladder mass, status post resection  9/2023  and pathology showed invasive adenocarcinoma then had partial cystectomy pelvic lymph node dissection small bowel resection 1/2024   #Suspected recurrent/metastatic adenocarcinoma  Followed at [place] and per urology notes form 8/9 \"We would not recommend anymore surgical intervention for her given the extreme complexity of her surgical situation previously and her extensive hospital stay after her surgeries that we had performed.\" She was to follow up  with oncology but never had an opportunity to get there and therefore has not established.   - oncology was consulted and saw pt on 8/11 and noted, \"My overall impression is that Mrs. Glynn has progressive metastatic stage IV colorectal cancer with genitourinary tract involvement and complications, with an overall clinical picture complicated by a series of non-cancer related co-morbid conditions. Her primary oncology team is at the [facility]. Considering the acute presentation and the extent and nature of current complications, my recommendation is that the focus remain on managing the acute issues that brought her into the hospital, and deferring larger picture diagnostic " "and prognostic discussions until there is evidence of meaningful recovery and improvement in performance status that would allow best assessment for realistic treatment options, if any. If the patient and her family may opt to return to her primary oncology team at the [facility]; if they wish for an additional oncology opinion with the [facility] team, we can help arrange outpatient consultation that can take place as an outpatient.\"  - d/w pt's cousin via phone on 8/20 noted that pt has not been made aware of these findings on CT and does not know she has worsening metastatic disease at this time. Palliative team had evaluated yesterday and recommended care conference which I relayed to family  - care conference scheduled for 8/22 at 11:30 AM     #Nausea and vomiting   #Decreased po intake  Had CT chest abdominal pelvis at [place] 8/8, she was already having above symptoms. Showed worsening of the locally advanced mass involving bladder, no evidence of bowel obstruction but at high risk , possible enterovesical fistula. Need to be careful with IV fluids with heart failure HFrEF. Abdominal XR 8/10 showed nonobstructive gas pattern, no pneumatosis or free air. Lactate negative.  she is also constipated so started on BM. Suspect related to above. GI in agreement.   - UA + but question if has enterovesical fistula,   - Urine culture no growth to date, received  Rocephin  now off   - repeat abdominal XR 8/16 with nonspecific gas pattern, mild colonic stool burden    - GI consulted, now signed off, recommended scopalamine patch and miralax titration for goal 1 BM per day    Diarrhea/loose stools  Had some constipation earlier in course. 5x BM on 8/19 with possible c/f overflow diarrhea. States she had diarrhea all night on 8/20 but only charted 1x BM. Denies abdominal pain. Prior course of CTX earlier this admission. WBC elevated today.   - hold bowel reg for >1 BM per day  - f/u c diff     Leukocytosis  WBC 25. Vitals " stable otherwise. No s/sxs of acute infection aside from loose stools as above. May be reactive in s/o metastatic cancer.      HFrEF  Significant Mitral Valve regurgitation   Hypertension    Follows at [facility]. TTE 4/4 with EF 29%  moderate TR. Was to have cardiac MRI once she improved.   - PTA on carvediol , continue with holding parameters  not on ACEi/ARB  SGLT2 inhibitor due to CKD      History left sided pleural effusion  - on CT 8/8 left pleural effusion size has decreased per report   - CT did show ne enhancing pleural thickening     Chronic CKD stage III  Mild AGMA  -Baseline creatinine 1.4-1.7. Cr 1.0 today.   - avoid nephrotoxic agents      Moderate malnutrition  PEG placement on 6/2024, on cycled G-tube feeds. Was not tolerating higher rates and continues at a lowered rate 15 ml/h currently.  - nutrition following  - care conference planned 8/22     T2DM  - not on medications blood sugar <100 stopped accu-checks      Mild cognitive impairment  - at risk for delirium      Depression  - not on medications       Anemia  - Hg up, could be form hemoconcentration     History of infected left retroperitoneal hematoma  status post  IR embolization and drainage 3/2024   - no current issues      Goals of cares  - full code at this point   - C discussion planned 8/22             Diet: NPO for Medical/Clinical Reasons Except for: Ice Chips, NPO but receiving Tube Feeding  Adult Formula Drip Feeding: Continuous [employer] Peptide 1.5; Gastrostomy; Goal Rate: keep at 15 ml/hour for now; mL/hr; If using cartons--follow 8 hr hang time for open system.; Do not advance tube feeding rate unless K+ is = or > 3.0, Mg++ is =...    DVT Prophylaxis: Heparin SQ  Cameron Catheter: Not present  Lines: None     Cardiac Monitoring: None  Code Status: Full Code      Clinically Significant Risk Factors            # Hypomagnesemia: Lowest Mg = 1.6 mg/dL in last 2 days, will replace as needed       # Hypertension: Noted on problem  "list            # Severe Malnutrition: based on nutrition assessment    # Financial/Environmental Concerns: none               Disposition Plan     Medically Ready for Discharge: Anticipated in 5+ Days             Raul Vicente MD  Hospitalist Service, GOLD TEAM 17  [facility]  Securely message with [employer] (more info)  Text page via [employer] Paging/Directory   See signed in provider for up to date coverage information  ______________________________________________________________________    Interval History   Reports diarrhea \"all night\"   Nausea overnight  No other events      Physical Exam   Vital Signs: Temp: 98.3  F (36.8  C) Temp src: Oral BP: 100/55 Pulse: 98   Resp: 16 SpO2: 97 % O2 Device: None (Room air)    Weight: 130 lbs 11.72 oz    General: mild distress d/t nausea, laying in bed  Resp: CTAB, no w/r/r, no increased WOB  CV: RRR, no m/r/g, pulses intact and equal, no BLE edema  GI: soft, non-tender, non-distended, PEG in place c/d/i  Skin: no rash, no lesions      Medical Decision Making       50 MINUTES SPENT BY ME on the date of service doing chart review, history, exam, documentation & further activities per the note.            Data     "

## 2024-08-21 NOTE — PROGRESS NOTES
Care Management Follow Up     Length of Stay (days): 11     Expected Discharge Date: TBD     Concerns to be Addressed: Discharge planning     Patient plan of care discussed at interdisciplinary rounds: Yes     Anticipated Discharge Disposition: Long Term Care     Anticipated Discharge Services: None  Anticipated Discharge DME: None     Patient/family educated on Medicare website which has current facility and service quality ratings: Yes  Education Provided on the Discharge Plan: Yes  Patient/Family in Agreement with the Plan: Yes     Referrals Placed by CM/SW: External Care Coordination  Private pay costs discussed: Not applicable     Additional Information:  Social work reached out to shasha Valles to set up a care conference with patient and family for tomorrow 8/22. Per Reena, patients two daughters are available to attend at 11:30am (one will attend in person and the other via iPad). Reena confirmed she still has the call in #, device ID & PIN. Confirmed with hospitalist who stated he will be able to make 11:30am work. Paged palliative care team to see if someone would be able to participate in the care conference as well. Received a message back from Karis ramos/ palliative stating that someone should be able to attend but they are unsure of who that will be yet.     Care conference tomorrow 8/22 @ 11:30am to discuss goals of care.    RUY Morris, LGSW  5 Med Surg   [facility]  Phone: [phone]

## 2024-08-22 NOTE — PROGRESS NOTES
"  PALLIATIVE CARE PROGRESS NOTE  [facility]     Patient Name: Myla Glynn  Date of Admission: 8/10/2024   Today the patient was seen for: goals of care     Recommendations & Counseling       GOALS OF CARE:   Life-prolonging with limits --Family would like Oncology to share what the cancer directed treatment options are. They will then decide \"what makes sense.\" Family does not think a surgery or aggressive chemo regimen would be well tolerated, but would be interested in treatments that could slow the cancer and not cause much burden.   Family care conference held today at the bedside, with a professional , until it was established that patient prefers to have family interpret.   Patient requested medical information be initially given to family and they will deliver the information to her. Getting information directly from staff, without family present, increases anxieties for the patient. Family shared they give Myla all the information they are given, but are able to do it with language that makes more sense to her.     ADVANCE CARE PLANNING:  No health care directive on file. Per system policy, Surrogate Decision-makers for Patients With Diminished Decision-making Capacity offers guidance on possible decision-makers. Daughter, Michele,  has been identified as a surrogate decision maker. Cousin, Dominic, is family spokesperson.   There is no POLST form on file, defer to patient and/or next of kin for decisions   Code status: Full Code    MEDICAL MANAGEMENT:   #Nausea 2/2 obstruction, medications, and infection    Tested C. Diff positive on 8/21 and started on oral vancomycin the morning of 8/22  Discontinued Ondansetron, due to it's constipating side effect   Would use Haldol first line for nausea prn, then compazine   Oral vanco should help with some of the nausea/diarrhea     PSYCHOSOCIAL/SPIRITUAL:  Family : 4 adult children (3 daughters and 1 " son), cousin Dominic is very involved and helping with medical interpretation and decision making. Lives with daughter Michele and granddaughter.      Palliative Care will continue to follow.     Thank you for the opportunity to be involved in the care of this patient. Please reach out with questions or concerns.     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 Other pertinent medical history of DM type 2, HFrEF (dx 4/2024), and CKD stage IIIb.     Recently admitted from 5/29-6/18/2024 for worsening weakness, fatigue, UTI, and ALFREDA, with PEG placed 5/31.     Imaging 8/8 revealed disease progression.     Presented on 8/10 with N/V 2-3 times a day for a week.  Hospital course has been complicated by difficulty tolerating tube feedings. Tube feeds remain at 15 ml/hr.      Today, the patient was seen for goals of care in the setting of:  Metastatic colon cancer  HFrEF  CKD stage IIIb  Nausea/vomiting   Deconditioning   Failure to thrive s/p PEG placement 6/13      Interval History:     Multidisciplinary collaboration:  Notes reviewed. No acute events overnight. Ongoing nausea. C. Diff came back positive; started vanco. Family care conference held today.     Notable medications:  Remeron 15 mg at bedtime   Scopolamine patch q72h   Vanco 125 mg QID (newly dx c. Diff)   Tylenol prn (0)  Dulcolax supp prn (0)  Haldol 1 mg PO q6h prn (0)  Compazine prn (0)  Senna-docusate prn (0)    Patient/family narrative  Understand cancer has return. Would like Oncology's recommendation. Patient will sign form to decline hospital  and use family.     Review of Systems:     Besides above,  ROS was unremarkable        Physical Exam:   Temp:  [98.1  F (36.7  C)-99  F (37.2  C)] 98.1  F (36.7  C)  Pulse:  [89-91] 89  Resp:  [16-18] 16  BP: (107-111)/(58-60) 111/58  SpO2:  [97 %-100 %] 97 %  130 lbs 11.72 oz    Physical Exam  Constitutional: Elderly female, seen lying in hospital bed. Ill appearing, but in NAD.  ENT: Normocephalic. Atraumatic. Missing multiple teeth.   CV: Warm and well perfused. No edema.  Pulm: Non-labored breathing, on room air.   Musculoskeletal: TREJO. No obvious malformations.   Skin: No jaundice. No concerning rashes or lesions on exposed areas.   Neuro: A/O x 4.   Psych: Affect appropriate to situation. Speaks Tigrinya without signs of difficulty/slurring.       Data Reviewed:     CMP  Recent Labs   Lab 08/22/24  1112 08/21/24  0806 08/20/24  0813 08/20/24  0209 08/19/24  2100 08/18/24  0647 08/18/24  0521 08/17/24  1014 08/17/24  0730 08/16/24  0646 08/16/24  0515   * 145  --   --  143  --   --   --   --   --   --    POTASSIUM 4.2 4.2  --   --  4.0  --  3.9  --  3.9  --  3.9   CHLORIDE 113* 110*  --   --  108*  --   --   --   --   --   --    CO2 27 23  --   --  23  --   --   --   --   --   --    ANIONGAP 10 12  --   --  12  --   --   --   --   --   --    * 104*  --  100* 88   < >  --    < >  --    < >  --    BUN 37.0* 30.3*  --   --  24.6*  --   --   --   --   --   --    CR 1.61* 1.42*  --   --  1.08*  --   --   --   --   --   --    GFRESTIMATED 33* 38*  --   --  53*  --   --   --   --   --   --    DICKSON 8.8 8.7*  --   --  8.7*  --   --   --   --   --   --    MAG  --  1.7  --   --  1.6*  --  1.7  --  2.3  --  2.3   PHOS  --  3.7 3.5  --   --   --   --   --   --   --  2.6    < > = values in this interval not displayed.     CBC  Recent Labs   Lab 08/22/24  0828 08/21/24  0806 08/19/24  0702 08/16/24  0515   WBC 10.7 25.0*  --   --    RBC 3.74* 3.92  --   --    HGB 11.5* 11.9  --   --    HCT 35.7 36.9  --   --    MCV 96 94  --   --    MCH 30.7 30.4  --   --    MCHC 32.2  32.2  --   --    RDW 14.4 14.1  --   --     [phone]       C.diff positive 8/21             100 minutes spent chart reviewing, coordinating care with medical team, discussing medical decision making preferences with patient and medical update/ goals of care with her family, and documenting. Face-to-face time: [id number]

## 2024-08-22 NOTE — PROGRESS NOTES
"[facility]    Medicine Progress Note - Hospitalist Service, GOLD TEAM 17    Date of Admission:  8/10/2024    Assessment & Plan   Myla Glynn is a 79 year old female admitted on 8/10/2024. 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, s/p sigmoid resection in 2021 also had partial cystectomy and ureteral reimplantation   #Bladder mass, status post resection  9/2023  and pathology showed invasive adenocarcinoma then had partial cystectomy pelvic lymph node dissection small bowel resection 1/2024   #Suspected recurrent/metastatic adenocarcinoma  Followed at [place] and per urology notes form 8/9 \"We would not recommend anymore surgical intervention for her given the extreme complexity of her surgical situation previously and her extensive hospital stay after her surgeries that we had performed.\" She was to follow up with oncology but never had an opportunity to get there and therefore has not re-established.   - oncology was consulted and saw pt on 8/11 recommending f/u with outpatient onc at [place] who she followed with previously. Unfortunately family is no longer following with any outpatient oncology provider   - oncology re-consulted, appreciate assistance; please communicate directly with family ([place], [phone]) for decision making as below  - d/w pt's cousin via phone on 8/20 noted that pt has not been made aware of these findings on CT and does not know she has worsening metastatic disease at this time. Palliative team had evaluated yesterday and recommended care conference which " I relayed to family  - care conference held today 8/22   - attempted discussion via  services, pt ultimately decided to waive translation services and deferred communication to her family, specifically via her cousin Dominic (first contact) and daughter, Michele. Pt deferred medical decision making to family as well.    - additional discussion held with palliative care, Deepti, and Zandra WADE, in conference call with Dominic and Michele. Discussed pt's current medical status regarding progression of metastatic stage IV colorectal cancer with  involvement. Dominic reiterated that they do not have any active oncologist following Nechi's care and requested consult which has been ordered. They would like to further discuss with oncology team pt's prognosis and treatment options before making further decisions. However, they did mention that they would likely not want to pursue any surgical intervention or aggressive chemotherapy. Also updated family on new C diff infection as well as discussing pts limitations in TF tolerance that has potential to limit her cancer tx options as well.     #Nausea and vomiting   #Decreased po intake  Had CT chest abdominal pelvis at [place] 8/8, she was already having above symptoms. Showed worsening of the locally advanced mass involving bladder, no evidence of bowel obstruction but at high risk , possible enterovesical fistula. Need to be careful with IV fluids with heart failure HFrEF. Abdominal XR 8/10 showed nonobstructive gas pattern, no pneumatosis or free air. Lactate negative.  she is also constipated so started on BM. Suspect related to above. GI in agreement.   - UA + but question if has enterovesical fistula,   - Urine culture no growth to date, received  Rocephin  now off   - repeat abdominal XR 8/16 with nonspecific gas pattern, mild colonic stool burden    - GI consulted, now signed off, recommended scopalamine patch and miralax titration for goal 1 BM per  day    #Diarrhea/loose stools  #C diff infection  Had some constipation earlier in course. 5x BM on 8/19 with possible c/f overflow diarrhea. States she had diarrhea all night on 8/20 but only charted 1x BM. Denies abdominal pain. Prior course of CTX earlier this admission. WBC elevated today.   - hold bowel reg if >1 BM per day  - f/u c diff     #Leukocytosis  WBC 25. Vitals stable otherwise. No s/sxs of acute infection aside from loose stools as above. May be reactive in s/o metastatic cancer.      #Chronic HFrEF  #Significant Mitral Valve regurgitation   #Hypertension    Follows at [facility]. TTE 4/4 with EF 29%  moderate TR. Was to have cardiac MRI once she improved.   - PTA on carvediol , continue with holding parameters  not on ACEi/ARB  SGLT2 inhibitor due to CKD      #History left sided pleural effusion  - on CT 8/8 left pleural effusion size has decreased per report   - CT did show ne enhancing pleural thickening     #Chronic CKD stage III  #Mild AGMA  -Baseline creatinine 1.4-1.7. Cr 1.0 today.   - avoid nephrotoxic agents      #Severe malnutrition  PEG placement on 6/2024, on cycled G-tube feeds. Was not tolerating higher rates and continues at a lowered rate 15 ml/h currently.  - nutrition following  - care conference planned 8/22     #T2DM  - not on medications blood sugar <100 stopped accu-checks      #Mild cognitive impairment  - at risk for delirium      #Depression  - not on medications      #History of infected left retroperitoneal hematoma  status post  IR embolization and drainage 3/2024   - no current issues      #GOC  Remains full code currently. Will readdress pending oncology evaluation        Diet: NPO for Medical/Clinical Reasons Except for: Ice Chips, NPO but receiving Tube Feeding  Adult Formula Drip Feeding: Continuous Startlocal Peptide 1.5; Gastrostomy; Goal Rate: keep at 15 ml/hour for now; mL/hr; If using cartons--follow 8 hr hang time for open system.; Do not advance tube feeding  rate unless K+ is = or > 3.0, Mg++ is =...    DVT Prophylaxis: Heparin SQ  Cameron Catheter: Not present  Lines: None     Cardiac Monitoring: None  Code Status: Full Code      Clinically Significant Risk Factors                  # Hypertension: Noted on problem list            # Severe Malnutrition: based on nutrition assessment    # Financial/Environmental Concerns: none               Disposition Plan     Medically Ready for Discharge: Anticipated in 5+ Days             Raul Vicente MD  Hospitalist Service, GOLD TEAM 17  [facility]  Securely message with [employer] (more info)  Text page via [employer] Paging/Directory   See signed in provider for up to date coverage information  ______________________________________________________________________    Interval History   YOVANI.   Care conference today.  No pain. Ongoing nausea.    Physical Exam   Vital Signs: Temp: 98.1  F (36.7  C) Temp src: Oral BP: 111/58 Pulse: 89   Resp: 16 SpO2: 97 % O2 Device: None (Room air)    Weight: 130 lbs 11.72 oz    General: NAD, laying in bed comfortably  Resp: CTAB, no w/r/r, no increased WOB  CV: RRR, no m/r/g, pulses intact and equal, no BLE edema  GI: soft, non-tender, non-distended, PEG in place c/d/i  Skin: no rash, no lesions      Medical Decision Making       75 MINUTES SPENT BY ME on the date of service doing chart review, history, exam, documentation & further activities per the note.            Data

## 2024-08-22 NOTE — PLAN OF CARE
/57 (BP Location: Left arm)   Pulse 79   Temp 98.1  F (36.7  C) (Oral)   Resp 16   Wt 59.3 kg (130 lb 11.7 oz)   SpO2 99%   BMI 26.39 kg/m    Aox4. Denied pain, nausea. On RA. LS clear.    utilized for care conference today, see palliative noted.   C-diff +, enteric precautions maintained. Incontinent b/b, see flowsheets for assessment details, no BM today.   Scopolamine patch behind R ear.   TF running at 15mL/hr with 30mL FWF q4 hours.   Care plan updated, pt able to make needs known.   Handoff given to Jael Braga.    Problem: Nausea and Vomiting  Goal: Nausea and Vomiting Relief  Intervention: Prevent and Manage Nausea and Vomiting  Recent Flowsheet Documentation  Taken 8/22/2024 1100 by Valentin Hand RN  Fluid/Electrolyte Management: fluids adjusted  Environmental Support: calm environment promoted, no additional interventions needed.      Problem: Pain Acute  Goal: Optimal Pain Control and Function  Intervention: Optimize Psychosocial Wellbeing  Recent Flowsheet Documentation  Taken 8/22/2024 1100 by Valentin Hand RN  Supportive Measures: relaxation techniques promoted     Problem: Infection  Goal: Absence of Infection Signs and Symptoms  Outcome: Progressing - vancocin given, see eMar orders.

## 2024-08-22 NOTE — PLAN OF CARE
Goal Outcome Evaluation:                      Pt alert and oriented x4, denied pain and nausea, asked several times to make sure, pt continued to deny. Incontinent of bowel and bladder, c diff positive. Skin CDI. TF to 15 mls. Care conference tomorrow 1130.

## 2024-08-22 NOTE — CONSULTS
Care Management Follow Up    Length of Stay (days): 12    Expected Discharge Date: TBD     Concerns to be Addressed: Discharge planning, adjustment to diagnosis/illness     Patient plan of care discussed at interdisciplinary rounds: Yes    Anticipated Discharge Disposition: Long Term Care     Anticipated Discharge Services: None  Anticipated Discharge DME: None    Patient/family educated on Medicare website which has current facility and service quality ratings: Yes  Education Provided on the Discharge Plan: Yes  Patient/Family in Agreement with the Plan: Yes    Referrals Placed by CM/SW: External Care Coordination  Private pay costs discussed: Not applicable    Additional Information:  Social work attended a care conference today w/ patient, cousin, daughter, Dr. Vicente and Deepti Tavera w/ palliative care. To start the care conference, the team used a [employer]  at bedside. Family began to express concerns that the  may not be translating everything as they should be and that patient appears to be confused. Patient expressed that she would prefer to speak with family and have them translate. The team was in agreement with this as patient was more open to answering questions when asked by family. We asked if patient wanted to know her medical diagnosis and if her cancer has returned. Patient expressed wanting her family to be informed on her medical care and that they would discuss things with her and they would make decisions together. We let patient rest and Deepti Paul w/ palliative, SW and family talked about how the cancer has returned to patients bladder and intestines with a possibly of there being a fistula. Dr. Vicente shared that patient is likely not a great surgical candidate per Urology. Family shared that they want to hear from Oncology while patient is admitted before continuing the conversation. Dr. Vicente stated he would consult Onc before we continued discussing next steps and how to  move forward with patients prognosis. Will await for Onc to speak with family. Reena is the primary point of contact moving forward and will include patients daughters in the conversation when making decisions. Family asked SW to send over a medicare care compare list for [place] and [facility]. Printed list and emailed to Reena.     Care management will continue to follow and assist with discharging planning when appropriate.     RUY Morris, LGSW  5 Med Surg   [facility]  Phone: [phone]

## 2024-08-22 NOTE — PROGRESS NOTES
VS: /58 (BP Location: Left arm)   Pulse 89   Temp 98.1  F (36.7  C) (Oral)   Resp 16   Wt 59.3 kg (130 lb 11.7 oz)   SpO2 97%   BMI 26.39 kg/m      O2: SpO2 > 97 and stable on RA. LS clear and equal bilaterally. Denies chest pain and SOB.    Output: Inc of bowel and bladder   Last BM: Last BM 08/22/2024   Activity: Turn q2 h   Skin: WDL except,    Pain: Denies pain   CMS: Intact, AOx4. Denies numbness and tingling.   Dressing:    Diet: NPO Continuous TF @15 ml /hr     LDA: L/t  PIV Saline lock   Equipment: IV pole, personal belongings,    Plan: Fall, aspiration precautions maintained / Continue with plan of care. Call light within reach, pt able to make needs known.    Additional Info: C/I  for c-diff, Care conference today @ 11:30 am

## 2024-08-22 NOTE — CONSULTS
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 status post resection including sigmoidectomy with en bloc partial cystectomy and total abdominal hysterectomy with left salpingo-oophorectomy, right salpingo-oophorectomy, reimplantation of left ureter, partial omentectomy, appendectomy, and diverting loop ileostomy on 01/05/2021) which was 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     Patient presents with nausea, vomiting, abdominal pain. PTA had not had bowel movement for 4-5 days. Abodminal X-ray on admission showed  no evidence of obstruction, pneumatosis or free air. Recent CT A/P at [facility] on 8/8 showed worsening of locally advanced mass involving bladder with no evidence of bowel obstruction, and possible enterovesical fistula.     She is unable to tolerate advancement of tube feeds due to ongoing nausea / abdominal pain being optimized by GI.    # Metastatic Sigmoid colon adenocarcinoma  -We discussed with family that she has a metastatic colon adenocarcinoma, invading into bladder. Patient is still  optimizing nutrition from GI and continues to be weak with ongoing nausea/vomitting and also complicated by ongoing CDIFF infection.   -Patient's family says that patient would not want to peruse aggressive treatments with surgery or aggressive systemic therapy but wanted to know if potential systemic options area available. We discussed that she has acute issues that need to be addressed, including active infection, before any systemic therapy options can be offered. We would recommend once acute issues resolved, if her performance status, nutrition, and infection were to improve, we can continue discussions about systemic therapy options such as palliative chemotherapy.   -No plans for inpatient systemic therapy at this time. Continue acute issues and follow up outpatient. Please reach out to oncology when medically stable for discharge to set up follow up      Oncologic History (Copied per chart review)  Oncology Treatment Summary  1. 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 October 2020 presented to PCP with recurrent dysuria, hematuria, 10/17/2020 CT AP ws done again with colovesicular fistula with a mass seen invading from colon into bladder.  3. 11/16 colonoscopy at [facility] with reported mass at 40 cm, bx with adenocarcinoma, I am unable to find MMR  4. 11/23 CEA 9.7  5. 12/22/2020 MRI pelvis locally advanced disease with multiple enlarged regional lymph nodes  6. 12/22/2020 PET some uptake in mesenteric area, no distant disease  7. 1/5/21 left hemicolectomy, DOC, BSO, partial cystectomy  8. 1/25/21 : Met with medical oncology, Staged at T4N1M0 with invasion into the bladder. She was offered 6 months of adjuvant chemotherapy, however she had poor performance status and plan to hold off  9. 3/1/2021: Medical oncology discussed with patient about adjuvant therapy, but family did not want adjuvant therapy going forward. Concerned about side  effects.   10. CEA 1.1 (12/2021)   11. Colonoscopy 3/2022 unremarkable  12. CT A/P 6/30/23 revealed a 3.3 cm tumor in the anterior urinary bladder wall, including near left ureteral reimplantation site, likely representing recurrent disease   13. Cystoscopy 9/29/23 with TURBT performed on the anterior bladder wall mass (now 6cm) by Dr. Sergo Valentin ([facility]). Pathology of this tissue demonstrates invasive adenocarcinoma, moderately differentiated, intestinal type (urachal adenocarcinoma vs metastasis from known colorectal adenocarcinoma).  14. Repeat cystoscopy was performed 11/10/23 showing known bladder mass surrounded by necrotic tissue along left lateral bladder wall (overall location making patient suitable for partial cystectomy due to location/appearance). Pathology from 9/29/23 reviewed by urology with tentative plans for partial cystectomy pending discussion with medical oncology.   15. PET 11/24/23 without any other sites (outside of anterolateral bladder wall mass) of FDG avidity / tumor recurrence  16. 1/9/24: enterovesical fistula (status post partial cystectomy, extensive bowel mobilization, small-bowel resection, small-bowel anastomosis, lysis of adhesions 1/9/24)  17. 3/9/2024 lumbar artery bleed and infected left retroperitoneal hematoma (s/p IR embolization and drain placement at [facility])  18. 6/13/2024: post G tube placement     Patient was seen and plan of care was discussed with attending physician Dr. Falcon.    Thank you for the opportunity to partake in this patient's plan of care. Please do not hesitate to page with questions. We will continue to follow .     Saji Godwin MD  Hematology/Oncology/BMT Fellow  Pager: [phone]    ___________________________________________________________________    History of Present Illness:    Ongoing nausea, tube feeds being optimized by primary team.     Physical Exam:    Blood pressure 111/58, pulse 89, temperature 98.1  F (36.7  C),  temperature source Oral, resp. rate 16, weight 59.3 kg (130 lb 11.7 oz), SpO2 97%, not currently breastfeeding.    Patient seen via video visit   She is resting without distress. She is nodding yes and no  She is breathing comfortably     Labs & Studies: I personally reviewed the following studies:  ROUTINE LABS (Last four results):  CMP  Recent Labs   Lab 08/22/24  1112 08/21/24  0806 08/20/24  0813 08/20/24  0209 08/19/24  2100 08/18/24  0647 08/18/24  0521 08/17/24  1014 08/17/24  0730 08/16/24  0646 08/16/24  0515   * 145  --   --  143  --   --   --   --   --   --    POTASSIUM 4.2 4.2  --   --  4.0  --  3.9  --  3.9  --  3.9   CHLORIDE 113* 110*  --   --  108*  --   --   --   --   --   --    CO2 27 23  --   --  23  --   --   --   --   --   --    ANIONGAP 10 12  --   --  12  --   --   --   --   --   --    * 104*  --  100* 88   < >  --    < >  --    < >  --    BUN 37.0* 30.3*  --   --  24.6*  --   --   --   --   --   --    CR 1.61* 1.42*  --   --  1.08*  --   --   --   --   --   --    GFRESTIMATED 33* 38*  --   --  53*  --   --   --   --   --   --    DICKSON 8.8 8.7*  --   --  8.7*  --   --   --   --   --   --    MAG  --  1.7  --   --  1.6*  --  1.7  --  2.3  --  2.3   PHOS  --  3.7 3.5  --   --   --   --   --   --   --  2.6    < > = values in this interval not displayed.     CBC  Recent Labs   Lab 08/22/24  0828 08/21/24  0806 08/19/24  0702 08/16/24  0515   WBC 10.7 25.0*  --   --    RBC 3.74* 3.92  --   --    HGB 11.5* 11.9  --   --    HCT 35.7 36.9  --   --    MCV 96 94  --   --    MCH 30.7 30.4  --   --    MCHC 32.2 32.2  --   --    RDW 14.4 14.1  --   --     291 268 270     INRNo lab results found in last 7 days.  Medications list for reference:  Current Facility-Administered Medications   Medication Dose Route Frequency Provider Last Rate Last Admin    acetaminophen (TYLENOL) tablet 650 mg  650 mg Oral or Feeding Tube Q4H PRN Naz Portillo MD   650 mg at 08/16/24 0154    Or     acetaminophen (TYLENOL) Suppository 650 mg  650 mg Rectal Q4H PRN Naz Portillo MD        bisacodyl (DULCOLAX) suppository 10 mg  10 mg Rectal Daily PRN Naz Portillo MD        famotidine (PEPCID) suspension 20 mg  20 mg Oral or Feeding Tube Q48H Naz Portillo MD   20 mg at 08/20/24 1455    haloperidol (HALDOL) 2 MG/ML (HIGH CONC) solution 1 mg  1 mg Oral Q6H PRN Tian Rider MD        heparin ANTICOAGULANT injection 5,000 Units  5,000 Units Subcutaneous Q12H Alis Muller MD   5,000 Units at 08/22/24 0931    lidocaine (LMX4) cream   Topical Q1H PRN Naz Portillo MD        lidocaine 1 % 0.1-1 mL  0.1-1 mL Other Q1H PRN Naz Portillo MD        [Held by provider] loperamide (IMODIUM) capsule 2 mg  2 mg Oral 4x Daily PRN Raul Vicente MD   2 mg at 08/21/24 1224    mirtazapine (REMERON SOL-TAB) ODT tab 15 mg  15 mg Orally disintegrating tablet At Bedtime Tian Rider MD   15 mg at 08/22/24 0025    multivitamins w/minerals liquid 15 mL  15 mL Per Feeding Tube Daily Alis Muller MD   15 mL at 08/22/24 0931    omeprazole (PriLOSEC) suspension 20 mg  20 mg Per G Tube BID AC Baldo Quevedo MD   20 mg at 08/22/24 0931    ondansetron (ZOFRAN ODT) ODT tab 4 mg  4 mg Oral Q6H PRN Naz Portillo MD        Or    ondansetron (ZOFRAN) injection 4 mg  4 mg Intravenous Q6H PRN Naz Portillo MD   4 mg at 08/20/24 2056    prochlorperazine (COMPAZINE) injection 5 mg  5 mg Intravenous Q6H PRN Naz Portillo MD   5 mg at 08/14/24 0623    Or    prochlorperazine (COMPAZINE) tablet 5 mg  5 mg Oral Q6H PRN Naz Portillo MD        Or    prochlorperazine (COMPAZINE) suppository 12.5 mg  12.5 mg Rectal Q12H PRN Naz Portillo MD        scopolamine (TRANSDERM) 72 hr patch 1 patch  1 patch Transdermal Q72H Raul Vicente MD   1 patch at 08/20/24 1723    And    scopolamine (TRANSDERM-SCOP) Patch in Place   Transdermal Q8H Raul Vicente MD        senna-docusate (SENOKOT-S/PERICOLACE) 8.6-50  MG per tablet 1 tablet  1 tablet Oral or Feeding Tube BID PRN Raul Vicente MD   1 tablet at 08/16/24 2159    Or    senna-docusate (SENOKOT-S/PERICOLACE) 8.6-50 MG per tablet 2 tablet  2 tablet Oral or Feeding Tube BID PRN Raul Vicente MD   2 tablet at 08/11/24 1441    sodium chloride (PF) 0.9% PF flush 3 mL  3 mL Intracatheter Q8H Naz Portillo MD   3 mL at 08/22/24 1226    sodium chloride (PF) 0.9% PF flush 3 mL  3 mL Intracatheter q1 min prn Naz Portillo MD   3 mL at 08/13/24 1806    vancomycin (VANCOCIN) capsule 125 mg  125 mg Oral 4x Daily Raul Vicente MD   125 mg at 08/22/24 1226

## 2024-08-23 NOTE — PROGRESS NOTES
CLINICAL NUTRITION SERVICES - BRIEF NOTE     Nutrition Prescription    RECOMMENDATIONS FOR MDs/PROVIDERS TO ORDER:  Plan of care discussed with provider and bedside RN    Malnutrition Status:    Severe malnutrition in the context of acute on chronic illness     Recommendations already ordered by Registered Dietitian (RD):  [place] Peptide 1.5 (or equivalent) @ 20 mL/hr (480 mL/day) to provide 720 kcal, 36 g protein, 66 g CHO, 7 g fiber, 37 g fat (40% from MCTs), and 336 mL free water daily     Free Water Flushes: 60 mL q 4 hours per provider order.     Future/Additional Recommendations:  Monitor labs, weights, BM/GI, TF tolerance  Monitor goals of care     Findings  Plan to increase TF rate today by 5 mL and assess again tomorrow ability to advance again by 5 mL. Discussed plan with Dominic and obtained permission for TF rate advancement and also to not tell pt of advancement.   Discussed plan with provider and bedside RN.     LABS  Hypernatremia noted  Electrolytes  Potassium (mmol/L)   Date Value   08/22/2024 4.2   08/21/2024 4.2   08/19/2024 4.0   12/13/2021 4.2   09/13/2021 3.9   06/07/2021 3.9   05/09/2021 3.5   05/06/2021 3.8     Phosphorus (mg/dL)   Date Value   08/21/2024 3.7   08/20/2024 3.5   08/16/2024 2.6   08/13/2024 4.3   08/12/2024 4.1   05/06/2021 3.8   05/05/2021 5.1 (H)   05/04/2021 5.0 (H)   01/12/2021 4.2   01/10/2021 2.5    Blood Glucose  Glucose (mg/dL)   Date Value   08/22/2024 103 (H)   08/21/2024 104 (H)   08/19/2024 88   12/13/2021 98   09/13/2021 105 (H)   06/07/2021 90   05/09/2021 101 (H)   05/06/2021 93   04/20/2021 83   02/18/2021 72     GLUCOSE BY METER POCT (mg/dL)   Date Value   08/20/2024 100 (H)   08/19/2024 78   08/18/2024 80   08/18/2024 80   08/18/2024 93     Hemoglobin A1C (%)   Date Value   08/10/2024 6.0 (H)    Inflammatory Markers  WBC (10e9/L)   Date Value   06/07/2021 5.9   04/20/2021 5.4   02/15/2021 7.1     WBC Count (10e3/uL)   Date Value   08/22/2024 10.7    08/21/2024 25.0 (H)   08/13/2024 13.4 (H)     Albumin (g/dL)   Date Value   08/10/2024 3.8   06/24/2024 3.3 (L)   06/15/2024 3.1 (L)   12/13/2021 3.5   09/13/2021 3.4   06/07/2021 3.6   04/20/2021 3.7   02/15/2021 3.5      Magnesium (mg/dL)   Date Value   08/21/2024 1.7   08/19/2024 1.6 (L)   08/18/2024 1.7   05/09/2021 2.5 (H)   05/07/2021 2.1   05/06/2021 2.0     Sodium (mmol/L)   Date Value   08/22/2024 150 (H)   08/21/2024 145   08/19/2024 143   06/07/2021 141   05/09/2021 140   05/06/2021 139    Renal  Urea Nitrogen (mg/dL)   Date Value   08/22/2024 37.0 (H)   08/21/2024 30.3 (H)   08/19/2024 24.6 (H)   12/13/2021 27   09/13/2021 30   06/07/2021 25   05/09/2021 24   05/06/2021 33 (H)     Creatinine (mg/dL)   Date Value   08/22/2024 1.61 (H)   08/21/2024 1.42 (H)   08/19/2024 1.08 (H)   06/07/2021 1.30 (H)   05/09/2021 1.39 (H)   05/06/2021 1.62 (H)     Additional  Ketones Urine (mg/dL)   Date Value   08/10/2024 Negative   02/18/2021 Negative     Platelet Count   Date Value   08/22/2024 291 10e3/uL   06/07/2021 216 10e9/L     aPTT (no units)   Date Value   05/30/2024      Comment:     Clotted. Disregard results.  This is a corrected result. Previous result was 20 Seconds on 5/30/2024 at  9:20 AM CDT     INR (no units)   Date Value   05/30/2024 1.23 (H)   01/06/2021 1.56 (H)          Body mass index is 26.39 kg/m .    Wt Readings from Last 10 Encounters:   08/18/24 59.3 kg (130 lb 11.7 oz)   07/23/24 53.1 kg (117 lb)   06/24/24 55 kg (121 lb 3.2 oz)   06/12/24 53.4 kg (117 lb 11.6 oz)   12/13/21 59.2 kg (130 lb 8 oz)   09/13/21 56.1 kg (123 lb 11.2 oz)   06/14/21 50.6 kg (111 lb 9.6 oz)   06/07/21 50.1 kg (110 lb 8 oz)   05/20/21 50.4 kg (111 lb 3.2 oz)   05/07/21 52.6 kg (115 lb 14.4 oz)     INTERVENTIONS  Implementation    Collaboration with other providers  Enteral Nutrition - Modify rate and volume  Feeding tube flush        Follow up/Monitoring  Progress toward goals will be monitored and evaluated per  protocol.    Becky RODRIGUEZ  Clinical Dietitian  [facility] 5B/10A ICU Vocera, Teams, or desk [phone]  Weekend/Holiday Vocera: Weekend Holiday Clinical Dietitian [Multi Site Groups]

## 2024-08-23 NOTE — PROGRESS NOTES
VS: /52 (BP Location: Left arm, Patient Position: Supine, Cuff Size: Adult Regular)   Pulse 79   Temp 98.8  F (37.1  C) (Oral)   Resp 17   Wt 59.3 kg (130 lb 11.7 oz)   SpO2 99%   BMI 26.39 kg/m      O2: SpO2 > 97 and stable on RA. LS clear and equal bilaterally. Denies chest pain and SOB.    Output: Inc of bowel and bladder   Last BM: Last BM    Activity: Turn q2 h   Skin: WDL except,    Pain: Denies pain   CMS: Intact, AOx4. Denies numbness and tingling.   Dressing:     Diet: NPO Continuous TF @15 ml /hr, except ice chips       LDA: L/t  PIV Saline lock   Equipment: IV pole, personal belongings,    Plan: Fall, aspiration precautions maintained / Continue with plan of care. Call light within reach, pt able to make needs known.    Additional Info: C/I  for c-diff

## 2024-08-23 NOTE — PLAN OF CARE
Patient here for increased symptoms related to colon cancer.    Goal Outcome Evaluation:      Plan of Care Reviewed With: patient      Outcome Evaluation: TF increased to 20 mL/hr, H20 increased to 60    VS: Temp: 98.2  F (36.8  C) Temp src: Oral BP: 120/51 Pulse: 75   Resp: 16 SpO2: 99 % O2 Device: None (Room air)      O2: RA   Output: Incontinent of urine   Last BM: 8/23, loose   Activity: Not OOB   Skin: No issues   Pain: Denies   Neuro: A&O x4   Labs: CBC and BMP ran, unremarkable   Diet: NPO except ice chips, TF [place] continuous 20 mL/hr, 60 mL H20 q4h   IV: L PIV SL   LDA:  PEG   Plan: Finding hospice   Additional Info: Eleazar Collins

## 2024-08-23 NOTE — PLAN OF CARE
Occupational Therapy: Orders received. Chart reviewed and discussed with SW and PT.? Occupational Therapy not indicated due to pt and family currently pursuing LTC. Given pt up currently Ax1 and with plans for ADL assist at discharge, no skilled IP OT needs at this time.? Defer discharge recommendations to medical team.? Will complete orders.

## 2024-08-23 NOTE — PLAN OF CARE
Goal Outcome Evaluation:      Plan of Care Reviewed With: patient    Overall Patient Progress: improvingOverall Patient Progress: improving    Outcome Evaluation: pt feeding increased to 20mL/hr, tolerating well.    Shift:     VS: /62 (BP Location: Left arm)   Pulse 82   Temp 98.1  F (36.7  C) (Oral)   Resp 16   Wt 59.3 kg (130 lb 11.7 oz)   SpO2 98%   BMI 26.39 kg/m       Pain:  pt describes slight pain in left knee  Neuro: WDL  Resp: WDL  Diet: Pt feeding increased to 20mL/hour, pt tolerating well.  Skin: WDL  LDA: Left PIV saline locked, clean dry, intact  Activity: Pt moved in bed. Pt turned for perineal care and cleansing.  Output: Pt BM at end of shift 1500 8/23  Additional Info/shift updates: Pt had a bed bath today with DAISY Buck. Pt assessment done with , pt described numbness and tingling in hands and feet, as well as new pain in her left knee, physician notified (SHANDRA Vicente). Physician ordered labs. Feeding rate changed to 20mL/hr, and is tolerating it well. [place] and syringe for feeding tube flush were changed today 8/23.   Call light within reach     Plan of care ongoing

## 2024-08-23 NOTE — PROGRESS NOTES
BRIEF ONCOLOGY NOTE      This patient was not seen by the oncology team today. Myla Glynn's chart and labs were reviewed. Please see Dr. Jazmin Guerrero's note on 8/22/24 for in depth details regarding conversations with family. Overall, discussed that in the patient's current condition she is not a candidate for inpatient chemotherapy given poor PS, nutrition and active infection. Family inquired about treatment as outpatient and requested referral to [facility] and [facility] which as been placed. Our team did express concern that she may never be well enough to receive treatment even in the outpatient setting.     No further inpatient oncology recommendations at this time. We will sign off.      Please do not hesitate to reach out if there are questions/concerns in the mean time.     Keely Huang PA-C  Hematology/Oncology  Pager: [phone]

## 2024-08-23 NOTE — PROGRESS NOTES
"[facility]    Medicine Progress Note - Hospitalist Service, GOLD TEAM 17    Date of Admission:  8/10/2024    Assessment & Plan   Myla Glynn is a 79 year old female admitted on 8/10/2024. 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, s/p sigmoid resection in 2021 also had partial cystectomy and ureteral reimplantation   #Bladder mass, status post resection  9/2023  and pathology showed invasive adenocarcinoma then had partial cystectomy pelvic lymph node dissection small bowel resection 1/2024   #Suspected recurrent/metastatic adenocarcinoma  Followed at [place] and per urology notes form 8/9 \"We would not recommend anymore surgical intervention for her given the extreme complexity of her surgical situation previously and her extensive hospital stay after her surgeries that we had performed.\" She was to follow up with oncology but never had an opportunity to get there and therefore has not re-established.   - oncology was consulted and saw pt on 8/11 recommending f/u with outpatient onc at [place] who she followed with previously. Unfortunately family is no longer following with any outpatient oncology provider   - oncology re-consulted, no plans for induction therapies while admitted, will need to reassess pending improvement in nutritional and functional status and f/u as outpatient  - suspect her deconditioning is significantly related to her cancer progression and have concerns that she will not be able to progress enough here to discharge safely. However, her nausea has been improved for a few " days and will increase TF rate to improve nutritional status today  - please see note on 8/22 for GOC discussion with pt and family  - please communicate directly with family (Dolores, [phone]) for decision making  - increase TFs to 20 ml/h, advance per RD as tolerated    #Severe malnutrition  PEG placement on 6/2024, on cycled G-tube feeds. Was not tolerating higher rates and continues at a lowered rate 15 ml/h currently.  - nutrition following  - advancing TFs as tolerated    #Nausea and vomiting, improved  #Decreased po intake  Had CT chest abdominal pelvis at [place] 8/8, she was already having above symptoms. Showed worsening of the locally advanced mass involving bladder, no evidence of bowel obstruction but at high risk , possible enterovesical fistula. Need to be careful with IV fluids with heart failure HFrEF. Abdominal XR 8/10 showed nonobstructive gas pattern, no pneumatosis or free air. Lactate negative.  she is also constipated so started on BM. Suspect related to above. GI in agreement.   - UA + but question if has enterovesical fistula, Urine culture negative, CTX was discontinued.  - repeat abdominal XR 8/16 with nonspecific gas pattern, mild colonic stool burden    - GI initially consulted, now signed off, recommended scopalamine patch and miralax titration for goal 1 BM per day    #Diarrhea/loose stools, improving  #C diff infection  Had some constipation earlier in course. 5x BM on 8/19 with possible c/f overflow diarrhea. States she had diarrhea all night on 8/20 but only charted 1x BM. Denies abdominal pain. Prior course of CTX earlier this admission. C diff positive.  - hold bowel reg if >1 BM per day  - continue PO vanc for 10 day course     #Leukocytosis, resolved  WBC 25. Vitals stable otherwise. No s/sxs of acute infection aside from loose stools as above. May be reactive in s/o metastatic cancer. Improved with IVFs and antibiotics for C diff.   - monitor     #Chronic  HFrEF  #Significant Mitral Valve regurgitation   #Hypertension    Follows at [facility]. TTE 4/4 with EF 29%  moderate TR. Was to have cardiac MRI once she improved.   - PTA on carvediol , continue with holding parameters  not on ACEi/ARB  SGLT2 inhibitor due to CKD      #History left sided pleural effusion  - on CT 8/8 left pleural effusion size has decreased per report   - CT did show new enhancing pleural thickening around the L effusion c/w exudative effusion. She was given CTX for 5 days.     #Chronic CKD stage III  #Mild AGMA  -Baseline creatinine 1.4-1.7. Cr 1.0 today.   - avoid nephrotoxic agents      #T2DM  - not on medications blood sugar <100 stopped accu-checks      #Mild cognitive impairment  - at risk for delirium      #Depression  - not on medications      #History of infected left retroperitoneal hematoma  status post  IR embolization and drainage 3/2024   - no current issues      #GOC  Remains full code currently. Will readdress pending oncology evaluation        Diet: NPO for Medical/Clinical Reasons Except for: Ice Chips, NPO but receiving Tube Feeding  Adult Formula Drip Feeding: Continuous [employer] Peptide 1.5; Gastrostomy; Goal Rate: keep at 15 ml/hour for now; mL/hr; If using cartons--follow 8 hr hang time for open system.; Do not advance tube feeding rate unless K+ is = or > 3.0, Mg++ is =...    DVT Prophylaxis: Heparin SQ  Cameron Catheter: Not present  Lines: None     Cardiac Monitoring: None  Code Status: Full Code      Clinically Significant Risk Factors         # Hypernatremia: Highest Na = 150 mmol/L in last 2 days, will monitor as appropriate          # Hypertension: Noted on problem list            # Severe Malnutrition: based on nutrition assessment    # Financial/Environmental Concerns: none               Disposition Plan     Medically Ready for Discharge: Anticipated in 5+ Days             Raul Vicente MD  Hospitalist Service, GOLD TEAM 17  [facility]  [facility]  Securely message with [employer] (more info)  Text page via [employer] Paging/Directory   See signed in provider for up to date coverage information  ______________________________________________________________________    Interval History   YOVANI.   No pain today. No nausea.   Family agreeable to advancing Tfs      Physical Exam   Vital Signs: Temp: 98.1  F (36.7  C) Temp src: Oral BP: 129/62 Pulse: 82   Resp: 16 SpO2: 98 % O2 Device: None (Room air)    Weight: 130 lbs 11.72 oz    General: NAD, laying in bed comfortably  Resp: CTAB, no w/r/r, no increased WOB  CV: RRR, no m/r/g, pulses intact and equal, no BLE edema  GI: soft, non-tender, non-distended, PEG in place c/d/i  Skin: no rash, no lesions      Medical Decision Making       50 MINUTES SPENT BY ME on the date of service doing chart review, history, exam, documentation & further activities per the note.            Data

## 2024-08-24 NOTE — PROGRESS NOTES
Goal Outcome Evaluation: reviewed w/ the pt.     Received alert and oriented x4, in room air  Denies Pain, N/V,  SOB and chest discomfort  Continuous Gtube feeding. Provided ice chips.  Incontinent of B/B  Assisted in repositioning  Keep call light within reach.  Awaiting for placement.

## 2024-08-24 NOTE — PROGRESS NOTES
"[facility]    Medicine Progress Note - Hospitalist Service, GOLD TEAM 17    Date of Admission:  8/10/2024    Assessment & Plan   Myla Glynn is a 79 year old female admitted on 8/10/2024. 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, s/p sigmoid resection in 2021 also had partial cystectomy and ureteral reimplantation   #Bladder mass, status post resection  9/2023  and pathology showed invasive adenocarcinoma then had partial cystectomy pelvic lymph node dissection small bowel resection 1/2024   #Suspected recurrent/metastatic adenocarcinoma  Followed at [place] and per urology notes form 8/9 \"We would not recommend anymore surgical intervention for her given the extreme complexity of her surgical situation previously and her extensive hospital stay after her surgeries that we had performed.\" She was to follow up with oncology but never had an opportunity to get there and therefore has not re-established.   - oncology was consulted and saw pt on 8/11 recommending f/u with outpatient onc at [place] who she followed with previously. Unfortunately family is no longer following with any outpatient oncology provider   - oncology re-consulted, no plans for induction therapies while admitted, will need to reassess pending improvement in nutritional and functional status and f/u as outpatient  - suspect her deconditioning is significantly related to her cancer progression and have concerns that she will not be able to progress enough here to discharge safely. However, her nausea has been improved for a few " days and will increase TF rate to improve nutritional status today  - please see note on 8/22 for GOC discussion with pt and family  - please communicate directly with family (Dolores, [phone]) for decision making  - updated Dominic via phone 8/24    #Pelvic/abdominal pain  Pt c/o lower pelvic pain. RN noted possible bloody discharge from vagina/urethra noted by overnight care team. Was previously tx for PNA/possible UTI earlier in hospitalization with a course of CTX as below.   - repeat UA    #Severe malnutrition  PEG placement on 6/2024, on cycled G-tube feeds. Was not tolerating higher rates initially. Now slowly titrating up feeds.   - nutrition following  - advancing TFs per RD as tolerated, currently at 20 cc/h --> 25 cc/h today    #Hypernatremia  Na 150, asymptomatic. Suspect nutrition related. Free water flushes increased on 8/23, will increase again today.  - free water flush 60 cc q4h --> q3h    #Nausea and vomiting, improved  #Decreased po intake  Had CT chest abdominal pelvis at [place] 8/8, she was already having above symptoms. Showed worsening of the locally advanced mass involving bladder, no evidence of bowel obstruction but at high risk , possible enterovesical fistula. Need to be careful with IV fluids with heart failure HFrEF. Abdominal XR 8/10 showed nonobstructive gas pattern, no pneumatosis or free air. Lactate negative.  she is also constipated so started on BM. Suspect related to above. GI in agreement.   - UA + but question if has enterovesical fistula, Urine culture negative, CTX was discontinued  - repeat abdominal XR 8/16 with nonspecific gas pattern, mild colonic stool burden    - GI initially consulted, now signed off, recommended scopalamine patch and miralax titration for goal 1 BM per day    #Diarrhea/loose stools, improving  #C diff infection  Had some constipation earlier in course. 5x BM on 8/19 with possible c/f overflow diarrhea. States she had diarrhea all night on 8/20  but only charted 1x BM. Denies abdominal pain. Prior course of CTX earlier this admission. C diff positive. Stool output improving, only 1x BM on 8/23.   - hold bowel reg if >1 BM per day  - continue PO vanc for 10 day course     #Leukocytosis, resolved  WBC 25. Vitals stable otherwise. No s/sxs of acute infection aside from loose stools as above. May be reactive in s/o metastatic cancer. Improved with IVFs and antibiotics for C diff.   - monitor     #Chronic HFrEF  #Significant Mitral Valve regurgitation   #Hypertension    Follows at [facility]. TTE 4/4 with EF 29%  moderate TR. Was to have cardiac MRI once she improved.   - PTA on carvediol , continue with holding parameters  not on ACEi/ARB  SGLT2 inhibitor due to CKD      #History left sided pleural effusion  - on CT 8/8 left pleural effusion size has decreased per report   - CT did show new enhancing pleural thickening around the L effusion c/w exudative effusion. She was given CTX for 5 days.     #Chronic CKD stage III  #Mild AGMA  -Baseline creatinine 1.4-1.7. Cr 1.0 today.   - avoid nephrotoxic agents      #T2DM  - not on medications blood sugar <100 stopped accu-checks      #Mild cognitive impairment  - at risk for delirium      #Depression  - not on medications      #History of infected left retroperitoneal hematoma  status post  IR embolization and drainage 3/2024   - no current issues      #GOC  Remains full code currently. Will readdress pending oncology evaluation        Diet: NPO for Medical/Clinical Reasons Except for: Ice Chips, NPO but receiving Tube Feeding  Adult Formula Drip Feeding: Continuous [employer] Peptide 1.5; Gastrostomy; Goal Rate: Advance to and hold at 20 mL. eventual goal 40 mL.; mL/hr; If using cartons--follow 8 hr hang time for open system.; Do not advance tube feeding rate unless K+ ...    DVT Prophylaxis: Heparin SQ  Cameron Catheter: Not present  Lines: None     Cardiac Monitoring: None  Code Status: Full Code      Clinically  Significant Risk Factors         # Hypernatremia: Highest Na = 150 mmol/L in last 2 days, will monitor as appropriate          # Hypertension: Noted on problem list            # Severe Malnutrition: based on nutrition assessment    # Financial/Environmental Concerns: none               Disposition Plan     Medically Ready for Discharge: Anticipated in 5+ Days             Raul Vicente MD  Hospitalist Service, GOLD TEAM 17  [facility]  Securely message with [employer] (more info)  Text page via [employer] Paging/Directory   See signed in provider for up to date coverage information  ______________________________________________________________________    Interval History   No issues overnight  ?possible bloody discharge noted in diaper overnight per RN report but none noted on overnight notes    Physical Exam   Vital Signs: Temp: 98.1  F (36.7  C) Temp src: Oral BP: 116/67 Pulse: 97   Resp: 16 SpO2: 100 % O2 Device: None (Room air)    Weight: 130 lbs 11.72 oz    General: NAD, laying in bed comfortably, reports pain in lower pelvis  Resp: CTAB, no w/r/r, no increased WOB  CV: RRR, no m/r/g, pulses intact and equal, no BLE edema  GI: soft, non-tender, non-distended, PEG in place c/d/I  : notes mild pain to palpation in lower middle pelvis, no rebound/guarding  Skin: no rash, no lesions      Medical Decision Making       50 MINUTES SPENT BY ME on the date of service doing chart review, history, exam, documentation & further activities per the note.            Data

## 2024-08-24 NOTE — PROGRESS NOTES
CLINICAL NUTRITION SERVICES - BRIEF NOTE     Nutrition Prescription    RECOMMENDATIONS FOR MDs/PROVIDERS TO ORDER:  Plan of care discussed with provider and bedside RN     Malnutrition Status:    Severe malnutrition in the context of acute on chronic illness      Recommendations already ordered by Registered Dietitian (RD):  Honey PeopleGoal Peptide 1.5 (or equivalent) @ 25 mL/hr (600 mL/day) to provide 900 kcal, 44 g protein, 83 g CHO, 9 g fiber, 46 g fat (40% from MCTs), and 487 mL free water daily     Increase 5 ml every 24 hours as tolerated.   Goal rate: Honey PeopleGoal Peptide 1.5 (or equivalent) @ 40 mL/hr (960 mL/day) to provide 1476 kcal, 71 g protein, 132 g CHO, 14 g fiber, 74 g fat (40% from MCTs), and 672 mL free water daily     Free Water Flushes: 60 mL q 4 hours per provider order.      Future/Additional Recommendations:  Monitor labs, weights, BM/GI, TF tolerance  Monitor goals of care     Findings  Per bedside RN, pt appears to be tolerating increased rate with no additional concerns for nausea beyond what pt has historically reported. Discussed advancement plans with RN. DO NOT tell pt of rate increases. Permission obtained by pt's family member to increase rate without telling pt.    LABS  Hypernatremia continues  Electrolytes  Potassium (mmol/L)   Date Value   08/23/2024 4.7   08/22/2024 4.2   08/21/2024 4.2   12/13/2021 4.2   09/13/2021 3.9   06/07/2021 3.9   05/09/2021 3.5   05/06/2021 3.8     Phosphorus (mg/dL)   Date Value   08/23/2024 3.2   08/21/2024 3.7   08/20/2024 3.5   08/16/2024 2.6   08/13/2024 4.3   05/06/2021 3.8   05/05/2021 5.1 (H)   05/04/2021 5.0 (H)   01/12/2021 4.2   01/10/2021 2.5    Blood Glucose  Glucose (mg/dL)   Date Value   08/23/2024 90   08/22/2024 103 (H)   08/21/2024 104 (H)   08/19/2024 88   12/13/2021 98   09/13/2021 105 (H)   06/07/2021 90   05/09/2021 101 (H)   05/06/2021 93   04/20/2021 83   02/18/2021 72     GLUCOSE BY METER POCT (mg/dL)   Date Value   08/23/2024 88    08/20/2024 100 (H)   08/19/2024 78   08/18/2024 80   08/18/2024 80     Hemoglobin A1C (%)   Date Value   08/10/2024 6.0 (H)    Inflammatory Markers  WBC (10e9/L)   Date Value   06/07/2021 5.9   04/20/2021 5.4   02/15/2021 7.1     WBC Count (10e3/uL)   Date Value   08/23/2024 9.3   08/22/2024 10.7   08/21/2024 25.0 (H)     Albumin (g/dL)   Date Value   08/10/2024 3.8   06/24/2024 3.3 (L)   06/15/2024 3.1 (L)   12/13/2021 3.5   09/13/2021 3.4   06/07/2021 3.6   04/20/2021 3.7   02/15/2021 3.5      Magnesium (mg/dL)   Date Value   08/23/2024 1.9   08/21/2024 1.7   08/19/2024 1.6 (L)   05/09/2021 2.5 (H)   05/07/2021 2.1   05/06/2021 2.0     Sodium (mmol/L)   Date Value   08/23/2024 150 (H)   08/22/2024 150 (H)   08/21/2024 145   06/07/2021 141   05/09/2021 140   05/06/2021 139    Renal  Urea Nitrogen (mg/dL)   Date Value   08/23/2024 39.4 (H)   08/22/2024 37.0 (H)   08/21/2024 30.3 (H)   12/13/2021 27   09/13/2021 30   06/07/2021 25   05/09/2021 24   05/06/2021 33 (H)     Creatinine (mg/dL)   Date Value   08/23/2024 1.45 (H)   08/22/2024 1.61 (H)   08/21/2024 1.42 (H)   06/07/2021 1.30 (H)   05/09/2021 1.39 (H)   05/06/2021 1.62 (H)     Additional  Ketones Urine (mg/dL)   Date Value   08/10/2024 Negative   02/18/2021 Negative     Platelet Count   Date Value   08/23/2024 330 10e3/uL   06/07/2021 216 10e9/L     aPTT (no units)   Date Value   05/30/2024      Comment:     Clotted. Disregard results.  This is a corrected result. Previous result was 20 Seconds on 5/30/2024 at  9:20 AM CDT     INR (no units)   Date Value   05/30/2024 1.23 (H)   01/06/2021 1.56 (H)        Body mass index is 26.39 kg/m .    Wt Readings from Last 10 Encounters:   08/18/24 59.3 kg (130 lb 11.7 oz)   07/23/24 53.1 kg (117 lb)   06/24/24 55 kg (121 lb 3.2 oz)   06/12/24 53.4 kg (117 lb 11.6 oz)   12/13/21 59.2 kg (130 lb 8 oz)   09/13/21 56.1 kg (123 lb 11.2 oz)   06/14/21 50.6 kg (111 lb 9.6 oz)   06/07/21 50.1 kg (110 lb 8 oz)   05/20/21 50.4  kg (111 lb 3.2 oz)   05/07/21 52.6 kg (115 lb 14.4 oz)   ]    INTERVENTIONS  Implementation    Collaboration with other providers  Enteral Nutrition - Modify rate and volume  Feeding tube flush        Follow up/Monitoring  Progress toward goals will be monitored and evaluated per protocol.    Becky RODRIGUEZ  Clinical Dietitian  [facility] 5B/10A ICU Vocera, Teams, or desk [phone]  Weekend/Holiday Vocera: Weekend Holiday Clinical Dietitian [Multi Site Groups]

## 2024-08-25 NOTE — PROGRESS NOTES
Notified by nursing that patient was having increased abdominal and vaginal pain. Also had an episode of emesis.  Reviewed chart and noted CT A/P from  yesterday evening shows interval progression of malignancy, including enterovesicular fistula, and likely partial SBO.     Patient sleeping on exam, abdomen soft and non-distended, bowel sounds sluggish. No grimace to mild palpation.     Will hold TF and make NPO for bowel rest for partial SBO, added PRN IV dilaudid for breakthrough pain. UA noted to have positive nitrite and elevated WBC, however with enterovesicular fistula unclear if this is contamination or true infection. Will hold on starting antibiotics for now pending urine culture. If fever low threshold to start.     Mary Ellen Aquino MD

## 2024-08-25 NOTE — PROGRESS NOTES
"[facility]    Medicine Progress Note - Hospitalist Service, GOLD TEAM 17    Date of Admission:  8/10/2024    Assessment & Plan   Myla Glynn is a 79 year old female admitted on 8/10/2024. 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, s/p sigmoid resection in 2021 also had partial cystectomy and ureteral reimplantation   #Bladder mass, status post resection  9/2023  and pathology showed invasive adenocarcinoma then had partial cystectomy pelvic lymph node dissection small bowel resection 1/2024   #Suspected recurrent/metastatic adenocarcinoma  Followed at [place] and per urology notes form 8/9 \"We would not recommend anymore surgical intervention for her given the extreme complexity of her surgical situation previously and her extensive hospital stay after her surgeries that we had performed.\" She was to follow up with oncology but never had an opportunity to get there and therefore has not re-established.   - Repeat CT A/P with con on 8/24 in s/o worsening pelvic pain and bloody discharge. 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 with soft tissue enhancement/thickening of SB wall suspicious for local disease recurrance with fistula creation; additional enhancing soft tissue thickening along bladder wall and distal L ureter at site of L ureteral reimplantation c/f disease progression; mild e/o partial obstruction with mild pelviectasis of L kidney, mild L urothelial enhancement possibly related to a UTI; focal SB natrrowing 2/2 soft tissue thickening along fistulized segment of SB in " L hemipelvis with multiple fluid filled loops of proximal bowel with few air-fluid levels, suggestive of partial bowel obstruction; decompressed and enhancing appearance of vagina without definitive fistulous connection; pelvic nodularity with no signficant change.   - oncology re-consulted, no plans for induction therapies while admitted, will need to reassess pending improvement in nutritional and functional status and f/u as outpatient  - suspect her deconditioning is primarily related to her cancer progression and have concerns that she will not be able to progress enough here to discharge safely. Trialed slow increase in TF rate with advancement up to 20 cc/h but then had recurrent nausea. Also developed pelvic pain as below. In s/o repeat imaging results, again suspect her sxs are all related to cancer progression. She is not a surgical candidate.   - discussed possible transition to comfort care/hospice with Dominic on 8/25, she is understanding of eval and will discuss with pt's daughters. She requested further d/w palliative care who is unavailable over the weekend, but will have team f/u with Dominic tomorrow.    - updated daughter, Michele (via phone) and her sister (in-person) today  - Continue current mgmt for now  - please see note on 8/22 for GOC discussion with pt and family  - please communicate directly with family (Dolores, [phone]) for decision making    #Pelvic/abdominal pain  #E coli UTI vs colonization  #Bloody discharge, suspect hematuria  Pt c/o lower pelvic pain. RN noted possible bloody discharge from vagina/urethra noted by overnight care team. Was previously tx for PNA/possible UTI earlier in hospitalization with a course of CTX as below. Repeat UA appears infected with UCx +E coli, susceptibilities pending. Would suspect with her fistula, this is likely contamination and her pain is more related to metastatic progression. No anemia.  - start CTX (8/25 - p)  - f/u UCx  sensitivities  - palliative consult as above    #Severe malnutrition  PEG placement on 6/2024, on cycled G-tube feeds. Was not tolerating higher rates initially. Now slowly titrating up feeds.   - nutrition following  - hold TFs    #Hypernatremia  Na 150, asymptomatic. Suspect nutrition related. Free water flushes increased on 8/23, will increase again today.  - hold free water flush 60 cc q4h --> q3h    #Nausea and vomiting, improved  #Decreased po intake  Had CT chest abdominal pelvis at [place] 8/8, she was already having above symptoms. Showed worsening of the locally advanced mass involving bladder, no evidence of bowel obstruction but at high risk , possible enterovesical fistula. Need to be careful with IV fluids with heart failure HFrEF. Abdominal XR 8/10 showed nonobstructive gas pattern, no pneumatosis or free air. Lactate negative.  she is also constipated so started on BM. Suspect related to above. GI in agreement.   - UA + but question if has enterovesical fistula, Urine culture negative, CTX was discontinued  - repeat abdominal XR 8/16 with nonspecific gas pattern, mild colonic stool burden    - GI initially consulted, now signed off, recommended scopalamine patch and miralax titration for goal 1 BM per day    #Diarrhea/loose stools, improving  #C diff infection  Had some constipation earlier in course. 5x BM on 8/19 with possible c/f overflow diarrhea. States she had diarrhea all night on 8/20 but only charted 1x BM. Denies abdominal pain. Prior course of CTX earlier this admission. C diff positive. Stool output improving, only 1x BM on 8/23.   - hold bowel reg if >1 BM per day  - continue PO vanc for 10 day course     #Leukocytosis, resolved  WBC 25. Vitals stable otherwise. No s/sxs of acute infection aside from loose stools as above. May be reactive in s/o metastatic cancer. Improved with IVFs and antibiotics for C diff.   - monitor     #Chronic HFrEF  #Significant Mitral Valve regurgitation    #Hypertension    Follows at [facility]. TTE 4/4 with EF 29%  moderate TR. Was to have cardiac MRI once she improved.   - PTA on carvediol , continue with holding parameters  not on ACEi/ARB  SGLT2 inhibitor due to CKD      #History left sided pleural effusion  - on CT 8/8 left pleural effusion size has decreased per report   - CT did show new enhancing pleural thickening around the L effusion c/w exudative effusion. She was given CTX for 5 days.     #Chronic CKD stage III  Baseline creatinine 1.4-1.7. Cr bump to 1.8 on 8/25, unclear if d/t partial obstruction as above vs prerenal ALFREDA vs. Within pts normal baseline.  - mIVFs with LR 75 cc/h until GOC discussion can be held with palliative care  - avoid nephrotoxic agents      #T2DM  - not on medications blood sugar <100 stopped accu-checks      #Mild cognitive impairment  - at risk for delirium      #Depression  - not on medications      #History of infected left retroperitoneal hematoma  status post  IR embolization and drainage 3/2024   - no current issues      #GOC  Remains full code currently. Will readdress pending oncology evaluation        Diet: Adult Formula Drip Feeding: [employer] Peptide 1.5; Gastrostomy; Goal Rate: Advance to 25 mL today, increase by 5 mL every 24 hours to goal rate 40; mL/hr; If using cartons--follow 8 hr hang time for open system.; Do not advance tube fe...  NPO for Medical/Clinical Reasons Except for: Meds, Ice Chips    DVT Prophylaxis: Heparin SQ  Cameron Catheter: Not present  Lines: None     Cardiac Monitoring: None  Code Status: Full Code      Clinically Significant Risk Factors         # Hypernatremia: Highest Na = 150 mmol/L in last 2 days, will monitor as appropriate          # Hypertension: Noted on problem list            # Severe Malnutrition: based on nutrition assessment    # Financial/Environmental Concerns: none               Disposition Plan     Medically Ready for Discharge: Anticipated in 5+ Days              Raul Vicente MD  Hospitalist Service, GOLD TEAM 17  [facility]  Securely message with [email or web address] (more info)  Text page via [employer] Paging/Directory   See signed in provider for up to date coverage information  ______________________________________________________________________    Interval History   Ongoing bloody discharge, appears clotted per RN.  Minimal pelvic pain today, comfortable.  No other issues.     Physical Exam   Vital Signs: Temp: 97.8  F (36.6  C) Temp src: Oral BP: 109/67 Pulse: 81   Resp: 16 SpO2: 100 % O2 Device: None (Room air)    Weight: 130 lbs 11.72 oz    General: NAD, laying in bed resting comfortably  Resp: CTAB, no w/r/r, no increased WOB  CV: RRR, no m/r/g, pulses intact and equal, no BLE edema  GI: soft, mildly tender, non-distended, PEG in place c/d/I      Medical Decision Making       60 MINUTES SPENT BY ME on the date of service doing chart review, history, exam, documentation & further activities per the note.            Data

## 2024-08-25 NOTE — PLAN OF CARE
Goal Outcome Evaluation:  Vitally stable, /67 (BP Location: Right arm)   Pulse 81   Temp 97.8  F (36.6  C) (Oral)   Resp 16   Wt 59.3 kg (130 lb 11.7 oz)   SpO2 100%   BMI 26.39 kg/m      Alert and oriented X4.    Pain managed with tylenol and oxycodone. Patient stated they were in so much pain around their perineum, and requested for provider to come see them. Provider informed.    Not out of bed, repositioned every 2 hours. Incontinent pad changed. With assist of 2.     Patient vomited once at around 0520 am, requested that writer stops tube feed. Writer disconnected from patient and stopped. Provider informed. Provider ordered for tube feed to be stopped and patient to be NPO

## 2024-08-25 NOTE — PLAN OF CARE
Goal Outcome Evaluation:      Plan of Care Reviewed With: patient    Overall Patient Progress: improving    Outcome Evaluation: TF increased to 25 mL/hr H20 increased to 60 q3h      VS: Temp: 98.4  F (36.9  C) Temp src: Oral BP: 109/67 Pulse: 81   Resp: 16 SpO2: 100 % O2 Device: None (Room air)      O2: RA   Output: Incontinent   Last BM: 8/25   Activity: Ax2   Skin: No issues   Pain: Moderate, received tylenol and oxycodone x1   Neuro: A&O x4   Labs: UA done, CT done   Diet: TF, NPO except ice chips   IV: L PIV SL   LDA:  No other LDA   Plan:    Additional Info:

## 2024-08-26 NOTE — PROGRESS NOTES
PALLIATIVE CARE PROGRESS NOTE  [facility]     Patient Name: Myla Glynn  Date of Admission: 8/10/2024   Today the patient was seen for: goals of care and symptom management     Recommendations & Counseling       GOALS OF CARE:   DNR/DNI  I have entered Comfort focused cares orderset and discontinued non comfort focused therapies and medications  Plan to discharge to LTC facility with hospice services, will need a POLST prior to discharge  Consider venting G tube for management of symptoms, I wonder if her PEG can be utilized for this, appreciate primary team assistance with this  Consider starting tickle tube feedings and could start 5 mg/hr and if he worsens her symptoms then stop. In my experience hospice agencies typically can continue tube feedings if it is their sole source of nutrition, family is also aware that it might make her feel worse again and we would not be able to continue them and would stop them and allow her to eat by mouth for pleasure. Primary team discussed with SW who reported that hospice will not take on tube feedings at this time. Hospice agencies slightly differ and so would look into other agencies. I also think it is very likely we trial it here and she doesn't tolerate it again and family is aware that it couldn't be continued at that point.    MEDICAL MANAGEMENT:   #Nausea,disease processes and obstruction  -Pharmacologic management   Scheduled haldol 1 mg q6h   Continue Scopolamine patch  Compazine 5 mg q6h PRN  Lorazepam 0.5 mg q3h PRN  -Nonpharmacologic management  Small, frequent intake  Assess and avoid aggravating factors  Accupressure (C-band)  Aromatherapy, alcohol swabs known to be effective    #Cancer related pain  Would encourage use of oxycodone 5-10 mg q2h PRN  Also there is an IV hydromorphone 0.2 ordered and can continue but would utilize the PO/gtube options first.    #Constipation,Partial small bowel  obstruction  Currently being treated for c diff which was exacerbating diarrhea  Once safe to do so would resume bowel regimen with miralax to help with treating her ongoing constipation.       PSYCHOSOCIAL/SPIRITUAL:  Family very supportive.    Palliative Care will continue to follow. Thank you for the consult and allowing us to aid in the care of Myla Glynn.    These recommendations have been discussed with primary team and bedside nurse.    WILL Vanegas CNS  [facility], Palliative Care  Securely message with the Vocera Web Console (learn more here) or  Text page via [facility] Paging/Directory        Assessment          Myla Glynn is a 79 year old female with metastatic sigmoid colon adenocarcinoma metastatic to the bladder s/p sigmoid resection (2021), partial cystectomy (9/2023), and small bowel resection (1/2024). Other pertinent medical history of DM type 2, HFrEF (dx 4/2024), and CKD stage IIIb.      Recently admitted from 5/29-6/18/2024 for worsening weakness, fatigue, UTI, and ALFREDA, with PEG placed 5/31.      Imaging 8/8 revealed disease progression.      Presented on 8/10 with N/V 2-3 times a day for a week.  Hospital course has been complicated by difficulty tolerating tube feedings. Tube feeds remain at 15 ml/hr.      Today, the patient was seen for goals of care in the setting of:  Metastatic colon cancer  HFrEF  CKD stage IIIb  Nausea/vomiting   Deconditioning   Failure to thrive s/p PEG placement 6/13          Interval History:     Multidisciplinary collaboration:  Being treated for c diff and UTI, on IV fluids and continued difficulty tolerating tube feedings and now they are on hold.     Notable medications:  Mirtazapine 15 mg at bedtime  Prilosec 20 mg BID  Scopolamine patch  Vancomycin  Acetaminophen 650 mg PRN- x2  Oxycodone 5 mg PRN - x2  Compazine PRN - x2    Patient/family narrative  Saw patient in bed, used phone , nausea and abdominal cramping pain is no  better then when she came in.     Spoke with cousin, Dominic and hope is to connect with her and daughter- Michele, we discussed their understanding which is that she is not getting better and the doctors have communicated that there is nothing we can do to help her get better at this point. We discussed that her current issues are related to the underlying issue of her cancer and surgeries which there is nothing doctors can do to help improve at this potint we discussed the focus being on comfort and not prolonging her life at this point. Family was understanding of the description of comfort measures only including not continuing life prolonging therapies such as vital signs and lab draws. Discussed nutrition which has been tricky including difficulty tolerating the g tube feeds, discussed attempt at a trickle feed and seeing if she tolerates that, discussed if she couldn't then it would be stopped and PO intake for pleasure. We discussed generally that she has limited time, discussed that she would be stable for discharge and family is not able to provide her care in the home and it was discussed the plan for facility with hospice and daughter and family were in agreement.      Review of Systems:     Besides above, ROS was reviewed and is unremarkable        Physical Exam:   Temp:  [98.5  F (36.9  C)-99  F (37.2  C)] 99  F (37.2  C)  Pulse:  [88-92] 88  Resp:  [16] 16  BP: ()/(43-60) 114/52  SpO2:  [97 %-99 %] 99 %  130 lbs 11.72 oz    Constitutional: Elderly female, seen lying in hospital bed. Ill appearing, but in NAD.  ENT: Normocephalic. Atraumatic. Missing multiple teeth.   CV: Warm and well perfused. No edema.  Pulm: Non-labored breathing, on room air.   Musculoskeletal: TREJO. No obvious malformations.   Skin: No jaundice. No concerning rashes or lesions on exposed areas.   Psych: Affect appropriate to situation. Speaks Tigrinya without signs of difficulty/slurring.         Data Reviewed:     No results  found for this or any previous visit (from the past 24 hour(s)).  Recent Labs   Lab 08/25/24  0836 08/23/24  1600 08/23/24  1240 08/22/24  1112 08/22/24  0828   WBC 8.6 9.3  --   --  10.7   HGB 12.1 11.2*  --   --  11.5*   * 97  --   --  96    330  --   --  291    150*  --  150*  --    POTASSIUM 4.9 4.7  --  4.2  --    CHLORIDE 108* 110*  --  113*  --    CO2 27 28  --  27  --    BUN 50.0* 39.4*  --  37.0*  --    CR 1.81* 1.45*  --  1.61*  --    ANIONGAP 9 12  --  10  --    DICKSON 8.5* 9.2  --  8.8  --    * 90 88 103*  --        No results found for this or any previous visit (from the past 24 hour(s)).      Medical Decision Making       MANAGEMENT DISCUSSED with the following over the past 24 hours: medicine, nursing   NOTE(S)/MEDICAL RECORDS REVIEWED over the past 24 hours: medicine, nursing, hem/onc, RD  Tests ORDERED & REVIEWED in the past 24 hours:  Tests REVIEWED in the past 24 hours:  - See lab/imaging results included in the data section of the note  SUPPLEMENTAL HISTORY, in addition to the patient's history, over the past 24 hours obtained from:   - Daughter- Genete and cousin- Dominic  Medical complexity over the past 24 hours:  - Decision to DE-ESCALATE CARE based on prognosis

## 2024-08-26 NOTE — PROGRESS NOTES
"[facility]    Medicine Progress Note - Hospitalist Service, GOLD TEAM 17    Date of Admission:  8/10/2024    Assessment & Plan   Myla Glynn is a 79 year old female admitted on 8/10/2024. 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, s/p sigmoid resection in 2021 also had partial cystectomy and ureteral reimplantation   #Bladder mass, status post resection  9/2023  and pathology showed invasive adenocarcinoma then had partial cystectomy pelvic lymph node dissection small bowel resection 1/2024   #Suspected recurrent/metastatic adenocarcinoma  Followed at [place] and per urology notes form 8/9 \"We would not recommend anymore surgical intervention for her given the extreme complexity of her surgical situation previously and her extensive hospital stay after her surgeries that we had performed.\" She was to follow up with oncology but never had an opportunity to get there and therefore has not re-established.   - Repeat CT A/P with con on 8/24 in s/o worsening pelvic pain and bloody discharge. 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 with soft tissue enhancement/thickening of SB wall suspicious for local disease recurrance with fistula creation; additional enhancing soft tissue thickening along bladder wall and distal L ureter at site of L ureteral reimplantation c/f disease progression; mild e/o partial obstruction with mild pelviectasis of L kidney, mild L urothelial enhancement possibly related to a UTI; focal SB natrrowing 2/2 soft tissue thickening along fistulized segment of SB in " L hemipelvis with multiple fluid filled loops of proximal bowel with few air-fluid levels, suggestive of partial bowel obstruction; decompressed and enhancing appearance of vagina without definitive fistulous connection; pelvic nodularity with no signficant change.   - oncology re-consulted, no plans for induction therapies while admitted, will need to reassess pending improvement in nutritional and functional status and f/u as outpatient  - suspect her deconditioning is primarily related to her cancer progression and have concerns that she will not be able to progress enough here to discharge safely. Trialed slow increase in TF rate with advancement up to 20 cc/h but then had recurrent nausea. Also developed pelvic pain as below. In s/o repeat imaging results, again suspect her sxs are all related to cancer progression. She is not a surgical candidate.   - palliative care following, appreciate assistance  - discussed possible transition to comfort care/hospice with Dominic on 8/25, and separately with her daughters via phone and in person (8/25)   - they expressed understanding of progressive cancer and recurrent fistula. We discussed possible transition to hospice/CC which Dominic is familiar with. She requested further d/w palliative care who will follow up with family today.  - please see note on 8/22 for GOC discussion with pt and family  - please communicate directly with family (Dolores, [phone]) for all decision making  - addendum: family in agreement with comfort measures, orders to be placed by palliative, please see note for details from discussion dated on same day.    - stop IV CTX with suspected UCx+ likely 2/2 fistulization from cancer   - continue PO vanc to complete C diff tx course   - will not continue additional TFs with progression in metastatic cancer and c/f partial obstruction on CT --> suspect would lead to prolonged suffering with minimal benefit, along with increased risk of  worsening nausea/emesis/aspiration/etc    #Pelvic/abdominal pain  #E coli UTI vs colonization  #Bloody discharge, suspect hematuria  Pt c/o lower pelvic pain. RN noted possible bloody discharge from vagina/urethra noted by overnight care team. Was previously tx for PNA/possible UTI earlier in hospitalization with a course of CTX as below. Repeat UA appears infected with UCx +E coli, susceptibilities pending. Would suspect with her fistula, this is likely contamination and her pain is more related to metastatic progression. No anemia. D/w cousin as above, will continue therapies for the time being until discussion with palliative care takes place for possible transition to comfort measures.   - stop CTX (8/25 - p)  - f/u UCx sensitivities    #Severe malnutrition  PEG placement on 6/2024, on cycled G-tube feeds. Was not tolerating higher rates initially. Started to slowly titrate up feeds but had nausea recurrence and now with worsening pelvic pain like 2/2 worsening cancer. Suspect will transition to comfort measures/hospice as above.   - nutrition following  - hold TFs  - stop IVFs    #Hypernatremia  Na 150, asymptomatic. Suspect nutrition related. Free water flushes increased on 8/23, will increase again today.  - stop IVFs now on comfort measures    #Nausea and vomiting, improved  #Decreased po intake  Had CT chest abdominal pelvis at [place] 8/8, she was already having above symptoms. Showed worsening of the locally advanced mass involving bladder, no evidence of bowel obstruction but at high risk , possible enterovesical fistula. Need to be careful with IV fluids with heart failure HFrEF. Abdominal XR 8/10 showed nonobstructive gas pattern, no pneumatosis or free air. Lactate negative.  she is also constipated so started on BM. Suspect related to above. GI in agreement.   - UA + but question if has enterovesical fistula, Urine culture negative, CTX was discontinued  - repeat abdominal XR 8/16 with nonspecific gas  pattern, mild colonic stool burden    - GI initially consulted, now signed off, recommended scopalamine patch and miralax titration for goal 1 BM per day  - comfort measures    #Diarrhea/loose stools, improving  #C diff infection  Had some constipation earlier in course. 5x BM on 8/19 with possible c/f overflow diarrhea. States she had diarrhea all night on 8/20 but only charted 1x BM. Denies abdominal pain. Prior course of CTX earlier this admission. C diff positive. Stool output improving, only 1x BM on 8/23.   - hold bowel reg if >1 BM per day  - continue PO vanc for 10 day course     #Leukocytosis, resolved  WBC 25. Vitals stable otherwise. No s/sxs of acute infection aside from loose stools as above. May be reactive in s/o metastatic cancer. Improved with IVFs and antibiotics for C diff.   - monitor     #Chronic HFrEF  #Significant Mitral Valve regurgitation   #Hypertension    Follows at [facility]. TTE 4/4 with EF 29%  moderate TR. Was to have cardiac MRI once she improved.   - PTA on carvediol , continue with holding parameters  not on ACEi/ARB  SGLT2 inhibitor due to CKD      #History left sided pleural effusion  - on CT 8/8 left pleural effusion size has decreased per report   - CT did show new enhancing pleural thickening around the L effusion c/w exudative effusion. She was given CTX for 5 days.     #Chronic CKD stage III  Baseline creatinine 1.4-1.7. Cr bump to 1.8 on 8/25, unclear if d/t partial obstruction as above vs prerenal ALFREDA vs. Within pts normal baseline.  - mIVFs with LR 75 cc/h until GOC discussion can be held with palliative care  - avoid nephrotoxic agents      #T2DM  - not on medications blood sugar <100 stopped accu-checks      #Mild cognitive impairment  - at risk for delirium      #Depression  - not on medications      #History of infected left retroperitoneal hematoma  status post  IR embolization and drainage 3/2024   - no current issues      #GOC  Remains full code currently.  Appreciate palliative care assistance.        Diet: Adult Formula Drip Feeding: Continuous [employer] Farms Peptide 1.5; Gastrostomy; Goal Rate: Advance to 25 mL today, increase by 5 mL every 24 hours to goal rate 40; mL/hr; If using cartons--follow 8 hr hang time for open system.; Do not advance tube fe...  NPO for Medical/Clinical Reasons Except for: Meds, Ice Chips    DVT Prophylaxis: Heparin SQ  Cameron Catheter: Not present  Lines: None     Cardiac Monitoring: None  Code Status: Full Code      Clinically Significant Risk Factors                  # Hypertension: Noted on problem list            # Severe Malnutrition: based on nutrition assessment    # Financial/Environmental Concerns: none           Disposition Plan     Medically Ready for Discharge: Anticipated in 5+ Days             Raul Vicente MD  Hospitalist Service, GOLD TEAM 17  [facility]  Securely message with [employer] (more info)  Text page via [employer] Paging/Directory   See signed in provider for up to date coverage information  ______________________________________________________________________    Interval History   C/o ongoing pelvic pain    Physical Exam   Vital Signs: Temp: 99  F (37.2  C) Temp src: Oral BP: 114/52 Pulse: 88   Resp: 16 SpO2: 99 % O2 Device: None (Room air)    Weight: 130 lbs 11.72 oz    General: NAD, laying in bed, c/o pain in pelvis  Resp: CTAB, no w/r/r, no increased WOB  CV: RRR, no m/r/g, pulses intact and equal, no BLE edema  GI: soft, mildly tender in lower abdomen/pelvic region, non-distended, PEG in place c/d/I    Medical Decision Making       60 MINUTES SPENT BY ME on the date of service doing chart review, history, exam, documentation & further activities per the note.            Data

## 2024-08-26 NOTE — PROGRESS NOTES
CLINICAL NUTRITION SERVICES - REASSESSMENT NOTE     Nutrition Prescription    RECOMMENDATIONS FOR MDs/PROVIDERS TO ORDER:  Consider TPN support if within goals of care     Malnutrition Status:    Severe malnutrition in the context of acute on chronic illness      Recommendations already ordered by Registered Dietitian (RD):  TF CURRENTLY HELD  Honey Farms Peptide 1.5 (or equivalent) @ 40 mL/hr (960 mL/day) to provide 1476 kcal (30 kcal/kg), 71 g protein (1.4 g/kg), 132 g CHO, 14 g fiber, 74 g fat (40% from MCTs), and 672 mL free water daily      Advance 5 mL every 24 hours to goal rate     Free Water Flushes: 30 mL q 4 hours to maintain tube patency. Additional free water per provider order.      Future/Additional Recommendations:  Monitor labs, weights, BM/GI, TF tolerance  Monitor for goals of care/nutrition plan of care     EVALUATION OF THE PROGRESS TOWARD GOALS   Diet: NPO  Nutrition Support: TF currently held d/t nausea, bowel rest  Unable to advance TF beyond 20 mL/hr    NEW FINDINGS   GOC discussions continue. Messaged MD to modify IVF to D5W to provide some kcals and address hypernatremia.    IVF: LR at 75 mL/hr    Weights during current admission:    Date/Time Weight Weight Method   08/18/24 0440 59.3 kg (130 lb 11.7 oz) Bed scale   08/17/24 0500 65.3 kg (143 lb 15.4 oz) --   08/16/24 1300 57.5 kg (126 lb 12.2 oz) Bed scale   08/10/24 2130 50 kg (110 lb 3.7 oz) Bed scale     Wt Readings from Last 15 Encounters:   08/18/24 59.3 kg (130 lb 11.7 oz)   07/23/24 53.1 kg (117 lb)   06/24/24 55 kg (121 lb 3.2 oz)   06/12/24 53.4 kg (117 lb 11.6 oz)   12/13/21 59.2 kg (130 lb 8 oz)   09/13/21 56.1 kg (123 lb 11.2 oz)   06/14/21 50.6 kg (111 lb 9.6 oz)   06/07/21 50.1 kg (110 lb 8 oz)   05/20/21 50.4 kg (111 lb 3.2 oz)   05/07/21 52.6 kg (115 lb 14.4 oz)   04/20/21 49.4 kg (108 lb 12.8 oz)   04/05/21 49.8 kg (109 lb 11.2 oz)   02/15/21 47.8 kg (105 lb 6.1 oz)   02/15/21 47.8 kg (105 lb 6.4 oz)   01/22/21 49.4 kg  (108 lb 12.8 oz)       MEDICATIONS REVIEWED  Pepcid, Remeron, liquid MVI-M, Prilosec  PRN:  Dulcolax, Calcium Carbonate (TUMS), Miralax, Compazine, Senna-Docusate  Current Facility-Administered Medications   Medication Dose Route Frequency Provider Last Rate Last Admin    acetaminophen (TYLENOL) tablet 650 mg  650 mg Oral or Feeding Tube Q4H PRN Nza Portillo MD   650 mg at 08/25/24 1134    Or    acetaminophen (TYLENOL) Suppository 650 mg  650 mg Rectal Q4H PRN Naz Portillo MD        bisacodyl (DULCOLAX) suppository 10 mg  10 mg Rectal Daily PRN Naz Portillo MD        cefTRIAXone (ROCEPHIN) 1 g vial to attach to  mL bag for ADULTS or NS 50 mL bag for PEDS  1 g Intravenous Q24H Raul Vicente  mL/hr at 08/25/24 1037 1 g at 08/25/24 1037    famotidine (PEPCID) suspension 20 mg  20 mg Oral or Feeding Tube Q48H Naz Portillo MD   20 mg at 08/24/24 1707    haloperidol (HALDOL) 2 MG/ML (HIGH CONC) solution 1 mg  1 mg Oral Q6H PRN Tian Rider MD        heparin ANTICOAGULANT injection 5,000 Units  5,000 Units Subcutaneous Q12H Alis Muller MD   5,000 Units at 08/25/24 2201    HYDROmorphone (DILAUDID) injection 0.2 mg  0.2 mg Intravenous Q6H PRN Mary Ellen Aquino MD        lactated ringers infusion   Intravenous Continuous Raul Vicente MD 75 mL/hr at 08/26/24 0623 New Bag at 08/26/24 0623    Lidocaine (LIDOCARE) 4 % Patch 1-2 patch  1-2 patch Transdermal Q24H PRN Raul Vicente MD        lidocaine (LMX4) cream   Topical Q1H PRN Naz Portillo MD        lidocaine 1 % 0.1-1 mL  0.1-1 mL Other Q1H PRN Naz Portillo MD        mirtazapine (REMERON SOL-TAB) ODT tab 15 mg  15 mg Orally disintegrating tablet At Bedtime Tian Rider MD   15 mg at 08/25/24 2157    multivitamins w/minerals liquid 15 mL  15 mL Per Feeding Tube Daily Alis Muller MD   15 mL at 08/25/24 1043    naloxone (NARCAN) injection 0.2 mg  0.2 mg Intravenous Q2 Min PRN Naz Portillo MD        Or     naloxone (NARCAN) injection 0.4 mg  0.4 mg Intravenous Q2 Min PRN Naz Portillo MD        Or    naloxone (NARCAN) injection 0.2 mg  0.2 mg Intramuscular Q2 Min PRN Naz Portillo MD        Or    naloxone (NARCAN) injection 0.4 mg  0.4 mg Intramuscular Q2 Min PRN Nza Portillo MD        omeprazole (PriLOSEC) suspension 20 mg  20 mg Per G Tube BID AC EmyBaldo catalan MD   20 mg at 08/25/24 1810    oxyCODONE (ROXICODONE) tablet 5 mg  5 mg Oral Q4H PRN Raul Vicente MD   5 mg at 08/25/24 1134    prochlorperazine (COMPAZINE) injection 5 mg  5 mg Intravenous Q6H PRN Naz Portillo MD   5 mg at 08/25/24 2202    Or    prochlorperazine (COMPAZINE) tablet 5 mg  5 mg Oral Q6H PRN Naz Portillo MD   5 mg at 08/25/24 1305    Or    prochlorperazine (COMPAZINE) suppository 12.5 mg  12.5 mg Rectal Q12H PRN Naz Portillo MD        scopolamine (TRANSDERM) 72 hr patch 1 patch  1 patch Transdermal Q72H Raul Vicente MD   1 patch at 08/23/24 1643    And    scopolamine (TRANSDERM-SCOP) Patch in Place   Transdermal Q8H Raul Vicente MD        senna-docusate (SENOKOT-S/PERICOLACE) 8.6-50 MG per tablet 1 tablet  1 tablet Oral or Feeding Tube BID PRRaul Mena MD   1 tablet at 08/16/24 2159    Or    senna-docusate (SENOKOT-S/PERICOLACE) 8.6-50 MG per tablet 2 tablet  2 tablet Oral or Feeding Tube BID PRRaul Mena MD   2 tablet at 08/11/24 1441    sodium chloride (PF) 0.9% PF flush 3 mL  3 mL Intracatheter Q8H Naz Portillo MD   3 mL at 08/25/24 0506    sodium chloride (PF) 0.9% PF flush 3 mL  3 mL Intracatheter q1 min prn Naz Portillo MD   3 mL at 08/13/24 1806    vancomycin (FIRVANQ) oral solution 125 mg  125 mg Oral 4x Daily Naz Portillo MD   125 mg at 08/25/24 2154       Labs Reviewed  Current replacement of electrolyte- RN managed None       Electrolytes  Potassium (mmol/L)   Date Value   08/25/2024 4.9   08/23/2024 4.7   08/22/2024 4.2   12/13/2021 4.2   09/13/2021 3.9    06/07/2021 3.9   05/09/2021 3.5   05/06/2021 3.8     Phosphorus (mg/dL)   Date Value   08/23/2024 3.2   08/21/2024 3.7   08/20/2024 3.5   08/16/2024 2.6   08/13/2024 4.3   05/06/2021 3.8   05/05/2021 5.1 (H)   05/04/2021 5.0 (H)   01/12/2021 4.2   01/10/2021 2.5    Blood Glucose  Glucose (mg/dL)   Date Value   08/25/2024 113 (H)   08/23/2024 90   08/22/2024 103 (H)   08/21/2024 104 (H)   08/19/2024 88   12/13/2021 98   09/13/2021 105 (H)   06/07/2021 90   05/09/2021 101 (H)   05/06/2021 93   04/20/2021 83   02/18/2021 72     GLUCOSE BY METER POCT (mg/dL)   Date Value   08/23/2024 88   08/20/2024 100 (H)   08/19/2024 78   08/18/2024 80   08/18/2024 80     Hemoglobin A1C (%)   Date Value   08/10/2024 6.0 (H)    Inflammatory Markers  WBC (10e9/L)   Date Value   06/07/2021 5.9   04/20/2021 5.4   02/15/2021 7.1     WBC Count (10e3/uL)   Date Value   08/25/2024 8.6   08/23/2024 9.3   08/22/2024 10.7     Albumin (g/dL)   Date Value   08/10/2024 3.8   06/24/2024 3.3 (L)   06/15/2024 3.1 (L)   12/13/2021 3.5   09/13/2021 3.4   06/07/2021 3.6   04/20/2021 3.7   02/15/2021 3.5      Magnesium (mg/dL)   Date Value   08/23/2024 1.9   08/21/2024 1.7   08/19/2024 1.6 (L)   05/09/2021 2.5 (H)   05/07/2021 2.1   05/06/2021 2.0     Sodium (mmol/L)   Date Value   08/25/2024 144   08/23/2024 150 (H)   08/22/2024 150 (H)   06/07/2021 141   05/09/2021 140   05/06/2021 139    Renal  Urea Nitrogen (mg/dL)   Date Value   08/25/2024 50.0 (H)   08/23/2024 39.4 (H)   08/22/2024 37.0 (H)   12/13/2021 27   09/13/2021 30   06/07/2021 25   05/09/2021 24   05/06/2021 33 (H)     Creatinine (mg/dL)   Date Value   08/25/2024 1.81 (H)   08/23/2024 1.45 (H)   08/22/2024 1.61 (H)   06/07/2021 1.30 (H)   05/09/2021 1.39 (H)   05/06/2021 1.62 (H)     Additional  Ketones Urine (mg/dL)   Date Value   08/24/2024 Negative   02/18/2021 Negative     Platelet Count   Date Value   08/25/2024 178 10e3/uL   06/07/2021 216 10e9/L     aPTT (no units)   Date Value    05/30/2024      Comment:     Clotted. Disregard results.  This is a corrected result. Previous result was 20 Seconds on 5/30/2024 at  9:20 AM CDT     INR (no units)   Date Value   05/30/2024 1.23 (H)   01/06/2021 1.56 (H)        RENAL  CKD stage III    GASTROINTESTINAL  Last BM: 8/23  Bowel regimen: PRN  GI concerns reported: Nausea  Enteral Access: GTube  Missing dentition    PHYSICAL FINDINGS  See malnutrition section below.  Poor dentition   Mateusz: 14, Nutrition 1  No significant skin impairments noted    MALNUTRITION  % Intake: </= 50% for >/= 5 days (severe)  % Weight Loss: Unable to assess bed scales used to obtain weights, suspect inaccurate  Subcutaneous Fat Loss: Facial region:  Moderate  Muscle Loss: Thoracic region (clavicle, acromium bone, deltoid, trapezius, pectoral):  Moderate  Fluid Accumulation/Edema: None noted  Malnutrition Diagnosis: Severe malnutrition in the context of acute on chronic disease    Previous Goals     Total avg nutritional intake to meet a minimum of 25 kcal/kg and 1.1 g PRO/kg daily (per dosing wt 50 kg)   Evaluation: Not met    Previous Nutrition Diagnosis  Inadequate enteral nutrition infusion related to nausea as evidenced by pt report of nausea and inability to tolerate TF advancement to goal rate    Evaluation: No change    CURRENT NUTRITION DIAGNOSIS  Inadequate enteral nutrition infusion related to nausea as evidenced by pt report of nausea and inability to tolerate TF advancement to goal rate      INTERVENTIONS  Implementation  Collaboration with other providers  Enteral Nutrition - Continue when able  Feeding tube flush    Goals  Total avg nutritional intake to meet a minimum of 25 kcal/kg and 1.1 g PRO/kg daily (per dosing wt 50 kg).    Monitoring/Evaluation  Progress toward goals will be monitored and evaluated per protocol.    Becky RODRIGUEZ  Clinical Dietitian  [facility] [place] 5B/10A ICU   Available by Andi Palma, desk [phone]  Weekend/Holiday [place]:  Weekend Holiday Clinical Dietitian [Multi Site Groups]

## 2024-08-26 NOTE — PLAN OF CARE
Goal Outcome Evaluation:         VS: /60 (BP Location: Right arm)   Pulse 88   Temp 98.5  F (36.9  C) (Oral)   Resp 16   Wt 59.3 kg (130 lb 11.7 oz)   SpO2 98%   BMI 26.39 kg/m       O2: 98% RA   Output: incontinent   Last BM: 8/24/2024   Activity: NOOB-- Ax1-2   Skin: Peg tube   Pain: Managed with PRN tylenol Q4, PRN oxycodone Q4   CMS: No concerns   Dressing: Peg CDI   Diet: NPO-- TF held- bowel rest PIV fluids   LDA: R PIV running LR @ 75 ml/hr   Equipment: IV pole, TF- held   Plan: IV and oral antibiotics-- continue POC   Additional Info: Dennis kamara

## 2024-08-26 NOTE — PLAN OF CARE
Goal Outcome Evaluation:      Plan of Care Reviewed With: patient    Overall Patient Progress: no changeOverall Patient Progress: no change    Outcome Evaluation: Patient experiencing abdominal pain and has bloddy discharge.    [employer]  utilized.   TF held for bowel rest, patient NPO.  Incontinent.   A2 repositioning.  Lower pelvic pain.  Patient rested throughout the night.    Plan: Discuss possible palliative/hospice care.

## 2024-08-27 NOTE — PLAN OF CARE
Goal Outcome Evaluation:      Plan of Care Reviewed With: patient    Overall Patient Progress: no changeOverall Patient Progress: no change    No acute changes this shift.    Palliative care maintained.    Pt remains alert A&Ox4.    [facility]  services utilized.    Reports some pelvic discomfort with urination; Oxycodone scheduled.    Meds via G-tube; free water flushes 30 ml  Q 3hrs.    Continue with current POC.

## 2024-08-27 NOTE — PLAN OF CARE
Shift 0700 to 1930:    Goal Outcome Evaluation:      Plan of Care Reviewed With: patient    Overall Patient Progress: declining    Outcome Evaluation: Pt switched to paliative care this shift, code status switched to DNR/DNI.    Pt A&Ox4, denies SOB, CP, constipation, and tingling. Pt endorses BLE numbness. Pt endorses intermittent nausea, x1 episode of emesis this shift, interventions declined--pt reports feeling better after emesis. Pt endorses diarrhea. Pain managed with PRN 10mg Oxycodone x1 this shift. Haldol added for nausea and abdominal discomfort per hospice team. IV fluids discontinued. TF still held. NPO, ice chips and <100cc of food/fluid for comfort okay. Incontinent of B&B, LBM this shift 8/26. Ax2 w/walker/GB, Ax2 for cares. Family at bedside for part of shift. Enteric precautions maintained. Pt has call light in reach, calls appropriately, and has no unanswered questions or concerns at end of shift. Continue POC.

## 2024-08-27 NOTE — PROGRESS NOTES
Care Management Follow Up     Length of Stay (days): 17     Expected Discharge Date: TBD     Concerns to be Addressed: Discharge planning, adjustment to diagnosis/illness     Patient plan of care discussed at interdisciplinary rounds: Yes     Anticipated Discharge Disposition: Long Term Care     Anticipated Discharge Services: None  Anticipated Discharge DME: None     Patient/family educated on Medicare website which has current facility and service quality ratings: Yes  Education Provided on the Discharge Plan: Yes  Patient/Family in Agreement with the Plan: Yes     Referrals Placed by CM/SW: External Care Coordination  Private pay costs discussed: Not applicable     Additional Information:  This SW spoke with palliative care provider (Nichole) this morning to discuss next steps for patients care. Nichole shared that family has opted for comfort cares moving forward and would like patient to be discharged to a LTC facility on hospice. Trish was unsure if patient would go on tube feedings or not as this still needs to be explored while hospitalized. Nichole felt we should have our answer on this in 1-2 days. MAURO reached out to cousin Reena who has been the primary contact throughout patients hospital stay. SW previously sent a medicare care compare list for [facility] and [place] so that family could choose some preferences for LTC placement. Left a voicemail for Reena requesting the families preferences for placement. Will await follow up. Care management continues to follow.      Zandra Lindo, RUY, LGSW  5 Med Surg   [facility]  Phone: [phone]

## 2024-08-27 NOTE — PLAN OF CARE
Goal Outcome Evaluation:      Plan of Care Reviewed With: patient    Overall Patient Progress: no changeOverall Patient Progress: no change       Pt is A&O x4, on RA.  Denies pain, SOB, Chest pain, N/V.   Slept through the night.   Able to make needs known.   L&R PIV SL.   No acute changes overnight.   Promoted sleep overnight.   POC continues.

## 2024-08-27 NOTE — PROGRESS NOTES
"[facility]    Medicine Progress Note - Hospitalist Service, GOLD TEAM 17    Date of Admission:  8/10/2024    Assessment & Plan     Myla Glynn is a 79 year old female admitted on 8/10/2024. 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, s/p sigmoid resection in 2021 also had partial cystectomy and ureteral reimplantation  2024 per urology progressive metastatic stage IV colorectal cancer   #Bladder mass, status post resection  9/2023  and pathology showed invasive adenocarcinoma then had partial cystectomy pelvic lymph node dissection small bowel resection 1/2024   #Suspected recurrent/metastatic adenocarcinoma  Followed at [facility] and per urology notes form 8/9 \"We would not recommend anymore surgical intervention for her given the extreme complexity of her surgical situation previously and her extensive hospital stay after her surgeries that we had performed.\" She was to follow up with oncology but never had an opportunity to get there and therefore has not re-established.   - Repeat CT A/P with con on 8/24 in s/o worsening pelvic pain and bloody discharge. 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 with soft tissue enhancement/thickening of SB wall suspicious for local disease recurrance with fistula creation; additional enhancing soft tissue thickening along bladder wall and distal L ureter at site of L ureteral reimplantation c/f disease progression; mild e/o partial obstruction with mild pelviectasis of L kidney, mild L urothelial enhancement possibly related to a UTI; focal SB " natrrowing 2/2 soft tissue thickening along fistulized segment of SB in L hemipelvis with multiple fluid filled loops of proximal bowel with few air-fluid levels, suggestive of partial bowel obstruction; decompressed and enhancing appearance of vagina without definitive fistulous connection; pelvic nodularity with no signficant change.   - oncology re-consulted, no plans for induction therapies while admitted, will need to reassess pending improvement in nutritional and functional status and f/u as outpatient  - suspect her deconditioning is primarily related to her cancer progression and have concerns that she will not be able to progress enough here to discharge safely. Trialed slow increase in TF rate with advancement up to 20 cc/h but then had recurrent nausea. Also developed pelvic pain as below. In s/o repeat imaging results, again suspect her sxs are all related to cancer progression. She is not a surgical candidate.   - palliative care following, appreciate assistance  - discussed possible transition to comfort care/hospice with Dominic on 8/25, and separately with her daughters via phone and in person (8/25)   - they expressed understanding of progressive cancer and recurrent fistula. We discussed possible transition to hospice/CC which Dominic is familiar with. She requested further d/w palliative care who will follow up with family today.  - please see note on 8/22 for GOC discussion with pt and family  - please communicate directly with family (Dolores, [phone]) for all decision making  - family in agreement with comfort measures, please see note for details from discussion dated on same day.                  - stop IV CTX with suspected UCx+ likely 2/2 fistulization from cancer                 - continue PO vanc to complete C diff tx course                 - will not continue additional TFs with progression in metastatic cancer and c/f partial obstruction on CT --> suspect would lead to prolonged  suffering with minimal benefit, along with increased risk of worsening nausea/emesis/aspiration/etc     #Pelvic/abdominal pain  #E coli UTI vs colonization  #Bloody discharge, suspect hematuria  Pt c/o lower pelvic pain. RN noted possible bloody discharge from vagina/urethra noted by overnight care team. Was previously tx for PNA/possible UTI earlier in hospitalization with a course of CTX as below. Repeat UA appears infected with UCx +E coli, susceptibilities pending. Would suspect with her fistula, this is likely contamination and her pain is more related to metastatic progression. No anemia. D/w cousin as above, will continue therapies for the time being until discussion with palliative care takes place for possible transition to comfort measures.   - stop CTX (8/25 - p)  - f/u UCx sensitivities     #Severe malnutrition  PEG placement on 6/2024, on cycled G-tube feeds. Was not tolerating higher rates initially. Started to slowly titrate up feeds but had nausea recurrence and now with worsening pelvic pain like 2/2 worsening cancer. Suspect will transition to comfort measures/hospice as above.   - nutrition following  - hold TFs  - stop IVFs     #Hypernatremia  Na 150, asymptomatic. Suspect nutrition related. Free water flushes increased  - stop IVFs now on comfort measures  - Na was 144 8/25      #Nausea and vomiting, improved  #Decreased po intake  Had CT chest abdominal pelvis at [place] 8/8, she was already having above symptoms. Showed worsening of the locally advanced mass involving bladder, no evidence of bowel obstruction but at high risk , possible enterovesical fistula. Need to be careful with IV fluids with heart failure HFrEF. Abdominal XR 8/10 showed nonobstructive gas pattern, no pneumatosis or free air. Lactate negative.  she is also constipated so started on BM. Suspect related to above. GI in agreement.   - UA + but question if has enterovesical fistula, Urine culture negative, CTX was  discontinued  - repeat abdominal XR 8/16 with nonspecific gas pattern, mild colonic stool burden    - GI initially consulted, now signed off, recommended scopalamine patch and miralax titration for goal 1 BM per day  - comfort measures     #Diarrhea/loose stools, improving  #C diff infection  Had some constipation earlier in course. 5x BM on 8/19 with possible c/f overflow diarrhea. States she had diarrhea all night on 8/20 but only charted 1x BM. Denies abdominal pain. Prior course of CTX earlier this admission. C diff positive. Stool output improving, only 1x BM on 8/23.   - hold bowel reg if >1 BM per day  - continue PO vanc for 10 day course     #Leukocytosis, resolved  WBC 25. Vitals stable otherwise. No s/sxs of acute infection aside from loose stools as above. May be reactive in s/o metastatic cancer. Improved with IVFs and antibiotics for C diff.   - monitor     #Chronic HFrEF  #Significant Mitral Valve regurgitation   #Hypertension    Follows at [facility] ER. TTE 4/4 with EF 29%  moderate TR. Was to have cardiac MRI once she improved.   - PTA on carvediol , continue with holding parameters  not on ACEi/ARB  SGLT2 inhibitor due to CKD      #History left sided pleural effusion  - on CT 8/8 left pleural effusion size has decreased per report   - CT did show new enhancing pleural thickening around the L effusion c/w exudative effusion. She was given CTX for 5 days.     #  acute on Chronic CKD stage III  Baseline creatinine 1.4-1.7. Cr bump to 1.8 on 8/25, unclear if d/t partial obstruction as above vs prerenal ALFREDA vs. Within pts normal baseline.  - mIVFs with LR 75 cc/h until GOC discussion can be held with palliative care  - avoid nephrotoxic agents      #T2DM  - not on medications blood sugar <100 stopped accu-checks      #Mild cognitive impairment  - at risk for delirium      #Depression  - not on medications       #History of infected left retroperitoneal hematoma  status post  IR embolization and drainage  3/2024   - no current issues      #GOC  Remains full code currently. Appreciate palliative care assistance.           Diet: NPO for Medical/Clinical Reasons Except for: Other; Specify: ok for small volume food/liquids (< 100 cc at a time) for pt comfort    DVT Prophylaxis: on confort cares   Cameron Catheter: Not present  Lines: None     Cardiac Monitoring: None  Code Status: No CPR- Do NOT Intubate      Clinically Significant Risk Factors                  # Hypertension: Noted on problem list            # Severe Malnutrition: based on nutrition assessment    # Financial/Environmental Concerns: none               Disposition Plan     Medically Ready for Discharge: when symptoms ok and bed available              Naz Portillo MD  Hospitalist Service, GOLD TEAM [address] [place] [address]  Securely message with [employer] (more info)  Text page via [employer] Paging/Directory   See signed in provider for up to date coverage information  ______________________________________________________________________    Interval History     In bed, العلي snot complain of much today, family in room, per daughter she throws up at same time each day   Physical Exam   Vital Signs: Temp: 99.1  F (37.3  C) Temp src: Oral BP: 100/60 Pulse: 86   Resp: 16 SpO2: 96 % O2 Device: None (Room air)    Weight: 130 lbs 11.72 oz  General appearence: awake alert  in  no apparent distress      RESPIRATORY: lungs clear   CARDIOVASCULAR:S1 S2 regular rate and rhythm,   GASTROINTESTINAL:soft, slight distention, occ  bowel sounds,   SKIN: warm and dry, no mottling noted   NEUROLOGIC; awake alert and oriented  EXTREMITIES: no clubbing, cyanosis or edema     Data         Imaging results reviewed over the past 24 hrs:   No results found for this or any previous visit (from the past 24 hour(s)).

## 2024-08-27 NOTE — PROGRESS NOTES
PALLIATIVE CARE PROGRESS NOTE  [facility]     Patient Name: Myla Glynn  Date of Admission: 8/10/2024   Today the patient was seen for: symptom management and goals of care     Recommendations & Counseling       GOALS OF CARE:   Patient verbalized again today she wishes for Michele to make decisions. I informed her we are working with her daughter to discuss and make decisions about her plan of care and that her family plans to talk to her further about it.  DNR/DNI, comfort measures only  Plan to discharge to LTC facility with hospice services, will need a POLST prior to discharge   Family would appreciate ongoing discussion if tube feedings at a low rate can be trailed and if a alejandra catheter would be able to be placed in the future. Dominic plans to inform the daughter with the current plan to give continued bowel rest and no alejandra given significant pain now.      MEDICAL MANAGEMENT:   #Nausea,disease processes and obstruction: denies nausea for this writer today, last emesis yesterday.  -Pharmacologic management   Continue Scheduled haldol 1 mg q6h   Continue Scopolamine patch  Compazine 5 mg q6h PRN  Lorazepam 0.5 mg q3h PRN  -Nonpharmacologic management  Small, frequent intake  Assess and avoid aggravating factors  Accupressure (C-band)  Aromatherapy, alcohol swabs known to be effective  Consider venting G tube if symptoms worsen     #Cancer related pain  Scheduled oxycodone 5 mg TID, may need to increase tomorrow  Would encourage use of oxycodone 5-10 mg q2h PRN  Also there is an IV hydromorphone 0.2 ordered and can continue but would utilize the PO/gtube options first.     #Constipation,Partial small bowel obstruction  Currently being treated for c diff which was exacerbating diarrhea  Once safe to do so would resume bowel regimen with miralax to help with treating her ongoing constipation.       PSYCHOSOCIAL/SPIRITUAL:  Appreciate family support of  patient.    Palliative Care will continue to follow. Thank you for the consult and allowing us to aid in the care of Myla Glynn.    These recommendations have been discussed with primary.    WILL Vanegas CNS  [facility], Palliative Care  Securely message with the [employer] Web Console (learn more here) or  Text page via [facility] Paging/Directory        Assessment          Myla Glynn is a 79 year old female with metastatic sigmoid colon adenocarcinoma metastatic to the bladder s/p sigmoid resection (2021), partial cystectomy (9/2023), and small bowel resection (1/2024). Other pertinent medical history of DM type 2, HFrEF (dx 4/2024), and CKD stage IIIb.      Recently admitted from 5/29-6/18/2024 for worsening weakness, fatigue, UTI, and ALFREDA, with PEG placed 5/31.      Imaging 8/8 revealed disease progression.      Presented on 8/10 with N/V 2-3 times a day for a week.  Hospital course has been complicated by difficulty tolerating tube feedings. She is currently on bowel rest, being treated for cdiff, and has persistent nausea and abdominal pain. Plan of care was discussed with patient's daughter and cousin who agreed to DNR/DNI and comfort measures only.     Today, the patient was seen for goals of care in the setting of:  Metastatic colon cancer  HFrEF  CKD stage IIIb  Nausea/vomiting   Deconditioning   Failure to thrive s/p PEG placement 6/13      Interval History:     Multidisciplinary collaboration:  Persistent nausea and emesis. No acute changes. One BM noted yesterday. Uncertain quantity.     Notable medications:  Haldol 1 mg q6h  Mirtazpaine 15 mg at bedtime  Scopolamine patch  Oxycodone 10 mg PRN - x1    Patient/family narrative  Saw today, no nausea, last emesis was yesterday, pain is really bad now, pain started about 3-4 days ago and feels burning. She agreed she wanted her daughter Michele to make medical decisions for her. I informed her that we are working closely with her  daughter and the family plans to talk with her about the next steps.    Todays visit was competed with an over the phone .    Review of Systems:     Besides above, ROS was reviewed and is unremarkable        Physical Exam:   Temp:  [99.1  F (37.3  C)-99.6  F (37.6  C)] 99.1  F (37.3  C)  Pulse:  [84-92] 86  Resp:  [16] 16  BP: (100-115)/(53-64) 100/60  SpO2:  [96 %-100 %] 96 %  130 lbs 11.72 oz    Constitutional: Elderly female, seen lying in hospital bed. Ill appearing, but in NAD.  ENT: Normocephalic. Atraumatic. Missing multiple teeth.   CV: Warm and well perfused. No edema.  Pulm: Non-labored breathing, on room air.   Musculoskeletal: TREJO. No obvious malformations.   Skin: No jaundice. No concerning rashes or lesions on exposed areas.   Psych: Affect appropriate to situation. Speaks Tigrinya without signs of difficulty/slurring.         Data Reviewed:     No results found for this or any previous visit (from the past 24 hour(s)).  Recent Labs   Lab 08/25/24  0836 08/23/24  1600 08/23/24  1240 08/22/24  1112 08/22/24  0828   WBC 8.6 9.3  --   --  10.7   HGB 12.1 11.2*  --   --  11.5*   * 97  --   --  96    330  --   --  291    150*  --  150*  --    POTASSIUM 4.9 4.7  --  4.2  --    CHLORIDE 108* 110*  --  113*  --    CO2 27 28  --  27  --    BUN 50.0* 39.4*  --  37.0*  --    CR 1.81* 1.45*  --  1.61*  --    ANIONGAP 9 12  --  10  --    DICKSON 8.5* 9.2  --  8.8  --    * 90 88 103*  --        No results found for this or any previous visit (from the past 24 hour(s)).      Medical Decision Making       MANAGEMENT DISCUSSED with the following over the past 24 hours: medicine, nursing. VALENTINE EATON   NOTE(S)/MEDICAL RECORDS REVIEWED over the past 24 hours: medicine, nursing. Nutrition.  Tests REVIEWED in the past 24 hours:  - See lab/imaging results included in the data section of the note  SUPPLEMENTAL HISTORY, in addition to the patient's history, over the past 24 hours obtained from:   -  Dominic  Medical complexity over the past 24 hours:  - Decision to DE-ESCALATE CARE based on prognosis

## 2024-08-28 NOTE — PROGRESS NOTES
"[facility]    Medicine Progress Note - Hospitalist Service, GOLD TEAM 17    Date of Admission:  8/10/2024    Assessment & Plan     Myla Glynn is a 79 year old female admitted on 8/10/2024. 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, s/p sigmoid resection in 2021 also had partial cystectomy and ureteral reimplantation  2024 per urology progressive metastatic stage IV colorectal cancer   #Bladder mass, status post resection  9/2023  and pathology showed invasive adenocarcinoma then had partial cystectomy pelvic lymph node dissection small bowel resection 1/2024   #Suspected recurrent/metastatic adenocarcinoma  Followed at [facility] and per urology notes form 8/9 \"We would not recommend anymore surgical intervention for her given the extreme complexity of her surgical situation previously and her extensive hospital stay after her surgeries that we had performed.\" She was to follow up with oncology but never had an opportunity to get there and therefore has not re-established.   - Repeat CT A/P with con on 8/24 in s/o worsening pelvic pain and bloody discharge. 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 with soft tissue enhancement/thickening of SB wall suspicious for local disease recurrance with fistula creation; additional enhancing soft tissue thickening along bladder wall and distal L ureter at site of L ureteral reimplantation c/f disease progression; mild e/o partial obstruction with mild pelviectasis of L kidney, mild L urothelial enhancement possibly related to a UTI; focal SB " natrrowing 2/2 soft tissue thickening along fistulized segment of SB in L hemipelvis with multiple fluid filled loops of proximal bowel with few air-fluid levels, suggestive of partial bowel obstruction; decompressed and enhancing appearance of vagina without definitive fistulous connection; pelvic nodularity with no signficant change.   - oncology re-consulted, no plans for induction therapies while admitted, will need to reassess pending improvement in nutritional and functional status and f/u as outpatient  - suspect her deconditioning is primarily related to her cancer progression and have concerns that she will not be able to progress enough here to discharge safely. Trialed slow increase in TF rate with advancement up to 20 cc/h but then had recurrent nausea. Also developed pelvic pain as below. In s/o repeat imaging results, again suspect her sxs are all related to cancer progression. She is not a surgical candidate.   - palliative care following, appreciate assistance  - discussed possible transition to comfort care/hospice with Dominic on 8/25, and separately with her daughters via phone and in person (8/25)   - they expressed understanding of progressive cancer and recurrent fistula. We discussed possible transition to hospice/CC which Dominic is familiar with. She requested further d/w palliative care who will follow up with family today.  - please see note on 8/22 for GOC discussion with pt and family  - please communicate directly with family (Dolores, [phone]) for all decision making  - family in agreement with comfort measures, please see note for details from discussion dated on same day.                  - stop IV CTX with suspected UCx+ likely 2/2 fistulization from cancer                 - continue PO vanc to complete C diff tx course                 - will not continue additional TFs with progression in metastatic cancer and c/f partial obstruction on CT --> suspect would lead to prolonged  suffering with minimal benefit, along with increased risk of worsening nausea/emesis/aspiration/etc     #Pelvic/abdominal pain  #E coli UTI vs colonization  #Bloody discharge, suspect hematuria  Pt c/o lower pelvic pain. RN noted possible bloody discharge from vagina/urethra noted by overnight care team. Was previously tx for PNA/possible UTI earlier in hospitalization with a course of CTX as below. Repeat UA appears infected with UCx +E coli, susceptibilities pending. Would suspect with her fistula, this is likely contamination and her pain is more related to metastatic progression. No anemia. D/w cousin as above, will continue therapies for the time being until discussion with palliative care takes place for possible transition to comfort measures.   - stop CTX (8/25 - p)  - f/u UCx sensitivities     #Severe malnutrition  PEG placement on 6/2024, on cycled G-tube feeds. Was not tolerating higher rates initially. Started to slowly titrate up feeds but had nausea recurrence and now with worsening pelvic pain like 2/2 worsening cancer. Suspect will transition to comfort measures/hospice as above.   - nutrition following  - hold TFs  - stop IVFs     #Hypernatremia  Na 150, asymptomatic. Suspect nutrition related. Free water flushes increased  - stop IVFs now on comfort measures  - Na was 144 8/25      #Nausea and vomiting, improved  #Decreased po intake  Had CT chest abdominal pelvis at [facility] 8/8, she was already having above symptoms. Showed worsening of the locally advanced mass involving bladder, no evidence of bowel obstruction but at high risk , possible enterovesical fistula. Need to be careful with IV fluids with heart failure HFrEF. Abdominal XR 8/10 showed nonobstructive gas pattern, no pneumatosis or free air. Lactate negative.  she is also constipated so started on BM. Suspect related to above. GI in agreement.   - UA + but question if has enterovesical fistula, Urine culture negative, CTX was  discontinued  - repeat abdominal XR 8/16 with nonspecific gas pattern, mild colonic stool burden    - GI initially consulted, now signed off, recommended scopalamine patch and miralax titration for goal 1 BM per day  - comfort measures     #Diarrhea/loose stools, improving  #C diff infection  Had some constipation earlier in course. 5x BM on 8/19 with possible c/f overflow diarrhea. States she had diarrhea all night on 8/20 but only charted 1x BM. Denies abdominal pain. Prior course of CTX earlier this admission. C diff positive. Stool output improving, only 1x BM on 8/23.   - hold bowel reg if >1 BM per day  - continue PO vanc for 10 day course     #Leukocytosis, resolved  WBC 25. Vitals stable otherwise. No s/sxs of acute infection aside from loose stools as above. May be reactive in s/o metastatic cancer. Improved with IVFs and antibiotics for C diff.   - monitor     #Chronic HFrEF  #Significant Mitral Valve regurgitation   #Hypertension    Follows at [facility]. TTE 4/4 with EF 29%  moderate TR. Was to have cardiac MRI once she improved.   - PTA on carvediol , continue with holding parameters  not on ACEi/ARB  SGLT2 inhibitor due to CKD      #History left sided pleural effusion  - on CT 8/8 left pleural effusion size has decreased per report   - CT did show new enhancing pleural thickening around the L effusion c/w exudative effusion. She was given CTX for 5 days.     #  acute on Chronic CKD stage III  Baseline creatinine 1.4-1.7. Cr bump to 1.8 on 8/25, unclear if d/t partial obstruction as above vs prerenal ALFREDA vs. Within pts normal baseline.  - mIVFs with LR 75 cc/h until GOC discussion can be held with palliative care  - avoid nephrotoxic agents      #T2DM  - not on medications blood sugar <100 stopped accu-checks      #Mild cognitive impairment  - at risk for delirium      #Depression  - not on medications       #History of infected left retroperitoneal hematoma  status post  IR embolization and drainage  3/2024   - no current issues      #GOC  Remains full code currently. Appreciate palliative care assistance.           Diet: NPO for Medical/Clinical Reasons Except for: Other; Specify: ok for small volume food/liquids (< 100 cc at a time) for pt comfort    DVT Prophylaxis: on confort cares   Cameron Catheter: Not present  Lines: None     Cardiac Monitoring: None  Code Status: No CPR- Do NOT Intubate      Clinically Significant Risk Factors                  # Hypertension: Noted on problem list            # Severe Malnutrition: based on nutrition assessment    # Financial/Environmental Concerns: none               Disposition Plan     Medically Ready for Discharge: when symptoms ok and bed available              Chetan Burrell MD  Hospitalist Service, GOLD TEAM [address] [place] [address]  Securely message with [place] (more info)  Text page via [facility] Paging/Directory   See signed in provider for up to date coverage information  ______________________________________________________________________    Interval History     Patient is awake alert oriented x 3.  She complains of feeling nauseous.  Per RN scheduled Haldol seems to be helping.  Palliative care had seen patient.  Recommendations to continue tube feeding and discharged to long-term care facility with hospice care.  POLST pending to be signed.  Palliative care will keep in touch with patient's daughter ALESSANDRA      Physical Exam   Vital Signs: Temp: 98.5  F (36.9  C) Temp src: Oral BP: 108/51 Pulse: 79   Resp: 18 SpO2: 99 % O2 Device: None (Room air)    Weight: 130 lbs 11.72 oz  General appearence: awake alert  in  no apparent distress      RESPIRATORY: lungs clear   CARDIOVASCULAR:S1 S2 regular rate and rhythm,   GASTROINTESTINAL: PEG tube is in place.  Soft, nontender.    SKIN: warm and dry, no mottling noted   NEUROLOGIC; awake alert and oriented  EXTREMITIES: no clubbing, cyanosis or edema     Data         Imaging results  reviewed over the past 24 hrs:   No results found for this or any previous visit (from the past 24 hour(s)).

## 2024-08-28 NOTE — PROGRESS NOTES
PALLIATIVE CARE PROGRESS NOTE  [facility]     Patient Name: Myla Glynn  Date of Admission: 8/10/2024   Today the patient was seen for: symptom management and goals of care     Recommendations & Counseling       GOALS OF CARE:   Patient verbalized again today she wishes for Michele to make decisions. I informed her we are working with her daughter to discuss and make decisions about her plan of care and that her family plans to talk to her further about it.  DNR/DNI, comfort measures only  Plan to discharge to LTC facility with hospice services, will need a POLST prior to discharge   Family would appreciate ongoing discussion if tube feedings at a low rate can be trailed and if a alejandra catheter would be able to be placed in the future. Dominic plans to inform the daughter with the current plan to give continued bowel rest and no alejandra given significant pain now.        MEDICAL MANAGEMENT:   #Nausea,disease processes and obstruction: denies nausea for this writer today, last emesis yesterday.  -Pharmacologic management   Continue Scheduled haldol 1 mg q6h   Continue Scopolamine patch  Compazine 5 mg q6h PRN  Lorazepam 0.5 mg q3h PRN  -Nonpharmacologic management  Small, frequent intake  Assess and avoid aggravating factors  Accupressure (C-band)  Aromatherapy, alcohol swabs known to be effective  Consider venting G tube if symptoms worsen     #Cancer related pain  Scheduled oxycodone 5 mg TID, may need to increase tomorrow  Would encourage use of oxycodone 5-10 mg q2h PRN  Also there is an IV hydromorphone 0.2 ordered and can continue but would utilize the PO/gtube options first.     #Constipation,Partial small bowel obstruction: no BM in two days  Currently being treated for c diff which was exacerbating diarrhea  Miralax daily and miralax daily PRN, if no BM by this evening would give PRN dose.        PSYCHOSOCIAL/SPIRITUAL:  Appreciate family support of  patient.    PSYCHOSOCIAL/SPIRITUAL:  Appreciate family support of patient    Palliative Care will continue to follow. Thank you for the consult and allowing us to aid in the care of Myla Glynn.    These recommendations have been discussed with primary team.    WILL Vanegas CNS  [facility], Palliative Care  Securely message with the [employer] (learn more here) or  Text page via [facility] Paging/Directory        Assessment          Myla Glynn is a 79 year old female with metastatic sigmoid colon adenocarcinoma metastatic to the bladder s/p sigmoid resection (2021), partial cystectomy (9/2023), and small bowel resection (1/2024). Other pertinent medical history of DM type 2, HFrEF (dx 4/2024), and CKD stage IIIb.      Recently admitted from 5/29-6/18/2024 for worsening weakness, fatigue, UTI, and ALFREDA, with PEG placed 5/31.      Imaging 8/8 revealed disease progression.      Presented on 8/10 with N/V 2-3 times a day for a week.  Hospital course has been complicated by difficulty tolerating tube feedings. She is currently on bowel rest, being treated for cdiff, and has persistent nausea and abdominal pain. Plan of care was discussed with patient's daughter and cousin who agreed to DNR/DNI and comfort measures only.     Today, the patient was seen for goals of care in the setting of:  Metastatic colon cancer  HFrEF  CKD stage IIIb  Nausea/vomiting   Deconditioning   Failure to thrive s/p PEG placement 6/13      Interval History:     Multidisciplinary collaboration:  Notes reviewed, no acute changes. Sleeping this morning per RN.    Notable medications:  Haldol 1 mg q6h  Pepcid 20 mg q48h  Mirtazapine 15 mg daily  Oxycodone 5 mg TID  Scopolamine patch    Patient/family narrative  Seen in bed, sleeping, did no wake.    Discussed with bedside nurse pain management and to page if symptoms uncontrolled.    Review of Systems:     Besides above, ROS was reviewed and is unremarkable         Physical Exam:   Temp:  [98  F (36.7  C)-99  F (37.2  C)] 98.5  F (36.9  C)  Pulse:  [79-80] 79  Resp:  [18] 18  BP: ()/(51-52) 108/51  SpO2:  [97 %-99 %] 99 %  130 lbs 11.72 oz            Data Reviewed:     No results found for this or any previous visit (from the past 24 hour(s)).  Recent Labs   Lab 08/25/24  0836 08/23/24  1600 08/23/24  1240 08/22/24  1112 08/22/24  0828   WBC 8.6 9.3  --   --  10.7   HGB 12.1 11.2*  --   --  11.5*   * 97  --   --  96    330  --   --  291    150*  --  150*  --    POTASSIUM 4.9 4.7  --  4.2  --    CHLORIDE 108* 110*  --  113*  --    CO2 27 28  --  27  --    BUN 50.0* 39.4*  --  37.0*  --    CR 1.81* 1.45*  --  1.61*  --    ANIONGAP 9 12  --  10  --    DICKSON 8.5* 9.2  --  8.8  --    * 90 88 103*  --        No results found for this or any previous visit (from the past 24 hour(s)).      Medical Decision Making       MANAGEMENT DISCUSSED with the following over the past 24 hours: medicine, nursing   NOTE(S)/MEDICAL RECORDS REVIEWED over the past 24 hours: medicine, nursing  Tests REVIEWED in the past 24 hours:  - See lab/imaging results included in the data section of the note

## 2024-08-28 NOTE — PLAN OF CARE
Goal Outcome Evaluation:      Plan of Care Reviewed With: patient    Overall Patient Progress: no changeOverall Patient Progress: no change       Palliative care maintained.   A&O x4, RA.   Able to make needs known.   Slept through the night.   No acute changes overnight.   R PIV SL.   POC continues.

## 2024-08-28 NOTE — PROGRESS NOTES
8/28/24  Care Management Follow Up    CHW assisted SW with gathering LTC preferences from family. CHW called pt's cousin, Reena ([phone]), and was given five preferences. CHW asked for five more and Reena will email them to the SW on the unit. This was notified to the SW and referrals were made.    Pending:    [facility]  [address]  [place] [address]  PH: [phone]  8/28: Initial referral sent     [facility]  [address] [place] [address]  PH: [phone]  8/28: Initial referral sent     [facility]  [address] [place] [address]  PH: [phone]  FAX: [phone]  8/28: Initial referral sent     [facility]  [address] [place] [address]  PH: [phone]  8/28: Initial referral sent     Declined:    [facility]  [id number] [place] [address]  PH: [phone]  8/28: Initial referral sent. Declined due to not having LTC beds     Daisha Koch  Inpatient CHW  [facility], [address], & ED  PH: [phone]

## 2024-08-29 NOTE — PLAN OF CARE
Physical Therapy Discharge Summary    Reason for therapy discharge:    Change in medical status.    Progress towards therapy goal(s). See goals on Care Plan in [facility] electronic health record for goal details.  Goals not met.  Barriers to achieving goals:   Comfort cares.    Therapy recommendation(s):    No further therapy is recommended.

## 2024-08-29 NOTE — PLAN OF CARE
Goal Outcome Evaluation: 1900-0700    Palliative care maintained. A&O x4, RA. Able to make needs known. Denied pain. NPO. Incontinent of B&B. Slept through the night. R PIV SL. Skin intact. Plan of care continues.

## 2024-08-29 NOTE — PROGRESS NOTES
"[facility]    Medicine Progress Note - Hospitalist Service, GOLD TEAM 17    Date of Admission:  8/10/2024    Assessment & Plan     Myla Glynn is a 79 year old female admitted on 8/10/2024. 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, s/p sigmoid resection in 2021 also had partial cystectomy and ureteral reimplantation  2024 per urology progressive metastatic stage IV colorectal cancer   #Bladder mass, status post resection  9/2023  and pathology showed invasive adenocarcinoma then had partial cystectomy pelvic lymph node dissection small bowel resection 1/2024   #Suspected recurrent/metastatic adenocarcinoma  Followed at [facility] and per urology notes form 8/9 \"We would not recommend anymore surgical intervention for her given the extreme complexity of her surgical situation previously and her extensive hospital stay after her surgeries that we had performed.\" She was to follow up with oncology but never had an opportunity to get there and therefore has not re-established.   - Repeat CT A/P with con on 8/24 in s/o worsening pelvic pain and bloody discharge. 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 with soft tissue enhancement/thickening of SB wall suspicious for local disease recurrance with fistula creation; additional enhancing soft tissue thickening along bladder wall and distal L ureter at site of L ureteral reimplantation c/f disease progression; mild e/o partial obstruction with mild pelviectasis of L kidney, mild L urothelial enhancement possibly related to a UTI; focal SB " natrrowing 2/2 soft tissue thickening along fistulized segment of SB in L hemipelvis with multiple fluid filled loops of proximal bowel with few air-fluid levels, suggestive of partial bowel obstruction; decompressed and enhancing appearance of vagina without definitive fistulous connection; pelvic nodularity with no signficant change.   - oncology re-consulted, no plans for induction therapies while admitted, will need to reassess pending improvement in nutritional and functional status and f/u as outpatient  - suspect her deconditioning is primarily related to her cancer progression and have concerns that she will not be able to progress enough here to discharge safely. Trialed slow increase in TF rate with advancement up to 20 cc/h but then had recurrent nausea. Also developed pelvic pain as below. In s/o repeat imaging results, again suspect her sxs are all related to cancer progression. She is not a surgical candidate.   - palliative care following, appreciate assistance  - discussed possible transition to comfort care/hospice with Dominic on 8/25, and separately with her daughters via phone and in person (8/25)   - they expressed understanding of progressive cancer and recurrent fistula. We discussed possible transition to hospice/CC which Dominic is familiar with. She requested further d/w palliative care who will follow up with family today.  - please see note on 8/22 for GOC discussion with pt and family  - please communicate directly with family (Dolores, [phone]) for all decision making  - family in agreement with comfort measures, please see note for details from discussion dated on same day.                  - stop IV CTX with suspected UCx+ likely 2/2 fistulization from cancer                 - continue PO vanc to complete C diff tx course                 - will not continue additional TFs with progression in metastatic cancer and c/f partial obstruction on CT --> suspect would lead to prolonged  suffering with minimal benefit, along with increased risk of worsening nausea/emesis/aspiration/etc  - 8/29 spoke with patient  daughter Deonte  by bedside and did call Reena who family is referring to for help, Deonte was asking about repeat CT scan, we did discuss that it will not show improvement but worsening as CT already showed worsening on 8/24. We did discuss that unfortunately Myla is at very high risk for bowel obstruction  form her cancer that has gotten worse. I feel Myla has several weeks to months to live and they expressed understanding of this . They again asked when can we restart tube feeding  as they would be interested in this, we could try in few days if still desired but we did talk about the fact that Myla had nausea vomiting with very low dose TF     Continue patient  and family support       #Pelvic/abdominal pain  #E coli UTI vs colonization  #Bloody discharge, suspect hematuria  Pt c/o lower pelvic pain. RN noted possible bloody discharge from vagina/urethra noted by overnight care team. Was previously tx for PNA/possible UTI earlier in hospitalization with a course of CTX as below. Repeat UA appears infected with UCx +E coli, susceptibilities pending. Would suspect with her fistula, this is likely contamination and her pain is more related to metastatic progression. No anemia. D/w cousin as above, will continue therapies for the time being until discussion with palliative care takes place for possible transition to comfort measures.   - stop CTX (8/25 - p)  - f/u UCx sensitivities     #Severe malnutrition  PEG placement on 6/2024, on cycled G-tube feeds. Was not tolerating higher rates initially. Started to slowly titrate up feeds but had nausea recurrence and now with worsening pelvic pain like 2/2 worsening cancer. Suspect will transition to comfort measures/hospice as above.   - nutrition following  - hold TFs  - stop IVFs     #Hypernatremia  Na 150, asymptomatic. Suspect  nutrition related. Free water flushes increased  - stop IVFs now on comfort measures  - Na was 144 8/25      #Nausea and vomiting, improved  #Decreased po intake  Had CT chest abdominal pelvis at [place] 8/8, she was already having above symptoms. Showed worsening of the locally advanced mass involving bladder, no evidence of bowel obstruction but at high risk , possible enterovesical fistula. Need to be careful with IV fluids with heart failure HFrEF. Abdominal XR 8/10 showed nonobstructive gas pattern, no pneumatosis or free air. Lactate negative.  she is also constipated so started on BM. Suspect related to above. GI in agreement.   - UA + but question if has enterovesical fistula, Urine culture negative, CTX was discontinued  - repeat abdominal XR 8/16 with nonspecific gas pattern, mild colonic stool burden    - GI initially consulted, now signed off, recommended scopalamine patch and miralax titration for goal 1 BM per day  - comfort measures     #Diarrhea/loose stools, improving  #C diff infection  Had some constipation earlier in course. 5x BM on 8/19 with possible c/f overflow diarrhea. States she had diarrhea all night on 8/20 but only charted 1x BM. Denies abdominal pain. Prior course of CTX earlier this admission. C diff positive. Stool output improving, only 1x BM on 8/23.   -- continue PO vanc for 10 day course, started 8/22  - now no BM , back on miralax      #Leukocytosis, resolved  WBC 25. Vitals stable otherwise. No s/sxs of acute infection aside from loose stools as above. May be reactive in s/o metastatic cancer. Improved with IVFs and antibiotics for C diff.   - monitor     #Chronic HFrEF  #Significant Mitral Valve regurgitation   #Hypertension    Follows at [facility]. TTE 4/4 with EF 29%  moderate TR. Was to have cardiac MRI once she improved.   - PTA on carvediol , continue with holding parameters  not on ACEi/ARB  SGLT2 inhibitor due to CKD      #History left sided pleural effusion  - on CT 8/8  left pleural effusion size has decreased per report   - CT did show new enhancing pleural thickening around the L effusion c/w exudative effusion. She was given CTX for 5 days.     #  acute on Chronic CKD stage III  Baseline creatinine 1.4-1.7. Cr bump to 1.8 on 8/25, unclear if d/t partial obstruction as above vs prerenal ALFREDA vs. Within pts normal baseline.  - mIVFs with LR 75 cc/h until GOC discussion can be held with palliative care  - avoid nephrotoxic agents      #T2DM  - not on medications blood sugar <100 stopped accu-checks      #Mild cognitive impairment  - at risk for delirium      #Depression  - not on medications       #History of infected left retroperitoneal hematoma  status post  IR embolization and drainage 3/2024   - no current issues      #GOC  Remains full code currently. Appreciate palliative care assistance.           Diet: NPO for Medical/Clinical Reasons Except for: Other; Specify: ok for small volume food/liquids (< 100 cc at a time) for pt comfort    DVT Prophylaxis: on confort cares   Cameron Catheter: Not present  Lines: None     Cardiac Monitoring: None  Code Status: No CPR- Do NOT Intubate      Clinically Significant Risk Factors                  # Hypertension: Noted on problem list            # Severe Malnutrition: based on nutrition assessment    # Financial/Environmental Concerns: none        8/29 discussed with  cousin Reena  and daughter Deonte         Disposition Plan     Medically Ready for Discharge: when symptoms ok and bed available              Naz Portillo MD  Hospitalist Service, GOLD TEAM [address] [place] [address]  Securely message with [employer] (more info)  Text page via [employer] Paging/Directory   See signed in provider for up to date coverage information  ______________________________________________________________________    Interval History     Seems and feels tired, no emesis per daughter   Physical Exam   Vital Signs: Temp: 98.3   F (36.8  C) Temp src: Oral   Pulse: 60   Resp: 18        Weight: 130 lbs 11.72 oz  General appearence: awake alert  in  no apparent distress      RESPIRATORY: lungs clear   CARDIOVASCULAR:S1 S2 regular rate and rhythm,   GASTROINTESTINAL:soft, slight distention, occ  bowel sounds,   SKIN: warm and dry, no mottling noted   NEUROLOGIC; awake alert  EXTREMITIES: no clubbing, cyanosis or edema     Data         Imaging results reviewed over the past 24 hrs:   No results found for this or any previous visit (from the past 24 hour(s)).

## 2024-08-29 NOTE — PLAN OF CARE
Goal Outcome Evaluation:      Plan of Care Reviewed With: patient    Overall Patient Progress: decliningOverall Patient Progress: declining    VS: /51 (BP Location: Left arm)   Pulse 60   Temp 98.3  F (36.8  C) (Oral)   Resp 18   Wt 59.3 kg (130 lb 11.7 oz)   SpO2 99%   BMI 26.39 kg/m     O2: Sating >90% on RA  Denies SOB and chest pain    Output: Incontinent of B&B   Last BM: 8/26/2024   Activity: Assist x2 for cares    Skin: PEG tube in place   Pain: Mild pain this shift, controlled with scheduled oxy and haldol    CMS: AxO x4, lethargic   Denies n/t   Dressing: N/A   Diet: NPO  Can have ice chips and <100cc of fluid/food for comfort, none requested this shift   LDA: R PIV SL   Equipment: Call light, personal belongings    Plan: Continue comfort cares   Additional Info: Very lethargic today, daughter in the room states that she does not typically display this behavior

## 2024-08-29 NOTE — PROGRESS NOTES
8/29/24  Care Management Follow Up    Pending:      [facility]  [address] [place] [address]  PH: [phone]  8/28: Initial referral sent   8/29: CHW called admissions and LVM requesting a call back     Declined:     [facility]  [address] [address]  [place] [address]  PH: [phone]  8/28: Initial referral sent. Declined due to not having LTC beds     [facility]  [address] [place] [address]  PH: [phone]  8/28: Initial referral sent   8/29: Declined due to no LTC bed available    [facility]  [address] [place] [address]  PH: [phone]  8/28: Initial referral sent   8/29: CHW spoke with Jessica from admissions and she will reach out to writer later today via Epic. Declined due to no bed available     [facility]  [address] [place] [address]  PH: [phone]  FAX: [phone]  8/28: Initial referral sent   8/29: CHW called admissions and LVM requesting a call back. Declined due to no LTC bed available     Daisha Koch  Inpatient CHW  [facility] 5 Ortho, 8 Med Surge, & ED  PH: [phone]

## 2024-08-29 NOTE — PLAN OF CARE
Shift 0700 to 1930:    Goal Outcome Evaluation:      Plan of Care Reviewed With: patient    Overall Patient Progress: no changeOverall Patient Progress: no change    Outcome Evaluation: No acute changes this shift.    Pt A&Ox4, denies SOB, CP, nausea, vomiting, diarrhea, constipation, and tingling. Pt endorses BLE numbness. Pt endorses pain, scheduled medications. NPO, ice chips and <100cc of food/fluid for comfort okay, all medications through PEG tube. Incontinent of B&B, LBM this shift 8/26. Ax2 walker/GB, Ax2 for cares. Skin intact. Family at bedside for part of shift. R PIV SL. Enteric precautions maintained. Pt has call light in reach, calls appropriately, and has no unanswered questions or concerns at end of shift. Continue POC.

## 2024-08-30 NOTE — PROGRESS NOTES
Goal Outcome Evaluation: with the pt.     Received alert and oriented x4, in room air  Denies pain, N/V, SOB and chest discomfort.  Due meds given via Gtube. NPO.  Perineal care provided. Incontinent of B/B  Frequent positioning.  Keep call light within reach  On Palliative care

## 2024-08-30 NOTE — PROGRESS NOTES
"[facility]    Medicine Progress Note - Hospitalist Service, GOLD TEAM 17    Date of Admission:  8/10/2024    Assessment & Plan     Myla Glynn is a 79 year old female admitted on 8/10/2024. 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, s/p sigmoid resection in 2021 also had partial cystectomy and ureteral reimplantation  2024 per urology progressive metastatic stage IV colorectal cancer   #Bladder mass, status post resection  9/2023  and pathology showed invasive adenocarcinoma then had partial cystectomy pelvic lymph node dissection small bowel resection 1/2024   #Suspected recurrent/metastatic adenocarcinoma  Followed at [place] and per urology notes form 8/9 \"We would not recommend anymore surgical intervention for her given the extreme complexity of her surgical situation previously and her extensive hospital stay after her surgeries that we had performed.\" She was to follow up with oncology but never had an opportunity to get there and therefore has not re-established.   - Repeat CT A/P with con on 8/24 in s/o worsening pelvic pain and bloody discharge. 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 with soft tissue enhancement/thickening of SB wall suspicious for local disease recurrance with fistula creation; additional enhancing soft tissue thickening along bladder wall and distal L ureter at site of L ureteral reimplantation c/f disease progression; mild e/o partial obstruction with mild pelviectasis of L kidney, mild L urothelial enhancement possibly related to a UTI; focal SB " natrrowing 2/2 soft tissue thickening along fistulized segment of SB in L hemipelvis with multiple fluid filled loops of proximal bowel with few air-fluid levels, suggestive of partial bowel obstruction; decompressed and enhancing appearance of vagina without definitive fistulous connection; pelvic nodularity with no signficant change.   - oncology re-consulted, no plans for induction therapies while admitted, will need to reassess pending improvement in nutritional and functional status and f/u as outpatient  - suspect her deconditioning is primarily related to her cancer progression and have concerns that she will not be able to progress enough here to discharge safely. Trialed slow increase in TF rate with advancement up to 20 cc/h but then had recurrent nausea. Also developed pelvic pain as below. In s/o repeat imaging results, again suspect her sxs are all related to cancer progression. She is not a surgical candidate.   - palliative care following, appreciate assistance  - discussed possible transition to comfort care/hospice with Dominic on 8/25, and separately with her daughters via phone and in person (8/25)   - they expressed understanding of progressive cancer and recurrent fistula. We discussed possible transition to hospice/CC which Dominic is familiar with. She requested further d/w palliative care who will follow up with family today.  - please see note on 8/22 for GOC discussion with pt and family  - please communicate directly with family (Dolores, [phone]) for all decision making  - family in agreement with comfort measures, please see note for details from discussion dated on same day.                  - stop IV CTX with suspected UCx+ likely 2/2 fistulization from cancer                 - continue PO vanc to complete C diff tx course                 - will not continue additional TFs with progression in metastatic cancer and c/f partial obstruction on CT --> suspect would lead to prolonged  suffering with minimal benefit, along with increased risk of worsening nausea/emesis/aspiration/etc  - 8/29 spoke with patient  daughter Deonte  by bedside and did call Reena who family is referring to for help, Deonte was asking about repeat CT scan, we did discuss that it will not show improvement but worsening as CT already showed worsening on 8/24. We did discuss that unfortunately Myla is at very high risk for bowel obstruction  form her cancer that has gotten worse. I feel Myla has several weeks to months to live and they expressed understanding of this . They again asked when can we restart tube feeding  as they would be interested in this, we could try in few days if still desired but we did talk about the fact that Myla had nausea vomiting with very low dose TF     Continue patient  and family support       #Pelvic/abdominal pain  #E coli UTI vs colonization  #Bloody discharge, suspect hematuria  Pt c/o lower pelvic pain. RN noted possible bloody discharge from vagina/urethra noted by overnight care team. Was previously tx for PNA/possible UTI earlier in hospitalization with a course of CTX as below. Repeat UA appears infected with UCx +E coli, susceptibilities pending. Would suspect with her fistula, this is likely contamination and her pain is more related to metastatic progression. No anemia. D/w cousin as above, will continue therapies for the time being until discussion with palliative care takes place for possible transition to comfort measures.   - stop CTX (8/25 - p)  - f/u UCx sensitivities     #Severe malnutrition  PEG placement on 6/2024, on cycled G-tube feeds. Was not tolerating higher rates initially. Started to slowly titrate up feeds but had nausea recurrence and now with worsening pelvic pain like 2/2 worsening cancer. Suspect will transition to comfort measures/hospice as above.   - nutrition following  - hold TFs  - stop IVFs     #Hypernatremia  Na 150, asymptomatic. Suspect  nutrition related. Free water flushes increased  - stop IVFs now on comfort measures  - Na was 144 8/25      #Nausea and vomiting, improved  #Decreased po intake  Had CT chest abdominal pelvis at [place] 8/8, she was already having above symptoms. Showed worsening of the locally advanced mass involving bladder, no evidence of bowel obstruction but at high risk , possible enterovesical fistula. Need to be careful with IV fluids with heart failure HFrEF. Abdominal XR 8/10 showed nonobstructive gas pattern, no pneumatosis or free air. Lactate negative.  she is also constipated so started on BM. Suspect related to above. GI in agreement.   - UA + but question if has enterovesical fistula, Urine culture negative, CTX was discontinued  - repeat abdominal XR 8/16 with nonspecific gas pattern, mild colonic stool burden    - GI initially consulted, now signed off, recommended scopalamine patch and miralax titration for goal 1 BM per day  - comfort measures     #Diarrhea/loose stools, improving  #C diff infection  Had some constipation earlier in course. 5x BM on 8/19 with possible c/f overflow diarrhea. States she had diarrhea all night on 8/20 but only charted 1x BM. Denies abdominal pain. Prior course of CTX earlier this admission. C diff positive. Stool output improving, only 1x BM on 8/23.   -- continue PO vanc for 10 day course, started 8/22  - now no BM , back on miralax      #Leukocytosis, resolved  WBC 25. Vitals stable otherwise. No s/sxs of acute infection aside from loose stools as above. May be reactive in s/o metastatic cancer. Improved with IVFs and antibiotics for C diff.   - monitor     #Chronic HFrEF  #Significant Mitral Valve regurgitation   #Hypertension    Follows at [facility]. TTE 4/4 with EF 29%  moderate TR. Was to have cardiac MRI once she improved.   - PTA on carvediol , continue with holding parameters  not on ACEi/ARB  SGLT2 inhibitor due to CKD      #History left sided pleural effusion  - on CT 8/8  left pleural effusion size has decreased per report   - CT did show new enhancing pleural thickening around the L effusion c/w exudative effusion. She was given CTX for 5 days.     #  acute on Chronic CKD stage III  Baseline creatinine 1.4-1.7. Cr bump to 1.8 on 8/25, unclear if d/t partial obstruction as above vs prerenal ALFREDA vs. Within pts normal baseline.  - mIVFs with LR 75 cc/h until GOC discussion can be held with palliative care  - avoid nephrotoxic agents      #T2DM  - not on medications blood sugar <100 stopped accu-checks      #Mild cognitive impairment  - at risk for delirium      #Depression  - not on medications       #History of infected left retroperitoneal hematoma  status post  IR embolization and drainage 3/2024   - no current issues      #GOC  Remains full code currently. Appreciate palliative care assistance.           Diet: NPO for Medical/Clinical Reasons Except for: Other; Specify: ok for small volume food/liquids (< 100 cc at a time) for pt comfort    DVT Prophylaxis: on confort cares   Cameron Catheter: Not present  Lines: None     Cardiac Monitoring: None  Code Status: No CPR- Do NOT Intubate      Clinically Significant Risk Factors                  # Hypertension: Noted on problem list            # Severe Malnutrition: based on nutrition assessment    # Financial/Environmental Concerns: none        8/29 discussed with  cousin Reena  and daughter Deonte         Disposition Plan     Medically Ready for Discharge: when symptoms ok and bed available              Naz Portillo MD  Hospitalist Service, GOLD TEAM [address] [place] [address]  Securely message with [employer] (more info)  Text page via [employer] Paging/Directory   See signed in provider for up to date coverage information  ______________________________________________________________________    Interval History     No major changes, in bed , no emesis   Physical Exam   Vital Signs: Temp: 98  F (36.7  C)  Temp src: Oral BP: 100/50 Pulse: 85   Resp: 18 SpO2: 96 % O2 Device: None (Room air)    Weight: 130 lbs 11.72 oz  General appearence: awake alert  in  no apparent distress      RESPIRATORY: lungs clear   CARDIOVASCULAR:S1 S2 regular rate and rhythm,   GASTROINTESTINAL:soft, slight distention, occ  bowel sounds,   SKIN: warm and dry, no mottling noted   NEUROLOGIC; awake alert  EXTREMITIES: no clubbing, cyanosis or edema     Data         Imaging results reviewed over the past 24 hrs:   No results found for this or any previous visit (from the past 24 hour(s)).

## 2024-08-30 NOTE — PROGRESS NOTES
Care Management Follow Up    Length of Stay (days): 20    Expected Discharge Date:       Concerns to be Addressed: discharge planning  Patient plan of care discussed at interdisciplinary rounds: Yes    Anticipated Discharge Disposition: Long Term Care    Anticipated Discharge Services: Hospice  Anticipated Discharge DME: None    Patient/family educated on Medicare website which has current facility and service quality ratings: yes  Education Provided on the Discharge Plan: Yes  Patient/Family in Agreement with the Plan: yes    Referrals Placed by CM/SW:     Pending:      [facility]  [address] [place] [address]  PH: [phone]  8/30: SW left VM for Fior in Admissions requesting call back.     [facility] [address] [place] [address]  P: [phone] (Kerline)  F: [phone]  8/30: Referral sent    [facility] (long term care)  [address] [place] [address]   Admissions Stephany: [phone]  Nurse manager Agnes: [phone]  F: [phone]  8/30: Referral sent    [facility]  [address] [place]  [address]  P: [phone]  Admissions Flower: [phone]  F: [phone]  8/30: Referral sent  ADDENDUM 1600: SW received VM from Nila in Admissions (ph: [phone]) with question about billing and noting she would be office until 1600. Nila also sent message through Epic asking about payer source. They do have beds available in LTC. MAURO messaged Nila back in [employer] with Unit MAURO cc'ed to message. MAURO shared that pt has [facility] PMAP policy so hope was that insurance could pay for services.     [facility]  [address] [place]  [address]  P: [phone]  Admissions: [phone]  F: [phone]  8/30: Referral sent    [facility]  [address] [place]  [address]  P: [phone]  F: [phone]  8/30: Referral sent      Declined:     [facility]  [address] [address]  [place] [address]  PH: [phone]  8/28: Initial referral sent. Declined due to not having LTC beds      [facility]  [address] [place] [address]  PH: [phone]  8/28: Initial referral sent   8/29: Declined due to no LTC bed available     [facility]  [address] [place] [address]  PH: [phone]  8/28: Initial referral sent   8/29: CHW spoke with Jessica from admissions and she will reach out to writer later today via Epic. Declined due to no bed available      [facility]  [address] [place] [address]  PH: [phone]  FAX: [phone]  8/28: Initial referral sent   8/29: CHW called admissions and LVM requesting a call back. Declined due to no LTC bed available     Private pay costs discussed: Not applicable    Discussed  Partnership in Safe Discharge Planning  document with patient/family: No     Handoff Completed: Not at this time    Additional Information:  SW following for discharge planning. Current discharge plan is for LTC with hospice. Pt currently receiving TF. Pt remains medically ready for LTC with hospice discharge per [facility] Jayesh provider in 5 MS rounds. MAURO followed up on pending referral as noted above.     MAURO spoke with pt cousin (Reena) over the phone to obtain additional LTC preferences. Reena provided 5 additional choices (noted above under referrals placed). MAURO provided Reena on update on previous referrals sent. Reena had no further questions.     MAURO sent additional LTC referrals as noted above.     Next Steps: MAURO to continue to follow up on LTC referrals and continue to work on safe discharge plan. SW to discuss and send referral to hospice agencies once LTC facility is found.     SAMANTHA Kearns   [facility]   Available via [employer]  Covering 5 Med Surg MAURO, ph: [phone]

## 2024-08-30 NOTE — PLAN OF CARE
Care provided [id number]    Remains on comfort cares.  Confusion noted today from family/. Not making as much sense, at times nonsensical answers.  Denies pain.   Hallucination? Of traditional food in room today.  Some fluids for comfort.   Continues to be incontinent of bowel and bladder.  Reposition q2h.   No acute events.

## 2024-08-31 NOTE — PLAN OF CARE
Comfort care maintained. NPO. Meds through [employer]. Denies pain. Incontinent of B/B. Assist of 1. R PIV SL, patent with flush. Appears to be sleeping during rounds. Hourly checks on pt with heard respirations. Pt refused to turn/reposition multiple times. Pt opened eyes and answered questions with head and hands. Continue POC.

## 2024-08-31 NOTE — PLAN OF CARE
Care provided [id number]    More tired/sleepy today.   No requests to eat or drink.  Very dark urine today. Blood noted. Few scant clots.   Turning q2h.   Bed bath given.    Meds per g-tube.  Update given to family about end of life progression, encouraged that it is normal for patient to become more sleepy, not desire to eat, etc.  They were accepting of this.   No respiratory changes. Rousable by touch.

## 2024-08-31 NOTE — PROGRESS NOTES
"[facility]    Medicine Progress Note - Hospitalist Service, GOLD TEAM 17    Date of Admission:  8/10/2024    Assessment & Plan     Myla Glynn is a 79 year old female admitted on 8/10/2024. 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, s/p sigmoid resection in 2021 also had partial cystectomy and ureteral reimplantation  2024 per urology progressive metastatic stage IV colorectal cancer   #Bladder mass, status post resection  9/2023  and pathology showed invasive adenocarcinoma then had partial cystectomy pelvic lymph node dissection small bowel resection 1/2024   #Suspected recurrent/metastatic adenocarcinoma  Followed at [facility] and per urology notes form 8/9 \"We would not recommend anymore surgical intervention for her given the extreme complexity of her surgical situation previously and her extensive hospital stay after her surgeries that we had performed.\" She was to follow up with oncology but never had an opportunity to get there and therefore has not re-established.   - Repeat CT A/P with con on 8/24 in s/o worsening pelvic pain and bloody discharge. 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 with soft tissue enhancement/thickening of SB wall suspicious for local disease recurrance with fistula creation; additional enhancing soft tissue thickening along bladder wall and distal L ureter at site of L ureteral reimplantation c/f disease progression; mild e/o partial obstruction with mild pelviectasis of L kidney, mild L urothelial enhancement possibly related to a UTI; focal SB " natrrowing 2/2 soft tissue thickening along fistulized segment of SB in L hemipelvis with multiple fluid filled loops of proximal bowel with few air-fluid levels, suggestive of partial bowel obstruction; decompressed and enhancing appearance of vagina without definitive fistulous connection; pelvic nodularity with no signficant change.   - oncology re-consulted, no plans for induction therapies while admitted, will need to reassess pending improvement in nutritional and functional status and f/u as outpatient  - suspect her deconditioning is primarily related to her cancer progression and have concerns that she will not be able to progress enough here to discharge safely. Trialed slow increase in TF rate with advancement up to 20 cc/h but then had recurrent nausea. Also developed pelvic pain as below. In s/o repeat imaging results, again suspect her sxs are all related to cancer progression. She is not a surgical candidate.   - palliative care following, appreciate assistance  - discussed possible transition to comfort care/hospice with Dominic on 8/25, and separately with her daughters via phone and in person (8/25)   - they expressed understanding of progressive cancer and recurrent fistula. We discussed possible transition to hospice/CC which Dominic is familiar with. She requested further d/w palliative care who will follow up with family today.  - please see note on 8/22 for GOC discussion with pt and family  - please communicate directly with family (Dolores, [phone]) for all decision making  - family in agreement with comfort measures, please see note for details from discussion dated on same day.                  - stop IV CTX with suspected UCx+ likely 2/2 fistulization from cancer                 - continue PO vanc to complete C diff tx course                 - will not continue additional TFs with progression in metastatic cancer and c/f partial obstruction on CT --> suspect would lead to prolonged  suffering with minimal benefit, along with increased risk of worsening nausea/emesis/aspiration/etc  - 8/29 spoke with patient  daughter Deonte  by bedside and did call Reena who family is referring to for help, Deonte was asking about repeat CT scan, we did discuss that it will not show improvement but worsening as CT already showed worsening on 8/24. We did discuss that unfortunately Myla is at very high risk for bowel obstruction  form her cancer that has gotten worse. I feel Myla has several weeks to months to live and they expressed understanding of this . They again asked when can we restart tube feeding  as they would be interested in this, we could try in few days if still desired but we did talk about the fact that Myla had nausea vomiting with very low dose TF     Continue patient  and family support       #Pelvic/abdominal pain  #E coli UTI vs colonization  #Bloody discharge, suspect hematuria  Pt c/o lower pelvic pain. RN noted possible bloody discharge from vagina/urethra noted by overnight care team. Was previously tx for PNA/possible UTI earlier in hospitalization with a course of CTX as below. Repeat UA appears infected with UCx +E coli, susceptibilities pending. Would suspect with her fistula, this is likely contamination and her pain is more related to metastatic progression. No anemia. D/w cousin as above, will continue therapies for the time being until discussion with palliative care takes place for possible transition to comfort measures.   - stop CTX (8/25 - p)  - f/u UCx sensitivities     #Severe malnutrition  PEG placement on 6/2024, on cycled G-tube feeds. Was not tolerating higher rates initially. Started to slowly titrate up feeds but had nausea recurrence and now with worsening pelvic pain like 2/2 worsening cancer. Suspect will transition to comfort measures/hospice as above.   - nutrition following  - hold TFs  - stop IVFs     #Hypernatremia  Na 150, asymptomatic. Suspect  nutrition related. Free water flushes increased  - stop IVFs now on comfort measures  - Na was 144 8/25      #Nausea and vomiting, improved  #Decreased po intake  Had CT chest abdominal pelvis at [place] 8/8, she was already having above symptoms. Showed worsening of the locally advanced mass involving bladder, no evidence of bowel obstruction but at high risk , possible enterovesical fistula. Need to be careful with IV fluids with heart failure HFrEF. Abdominal XR 8/10 showed nonobstructive gas pattern, no pneumatosis or free air. Lactate negative.  she is also constipated so started on BM. Suspect related to above. GI in agreement.   - UA + but question if has enterovesical fistula, Urine culture negative, CTX was discontinued  - repeat abdominal XR 8/16 with nonspecific gas pattern, mild colonic stool burden    - GI initially consulted, now signed off, recommended scopalamine patch and miralax titration for goal 1 BM per day  - comfort measures     #Diarrhea/loose stools, improving  #C diff infection  Had some constipation earlier in course. 5x BM on 8/19 with possible c/f overflow diarrhea. States she had diarrhea all night on 8/20 but only charted 1x BM. Denies abdominal pain. Prior course of CTX earlier this admission. C diff positive. Stool output improving, only 1x BM on 8/23.   -- continue PO vanc for 10 day course, started 8/22  - now no BM , back on miralax      #Leukocytosis, resolved  WBC 25. Vitals stable otherwise. No s/sxs of acute infection aside from loose stools as above. May be reactive in s/o metastatic cancer. Improved with IVFs and antibiotics for C diff.   - monitor     #Chronic HFrEF  #Significant Mitral Valve regurgitation   #Hypertension    Follows at [facility]. TTE 4/4 with EF 29%  moderate TR. Was to have cardiac MRI once she improved.   - PTA on carvediol , continue with holding parameters  not on ACEi/ARB  SGLT2 inhibitor due to CKD      #History left sided pleural effusion  - on CT 8/8  left pleural effusion size has decreased per report   - CT did show new enhancing pleural thickening around the L effusion c/w exudative effusion. She was given CTX for 5 days.     #  acute on Chronic CKD stage III  Baseline creatinine 1.4-1.7. Cr bump to 1.8 on 8/25, unclear if d/t partial obstruction as above vs prerenal ALFREDA vs. Within pts normal baseline.  - mIVFs with LR 75 cc/h until GOC discussion can be held with palliative care  - avoid nephrotoxic agents      #T2DM  - not on medications blood sugar <100 stopped accu-checks      #Mild cognitive impairment  - at risk for delirium      #Depression  - not on medications       #History of infected left retroperitoneal hematoma  status post  IR embolization and drainage 3/2024   - no current issues      #GOC  Remains full code currently. Appreciate palliative care assistance.           Diet: NPO for Medical/Clinical Reasons Except for: Other; Specify: ok for small volume food/liquids (< 100 cc at a time) for pt comfort    DVT Prophylaxis: on confort cares   Cameron Catheter: Not present  Lines: None     Cardiac Monitoring: None  Code Status: No CPR- Do NOT Intubate      Clinically Significant Risk Factors                  # Hypertension: Noted on problem list            # Severe Malnutrition: based on nutrition assessment    # Financial/Environmental Concerns: none        8/29 discussed with  cousin Reena  and daughter Deonte       8/31 discussed with  niece by bedside     Disposition Plan     Medically Ready for Discharge: when symptoms ok and bed available              Naz Portillo MD  Hospitalist Service, GOLD TEAM 17  [facility]  Securely message with [employer] (more info)  Text page via [facility] Paging/Directory   See signed in provider for up to date coverage information  ______________________________________________________________________    Interval History     Confusion noted by family yesterday, niece here  today and states she is not confused   Urine getting darker , some mucus bloody drainage urethra/vagina   Per niece denies pain ha shad no emesis       Physical Exam   Vital Signs:                    Weight: 130 lbs 11.72 oz  General appearence: tired appearing awake alert  in  no apparent distress    RESPIRATORY: lungs clear   CARDIOVASCULAR:S1 S2 regular rate and rhythm,   GASTROINTESTINAL:soft, slight distention, occ  bowel sounds,   SKIN: warm and dry, no mottling noted   NEUROLOGIC; awake bit seems more tired   EXTREMITIES: no clubbing, cyanosis or edema     Data         Imaging results reviewed over the past 24 hrs:   No results found for this or any previous visit (from the past 24 hour(s)).

## 2024-09-01 NOTE — PLAN OF CARE
Goal Outcome Evaluation:      Plan of Care Reviewed With: patient    Overall Patient Progress: no changeOverall Patient Progress: no change    Outcome Evaluation: See progress note.    Pt is NPO, all meds to be administered through Gtube. Comfort care maintained. Pt denies pain, incontinent of b/b. R PIV SL. Pt turn/reposition as scheduled. No acute events this shift. Call light within reach, pt able to use appropriately. Continue POC.    Zuly White RN

## 2024-09-01 NOTE — PROGRESS NOTES
"[facility]    Medicine Progress Note - Hospitalist Service, GOLD TEAM 17    Date of Admission:  8/10/2024    Assessment & Plan     Myla Glynn is a 79 year old female admitted on 8/10/2024. 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, s/p sigmoid resection in 2021 also had partial cystectomy and ureteral reimplantation  2024 per urology progressive metastatic stage IV colorectal cancer   #Bladder mass, status post resection  9/2023  and pathology showed invasive adenocarcinoma then had partial cystectomy pelvic lymph node dissection small bowel resection 1/2024   #Suspected recurrent/metastatic adenocarcinoma  Followed at [facility] and per urology notes form 8/9 \"We would not recommend anymore surgical intervention for her given the extreme complexity of her surgical situation previously and her extensive hospital stay after her surgeries that we had performed.\" She was to follow up with oncology but never had an opportunity to get there and therefore has not re-established.   - Repeat CT A/P with con on 8/24 in s/o worsening pelvic pain and bloody discharge. 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 with soft tissue enhancement/thickening of SB wall suspicious for local disease recurrance with fistula creation; additional enhancing soft tissue thickening along bladder wall and distal L ureter at site of L ureteral reimplantation c/f disease progression; mild e/o partial obstruction with mild pelviectasis of L kidney, mild L urothelial enhancement possibly related to a UTI; focal SB " natrrowing 2/2 soft tissue thickening along fistulized segment of SB in L hemipelvis with multiple fluid filled loops of proximal bowel with few air-fluid levels, suggestive of partial bowel obstruction; decompressed and enhancing appearance of vagina without definitive fistulous connection; pelvic nodularity with no signficant change.   - oncology re-consulted, no plans for induction therapies while admitted, will need to reassess pending improvement in nutritional and functional status and f/u as outpatient  - suspect her deconditioning is primarily related to her cancer progression and have concerns that she will not be able to progress enough here to discharge safely. Trialed slow increase in TF rate with advancement up to 20 cc/h but then had recurrent nausea. Also developed pelvic pain as below. In s/o repeat imaging results, again suspect her sxs are all related to cancer progression. She is not a surgical candidate.   - palliative care following, appreciate assistance  - discussed possible transition to comfort care/hospice with Dominic on 8/25, and separately with her daughters via phone and in person (8/25)   - they expressed understanding of progressive cancer and recurrent fistula. We discussed possible transition to hospice/CC which Dominic is familiar with. She requested further d/w palliative care who will follow up with family today.  - please see note on 8/22 for GOC discussion with pt and family  - please communicate directly with family (Dolores, [phone]) for all decision making  - family in agreement with comfort measures, please see note for details from discussion dated on same day.                  - stop IV CTX with suspected UCx+ likely 2/2 fistulization from cancer                 - continue PO vanc to complete C diff tx course                 - will not continue additional TFs with progression in metastatic cancer and c/f partial obstruction on CT --> suspect would lead to prolonged  suffering with minimal benefit, along with increased risk of worsening nausea/emesis/aspiration/etc  - 8/29 spoke with patient  daughter Deonte  by bedside and did call Reena who family is referring to for help, Deonte was asking about repeat CT scan, we did discuss that it will not show improvement but worsening as CT already showed worsening on 8/24. We did discuss that unfortunately Myla is at very high risk for bowel obstruction  form her cancer that has gotten worse. I feel Myla has several weeks to months to live and they expressed understanding of this . They again asked when can we restart tube feeding  as they would be interested in this, we could try in few days if still desired but we did talk about the fact that Myla had nausea vomiting with very low dose TF     Continue patient  and family support    - 9/1 she seems to be in the phase of active dying      #Pelvic/abdominal pain  #E coli UTI vs colonization  #Bloody discharge, suspect hematuria  Pt c/o lower pelvic pain. RN noted possible bloody discharge from vagina/urethra noted by overnight care team. Was previously tx for PNA/possible UTI earlier in hospitalization with a course of CTX as below. Repeat UA appears infected with UCx +E coli, susceptibilities pending. Would suspect with her fistula, this is likely contamination and her pain is more related to metastatic progression. No anemia. D/w cousin as above, will continue therapies for the time being until discussion with palliative care takes place for possible transition to comfort measures.   - stop CTX (8/25 - p)  - f/u UCx sensitivities     #Severe malnutrition  PEG placement on 6/2024, on cycled G-tube feeds. Was not tolerating higher rates initially. Started to slowly titrate up feeds but had nausea recurrence and now with worsening pelvic pain like 2/2 worsening cancer. Suspect will transition to comfort measures/hospice as above.   - nutrition following  - hold TFs  - stop IVFs      #Hypernatremia  Na 150, asymptomatic. Suspect nutrition related. Free water flushes increased  - stop IVFs now on comfort measures  - Na was 144 8/25      #Nausea and vomiting, improved  #Decreased po intake  Had CT chest abdominal pelvis at [facility] 8/8, she was already having above symptoms. Showed worsening of the locally advanced mass involving bladder, no evidence of bowel obstruction but at high risk , possible enterovesical fistula. Need to be careful with IV fluids with heart failure HFrEF. Abdominal XR 8/10 showed nonobstructive gas pattern, no pneumatosis or free air. Lactate negative.  she is also constipated so started on BM. Suspect related to above. GI in agreement.   - UA + but question if has enterovesical fistula, Urine culture negative, CTX was discontinued  - repeat abdominal XR 8/16 with nonspecific gas pattern, mild colonic stool burden    - GI initially consulted, now signed off, recommended scopalamine patch and miralax titration for goal 1 BM per day  - comfort measures     #Diarrhea/loose stools, improving  #C diff infection  Had some constipation earlier in course. 5x BM on 8/19 with possible c/f overflow diarrhea. States she had diarrhea all night on 8/20 but only charted 1x BM. Denies abdominal pain. Prior course of CTX earlier this admission. C diff positive. Stool output improving, only 1x BM on 8/23.   -- continue PO vanc for 10 day course, started 8/22  - now no BM , back on miralax      #Leukocytosis, resolved  WBC 25. Vitals stable otherwise. No s/sxs of acute infection aside from loose stools as above. May be reactive in s/o metastatic cancer. Improved with IVFs and antibiotics for C diff.   - monitor     #Chronic HFrEF  #Significant Mitral Valve regurgitation   #Hypertension    Follows at [facility]. TTE 4/4 with EF 29%  moderate TR. Was to have cardiac MRI once she improved.   - PTA on carvediol , continue with holding parameters  not on ACEi/ARB  SGLT2 inhibitor due to CKD       #History left sided pleural effusion  - on CT 8/8 left pleural effusion size has decreased per report   - CT did show new enhancing pleural thickening around the L effusion c/w exudative effusion. She was given CTX for 5 days.     #  acute on Chronic CKD stage III  Baseline creatinine 1.4-1.7. Cr bump to 1.8 on 8/25, unclear if d/t partial obstruction as above vs prerenal ALFREDA vs. Within pts normal baseline.  - mIVFs with LR 75 cc/h until GOC discussion can be held with palliative care  - avoid nephrotoxic agents      #T2DM  - not on medications blood sugar <100 stopped accu-checks      #Mild cognitive impairment  - at risk for delirium      #Depression  - not on medications       #History of infected left retroperitoneal hematoma  status post  IR embolization and drainage 3/2024   - no current issues      #GOC  Remains full code currently. Appreciate palliative care assistance.           Diet: NPO for Medical/Clinical Reasons Except for: Other; Specify: ok for small volume food/liquids (< 100 cc at a time) for pt comfort    DVT Prophylaxis: on confort cares   Cameron Catheter: Not present  Lines: None     Cardiac Monitoring: None  Code Status: No CPR- Do NOT Intubate      Clinically Significant Risk Factors                  # Hypertension: Noted on problem list            # Severe Malnutrition: based on nutrition assessment    # Financial/Environmental Concerns: none        8/29 discussed with  cousin Reena  and daughter Deonte       8/31 discussed with  niece by bedside     Disposition Plan     Medically Ready for Discharge: when symptoms ok and bed available              Naz Portillo MD  Hospitalist Service, GOLD TEAM [address] [place] [address]  Securely message with [employer] (more info)  Text page via [employer] Paging/Directory   See signed in provider for up to date coverage information  ______________________________________________________________________    Interval History    In bed, not awake. family here, apparently has had no pain no nausea vomiting       Physical Exam   Vital Signs:                    Weight: 130 lbs 11.72 oz  General appearence: in no distress   Breathing nonlaboured  Abdominal soft nontender     Data         Imaging results reviewed over the past 24 hrs:   No results found for this or any previous visit (from the past 24 hour(s)).

## 2024-09-01 NOTE — PROGRESS NOTES
"Care Management Follow Up    Length of Stay (days): 22    Expected Discharge Date: TBD, medically ready for discharge     Concerns to be Addressed: Discharge planning     Patient plan of care discussed at interdisciplinary rounds: Yes    Anticipated Discharge Disposition: Long Term Care  Anticipated Discharge Services: None  Anticipated Discharge DME: None    Patient/family educated on Medicare website which has current facility and service quality ratings: Yes  Education Provided on the Discharge Plan: Yes  Patient/Family in Agreement with the Plan: Yes    Referrals Placed by CM/SW: External Care Coordination  Private pay costs discussed: Not applicable    Discussed  Partnership in Safe Discharge Planning  document with patient/family: No     Handoff Completed: No, handoff not indicated or clinically appropriate    Additional Information:  Care management continues to work on finding LTC placement. Once we find an accepting facility, patient will discharge on hospice. Care management continues to follow.     Pending:  [facility]  [address] [place] [address]  Phone: [phone]  8/30: SW left  for Fior in Admissions requesting call back.   9/1: Staff unable to transfer me to admissions and provided SW with the wrong phone number. Will need to follow up on Tuesday.     [facility] at [address] [place] [address]  P: [phone] (Kerline)  F: [phone]  8/30: Referral sent  9/1: Called to follow up. No answer but left a voicemail requesting a call back.     [facility]  [address] [place] [address]   Admissions (Stephany): [phone]  Nurse manager (Agnes): [phone]  F: [phone]  8/30: Referral sent  9/1: Called to follow up. No answer but left a voicemail requesting a call back. It does appear that admissions staff may be OOO until mid September. There was another # listed to call for \"immediate needs\". We may want to call " this number to follow up on Tuesday 9/3.     [facility]  [address] [place]  [address]  P: [phone]  Admissions: [phone]   F: [phone]  8/30: MAURO received VM from Nila in Admissions (ph: [phone]) with question about billing and noting she would be office until 1600. Nila also sent message through Epic asking about payer source. They do have beds available in LTC. MAURO messaged Nila back in [employer] with Unit SW cc'ed to message. [facility]O [facility] so hope was that insurance could pay for services.   9/1: Called to follow up. No answer but left a voicemail requesting a call back.     [facility]  [address] [place]  [address]  P: [phone]  F: [phone]  8/30: Referral sent   9/1: Called to follow up. No answer but left a voicemail requesting a call back.     Declined:  [facility]  [address] [address]  [place] [address]  PH: [phone]  8/28: Declined due to not having LTC beds      [facility]  [address]  [place] [address]  PH: [phone]  8/28: Initial referral sent   8/29: Declined due to no LTC bed available     [facility]  [address] [place] [address]  PH: [phone]  8/28: Initial referral sent   8/29: Declined due to no bed available      [facility]  [address] [place] [address]  PH: [phone]  FAX: [phone]  8/28: Initial referral sent   8/29: Declined due to no LTC bed available     [facility]  [address] [place]  [address]  P: [phone]  Admissions: [phone]  F: [phone]  9/1: Declined due to no bed, acuity too high, cannot meet patients needs and complex medical needs    Next Steps: Remain on hospice and discharge to LTC once we have an accepting facility     RUY Morris, LGSW  [address]   [facility]  Phone: [phone]

## 2024-09-01 NOTE — PLAN OF CARE
Care provided [id number]    No acute events  Continues to mostly be asleep  Able to rouse and respond to simple questions. Is alert and oriented  Denies pain/nausea- when family at bedside requested to let rest this afternoon and not have pain medications/tube medications in afternoon.   Turning q2h  Incontinent, benita provided  Bed bath given.

## 2024-09-02 NOTE — PROGRESS NOTES
"[facility]    Medicine Progress Note - Hospitalist Service, GOLD TEAM 17    Date of Admission:  8/10/2024    Assessment & Plan     Myla Glynn is a 79 year old female admitted on 8/10/2024. 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, s/p sigmoid resection in 2021 also had partial cystectomy and ureteral reimplantation  2024 per urology progressive metastatic stage IV colorectal cancer   #Bladder mass, status post resection  9/2023  and pathology showed invasive adenocarcinoma then had partial cystectomy pelvic lymph node dissection small bowel resection 1/2024   #Suspected recurrent/metastatic adenocarcinoma  Followed at [facility] and per urology notes form 8/9 \"We would not recommend anymore surgical intervention for her given the extreme complexity of her surgical situation previously and her extensive hospital stay after her surgeries that we had performed.\" She was to follow up with oncology but never had an opportunity to get there and therefore has not re-established.   - Repeat CT A/P with con on 8/24 in s/o worsening pelvic pain and bloody discharge. 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 with soft tissue enhancement/thickening of SB wall suspicious for local disease recurrance with fistula creation; additional enhancing soft tissue thickening along bladder wall and distal L ureter at site of L ureteral reimplantation c/f disease progression; mild e/o partial obstruction with mild pelviectasis of L kidney, mild L urothelial enhancement possibly related to a UTI; focal SB " natrrowing 2/2 soft tissue thickening along fistulized segment of SB in L hemipelvis with multiple fluid filled loops of proximal bowel with few air-fluid levels, suggestive of partial bowel obstruction; decompressed and enhancing appearance of vagina without definitive fistulous connection; pelvic nodularity with no signficant change.   - oncology re-consulted, no plans for induction therapies while admitted, will need to reassess pending improvement in nutritional and functional status and f/u as outpatient  - suspect her deconditioning is primarily related to her cancer progression and have concerns that she will not be able to progress enough here to discharge safely. Trialed slow increase in TF rate with advancement up to 20 cc/h but then had recurrent nausea. Also developed pelvic pain as below. In s/o repeat imaging results, again suspect her sxs are all related to cancer progression. She is not a surgical candidate.   - palliative care following, appreciate assistance  - discussed possible transition to comfort care/hospice with Dominic on 8/25, and separately with her daughters via phone and in person (8/25)   - they expressed understanding of progressive cancer and recurrent fistula. We discussed possible transition to hospice/CC which Dominic is familiar with. She requested further d/w palliative care who will follow up with family today.  - please see note on 8/22 for GOC discussion with pt and family  - please communicate directly with family (Dolores, [phone]) for all decision making  - family in agreement with comfort measures, please see note for details from discussion dated on same day.                  - stop IV CTX with suspected UCx+ likely 2/2 fistulization from cancer                 - continue PO vanc to complete C diff tx course                 - will not continue additional TFs with progression in metastatic cancer and c/f partial obstruction on CT --> suspect would lead to prolonged  suffering with minimal benefit, along with increased risk of worsening nausea/emesis/aspiration/etc  - 8/29 spoke with patient  daughter Deonte  by bedside and did call eRena who family is referring to for help, Deonte was asking about repeat CT scan, we did discuss that it will not show improvement but worsening as CT already showed worsening on 8/24. We did discuss that unfortunately Myla is at very high risk for bowel obstruction  form her cancer that has gotten worse. I feel Myla has several weeks to months to live and they expressed understanding of this . They again asked when can we restart tube feeding  as they would be interested in this, we could try in few days if still desired but we did talk about the fact that Myla had nausea vomiting with very low dose TF     Continue patient  and family support    - 9/1 she seems to be in the phase of active dying      #Pelvic/abdominal pain  #E coli UTI vs colonization  #Bloody discharge, suspect hematuria  Pt c/o lower pelvic pain. RN noted possible bloody discharge from vagina/urethra noted by overnight care team. Was previously tx for PNA/possible UTI earlier in hospitalization with a course of CTX as below. Repeat UA appears infected with UCx +E coli, susceptibilities pending. Would suspect with her fistula, this is likely contamination and her pain is more related to metastatic progression. No anemia. D/w cousin as above, will continue therapies for the time being until discussion with palliative care takes place for possible transition to comfort measures.   - stop CTX (8/25 - p)  - f/u UCx sensitivities     #Severe malnutrition  PEG placement on 6/2024, on cycled G-tube feeds. Was not tolerating higher rates initially. Started to slowly titrate up feeds but had nausea recurrence and now with worsening pelvic pain like 2/2 worsening cancer. Suspect will transition to comfort measures/hospice as above.   - nutrition following  - hold TFs  - stop IVFs      #Hypernatremia  Na 150, asymptomatic. Suspect nutrition related. Free water flushes increased  - stop IVFs now on comfort measures  - Na was 144 8/25      #Nausea and vomiting, improved  #Decreased po intake  Had CT chest abdominal pelvis at [place] 8/8, she was already having above symptoms. Showed worsening of the locally advanced mass involving bladder, no evidence of bowel obstruction but at high risk , possible enterovesical fistula. Need to be careful with IV fluids with heart failure HFrEF. Abdominal XR 8/10 showed nonobstructive gas pattern, no pneumatosis or free air. Lactate negative.  she is also constipated so started on BM. Suspect related to above. GI in agreement.   - UA + but question if has enterovesical fistula, Urine culture negative, CTX was discontinued  - repeat abdominal XR 8/16 with nonspecific gas pattern, mild colonic stool burden    - GI initially consulted, now signed off, recommended scopalamine patch and miralax titration for goal 1 BM per day  - comfort measures     #Diarrhea/loose stools, improving  #C diff infection  Had some constipation earlier in course. 5x BM on 8/19 with possible c/f overflow diarrhea. States she had diarrhea all night on 8/20 but only charted 1x BM. Denies abdominal pain. Prior course of CTX earlier this admission. C diff positive. Stool output improving, only 1x BM on 8/23.   -- continue PO vanc for 10 day course, started 8/22  - now no BM , back on miralax      #Leukocytosis, resolved  WBC 25. Vitals stable otherwise. No s/sxs of acute infection aside from loose stools as above. May be reactive in s/o metastatic cancer. Improved with IVFs and antibiotics for C diff.   - monitor     #Chronic HFrEF  #Significant Mitral Valve regurgitation   #Hypertension    Follows at [facility]. TTE 4/4 with EF 29%  moderate TR. Was to have cardiac MRI once she improved.   - PTA on carvediol , continue with holding parameters  not on ACEi/ARB  SGLT2 inhibitor due to CKD       #History left sided pleural effusion  - on CT 8/8 left pleural effusion size has decreased per report   - CT did show new enhancing pleural thickening around the L effusion c/w exudative effusion. She was given CTX for 5 days.     #  acute on Chronic CKD stage III  Baseline creatinine 1.4-1.7. Cr bump to 1.8 on 8/25, unclear if d/t partial obstruction as above vs prerenal ALFREDA vs. Within pts normal baseline.  - mIVFs with LR 75 cc/h until GOC discussion can be held with palliative care  - avoid nephrotoxic agents      #T2DM  - not on medications blood sugar <100 stopped accu-checks      #Mild cognitive impairment  - at risk for delirium      #Depression  - not on medications       #History of infected left retroperitoneal hematoma  status post  IR embolization and drainage 3/2024   - no current issues      #GOC  Remains full code currently. Appreciate palliative care assistance.           Diet: NPO for Medical/Clinical Reasons Except for: Other; Specify: ok for small volume food/liquids (< 100 cc at a time) for pt comfort    DVT Prophylaxis: on confort cares   Cameron Catheter: Not present  Lines: None     Cardiac Monitoring: None  Code Status: No CPR- Do NOT Intubate      Clinically Significant Risk Factors                  # Hypertension: Noted on problem list            # Severe Malnutrition: based on nutrition assessment    # Financial/Environmental Concerns: none        8/29 discussed with  cousin Reena  and daughter Deonte       8/31 discussed with  niece by bedside     Disposition Plan     Medically Ready for Discharge: when symptoms ok and bed available              Naz Portillo MD  Hospitalist Service, GOLD TEAM [address] [place] [address]  Securely message with [employer] (more info)  Text page via [employer] Paging/Directory   See signed in provider for up to date coverage information  ______________________________________________________________________    Interval History    In bed, does not open eyes to verbal commands       Physical Exam   Vital Signs:                    Weight: 130 lbs 11.72 oz  General appearence: in no distress   Breathing nonlaboured  Abdomen l soft  + bowel sounds      Data         Imaging results reviewed over the past 24 hrs:   No results found for this or any previous visit (from the past 24 hour(s)).

## 2024-09-02 NOTE — PLAN OF CARE
Goal Outcome Evaluation:      Plan of Care Reviewed With: patient, family    Overall Patient Progress: no changeOverall Patient Progress: no change     Pain managed with scheduled oxy.   1 episode of green emesis this shift while repositioning.  Sleeping all day. No PO intake. All meds through peg tube.

## 2024-09-02 NOTE — PLAN OF CARE
End of Shift Summary:     Comfort cares maintained. Pt hypoactive and sleeping throughout the night. No complains of pain or N&V. Turned Q2 hrs. Incontinence care provided.     Neuro: Arouses to voice  Pain: Denies   GI/: Incontinent of B&B  Lines/Access: R PIV SL     Diet: NPO. G-tube used for medications    Activity: Not OOB  Skin: Intact    Plan: Hospice

## 2024-09-03 NOTE — PROGRESS NOTES
"[facility]    Medicine Progress Note - Hospitalist Service, GOLD TEAM 17    Date of Admission:  8/10/2024    Assessment & Plan   Myla Glynn is a 79 year old female admitted on 8/10/2024.  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, s/p sigmoid resection in 2021 also had partial cystectomy and ureteral reimplantation 2024 per urology progressive metastatic stage IV colorectal ca   #  Bladder mass, s/p resection 9/2023  and pathology showed invasive adenocarcinoma then had partial cystectomy pelvic lymph node dissection small bowel resection 1/2024   #  Suspected recurrent/metastatic adenocarcinoma  ---   Followed at [facility] and per urology notes form 8/9 \" we would not recommend anymore surgical intervention for her given the extreme complexity of her surgical situation previously and her extensive hospital stay after her surgeries that we had performed. \" She was to follow up with oncology but never had an opportunity to get there and therefore has not re-established.   ---   Repeat CT A/P with con on 8/24 in s/o worsening pelvic pain and bloody discharge.  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 with soft tissue enhancement/thickening of SB wall suspicious for local disease recurrance with fistula creation; additional enhancing soft tissue thickening along bladder wall and distal L ureter at site of L ureteral reimplantation c/f disease progression; mild e/o partial obstruction with mild pelviectasis of L kidney, mild L urothelial enhancement possibly related to a UTI; focal SB " narrowing 2/2 soft tissue thickening along fistulized segment of SB in L hemipelvis with multiple fluid filled loops of proximal bowel with few air-fluid levels, suggestive of partial bowel obstruction; decompressed and enhancing appearance of vagina without definitive fistulous connection; pelvic nodularity with no signficant change.   ---   Oncology re-consulted, no plans for induction therapies while admitted, will need to reassess pending improvement in nutritional and functional status and f/u as outpatient  ---   Suspect her deconditioning is primarily related to her cancer progression and have concerns that she will not be able to progress enough here to discharge safely.  Trial of slow increase in TF rate with advancement up to 20 cc/h but then had recurrent nausea.  Also developed pelvic pain as below.  In s/o repeat imaging results, again suspect her sxs are all related to cancer progression.  She is not a surgical candidate.   ---   Palliative care following, appreciate assistance  - Discussed possible transition to comfort care/hospice with Dominic on 8/25, and separately with her daughters via phone and in person (8/25).  They expressed understanding of progressive cancer and recurrent fistula.  Dr. Portillo discussed possible transition to hospice/CC which Dominic is familiar with.  She requested further d/w palliative care who will follow up with family soon.  - Please see note on 8/22 for GOC discussion with pt and family  - Please communicate directly with family (Dolores, [phone]) for all decision making  ---   Family in agreement with comfort measures, please see note for details from discussion dated on same day.   ---   D/c'd IV CTX with suspected UCx+ likely 2/2 fistulization from cancer  ---   Completed PO vanc x 10 days 8/22 - 8/31 for treatment of C diff colitis  ---   Will not continue additional TFs with progression in metastatic cancer and c/f partial obstruction on CT --> suspect  would lead to prolonged suffering with minimal benefit, along with increased risk of worsening nausea/emesis/aspiration/etc  ---   8/29 Dr. Portillo spoke with pt's daughter Denote by bedside and did call Reena who family is referring to for help, Deonte was asking about repeat CT scan, Dr. Portillo did discuss that it will not show improvement but worsening as CT already showed worsening on 8/24.  / Bandar discussed that unfortunately Myla is at very high risk for bowel obstruction form her cancer that has gotten worse.  Dr. Portillo feel Myla has several weeks to months to live and they expressed understanding of this . They again asked when can we restart tube feeding as they would be interested in this, we could try in few days if still desired but we did talk about the fact that Myla had nausea vomiting with very low dose TF   ---   Pt appears to be actively dying     #  Pelvic/abdominal pain  #  E coli UTI vs colonization  #  Bloody discharge, suspect hematuria  ---   Pt c/o lower pelvic pain.   ---   RN recently noted possible bloody discharge from vagina/urethra    ---   Pt was previously tx for PNA/possible UTI earlier in hospitalization with a course of CTX as below.  Repeat UA appears infected with UCx +E coli, R to CTX.  Would suspect with her fistula, this is likely contamination and her pain is more related to metastatic progression.  No anemia.  D/w cousin as above, will continue therapies for the time being until discussion with palliative care takes place for possible transition to comfort measures.   ---   S/p CTX 8/25 - 8/26  ---   Will consider treating pt w/ Ciprofloxacin 400 mg iv bid if family insisted on IV abx treatment     #  Severe malnutrition  ---   PEG placement on 6/2024, on cycled G-tube feeds.   ---   Was not tolerating higher rates initially.  Started to slowly titrate up feeds but had nausea recurrence and now with worsening pelvic pain like 2/2 worsening cancer.   ---  Suspect  will transition to comfort measures/hospice as above.   ---   RD following  ---   TF on hold  ---   IVF d/c'd     #  Hypernatremia  ---   Due to hypovolemia  ---   Na level peaked at 150 ---> IVF ---> 144 on 8/25  ---   Na was not checked since 8/25     #  Nausea and vomiting, improved  #  Decreased po intake  ---   Had CT chest abdominal pelvis at [place] 8/8, she was already having above symptoms. Showed worsening of the locally advanced mass involving bladder, no evidence of bowel obstruction but at high risk, possible enterovesical fistula.  Need to be careful with IVF w/ her h/o HFrEF.    ---   Abdominal XR 8/10 showed nonobstructive gas pattern, no pneumatosis or free air. Lactate negative.  ---   She is also constipated so started on bowel meds. Suspect related to above. GI in agreement.   ---   UCx 8/24 grew > 100K E coli but ? if pt has enterovesical fistula  ---   Repeat abdominal XR 8/16 with nonspecific gas pattern, mild colonic stool burden    - GI initially consulted, now signed off, recommended scopalamine patch and miralax titration for goal 1 BM per day  ---   Currently family are considering comfort measures     #  Diarrhea/loose stools, improving  #  C diff infection  ---   Had some constipation earlier in course. 5x BM on 8/19 with possible c/f overflow diarrhea.  States she had diarrhea all night on 8/20.  Denied abdominal pain.    ---   She was exposed to CTX earlier this admission.    ---   Stool C diff toxins positive on 8/21.    ---   Completed Oral Vancomycin x 10 days, 8/22 8/31/24  ---   Diarrhea has resolved  ---   Continue Miralalx     #  Leukocytosis  ---   Due to underlying ca and infection  ---   Resolved     #  Chronic HFrEF  #  Significant Mitral Valve regurgitation   #  Hypertension    ---   Follows at [facility].   ---   TTE 4/4 with EF 29%  moderate TR.   ---   Was to have cardiac MRI once she improved.   ---   Continue PTA carvedilol with holding parameters   ---   Not on  ACEi/ARB or SGLT2 inhibitor due to CKD      #  CKD3  ---   Baseline creatinine 1.4-1.7.   ---   Peak Cr during this hospitalization is 1.8, not too far from baseline  ---   Avoid nephrotoxins     #  T2DM  ---   Diet controlled  ---   No longer monitoring glucose level     #  Mild cognitive impairment  ---   Continue supportive care     #  Depression  ---   Not on medications           Diet: NPO for Medical/Clinical Reasons Except for: Other; Specify: ok for small volume food/liquids (< 100 cc at a time) for pt comfort    DVT Prophylaxis: on confort cares   Cameron Catheter: Not present  Lines: None     Cardiac Monitoring: None  Code Status: No CPR- Do NOT Intubate    Disposition Plan    Medically Ready for Discharge: when symptoms ok and bed available           Nic Ferrer MD  Hospitalist Service, GOLD TEAM [address] [place] [address]  Securely message with [employer] (more info)  Text page via [employer] Paging/Directory   See signed in provider for up to date coverage information  ______________________________________________________________________    Interval History   Lying in bed  She did not respond or open her eyes to verbal command or tactile stimuli       Physical Exam   Vital Signs:                    Weight: 130 lbs 11.72 oz  General appearence: in no distress   Breathing nonlaboured  Abdomen l soft  + bowel sounds      Data         Imaging results reviewed over the past 24 hrs:   No results found for this or any previous visit (from the past 24 hour(s)).

## 2024-09-03 NOTE — PROGRESS NOTES
Received responsive to touch.  No signs of respiratory distress. Oxygen Sat at % in room air. Tachycardic.  Due meds given via Gtube. NPO.  Perineal care provided. Incontinent of B/B  Frequent positioning.  Keep call light within reach  On Comfort care.

## 2024-09-03 NOTE — PLAN OF CARE
Patient is responsive to touch and sometimes voice. No signs of respiratory distress with O2 sats % on RA. Medications administered via Gtube with free water to flush medications through tube. Patient is NPO. Incontinent of B/B, perineal care provided. Patient repositioned [id number]. Call light within reach. Family visiting bedside throughout shift.

## 2024-09-03 NOTE — PROGRESS NOTES
"                            CARE MANAGEMENT          Pending:  [facility]  [address] [place] [address]  Phone: [phone]  8/30: SW left VM for Fior in Admissions requesting call back.   9/1: Staff unable to transfer me to admissions and provided SW with the wrong phone number. Will need to follow up on Tuesday.  9/3: CHW left VM for Fior in Admissions requesting call back.     [facility]  [address] [place] [address]   Admissions (Stephany): [phone]  Nurse manager (Agnes): [phone]  F: [phone]  8/30: Referral sent  9/1: Called to follow up. No answer but left a voicemail requesting a call back. It does appear that admissions staff may be OOO until mid September. There was another # listed to call for \"immediate needs\". We may want to call this number to follow up on Tuesday 9/3.   9/3: CHW [facility] requesting a return call with an status update.    [facility]  [address] [place]  [address]  P: [phone]  Admissions: [phone]   F: [phone]  8/30: MAURO received VM from Nila in Admissions (ph: [phone]) with question about billing and noting she would be office until 1600. Nila also sent message through Epic asking about payer source. They do have beds available in LTC. MAURO messaged Nila back in [place] with Unit  cc'ed to message. MAURO shared that pt has [facility] PMAP policy so hope was that insurance could pay for services.   9/1: Called to follow up. No answer but left a voicemail requesting a call back.  9/3: CHW left VM for Nila in Admissions requesting call back.         Declined:  [facility]  [address] [place]  [address]  P: [phone]  F: [phone]  8/30: Referral sent   9/1: Called to follow up. No answer but left a voicemail requesting a call back.  9/3: CHW(not an appropriate placement)    [facility] [address] [place] [address]  P: [phone] " (Kerline)  F: [phone]  8/30: Referral sent  9/1: Called to follow up. No answer but left a voicemail requesting a call back.  9/3: CHW spoke with Kerline. Patient would need a private room due to her C-difficile precaution.  NO BED AVAILABLE IN A PRIVATE ROOM.    [facility]  [address] [facility] [address]  [place] [address]  PH: [phone]  8/28: Declined due to not having LTC beds      [facility]  [address] [place] [address]  PH: [phone]  8/28: Initial referral sent   8/29: Declined due to no LTC bed available     [facility]  [address] [place] [address]  PH: [phone]  8/28: Initial referral sent   8/29: Declined due to no bed available      [facility]  [address] [place] [address]  PH: [phone]  FAX: [phone]  8/28: Initial referral sent   8/29: Declined due to no LTC bed available      [facility]  [address] [place]  [address]  P: [phone]  Admissions: [phone]  F: [phone]  9/1: Declined due to no bed, acuity too high, cannot meet patients needs and complex medical needs           Myke Veliz  InPatient CHW

## 2024-09-04 NOTE — PROGRESS NOTES
"[facility]    Medicine Progress Note - Hospitalist Service, GOLD TEAM 17    Date of Admission:  8/10/2024    Assessment & Plan   Myla Glynn is a 79 year old female admitted on 8/10/2024.  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, s/p sigmoid resection in 2021 also had partial cystectomy and ureteral reimplantation 2024 per urology progressive metastatic stage IV colorectal ca   #  Bladder mass, s/p resection 9/2023  and pathology showed invasive adenocarcinoma then had partial cystectomy pelvic lymph node dissection small bowel resection 1/2024   #  Suspected recurrent/metastatic adenocarcinoma  ---   Followed at [facility] and per urology notes form 8/9 \" we would not recommend anymore surgical intervention for her given the extreme complexity of her surgical situation previously and her extensive hospital stay after her surgeries that we had performed. \" She was to follow up with oncology but never had an opportunity to get there and therefore has not re-established.   ---   Repeat CT A/P with con on 8/24 in s/o worsening pelvic pain and bloody discharge.  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 with soft tissue enhancement/thickening of SB wall suspicious for local disease recurrance with fistula creation; additional enhancing soft tissue thickening along bladder wall and distal L ureter at site of L ureteral " reimplantation c/f disease progression; mild e/o partial obstruction with mild pelviectasis of L kidney, mild L urothelial enhancement possibly related to a UTI; focal SB narrowing 2/2 soft tissue thickening along fistulized segment of SB in L hemipelvis with multiple fluid filled loops of proximal bowel with few air-fluid levels, suggestive of partial bowel obstruction; decompressed and enhancing appearance of vagina without definitive fistulous connection; pelvic nodularity with no signficant change.   ---   Oncology re-consulted, no plans for induction therapies while admitted, will need to reassess pending improvement in nutritional and functional status and f/u as outpatient  ---   Suspect her deconditioning is primarily related to her cancer progression and have concerns that she will not be able to progress enough here to discharge safely.  Trial of slow increase in TF rate with advancement up to 20 cc/h but then had recurrent nausea.  Also developed pelvic pain as below.  In s/o repeat imaging results, again suspect her sxs are all related to cancer progression.  She is not a surgical candidate.   ---   Palliative care following, appreciate assistance  - Discussed possible transition to comfort care/hospice with Dominic on 8/25, and separately with her daughters via phone and in person (8/25).  They expressed understanding of progressive cancer and recurrent fistula.  Dr. Portillo discussed possible transition to hospice/CC which Dominic is familiar with.  She requested further d/w palliative care who will follow up with family soon.  - Please see note on 8/22 for C discussion with pt and family  - Please communicate directly with family (Dolores, [phone]) for all decision making  ---   Family in agreement with comfort measures, please see note for details from discussion dated on same day.   ---   D/c'd IV CTX with suspected UCx+ likely 2/2 fistulization from cancer  ---   Completed PO vanc x 10 days  8/22 - 8/31 for treatment of C diff colitis  ---   Will not continue additional TFs with progression in metastatic cancer and c/f partial obstruction on CT --> suspect would lead to prolonged suffering with minimal benefit, along with increased risk of worsening nausea/emesis/aspiration/etc  ---   8/29 Dr. Portillo spoke with pt's daughter Deonte by bedside and did call Reena who family is referring to for help, Willvelia was asking about repeat CT scan, Dr. Portillo did discuss that it will not show improvement but worsening as CT already showed worsening on 8/24.  / Bandar discussed that unfortunately Myla is at very high risk for bowel obstruction form her cancer that has gotten worse.  Dr. Portillo feel Myla has several weeks to months to live and they expressed understanding of this . They again asked when can we restart tube feeding as they would be interested in this, we could try in few days if still desired but we did talk about the fact that Myla had nausea vomiting with very low dose TF   ---   Pt appears to be actively dying     #  Pelvic/abdominal pain  #  E coli UTI vs colonization  #  Bloody discharge, suspect hematuria  ---   Pt c/o lower pelvic pain.   ---   RN recently noted possible bloody discharge from vagina/urethra    ---   Pt was previously tx for PNA/possible UTI earlier in hospitalization with a course of CTX as below.  Repeat UA appears infected with UCx +E coli, R to CTX.  Would suspect with her fistula, this is likely contamination and her pain is more related to metastatic progression.  No anemia.  D/w cousin as above, will continue therapies for the time being until discussion with palliative care takes place for possible transition to comfort measures.   ---   S/p CTX 8/25 - 8/26  ---   Will consider treating pt w/ Ciprofloxacin 400 mg iv bid if family insisted on IV abx treatment     #  Severe malnutrition  ---   PEG placement on 6/2024, on cycled G-tube feeds.   ---   Was not  tolerating higher rates initially.  Started to slowly titrate up feeds but had nausea recurrence and now with worsening pelvic pain like 2/2 worsening cancer.   ---  Suspect will transition to comfort measures/hospice as above.   ---   RD following  ---   TF on hold  ---   IVF d/c'd     #  Hypernatremia  ---   Due to hypovolemia  ---   Na level peaked at 150 ---> IVF ---> 144 on 8/25  ---   Na was not checked since 8/25     #  Nausea and vomiting, improved  #  Decreased po intake  ---   Had CT chest abdominal pelvis at [place] 8/8, she was already having above symptoms. Showed worsening of the locally advanced mass involving bladder, no evidence of bowel obstruction but at high risk, possible enterovesical fistula.  Need to be careful with IVF w/ her h/o HFrEF.    ---   Abdominal XR 8/10 showed nonobstructive gas pattern, no pneumatosis or free air. Lactate negative.  ---   She is also constipated so started on bowel meds. Suspect related to above. GI in agreement.   ---   UCx 8/24 grew > 100K E coli but ? if pt has enterovesical fistula  ---   Repeat abdominal XR 8/16 with nonspecific gas pattern, mild colonic stool burden    - GI initially consulted, now signed off, recommended scopalamine patch and miralax titration for goal 1 BM per day  ---   Currently family are considering comfort measures     #  Diarrhea/loose stools, improving  #  C diff infection  ---   Had some constipation earlier in course. 5x BM on 8/19 with possible c/f overflow diarrhea.  States she had diarrhea all night on 8/20.  Denied abdominal pain.    ---   She was exposed to CTX earlier this admission.    ---   Stool C diff toxins positive on 8/21.    ---   Completed Oral Vancomycin x 10 days, 8/22 8/31/24  ---   Diarrhea has resolved  ---   Continue Miralalx     #  Leukocytosis  ---   Due to underlying ca and infection  ---   Resolved     #  Chronic HFrEF  #  Significant Mitral Valve regurgitation   #  Hypertension    ---   Follows at [place]  clinic.   ---   TTE 4/4 with EF 29%  moderate TR.   ---   Was to have cardiac MRI once she improved.   ---   Continue PTA carvedilol with holding parameters   ---   Not on ACEi/ARB or SGLT2 inhibitor due to CKD      #  CKD3  ---   Baseline creatinine 1.4-1.7.   ---   Peak Cr during this hospitalization is 1.8, not too far from baseline  ---   Avoid nephrotoxins     #  T2DM  ---   Diet controlled  ---   No longer monitoring glucose level     #  Mild cognitive impairment  ---   Continue supportive care     #  Depression  ---   Not on medications           Diet: NPO for Medical/Clinical Reasons Except for: Other; Specify: ok for small volume food/liquids (< 100 cc at a time) for pt comfort    DVT Prophylaxis: on confort cares   Cameron Catheter: Not present  Lines: None     Cardiac Monitoring: None  Code Status: No CPR- Do NOT Intubate    Disposition Plan    Medically Ready for Discharge now w/ comfort care measures only  Awaiting for placement          Nic Ferrer MD  Hospitalist Service, GOLD TEAM 17  [facility]  Securely message with [employer] (more info)  Text page via [facility] Paging/Directory   See signed in provider for up to date coverage information  ______________________________________________________________________    Interval History   Lying in bed  She does not open her eyes  She does not follow commands  Chronically ill-appearing  Family not present at bedside during this evaluation      Physical Exam   Vital Signs:                    Weight: 130 lbs 11.72 oz  General appearence: in no distress   Breathing nonlaboured  Abdomen l soft  + bowel sounds      Data         Imaging results reviewed over the past 24 hrs:   No results found for this or any previous visit (from the past 24 hour(s)).

## 2024-09-04 NOTE — PROGRESS NOTES
Care Management Follow Up     Length of Stay (days): 25     Expected Discharge Date: TBD, medically ready for discharge     Concerns to be Addressed: Discharge planning     Patient plan of care discussed at interdisciplinary rounds: Yes     Anticipated Discharge Disposition: Long Term Care  Anticipated Discharge Services: None  Anticipated Discharge DME: None     Patient/family educated on Medicare website which has current facility and service quality ratings: Yes  Education Provided on the Discharge Plan: Yes  Patient/Family in Agreement with the Plan: Yes     Referrals Placed by CM/SW: External Care Coordination  Private pay costs discussed: Not applicable     Discussed  Partnership in Safe Discharge Planning  document with patient/family: No      Handoff Completed: No, handoff not indicated or clinically appropriate     Additional Information:  Care management continues to work on placement. CHW followed up on LTC referrals today and [facility] may be able to accept patient but is still reviewing. SW sent a referral sent to [facility] for residential hospice placement. Emailed to Scot and awaiting follow up. Care management will continue to follow.      Pending:  [address] [address]  Phone: [phone]  8/30: SW left VM for [place] in Admissions requesting call back.   9/1: Staff unable to transfer me to admissions and provided SW with the wrong phone number. Will need to follow up on Tuesday.  9/3: CHW left VM for Childress in Admissions requesting call back.      [facility] [address] [place] [address]  P: [phone] (Kerline)  F: [phone]  8/30: Referral sent  9/1: Called to follow up. No answer but left a voicemail requesting a call back.  9/3: CHW LVM requesting a return call with a status update.      [facility]  [address] [place] [address]   Admissions (Stephany): [phone]  Nurse manager  "(Agnes): [phone]  F: [phone]  8/30: Referral sent  9/1: Called to follow up. No answer but left a voicemail requesting a call back. It does appear that admissions staff may be OOO until mid September. There was another # listed to call for \"immediate needs\". We may want to call this number to follow up on Tuesday 9/3.  9/3: CHW LV requesting a return call with a status update.      [facility]  [address] [place]  [address]  P: [phone]  Admissions: [phone]   F: [phone]  8/30: MAURO received VM from Nila in Admissions (ph: [phone]) with question about billing and noting she would be office until 1600. Nila also sent message through Epic asking about payer source. They do have beds available in LTC. MAURO messaged Nila back in [place] with Unit  cc'ed to message. [facility] so hope was that insurance could pay for services.   9/1: Called to follow up. No answer but left a voicemail requesting a call back.  9/3: CHW left VM for Nila in Admissions requesting call back.   9/4: The Admissions staff at the facility returned a call. They have provision for the patients language.The team is reviewing this referral. It is a possibility that she can admit tomorrow. She inquired about the patients PCP authorizing the LTC admission and completing the discharge orders. She also asked about the kind of tube feeding the patient is on. This writer asked the RNCC (Candy) and replied with the requested information. She is still meeting with the Clinical Team. She will be in touch with this writer asap. It will possibly be in the morning after a final decision.    [facility]   [address] [place] [address]  Phone: [phone]  Fax: [phone]  Email: [email or web address]  9/4: Referral sent      Declined:  [facility] [address] [address] [place] [address]  PH: [phone]  8/28: " Declined due to not having LTC beds      [facility]  [address] [place] [address]  PH: [phone]  8/28: Initial referral sent   8/29: Declined due to no LTC bed available     [facility]  [address]   [place] [address]  PH: [phone]  8/28: Initial referral sent   8/29: Declined due to no bed available      [facility]  [address] [place] [address]  PH: [phone]  FAX: [phone]  8/28: Initial referral sent   8/29: Declined due to no LTC bed available      [facility]  [address] [place]  [address]  P: [phone]  Admissions: [phone]  F: [phone]  9/1: Declined due to no bed, acuity too high, cannot meet patients needs and complex medical needs    [facility]  [address] [place]  [address]  P: [phone]  F: [phone]  9/3: Not an appropriate facility for placement.     Next Steps:   -LTC follow up  -[facility] with [facility], SW  5 Med Surg   [facility]  Phone: [phone]

## 2024-09-04 NOTE — PROGRESS NOTES
"                            CARE MANAGEMENT          Pending:  [facility]  [address] [place] [address]  Phone: [phone]  8/30: SW left VM for Fior in Admissions requesting call back.   9/1: Staff unable to transfer me to admissions and provided SW with the wrong phone number. Will need to follow up on Tuesday.  9/3: CHW left VM for Fior in Admissions requesting call back.     [facility]  [address] [place] [address]   Admissions (Stephany): [phone]  Nurse manager (Agnes): [phone]  F: [phone]  8/30: Referral sent  9/1: Called to follow up. No answer but left a voicemail requesting a call back. It does appear that admissions staff may be OOO until mid September. There was another # listed to call for \"immediate needs\". We may want to call this number to follow up on Tuesday 9/3.   9/3: CHW LVM requesting a return call with an status update.    [facility]  [address] [place]  [address]  P: [phone]  Admissions: [phone]   F: [phone]  8/30: MAURO received VM from Nila in Admissions (ph: [phone]) with question about billing and noting she would be office until 1600. Nila also sent message through Epic asking about payer source. They do have beds available in LTC. MAURO messaged Nila back in [place] with Unit  cc'ed to message. MAURO shared that pt has [facility] PMAP policy so hope was that insurance could pay for services.   9/1: Called to follow up. No answer but left a voicemail requesting a call back.  9/3: CHW left VM for Nila in Admissions requesting call back.   9/4: CHW left VM for Nila in Admissions requesting call back.   9/4: The Admissions staff at the facility returned a call. They have provision for the Patient's language.The team is reviewing this referral. It is a possibility that she can admit tomorrow. She inquired about the Patient's PCP authorizing the LTC Admission and completing " the discharge orders.She also asked about the kind of tube feeding the Patient is on. This writer asked the RNCC(Candy)  And replied with the requested information. She is still meeting with the Clinical Team.She will be in touch with this writer asap.   It will possibly be in the morning after a final decision.    Declined:  [facility]  [address] [place]  [address]  P: [phone]  F: [phone]  8/30: Referral sent   9/1: Called to follow up. No answer but left a voicemail requesting a call back.  9/3: CHW(not an appropriate placement)    [facility]  [address] [place] [address]  P: [phone] (Kerline)  F: [phone]  8/30: Referral sent  9/1: Called to follow up. No answer but left a voicemail requesting a call back.  9/3: CHW spoke with Kerline. Patient would need a private room due to her C-difficile precaution.  NO BED AVAILABLE IN A PRIVATE ROOM.    [facility]  [address] [facility] [place] [address]  [place] [address]  PH: [phone]  8/28: Declined due to not having LTC beds      [facility]  [address] [place] [address]  PH: [phone]  8/28: Initial referral sent   8/29: Declined due to no LTC bed available     [facility]  [address] [place] [address]  PH: [phone]  8/28: Initial referral sent   8/29: Declined due to no bed available      [facility]  [address] [place] [address]  PH: [phone]  FAX: [phone]  8/28: Initial referral sent   8/29: Declined due to no LTC bed available      [facility]  [address] [place]  [address]  P: [phone]  Admissions: [phone]  F: [phone]  9/1: Declined due to no bed, acuity too high, cannot meet patients needs and complex medical needs           Myke Veliz  InPatient CHW

## 2024-09-04 NOTE — PROGRESS NOTES
Pt is responsive to touch.  No signs of respiratory distress. Oxygen Sat at 99 % in room air. Tachycardic.  Due meds given via Gtube. Keep NPO.  Perineal care provided. Incontinent of B/B. No BM.  Frequent positioning.  Continue Comfort care.

## 2024-09-04 NOTE — PLAN OF CARE
100% SPO2 on RA. HR Tachycardic   Family at bedside throughout day   Pt very labile and no longer verbal. Moans with movements.  Scheduled Pain medication and haldol- family did refused last dose of each while at bedside- felt pt was comfortable and not currently in pain.   Q 2 hr repositioning- no BM this shift, Bloody urine w/ clots on chux pad.   Continue POC   Looking for hospice placement     Problem: Adult Inpatient Plan of Care  Goal: Plan of Care Review  Description: The Plan of Care Review/Shift note should be completed every shift.  The Outcome Evaluation is a brief statement about your assessment that the patient is improving, declining, or no change.  This information will be displayed automatically on your shift  note.  Outcome: Not Progressing  Flowsheets (Taken 9/4/2024 4264)  Outcome Evaluation: Recieving Comfort Cares. Repositioned q 2 hrs.  Plan of Care Reviewed With: patient  Overall Patient Progress: no change   Goal Outcome Evaluation:      Plan of Care Reviewed With: patient    Overall Patient Progress: no changeOverall Patient Progress: no change    Outcome Evaluation: Recieving Comfort Cares. Repositioned q 2 hrs.

## 2024-09-05 NOTE — PROGRESS NOTES
Care Management Follow Up    Length of Stay (days): 26    Expected Discharge Date:       Concerns to be Addressed: discharge planning     Patient plan of care discussed at interdisciplinary rounds: Yes    Anticipated Discharge Disposition: Long Term Care     Anticipated Discharge Services: None  Anticipated Discharge DME: None    Patient/family educated on Medicare website which has current facility and service quality ratings: yes  Education Provided on the Discharge Plan: Yes  Patient/Family in Agreement with the Plan: yes    Referrals Placed by CM/SW: External Care Coordination  Private pay costs discussed: Not applicable    Discussed  Partnership in Safe Discharge Planning  document with patient/family: No     Handoff Completed: Not at this time    Additional Information:  Writer spoke with [facility] agency to discuss referral process. Plan for patient to discharge to [facility] tomorrow. [facility] could sign patient on to hospice around 1pm tomorrow. Writer requested hospice orders from hospitalist. Manually faxed H&P, recent progress notes, and orders for hospice to [facility].    Accepted:  [facility]  [address] [place]  [address]  P: [phone]  Admissions: [phone]   F: [phone]    [facility]  Phone: [phone]  Fax: [phone]    Next Steps:   F/up with [facility] hospice if they can admit pt tomorrow at 1pm.  Connect with family.    Sarah Elias, MSN, APRN, AGCNS-BC   5MS [phone]  Nurse Coordinator  Securely message with Minerva, 5 Med Surg Vocera

## 2024-09-05 NOTE — PROGRESS NOTES
"Care Management Follow Up     Length of Stay (days): 26     Expected Discharge Date: 9/6/24     Concerns to be Addressed: Discharge planning     Patient plan of care discussed at interdisciplinary rounds: Yes     Anticipated Discharge Disposition: Long Term Care  Anticipated Discharge Services: None  Anticipated Discharge DME: None     Patient/family educated on Medicare website which has current facility and service quality ratings: Yes  Education Provided on the Discharge Plan: Yes  Patient/Family in Agreement with the Plan: Yes     Referrals Placed by CM/SW: External Care Coordination  Private pay costs discussed: Not applicable     Discussed  Partnership in Safe Discharge Planning  document with patient/family: No      Handoff Completed: No, handoff not indicated or clinically appropriate     Additional Information:  Social work received a call from Nila in admissions @ [facility] stating they can accept patient for LTC w/ hospice. Nila was checking to see what hospice agency the facility prefers to use. She will update MAURO once she knows and I will see if family is okay working with that agency. Call out to Reena who has been the primary point of contact during this hospital stay. Left Reena a VM updating her and requesting a call back. Will await a call back from patients family and [facility].     Addendum 2:39 PM:  Per Nila in admissions, \"[facility] is our preferred hospice provider. They are wonderful. Let's plan for tomorrow. Let me know what time. She will be in the [facility] in room 323\". JAMARI AlvaradoCC to call [facility] and see what their process is.     Addendum 4:15 PM:  Social work reached out to Reena to discuss discharge plans. She stated she would talk with family to confirm they are on board with the plan. Referral has been sent to [facility] and they are reviewing. Per Jenny RNCC - they may be able to start tomorrow as long as everything goes according to plan.     Care " management continues to follow.      Accepted:  [facility]  [address] [place]  [address]  P: [phone]  Admissions: [phone]   F: [phone]     Next Steps:   -Reach out to hospice agency  -Connect with family     RUY Morris, LGSW  5 Med Surg   [facility]  Phone: [phone]

## 2024-09-05 NOTE — PROGRESS NOTES
VS: Comfort cares no VS    Output/Last BM: Incontinent B&B, bloody urine, No BM this shift    Activity: Ax2 repositioning q2    Skin/Dressing: Meplix on bottom preventive    Pain: Managed with scheduled oxy and haldol    CMS: Responds to touch    Diet: NPO    LDA: RPIV SL    Equipment: Personal belongings    Plan: Hospice placement    Additional Info: Mediation through Peg tube      Goal Outcome Evaluation:  Plan of Care Reviewed With: patient  Overall Patient Progress: no change

## 2024-09-05 NOTE — PROGRESS NOTES
"[facility]    Medicine Progress Note - Hospitalist Service, GOLD TEAM 17    Date of Admission:  8/10/2024    Assessment & Plan   Myla Glynn is a 79 year old female admitted on 8/10/2024.  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, s/p sigmoid resection in 2021 also had partial cystectomy and ureteral reimplantation 2024 per urology progressive metastatic stage IV colorectal ca   #  Bladder mass, s/p resection 9/2023  and pathology showed invasive adenocarcinoma then had partial cystectomy pelvic lymph node dissection small bowel resection 1/2024   #  Suspected recurrent/metastatic adenocarcinoma  ---   Followed at [place] and per urology notes form 8/9 \" we would not recommend anymore surgical intervention for her given the extreme complexity of her surgical situation previously and her extensive hospital stay after her surgeries that we had performed. \" She was to follow up with oncology but never had an opportunity to get there and therefore has not re-established.   ---   Repeat CT A/P with con on 8/24 in s/o worsening pelvic pain and bloody discharge.  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 with soft tissue enhancement/thickening of SB wall suspicious for local disease recurrance with fistula creation; additional enhancing soft tissue thickening along bladder wall and distal L ureter at site of L ureteral reimplantation c/f disease progression; mild e/o partial obstruction with mild " pelviectasis of L kidney, mild L urothelial enhancement possibly related to a UTI; focal SB narrowing 2/2 soft tissue thickening along fistulized segment of SB in L hemipelvis with multiple fluid filled loops of proximal bowel with few air-fluid levels, suggestive of partial bowel obstruction; decompressed and enhancing appearance of vagina without definitive fistulous connection; pelvic nodularity with no signficant change.   ---   Oncology re-consulted, no plans for induction therapies while admitted, will need to reassess pending improvement in nutritional and functional status and f/u as outpatient  ---   Suspect her deconditioning is primarily related to her cancer progression and have concerns that she will not be able to progress enough here to discharge safely.  Trial of slow increase in TF rate with advancement up to 20 cc/h but then had recurrent nausea.  Also developed pelvic pain as below.  In s/o repeat imaging results, again suspect her sxs are all related to cancer progression.  She is not a surgical candidate.   ---   Palliative care following, appreciate assistance  - Discussed possible transition to comfort care/hospice with Dominic on 8/25, and separately with her daughters via phone and in person (8/25).  They expressed understanding of progressive cancer and recurrent fistula.  Dr. Portillo discussed possible transition to hospice/CC which Dominic is familiar with.  She requested further d/w palliative care who will follow up with family soon.  - Please see note on 8/22 for GOC discussion with pt and family  - Please communicate directly with family (Dolores, [phone]) for all decision making  ---   Family in agreement with comfort measures, please see note for details from discussion dated on same day.   ---   D/c'd IV CTX with suspected UCx+ likely 2/2 fistulization from cancer  ---   Completed PO vanc x 10 days 8/22 - 8/31 for treatment of C diff colitis  ---   Will not continue  additional TFs with progression in metastatic cancer and c/f partial obstruction on CT --> suspect would lead to prolonged suffering with minimal benefit, along with increased risk of worsening nausea/emesis/aspiration/etc  ---   8/29 Dr. Portillo spoke with pt's daughter Deonte by bedside and did call Reena who family is referring to for help, Deonte was asking about repeat CT scan, Dr. Portillo did discuss that it will not show improvement but worsening as CT already showed worsening on 8/24.  / Bandar discussed that unfortunately Myla is at very high risk for bowel obstruction form her cancer that has gotten worse.  Dr. Portillo feel Myla has several weeks to months to live and they expressed understanding of this . They again asked when can we restart tube feeding as they would be interested in this, we could try in few days if still desired but we did talk about the fact that Myla had nausea vomiting with very low dose TF   ---   Pt appears to be actively dying     #  Pelvic/abdominal pain  #  E coli UTI vs colonization  #  Bloody discharge, suspect hematuria  ---   Pt c/o lower pelvic pain.   ---   RN recently noted possible bloody discharge from vagina/urethra    ---   Pt was previously tx for PNA/possible UTI earlier in hospitalization with a course of CTX as below.  Repeat UA appears infected with UCx +E coli, R to CTX.  Would suspect with her fistula, this is likely contamination and her pain is more related to metastatic progression.  No anemia.  D/w cousin as above, will continue therapies for the time being until discussion with palliative care takes place for possible transition to comfort measures.   ---   S/p CTX 8/25 - 8/26  ---   Will consider treating pt w/ Ciprofloxacin 400 mg iv bid if family insisted on IV abx treatment     #  Severe malnutrition  ---   PEG placement on 6/2024, on cycled G-tube feeds.   ---   Was not tolerating higher rates initially.  Started to slowly titrate up feeds but  had nausea recurrence and now with worsening pelvic pain like 2/2 worsening cancer.   ---  Suspect will transition to comfort measures/hospice as above.   ---   RD following  ---   TF on hold  ---   IVF d/c'd     #  Hypernatremia  ---   Due to hypovolemia  ---   Na level peaked at 150 ---> IVF ---> 144 on 8/25  ---   Na was not checked since 8/25     #  Nausea and vomiting, improved  #  Decreased po intake  ---   Had CT chest abdominal pelvis at [place] 8/8, she was already having above symptoms. Showed worsening of the locally advanced mass involving bladder, no evidence of bowel obstruction but at high risk, possible enterovesical fistula.  Need to be careful with IVF w/ her h/o HFrEF.    ---   Abdominal XR 8/10 showed nonobstructive gas pattern, no pneumatosis or free air. Lactate negative.  ---   She is also constipated so started on bowel meds. Suspect related to above. GI in agreement.   ---   UCx 8/24 grew > 100K E coli but ? if pt has enterovesical fistula  ---   Repeat abdominal XR 8/16 with nonspecific gas pattern, mild colonic stool burden    - GI initially consulted, now signed off, recommended scopalamine patch and miralax titration for goal 1 BM per day  ---   Currently family are considering comfort measures     #  Diarrhea/loose stools, improving  #  C diff infection  ---   Had some constipation earlier in course. 5x BM on 8/19 with possible c/f overflow diarrhea.  States she had diarrhea all night on 8/20.  Denied abdominal pain.    ---   She was exposed to CTX earlier this admission.    ---   Stool C diff toxins positive on 8/21.    ---   Completed Oral Vancomycin x 10 days, 8/22 8/31/24  ---   Diarrhea has resolved  ---   Continue Miralalx     #  Leukocytosis  ---   Due to underlying ca and infection  ---   Resolved     #  Chronic HFrEF  #  Significant Mitral Valve regurgitation   #  Hypertension    ---   Follows at [facility].   ---   TTE 4/4 with EF 29%  moderate TR.   ---   Was to have cardiac  MRI once she improved.   ---   Continue PTA carvedilol with holding parameters   ---   Not on ACEi/ARB or SGLT2 inhibitor due to CKD      #  CKD3  ---   Baseline creatinine 1.4-1.7.   ---   Peak Cr during this hospitalization is 1.8, not too far from baseline  ---   Avoid nephrotoxins     #  T2DM  ---   Diet controlled  ---   No longer monitoring glucose level     #  Mild cognitive impairment  ---   Continue supportive care     #  Depression  ---   Not on medications           Diet: NPO for Medical/Clinical Reasons Except for: Other; Specify: ok for small volume food/liquids (< 100 cc at a time) for pt comfort    DVT Prophylaxis: on confort cares   Cameron Catheter: Not present  Lines: None     Cardiac Monitoring: None  Code Status: No CPR- Do NOT Intubate    Disposition Plan    Medically Ready for Discharge now w/ comfort care measures only  Awaiting for placement          Nic Ferrer MD  Hospitalist Service, GOLD TEAM 17  [facility]  Securely message with [employer] (more info)  Text page via [employer] Paging/Directory   See signed in provider for up to date coverage information  ______________________________________________________________________    Interval History   Lying in bed  She does not open her eyes  Chronically ill-appearing        Physical Exam   Vital Signs: Temp: 98.8  F (37.1  C) Temp src: Oral BP: 104/61 Pulse: 60   Resp: 18   O2 Device: None (Room air)    Weight: 130 lbs 11.72 oz  General appearence: in no distress   Breathing nonlaboured  Abdomen l soft  + bowel sounds      Data         Imaging results reviewed over the past 24 hrs:   No results found for this or any previous visit (from the past 24 hour(s)).

## 2024-09-05 NOTE — PLAN OF CARE
No Acute Changes   Comfort Cares:  Haldol & Oxycodone given as scheduled as necessary. Pt very labile and calm this shift. Occasional moans with movement.   Q 2 hr turns  Incontinent of B/B- rafia/bloody urine   Family at bedside throughout day     Awaiting Placement   Problem: Adult Inpatient Plan of Care  Goal: Plan of Care Review  Description: The Plan of Care Review/Shift note should be completed every shift.  The Outcome Evaluation is a brief statement about your assessment that the patient is improving, declining, or no change.  This information will be displayed automatically on your shift  note.  Outcome: Progressing  Flowsheets (Taken 9/5/2024 1551)  Outcome Evaluation: Recieving Comfort Cares, awaiting placement. Repositioned q 2 hrs. Haldol & Oxycodone given when neccessary for pain & comfort.  Plan of Care Reviewed With:   patient   family  Overall Patient Progress: no change  Goal: Absence of Hospital-Acquired Illness or Injury  Outcome: Progressing  Intervention: Identify and Manage Fall Risk  Recent Flowsheet Documentation  Taken 9/5/2024 1015 by Vilma Maxwell RN  Safety Promotion/Fall Prevention:   increased rounding and observation   increase visualization of patient   lighting adjusted  Intervention: Prevent Skin Injury  Recent Flowsheet Documentation  Taken 9/5/2024 1520 by Vilma Maxwell RN  Body Position:   turned   log-rolled   supine  Taken 9/5/2024 1015 by Vilma Maxwell RN  Body Position:   supine   log-rolled  Skin Protection: incontinence pads utilized  Device Skin Pressure Protection: absorbent pad utilized/changed  Intervention: Prevent and Manage VTE (Venous Thromboembolism) Risk  Recent Flowsheet Documentation  Taken 9/5/2024 1015 by Vilma Maxwell RN  VTE Prevention/Management: SCDs off (sequential compression devices)  Intervention: Prevent Infection  Recent Flowsheet Documentation  Taken 9/5/2024 1015 by Vilma Maxwell RN  Infection Prevention:    rest/sleep promoted   single patient room provided  Goal: Optimal Comfort and Wellbeing  Outcome: Progressing  Goal: Readiness for Transition of Care  Outcome: Progressing  Flowsheets (Taken 9/5/2024 1551)  Anticipated Changes Related to Illness: none  Transportation Anticipated: agency  Concerns to be Addressed: discharge planning  Barriers to Discharge: Availability  Intervention: Mutually Develop Transition Plan  Recent Flowsheet Documentation  Taken 9/5/2024 1551 by Vilma Maxwell RN  Anticipated Changes Related to Illness: none  Transportation Anticipated: agency  Concerns to be Addressed: discharge planning     Problem: Risk for Delirium  Goal: Optimal Coping  Outcome: Progressing  Goal: Improved Behavioral Control  Outcome: Progressing  Intervention: Minimize Safety Risk  Recent Flowsheet Documentation  Taken 9/5/2024 1015 by Vilma Maxwell RN  Enhanced Safety Measures: pain management     Problem: Diabetes  Goal: Optimal Coping  Outcome: Progressing   Goal Outcome Evaluation:      Plan of Care Reviewed With: patient, family    Overall Patient Progress: no changeOverall Patient Progress: no change    Outcome Evaluation: Recieving Comfort Cares, awaiting placement. Repositioned q 2 hrs. Haldol & Oxycodone given when neccessary for pain & comfort.

## 2024-09-06 NOTE — PROGRESS NOTES
Care Management Discharge Note    Discharge Date:  9/6/24       Discharge Disposition: Hospice    Discharge Services: None    Discharge DME: None    Discharge Transportation: agency    Private pay costs discussed: Not applicable    Does the patient's insurance plan have a 3 day qualifying hospital stay waiver?  No    PAS Confirmation Code:  [id number]  Patient/family educated on Medicare website which has current facility and service quality ratings: yes    Education Provided on the Discharge Plan: Yes  Persons Notified of Discharge Plans: Daughters, cousin  Patient/Family in Agreement with the Plan: yes    Handoff Referral Completed: Yes, non-[facility] PCP: External handoff communication completed     Additional Information:  Writer updated Nila in admissions at [facility] that patient will discharge today. They can accept pt today before 4pm.    Writer spoke to cousinReena, on the phone and updated her. Updated daughters in room about discharge today. Family in room had also talked to hospitalist about medication changes.    Stretcher ride scheduled with [facility] at 13:38 - 14:23. Updated bedside nurse and family.    Patient will go with small supply of comfort meds in hand to facility d/t signing on to hospice tomorrow. Will fax discharge orders to facility.    [address]  [address]  P: [phone]  Admissions: [phone]   F: [phone]     [facility]  Phone: [phone]  Fax: [phone]    Sarah Elias, MSN, APRN, AGCNS-BC   5MS [phone]  Nurse Coordinator  Securely message with Minerva, 5 Med Surg Vocera

## 2024-09-06 NOTE — PROGRESS NOTES
Care Management Discharge Note    Discharge Date: 09/06/24    Discharge Disposition: Hospice    Discharge Services: None    Discharge DME: None    Discharge Transportation: Agency    Private pay costs discussed: transportation costs    Does the patient's insurance plan have a 3 day qualifying hospital stay waiver?  No    PAS Confirmation Code:   Patient/family educated on Medicare website which has current facility and service quality ratings: Yes    Education Provided on the Discharge Plan: Yes  Persons Notified of Discharge Plans: Yes  Patient/Family in Agreement with the Plan: Yes    Handoff Referral Completed: Yes, non-[facility] PCP: External handoff communication completed    Additional Information:  Call out to [facility] to see if they can accept patient today. I am awaiting a call back from admissions to confirm. [facility] can start tomorrow at 1pm. This appt has been scheduled but can be changed if [facility] cannot accept patient today. Unfortunately, [facility] does not do weekend admissions, therefore, patient would need to admit today. Awaiting a call back.     [address]  [address]  P: [phone]  Admissions: [phone]   F: [phone]    [facility]  Phone: [phone]  Fax: [phone]    RUY Morris, LGSW  5 Med Surg   [facility]  Phone: [phone]

## 2024-09-06 NOTE — PLAN OF CARE
Goal Outcome Evaluation:  Lethargic.On comfort cares.  Oral cares done.GT for all meds.  Turned and repositioned.  X1 urine incontinence,tea colored.  Protective sacral mepilex,CDI.  PIV line R forearm,saline locked.  Plan: discharge to LTC/hospice.

## 2024-09-06 NOTE — PROGRESS NOTES
CARE MANAGEMENT      9/6/2024:CHW completed and submitted PAS:[id number]    Patient does not appear to need an OBRA Level II Assessment.        Myke Veliz  [facility]

## 2024-09-06 NOTE — PROGRESS NOTES
Received responsive to voice, unable to speak.  No signs of respiratory distress. Oxygen Sat at 99 % in room air.   Due meds given via Gtube. Keep NPO.  Perineal care provided. Incontinent of B/B. No BM.  Frequent positioning.  Continue Comfort care.  Awaiting for placement - [facility].

## 2024-09-06 NOTE — DISCHARGE SUMMARY
[facility]  Hospitalist Discharge Summary      Date of Admission:  8/10/2024  Date of Discharge:  9/6/2024  Discharging Provider: Nic Ferrer MD  Discharge Service: Hospitalist Service, GOLD TEAM 17    Discharge Diagnoses   1.  Hospice care  2.  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  Bladder mass, s/p resection 9/2023  and pathology showed invasive adenocarcinoma then had partial cystectomy pelvic lymph node dissection small bowel resection 1/2024   4.  Suspected recurrent/metastatic adenocarcinoma  5.  Severe malnutrition  6.  Mild cognitive impairment      Follow-ups Needed After Discharge   Follow-up Appointments    Follow Up and recommended labs and tests     Discharging to hospice facility         Discharge Disposition   Discharged to home  Condition at discharge: Stable    Hospital Course   Myla Glynn is a 80 yo female admitted on 8/10/24. She has complex past medical history of metastatic sigmoid colon adenocarcinoma with bladder metastasis  and colovesical fistula status post resection including sigmoidectomy with en bloc partial cystectomy and total abdominal hysterectomy with left salpingo-oophorectomy, right salpingo-oophorectomy, reimplantation of left ureter, partial omentectomy, appendectomy, and diverting loop ileostomy on 01/05/2021) which was further complicated by enterovesical fistula (status post partial cystectomy, extensive bowel mobilization, small-bowel resection, small-bowel anastomosis, lysis of adhesions 1/9/24), lumbar artery bleed and infected left retroperitoneal hematoma (s/p IR embolization and drain placement 3/9/24 at [facility]), failure to thrive status post G tube placement 6/13/2024, T2DM, severe malnutrition, HFrEF, HTN, mild cognitive impairment,  depression and CKD 3.  She presented to [facility] on 8/10/2024 with nausea, vomiting, abdominal pain and overall failure to thrive.  She did not tolerate nutritional supplement via PEG tube due to intractable nausea and vomiting.  Imaging studies ruled out ileus or SBO.  Pt's symptoms were attributed to suspected recurrent metastatic adenocarcinoma.  She  was seen by oncology, GI, psychiatry and palliative consult services and they have all recommended hospice/comfort care measures.  Family care conference held.  Pt's family ultimately agreed to convert pt's CODE STATUS to HOSPICE/COMFORT CARE MEASURES ONLY.  Tube feed discontinued because of pt's intractable nausea and vomiting.  She will discharge to a hospice facility today with comfort care measures medications consisting of oxycodone Intensol, Haldol, Ativan, Tylenol, atropine 1% ophthalmic solution, senna and docusate per rectum.        Consultations This Hospital Stay   MEDICATION HISTORY IP PHARMACY CONSULT  NUTRITION SERVICES ADULT IP CONSULT  PHARMACY IP CONSULT  ONCOLOGY ADULT IP CONSULT  PHARMACY IP CONSULT  CARE MANAGEMENT / SOCIAL WORK IP CONSULT  PHYSICAL THERAPY ADULT IP CONSULT  OCCUPATIONAL THERAPY ADULT IP CONSULT  NURSING TO CONSULT FOR VASCULAR ACCESS CARE IP CONSULT  GI LUMINAL ADULT IP CONSULT  PALLIATIVE CARE ADULT IP CONSULT  MEDICATION HISTORY IP PHARMACY CONSULT  PSYCHIATRY IP CONSULT  CARE MANAGEMENT / SOCIAL WORK IP CONSULT  CARE MANAGEMENT / SOCIAL WORK IP CONSULT  ONCOLOGY ADULT IP CONSULT  SOCIAL WORK IP CONSULT    Code Status   No CPR- Do NOT Intubate    Time Spent on this Encounter   I, Nic Ferrer MD, personally saw the patient today and spent greater than 30 minutes discharging this patient.       Nic Ferrer MD  [facility] MED SURG  [address] [place] [id number]  Phone: [phone]  Fax: [phone]  ______________________________________________________________________    Physical Exam    Vital Signs:             SpO2: 96 % O2 Device: None (Room air)    Weight: 130 lbs 11.72 oz  General appearence: Cachectic, chronically ill appearing  CVS:   Tachy, no m/r/g  Lungs:  On RA, CTA b/l, non-labored breathing  Abd:  thin, soft, + BS, PEG tube present       Primary Care Physician   Jami Bundy    Discharge Orders      Hospice Referral      Activity    Your activity upon discharge: activity as tolerated     Follow Up and recommended labs and tests    Discharging to hospice facility     General info for SNF    Length of Stay Estimate: Short Term Care: Estimated # of Days 31-90  Condition at Discharge: Terminal  Level of care:skilled   Rehabilitation Potential: Poor  Admission H&P remains valid and up-to-date: Yes  Recent Chemotherapy: N/A  Use Nursing Home Standing Orders: Yes     Mantoux instructions    Give two-step Mantoux (PPD) Per Facility Policy Yes     Reason for your hospital stay    Pt has a h/o 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  She also has a h/o bladder mass, s/p resection 9/2023  and pathology showed invasive adenocarcinoma then had partial cystectomy pelvic lymph node dissection small bowel resection 1/2024.  She is now suspected to have recurrent/metastatic adenocarcinoma.  She is discharging under hospice and comfort care measures     No CPR- Do NOT Intubate     Diet    CLD and ADAT       Discharge Medications   Current Discharge Medication List        START taking these medications    Details   acetaminophen (TYLENOL) 160 MG/5ML solution Take 20.3 mLs (650 mg) by mouth every 4 hours as needed for fever or mild pain.  Qty: 20.3 mL, Refills: 0    Associated Diagnoses: Hospice care patient      acetaminophen (TYLENOL) 325 MG suppository Place 2 suppositories (650 mg) rectally every 4 hours as needed for fever.  Qty: 10  suppository, Refills: 0    Associated Diagnoses: Hospice care patient      atropine 1 % ophthalmic solution Place 2 drops under the tongue every 4 hours as needed for secretions.  Qty: 5 mL, Refills: 0    Associated Diagnoses: Hospice care patient      bisacodyl (DULCOLAX) 10 MG suppository Place 1 suppository (10 mg) rectally daily as needed for constipation.  Qty: 5 suppository, Refills: 0    Associated Diagnoses: Hospice care patient      haloperidol (HALDOL) 2 MG/ML (HIGH CONC) solution Take 0.5 mLs (1 mg) by mouth or Feeding Tube every 6 hours as needed for agitation.  Qty: 5 mL, Refills: 0    Associated Diagnoses: Nausea and vomiting, unspecified vomiting type      LORazepam (ATIVAN) 0.5 MG tablet Place 1 tablet (0.5 mg) under the tongue every 4 hours as needed for anxiety.  Qty: 10 tablet, Refills: 0    Associated Diagnoses: Hospice care patient      !! oxyCODONE (ROXICODONE INTENSOL) 20 mg/mL (HIGH CONC) solution Take 0.25 mLs (5 mg) by mouth every 4 hours as needed.  Qty: 5 mL, Refills: 0    Associated Diagnoses: Hospice care patient      !! oxyCODONE (ROXICODONE INTENSOL) 20 mg/mL (HIGH CONC) solution Take 0.25 mLs (5 mg) by mouth 2 times daily.  Qty: 5 mL, Refills: 0    Associated Diagnoses: Hospice care patient      scopolamine (TRANSDERM) 1 MG/3DAYS 72 hr patch Place 1 patch over 72 hours onto the skin every 72 hours.  Qty: 2 patch, Refills: 0    Associated Diagnoses: Hospice care patient      sennosides (SENOKOT) 8.6 MG tablet Take 1 tablet by mouth 2 times daily as needed for constipation.  Qty: 10 tablet, Refills: 0    Associated Diagnoses: Hospice care patient       !! - Potential duplicate medications found. Please discuss with provider.        STOP taking these medications       acetaminophen (TYLENOL) 325 MG tablet Comments:   Reason for Stopping:         carvedilol (COREG) 3.125 MG tablet Comments:   Reason for Stopping:         multivitamins w/minerals liquid Comments:   Reason for Stopping:          ondansetron (ZOFRAN) 4 MG tablet Comments:   Reason for Stopping:         oxyBUTYnin (DITROPAN) 5 MG/5ML solution Comments:   Reason for Stopping:         pantoprazole (PROTONIX) 2 mg/mL SUSP suspension Comments:   Reason for Stopping:         psyllium (METAMUCIL/KONSYL) 58.6 % powder Comments:   Reason for Stopping:         rosuvastatin (CRESTOR) 5 MG tablet Comments:   Reason for Stopping:         saccharomyces boulardii (FLORASTOR) 250 MG capsule Comments:   Reason for Stopping:             Allergies   No Known Allergies

## 2024-09-07 NOTE — PROGRESS NOTES
Pt. discharged at 1430 to [facility] , and left with personal belongings. Pt. Sent with discharge packet and orders sent to facility. Patient sent with hard scripts for narcotics. Pt transported with EMS via stretcher

## 2024-09-11 ENCOUNTER — DOCUMENTATION ONLY (OUTPATIENT)
Dept: GERIATRICS | Facility: CLINIC | Age: 79
End: 2024-09-11
Payer: COMMERCIAL

## 2024-09-11 NOTE — PROGRESS NOTES
Patient  on 9/10/24 @ 0930 at [facility].  Patient was on [facility].

## 2025-03-11 NOTE — PATIENT INSTRUCTIONS
Follow up:    1.Labs today    3. MRI/PET scan     3. Urology Referral     4. May need iron infusions if your blood levels are low    4. Nutrition referral     Please call with any questions or concerns regarding your clinic visit today.    It is a pleasure being involved in your health care.    Contacts post-consultation depending on your need:    Radiology Appointments [phone]    Schedule Clinic Appointments [phone] # 1   M-F 7:30 - 5 pm    [facility] Landaver[facility] [phone]    Clinic Fax Number [phone]    Surgery Scheduling [phone]    My Chart is available 24 hours a day and is a secure way to access your records and communicate with your care team.  I strongly recommend signing up if you haven't already done so, if you are comfortable with computers.  If you would like to inquire about this or are having problems with My Chart access, you may call [phone] or go online at [email or web address].  Please allow at least 24 hours for a response and extra time on weekends and Holidays.      
Include Location In Plan?: No
Detail Level: Zone

## (undated) DEVICE — LINEN TOWEL PACK X30 5481

## (undated) DEVICE — SUCTION TIP YANKAUER STR K87

## (undated) DEVICE — ESU LIGASURE LAPAROSCOPIC BLUNT TIP SEALER 5MMX44CM LF1844

## (undated) DEVICE — SU VICRYL 2-0 CT-1 27" UND J259H

## (undated) DEVICE — CUP AND LID 2PK 2OZ STERILE  SSK9006A

## (undated) DEVICE — SUTURE BOOTS 051003PBX

## (undated) DEVICE — DRAPE IOBAN INCISE 23X17" 6650EZ

## (undated) DEVICE — WIPE PREMOIST CLEANSING WASHCLOTHS 7988

## (undated) DEVICE — ESU HOLSTER PLASTIC DISP E2400

## (undated) DEVICE — ESU LIGASURE IMPACT OPEN SEALER/DVDR CVD LG JAW LF4418

## (undated) DEVICE — LINEN TOWEL PACK X6 WHITE 5487

## (undated) DEVICE — SURGICEL HEMOSTAT 4X8" 1952

## (undated) DEVICE — SU VICRYL 0 CT-1 36" J346H

## (undated) DEVICE — SU PDS II 4-0 RB-1 27" Z304H

## (undated) DEVICE — SU VICRYL 0 UR-6 27" J603H

## (undated) DEVICE — SU PROLENE 2-0 SHDA 36" 8523H

## (undated) DEVICE — DRSG GAUZE 4X4" TRAY 6939

## (undated) DEVICE — GOWN IMPERVIOUS BREATHABLE SMART XLG 89045

## (undated) DEVICE — GUIDEWIRE SENSOR DUAL FLEX STR 0.035"X150CM M0066703080

## (undated) DEVICE — DRAIN PENROSE 5/8X18" LATEX 0918040

## (undated) DEVICE — SU PROLENE 4-0 RB-1DA 36" 8557H

## (undated) DEVICE — GLOVE PROTEXIS BLUE W/NEU-THERA 8.0  2D73EB80

## (undated) DEVICE — STPL RELOAD CONTOUR CUT CVD THICK  CR40G

## (undated) DEVICE — TUBE FEEDING 05FR 36"

## (undated) DEVICE — BNDG ABDOMINAL BINDER 9X45-62" 79-89071

## (undated) DEVICE — TUBING SUCTION 10'X3/16" N510

## (undated) DEVICE — SU PROLENE 1 CTX 30" 8455H

## (undated) DEVICE — SYR PISTON URETHRAL 60ML 68000

## (undated) DEVICE — KIT NEW IMAGE COLOSTOMY/ILEOSTOMY 2 3/4" LF 19354

## (undated) DEVICE — BNDG ABDOMINAL BINDER 15X46-62"  08140562

## (undated) DEVICE — NDL 25GA 2"  8881200441

## (undated) DEVICE — PAD CHUX UNDERPAD 23X24" 7136

## (undated) DEVICE — SYR 30ML LL W/O NDL 302832

## (undated) DEVICE — PACK GOWN 3/PK DISP XL SBA32GPFCB

## (undated) DEVICE — ESU ELEC BLADE 6" COATED E1450-6

## (undated) DEVICE — JELLY LUBRICATING SURGILUBE 2OZ TUBE

## (undated) DEVICE — SYR 10ML LL W/O NDL 302995

## (undated) DEVICE — STPL LINEAR 90 X 3.5MM TA9035S

## (undated) DEVICE — KIT PATIENT POSITIONING PIGAZZI LATEX FREE 40580

## (undated) DEVICE — WIPES FOLEY CARE SURESTEP PROVON DFC100

## (undated) DEVICE — SUCTION MANIFOLD NEPTUNE 2 SYS 4 PORT 0702-020-000

## (undated) DEVICE — GLOVE PROTEXIS MICRO 7.5  2D73PM75

## (undated) DEVICE — SU SILK 2-0 TIE 12X30" A305H

## (undated) DEVICE — SU MONOCRYL 4-0 RB-1 27" Y214H

## (undated) DEVICE — SU SILK 4-0 RB-1 30" K871H

## (undated) DEVICE — Device

## (undated) DEVICE — TUBING SUCTION MEDI-VAC SOFT 3/16"X20' N520A

## (undated) DEVICE — ENDO CAP AND TUBING STERILE FOR ENDOGATOR  100130

## (undated) DEVICE — DRAIN JACKSON PRATT ROUND SIL 19FR W/TROCAR LF JP-2232

## (undated) DEVICE — CLIP HORIZON LG ORANGE 004200

## (undated) DEVICE — SU VICRYL 2-0 TIE 12X18" J905T

## (undated) DEVICE — CATH TRAY FOLEY SURESTEP 16FR W/URNE MTR STLK LATEX A303316A

## (undated) DEVICE — SU VICRYL 3-0 SH 27" UND J416H

## (undated) DEVICE — DRAPE LEGGINGS CLEAR 8430

## (undated) DEVICE — KIT CONNECTOR FOR OLYMPUS ENDOSCOPES DEFENDO 100310

## (undated) DEVICE — STPL 100 X 3.8MM GIA10038S

## (undated) DEVICE — PROTECTOR ARM ONE-STEP TRENDELENBURG 40418

## (undated) DEVICE — SU ETHILON 2-0 FS 18" 664H

## (undated) DEVICE — PITCHER STERILE 1000ML  SSK9004A

## (undated) DEVICE — PACK AB HYST II

## (undated) DEVICE — GLOVE PROTEXIS W/NEU-THERA 7.5  2D73TE75

## (undated) DEVICE — SU SILK 3-0 TIE 12X30" A304H

## (undated) DEVICE — ENDO TROCAR SLEEVE KII ADV FIXATION 05X100MM CFS02

## (undated) DEVICE — SU SILK 0 TIE 6X30" A306H

## (undated) DEVICE — STPL CONTOUR CUT CVD GREEN CS40G

## (undated) DEVICE — SUCTION TIP YANKAUER W/O VENT K86

## (undated) DEVICE — RETR RING LONE STAR 14.1X14.1CM 3307G

## (undated) DEVICE — CONNECTOR WATER VALVE PERFUSION PACK STR 020272801

## (undated) DEVICE — DRAPE U SPLIT 74X120" 29440

## (undated) DEVICE — CATH URETERAL OPEN END 05FR 70CM 020015

## (undated) DEVICE — STPL CIRCULAR ENDOPATH 29MM CVD ECS29A

## (undated) DEVICE — PREP CHLORAPREP 26ML TINTED ORANGE  260815

## (undated) DEVICE — SU MONOCRYL 4-0 PS-2 27" UND Y426H

## (undated) DEVICE — DRAPE SHEET MED 44X70" 9355

## (undated) DEVICE — ENDO TROCAR BLUNT TIP KII BALLOON 12X100MM C0R47

## (undated) DEVICE — PACKING IODOFORM STRIP 1/4" 7831

## (undated) DEVICE — PREP POVIDONE IODINE SCRUB 7.5% 4OZ APL82212

## (undated) DEVICE — SU VICRYL 2-0 SH 27" UND J417H

## (undated) DEVICE — PREP POVIDONE IODINE SOLUTION 10% 4OZ

## (undated) DEVICE — ENDO TROCAR FIRST ENTRY KII FIOS ADV FIX 05X100MM CFF03

## (undated) DEVICE — SPONGE LAP 18X18" X8435

## (undated) DEVICE — CLIP HORIZON MED BLUE 002200

## (undated) DEVICE — APPLICATORS COTTON TIP 6"X2 STERILE LF C15053-006

## (undated) DEVICE — PACK DAVINCI UROL

## (undated) DEVICE — VESSEL LOOPS YELLOW MAXI 31145694

## (undated) DEVICE — ENDO TROCAR CONMED AIRSEAL BLADELESS 08X120MM IAS8-120LP

## (undated) DEVICE — ENDO SYSTEM WATER BOTTLE & TUBING W/CO2 FILTER 00711549

## (undated) DEVICE — SU VICRYL 2-0 CT-2 27" UND J269H

## (undated) DEVICE — NDL COUNTER 20CT 31142493

## (undated) DEVICE — BLADE KNIFE SURG 10 371110

## (undated) DEVICE — SOL WATER IRRIG 1000ML BOTTLE 2F7114

## (undated) DEVICE — DRAIN JACKSON PRATT RESERVOIR 100ML SU130-1305

## (undated) DEVICE — RETR ELASTIC STAYS LONE STAR SHARP 5MM 8/PACK 3311-8G

## (undated) DEVICE — SU VICRYL 2-0 UR-6 27" J602H

## (undated) DEVICE — TUBING CONMED AIRSEAL SMOKE EVAC INSUFFLATION ASM-EVAC

## (undated) DEVICE — SU PROLENE 4-0 RB-1DA 18" 8757H

## (undated) DEVICE — TUBING IRRIG CYSTO/BLADDER SET 81" LF 2C4040

## (undated) DEVICE — SUCTION TIP POOLE K770

## (undated) DEVICE — DRAPE MAYO STAND 23X54 8337

## (undated) DEVICE — BARRIER SEPRAFILM 3X5" X6 SHEETS 5086-02

## (undated) DEVICE — SU VICRYL 3-0 SH 8X18" UND J864D

## (undated) RX ORDER — LIDOCAINE HYDROCHLORIDE 10 MG/ML
INJECTION, SOLUTION EPIDURAL; INFILTRATION; INTRACAUDAL; PERINEURAL
Status: DISPENSED
Start: 2024-01-01

## (undated) RX ORDER — FENTANYL CITRATE-0.9 % NACL/PF 10 MCG/ML
PLASTIC BAG, INJECTION (ML) INTRAVENOUS
Status: DISPENSED
Start: 2021-01-05

## (undated) RX ORDER — PROPOFOL 10 MG/ML
INJECTION, EMULSION INTRAVENOUS
Status: DISPENSED
Start: 2021-01-05

## (undated) RX ORDER — ACETAMINOPHEN 325 MG/1
TABLET ORAL
Status: DISPENSED
Start: 2021-05-04

## (undated) RX ORDER — FENTANYL CITRATE 50 UG/ML
INJECTION, SOLUTION INTRAMUSCULAR; INTRAVENOUS
Status: DISPENSED
Start: 2024-01-01

## (undated) RX ORDER — FENTANYL CITRATE-0.9 % NACL/PF 10 MCG/ML
PLASTIC BAG, INJECTION (ML) INTRAVENOUS
Status: DISPENSED
Start: 2021-05-04

## (undated) RX ORDER — HEPARIN SODIUM 5000 [USP'U]/.5ML
INJECTION, SOLUTION INTRAVENOUS; SUBCUTANEOUS
Status: DISPENSED
Start: 2021-05-04

## (undated) RX ORDER — HYDROMORPHONE HYDROCHLORIDE 1 MG/ML
INJECTION, SOLUTION INTRAMUSCULAR; INTRAVENOUS; SUBCUTANEOUS
Status: DISPENSED
Start: 2021-01-05

## (undated) RX ORDER — ONDANSETRON 2 MG/ML
INJECTION INTRAMUSCULAR; INTRAVENOUS
Status: DISPENSED
Start: 2021-01-05

## (undated) RX ORDER — ALBUMIN, HUMAN INJ 5% 5 %
SOLUTION INTRAVENOUS
Status: DISPENSED
Start: 2021-01-05

## (undated) RX ORDER — FENTANYL CITRATE 50 UG/ML
INJECTION, SOLUTION INTRAMUSCULAR; INTRAVENOUS
Status: DISPENSED
Start: 2021-05-04

## (undated) RX ORDER — GRANISETRON HYDROCHLORIDE 1 MG/ML
INJECTION INTRAVENOUS
Status: DISPENSED
Start: 2021-01-05

## (undated) RX ORDER — EPHEDRINE SULFATE 50 MG/ML
INJECTION, SOLUTION INTRAMUSCULAR; INTRAVENOUS; SUBCUTANEOUS
Status: DISPENSED
Start: 2021-01-05

## (undated) RX ORDER — LIDOCAINE HYDROCHLORIDE 20 MG/ML
INJECTION, SOLUTION EPIDURAL; INFILTRATION; INTRACAUDAL; PERINEURAL
Status: DISPENSED
Start: 2021-01-05

## (undated) RX ORDER — CALCIUM CHLORIDE 100 MG/ML
INJECTION INTRAVENOUS; INTRAVENTRICULAR
Status: DISPENSED
Start: 2021-01-05

## (undated) RX ORDER — CEFAZOLIN SODIUM 2 G/100ML
INJECTION, SOLUTION INTRAVENOUS
Status: DISPENSED
Start: 2024-01-01

## (undated) RX ORDER — METOPROLOL TARTRATE 1 MG/ML
INJECTION, SOLUTION INTRAVENOUS
Status: DISPENSED
Start: 2021-05-04

## (undated) RX ORDER — GLYCOPYRROLATE 0.2 MG/ML
INJECTION, SOLUTION INTRAMUSCULAR; INTRAVENOUS
Status: DISPENSED
Start: 2021-01-05

## (undated) RX ORDER — FENTANYL CITRATE 50 UG/ML
INJECTION, SOLUTION INTRAMUSCULAR; INTRAVENOUS
Status: DISPENSED
Start: 2021-01-05

## (undated) RX ORDER — CIPROFLOXACIN 500 MG/1
TABLET, FILM COATED ORAL
Status: DISPENSED
Start: 2021-02-25

## (undated) RX ORDER — HEPARIN SODIUM 5000 [USP'U]/.5ML
INJECTION, SOLUTION INTRAVENOUS; SUBCUTANEOUS
Status: DISPENSED
Start: 2021-01-05

## (undated) RX ORDER — POTASSIUM CHLORIDE 750 MG/1
TABLET, EXTENDED RELEASE ORAL
Status: DISPENSED
Start: 2021-01-05

## (undated) RX ORDER — SODIUM CHLORIDE, SODIUM LACTATE, POTASSIUM CHLORIDE, CALCIUM CHLORIDE 600; 310; 30; 20 MG/100ML; MG/100ML; MG/100ML; MG/100ML
INJECTION, SOLUTION INTRAVENOUS
Status: DISPENSED
Start: 2021-01-05

## (undated) RX ORDER — ERTAPENEM 1 G/1
INJECTION, POWDER, LYOPHILIZED, FOR SOLUTION INTRAMUSCULAR; INTRAVENOUS
Status: DISPENSED
Start: 2021-01-05

## (undated) RX ORDER — CELECOXIB 200 MG/1
CAPSULE ORAL
Status: DISPENSED
Start: 2021-01-05

## (undated) RX ORDER — GLYCOPYRROLATE 0.2 MG/ML
INJECTION, SOLUTION INTRAMUSCULAR; INTRAVENOUS
Status: DISPENSED
Start: 2021-05-04

## (undated) RX ORDER — LIDOCAINE HYDROCHLORIDE 20 MG/ML
JELLY TOPICAL
Status: DISPENSED
Start: 2024-01-01

## (undated) RX ORDER — LIDOCAINE HYDROCHLORIDE 20 MG/ML
JELLY TOPICAL
Status: DISPENSED
Start: 2020-11-25

## (undated) RX ORDER — GRANISETRON HYDROCHLORIDE 1 MG/ML
INJECTION INTRAVENOUS
Status: DISPENSED
Start: 2021-05-04

## (undated) RX ORDER — CIPROFLOXACIN 2 MG/ML
INJECTION, SOLUTION INTRAVENOUS
Status: DISPENSED
Start: 2021-01-05

## (undated) RX ORDER — CIPROFLOXACIN 500 MG/1
TABLET, FILM COATED ORAL
Status: DISPENSED
Start: 2021-01-20

## (undated) RX ORDER — LIDOCAINE HYDROCHLORIDE 20 MG/ML
JELLY TOPICAL
Status: DISPENSED
Start: 2021-02-25

## (undated) RX ORDER — ACETAMINOPHEN 325 MG/1
TABLET ORAL
Status: DISPENSED
Start: 2021-01-05